# Patient Record
Sex: FEMALE | Race: WHITE | NOT HISPANIC OR LATINO | Employment: OTHER | URBAN - METROPOLITAN AREA
[De-identification: names, ages, dates, MRNs, and addresses within clinical notes are randomized per-mention and may not be internally consistent; named-entity substitution may affect disease eponyms.]

---

## 2017-01-05 ENCOUNTER — OFFICE VISIT (OUTPATIENT)
Dept: PAIN MEDICINE | Facility: CLINIC | Age: 67
End: 2017-01-05

## 2017-01-05 VITALS
SYSTOLIC BLOOD PRESSURE: 112 MMHG | OXYGEN SATURATION: 99 % | BODY MASS INDEX: 31.15 KG/M2 | HEART RATE: 76 BPM | RESPIRATION RATE: 16 BRPM | WEIGHT: 165 LBS | HEIGHT: 61 IN | TEMPERATURE: 97.2 F | DIASTOLIC BLOOD PRESSURE: 59 MMHG
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
## 2017-01-05 DIAGNOSIS — M54.2 NECK PAIN: ICD-10-CM

## 2017-01-05 DIAGNOSIS — Z79.899 ENCOUNTER FOR LONG-TERM (CURRENT) USE OF HIGH-RISK MEDICATION: ICD-10-CM

## 2017-01-05 DIAGNOSIS — G89.21 CHRONIC PAIN DUE TO TRAUMA: Primary | ICD-10-CM

## 2017-01-05 PROCEDURE — 99213 OFFICE O/P EST LOW 20 MIN: CPT | Performed by: NURSE PRACTITIONER

## 2017-01-05 RX ORDER — OXYCODONE AND ACETAMINOPHEN 10; 325 MG/1; MG/1
1 TABLET ORAL EVERY 4 HOURS PRN
Qty: 150 TABLET | Refills: 0 | Status: SHIPPED | OUTPATIENT
Start: 2017-01-05 | End: 2017-02-06 | Stop reason: SDUPTHER

## 2017-01-05 RX ORDER — METHYLPREDNISOLONE 4 MG/1
TABLET ORAL
Qty: 21 TABLET | Refills: 0 | Status: SHIPPED | OUTPATIENT
Start: 2017-01-05 | End: 2017-04-19 | Stop reason: ALTCHOICE
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
## 2017-02-06 ENCOUNTER — OFFICE VISIT (OUTPATIENT)
Dept: PAIN MEDICINE | Facility: CLINIC | Age: 67
End: 2017-02-06

## 2017-02-06 VITALS
WEIGHT: 168 LBS | HEART RATE: 78 BPM | OXYGEN SATURATION: 96 % | SYSTOLIC BLOOD PRESSURE: 115 MMHG | BODY MASS INDEX: 31.72 KG/M2 | HEIGHT: 61 IN | RESPIRATION RATE: 16 BRPM | DIASTOLIC BLOOD PRESSURE: 67 MMHG | TEMPERATURE: 97.8 F

## 2017-02-06 DIAGNOSIS — M79.602 PAIN OF LEFT UPPER EXTREMITY: ICD-10-CM

## 2017-02-06 DIAGNOSIS — M54.2 NECK PAIN: ICD-10-CM

## 2017-02-06 DIAGNOSIS — G89.21 CHRONIC PAIN DUE TO TRAUMA: Primary | ICD-10-CM

## 2017-02-06 DIAGNOSIS — Z79.899 ENCOUNTER FOR LONG-TERM (CURRENT) USE OF HIGH-RISK MEDICATION: ICD-10-CM

## 2017-02-06 LAB
POC AMPHETAMINES: NEGATIVE
POC BARBITURATES: NEGATIVE
POC BENZODIAZEPHINES: POSITIVE
POC COCAINE: NEGATIVE
POC METHADONE: NEGATIVE
POC METHAMPHETAMINE SCREEN URINE: NEGATIVE
POC OPIATES: NEGATIVE
POC OXYCODONE: POSITIVE
POC PHENCYCLIDINE: NEGATIVE
POC PROPOXYPHENE: NEGATIVE
POC THC: NEGATIVE
POC TRICYCLIC ANTIDEPRESSANTS: NEGATIVE

## 2017-02-06 PROCEDURE — 80305 DRUG TEST PRSMV DIR OPT OBS: CPT | Performed by: NURSE PRACTITIONER

## 2017-02-06 PROCEDURE — 99213 OFFICE O/P EST LOW 20 MIN: CPT | Performed by: NURSE PRACTITIONER

## 2017-02-06 RX ORDER — OXYCODONE AND ACETAMINOPHEN 10; 325 MG/1; MG/1
1 TABLET ORAL EVERY 4 HOURS PRN
Qty: 150 TABLET | Refills: 0 | Status: SHIPPED | OUTPATIENT
Start: 2017-02-06 | End: 2017-03-06 | Stop reason: SDUPTHER
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
## 2017-03-03 ENCOUNTER — TRANSCRIBE ORDERS (OUTPATIENT)
Dept: ADMINISTRATIVE | Facility: HOSPITAL | Age: 67
End: 2017-03-03

## 2017-03-03 ENCOUNTER — HOSPITAL ENCOUNTER (OUTPATIENT)
Dept: GENERAL RADIOLOGY | Facility: HOSPITAL | Age: 67
Discharge: HOME | End: 2017-03-03
Admitting: INTERNAL MEDICINE

## 2017-03-03 ENCOUNTER — LAB (OUTPATIENT)
Dept: LAB | Facility: HOSPITAL | Age: 67
End: 2017-03-03

## 2017-03-03 DIAGNOSIS — R07.9 CHEST PAIN, UNSPECIFIED: ICD-10-CM

## 2017-03-03 DIAGNOSIS — J64: Primary | ICD-10-CM

## 2017-03-03 DIAGNOSIS — J64: ICD-10-CM

## 2017-03-03 DIAGNOSIS — R05.9 COUGH: ICD-10-CM

## 2017-03-03 LAB
25(OH)D3 SERPL-MCNC: 19 NG/ML (ref 30–100)
BASOPHILS # BLD AUTO: 0.03 10*3/MM3 (ref 0–0.2)
BASOPHILS NFR BLD AUTO: 0.3 % (ref 0–1.5)
DEPRECATED RDW RBC AUTO: 52.3 FL (ref 37–54)
EOSINOPHIL # BLD AUTO: 0.28 10*3/MM3 (ref 0–0.7)
EOSINOPHIL NFR BLD AUTO: 2.8 % (ref 0.3–6.2)
ERYTHROCYTE [DISTWIDTH] IN BLOOD BY AUTOMATED COUNT: 14.8 % (ref 11.7–13)
FOLATE SERPL-MCNC: >20 NG/ML (ref 4.78–24.2)
HCT VFR BLD AUTO: 37.8 % (ref 35.6–45.5)
HGB BLD-MCNC: 12.2 G/DL (ref 11.9–15.5)
IMM GRANULOCYTES # BLD: 0.07 10*3/MM3 (ref 0–0.03)
IMM GRANULOCYTES NFR BLD: 0.7 % (ref 0–0.5)
LYMPHOCYTES # BLD AUTO: 2.64 10*3/MM3 (ref 0.9–4.8)
LYMPHOCYTES NFR BLD AUTO: 26.8 % (ref 19.6–45.3)
MCH RBC QN AUTO: 31.5 PG (ref 26.9–32)
MCHC RBC AUTO-ENTMCNC: 32.3 G/DL (ref 32.4–36.3)
MCV RBC AUTO: 97.7 FL (ref 80.5–98.2)
MONOCYTES # BLD AUTO: 0.77 10*3/MM3 (ref 0.2–1.2)
MONOCYTES NFR BLD AUTO: 7.8 % (ref 5–12)
NEUTROPHILS # BLD AUTO: 6.07 10*3/MM3 (ref 1.9–8.1)
NEUTROPHILS NFR BLD AUTO: 61.6 % (ref 42.7–76)
PLATELET # BLD AUTO: 415 10*3/MM3 (ref 140–500)
PMV BLD AUTO: 9 FL (ref 6–12)
RBC # BLD AUTO: 3.87 10*6/MM3 (ref 3.9–5.2)
VIT B12 BLD-MCNC: >2000 PG/ML (ref 211–946)
WBC NRBC COR # BLD: 9.86 10*3/MM3 (ref 4.5–10.7)

## 2017-03-03 PROCEDURE — 36415 COLL VENOUS BLD VENIPUNCTURE: CPT

## 2017-03-03 PROCEDURE — 82306 VITAMIN D 25 HYDROXY: CPT | Performed by: INTERNAL MEDICINE

## 2017-03-03 PROCEDURE — 85025 COMPLETE CBC W/AUTO DIFF WBC: CPT | Performed by: INTERNAL MEDICINE

## 2017-03-03 PROCEDURE — 82746 ASSAY OF FOLIC ACID SERUM: CPT | Performed by: INTERNAL MEDICINE

## 2017-03-03 PROCEDURE — 71020 HC CHEST PA AND LATERAL: CPT

## 2017-03-03 PROCEDURE — 82607 VITAMIN B-12: CPT | Performed by: INTERNAL MEDICINE

## 2017-03-06 ENCOUNTER — APPOINTMENT (OUTPATIENT)
Dept: WOMENS IMAGING | Facility: HOSPITAL | Age: 67
End: 2017-03-06

## 2017-03-06 ENCOUNTER — OFFICE VISIT (OUTPATIENT)
Dept: PAIN MEDICINE | Facility: CLINIC | Age: 67
End: 2017-03-06

## 2017-03-06 VITALS
RESPIRATION RATE: 16 BRPM | OXYGEN SATURATION: 99 % | DIASTOLIC BLOOD PRESSURE: 68 MMHG | HEIGHT: 61 IN | BODY MASS INDEX: 32.32 KG/M2 | WEIGHT: 171.2 LBS | HEART RATE: 83 BPM | TEMPERATURE: 97.5 F | SYSTOLIC BLOOD PRESSURE: 122 MMHG

## 2017-03-06 DIAGNOSIS — G89.21 CHRONIC PAIN DUE TO TRAUMA: Primary | ICD-10-CM

## 2017-03-06 DIAGNOSIS — M50.30 DEGENERATIVE DISC DISEASE, CERVICAL: ICD-10-CM

## 2017-03-06 DIAGNOSIS — M54.2 NECK PAIN: ICD-10-CM

## 2017-03-06 DIAGNOSIS — Z79.899 ENCOUNTER FOR LONG-TERM (CURRENT) USE OF HIGH-RISK MEDICATION: ICD-10-CM

## 2017-03-06 PROCEDURE — 77067 SCR MAMMO BI INCL CAD: CPT | Performed by: RADIOLOGY

## 2017-03-06 PROCEDURE — 77063 BREAST TOMOSYNTHESIS BI: CPT | Performed by: RADIOLOGY

## 2017-03-06 PROCEDURE — 99213 OFFICE O/P EST LOW 20 MIN: CPT | Performed by: NURSE PRACTITIONER

## 2017-03-06 RX ORDER — OXYCODONE AND ACETAMINOPHEN 10; 325 MG/1; MG/1
1 TABLET ORAL EVERY 4 HOURS PRN
Qty: 150 TABLET | Refills: 0 | Status: SHIPPED | OUTPATIENT
Start: 2017-03-06 | End: 2017-04-05 | Stop reason: SDUPTHER
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
## 2017-03-22 ENCOUNTER — OFFICE VISIT (OUTPATIENT)
Dept: CARDIOLOGY | Facility: CLINIC | Age: 67
End: 2017-03-22

## 2017-03-22 VITALS
BODY MASS INDEX: 32.28 KG/M2 | HEIGHT: 61 IN | WEIGHT: 171 LBS | DIASTOLIC BLOOD PRESSURE: 64 MMHG | HEART RATE: 69 BPM | SYSTOLIC BLOOD PRESSURE: 118 MMHG

## 2017-03-22 DIAGNOSIS — Z72.0 TOBACCO ABUSE: ICD-10-CM

## 2017-03-22 DIAGNOSIS — R07.2 PRECORDIAL PAIN: Primary | ICD-10-CM

## 2017-03-22 DIAGNOSIS — R06.09 DYSPNEA ON EXERTION: ICD-10-CM

## 2017-03-22 PROCEDURE — 93000 ELECTROCARDIOGRAM COMPLETE: CPT | Performed by: INTERNAL MEDICINE

## 2017-03-22 PROCEDURE — 99214 OFFICE O/P EST MOD 30 MIN: CPT | Performed by: INTERNAL MEDICINE

## 2017-03-22 NOTE — PROGRESS NOTES
"    Subjective:     Encounter Date:03/22/2017      Patient ID: Traci King is a 66 y.o. female.    Chief Complaint:  History of Present Illness     Ms. King is a 66-year-old female with a history of prior right middle lobe resection for carcinoma and chronic dyspnea on exertion who presents for evaluation of chest discomfort.  I last saw the patient in 08/2015 for an evaluation of worsening dyspnea on exertion and chest pain.  We decided to proceed with an echocardiogram and a stress test.  Her echocardiogram revealed normal left ventricular systolic function and wall motion, an ejection fraction of 56%, normal diastolic function and a right ventricular systolic pressure of 30 mmHg.  Her stress test revealed a large area of myocardial infarction in the septal wall.  Based on these abnormal findings we decided to proceed with a cardiac catheterization which was performed on 08/17/2015 and was negative for any coronary artery disease.      Today she again presents for chest discomfort.  The patient reports that she was in [place] shopping when she developed the sudden onset of \"hurting\" in the middle of her chest.  She had some associated mild jaw discomfort with this along with nausea.  She denied any significant change in her shortness of breath.  She denied any diaphoresis.  When she went to check out, the  asked her if she was okay because the patient was very pale.  She decided that if she continued to have these symptoms she would go to the [facility] next door.  Instead, by the time she got to the car her pain had resolved.  In the two weeks since then she has had no further episodes.  She went to see Dr. Cheek and it was unclear if her EKG had changed.  She continues to have her chronic dyspnea on exertion and it has progressed some but she believes that some of this is because she continues to gain weight.  She denies any palpitations, dizziness, lightheadedness, PND, orthopnea, " presyncope or syncope.  She is concerned she may have had a heart attack at that time.          Review of Systems   Constitution: Negative for weakness and malaise/fatigue.   HENT: Negative for headaches, hearing loss, hoarse voice, nosebleeds and sore throat.    Eyes: Negative for pain.   Cardiovascular: Positive for chest pain and dyspnea on exertion. Negative for claudication, cyanosis, irregular heartbeat, leg swelling, near-syncope, orthopnea, palpitations, paroxysmal nocturnal dyspnea and syncope.   Respiratory: Negative for shortness of breath and snoring.    Endocrine: Negative for cold intolerance, heat intolerance, polydipsia, polyphagia and polyuria.   Skin: Negative for itching and rash.   Musculoskeletal: Negative for arthritis, falls, joint pain, joint swelling, muscle cramps, muscle weakness and myalgias.   Gastrointestinal: Negative for constipation, diarrhea, dysphagia, heartburn, hematemesis, hematochezia, melena, nausea and vomiting.   Genitourinary: Negative for frequency, hematuria and hesitancy.   Neurological: Negative for excessive daytime sleepiness, dizziness, light-headedness and numbness.   Psychiatric/Behavioral: Negative for depression. The patient is not nervous/anxious.           Current Outpatient Prescriptions:   •  ARIPiprazole (ABILIFY) 5 MG tablet, Take  by mouth daily., Disp: , Rfl:   •  benzonatate (TESSALON) 100 MG capsule, , Disp: , Rfl:   •  cetirizine (ZyrTEC) 10 MG tablet, Take  by mouth 2 (two) times a day., Disp: , Rfl:   •  meloxicam (MOBIC) 15 MG tablet, Take 1 tablet by mouth Daily., Disp: 90 tablet, Rfl: 3  •  nystatin-triamcinolone (MYCOLOG II) 060054-5.1 UNIT/GM-% cream, , Disp: , Rfl:   •  oxyCODONE-acetaminophen (PERCOCET)  MG per tablet, Take 1 tablet by mouth Every 4 (Four) Hours As Needed for severe pain (7-10) (max 5 per day)., Disp: 150 tablet, Rfl: 0  •  PARoxetine (PAXIL) 20 MG tablet, , Disp: , Rfl:   •  temazepam (RESTORIL) 15 MG capsule, Take  by  "mouth., Disp: , Rfl:   •  VAGIFEM 10 MCG tablet vaginal tablet, , Disp: , Rfl:   •  MethylPREDNISolone (MEDROL, ANY,) 4 MG tablet, Take as directed on package instructions., Disp: 21 tablet, Rfl: 0    Past Medical History:   Diagnosis Date   • Depression    • Joint pain    • Low back pain    • Neck pain    • Torn rotator cuff      Past Surgical History:   Procedure Laterality Date   • BLADDER SURGERY     • BREAST SURGERY     • CARDIAC CATHETERIZATION  08/2015   • EPIDURAL BLOCK     • LUNG LOBECTOMY Right 2013    middle lobe   • ORIF WRIST FRACTURE     • RETINAL DETACHMENT REPAIR     • TONSILLECTOMY     • TUBAL ABDOMINAL LIGATION       History reviewed. No pertinent family history.  Social History   Substance Use Topics   • Smoking status: Current Every Day Smoker     Packs/day: 0.50   • Smokeless tobacco: None   • Alcohol use No           ECG 12 Lead  Date/Time: 3/22/2017 5:01 PM  Performed by: CARA CELESTIN  Authorized by: CARA CELESTIN   Comparison: compared with previous ECG   Rhythm: sinus rhythm  Other findings: PRWP               Objective:         Visit Vitals   • /64 (BP Location: Right arm, Patient Position: Sitting)   • Pulse 69   • Ht 61\" (154.9 cm)   • Wt 171 lb (77.6 kg)   • BMI 32.31 kg/m2          Physical Exam   Constitutional: She is oriented to person, place, and time. She appears well-developed and well-nourished.   HENT:   Head: Normocephalic and atraumatic.   Eyes: Conjunctivae, EOM and lids are normal. Pupils are equal, round, and reactive to light.   Neck: Normal range of motion and full passive range of motion without pain. Neck supple. No JVD present. Carotid bruit is not present.   Cardiovascular: Normal rate, regular rhythm, S1 normal and S2 normal.  Exam reveals no gallop.    No murmur heard.  Pulses:       Radial pulses are 2+ on the right side, and 2+ on the left side.   No bilateral lower extremity edema   Pulmonary/Chest: Effort normal and breath sounds normal.   Abdominal: " Soft. Normal appearance.   Lymphadenopathy:     She has no cervical adenopathy.   Neurological: She is alert and oriented to person, place, and time.   Skin: Skin is warm, dry and intact.   Psychiatric: She has a normal mood and affect.       Lab Review:       Assessment:          Diagnosis Plan   1. Precordial pain  Adult Transthoracic Echo Complete   2. Dyspnea on exertion     3. Tobacco abuse            Plan:        Chest discomfort.  Her symptoms have typical and atypical features.  It is reassuring that she has only had the one episode.  It is also reassuring that approximately one and a half years ago she had normal coronary arteries.  I would be surprised, although it is possible, that she has had progression of disease since then.  We discussed the options of monitoring her symptoms versus proceeding with a stress test and she is okay with monitoring her symptoms for now.  She is still concerned that maybe she had some sort of damage or event that occurred at the time of her episode.  We will proceed with an echocardiogram to see if there is any change in her LV function or wall motion.      We will call the patient and discuss the results of her echocardiogram.  Further workup, management and followup will be based on those results.

## 2017-03-29 ENCOUNTER — HOSPITAL ENCOUNTER (OUTPATIENT)
Dept: CARDIOLOGY | Facility: HOSPITAL | Age: 67
Discharge: HOME | End: 2017-03-29
Attending: INTERNAL MEDICINE | Admitting: INTERNAL MEDICINE

## 2017-03-29 VITALS
DIASTOLIC BLOOD PRESSURE: 42 MMHG | HEIGHT: 61 IN | HEART RATE: 82 BPM | WEIGHT: 171 LBS | SYSTOLIC BLOOD PRESSURE: 104 MMHG | BODY MASS INDEX: 32.28 KG/M2

## 2017-03-29 LAB
ASCENDING AORTA: 2.8 CM
BH CV ECHO MEAS - ACS: 1.9 CM
BH CV ECHO MEAS - AO ROOT AREA (BSA CORRECTED): 1.6
BH CV ECHO MEAS - AO ROOT AREA: 6.2 CM^2
BH CV ECHO MEAS - AO ROOT DIAM: 2.8 CM
BH CV ECHO MEAS - BSA(HAYCOCK): 1.9 M^2
BH CV ECHO MEAS - BSA: 1.8 M^2
BH CV ECHO MEAS - BZI_BMI: 32.1 KILOGRAMS/M^2
BH CV ECHO MEAS - BZI_METRIC_HEIGHT: 154.9 CM
BH CV ECHO MEAS - BZI_METRIC_WEIGHT: 77.1 KG
BH CV ECHO MEAS - CONTRAST EF (2CH): 55 ML/M^2
BH CV ECHO MEAS - CONTRAST EF 4CH: 68 ML/M^2
BH CV ECHO MEAS - EDV(MOD-SP2): 60 ML
BH CV ECHO MEAS - EDV(MOD-SP4): 75 ML
BH CV ECHO MEAS - EDV(TEICH): 126.5 ML
BH CV ECHO MEAS - EF(CUBED): 89.6 %
BH CV ECHO MEAS - EF(MOD-SP2): 55 %
BH CV ECHO MEAS - EF(MOD-SP4): 68 %
BH CV ECHO MEAS - EF(TEICH): 83.7 %
BH CV ECHO MEAS - ESV(MOD-SP2): 27 ML
BH CV ECHO MEAS - ESV(MOD-SP4): 24 ML
BH CV ECHO MEAS - ESV(TEICH): 20.6 ML
BH CV ECHO MEAS - FS: 53 %
BH CV ECHO MEAS - IVS/LVPW: 1
BH CV ECHO MEAS - IVSD: 0.92 CM
BH CV ECHO MEAS - LAT PEAK E' VEL: 10 CM/SEC
BH CV ECHO MEAS - LV DIASTOLIC VOL/BSA (35-75): 42.5 ML/M^2
BH CV ECHO MEAS - LV MASS(C)D: 171.4 GRAMS
BH CV ECHO MEAS - LV MASS(C)DI: 97.2 GRAMS/M^2
BH CV ECHO MEAS - LV SYSTOLIC VOL/BSA (12-30): 13.6 ML/M^2
BH CV ECHO MEAS - LVIDD: 5.1 CM
BH CV ECHO MEAS - LVIDS: 2.4 CM
BH CV ECHO MEAS - LVLD AP2: 6.3 CM
BH CV ECHO MEAS - LVLD AP4: 6.5 CM
BH CV ECHO MEAS - LVLS AP2: 5.1 CM
BH CV ECHO MEAS - LVLS AP4: 5.4 CM
BH CV ECHO MEAS - LVOT AREA (M): 2.5 CM^2
BH CV ECHO MEAS - LVOT AREA: 2.5 CM^2
BH CV ECHO MEAS - LVOT DIAM: 1.8 CM
BH CV ECHO MEAS - LVPWD: 0.92 CM
BH CV ECHO MEAS - MED PEAK E' VEL: 8 CM/SEC
BH CV ECHO MEAS - MR MAX PG: 41 MMHG
BH CV ECHO MEAS - MR MAX VEL: 320.1 CM/SEC
BH CV ECHO MEAS - MV A DUR: 0.1 SEC
BH CV ECHO MEAS - MV A MAX VEL: 76.2 CM/SEC
BH CV ECHO MEAS - MV DEC SLOPE: 452.6 CM/SEC^2
BH CV ECHO MEAS - MV DEC TIME: 0.17 SEC
BH CV ECHO MEAS - MV E MAX VEL: 78.6 CM/SEC
BH CV ECHO MEAS - MV E/A: 1
BH CV ECHO MEAS - MV MAX PG: 4.7 MMHG
BH CV ECHO MEAS - MV MEAN PG: 2 MMHG
BH CV ECHO MEAS - MV P1/2T MAX VEL: 79.1 CM/SEC
BH CV ECHO MEAS - MV P1/2T: 51.2 MSEC
BH CV ECHO MEAS - MV V2 MAX: 108.6 CM/SEC
BH CV ECHO MEAS - MV V2 MEAN: 67.8 CM/SEC
BH CV ECHO MEAS - MV V2 VTI: 32.2 CM
BH CV ECHO MEAS - MVA P1/2T LCG: 2.8 CM^2
BH CV ECHO MEAS - MVA(P1/2T): 4.3 CM^2
BH CV ECHO MEAS - PA ACC TIME: 0.13 SEC
BH CV ECHO MEAS - PA MAX PG (FULL): 1.3 MMHG
BH CV ECHO MEAS - PA MAX PG: 2.7 MMHG
BH CV ECHO MEAS - PA PR(ACCEL): 20.4 MMHG
BH CV ECHO MEAS - PA V2 MAX: 81.4 CM/SEC
BH CV ECHO MEAS - PULM A REVS DUR: 0.1 SEC
BH CV ECHO MEAS - PULM A REVS VEL: 25.2 CM/SEC
BH CV ECHO MEAS - PULM DIAS VEL: 52.4 CM/SEC
BH CV ECHO MEAS - PULM S/D: 1.1
BH CV ECHO MEAS - PULM SYS VEL: 56.8 CM/SEC
BH CV ECHO MEAS - PVA(V,A): 1.9 CM^2
BH CV ECHO MEAS - PVA(V,D): 1.9 CM^2
BH CV ECHO MEAS - RV MAX PG: 1.4 MMHG
BH CV ECHO MEAS - RV MEAN PG: 0.85 MMHG
BH CV ECHO MEAS - RV V1 MAX: 59.2 CM/SEC
BH CV ECHO MEAS - RV V1 MEAN: 43.7 CM/SEC
BH CV ECHO MEAS - RV V1 VTI: 14.8 CM
BH CV ECHO MEAS - RVOT AREA: 2.7 CM^2
BH CV ECHO MEAS - RVOT DIAM: 1.8 CM
BH CV ECHO MEAS - SI(CUBED): 69.3 ML/M^2
BH CV ECHO MEAS - SI(MOD-SP2): 18.7 ML/M^2
BH CV ECHO MEAS - SI(MOD-SP4): 28.9 ML/M^2
BH CV ECHO MEAS - SI(TEICH): 60.1 ML/M^2
BH CV ECHO MEAS - SV(CUBED): 122.2 ML
BH CV ECHO MEAS - SV(MOD-SP2): 33 ML
BH CV ECHO MEAS - SV(MOD-SP4): 51 ML
BH CV ECHO MEAS - SV(RVOT): 39.5 ML
BH CV ECHO MEAS - SV(TEICH): 105.9 ML
BH CV ECHO MEAS - TAPSE (>1.6): 2.9 CM2
BH CV ECHO MEAS - TR MAX VEL: 190 CM/SEC
BH CV XLRA - RV BASE: 2.4 CM
BH CV XLRA - TDI S': 13 CM/SEC
E/E' RATIO: 9
LEFT ATRIUM VOLUME INDEX: 14 ML/M2
LEFT ATRIUM VOLUME: 25 CM3
SINUS: 2.6 CM
STJ: 2.2 CM

## 2017-03-29 PROCEDURE — 25010000002 PERFLUTREN (DEFINITY) 8.476 MG IN SODIUM CHLORIDE 10 ML INJECTION: Performed by: INTERNAL MEDICINE

## 2017-03-29 PROCEDURE — 93306 TTE W/DOPPLER COMPLETE: CPT | Performed by: INTERNAL MEDICINE

## 2017-03-29 PROCEDURE — C8929 TTE W OR WO FOL WCON,DOPPLER: HCPCS

## 2017-03-29 RX ADMIN — PERFLUTREN 1.5 ML: 6.52 INJECTION, SUSPENSION INTRAVENOUS at 08:33

## 2017-03-31 ENCOUNTER — TELEPHONE (OUTPATIENT)
Dept: CARDIOLOGY | Facility: CLINIC | Age: 67
End: 2017-03-31

## 2017-03-31 DIAGNOSIS — R07.89 OTHER CHEST PAIN: Primary | ICD-10-CM

## 2017-04-05 ENCOUNTER — OFFICE VISIT (OUTPATIENT)
Dept: PAIN MEDICINE | Facility: CLINIC | Age: 67
End: 2017-04-05

## 2017-04-05 VITALS
HEART RATE: 89 BPM | TEMPERATURE: 98 F | SYSTOLIC BLOOD PRESSURE: 99 MMHG | RESPIRATION RATE: 18 BRPM | DIASTOLIC BLOOD PRESSURE: 61 MMHG | HEIGHT: 61 IN | BODY MASS INDEX: 32.02 KG/M2 | WEIGHT: 169.6 LBS | OXYGEN SATURATION: 93 %

## 2017-04-05 DIAGNOSIS — M50.30 DEGENERATIVE DISC DISEASE, CERVICAL: ICD-10-CM

## 2017-04-05 DIAGNOSIS — Z79.899 ENCOUNTER FOR LONG-TERM (CURRENT) USE OF HIGH-RISK MEDICATION: ICD-10-CM

## 2017-04-05 DIAGNOSIS — G89.21 CHRONIC PAIN DUE TO TRAUMA: Primary | ICD-10-CM

## 2017-04-05 DIAGNOSIS — M54.2 NECK PAIN: ICD-10-CM

## 2017-04-05 PROCEDURE — 99214 OFFICE O/P EST MOD 30 MIN: CPT | Performed by: NURSE PRACTITIONER

## 2017-04-05 RX ORDER — OXYCODONE AND ACETAMINOPHEN 10; 325 MG/1; MG/1
1 TABLET ORAL EVERY 4 HOURS PRN
Qty: 150 TABLET | Refills: 0 | Status: SHIPPED | OUTPATIENT
Start: 2017-04-05 | End: 2017-04-19
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
## 2017-04-12 ENCOUNTER — TELEPHONE (OUTPATIENT)
Dept: CARDIOLOGY | Facility: CLINIC | Age: 67
End: 2017-04-12

## 2017-04-12 ENCOUNTER — HOSPITAL ENCOUNTER (OUTPATIENT)
Dept: CARDIOLOGY | Facility: HOSPITAL | Age: 67
Discharge: HOME | End: 2017-04-12
Attending: INTERNAL MEDICINE | Admitting: INTERNAL MEDICINE

## 2017-04-12 ENCOUNTER — TELEPHONE (OUTPATIENT)
Dept: PAIN MEDICINE | Facility: CLINIC | Age: 67
End: 2017-04-12

## 2017-04-12 DIAGNOSIS — R07.89 OTHER CHEST PAIN: ICD-10-CM

## 2017-04-12 DIAGNOSIS — R94.39 ABNORMAL STRESS TEST: Primary | ICD-10-CM

## 2017-04-12 LAB
BH CV NUCLEAR PRIOR STUDY: 3
BH CV STRESS BP STAGE 1: NORMAL
BH CV STRESS COMMENTS STAGE 1: NORMAL
BH CV STRESS DOSE REGADENOSON STAGE 1: 0.4
BH CV STRESS DURATION MIN STAGE 1: 0
BH CV STRESS DURATION SEC STAGE 1: 15
BH CV STRESS HR STAGE 1: 111
BH CV STRESS PROTOCOL 1: NORMAL
BH CV STRESS RECOVERY BP: NORMAL MMHG
BH CV STRESS RECOVERY HR: 100 BPM
BH CV STRESS STAGE 1: 1
LV EF NUC BP: 72 %
MAXIMAL PREDICTED HEART RATE: 156 BPM
PERCENT MAX PREDICTED HR: 71.15 %
STRESS BASELINE BP: NORMAL MMHG
STRESS BASELINE HR: 74 BPM
STRESS PERCENT HR: 84 %
STRESS POST EXERCISE DUR SEC: 15 SEC
STRESS POST PEAK BP: NORMAL MMHG
STRESS POST PEAK HR: 111 BPM
STRESS TARGET HR: 133 BPM

## 2017-04-12 PROCEDURE — 0 TECHNETIUM TETROFOSMIN KIT: Performed by: INTERNAL MEDICINE

## 2017-04-12 PROCEDURE — 78452 HT MUSCLE IMAGE SPECT MULT: CPT

## 2017-04-12 PROCEDURE — 93017 CV STRESS TEST TRACING ONLY: CPT

## 2017-04-12 PROCEDURE — 93018 CV STRESS TEST I&R ONLY: CPT | Performed by: INTERNAL MEDICINE

## 2017-04-12 PROCEDURE — 93016 CV STRESS TEST SUPVJ ONLY: CPT | Performed by: INTERNAL MEDICINE

## 2017-04-12 PROCEDURE — 78452 HT MUSCLE IMAGE SPECT MULT: CPT | Performed by: INTERNAL MEDICINE

## 2017-04-12 PROCEDURE — A9502 TC99M TETROFOSMIN: HCPCS | Performed by: INTERNAL MEDICINE

## 2017-04-12 PROCEDURE — 25010000002 REGADENOSON 0.4 MG/5ML SOLUTION: Performed by: INTERNAL MEDICINE

## 2017-04-12 RX ADMIN — TETROFOSMIN 1 DOSE: 1.38 INJECTION, POWDER, LYOPHILIZED, FOR SOLUTION INTRAVENOUS at 08:50

## 2017-04-12 RX ADMIN — REGADENOSON 0.4 MG: 0.08 INJECTION, SOLUTION INTRAVENOUS at 09:55

## 2017-04-12 RX ADMIN — TETROFOSMIN 1 DOSE: 1.38 INJECTION, POWDER, LYOPHILIZED, FOR SOLUTION INTRAVENOUS at 09:55

## 2017-04-12 NOTE — TELEPHONE ENCOUNTER
Called and discussed stress test results which corresponds with abnormal wall motion on echocardiogram.    Recommend cardiac catheterization.  Explained procedure, risks, and benefits and she agrees to proceed.  She denies any further chest discomfort.  Just dyspnea on exertion.    Order placed.  Please schedule for within the next 1-2 weeks.

## 2017-04-12 NOTE — TELEPHONE ENCOUNTER
FYI - Patient called and states she had a stress test after her heart attack and was abnormal. She is awaiting a call to schedule a heart cath and is looking at blood thinners as well.

## 2017-04-17 ENCOUNTER — TELEPHONE (OUTPATIENT)
Dept: CARDIOLOGY | Facility: CLINIC | Age: 67
End: 2017-04-17

## 2017-04-17 ENCOUNTER — TRANSCRIBE ORDERS (OUTPATIENT)
Dept: LAB | Facility: HOSPITAL | Age: 67
End: 2017-04-17

## 2017-04-17 ENCOUNTER — LAB (OUTPATIENT)
Dept: LAB | Facility: HOSPITAL | Age: 67
End: 2017-04-17
Attending: INTERNAL MEDICINE

## 2017-04-17 DIAGNOSIS — Z13.6 SCREENING FOR ISCHEMIC HEART DISEASE: ICD-10-CM

## 2017-04-17 DIAGNOSIS — Z01.810 PRE-OPERATIVE CARDIOVASCULAR EXAMINATION: ICD-10-CM

## 2017-04-17 DIAGNOSIS — R94.39 OTHER NONSPECIFIC ABNORMAL CARDIOVASCULAR SYSTEM FUNCTION STUDY: ICD-10-CM

## 2017-04-17 DIAGNOSIS — Z01.810 PRE-OPERATIVE CARDIOVASCULAR EXAMINATION: Primary | ICD-10-CM

## 2017-04-17 LAB
ANION GAP SERPL CALCULATED.3IONS-SCNC: 9.8 MMOL/L
BASOPHILS # BLD AUTO: 0.04 10*3/MM3 (ref 0–0.2)
BASOPHILS NFR BLD AUTO: 0.4 % (ref 0–1.5)
BUN BLD-MCNC: 16 MG/DL (ref 8–23)
BUN/CREAT SERPL: 16.5 (ref 7–25)
CALCIUM SPEC-SCNC: 9.3 MG/DL (ref 8.6–10.5)
CHLORIDE SERPL-SCNC: 101 MMOL/L (ref 98–107)
CO2 SERPL-SCNC: 27.2 MMOL/L (ref 22–29)
CREAT BLD-MCNC: 0.97 MG/DL (ref 0.57–1)
DEPRECATED RDW RBC AUTO: 50.5 FL (ref 37–54)
EOSINOPHIL # BLD AUTO: 0.22 10*3/MM3 (ref 0–0.7)
EOSINOPHIL NFR BLD AUTO: 2.4 % (ref 0.3–6.2)
ERYTHROCYTE [DISTWIDTH] IN BLOOD BY AUTOMATED COUNT: 14.2 % (ref 11.7–13)
GFR SERPL CREATININE-BSD FRML MDRD: 58 ML/MIN/1.73
GLUCOSE BLD-MCNC: 99 MG/DL (ref 65–99)
HCT VFR BLD AUTO: 38.4 % (ref 35.6–45.5)
HGB BLD-MCNC: 12.6 G/DL (ref 11.9–15.5)
IMM GRANULOCYTES # BLD: 0.04 10*3/MM3 (ref 0–0.03)
IMM GRANULOCYTES NFR BLD: 0.4 % (ref 0–0.5)
LYMPHOCYTES # BLD AUTO: 2.39 10*3/MM3 (ref 0.9–4.8)
LYMPHOCYTES NFR BLD AUTO: 26.6 % (ref 19.6–45.3)
MCH RBC QN AUTO: 31.5 PG (ref 26.9–32)
MCHC RBC AUTO-ENTMCNC: 32.8 G/DL (ref 32.4–36.3)
MCV RBC AUTO: 96 FL (ref 80.5–98.2)
MONOCYTES # BLD AUTO: 0.54 10*3/MM3 (ref 0.2–1.2)
MONOCYTES NFR BLD AUTO: 6 % (ref 5–12)
NEUTROPHILS # BLD AUTO: 5.75 10*3/MM3 (ref 1.9–8.1)
NEUTROPHILS NFR BLD AUTO: 64.2 % (ref 42.7–76)
PLATELET # BLD AUTO: 317 10*3/MM3 (ref 140–500)
PMV BLD AUTO: 9.2 FL (ref 6–12)
POTASSIUM BLD-SCNC: 4.5 MMOL/L (ref 3.5–5.2)
RBC # BLD AUTO: 4 10*6/MM3 (ref 3.9–5.2)
SODIUM BLD-SCNC: 138 MMOL/L (ref 136–145)
WBC NRBC COR # BLD: 8.98 10*3/MM3 (ref 4.5–10.7)

## 2017-04-17 PROCEDURE — 80048 BASIC METABOLIC PNL TOTAL CA: CPT

## 2017-04-17 PROCEDURE — 85025 COMPLETE CBC W/AUTO DIFF WBC: CPT

## 2017-04-17 PROCEDURE — 36415 COLL VENOUS BLD VENIPUNCTURE: CPT

## 2017-04-17 NOTE — TELEPHONE ENCOUNTER
Patient called, informing me that she is scheduled for a cath on Thursday4/20.  Patient said her work would like to know if she will be able to return to work on Friday April 21.  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  Patient said she is not worried about when she goes back to work but her boss is insistent she find out now how long she needs to be off for.  Aidee

## 2017-04-17 NOTE — TELEPHONE ENCOUNTER
I called and told her she would not be able to work Friday.  For Saturday we would have to wait and see what is find on cath.

## 2017-04-19 RX ORDER — OXYCODONE AND ACETAMINOPHEN 10; 325 MG/1; MG/1
1 TABLET ORAL EVERY 6 HOURS PRN
COMMUNITY
End: 2017-05-03 | Stop reason: SDUPTHER

## 2017-04-20 ENCOUNTER — HOSPITAL ENCOUNTER (OUTPATIENT)
Facility: HOSPITAL | Age: 67
Setting detail: HOSPITAL OUTPATIENT SURGERY
Discharge: HOME | End: 2017-04-20
Attending: INTERNAL MEDICINE | Admitting: INTERNAL MEDICINE

## 2017-04-20 VITALS
TEMPERATURE: 98.2 F | HEIGHT: 62 IN | OXYGEN SATURATION: 96 % | BODY MASS INDEX: 31.28 KG/M2 | DIASTOLIC BLOOD PRESSURE: 60 MMHG | RESPIRATION RATE: 16 BRPM | WEIGHT: 170 LBS | SYSTOLIC BLOOD PRESSURE: 122 MMHG | HEART RATE: 74 BPM

## 2017-04-20 DIAGNOSIS — R94.39 ABNORMAL STRESS TEST: ICD-10-CM

## 2017-04-20 PROCEDURE — C1769 GUIDE WIRE: HCPCS | Performed by: INTERNAL MEDICINE

## 2017-04-20 PROCEDURE — C1894 INTRO/SHEATH, NON-LASER: HCPCS | Performed by: INTERNAL MEDICINE

## 2017-04-20 PROCEDURE — 93458 L HRT ARTERY/VENTRICLE ANGIO: CPT | Performed by: INTERNAL MEDICINE

## 2017-04-20 PROCEDURE — 25010000002 MIDAZOLAM PER 1 MG: Performed by: INTERNAL MEDICINE

## 2017-04-20 PROCEDURE — 25010000002 HEPARIN (PORCINE) PER 1000 UNITS: Performed by: INTERNAL MEDICINE

## 2017-04-20 PROCEDURE — 0 IOPAMIDOL PER 1 ML: Performed by: INTERNAL MEDICINE

## 2017-04-20 PROCEDURE — 25010000002 FENTANYL CITRATE (PF) 100 MCG/2ML SOLUTION: Performed by: INTERNAL MEDICINE

## 2017-04-20 RX ORDER — LIDOCAINE HYDROCHLORIDE 10 MG/ML
0.1 INJECTION, SOLUTION EPIDURAL; INFILTRATION; INTRACAUDAL; PERINEURAL ONCE AS NEEDED
Status: DISCONTINUED | OUTPATIENT
Start: 2017-04-20 | End: 2017-04-20 | Stop reason: HOSPADM

## 2017-04-20 RX ORDER — ERGOCALCIFEROL 1.25 MG/1
50000 CAPSULE ORAL WEEKLY
COMMUNITY
End: 2018-09-24

## 2017-04-20 RX ORDER — ASPIRIN 81 MG/1
81 TABLET ORAL DAILY
COMMUNITY
End: 2018-02-14 | Stop reason: ALTCHOICE

## 2017-04-20 RX ORDER — MIDAZOLAM HYDROCHLORIDE 1 MG/ML
INJECTION INTRAMUSCULAR; INTRAVENOUS AS NEEDED
Status: DISCONTINUED | OUTPATIENT
Start: 2017-04-20 | End: 2017-04-20 | Stop reason: HOSPADM

## 2017-04-20 RX ORDER — FENTANYL CITRATE 50 UG/ML
INJECTION, SOLUTION INTRAMUSCULAR; INTRAVENOUS AS NEEDED
Status: DISCONTINUED | OUTPATIENT
Start: 2017-04-20 | End: 2017-04-20 | Stop reason: HOSPADM

## 2017-04-20 RX ORDER — SODIUM CHLORIDE 9 MG/ML
75 INJECTION, SOLUTION INTRAVENOUS CONTINUOUS
Status: DISCONTINUED | OUTPATIENT
Start: 2017-04-20 | End: 2017-04-20 | Stop reason: HOSPADM

## 2017-04-20 RX ORDER — SODIUM CHLORIDE 0.9 % (FLUSH) 0.9 %
1-10 SYRINGE (ML) INJECTION AS NEEDED
Status: CANCELLED | OUTPATIENT
Start: 2017-04-20

## 2017-04-20 RX ORDER — LIDOCAINE HYDROCHLORIDE 20 MG/ML
INJECTION, SOLUTION INFILTRATION; PERINEURAL AS NEEDED
Status: DISCONTINUED | OUTPATIENT
Start: 2017-04-20 | End: 2017-04-20 | Stop reason: HOSPADM

## 2017-04-20 RX ORDER — SODIUM CHLORIDE 0.9 % (FLUSH) 0.9 %
1-10 SYRINGE (ML) INJECTION AS NEEDED
Status: DISCONTINUED | OUTPATIENT
Start: 2017-04-20 | End: 2017-04-20 | Stop reason: HOSPADM

## 2017-04-20 RX ADMIN — SODIUM CHLORIDE 75 ML/HR: 9 INJECTION, SOLUTION INTRAVENOUS at 07:23

## 2017-04-20 NOTE — DISCHARGE INSTRUCTIONS
[facility]  [address] [place] [address]    Coronary Angiogram with Stent (Radial Approach) After Care    Refer to this sheet in the next few weeks. These instructions provide you with information on caring for yourself after your procedure. Your health care provider may also give you more specific instructions. Your treatment has been planned according to current medical practices, but problems sometimes occur. Call your health care provider if you have any problems or questions after your procedure.       Home Care Instructions:  · You may shower the day after the procedure. Remove the bandage (dressing) and gently wash the site with plain soap and water. Gently pat the site dry. You may apply a band aid daily for 2 days if desired.    · Do not apply powder or lotion to the site.  · Do not submerge the affected site in water for 3 to 5 days or until the site is completely healed.   · Do not flex or bend the affected arm for 24 hours.  · Do not lift, push or pull anything over 10 pounds for 2 days after your procedure.  · Do not operate machinery or power tools for 24 hours.  · Inspect the site at least twice daily. You may notice some bruising at the site and it may be tender for 1 to 2 weeks.     · Increase your fluid intake for the next 2 days.    · Keep arm elevated for 24 hours.  For the remainder of the day, keep your arm in the “Pledge of Allegiance” position when up and about.    · Limit your activity for the next 48 hours and avoid strenuous activity as directed by your physician.   · Cardiac Rehab may or may not be ordered.  Please consult with your physician  · You may drive 24 hours after the procedure unless otherwise instructed by your caregiver.  · A responsible adult should be with you for the first 24 hours after you arrive home.   · Do not make any important legal decisions or sign legal papers for 24 hours.    · Take medicines only as directed by your health care provider.   Blood thinners may be prescribed after your procedure to improve blood flow through the stent.    · Eat a heart-healthy diet. This should include plenty of fresh fruits and vegetables. Meat should be lean cuts. Avoid the following types of food:    ¨ Food that is high in salt.    ¨ Canned or highly processed food.    ¨ Food that is high in saturated fat or sugar.    ¨ Fried food.    · Make any other lifestyle changes recommended by your health care provider. This may include:    ¨ Not using any tobacco products including cigarettes, chewing tobacco, or electronic cigarettes.   ¨ Managing your weight.    ¨ Getting regular exercise.    ¨ Managing your blood pressure.    ¨ Limiting your alcohol intake.    ¨ Managing other health problems, such as diabetes.    · If you need an MRI after your heart stent was placed, be sure to tell the health care provider who orders the MRI that you have a heart stent.    · Keep all follow-up visits as directed by your health care provider.    ·   Call Your Doctor If:  · You have unusual pain at the radial/ulnar (wrist) site.  · You have redness, warmth, swelling, or pain at the radial/ulnar (wrist) site.  · You have drainage (other than a small amount of blood on the dressing).  · `You have chills or a fever > 101.  · Your arm becomes pale or dark, cool, tingly, or numb.  · You develop chest pain, shortness of breath, feel faint or pass out.    · You have heavy bleeding from the site, hold pressure on the site for 20 minutes.  If the bleeding stops, apply a fresh bandage and call your cardiologist.  However, if you continue to have bleeding, call 911.        Make Sure You:   · Understand these instructions.  · Will watch your condition.  · Will get help right away if you are not doing well or get worse.

## 2017-04-20 NOTE — PLAN OF CARE
Problem: Patient Care Overview (Adult)  Goal: Discharge Needs Assessment  Outcome: Outcome(s) achieved Date Met:  04/20/17 04/20/17 1030   Discharge Needs Assessment   Concerns To Be Addressed denies needs/concerns at this time

## 2017-04-20 NOTE — PLAN OF CARE
Problem: Patient Care Overview (Adult)  Goal: Plan of Care Review  Outcome: Outcome(s) achieved Date Met:  04/20/17 04/20/17 1039   Coping/Psychosocial Response Interventions   Plan Of Care Reviewed With patient;spouse   Patient Care Overview   Progress improving   Outcome Evaluation   Outcome Summary/Follow up Plan READY FOR DISCHARGE

## 2017-04-20 NOTE — INTERVAL H&P NOTE
H&P updated. The patient was examined and the following changes are noted:  echo with septal hypokinesis and stress test with septal infarct and sean-infarct ischemia.  For cardiac catheterization today.  Patient and family given opportunity to ask questions.

## 2017-04-20 NOTE — H&P (VIEW-ONLY)
"    Subjective:     Encounter Date:03/22/2017      Patient ID: Traci King is a 66 y.o. female.    Chief Complaint:  History of Present Illness     Ms. King is a 66-year-old female with a history of prior right middle lobe resection for carcinoma and chronic dyspnea on exertion who presents for evaluation of chest discomfort.  I last saw the patient in 08/2015 for an evaluation of worsening dyspnea on exertion and chest pain.  We decided to proceed with an echocardiogram and a stress test.  Her echocardiogram revealed normal left ventricular systolic function and wall motion, an ejection fraction of 56%, normal diastolic function and a right ventricular systolic pressure of 30 mmHg.  Her stress test revealed a large area of myocardial infarction in the septal wall.  Based on these abnormal findings we decided to proceed with a cardiac catheterization which was performed on 08/17/2015 and was negative for any coronary artery disease.      Today she again presents for chest discomfort.  The patient reports that she was in [employer] shopping when she developed the sudden onset of \"hurting\" in the middle of her chest.  She had some associated mild jaw discomfort with this along with nausea.  She denied any significant change in her shortness of breath.  She denied any diaphoresis.  When she went to check out, the  asked her if she was okay because the patient was very pale.  She decided that if she continued to have these symptoms she would go to the [facility] next door.  Instead, by the time she got to the car her pain had resolved.  In the two weeks since then she has had no further episodes.  She went to see Dr. Cheek and it was unclear if her EKG had changed.  She continues to have her chronic dyspnea on exertion and it has progressed some but she believes that some of this is because she continues to gain weight.  She denies any palpitations, dizziness, lightheadedness, PND, orthopnea, " presyncope or syncope.  She is concerned she may have had a heart attack at that time.          Review of Systems   Constitution: Negative for weakness and malaise/fatigue.   HENT: Negative for headaches, hearing loss, hoarse voice, nosebleeds and sore throat.    Eyes: Negative for pain.   Cardiovascular: Positive for chest pain and dyspnea on exertion. Negative for claudication, cyanosis, irregular heartbeat, leg swelling, near-syncope, orthopnea, palpitations, paroxysmal nocturnal dyspnea and syncope.   Respiratory: Negative for shortness of breath and snoring.    Endocrine: Negative for cold intolerance, heat intolerance, polydipsia, polyphagia and polyuria.   Skin: Negative for itching and rash.   Musculoskeletal: Negative for arthritis, falls, joint pain, joint swelling, muscle cramps, muscle weakness and myalgias.   Gastrointestinal: Negative for constipation, diarrhea, dysphagia, heartburn, hematemesis, hematochezia, melena, nausea and vomiting.   Genitourinary: Negative for frequency, hematuria and hesitancy.   Neurological: Negative for excessive daytime sleepiness, dizziness, light-headedness and numbness.   Psychiatric/Behavioral: Negative for depression. The patient is not nervous/anxious.           Current Outpatient Prescriptions:   •  ARIPiprazole (ABILIFY) 5 MG tablet, Take  by mouth daily., Disp: , Rfl:   •  benzonatate (TESSALON) 100 MG capsule, , Disp: , Rfl:   •  cetirizine (ZyrTEC) 10 MG tablet, Take  by mouth 2 (two) times a day., Disp: , Rfl:   •  meloxicam (MOBIC) 15 MG tablet, Take 1 tablet by mouth Daily., Disp: 90 tablet, Rfl: 3  •  nystatin-triamcinolone (MYCOLOG II) 260927-2.1 UNIT/GM-% cream, , Disp: , Rfl:   •  oxyCODONE-acetaminophen (PERCOCET)  MG per tablet, Take 1 tablet by mouth Every 4 (Four) Hours As Needed for severe pain (7-10) (max 5 per day)., Disp: 150 tablet, Rfl: 0  •  PARoxetine (PAXIL) 20 MG tablet, , Disp: , Rfl:   •  temazepam (RESTORIL) 15 MG capsule, Take  by  "mouth., Disp: , Rfl:   •  VAGIFEM 10 MCG tablet vaginal tablet, , Disp: , Rfl:   •  MethylPREDNISolone (MEDROL, ANY,) 4 MG tablet, Take as directed on package instructions., Disp: 21 tablet, Rfl: 0    Past Medical History:   Diagnosis Date   • Depression    • Joint pain    • Low back pain    • Neck pain    • Torn rotator cuff      Past Surgical History:   Procedure Laterality Date   • BLADDER SURGERY     • BREAST SURGERY     • CARDIAC CATHETERIZATION  08/2015   • EPIDURAL BLOCK     • LUNG LOBECTOMY Right 2013    middle lobe   • ORIF WRIST FRACTURE     • RETINAL DETACHMENT REPAIR     • TONSILLECTOMY     • TUBAL ABDOMINAL LIGATION       History reviewed. No pertinent family history.  Social History   Substance Use Topics   • Smoking status: Current Every Day Smoker     Packs/day: 0.50   • Smokeless tobacco: None   • Alcohol use No           ECG 12 Lead  Date/Time: 3/22/2017 5:01 PM  Performed by: CARA CELESTIN  Authorized by: CARA CELESTIN   Comparison: compared with previous ECG   Rhythm: sinus rhythm  Other findings: PRWP               Objective:         Visit Vitals   • /64 (BP Location: Right arm, Patient Position: Sitting)   • Pulse 69   • Ht 61\" (154.9 cm)   • Wt 171 lb (77.6 kg)   • BMI 32.31 kg/m2          Physical Exam   Constitutional: She is oriented to person, place, and time. She appears well-developed and well-nourished.   HENT:   Head: Normocephalic and atraumatic.   Eyes: Conjunctivae, EOM and lids are normal. Pupils are equal, round, and reactive to light.   Neck: Normal range of motion and full passive range of motion without pain. Neck supple. No JVD present. Carotid bruit is not present.   Cardiovascular: Normal rate, regular rhythm, S1 normal and S2 normal.  Exam reveals no gallop.    No murmur heard.  Pulses:       Radial pulses are 2+ on the right side, and 2+ on the left side.   No bilateral lower extremity edema   Pulmonary/Chest: Effort normal and breath sounds normal.   Abdominal: " Soft. Normal appearance.   Lymphadenopathy:     She has no cervical adenopathy.   Neurological: She is alert and oriented to person, place, and time.   Skin: Skin is warm, dry and intact.   Psychiatric: She has a normal mood and affect.       Lab Review:       Assessment:          Diagnosis Plan   1. Precordial pain  Adult Transthoracic Echo Complete   2. Dyspnea on exertion     3. Tobacco abuse            Plan:        Chest discomfort.  Her symptoms have typical and atypical features.  It is reassuring that she has only had the one episode.  It is also reassuring that approximately one and a half years ago she had normal coronary arteries.  I would be surprised, although it is possible, that she has had progression of disease since then.  We discussed the options of monitoring her symptoms versus proceeding with a stress test and she is okay with monitoring her symptoms for now.  She is still concerned that maybe she had some sort of damage or event that occurred at the time of her episode.  We will proceed with an echocardiogram to see if there is any change in her LV function or wall motion.      We will call the patient and discuss the results of her echocardiogram.  Further workup, management and followup will be based on those results.

## 2017-04-20 NOTE — PLAN OF CARE
Problem: Cardiac Catheterization with/without PCI (Adult)  Goal: Signs and Symptoms of Listed Potential Problems Will be Absent or Manageable (Cardiac Catheterization with/without PCI)  Outcome: Outcome(s) achieved Date Met:  04/20/17 04/20/17 1037   Cardiac Catheterization with/without PCI   Problems Assessed (Cardiac Catheterization) all   Problems Present (Cardiac Catheterization) none

## 2017-04-20 NOTE — NURSING NOTE
DR. CELESTIN HERE TO CHECK ON PT RASH. INSTRUCTED PT TO USE OC BENADRYL IF NEEDED AND TO CALL HER FOR FURTHER PROBLEMS

## 2017-04-20 NOTE — PLAN OF CARE
Problem: Patient Care Overview (Adult)  Goal: Adult Individualization and Mutuality  Outcome: Outcome(s) achieved Date Met:  04/20/17

## 2017-05-03 ENCOUNTER — OFFICE VISIT (OUTPATIENT)
Dept: PAIN MEDICINE | Facility: CLINIC | Age: 67
End: 2017-05-03

## 2017-05-03 VITALS
SYSTOLIC BLOOD PRESSURE: 101 MMHG | RESPIRATION RATE: 14 BRPM | HEART RATE: 84 BPM | OXYGEN SATURATION: 95 % | DIASTOLIC BLOOD PRESSURE: 65 MMHG | TEMPERATURE: 97.8 F | HEIGHT: 62 IN | WEIGHT: 172.2 LBS | BODY MASS INDEX: 31.69 KG/M2

## 2017-05-03 DIAGNOSIS — M47.812 CERVICAL FACET JOINT SYNDROME: ICD-10-CM

## 2017-05-03 DIAGNOSIS — M50.30 DEGENERATIVE DISC DISEASE, CERVICAL: ICD-10-CM

## 2017-05-03 DIAGNOSIS — G89.21 CHRONIC PAIN DUE TO TRAUMA: Primary | ICD-10-CM

## 2017-05-03 DIAGNOSIS — M54.2 NECK PAIN: ICD-10-CM

## 2017-05-03 DIAGNOSIS — Z79.899 ENCOUNTER FOR LONG-TERM (CURRENT) USE OF HIGH-RISK MEDICATION: ICD-10-CM

## 2017-05-03 PROCEDURE — 99214 OFFICE O/P EST MOD 30 MIN: CPT | Performed by: NURSE PRACTITIONER

## 2017-05-03 RX ORDER — OXYCODONE AND ACETAMINOPHEN 10; 325 MG/1; MG/1
1 TABLET ORAL EVERY 6 HOURS PRN
Qty: 150 TABLET | Refills: 0 | Status: SHIPPED | OUTPATIENT
Start: 2017-05-03 | End: 2017-05-12 | Stop reason: SDUPTHER

## 2017-05-15 RX ORDER — OXYCODONE AND ACETAMINOPHEN 10; 325 MG/1; MG/1
1 TABLET ORAL EVERY 6 HOURS PRN
Qty: 150 TABLET | Refills: 0 | Status: SHIPPED | OUTPATIENT
Start: 2017-05-15 | End: 2017-06-06 | Stop reason: SDUPTHER

## 2017-05-31 ENCOUNTER — OFFICE VISIT (OUTPATIENT)
Dept: CARDIOLOGY | Facility: CLINIC | Age: 67
End: 2017-05-31

## 2017-05-31 VITALS
BODY MASS INDEX: 31.11 KG/M2 | DIASTOLIC BLOOD PRESSURE: 60 MMHG | SYSTOLIC BLOOD PRESSURE: 90 MMHG | HEIGHT: 61 IN | HEART RATE: 71 BPM | WEIGHT: 164.8 LBS

## 2017-05-31 DIAGNOSIS — R06.09 DYSPNEA ON EXERTION: ICD-10-CM

## 2017-05-31 DIAGNOSIS — Z72.0 TOBACCO ABUSE: ICD-10-CM

## 2017-05-31 DIAGNOSIS — I51.89 DIASTOLIC DYSFUNCTION: Primary | ICD-10-CM

## 2017-05-31 PROCEDURE — 93000 ELECTROCARDIOGRAM COMPLETE: CPT | Performed by: INTERNAL MEDICINE

## 2017-05-31 PROCEDURE — 99213 OFFICE O/P EST LOW 20 MIN: CPT | Performed by: INTERNAL MEDICINE

## 2017-06-01 ENCOUNTER — TRANSCRIBE ORDERS (OUTPATIENT)
Dept: ADMINISTRATIVE | Facility: HOSPITAL | Age: 67
End: 2017-06-01

## 2017-06-01 ENCOUNTER — LAB (OUTPATIENT)
Dept: LAB | Facility: HOSPITAL | Age: 67
End: 2017-06-01

## 2017-06-01 DIAGNOSIS — E55.9 VITAMIN D DEFICIENCY: Primary | ICD-10-CM

## 2017-06-01 DIAGNOSIS — E55.9 VITAMIN D DEFICIENCY: ICD-10-CM

## 2017-06-01 LAB — 25(OH)D3 SERPL-MCNC: 56.8 NG/ML (ref 30–100)

## 2017-06-01 PROCEDURE — 36415 COLL VENOUS BLD VENIPUNCTURE: CPT

## 2017-06-01 PROCEDURE — 82306 VITAMIN D 25 HYDROXY: CPT | Performed by: INTERNAL MEDICINE

## 2017-06-02 ENCOUNTER — TELEPHONE (OUTPATIENT)
Dept: PAIN MEDICINE | Facility: CLINIC | Age: 67
End: 2017-06-02

## 2017-06-02 NOTE — TELEPHONE ENCOUNTER
Patient came in today and picked up her script for Oxycodone-acetaminophen 10/325 q6hrs # 150. She took the script to [facility] and they would not fill the script. Regino, pharmacist, states that it is 7 days too early for the script to be filled. I spoke to regino and she states that she received #150 on 5/3/17 and the quanity of 150 is a 37 day supply.     I spoke to patient and explained that the script is q6hrs and she states she has always taken it q4hrs and she never looked at the bottle that she received on 5/3/17. She states that Patricia Chowdary never told her at her last appointment on 5/3/17 that the directions has changed. The patient was upset and crying because she states she is out of medication. I explained to the patient that we could not authorize an early fill without speaking to the provider and that Patricia is not here today. She states she wanted to come back to the office to get another script with the correct q4hrs directions and I explained to her that we did not have a doctor here to sign the script until Monday. Patient was very blunt and upset and states she will talk to Patricia on her appointment on 6/6/17

## 2017-06-02 NOTE — TELEPHONE ENCOUNTER
Spoke to Regino at [facility] and authorized an early fill for the medication. Called patient and explained that the pharmacy will fill the script and will discuss on her next appointment

## 2017-06-06 ENCOUNTER — OFFICE VISIT (OUTPATIENT)
Dept: PAIN MEDICINE | Facility: CLINIC | Age: 67
End: 2017-06-06

## 2017-06-06 VITALS
BODY MASS INDEX: 30.58 KG/M2 | RESPIRATION RATE: 16 BRPM | HEART RATE: 87 BPM | OXYGEN SATURATION: 97 % | HEIGHT: 61 IN | TEMPERATURE: 98 F | WEIGHT: 162 LBS | SYSTOLIC BLOOD PRESSURE: 118 MMHG | DIASTOLIC BLOOD PRESSURE: 75 MMHG

## 2017-06-06 DIAGNOSIS — M50.30 DEGENERATIVE DISC DISEASE, CERVICAL: ICD-10-CM

## 2017-06-06 DIAGNOSIS — G89.21 CHRONIC PAIN DUE TO TRAUMA: Primary | ICD-10-CM

## 2017-06-06 DIAGNOSIS — M47.812 CERVICAL FACET JOINT SYNDROME: ICD-10-CM

## 2017-06-06 DIAGNOSIS — Z79.899 ENCOUNTER FOR LONG-TERM (CURRENT) USE OF HIGH-RISK MEDICATION: ICD-10-CM

## 2017-06-06 PROCEDURE — 99213 OFFICE O/P EST LOW 20 MIN: CPT | Performed by: NURSE PRACTITIONER

## 2017-06-06 RX ORDER — OXYCODONE AND ACETAMINOPHEN 10; 325 MG/1; MG/1
TABLET ORAL
Qty: 150 TABLET | Refills: 0 | Status: SHIPPED | OUTPATIENT
Start: 2017-06-06 | End: 2017-07-03 | Stop reason: SDUPTHER

## 2017-06-06 NOTE — PROGRESS NOTES
"CHIEF COMPLAINT  Since 4/5/17 office visit,Pt states neck pain has gradually increased to the point of being \"constantly distracted\".    Subjective   Traci King is a 67 y.o. female  who presents to the office for follow-up.She has a history of chronic neck pain.  Pain worse today.  Cervical MBB ordered at the previous office visit and was denied by insurance, appeal process has been started.  This was per the recommendation of the neurosurgeon.      Reviewed Dr. Lira office note 4-6-17-- Treatment Plan: Recommendations would be to go forward with a left sided C3-4 and C4-5 facet steroid injection. Further she likely would be an excellent candidate for facet ablation of these levels to eliminate her pain. Over flexion extension films and her bone scan study which strongly support that his likely diagnosis. I do not feel that she needs surgical intervention at this time. She will plan to follow back with her pain management physician, Dr. Gan, in this regard. We will transfer the information to him as well.    Complains of pain in her neck and left shoulder. Today her pain is 7/10VAS. Describes the pain as continuous and worsening. Continues with Percocet 10/325 4-5/day and meloxicam. Does have some constipation but states this has been ongoing since adolescence. Constipation relieved with OTC options. Denies any other side effects from the regimen. The regimen helps decrease her pain by 50%. ADL's by self.    Neck Pain    This is a chronic problem. The current episode started more than 1 year ago. The problem occurs constantly. The problem has been gradually worsening (slightly worse since her last office visit). The quality of the pain is described as aching and burning. The pain is at a severity of 7/10. The pain is moderate. The symptoms are aggravated by position, bending, stress and twisting. The pain is worse during the day. Stiffness is present all day. Pertinent negatives include no chest pain, " fever, headaches, numbness or weakness. She has tried oral narcotics and NSAIDs for the symptoms. The treatment provided moderate relief.      EXAMINATION: Cervical spine series    DATE: 4/6/2017 8:54 AM    HISTORY: Neck pain    COMPARISON: September 9, 2014    FINDINGS: AP, lateral, and flexion-extension views of the cervical spine demonstrate no acute osseous abnormality. There is mild straightening of the normal lordotic curvature. There is mild grade 1 anterolisthesis of C3 relative to C4, due to severe   degenerative changes in the facet joints. There is a generalized pattern of reduced bone mineral density.    Between flexion and extension, there is motion at C3-C4 by approximately 3 mm.    IMPRESSION:    1. Multilevel disc and facet degeneration and reduced bone mineral density, but no acute osseous abnormality.    2. Pathologic motion at C3-C4 by approximately 3 mm.    Dictated by: Shamir Randall M.D.    Images and Report reviewed and interpreted by: Shamir Randall M.D.    PEG Assessment   What number best describes your pain on average in the past week?4  What number best describes how, during the past week, pain has interfered with your enjoyment of life?6  What number best describes how, during the past week, pain has interfered with your general activity?  6    The following portions of the patient's history were reviewed and updated as appropriate: allergies, current medications, past family history, past medical history, past social history, past surgical history and problem list.    Review of Systems   Constitutional: Negative for activity change, appetite change, fatigue and fever.   HENT: Negative for congestion.    Eyes: Negative for visual disturbance.   Respiratory: Negative for apnea, cough and wheezing.    Cardiovascular: Negative for chest pain.   Gastrointestinal: Positive for constipation. Negative for diarrhea and nausea.   Genitourinary: Negative for difficulty urinating.  "  Musculoskeletal: Positive for neck pain and neck stiffness.   Neurological: Negative for dizziness, weakness, numbness and headaches.   Psychiatric/Behavioral: Positive for sleep disturbance. Negative for agitation, confusion, hallucinations and suicidal ideas. The patient is not nervous/anxious.        Vitals:    06/06/17 0846   BP: 118/75   Pulse: 87   Resp: 16   Temp: 98 °F (36.7 °C)   SpO2: 97%   Weight: 162 lb (73.5 kg)   Height: 61\" (154.9 cm)   PainSc: 7  Comment: neck pain ranges from 2-7/10       Objective   Physical Exam   Constitutional: She is oriented to person, place, and time. Vital signs are normal. She appears well-developed and well-nourished. She is cooperative.   HENT:   Head: Normocephalic and atraumatic.   Nose: Nose normal.   Eyes: Conjunctivae and lids are normal.   Neck: Trachea normal and full passive range of motion without pain. Neck supple. Spinous process tenderness (bilateral C2-C6 facets--- moderate) and muscular tenderness present. Decreased range of motion present.   Cardiovascular: Normal rate, regular rhythm and normal heart sounds.    Pulmonary/Chest: Effort normal and breath sounds normal. No respiratory distress.   Abdominal: Normal appearance.   Neurological: She is alert and oriented to person, place, and time. Gait normal.   Reflex Scores:       Bicep reflexes are 1+ on the right side and 1+ on the left side.       Brachioradialis reflexes are 1+ on the right side and 1+ on the left side.  Skin: Skin is warm, dry and intact.   Psychiatric: She has a normal mood and affect. Her speech is normal and behavior is normal. Judgment and thought content normal. Cognition and memory are normal.   Nursing note and vitals reviewed.      Assessment/Plan   Traci was seen today for neck pain.    Diagnoses and all orders for this visit:    Chronic pain due to trauma    Cervical facet joint syndrome    Degenerative disc disease, cervical    Encounter for long-term (current) use of " high-risk medication    Other orders  -     oxyCODONE-acetaminophen (PERCOCET)  MG per tablet; 1 po q4-6 hrs PRN for pain Max-5/day      --- The urine drug screen confirmation from 2-6-17 has been reviewed and the result is appropriate based on patient history and MATTHEW report  --- Continue Percocet (refilled 6/2/17). Patient appears stable with current regimen. No adverse effects. Regarding continuation of opioids, there is no evidence of aberrant behavior or any red flags. The patient continues with appropriate response to opioid therapy. ADL's remain intact by self.   --- Continue with plan for bilateral C3-C6 facet FJN x2, when approved by insurance. Reviewed the procedure at length with the patient.  Included in the review was expectations, complications, risk and benefits.The procedure was described in detail and the risks, benefits and alternatives were discussed with the patient (including but not limited to: bleeding, infection, nerve damage, worsening of pain, inability to perform injection, paralysis, seizures, and death) who agreed to proceed.  Discussed the potential for sedation if warranted/wanted.  Questions were answered and in a way the patient could understand.  Patient verbalized understanding and wishes to proceed.  This intervention will be ordered.  --- Follow-up 1 month            MATTHEW REPORT    As part of the patient's treatment plan, I am prescribing controlled substances. The patient has been made aware of appropriate use of such medications, including potential risk of somnolence, limited ability to drive and/or work safely, and the potential for dependence or overdose. It has also bee made clear that these medications are for use by this patient only, without concomitant use of alcohol or other substances unless prescribed.     Patient has completed prescribing agreement detailing terms of continued prescribing of controlled substances, including monitoring MATTHEW reports, urine  drug screening, and pill counts if necessary. The patient is aware that inappropriate use will results in cessation of prescribing such medications.    MATTHEW report has been reviewed and scanned into the patient's chart.    Date of last MATTHEW : 6-5-2017    History and physical exam exhibit continued safe and appropriate use of controlled substances.    EMR Dragon/Transcription disclaimer:   Much of this encounter note is an electronic transcription/translation of spoken language to printed text. The electronic translation of spoken language may permit erroneous, or at times, nonsensical words or phrases to be inadvertently transcribed; Although I have reviewed the note for such errors, some may still exist.

## 2017-07-03 ENCOUNTER — OFFICE VISIT (OUTPATIENT)
Dept: PAIN MEDICINE | Facility: CLINIC | Age: 67
End: 2017-07-03

## 2017-07-03 VITALS
WEIGHT: 158.2 LBS | RESPIRATION RATE: 16 BRPM | HEART RATE: 90 BPM | HEIGHT: 61 IN | OXYGEN SATURATION: 99 % | TEMPERATURE: 98.1 F | DIASTOLIC BLOOD PRESSURE: 60 MMHG | BODY MASS INDEX: 29.87 KG/M2 | SYSTOLIC BLOOD PRESSURE: 101 MMHG

## 2017-07-03 DIAGNOSIS — Z79.899 ENCOUNTER FOR LONG-TERM (CURRENT) USE OF HIGH-RISK MEDICATION: ICD-10-CM

## 2017-07-03 DIAGNOSIS — M47.812 CERVICAL FACET JOINT SYNDROME: Primary | ICD-10-CM

## 2017-07-03 DIAGNOSIS — M54.2 NECK PAIN: ICD-10-CM

## 2017-07-03 DIAGNOSIS — G89.21 CHRONIC PAIN DUE TO TRAUMA: ICD-10-CM

## 2017-07-03 DIAGNOSIS — M47.812 CERVICAL SPONDYLOSIS WITHOUT MYELOPATHY: ICD-10-CM

## 2017-07-03 PROCEDURE — 99214 OFFICE O/P EST MOD 30 MIN: CPT | Performed by: ANESTHESIOLOGY

## 2017-07-03 RX ORDER — OXYCODONE AND ACETAMINOPHEN 10; 325 MG/1; MG/1
TABLET ORAL
Qty: 150 TABLET | Refills: 0 | Status: SHIPPED | OUTPATIENT
Start: 2017-07-03 | End: 2017-08-03 | Stop reason: SDUPTHER
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
## 2017-08-01 ENCOUNTER — OUTSIDE FACILITY SERVICE (OUTPATIENT)
Dept: PAIN MEDICINE | Facility: CLINIC | Age: 67
End: 2017-08-01

## 2017-08-01 ENCOUNTER — DOCUMENTATION (OUTPATIENT)
Dept: PAIN MEDICINE | Facility: CLINIC | Age: 67
End: 2017-08-01

## 2017-08-01 PROCEDURE — 64490 INJ PARAVERT F JNT C/T 1 LEV: CPT | Performed by: ANESTHESIOLOGY

## 2017-08-01 PROCEDURE — 64491 INJ PARAVERT F JNT C/T 2 LEV: CPT | Performed by: ANESTHESIOLOGY

## 2017-08-02 NOTE — PROGRESS NOTES
LEFT C3-5 Cervical Medial Branch Blockade  [facility]      PREOPERATIVE DIAGNOSIS:  Cervical spondylosis without myelopathy   POSTOPERATIVE DIAGNOSIS:  Same as preoperative diagnosis    PROCEDURE:    Cervical Facet Nerve (medial branch) Blocks, with Fluoroscopy:  LEVELS C3, C4, C5,  to block facet joints C3-4 & C4-5 on the LEFT  • 55745 - Cervical Facet block, 1st level  • 24503 - Cervical Facet block, 2nd level    PRE-PROCEDURE DISCUSSION WITH PATIENT:    Risks and complications were discussed with the patient prior to starting the procedure and informed consent was obtained.    SURGEON:  Jose Luis Gan MD    REASON FOR PROCEDURE:    The cause of left-sided neck pain is not determined, therefore the diagnostic injections are indicated.    SEDATION:  Patient declined administration of moderate sedation    ANESTHETIC:   Marcaine 0.5%  STEROID:  Dexamethasone 3 mg  TOTAL VOLUME OF SOLUTION:  3 mL    DESCRIPTON OF PROCEDURE:  After obtaining informed consent, the patient was placed in the prone position. IV access was obtained.  EKG, blood pressure, and pulse oximeter were monitored and all sedation was administered by an RN under my direct guidance.  The cervical area was prepped with Chloraprep and draped with sterile barrier. Under fluoroscopic guidance the waists of the C3 & C4 & C5 vertebra on the affected side were identified. Skin and subcutaneous tissue was then anesthetized with 1% lidocaine 1mL at each point. Then spinal needles were introduced under fluoroscopic guidance at the waist of these vertebra on this side. After confirming the position of the needle under fluoroscopic PA and lateral views, and confirming negative aspiration of blood and CSF, 0.25 mL of Omnipaque was injected.  Proper spread and lack of vascular uptake was demonstrated.  A solution was prepared as above, and 1 mL of that solution was injected very slowly each level.      Needles were removed intact from all  levels.  Vitals were stable throughout.       ESTIMATED BLOOD LOSS:  minimal  SPECIMENS:  None    COMPLICATIONS:    No complications were noted. and There was no indication of vascular uptake on live injection of contrast dye.    TOLERANCE & DISCHARGE CONDITION:    The patient tolerated the procedure well.  The patient was transported to the recovery area without difficulties.  The patient was discharged to home under the care of family in stable and satisfactory condition.  The patient did notice improvement in pain on extension and rotation of the cervical spine.    PLAN OF CARE:  1. The patient was given our standard instruction sheet.  2. We discussed that Cervical Medial Branch Blockade is a diagnostic procedure in consideration for radiofrequency ablation if two diagnostic procedures proved to be positive for significant benefit.  If sustained relief of six to eight weeks is obtained, then an alternative plan could be therapeutic cervical medial branch blocks on an as-needed basis.  3. The patient is asked to keep a pain log hourly for 8 hours postoperatively today.  4. The patient will Repeat injection 2 wks.  5. The patient will resume all medications as per the medication reconciliation sheet.

## 2017-08-03 ENCOUNTER — OFFICE VISIT (OUTPATIENT)
Dept: PAIN MEDICINE | Facility: CLINIC | Age: 67
End: 2017-08-03

## 2017-08-03 VITALS
WEIGHT: 158 LBS | HEART RATE: 73 BPM | SYSTOLIC BLOOD PRESSURE: 107 MMHG | TEMPERATURE: 97.9 F | RESPIRATION RATE: 16 BRPM | OXYGEN SATURATION: 97 % | BODY MASS INDEX: 29.83 KG/M2 | HEIGHT: 61 IN | DIASTOLIC BLOOD PRESSURE: 67 MMHG

## 2017-08-03 DIAGNOSIS — G89.21 CHRONIC PAIN DUE TO TRAUMA: Primary | ICD-10-CM

## 2017-08-03 DIAGNOSIS — M50.30 DEGENERATIVE DISC DISEASE, CERVICAL: ICD-10-CM

## 2017-08-03 DIAGNOSIS — M47.812 CERVICAL FACET JOINT SYNDROME: ICD-10-CM

## 2017-08-03 DIAGNOSIS — M54.2 NECK PAIN: ICD-10-CM

## 2017-08-03 DIAGNOSIS — Z79.899 ENCOUNTER FOR LONG-TERM (CURRENT) USE OF HIGH-RISK MEDICATION: ICD-10-CM

## 2017-08-03 PROCEDURE — 99214 OFFICE O/P EST MOD 30 MIN: CPT | Performed by: NURSE PRACTITIONER

## 2017-08-03 RX ORDER — OXYCODONE AND ACETAMINOPHEN 10; 325 MG/1; MG/1
TABLET ORAL
Qty: 150 TABLET | Refills: 0 | Status: SHIPPED | OUTPATIENT
Start: 2017-08-03 | End: 2017-08-31 | Stop reason: SDUPTHER

## 2017-08-03 RX ORDER — TIZANIDINE 4 MG/1
4 TABLET ORAL 2 TIMES DAILY PRN
Qty: 60 TABLET | Refills: 1 | Status: SHIPPED | OUTPATIENT
Start: 2017-08-03 | End: 2017-10-26

## 2017-08-03 NOTE — PROGRESS NOTES
CHIEF COMPLAINT    Pt had left C3-C5 on 8/01/17 and reports 10% relief from neck pain.     Subjective   Traci King is a 67 y.o. female  who presents to the office for follow-up of procedure.  She completed a left C3-C5 FJN   on  8-1-17 performed by Dr. Gan for management of neck pain. Patient reports mild relief from the procedure.  She also reports that she felt dizzy and disconnected from her body. Had increased aching and throbbing. Also notices left shoulder pain did not improve. States she is still feeling a little dizzy. Denies any other symptoms.     Complains of pain in her neck. Today her pain is 4/10VAS. Describes the pain as continuous and mildly improved.Continues with Percocet 10/325 4-5/day and meloxicam. Does have some constipation but states this has been ongoing since adolescence. Constipation relieved with OTC options. Denies any other side effects from the regimen. The regimen helps decrease her pain by 50%. ADL's by self.    Is noticing an area of her left shoulder area. Notes her  rubs area and notes improvement. Used to be on zanaflex, does not remember results with this.    Neck Pain    This is a chronic problem. The current episode started more than 1 year ago. The problem occurs constantly. The quality of the pain is described as aching and burning. The pain is at a severity of 4/10. The pain is moderate. The symptoms are aggravated by position, bending, stress and twisting. The pain is worse during the day. Stiffness is present all day. Pertinent negatives include no chest pain, fever, headaches, numbness or weakness. She has tried oral narcotics and NSAIDs for the symptoms. The treatment provided moderate relief.        PEG Assessment   What number best describes your pain on average in the past week?6  What number best describes how, during the past week, pain has interfered with your enjoyment of life?3  What number best describes how, during the past week, pain has  "interfered with your general activity?  5    The following portions of the patient's history were reviewed and updated as appropriate: allergies, current medications, past family history, past medical history, past social history, past surgical history and problem list.    Review of Systems   Constitutional: Negative for activity change, appetite change (pt on phentermine), fatigue and fever.   HENT: Negative for congestion.    Eyes: Negative for visual disturbance.   Respiratory: Negative for apnea, cough and wheezing.    Cardiovascular: Negative for chest pain.   Gastrointestinal: Positive for constipation (chronic). Negative for diarrhea and nausea.   Endocrine: Negative for polydipsia and polyuria.   Genitourinary: Negative for difficulty urinating.   Musculoskeletal: Positive for neck pain and neck stiffness.   Skin: Negative for rash.   Neurological: Negative for dizziness, weakness, numbness and headaches.   Psychiatric/Behavioral: Positive for sleep disturbance. Negative for agitation, confusion, hallucinations and suicidal ideas. The patient is not nervous/anxious.        Vitals:    08/03/17 1031   BP: 107/67   Pulse: 73   Resp: 16   Temp: 97.9 °F (36.6 °C)   SpO2: 97%   Weight: 158 lb (71.7 kg)   Height: 61\" (154.9 cm)   PainSc:   4   PainLoc: Neck     Objective   Physical Exam   Constitutional: She is oriented to person, place, and time. Vital signs are normal. She appears well-developed and well-nourished. She is cooperative.   HENT:   Head: Normocephalic and atraumatic.   Nose: Nose normal.   Eyes: Conjunctivae and lids are normal.   Neck: Trachea normal and full passive range of motion without pain. Neck supple. Spinous process tenderness (left C2-C6 facets--- mild) and muscular tenderness present. Decreased range of motion present.   Cardiovascular: Normal rate, regular rhythm and normal heart sounds.    Pulmonary/Chest: Effort normal and breath sounds normal. No respiratory distress.   Abdominal: " Normal appearance.   Musculoskeletal:        Arms:  Neurological: She is alert and oriented to person, place, and time. Gait normal.   Reflex Scores:       Bicep reflexes are 1+ on the right side and 1+ on the left side.       Brachioradialis reflexes are 1+ on the right side and 1+ on the left side.  Skin: Skin is warm, dry and intact.   Psychiatric: She has a normal mood and affect. Her speech is normal and behavior is normal. Judgment and thought content normal. Cognition and memory are normal.   Nursing note and vitals reviewed.      Assessment/Plan   Traci was seen today for neck pain.    Diagnoses and all orders for this visit:    Chronic pain due to trauma    Cervical facet joint syndrome    Neck pain    Degenerative disc disease, cervical    Encounter for long-term (current) use of high-risk medication    Other orders  -     tiZANidine (ZANAFLEX) 4 MG tablet; Take 1 tablet by mouth 2 (Two) Times a Day As Needed for Muscle Spasms.      --- The urine drug screen confirmation from 2-6-17 has been reviewed and the result is appropriate based on patient history and MATTHEW report  --- Refill Percocet. Patient appears stable with current regimen. No adverse effects. Regarding continuation of opioids, there is no evidence of aberrant behavior or any red flags.  The patient continues with appropriate response to opioid therapy. ADL's remain intact by self.   --- Trial of Tizanidine 4 mg BID PRN. Discussed medication with the patient.  Included in this discussion was the potential for side effects and adverse events.  Patient verbalized understanding and wished to proceed.  Prescription will be sent to pharmacy.  --- Hold cervical MBB at this time.   --- Follow-up 1 month or sooner if needed.         MATTHEW REPORT    As part of the patient's treatment plan, I am prescribing controlled substances. The patient has been made aware of appropriate use of such medications, including potential risk of somnolence, limited  ability to drive and/or work safely, and the potential for dependence or overdose. It has also bee made clear that these medications are for use by this patient only, without concomitant use of alcohol or other substances unless prescribed.     Patient has completed prescribing agreement detailing terms of continued prescribing of controlled substances, including monitoring MATTHEW reports, urine drug screening, and pill counts if necessary. The patient is aware that inappropriate use will results in cessation of prescribing such medications.    MATTHEW report has been reviewed and scanned into the patient's chart.    Date of last MATTHEW : 7-31-17    History and physical exam exhibit continued safe and appropriate use of controlled substances.       EMR Dragon/Transcription disclaimer:   Much of this encounter note is an electronic transcription/translation of spoken language to printed text. The electronic translation of spoken language may permit erroneous, or at times, nonsensical words or phrases to be inadvertently transcribed; Although I have reviewed the note for such errors, some may still exist.

## 2017-08-31 ENCOUNTER — OFFICE VISIT (OUTPATIENT)
Dept: PAIN MEDICINE | Facility: CLINIC | Age: 67
End: 2017-08-31

## 2017-08-31 VITALS
HEART RATE: 80 BPM | WEIGHT: 158 LBS | SYSTOLIC BLOOD PRESSURE: 108 MMHG | HEIGHT: 61 IN | DIASTOLIC BLOOD PRESSURE: 59 MMHG | RESPIRATION RATE: 18 BRPM | TEMPERATURE: 98.4 F | BODY MASS INDEX: 29.83 KG/M2 | OXYGEN SATURATION: 99 %

## 2017-08-31 DIAGNOSIS — M79.602 PAIN OF LEFT UPPER EXTREMITY: ICD-10-CM

## 2017-08-31 DIAGNOSIS — Z79.899 ENCOUNTER FOR LONG-TERM (CURRENT) USE OF HIGH-RISK MEDICATION: ICD-10-CM

## 2017-08-31 DIAGNOSIS — M47.812 CERVICAL FACET JOINT SYNDROME: ICD-10-CM

## 2017-08-31 DIAGNOSIS — M54.2 NECK PAIN: ICD-10-CM

## 2017-08-31 DIAGNOSIS — M50.30 DEGENERATIVE DISC DISEASE, CERVICAL: ICD-10-CM

## 2017-08-31 DIAGNOSIS — G89.21 CHRONIC PAIN DUE TO TRAUMA: Primary | ICD-10-CM

## 2017-08-31 PROCEDURE — 99213 OFFICE O/P EST LOW 20 MIN: CPT | Performed by: NURSE PRACTITIONER

## 2017-08-31 RX ORDER — OXYCODONE AND ACETAMINOPHEN 10; 325 MG/1; MG/1
TABLET ORAL
Qty: 150 TABLET | Refills: 0 | Status: SHIPPED | OUTPATIENT
Start: 2017-08-31 | End: 2017-09-27 | Stop reason: SDUPTHER

## 2017-09-27 ENCOUNTER — OFFICE VISIT (OUTPATIENT)
Dept: PAIN MEDICINE | Facility: CLINIC | Age: 67
End: 2017-09-27

## 2017-09-27 VITALS
HEART RATE: 74 BPM | OXYGEN SATURATION: 96 % | SYSTOLIC BLOOD PRESSURE: 108 MMHG | WEIGHT: 159 LBS | HEIGHT: 61 IN | BODY MASS INDEX: 30.02 KG/M2 | TEMPERATURE: 97.3 F | RESPIRATION RATE: 18 BRPM | DIASTOLIC BLOOD PRESSURE: 65 MMHG

## 2017-09-27 DIAGNOSIS — M47.812 CERVICAL FACET JOINT SYNDROME: ICD-10-CM

## 2017-09-27 DIAGNOSIS — G89.21 CHRONIC PAIN DUE TO TRAUMA: Primary | ICD-10-CM

## 2017-09-27 DIAGNOSIS — M54.2 NECK PAIN: ICD-10-CM

## 2017-09-27 DIAGNOSIS — Z79.899 ENCOUNTER FOR LONG-TERM (CURRENT) USE OF HIGH-RISK MEDICATION: ICD-10-CM

## 2017-09-27 DIAGNOSIS — M50.30 DEGENERATIVE DISC DISEASE, CERVICAL: ICD-10-CM

## 2017-09-27 LAB
POC AMPHETAMINES: POSITIVE
POC BARBITURATES: NEGATIVE
POC BENZODIAZEPHINES: POSITIVE
POC COCAINE: NEGATIVE
POC METHADONE: NEGATIVE
POC METHAMPHETAMINE SCREEN URINE: NEGATIVE
POC OPIATES: NEGATIVE
POC OXYCODONE: POSITIVE
POC PHENCYCLIDINE: NEGATIVE
POC PROPOXYPHENE: NEGATIVE
POC THC: NEGATIVE
POC TRICYCLIC ANTIDEPRESSANTS: NEGATIVE

## 2017-09-27 PROCEDURE — 99214 OFFICE O/P EST MOD 30 MIN: CPT | Performed by: NURSE PRACTITIONER

## 2017-09-27 PROCEDURE — 80305 DRUG TEST PRSMV DIR OPT OBS: CPT | Performed by: NURSE PRACTITIONER

## 2017-09-27 RX ORDER — OXYCODONE AND ACETAMINOPHEN 10; 325 MG/1; MG/1
TABLET ORAL
Qty: 150 TABLET | Refills: 0 | Status: SHIPPED | OUTPATIENT
Start: 2017-09-27 | End: 2017-10-26 | Stop reason: SDUPTHER

## 2017-09-27 NOTE — PROGRESS NOTES
CHIEF COMPLAINT  Patient is here today for a follow up on her neck pain. She states that her pain has increased after lifting a heavy tote and feels as if she has strained a muscle.     Subjective   Traci King is a 67 y.o. female  who presents to the office for follow-up.She has a history of neck.    Complains of pain in her neck. Today her pain is 6/10VAs. Somewhat worse since last visit, hurt herself at work lifting a heavy tote and strained muscle in right side of neck, given Robaxin by work injury physician, did not tolerate, does better with Tizanidine, improving overall.      Continues with Percocet 10/325 4-5/day, tizanidine 4 mg 1-2/day and meloxicam daily. She reports moderate improvement with this regimen, no adverse reactions, some constipation but managed with OTC's, ALD's by self.      Left Cervical MBB C3-C5 8/1/2017, reported at least 80% of left sided neck and shoulder pain, she has noticed over the past week that the pain has started to return.      Neck Pain    This is a chronic problem. The current episode started more than 1 year ago. The problem occurs constantly. The quality of the pain is described as aching and burning. The pain is at a severity of 7/10. The pain is moderate. The symptoms are aggravated by position, bending, stress and twisting. The pain is worse during the day. Stiffness is present all day. Pertinent negatives include no chest pain, fever, headaches, numbness or weakness. She has tried oral narcotics and NSAIDs for the symptoms. The treatment provided moderate relief.      PEG Assessment   What number best describes your pain on average in the past week?6  What number best describes how, during the past week, pain has interfered with your enjoyment of life?8  What number best describes how, during the past week, pain has interfered with your general activity?  7    The following portions of the patient's history were reviewed and updated as appropriate: allergies,  "current medications, past family history, past medical history, past social history, past surgical history and problem list.    Review of Systems   Constitutional: Negative for chills and fever.   Respiratory: Negative for shortness of breath.    Cardiovascular: Negative for chest pain.   Gastrointestinal: Positive for constipation. Negative for diarrhea, nausea and vomiting.   Genitourinary: Negative for difficulty urinating, dyspareunia and dysuria.   Musculoskeletal: Positive for neck pain.   Neurological: Negative for dizziness, weakness, light-headedness, numbness and headaches.   Psychiatric/Behavioral: Negative for confusion, hallucinations, self-injury, sleep disturbance and suicidal ideas. The patient is not nervous/anxious.      Vitals:    09/27/17 1339   BP: 108/65   Pulse: 74   Resp: 18   Temp: 97.3 °F (36.3 °C)   SpO2: 96%   Weight: 159 lb (72.1 kg)   Height: 61\" (154.9 cm)   PainSc:   7   PainLoc: Neck       Objective   Physical Exam   Constitutional: She is oriented to person, place, and time. Vital signs are normal. She appears well-developed and well-nourished. She is cooperative.   HENT:   Head: Normocephalic and atraumatic.   Nose: Nose normal.   Eyes: Conjunctivae and lids are normal.   Neck: Trachea normal and full passive range of motion without pain. Neck supple. Muscular tenderness present. Decreased range of motion present.   Cardiovascular: Normal rate.    Pulmonary/Chest: Effort normal. No respiratory distress.   Abdominal: Normal appearance.   Musculoskeletal:        Cervical back: She exhibits tenderness, bony tenderness and spasm.   +Cervical facet tenderness/loading    Neurological: She is alert and oriented to person, place, and time. Gait normal.   Reflex Scores:       Tricep reflexes are 1+ on the right side and 1+ on the left side.       Bicep reflexes are 1+ on the right side and 1+ on the left side.       Brachioradialis reflexes are 1+ on the right side and 1+ on the left " side.  Skin: Skin is warm, dry and intact.   Psychiatric: She has a normal mood and affect. Her speech is normal and behavior is normal. Judgment and thought content normal. Cognition and memory are normal.   Nursing note and vitals reviewed.    Assessment/Plan   Traci was seen today for neck pain.    Diagnoses and all orders for this visit:    Chronic pain due to trauma  -     Urine Drug Screen Confirmation  -     POC Urine Drug Screen, Triage    Degenerative disc disease, cervical  -     Urine Drug Screen Confirmation  -     POC Urine Drug Screen, Triage    Cervical facet joint syndrome  -     Case Request  -     Urine Drug Screen Confirmation  -     POC Urine Drug Screen, Triage    Neck pain  -     Urine Drug Screen Confirmation  -     POC Urine Drug Screen, Triage    Encounter for long-term (current) use of high-risk medication  -     Urine Drug Screen Confirmation  -     POC Urine Drug Screen, Triage    Other orders  -     oxyCODONE-acetaminophen (PERCOCET)  MG per tablet; 1 po q4-6 hrs PRN for pain Max-5/day      --- Repeat Left C3-C5 MBB   --- The urine drug screen confirmation from 2-6-17 has been reviewed and the result is appropriate based on patient history and MATTHEW report  --- Refill Percocet. Patient appears stable with current regimen. No adverse effects. Regarding continuation of opioids, there is no evidence of aberrant behavior or any red flags.  The patient continues with appropriate response to opioid therapy. ADL's remain intact by self.   --- Routine UDS in office today as part of monitoring requirements for controlled substances.  The specimen was viewed and the immunoassay result reviewed and is +BZD, oXY.  This specimen will be sent to [facility] for confirmation.     --- Follow-up 1 month / after procedure          MATTHEW REPORT  As part of the patient's treatment plan, I am prescribing controlled substances. The patient has been made aware of appropriate use of such  medications, including potential risk of somnolence, limited ability to drive and/or work safely, and the potential for dependence or overdose. It has also bee made clear that these medications are for use by this patient only, without concomitant use of alcohol or other substances unless prescribed.     Patient has completed prescribing agreement detailing terms of continued prescribing of controlled substances, including monitoring MATTHEW reports, urine drug screening, and pill counts if necessary. The patient is aware that inappropriate use will results in cessation of prescribing such medications.    MATTHEW report has been reviewed and scanned into the patient's chart.    Date of last MATTHEW : 9/26/2017    History and physical exam exhibit continued safe and appropriate use of controlled substances.    EMR Dragon/Transcription disclaimer:   Much of this encounter note is an electronic transcription/translation of spoken language to printed text. The electronic translation of spoken language may permit erroneous, or at times, nonsensical words or phrases to be inadvertently transcribed; Although I have reviewed the note for such errors, some may still exist.    Initial evaluation by DACIA Solis

## 2017-10-04 ENCOUNTER — TRANSCRIBE ORDERS (OUTPATIENT)
Dept: OCCUPATIONAL THERAPY | Facility: CLINIC | Age: 67
End: 2017-10-04

## 2017-10-04 ENCOUNTER — TREATMENT (OUTPATIENT)
Dept: PHYSICAL THERAPY | Facility: CLINIC | Age: 67
End: 2017-10-04

## 2017-10-04 DIAGNOSIS — S16.1XXD STRAIN OF NECK MUSCLE, SUBSEQUENT ENCOUNTER: Primary | ICD-10-CM

## 2017-10-04 DIAGNOSIS — S16.1XXA STRAIN OF NECK MUSCLE, INITIAL ENCOUNTER: Primary | ICD-10-CM

## 2017-10-04 PROCEDURE — 97014 ELECTRIC STIMULATION THERAPY: CPT | Performed by: PHYSICAL THERAPIST

## 2017-10-04 PROCEDURE — 97001 PR PHYS THERAPY EVALUATION: CPT | Performed by: PHYSICAL THERAPIST

## 2017-10-04 PROCEDURE — 97530 THERAPEUTIC ACTIVITIES: CPT | Performed by: PHYSICAL THERAPIST

## 2017-10-04 PROCEDURE — 97140 MANUAL THERAPY 1/> REGIONS: CPT | Performed by: PHYSICAL THERAPIST

## 2017-10-04 PROCEDURE — 97035 APP MDLTY 1+ULTRASOUND EA 15: CPT | Performed by: PHYSICAL THERAPIST

## 2017-10-04 NOTE — PROGRESS NOTES
"Physical Therapy Initial Evaluation and Plan of Care    Patient: Traci King   : 1950  Diagnosis/ICD-10 Code:  Strain of neck muscle, subsequent encounter [S16.1XXD]  Referring practitioner: ALLY Clark    Subjective Evaluation    History of Present Illness  Date of onset: 2017  Mechanism of injury: Lifting a heavy tote to shelf in refrigerator - felt pain in the right shoulder the next morning - felt sharp pain in right side neck  Issued Robaxin - already on meloxicam and oxycodone from pain mgmt for left cervical symptoms - ongoing treatment with facet injections for left cervical symptoms    No specific outside activities          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   Precautions and Work Restrictions: no lifting >5#, no push/pull >5# Pain  Current pain ratin (with meds)  At best pain ratin  At worst pain ratin  Location: right cervicalk PVM, UT, occasional posterior HA  Quality: dull ache, sharp and tight  Relieving factors: medications, change in position and rest  Aggravating factors: outstretched reach and overhead activity (right rotation, lifting, pushing, pulling)  Progression: worsening    Hand dominance: left    Diagnostic Tests  No diagnostic tests performed    Treatments  Previous treatment: medication  Current treatment: medication and physical therapy  Patient Goals  Patient goals for therapy: decreased pain and independence with ADLs/IADLs  Patient goal: Pain to be gone and RTW without pain           Objective     Special Questions  Patient is experiencing night pain, disturbed sleep and headaches.     Additional Special Questions  Pain awakens her at night - takes meds to return to sleep    Postural Observations  Seated posture: fair  Standing posture: fair    Additional Postural Observation Details  Forward head and [employer] Hump    Palpation     Right   Hypertonic in the levator scapulae, scalenes and upper trapezius. Tenderness of the " cervical paraspinals, levator scapulae, scalenes, suboccipitals and upper trapezius.     Additional Palpation Details  Generalized increased muscle tone throughout the cervical region as patient has a chronic cervical injury for which she is seeing pain mgmt for.  More abnormal tone in right UT, Levator and scalenes than left     Neurological Testing   Sensation   Cervical/Thoracic   Left   Intact: light touch    Right   Intact: light touch    Active Range of Motion   Cervical/Thoracic Spine   Cervical    Flexion: 44 (pulling) degrees with pain  Extension: Neck active extension: sharp pain. with pain  Left lateral flexion: 18 degrees with pain  Right lateral flexion: 22 degrees   Left rotation: 44 degrees   Right rotation: 36 degrees with pain    Strength/Myotome Testing     Right Shoulder     Planes of Motion   Flexion: 4-   Extension: 4     Right Elbow   Flexion: 4  Extension: 4    Right Wrist/Hand   Wrist extension: 4-  Wrist flexion: 4    Tests   Cervical     Right   Positive cervical distraction.   Negative brachial plexus traction.     Additional Tests Details  Slight decrease in cervical symptoms with cervical distraction         Assessment & Plan     Assessment  Impairments: abnormal muscle firing, abnormal muscle tone, abnormal or restricted ROM, activity intolerance, impaired physical strength, lacks appropriate home exercise program and pain with function  Assessment details: 67 y.o. Female with right cervical muscle strain after lifting heavy tote presents with: 1. Constant right cervical pain, 2. Decreased cervical AROM, 3. Increased cervical muscle tone and tenderness in right upper trapezius, levator scapular and scalenes, 4. Slight increased muscle tone throughout cervical region, 5. Pre-existing left cervical symptoms for whiich she is currently receving facet injections and medications from pain mgmt, 6. Slight weakness in right UE, 7. Decreased tolerance for many critical demands of her job in  "\"[employer]\" for [employer]  Barriers to therapy: Previous cervical injury  Prognosis: good  Functional Limitations: carrying objects, lifting, pulling, pushing, uncomfortable because of pain, reaching behind back, reaching overhead and unable to perform repetitive tasks  Goals  Plan Goals: Short Term Goals: 2 weeks  Patient will be able to tolerate initial exercises  Patient will have pain <5/10  Patient will be able to reach to shoulder level with the right hand without pain  Patient will be able to reach to upper shelf at work and lift 2# object off without pain    Long Term Goals: 4 weeks  Patient will be independent in performing home exercise program.  Patient will return to baseline level of cervical function prior to this lifting episode  Patient will be able to lift 15# to chest level without increased symptoms    Patient will return to work with min/no restrictions        Plan  Therapy options: will be seen for skilled physical therapy services  Planned modality interventions: electrical stimulation/German stimulation, ultrasound and thermotherapy (hydrocollator packs)  Planned therapy interventions: manual therapy, soft tissue mobilization, stretching, strengthening and home exercise program  Frequency: 3x week  Duration in visits: 12  Duration in weeks: 4  Treatment plan discussed with: patient        Manual Therapy:    16     mins  17926;  Therapeutic Exercise:    5     mins  38756;     Neuromuscular Pricilla:    0    mins  98439;    Therapeutic Activity:     5     mins  49211;   Rehab process, prevent aggravation to pre-existing cervical injury    Evaluation Time:     20  mins  Timed Treatment:   34   mins   Total Treatment:     90   mins    PT SIGNATURE: Angela Iglesias, PT   DATE TREATMENT INITIATED: 10/4/2017    Initial Certification  Certification Period: 1/2/2018  I certify that the therapy services are furnished while this patient is under my care.  The services outlined above are required by this " patient, and will be reviewed every 90 days.     PHYSICIAN: ALLY Clark __________________     DATE: _______________    Please sign and return via fax to [phone].. Thank you, [facility].

## 2017-10-05 ENCOUNTER — TREATMENT (OUTPATIENT)
Dept: PHYSICAL THERAPY | Facility: CLINIC | Age: 67
End: 2017-10-05

## 2017-10-05 DIAGNOSIS — S16.1XXD STRAIN OF NECK MUSCLE, SUBSEQUENT ENCOUNTER: Primary | ICD-10-CM

## 2017-10-05 PROCEDURE — 97110 THERAPEUTIC EXERCISES: CPT | Performed by: PHYSICAL THERAPIST

## 2017-10-05 PROCEDURE — 97530 THERAPEUTIC ACTIVITIES: CPT | Performed by: PHYSICAL THERAPIST

## 2017-10-05 PROCEDURE — 97014 ELECTRIC STIMULATION THERAPY: CPT | Performed by: PHYSICAL THERAPIST

## 2017-10-05 PROCEDURE — 97140 MANUAL THERAPY 1/> REGIONS: CPT | Performed by: PHYSICAL THERAPIST

## 2017-10-05 NOTE — PROGRESS NOTES
Physical Therapy Daily Progress Note    Patient Name: Traci King         :  1950  Referring Physician: ALLY Clark    Subjective   Traci King reports: Sore - C/O (R) UT / neck pain - Increased with neck flexion - No UE symptoms -     Objective   Pt presents with FH posture and rounded shoulder -   Tender (R) UT, CPS with multiple trigger points;  Tender over 1st rib (R)    See Exercise, Manual, and Modality Logs for complete treatment.     Functional / Therapeutic Activities:  10 min  · TAPING / BRACING: K-Tape to 1) Unload CPS; 2) Unload UT (R);  · Jt protection, ADL modification; Posture and      Assessment/Plan  C-Strain -     Progress strengthening /stabilization /functional activity       _________________________________________________  Manual Therapy:    15     mins  41953;  Therapeutic Exercise:    10     mins  90944;     Neuromuscular Pricilla:    NA    mins  24107;    Therapeutic Activity:     10     mins  54810;     Gait Training:      NA     mins  09378;     Ultrasound:     10     mins  90038;    Electrical Stimulation:    20     mins  42907 ( );  Dry Needling     NA     mins self-pay    Timed Treatment:   45   mins                  Total Treatment:     70   mins    Lemuel Jaffe PT  Physical Therapist

## 2017-10-10 ENCOUNTER — TREATMENT (OUTPATIENT)
Dept: PHYSICAL THERAPY | Facility: CLINIC | Age: 67
End: 2017-10-10

## 2017-10-10 DIAGNOSIS — S16.1XXD STRAIN OF NECK MUSCLE, SUBSEQUENT ENCOUNTER: Primary | ICD-10-CM

## 2017-10-10 PROCEDURE — 97140 MANUAL THERAPY 1/> REGIONS: CPT | Performed by: PHYSICAL THERAPIST

## 2017-10-10 PROCEDURE — 97530 THERAPEUTIC ACTIVITIES: CPT | Performed by: PHYSICAL THERAPIST

## 2017-10-10 PROCEDURE — 97110 THERAPEUTIC EXERCISES: CPT | Performed by: PHYSICAL THERAPIST

## 2017-10-11 ENCOUNTER — TREATMENT (OUTPATIENT)
Dept: PHYSICAL THERAPY | Facility: CLINIC | Age: 67
End: 2017-10-11

## 2017-10-11 DIAGNOSIS — S16.1XXD STRAIN OF NECK MUSCLE, SUBSEQUENT ENCOUNTER: Primary | ICD-10-CM

## 2017-10-11 PROCEDURE — 97110 THERAPEUTIC EXERCISES: CPT | Performed by: PHYSICAL THERAPIST

## 2017-10-11 PROCEDURE — 97140 MANUAL THERAPY 1/> REGIONS: CPT | Performed by: PHYSICAL THERAPIST

## 2017-10-11 PROCEDURE — 97014 ELECTRIC STIMULATION THERAPY: CPT | Performed by: PHYSICAL THERAPIST

## 2017-10-11 PROCEDURE — 97530 THERAPEUTIC ACTIVITIES: CPT | Performed by: PHYSICAL THERAPIST

## 2017-10-13 ENCOUNTER — TREATMENT (OUTPATIENT)
Dept: PHYSICAL THERAPY | Facility: CLINIC | Age: 67
End: 2017-10-13

## 2017-10-13 DIAGNOSIS — S16.1XXD STRAIN OF NECK MUSCLE, SUBSEQUENT ENCOUNTER: Primary | ICD-10-CM

## 2017-10-13 PROCEDURE — 97530 THERAPEUTIC ACTIVITIES: CPT | Performed by: PHYSICAL THERAPIST

## 2017-10-13 PROCEDURE — 97035 APP MDLTY 1+ULTRASOUND EA 15: CPT | Performed by: PHYSICAL THERAPIST

## 2017-10-13 PROCEDURE — 97140 MANUAL THERAPY 1/> REGIONS: CPT | Performed by: PHYSICAL THERAPIST

## 2017-10-13 PROCEDURE — 97110 THERAPEUTIC EXERCISES: CPT | Performed by: PHYSICAL THERAPIST

## 2017-10-15 NOTE — PROGRESS NOTES
Physical Therapy Daily Progress Note     Patient Name: Traci King         :  1950  Referring Physician: ALLY Clark     Subjective   Traci King reports: Sore - C/O (R) UT / neck pain - Increased with neck flexion - No UE symptoms - Taping very helpful      Objective   Pt presents with FH posture and rounded shoulder -   Tender (R) UT, CPS with multiple trigger points;  Tender over 1st rib (R)  (+) Elevated 1st rib (R)    See Exercise, Manual, and Modality Logs for complete treatment.      Functional / Therapeutic Activities:  10 min  · TAPING / BRACING: K-Tape to 1) Unload CPS; 2) Unload UT (R);  · Jt protection, ADL modification; Posture and       Assessment/Plan  C-Strain -   Pt reports decreased pain after MT and with Tape   Felt much better after 1st rib mobilization -      Progress strengthening /stabilization /functional activity        _________________________________________________  Manual Therapy:                      25     mins  11103;  Therapeutic Exercise:              10     mins  41265;     Neuromuscular Pricilla:             NA    mins  92091;    Therapeutic Activity:                10     mins  54607;     Gait Training:                            NA     mins  12061;     Ultrasound:                               10     mins  76086;    Electrical Stimulation:              20     mins  50304 ( );  Dry Needling                             NA     mins self-pay     Timed Treatment:   55   mins                  Total Treatment:     80   mins     Lemuel Jaffe, PT  Physical Therapist

## 2017-10-15 NOTE — PROGRESS NOTES
Physical Therapy Daily Progress Note      Patient Name: Traci King         :  1950  Referring Physician: ALLY Clark      Subjective   Traci King reports: Much less pain after last session - Decreased pain in (R) UT and improved mobility- Taping very helpful       Objective   Pt presents with FH posture and rounded shoulder -   Less tight and tender (R) UT, CPS with fewer trigger points;  Less tender over 1st rib (R)  (-) Elevated 1st rib (R)     See Exercise, Manual, and Modality Logs for complete treatment.      Functional / Therapeutic Activities:  10 min  · TAPING / BRACING: K-Tape to 1) Unload CPS; 2) Unload UT (R);  · Jt protection, ADL modification; Posture and        Assessment/Plan  C-Strain - Trap strain; Elevated 1st rib (R)  Pt reports decreased pain after MT and with Tape   Felt much better after 1st rib mobilization -       Progress strengthening /stabilization /functional activity         _________________________________________________  Manual Therapy:                      10     mins  98380;  Therapeutic Exercise:              10     mins  99594;     Neuromuscular Pricilla:             NA    mins  93676;    Therapeutic Activity:                10     mins  65279;     Gait Training:                            NA     mins  81338;     Ultrasound:                               10     mins  25320;    Electrical Stimulation:              20     mins  28896 ( );  Dry Needling                             NA     mins self-pay      Timed Treatment:   40   mins                  Total Treatment:     65   mins      Lemuel Jaffe PT  Physical Therapist

## 2017-10-16 NOTE — PROGRESS NOTES
[facility]  Physical Therapy   ------------------------------------------------------------------------------------------------------   MD PROGRESS NOTE    Patient: Traci King        : 1950  Diagnosis/ICD-10 Code:  Strain of neck muscle, subsequent encounter [S16.1XXD]  Referring practitioner: ALLY Clark / DACIA Jean  Date of Initial Visit: 10/05/2017                  Today's Date: 10/13/2017  _________________________________________________________________    Thank you for the referral of Ms. King to [facility].  Ms. King has attended 5 PT sessions and their treatment has consisted of: modalities prn, manual therapy, therapeutic exercise, kinesio taping, functional training, patient education, and HEP.     Subjective   Traci King reports: significant improvement with significantly decreased pain and good mobility and notes no functional assessment - Feels like she could do her normal job w/o problems and is anxious to return to work -   ___________________________________________________________________  Objective              OBSERVATION: Improved posturing (R) upper quarter -               PALPATION: Much less tenderness (R) UT, CPS, scalenes, and 1st rib (R) -         AROM: WFL C-spine and upper quarter -    STRENGTH: UE myotomes WNL (B)   SPECIAL TESTS: (-) C-spine and (-) Elevated 1st rib (R)   ACTIVITY TOLERANCE: Much improved tolerance to ADL's and job requirements -      See Exercise, Manual, and Modality Logs for complete treatment.      Functional / Therapeutic Activities:  X 20 min  · TAPING / BRACING: Taping to unload UT  · SEE EXERCISE FLOW SHEET -   · FUNCTIONAL ASSESSMENT   · Jt protection, ADL modification; Posture and    ___________________________________________________________________   Assessment/Plan  C-Strain - Trap strain; Elevated 1st rib (R)  Much improved with pain alleviation and improved functional ability  and mobility -   Ms. King would benefit from continuing a HEP and discontinuing formal Physical Therapy and RTW as provider deems appropriate.     P: Recommend continuing a HEP and discontinuing formal Physical Therapy and RTW as provider deems appropriate.    Please advise after your exam.    Thank you again for this referral of Ms. King to [facility].    PT Signature: ______________________________   Lemuel Jaffe, PT  ____________________________________________________________________  Manual Therapy:                      10     mins  18058;  Therapeutic Exercise:              10     mins  13488;     Neuromuscular Pricilla:             NA    mins  35806;    Therapeutic Activity:                20     mins  46152;     Gait Training:                            NA     mins  05480;     Ultrasound:                               10     mins  79279;    Electrical Stimulation:              NA     mins  40981 ( );  Dry Needling                             NA     mins self-pay      Timed Treatment:   50   mins                  Total Treatment:     60   mins        ______________________________________________________________________  [address] [place] [address]  Phone: [phone] Fax: [phone]

## 2017-10-26 ENCOUNTER — OFFICE VISIT (OUTPATIENT)
Dept: PAIN MEDICINE | Facility: CLINIC | Age: 67
End: 2017-10-26

## 2017-10-26 VITALS
OXYGEN SATURATION: 98 % | DIASTOLIC BLOOD PRESSURE: 68 MMHG | SYSTOLIC BLOOD PRESSURE: 109 MMHG | BODY MASS INDEX: 30.4 KG/M2 | TEMPERATURE: 98 F | HEIGHT: 61 IN | HEART RATE: 93 BPM | RESPIRATION RATE: 16 BRPM | WEIGHT: 161 LBS

## 2017-10-26 DIAGNOSIS — G89.21 CHRONIC PAIN DUE TO TRAUMA: Primary | ICD-10-CM

## 2017-10-26 DIAGNOSIS — Z79.899 ENCOUNTER FOR LONG-TERM (CURRENT) USE OF HIGH-RISK MEDICATION: ICD-10-CM

## 2017-10-26 DIAGNOSIS — M47.812 CERVICAL FACET JOINT SYNDROME: ICD-10-CM

## 2017-10-26 DIAGNOSIS — M50.30 DEGENERATIVE DISC DISEASE, CERVICAL: ICD-10-CM

## 2017-10-26 PROCEDURE — 99213 OFFICE O/P EST LOW 20 MIN: CPT | Performed by: NURSE PRACTITIONER

## 2017-10-26 RX ORDER — METHOCARBAMOL 500 MG/1
500 TABLET, FILM COATED ORAL 3 TIMES DAILY PRN
Qty: 90 TABLET | Refills: 1 | Status: SHIPPED | OUTPATIENT
Start: 2017-10-26 | End: 2018-02-22 | Stop reason: SDUPTHER

## 2017-10-26 RX ORDER — ESTRADIOL 10 UG/1
INSERT VAGINAL
COMMUNITY
Start: 2017-10-23 | End: 2018-02-14 | Stop reason: ALTCHOICE

## 2017-10-26 RX ORDER — OXYCODONE AND ACETAMINOPHEN 10; 325 MG/1; MG/1
TABLET ORAL
Qty: 150 TABLET | Refills: 0 | Status: SHIPPED | OUTPATIENT
Start: 2017-10-26 | End: 2017-11-22 | Stop reason: SDUPTHER

## 2017-10-26 NOTE — PROGRESS NOTES
CHIEF COMPLAINT  F/U neck pain. Left sided neck pain improved since she had facet injections, however, now the pain has increased. She wants to be scheduled for more injections. She states she hurt the right side of her neck at work 2 weeks ago (workman's comp), but had PT and it has been improving.     Subjective   Traci King is a 67 y.o. female  who presents to the office for follow-up.She has a history of chronic neck pain.    She completed a left C3-C5 MBB  on  8-1-17 which provided 80% diagnostic benefit and over a month of therapeutic benefit.      Continues with Percocet 10/325 4-5/day, tizanidine 4 mg 1-2/day and meloxicam daily. She reports moderate improvement with this regimen, no adverse reactions, some constipation but managed with OTC's, ALD's by self.      Neck Pain    This is a chronic problem. The current episode started more than 1 year ago. The problem occurs constantly. The quality of the pain is described as aching and burning. The pain is at a severity of 6/10. The pain is moderate. The symptoms are aggravated by position, bending, stress and twisting. The pain is worse during the day. Stiffness is present all day. Pertinent negatives include no chest pain, fever, headaches, numbness or weakness. She has tried oral narcotics and NSAIDs for the symptoms. The treatment provided moderate relief.      PEG Assessment   What number best describes your pain on average in the past week?6  What number best describes how, during the past week, pain has interfered with your enjoyment of life?4  What number best describes how, during the past week, pain has interfered with your general activity?  5    The following portions of the patient's history were reviewed and updated as appropriate: allergies, current medications, past family history, past medical history, past social history, past surgical history and problem list.    Review of Systems   Constitutional: Negative for chills and fever.  "  Respiratory: Negative for shortness of breath.    Cardiovascular: Negative for chest pain.   Gastrointestinal: Positive for constipation. Negative for diarrhea, nausea and vomiting.   Genitourinary: Negative for difficulty urinating, dyspareunia and dysuria.   Musculoskeletal: Positive for neck pain.   Neurological: Negative for dizziness, weakness, light-headedness, numbness and headaches.   Psychiatric/Behavioral: Negative for agitation, confusion, hallucinations, self-injury, sleep disturbance and suicidal ideas. The patient is not nervous/anxious.        Vitals:    10/26/17 0933   BP: 109/68   Pulse: 93   Resp: 16   Temp: 98 °F (36.7 °C)   SpO2: 98%   Weight: 161 lb (73 kg)   Height: 61\" (154.9 cm)   PainSc:   6   PainLoc: Neck       Objective   Physical Exam   Constitutional: She is oriented to person, place, and time. Vital signs are normal. She appears well-developed and well-nourished. She is cooperative.   HENT:   Head: Normocephalic and atraumatic.   Nose: Nose normal.   Eyes: Conjunctivae and lids are normal.   Neck: Trachea normal and full passive range of motion without pain. Neck supple. Muscular tenderness present. Decreased range of motion present.   Cardiovascular: Normal rate.    Pulmonary/Chest: Effort normal. No respiratory distress.   Abdominal: Normal appearance.   Musculoskeletal:        Cervical back: She exhibits tenderness, bony tenderness and spasm.   +Cervical facet tenderness/loading    Neurological: She is alert and oriented to person, place, and time. Gait normal.   Skin: Skin is warm, dry and intact.   Psychiatric: She has a normal mood and affect. Her speech is normal and behavior is normal. Judgment and thought content normal. Cognition and memory are normal.   Nursing note and vitals reviewed.      Assessment/Plan   Traci was seen today for neck pain.    Diagnoses and all orders for this visit:    Chronic pain due to trauma    Degenerative disc disease, cervical    Cervical facet " joint syndrome  -     Cancel: Case Request    Encounter for long-term (current) use of high-risk medication    Other orders  -     methocarbamol (ROBAXIN) 500 MG tablet; Take 1 tablet by mouth 3 (Three) Times a Day As Needed for Muscle Spasms.      --- Repeat left C3-C5 MBB - schedule today, has been authorized  --- Refill Percocet. Patient appears stable with current regimen. No adverse effects. Regarding continuation of opioids, there is no evidence of aberrant behavior or any red flags.  The patient continues with appropriate response to opioid therapy. ADL's remain intact by self.   --- The urine drug screen confirmation from 9/27/2017 has been reviewed and the result is appropriate based on patient history and MATTHEW report  --- Follow-up 1 month          MATTHEW REPORT    As part of the patient's treatment plan, I am prescribing controlled substances. The patient has been made aware of appropriate use of such medications, including potential risk of somnolence, limited ability to drive and/or work safely, and the potential for dependence or overdose. It has also bee made clear that these medications are for use by this patient only, without concomitant use of alcohol or other substances unless prescribed.     Patient has completed prescribing agreement detailing terms of continued prescribing of controlled substances, including monitoring MATTHEW reports, urine drug screening, and pill counts if necessary. The patient is aware that inappropriate use will results in cessation of prescribing such medications.    MATTHEW report has been reviewed and scanned into the patient's chart.    Date of last MATTHEW : 10/25/2017    History and physical exam exhibit continued safe and appropriate use of controlled substances.      EMR Dragon/Transcription disclaimer:   Much of this encounter note is an electronic transcription/translation of spoken language to printed text. The electronic translation of spoken language may  permit erroneous, or at times, nonsensical words or phrases to be inadvertently transcribed; Although I have reviewed the note for such errors, some may still exist.

## 2017-11-07 ENCOUNTER — TELEPHONE (OUTPATIENT)
Dept: PAIN MEDICINE | Facility: CLINIC | Age: 67
End: 2017-11-07

## 2017-11-07 NOTE — TELEPHONE ENCOUNTER
Patient called and LM stating that her arthritis in her hands is increasing and she states she is taking meloxicam. She wants to know if this can be changed to something stronger because it is not helping. She states has an appointment this month but is not able to come in sooner due to her work schedule and we cannot get her in on the days that she is off work.

## 2017-11-07 NOTE — TELEPHONE ENCOUNTER
Sent another NSAID (Diclofenac) to pharmacy to try. Do not take with Meloxicam. Always take NSAID's with food.

## 2017-11-22 RX ORDER — OXYCODONE AND ACETAMINOPHEN 10; 325 MG/1; MG/1
TABLET ORAL
Qty: 150 TABLET | Refills: 0 | Status: SHIPPED | OUTPATIENT
Start: 2017-11-22 | End: 2017-12-20 | Stop reason: SDUPTHER

## 2017-11-27 ENCOUNTER — OFFICE VISIT (OUTPATIENT)
Dept: PAIN MEDICINE | Facility: CLINIC | Age: 67
End: 2017-11-27

## 2017-11-27 ENCOUNTER — LAB (OUTPATIENT)
Dept: LAB | Facility: HOSPITAL | Age: 67
End: 2017-11-27

## 2017-11-27 VITALS
DIASTOLIC BLOOD PRESSURE: 67 MMHG | WEIGHT: 164 LBS | RESPIRATION RATE: 18 BRPM | SYSTOLIC BLOOD PRESSURE: 113 MMHG | HEART RATE: 90 BPM | BODY MASS INDEX: 30.96 KG/M2 | OXYGEN SATURATION: 97 % | TEMPERATURE: 97.3 F | HEIGHT: 61 IN

## 2017-11-27 DIAGNOSIS — M54.2 NECK PAIN: ICD-10-CM

## 2017-11-27 DIAGNOSIS — Z79.899 ENCOUNTER FOR LONG-TERM (CURRENT) USE OF HIGH-RISK MEDICATION: ICD-10-CM

## 2017-11-27 DIAGNOSIS — M50.30 DEGENERATIVE DISC DISEASE, CERVICAL: ICD-10-CM

## 2017-11-27 DIAGNOSIS — G89.21 CHRONIC PAIN DUE TO TRAUMA: Primary | ICD-10-CM

## 2017-11-27 DIAGNOSIS — M47.812 CERVICAL FACET JOINT SYNDROME: ICD-10-CM

## 2017-11-27 DIAGNOSIS — M25.50 ARTHRALGIA, UNSPECIFIED JOINT: ICD-10-CM

## 2017-11-27 LAB
CHROMATIN AB SERPL-ACNC: <10 IU/ML (ref 0–14)
CRP SERPL-MCNC: 0.48 MG/DL (ref 0–0.5)
ERYTHROCYTE [SEDIMENTATION RATE] IN BLOOD: 17 MM/HR (ref 0–30)

## 2017-11-27 PROCEDURE — 99214 OFFICE O/P EST MOD 30 MIN: CPT | Performed by: NURSE PRACTITIONER

## 2017-11-27 PROCEDURE — 86140 C-REACTIVE PROTEIN: CPT | Performed by: NURSE PRACTITIONER

## 2017-11-27 PROCEDURE — 85651 RBC SED RATE NONAUTOMATED: CPT

## 2017-11-27 PROCEDURE — 86431 RHEUMATOID FACTOR QUANT: CPT | Performed by: NURSE PRACTITIONER

## 2017-11-27 PROCEDURE — 86038 ANTINUCLEAR ANTIBODIES: CPT | Performed by: NURSE PRACTITIONER

## 2017-11-27 PROCEDURE — 36415 COLL VENOUS BLD VENIPUNCTURE: CPT

## 2017-11-27 RX ORDER — DICLOFENAC SODIUM 75 MG/1
75 TABLET, DELAYED RELEASE ORAL 2 TIMES DAILY
Qty: 60 TABLET | Refills: 1 | Status: SHIPPED | OUTPATIENT
Start: 2017-11-27 | End: 2018-02-22 | Stop reason: SDUPTHER

## 2017-11-27 NOTE — PROGRESS NOTES
"CHIEF COMPLAINT  Neck pain has decreased since last visit.    Subjective   Traci King is a 67 y.o. female  who presents to the office for follow-up.She has a history of chronic neck pain. Her pain is relatively unchanged since last office visit, though slightly improved.    At last office visit, was ordered to have cervical MBB, which had to be rescheduled. She has not yet completed this.    Complains of pain in her neck and joints. Today her pain is 5/10VSA. Describes the pain as continuous. Continues with Percocet 10/325 4-5/day, tizanidine 4 mg 1-2/day and diclofenac 50 mg BID. The regimen helps decrease her pain by 75%. Denies any side effects from the regimen.  ALD's by self.     Complains of pain in her hands. Her hands are really bothering her.  Mother has a history of arthritis.  Is wondering if she can be tested for rheumatoid arthritis. \"My whole body hurts.\" \"I get up in the morning and my entire body hurts. I literally think I have arthritis around my whole body.\" Was having increased hand pain since last office visit. Requested a different anti-inflammatory. Was tried on Diclofenac 50 mg BID in place of meloxicam.  Denies any side effects from this. Did ask about increasing to 75 mg BID.     She completed a left C3-C5 MBB  on  8-1-17 which provided 80% diagnostic benefit and over a month of therapeutic benefit.      Neck Pain    This is a chronic problem. The current episode started more than 1 year ago. The problem occurs constantly. The quality of the pain is described as aching and burning. The pain is at a severity of 5/10. The pain is moderate. The symptoms are aggravated by position, bending, stress and twisting. The pain is worse during the day. Stiffness is present all day. Pertinent negatives include no chest pain, fever, headaches, numbness or weakness. She has tried oral narcotics and NSAIDs for the symptoms. The treatment provided moderate relief.      PEG Assessment   What number best " "describes your pain on average in the past week?6  What number best describes how, during the past week, pain has interfered with your enjoyment of life?4  What number best describes how, during the past week, pain has interfered with your general activity?  5    The following portions of the patient's history were reviewed and updated as appropriate: allergies, current medications, past family history, past medical history, past social history, past surgical history and problem list.    Review of Systems   Constitutional: Negative for chills and fever.   Respiratory: Negative for shortness of breath.    Cardiovascular: Negative for chest pain.   Gastrointestinal: Negative for constipation, diarrhea, nausea and vomiting.   Genitourinary: Negative for difficulty urinating, dyspareunia and dysuria.   Musculoskeletal: Positive for neck pain.   Neurological: Negative for dizziness, weakness, light-headedness, numbness and headaches.   Psychiatric/Behavioral: Positive for sleep disturbance. Negative for confusion, hallucinations, self-injury and suicidal ideas. The patient is not nervous/anxious.        Vitals:    11/27/17 1311   BP: 113/67   Pulse: 90   Resp: 18   Temp: 97.3 °F (36.3 °C)   SpO2: 97%   Weight: 164 lb (74.4 kg)   Height: 61\" (154.9 cm)   PainSc:   5   PainLoc: Neck     Objective   Physical Exam   Constitutional: She is oriented to person, place, and time. Vital signs are normal. She appears well-developed and well-nourished. She is cooperative.   HENT:   Head: Normocephalic and atraumatic.   Nose: Nose normal.   Eyes: Conjunctivae and lids are normal.   Neck: Trachea normal and full passive range of motion without pain. Neck supple. Muscular tenderness present. Decreased range of motion present.   Cardiovascular: Normal rate, regular rhythm and normal heart sounds.    Pulmonary/Chest: Effort normal and breath sounds normal. No respiratory distress.   Abdominal: Normal appearance.   Musculoskeletal:        " Cervical back: She exhibits tenderness, bony tenderness and spasm.   +Cervical facet tenderness/loading    OA changes of bilateral hands with no acute synovitis noted    Neurological: She is alert and oriented to person, place, and time. Gait normal.   Reflex Scores:       Bicep reflexes are 1+ on the right side and 1+ on the left side.       Brachioradialis reflexes are 1+ on the right side and 1+ on the left side.  Skin: Skin is warm, dry and intact.   Psychiatric: She has a normal mood and affect. Her speech is normal and behavior is normal. Judgment and thought content normal. Cognition and memory are normal.   Nursing note and vitals reviewed.      Assessment/Plan   Traci was seen today for neck pain.    Diagnoses and all orders for this visit:    Chronic pain due to trauma    Cervical facet joint syndrome    Degenerative disc disease, cervical    Neck pain    Encounter for long-term (current) use of high-risk medication    Arthralgia, unspecified joint  -     Rheumatoid Factor, Quant; Future  -     Sedimentation Rate; Future  -     C-reactive Protein; Future  -     RAHUL With / DsDNA, RNP, Sjogrens A / B, Randall; Future    Other orders  -     diclofenac (VOLTAREN) 75 MG EC tablet; Take 1 tablet by mouth 2 (Two) Times a Day.      --- The urine drug screen confirmation from 9-27-17 has been reviewed and the result is appropriate based on patient history and MATTHEW report  --- Refill Percocet. Patient appears stable with current regimen. No adverse effects. Regarding continuation of opioids, there is no evidence of aberrant behavior or any red flags.  The patient continues with appropriate response to opioid therapy. ADL's remain intact by self. she does have a significant history of chronic neck pain and OA and demonstrates moderate improvement with multimodal therapy including opioid therapy, which allows her to engage in ADLs and family activities. The continuation of opioid therapy is therefore not  contraindicated. We will however respect the March 2016 CDC Guidelines and will plan to avoid overexposure to opioids and avoid dose escalation.  --- Increase diclofenac 75 mg BID. No history of kidney disease. Discussed medication with the patient.  Included in this discussion was the potential for side effects and adverse events.  Patient verbalized understanding and wished to proceed.  Prescription will be sent to pharmacy.  --- proceed with cervical MBB when able.  --- lab tests-- rheumatoid factor, RAHUL w reflex, ESR and CRP.  --- Follow-up 1 month or sooner if needed.       MATTHEW REPORT    As part of the patient's treatment plan, I am prescribing controlled substances. The patient has been made aware of appropriate use of such medications, including potential risk of somnolence, limited ability to drive and/or work safely, and the potential for dependence or overdose. It has also bee made clear that these medications are for use by this patient only, without concomitant use of alcohol or other substances unless prescribed.     Patient has completed prescribing agreement detailing terms of continued prescribing of controlled substances, including monitoring MATTHEW reports, urine drug screening, and pill counts if necessary. The patient is aware that inappropriate use will results in cessation of prescribing such medications.    MATTHEW report has been reviewed and scanned into the patient's chart.    Date of last MATTHEW : 11-22-17    History and physical exam exhibit continued safe and appropriate use of controlled substances.      EMR Dragon/Transcription disclaimer:   Much of this encounter note is an electronic transcription/translation of spoken language to printed text. The electronic translation of spoken language may permit erroneous, or at times, nonsensical words or phrases to be inadvertently transcribed; Although I have reviewed the note for such errors, some may still exist.

## 2017-11-29 LAB — ANA SER QL: NEGATIVE

## 2017-11-30 ENCOUNTER — TELEPHONE (OUTPATIENT)
Dept: PAIN MEDICINE | Facility: CLINIC | Age: 67
End: 2017-11-30

## 2017-11-30 NOTE — TELEPHONE ENCOUNTER
----- Message from DACIA Brower sent at 11/29/2017  3:24 PM EST -----  Please let patient know labs are normal. No obvious signs of RA. Thanks. andi    -----------    Called and relayed message to pt.

## 2017-12-14 ENCOUNTER — DOCUMENTATION (OUTPATIENT)
Dept: PAIN MEDICINE | Facility: CLINIC | Age: 67
End: 2017-12-14

## 2017-12-14 ENCOUNTER — OUTSIDE FACILITY SERVICE (OUTPATIENT)
Dept: PAIN MEDICINE | Facility: CLINIC | Age: 67
End: 2017-12-14

## 2017-12-14 PROCEDURE — 64491 INJ PARAVERT F JNT C/T 2 LEV: CPT | Performed by: ANESTHESIOLOGY

## 2017-12-14 PROCEDURE — 64490 INJ PARAVERT F JNT C/T 1 LEV: CPT | Performed by: ANESTHESIOLOGY

## 2017-12-14 NOTE — PROGRESS NOTES
LEFT C3-5 Cervical Medial Branch Blockade  [facility]      PREOPERATIVE DIAGNOSIS:  Cervical spondylosis without myelopathy   POSTOPERATIVE DIAGNOSIS:  Same as preoperative diagnosis    PROCEDURE:    Cervical Facet Nerve (medial branch) Blocks, with Fluoroscopy:  LEVELS C3, C4, C5,  to block facet joints C3-4 & C4-5 on the LEFT  • 73984 - Cervical Facet block, 1st level  • 71567 - Cervical Facet block, 2nd level    PRE-PROCEDURE DISCUSSION WITH PATIENT:    Risks and complications were discussed with the patient prior to starting the procedure and informed consent was obtained.    SURGEON:  Jose Luis Gan MD    REASON FOR PROCEDURE:    Previous diagnostic positivity of a Cervical Medial Branch Blockade at the same levels Pain logs were reviewed and these demonstrated clinically significant relief of at least 75-80% during the diagnostic window    SEDATION:  Patient declined administration of moderate sedation   and , but an IV access was obtained for safety.  ANESTHETIC:   Marcaine 0.5%  STEROID:  Methylprednisolone (DEPO MEDROL) 30mg  TOTAL VOLUME OF SOLUTION:  3 mL    DESCRIPTON OF PROCEDURE:  After obtaining informed consent, the patient was placed in the prone position. IV access was obtained.  EKG, blood pressure, and pulse oximeter were monitored and all sedation was administered by an RN under my direct guidance.  The cervical area was prepped with Chloraprep and draped with sterile barrier. Under fluoroscopic guidance the waists of the C3 & C4 & C5 vertebra on the affected side were identified. Skin and subcutaneous tissue was then anesthetized with 1% lidocaine 1mL at each point. Then spinal needles were introduced under fluoroscopic guidance at the waist of these vertebra on this side. After confirming the position of the needle under fluoroscopic PA and lateral views, and confirming negative aspiration of blood and CSF, 0.25 mL of Omnipaque was injected.  Proper spread and lack of  vascular uptake was demonstrated.  A solution was prepared as above, and 1 mL of that solution was injected very slowly each level.      Needles were removed intact from all levels.  Vitals were stable throughout.       ESTIMATED BLOOD LOSS:  minimal  SPECIMENS:  None    COMPLICATIONS:    No complications were noted. and There was no indication of vascular uptake on live injection of contrast dye.    TOLERANCE & DISCHARGE CONDITION:    The patient tolerated the procedure well.  The patient was transported to the recovery area without difficulties.  The patient was discharged to home under the care of family in stable and satisfactory condition.  The patient did notice improvement in pain on extension and rotation of the cervical spine.    PLAN OF CARE:  1. The patient was given our standard instruction sheet.  2. We discussed that Cervical Medial Branch Blockade is a diagnostic procedure in consideration for radiofrequency ablation if two diagnostic procedures proved to be positive for significant benefit.  If sustained relief of six to eight weeks is obtained, then an alternative plan could be therapeutic cervical medial branch blocks on an as-needed basis.  3. The patient is asked to keep a pain log hourly for 8 hours postoperatively today.  4. The patient will Return to clinic 1 wk.  5. The patient will resume all medications as per the medication reconciliation sheet.

## 2017-12-20 ENCOUNTER — OFFICE VISIT (OUTPATIENT)
Dept: PAIN MEDICINE | Facility: CLINIC | Age: 67
End: 2017-12-20

## 2017-12-20 VITALS
DIASTOLIC BLOOD PRESSURE: 57 MMHG | BODY MASS INDEX: 30.93 KG/M2 | OXYGEN SATURATION: 97 % | HEART RATE: 80 BPM | RESPIRATION RATE: 18 BRPM | SYSTOLIC BLOOD PRESSURE: 112 MMHG | HEIGHT: 61 IN | TEMPERATURE: 97.8 F | WEIGHT: 163.8 LBS

## 2017-12-20 DIAGNOSIS — M47.812 CERVICAL FACET JOINT SYNDROME: ICD-10-CM

## 2017-12-20 DIAGNOSIS — M54.2 NECK PAIN: ICD-10-CM

## 2017-12-20 DIAGNOSIS — G89.21 CHRONIC PAIN DUE TO TRAUMA: Primary | ICD-10-CM

## 2017-12-20 DIAGNOSIS — Z79.899 ENCOUNTER FOR LONG-TERM (CURRENT) USE OF HIGH-RISK MEDICATION: ICD-10-CM

## 2017-12-20 DIAGNOSIS — M50.30 DEGENERATIVE DISC DISEASE, CERVICAL: ICD-10-CM

## 2017-12-20 PROCEDURE — 99214 OFFICE O/P EST MOD 30 MIN: CPT | Performed by: NURSE PRACTITIONER

## 2017-12-20 RX ORDER — OXYCODONE AND ACETAMINOPHEN 10; 325 MG/1; MG/1
TABLET ORAL
Qty: 150 TABLET | Refills: 0 | Status: SHIPPED | OUTPATIENT
Start: 2017-12-20 | End: 2018-01-24 | Stop reason: SDUPTHER

## 2017-12-20 NOTE — PATIENT INSTRUCTIONS
"-------  Education about Medial Branch Blockade and RF Therapy:    This medial branch blockade (MBB) suggested is intended for diagnostic purposes, with the intent of offering the patient Radiofrequency thermal rhizotomy (RF) if the MBB is diagnostically effective.  The diagnostic blockade is necessary to determine the likelihood that RF therapy could be efficacious in providing long term relief to the patient.    Medial branches are sensory nerve branches that connect to a facet joint and transmit sensations & pain signals from that joint.  Facet is a term for the type of joints found in the spine.  Medial branches are the nerves that go to a facet, and therefore are also sometimes called \"facet joint nerves\" (FJNs).      In a medial branch blockade procedure, xray fluoroscopy is used to verify the locations of the outside of the joint lines which are being targeted.  Under xray guidance, needles are placed to these areas.  Contrast dye is injected to confirm proper placement, with dye flowing over the joint area, and to ensure that the dye does not flow into unintended areas such as a vein.  When this is confirmed, local anesthetic is injected to block the medial branch at that joint level.      If MBBs are diagnostically successful in blocking pain, then the patient is most likely a great candidate for Radiofrequency of those facet joint nerves.  In the RF procedure, needles are placed to the joint lines in the same fashion, and after testing, the needle tips are heated to thermally treat the nerves, blocking the nerves by in essence damaging the nerves with the heat treatment.       Medically, a successful RF procedure should provide a patient with 50% pain relief or more for at least 6 months.  Clinical experience suggests that successful patients receive relief more in the range of 12 months on average.  We also discussed that a fortunate minority of patients receive therapeutic success from the MBB, and may " not require RF ablation.  If a patient receives more than 8 weeks of relief from MBB, then occasional repeat MBB for therapeutic purposes is a very reasonable alternative therapy.  This course of therapy is consistent with our LCDs according to our CMS  in the area, and therefore other insurance providers should follow accordingly.  We will monitor our patients to screen for these therapeutic responders and will offer RF therapy only when necessary.        We discussed that MBB & RF are not without risks.  Guidelines regarding anticoagulant use & neuraxial procedures will be respected.  Patients that are ill or otherwise may be at risk for sepsis will not have their spines accessed by neuraxial injections of any type.  This patient will not be offered these therapies if there is an increased risk.   We discussed that there is a risk of postprocedural pain and also a risk of worsening of clinical picture with these procedures as with any neuraxial procedure.    -------

## 2017-12-20 NOTE — PROGRESS NOTES
CHIEF COMPLAINT  Follow-up for neck pain.    Subjective   Traci King is a 67 y.o. female  who presents to the office for follow-up of procedure.  She completed a LEFT C3-5 Cervical Medial Branch Blockade on 12/14/17 performed by Dr. Gan for management of neck pain. Patient reports 80% relief from the procedure for 6 days.  Her pain returned to pre-procedure level yesterday.  She completed a left C3-C5 MBB  on  8-1-17 which provided 80% diagnostic benefit and over a month of therapeutic benefit.      Complains of pain in her neck. Today her pain is 6/10VAS. Describes the pain as continuous and relatively unchanged(temporary decrease). Continues with Percocet 10/325 4-5/day, tizanidine 4 mg 1-2/day and diclofenac 75 mg BID(increased at last office visit- noting mild improvement). The regimen helps decrease her pain by 80%. Denies any side effects from the regimen.  ALD's by self.     Neck Pain    This is a chronic problem. The current episode started more than 1 year ago. The problem occurs constantly. The quality of the pain is described as aching and burning. The pain is at a severity of 6/10. The pain is moderate. The symptoms are aggravated by position, bending, stress and twisting. The pain is worse during the day. Stiffness is present all day. Pertinent negatives include no chest pain, fever, headaches, numbness or weakness. She has tried oral narcotics and NSAIDs (cervical MBB--significant temporary relief) for the symptoms. The treatment provided moderate relief.      PEG Assessment   What number best describes your pain on average in the past week?4  What number best describes how, during the past week, pain has interfered with your enjoyment of life?4  What number best describes how, during the past week, pain has interfered with your general activity?  4    The following portions of the patient's history were reviewed and updated as appropriate: allergies, current medications, past family history,  "past medical history, past social history, past surgical history and problem list.    Review of Systems   Constitutional: Negative for fatigue and fever.   HENT: Negative for congestion.    Eyes: Negative for visual disturbance.   Respiratory: Negative for cough, shortness of breath and wheezing.    Cardiovascular: Negative.  Negative for chest pain.   Gastrointestinal: Positive for constipation. Negative for diarrhea.   Genitourinary: Negative for difficulty urinating.   Musculoskeletal: Positive for neck pain.   Neurological: Negative for weakness, numbness and headaches.   Psychiatric/Behavioral: Positive for sleep disturbance. Negative for suicidal ideas. The patient is not nervous/anxious.        Vitals:    12/20/17 1316   BP: 112/57   Pulse: 80   Resp: 18   Temp: 97.8 °F (36.6 °C)   SpO2: 97%   Weight: 74.3 kg (163 lb 12.8 oz)   Height: 154.9 cm (61\")   PainSc:   6   PainLoc: Neck     Objective   Physical Exam   Constitutional: She is oriented to person, place, and time. Vital signs are normal. She appears well-developed and well-nourished. She is cooperative.   HENT:   Head: Normocephalic and atraumatic.   Nose: Nose normal.   Eyes: Conjunctivae and lids are normal.   Neck: Trachea normal and full passive range of motion without pain. Neck supple. Spinous process tenderness (moderate tenderness left C3-C5 facets) and muscular tenderness present. Decreased range of motion present.   Cardiovascular: Normal rate.    Pulmonary/Chest: Effort normal. No respiratory distress.   Abdominal: Normal appearance.   Musculoskeletal:        Cervical back: She exhibits tenderness, bony tenderness and spasm.   +Cervical facet tenderness/loading    OA changes of bilateral hands with no acute synovitis noted    Neurological: She is alert and oriented to person, place, and time. Gait normal.   Reflex Scores:       Bicep reflexes are 1+ on the right side and 1+ on the left side.       Brachioradialis reflexes are 1+ on the right " side and 1+ on the left side.  Skin: Skin is warm, dry and intact.   Psychiatric: She has a normal mood and affect. Her speech is normal and behavior is normal. Judgment and thought content normal. Cognition and memory are normal.   Nursing note and vitals reviewed.    Assessment/Plan   Traci was seen today for neck pain.    Diagnoses and all orders for this visit:    Chronic pain due to trauma    Neck pain  -     Case Request    Degenerative disc disease, cervical    Cervical facet joint syndrome  -     Case Request    Encounter for long-term (current) use of high-risk medication    Other orders  -     oxyCODONE-acetaminophen (PERCOCET)  MG per tablet; 1 po q4-6 hrs PRN for pain Max-5/day      --- The urine drug screen confirmation from 9-27-17 has been reviewed and the result is appropriate based on patient history and MATTHEW report  --- Refill Percocet. Patient appears stable with current regimen. No adverse effects. Regarding continuation of opioids, there is no evidence of aberrant behavior or any red flags.  The patient continues with appropriate response to opioid therapy. ADL's remain intact by self.   --- Proceed with left C3-C5 RFL. No blood thinners. Reviewed the procedure at length with the patient.  Included in the review was expectations, complications, risk and benefits.The procedure was described in detail and the risks, benefits and alternatives were discussed with the patient (including but not limited to: bleeding, infection, nerve damage, worsening of pain, inability to perform injection, paralysis, seizures, and death) who agreed to proceed.  Discussed the potential for sedation if warranted/wanted.  Questions were answered and in a way the patient could understand.  Patient verbalized understanding and wishes to proceed.  This intervention will be ordered.  --- Follow-up 1 month or sooner if needed.       MATTHEW REPORT    As part of the patient's treatment plan, I am prescribing  "controlled substances. The patient has been made aware of appropriate use of such medications, including potential risk of somnolence, limited ability to drive and/or work safely, and the potential for dependence or overdose. It has also bee made clear that these medications are for use by this patient only, without concomitant use of alcohol or other substances unless prescribed.     Patient has completed prescribing agreement detailing terms of continued prescribing of controlled substances, including monitoring MATTHEW reports, urine drug screening, and pill counts if necessary. The patient is aware that inappropriate use will results in cessation of prescribing such medications.    MATTHEW report has been reviewed and scanned into the patient's chart.    Date of last MATTHEW : 12-18-17    History and physical exam exhibit continued safe and appropriate use of controlled substances.       -------  Education about Medial Branch Blockade and RF Therapy:    This medial branch blockade (MBB) suggested is intended for diagnostic purposes, with the intent of offering the patient Radiofrequency thermal rhizotomy (RF) if the MBB is diagnostically effective.  The diagnostic blockade is necessary to determine the likelihood that RF therapy could be efficacious in providing long term relief to the patient.    Medial branches are sensory nerve branches that connect to a facet joint and transmit sensations & pain signals from that joint.  Facet is a term for the type of joints found in the spine.  Medial branches are the nerves that go to a facet, and therefore are also sometimes called \"facet joint nerves\" (FJNs).      In a medial branch blockade procedure, xray fluoroscopy is used to verify the locations of the outside of the joint lines which are being targeted.  Under xray guidance, needles are placed to these areas.  Contrast dye is injected to confirm proper placement, with dye flowing over the joint area, and to ensure " that the dye does not flow into unintended areas such as a vein.  When this is confirmed, local anesthetic is injected to block the medial branch at that joint level.      If MBBs are diagnostically successful in blocking pain, then the patient is most likely a great candidate for Radiofrequency of those facet joint nerves.  In the RF procedure, needles are placed to the joint lines in the same fashion, and after testing, the needle tips are heated to thermally treat the nerves, blocking the nerves by in essence damaging the nerves with the heat treatment.       Medically, a successful RF procedure should provide a patient with 50% pain relief or more for at least 6 months.  Clinical experience suggests that successful patients receive relief more in the range of 12 months on average.  We also discussed that a fortunate minority of patients receive therapeutic success from the MBB, and may not require RF ablation.  If a patient receives more than 8 weeks of relief from MBB, then occasional repeat MBB for therapeutic purposes is a very reasonable alternative therapy.  This course of therapy is consistent with our LCDs according to our CMS  in the area, and therefore other insurance providers should follow accordingly.  We will monitor our patients to screen for these therapeutic responders and will offer RF therapy only when necessary.        We discussed that MBB & RF are not without risks.  Guidelines regarding anticoagulant use & neuraxial procedures will be respected.  Patients that are ill or otherwise may be at risk for sepsis will not have their spines accessed by neuraxial injections of any type.  This patient will not be offered these therapies if there is an increased risk.   We discussed that there is a risk of postprocedural pain and also a risk of worsening of clinical picture with these procedures as with any neuraxial procedure.    -------          EMR Dragon/Transcription disclaimer:    Much of this encounter note is an electronic transcription/translation of spoken language to printed text. The electronic translation of spoken language may permit erroneous, or at times, nonsensical words or phrases to be inadvertently transcribed; Although I have reviewed the note for such errors, some may still exist.

## 2018-01-18 ENCOUNTER — LAB (OUTPATIENT)
Dept: LAB | Facility: HOSPITAL | Age: 68
End: 2018-01-18

## 2018-01-18 ENCOUNTER — TRANSCRIBE ORDERS (OUTPATIENT)
Dept: ADMINISTRATIVE | Facility: HOSPITAL | Age: 68
End: 2018-01-18

## 2018-01-18 DIAGNOSIS — Z20.2 POSSIBLE EXPOSURE TO STD: ICD-10-CM

## 2018-01-18 DIAGNOSIS — Z20.2 POSSIBLE EXPOSURE TO STD: Primary | ICD-10-CM

## 2018-01-18 LAB — HCV AB SER DONR QL: NORMAL

## 2018-01-18 PROCEDURE — 36415 COLL VENOUS BLD VENIPUNCTURE: CPT

## 2018-01-18 PROCEDURE — 86803 HEPATITIS C AB TEST: CPT | Performed by: INTERNAL MEDICINE

## 2018-01-24 ENCOUNTER — OFFICE VISIT (OUTPATIENT)
Dept: PAIN MEDICINE | Facility: CLINIC | Age: 68
End: 2018-01-24

## 2018-01-24 VITALS
SYSTOLIC BLOOD PRESSURE: 124 MMHG | WEIGHT: 167 LBS | BODY MASS INDEX: 31.53 KG/M2 | OXYGEN SATURATION: 97 % | DIASTOLIC BLOOD PRESSURE: 74 MMHG | TEMPERATURE: 96.8 F | RESPIRATION RATE: 16 BRPM | HEIGHT: 61 IN | HEART RATE: 84 BPM

## 2018-01-24 DIAGNOSIS — G89.21 CHRONIC PAIN DUE TO TRAUMA: Primary | ICD-10-CM

## 2018-01-24 DIAGNOSIS — Z79.899 ENCOUNTER FOR LONG-TERM (CURRENT) USE OF HIGH-RISK MEDICATION: ICD-10-CM

## 2018-01-24 DIAGNOSIS — M50.30 DEGENERATIVE DISC DISEASE, CERVICAL: ICD-10-CM

## 2018-01-24 DIAGNOSIS — M47.812 CERVICAL FACET JOINT SYNDROME: ICD-10-CM

## 2018-01-24 DIAGNOSIS — M54.2 NECK PAIN: ICD-10-CM

## 2018-01-24 PROCEDURE — 99213 OFFICE O/P EST LOW 20 MIN: CPT | Performed by: NURSE PRACTITIONER

## 2018-01-24 RX ORDER — OXYCODONE AND ACETAMINOPHEN 10; 325 MG/1; MG/1
TABLET ORAL
Qty: 150 TABLET | Refills: 0 | Status: SHIPPED | OUTPATIENT
Start: 2018-01-24 | End: 2018-02-22 | Stop reason: SDUPTHER

## 2018-01-24 NOTE — PROGRESS NOTES
CHIEF COMPLAINT  F/U neck pain. Pt states it has been decreased since having facet blocks. Last visit 12-20-17.    Subjective   Traci King is a 67 y.o. female  who presents to the office for follow-up.She has a history of chronic neck pain, which she reports as being decreased since cervical MBB. Is awaiting RFL for MAC Anesthesia.    Complains of pain in her neck. Today her pain is 3/1)VAS. Describes the pain as continuous and improved overall with cervical MBB(though not since last office visit).  Continues with Percocet 10/325 4-5/day, Robaxin 750 mg 1/day PRN and diclofenac 75 mg BID. The regimen helps decrease her pain by 75%. Denies any side effects from the regimen.  ADL's by self.     Will be leaving for [place] the second week of February.  Neck Pain    This is a chronic problem. The current episode started more than 1 year ago. The problem occurs constantly. The quality of the pain is described as aching and burning. The pain is at a severity of 3/10. The pain is moderate. The symptoms are aggravated by position, bending, stress and twisting. The pain is worse during the day. Stiffness is present all day. Pertinent negatives include no chest pain, fever, headaches, numbness or weakness. She has tried oral narcotics and NSAIDs (cervical MBB--significant temporary relief) for the symptoms. The treatment provided moderate relief.      PEG Assessment   What number best describes your pain on average in the past week?4  What number best describes how, during the past week, pain has interfered with your enjoyment of life?4  What number best describes how, during the past week, pain has interfered with your general activity?  4    The following portions of the patient's history were reviewed and updated as appropriate: allergies, current medications, past family history, past medical history, past social history, past surgical history and problem list.    Review of Systems   Constitutional: Negative for  "fatigue and fever.   HENT: Negative for congestion.    Eyes: Negative for visual disturbance.   Respiratory: Negative for cough, shortness of breath and wheezing.    Cardiovascular: Negative.  Negative for chest pain.   Gastrointestinal: Positive for constipation. Negative for diarrhea.   Genitourinary: Negative for difficulty urinating.   Musculoskeletal: Positive for neck pain.   Neurological: Negative for dizziness, weakness, light-headedness, numbness and headaches.   Psychiatric/Behavioral: Positive for sleep disturbance. Negative for agitation, confusion, hallucinations and suicidal ideas. The patient is not nervous/anxious.        Vitals:    01/24/18 0949   BP: 124/74   Pulse: 84   Resp: 16   Temp: 96.8 °F (36 °C)   SpO2: 97%   Weight: 75.8 kg (167 lb)   Height: 154.9 cm (60.98\")   PainSc:   3   PainLoc: Neck     Objective   Physical Exam   Constitutional: She is oriented to person, place, and time. Vital signs are normal. She appears well-developed and well-nourished. She is cooperative.   HENT:   Head: Normocephalic and atraumatic.   Nose: Nose normal.   Eyes: Conjunctivae and lids are normal.   Neck: Trachea normal. Neck supple. Spinous process tenderness (moderate tenderness left C3-C5 facets) and muscular tenderness present. Decreased range of motion present.   Cardiovascular: Normal rate.    Pulmonary/Chest: Effort normal.   Abdominal: Normal appearance.   Musculoskeletal:        Cervical back: She exhibits tenderness, bony tenderness and spasm.   +Cervical facet tenderness/loading   Neurological: She is alert and oriented to person, place, and time. Gait normal.   Reflex Scores:       Bicep reflexes are 1+ on the right side and 1+ on the left side.       Brachioradialis reflexes are 1+ on the right side and 1+ on the left side.  Skin: Skin is warm, dry and intact.   Psychiatric: She has a normal mood and affect. Her speech is normal and behavior is normal. Judgment and thought content normal. Cognition " and memory are normal.   Nursing note and vitals reviewed.      Assessment/Plan   Diagnoses and all orders for this visit:    Chronic pain due to trauma    Degenerative disc disease, cervical    Cervical facet joint syndrome    Neck pain    Encounter for long-term (current) use of high-risk medication    Other orders  -     oxyCODONE-acetaminophen (PERCOCET)  MG per tablet; 1 po q4-6 hrs PRN for pain Max-5/day      --- The urine drug screen confirmation from 9-27-17 has been reviewed and the result is appropriate based on patient history and MATTHEW report  --- Refill Percocet. Patient appears stable with current regimen. No adverse effects. Regarding continuation of opioids, there is no evidence of aberrant behavior or any red flags.  The patient continues with appropriate response to opioid therapy. ADL's remain intact by self. she does have a significant history of chronic neck pain and demonstrates moderate improvement with multimodal therapy including opioid therapy, which allows her to engage in ADLs and family activities. The continuation of opioid therapy is therefore not contraindicated. We will however respect the March 2016 CDC Guidelines and will plan to avoid overexposure to opioids and avoid dose escalation.  --- Proceed with cervical RFL when able to procure MAC anesthesia.   --- Follow-up 1 month or sooner if needed.       MATTHEW REPORT    As part of the patient's treatment plan, I am prescribing controlled substances. The patient has been made aware of appropriate use of such medications, including potential risk of somnolence, limited ability to drive and/or work safely, and the potential for dependence or overdose. It has also bee made clear that these medications are for use by this patient only, without concomitant use of alcohol or other substances unless prescribed.     Patient has completed prescribing agreement detailing terms of continued prescribing of controlled substances, including  monitoring MATTHEW reports, urine drug screening, and pill counts if necessary. The patient is aware that inappropriate use will results in cessation of prescribing such medications.    MATTHEW report has been reviewed and scanned into the patient's chart.    Date of last MATTHEW : 1-23-18    History and physical exam exhibit continued safe and appropriate use of controlled substances.      EMR Dragon/Transcription disclaimer:   Much of this encounter note is an electronic transcription/translation of spoken language to printed text. The electronic translation of spoken language may permit erroneous, or at times, nonsensical words or phrases to be inadvertently transcribed; Although I have reviewed the note for such errors, some may still exist.

## 2018-01-26 ENCOUNTER — DOCUMENTATION (OUTPATIENT)
Dept: PAIN MEDICINE | Facility: CLINIC | Age: 68
End: 2018-01-26

## 2018-01-26 ENCOUNTER — OUTSIDE FACILITY SERVICE (OUTPATIENT)
Dept: PAIN MEDICINE | Facility: CLINIC | Age: 68
End: 2018-01-26

## 2018-01-26 PROCEDURE — 64634 DESTROY C/TH FACET JNT ADDL: CPT | Performed by: ANESTHESIOLOGY

## 2018-01-26 PROCEDURE — 99152 MOD SED SAME PHYS/QHP 5/>YRS: CPT | Performed by: ANESTHESIOLOGY

## 2018-01-26 PROCEDURE — 64633 DESTROY CERV/THOR FACET JNT: CPT | Performed by: ANESTHESIOLOGY

## 2018-01-29 NOTE — PROGRESS NOTES
Cervical Facet Nerve (Medial Branch) Radiofrequency Lesioning  (Two Needles Per Level technique)    [facility]      PREOPERATIVE DIAGNOSIS:  Cervical spondylosis without myelopathy   POSTOPERATIVE DIAGNOSIS:  Same as preoperative diagnosis    PROCEDURE:    Cervical Facet Nerve (medial branch) RF, with Fluoroscopy:      LEVELS: left  C3, C4 and C5    PRE-PROCEDURE DISCUSSION WITH PATIENT:    Risks and complications were discussed with the patient prior to starting the procedure and informed consent was obtained.  We discussed various topics, including but not limited to bleeding, infection, injury, nerve injury, paralysis, coma, death, postprocedural soreness or painful flare, worsening of clinical picture, lack of pain relief.  We discussed the previous positive diagnostic nature of medial branch blockades.      SURGEON:  Jose Luis Gan MD    REASON FOR PROCEDURE:    This patient had previously diagnostic CMBB of the same levels.     SEDATION:  Versed 4mg & Fentanyl 100 mcg IV  TIME OF PROCEDURE:  After administration of the IV sedative, the intraoperative procedure time was 26 minutes.     ANESTHETIC:   Lidocaine 2%  STEROID:   NONE  TOTAL VOLUME OF SOLUTION:  6 ml      DESCRIPTON OF PROCEDURE:  After obtaining informed consent, the patient was placed in the prone position. IV access was obtained.  EKG, blood pressure, and pulse oximeter were monitored and any & all sedation was administered by an RN under my direct guidance.  The cervical area was prepped with Chloraprep and draped with sterile barrier.     Under fluoroscopic guidance the waists of the above mentioned vertebra were identified and marked.  Skin and subcutaneous tissue were then anesthetized with 1% lidocaine 1mL at each point.  Then RF cannula needles were sequentially introduced under fluoroscopic guidance to the waists of these vertebrae contacting periosteum on the lateral edge of the vertebra on the AP fluroscopic view.  After confirming the position of the needle under fluoroscopic PA and lateral views, confirming the needle position in the center of the trapezoid at each level, a second needle was placed in parallel about 2 mm from the first at each level.  Similar approach was used, and the two needles were confirmed to be in the center of each trapezoid, parallel and about 2mm apart, and after confirming negative aspiration of blood and CSF, testing was initiated.  There was a lack of radicular stimulation on each needle at 3v and 2Hz.      Then, in each needle, 1mL of the aforementioned local anesthetic was instilled.  After 2 minutes had elapsed, Radiofrequency thermal lesioning was initiated for 2 minutes at 80 degrees Celsius.      Needles were removed intact from all levels.  Vitals were stable throughout.       ESTIMATED BLOOD LOSS:  minimal  SPECIMENS:  None    COMPLICATIONS:    No complications were noted.    TOLERANCE & DISCHARGE CONDITION:    The patient tolerated the procedure well.  The patient was transported to the recovery area without difficulties.  The patient was discharged to home under the care of family in stable and satisfactory condition.  The patient did notice improvement in pain on extension and rotation of the cervical spine.      PLAN OF CARE:  1. The patient was given our standard instruction sheet.  2. We discussed that Cervical Medial Branch Blockade is a diagnostic procedure in consideration for radiofrequency ablation if two diagnostic procedures proved to be positive for significant benefit.  If sustained relief of six to eight weeks is obtained, then an alternative plan could be therapeutic cervical medial branch blocks on an as-needed basis.  3. The patient is asked to keep a pain log hourly for 8 hours postoperatively today.  4. The patient will Return to clinic 4 wks.  5. The patient will resume all medications as per the medication reconciliation sheet.

## 2018-02-06 ENCOUNTER — PRIOR AUTHORIZATION (OUTPATIENT)
Dept: PAIN MEDICINE | Facility: CLINIC | Age: 68
End: 2018-02-06

## 2018-02-06 NOTE — TELEPHONE ENCOUNTER
Sent PA Methocarbamol 500 mg to [employer] through [employer] (Key # [id number]) and was approved:          Traci King (Rosen: [id number]) - [id number]  Methocarbamol 500MG tablets  Status: PA Response - Approved  Created: February 6th, 2018  Sent: February 6th, 2018  Open  Archive

## 2018-02-13 ENCOUNTER — TELEPHONE (OUTPATIENT)
Dept: CARDIOLOGY | Facility: CLINIC | Age: 68
End: 2018-02-13

## 2018-02-13 NOTE — TELEPHONE ENCOUNTER
02/13/18  9:13 AM  Traci King  1950    Home Phone [phone]   Mobile [phone]     Ms. 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 She states she had excruciating pain in the middle of her chest at her sternum. It caused her to double over. It hurt to breathe. She describes the pain as a spasm. She has some mild nausea with it, but no lightheadedness or diaphoresis. It lasted 45 minutes and resolved on it own. She has felt fatigued since that time. She does not measure her HR/ BP.    She had a normal cath in April 2017. Does she need to be seen today or in [facility]? You have an opening tomorrow as well.    Josie CAMACHO RN

## 2018-02-14 ENCOUNTER — OFFICE VISIT (OUTPATIENT)
Dept: CARDIOLOGY | Facility: CLINIC | Age: 68
End: 2018-02-14

## 2018-02-14 VITALS
BODY MASS INDEX: 30.58 KG/M2 | SYSTOLIC BLOOD PRESSURE: 138 MMHG | WEIGHT: 162 LBS | HEART RATE: 70 BPM | HEIGHT: 61 IN | DIASTOLIC BLOOD PRESSURE: 74 MMHG

## 2018-02-14 DIAGNOSIS — R07.2 PRECORDIAL PAIN: Primary | ICD-10-CM

## 2018-02-14 DIAGNOSIS — Z72.0 TOBACCO ABUSE: ICD-10-CM

## 2018-02-14 DIAGNOSIS — I51.89 DIASTOLIC DYSFUNCTION: ICD-10-CM

## 2018-02-14 PROCEDURE — 93000 ELECTROCARDIOGRAM COMPLETE: CPT | Performed by: INTERNAL MEDICINE

## 2018-02-14 PROCEDURE — 99213 OFFICE O/P EST LOW 20 MIN: CPT | Performed by: INTERNAL MEDICINE
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
## 2018-02-22 ENCOUNTER — OFFICE VISIT (OUTPATIENT)
Dept: PAIN MEDICINE | Facility: CLINIC | Age: 68
End: 2018-02-22

## 2018-02-22 VITALS
BODY MASS INDEX: 31.15 KG/M2 | OXYGEN SATURATION: 98 % | DIASTOLIC BLOOD PRESSURE: 71 MMHG | HEART RATE: 77 BPM | SYSTOLIC BLOOD PRESSURE: 114 MMHG | RESPIRATION RATE: 18 BRPM | HEIGHT: 61 IN | WEIGHT: 165 LBS | TEMPERATURE: 97.9 F

## 2018-02-22 DIAGNOSIS — G89.21 CHRONIC PAIN DUE TO TRAUMA: Primary | ICD-10-CM

## 2018-02-22 DIAGNOSIS — Z79.899 ENCOUNTER FOR LONG-TERM (CURRENT) USE OF HIGH-RISK MEDICATION: ICD-10-CM

## 2018-02-22 DIAGNOSIS — M54.2 NECK PAIN: ICD-10-CM

## 2018-02-22 DIAGNOSIS — M47.812 CERVICAL FACET JOINT SYNDROME: ICD-10-CM

## 2018-02-22 DIAGNOSIS — M50.30 DEGENERATIVE DISC DISEASE, CERVICAL: ICD-10-CM

## 2018-02-22 PROCEDURE — 99213 OFFICE O/P EST LOW 20 MIN: CPT | Performed by: NURSE PRACTITIONER

## 2018-02-22 RX ORDER — METHOCARBAMOL 500 MG/1
500 TABLET, FILM COATED ORAL 3 TIMES DAILY PRN
Qty: 90 TABLET | Refills: 1 | Status: SHIPPED | OUTPATIENT
Start: 2018-02-22 | End: 2018-09-24

## 2018-02-22 RX ORDER — DICLOFENAC SODIUM 75 MG/1
75 TABLET, DELAYED RELEASE ORAL 2 TIMES DAILY
Qty: 60 TABLET | Refills: 2 | Status: SHIPPED | OUTPATIENT
Start: 2018-02-22 | End: 2018-06-15 | Stop reason: SDUPTHER

## 2018-02-22 RX ORDER — OXYCODONE AND ACETAMINOPHEN 10; 325 MG/1; MG/1
TABLET ORAL
Qty: 150 TABLET | Refills: 0 | Status: SHIPPED | OUTPATIENT
Start: 2018-02-22 | End: 2018-03-14 | Stop reason: SDUPTHER

## 2018-02-22 NOTE — PROGRESS NOTES
CHIEF COMPLAINT  Patient states that her neck pain has decreased some since her RF.    Subjective   Traci King is a 67 y.o. female  who presents to the office for follow-up of procedure.  She completed a Cervical Facet Nerve (Medial Branch) Radiofrequency Lesioning LEFT C3-C5  on  1/26/18 performed by Dr. Gan for management of neck pain. Patient reports 50% relief from the procedure on her left neck  Is now noticing tightness and pain in her right neck.    Complains of pain in her neck. Today her pain is 5/10VAS.  Continues with Percocet 10/325 4-5/day, Robaxin 750 mg 1/day PRN and diclofenac 75 mg BID. The regimen helps decrease her pain significantly. Denies any side effects from the regimen.  ADL's by self.    Neck Pain    This is a chronic problem. The current episode started more than 1 year ago. The problem occurs constantly. The quality of the pain is described as aching and burning. The pain is at a severity of 5/10. The pain is moderate. The symptoms are aggravated by position, bending, stress and twisting. The pain is worse during the day. Stiffness is present all day. Pertinent negatives include no chest pain, fever, headaches, numbness or weakness. She has tried oral narcotics and NSAIDs (cervical MBB--significant temporary relief) for the symptoms. The treatment provided moderate relief.      PEG Assessment   What number best describes your pain on average in the past week?6  What number best describes how, during the past week, pain has interfered with your enjoyment of life?6  What number best describes how, during the past week, pain has interfered with your general activity?  6    The following portions of the patient's history were reviewed and updated as appropriate: allergies, current medications, past family history, past medical history, past social history, past surgical history and problem list.    Review of Systems   Constitutional: Negative for chills and fever.   Respiratory:  "Negative for shortness of breath.    Cardiovascular: Negative for chest pain.   Gastrointestinal: Negative for constipation, diarrhea, nausea and vomiting.   Genitourinary: Negative for difficulty urinating, dyspareunia and dysuria.   Musculoskeletal: Positive for neck pain.   Neurological: Negative for dizziness, weakness, light-headedness, numbness and headaches.   Psychiatric/Behavioral: Negative for confusion, hallucinations, self-injury, sleep disturbance and suicidal ideas. The patient is not nervous/anxious.        Vitals:    02/22/18 0939   BP: 114/71   Pulse: 77   Resp: 18   Temp: 97.9 °F (36.6 °C)   SpO2: 98%   Weight: 74.8 kg (165 lb)   Height: 154.9 cm (60.98\")   PainSc:   5   PainLoc: Neck     Objective   Physical Exam   Constitutional: She is oriented to person, place, and time. Vital signs are normal. She appears well-developed and well-nourished. She is cooperative.   HENT:   Head: Normocephalic and atraumatic.   Nose: Nose normal.   Eyes: Conjunctivae and lids are normal.   Neck: Trachea normal. Neck supple. Spinous process tenderness (mild tenderness left C3-C5 facets) and muscular tenderness present. Decreased range of motion present.   Mild spasm of right trapezius with no distinct trigger point noted   Cardiovascular: Normal rate.    Pulmonary/Chest: Effort normal.   Abdominal: Normal appearance.   Musculoskeletal:        Cervical back: She exhibits tenderness, bony tenderness and spasm.   Neurological: She is alert and oriented to person, place, and time. Gait normal.   Reflex Scores:       Bicep reflexes are 1+ on the right side and 1+ on the left side.       Brachioradialis reflexes are 1+ on the right side and 1+ on the left side.  Skin: Skin is warm, dry and intact.   Psychiatric: She has a normal mood and affect. Her speech is normal and behavior is normal. Judgment and thought content normal. Cognition and memory are normal.   Nursing note and vitals reviewed.      Assessment/Plan   Traci " was seen today for shoulder pain and neck pain.    Diagnoses and all orders for this visit:    Chronic pain due to trauma    Degenerative disc disease, cervical    Cervical facet joint syndrome    Neck pain    Encounter for long-term (current) use of high-risk medication    Other orders  -     oxyCODONE-acetaminophen (PERCOCET)  MG per tablet; 1 po q4-6 hrs PRN for pain Max-5/day  -     diclofenac (VOLTAREN) 75 MG EC tablet; Take 1 tablet by mouth 2 (Two) Times a Day.  -     methocarbamol (ROBAXIN) 500 MG tablet; Take 1 tablet by mouth 3 (Three) Times a Day As Needed for Muscle Spasms.      --- The urine drug screen confirmation from 9-27-17 has been reviewed and the result is appropriate based on patient history and MATTHEW report  --- Refill Percocet and Diclofenac. Patient appears stable with current regimen. No adverse effects. Regarding continuation of opioids, there is no evidence of aberrant behavior or any red flags.  The patient continues with appropriate response to opioid therapy. ADL's remain intact by self.   --- Follow-up 1 month or sooner if needed.       MATTHEW REPORT    As part of the patient's treatment plan, I am prescribing controlled substances. The patient has been made aware of appropriate use of such medications, including potential risk of somnolence, limited ability to drive and/or work safely, and the potential for dependence or overdose. It has also bee made clear that these medications are for use by this patient only, without concomitant use of alcohol or other substances unless prescribed.     Patient has completed prescribing agreement detailing terms of continued prescribing of controlled substances, including monitoring MATTHEW reports, urine drug screening, and pill counts if necessary. The patient is aware that inappropriate use will results in cessation of prescribing such medications.    MATTHEW report has been reviewed and scanned into the patient's chart.    Date of last  MATTHEW : 2-15-18    History and physical exam exhibit continued safe and appropriate use of controlled substances.       EMR Dragon/Transcription disclaimer:   Much of this encounter note is an electronic transcription/translation of spoken language to printed text. The electronic translation of spoken language may permit erroneous, or at times, nonsensical words or phrases to be inadvertently transcribed; Although I have reviewed the note for such errors, some may still exist.

## 2018-03-08 ENCOUNTER — APPOINTMENT (OUTPATIENT)
Dept: WOMENS IMAGING | Facility: HOSPITAL | Age: 68
End: 2018-03-08

## 2018-03-08 PROCEDURE — 77063 BREAST TOMOSYNTHESIS BI: CPT | Performed by: RADIOLOGY

## 2018-03-08 PROCEDURE — 77067 SCR MAMMO BI INCL CAD: CPT | Performed by: RADIOLOGY

## 2018-03-14 RX ORDER — OXYCODONE AND ACETAMINOPHEN 10; 325 MG/1; MG/1
TABLET ORAL
Qty: 150 TABLET | Refills: 0 | Status: SHIPPED | OUTPATIENT
Start: 2018-03-14 | End: 2018-04-19 | Stop reason: SDUPTHER

## 2018-03-22 ENCOUNTER — OFFICE VISIT (OUTPATIENT)
Dept: PAIN MEDICINE | Facility: CLINIC | Age: 68
End: 2018-03-22

## 2018-03-22 VITALS
RESPIRATION RATE: 16 BRPM | HEIGHT: 61 IN | BODY MASS INDEX: 31.91 KG/M2 | DIASTOLIC BLOOD PRESSURE: 50 MMHG | OXYGEN SATURATION: 98 % | WEIGHT: 169 LBS | HEART RATE: 82 BPM | TEMPERATURE: 97.4 F | SYSTOLIC BLOOD PRESSURE: 102 MMHG

## 2018-03-22 DIAGNOSIS — M79.672 PAIN IN BOTH FEET: ICD-10-CM

## 2018-03-22 DIAGNOSIS — M54.50 LOW BACK PAIN WITHOUT SCIATICA, UNSPECIFIED BACK PAIN LATERALITY, UNSPECIFIED CHRONICITY: ICD-10-CM

## 2018-03-22 DIAGNOSIS — M54.2 NECK PAIN: ICD-10-CM

## 2018-03-22 DIAGNOSIS — M79.671 PAIN IN BOTH FEET: ICD-10-CM

## 2018-03-22 DIAGNOSIS — M47.812 CERVICAL FACET JOINT SYNDROME: ICD-10-CM

## 2018-03-22 DIAGNOSIS — G89.21 CHRONIC PAIN DUE TO TRAUMA: Primary | ICD-10-CM

## 2018-03-22 DIAGNOSIS — Z79.899 ENCOUNTER FOR LONG-TERM (CURRENT) USE OF HIGH-RISK MEDICATION: ICD-10-CM

## 2018-03-22 DIAGNOSIS — M50.30 DEGENERATIVE DISC DISEASE, CERVICAL: ICD-10-CM

## 2018-03-22 PROCEDURE — 99213 OFFICE O/P EST LOW 20 MIN: CPT | Performed by: NURSE PRACTITIONER

## 2018-03-22 RX ORDER — PHENTERMINE HYDROCHLORIDE 37.5 MG/1
TABLET ORAL
COMMUNITY
Start: 2018-02-20 | End: 2018-03-22 | Stop reason: SDUPTHER

## 2018-03-22 NOTE — PROGRESS NOTES
"CHIEF COMPLAINT  F/U neck pain. Pt states neck pain is a little improved since last visit, with right side of neck greater pain than the left. C/O new pain in feet that only bother her when she is at work. She has spoken with her PCP about it and is to wear in soles at work and see if this helps.     Subjective   Traci King is a 67 y.o. female  who presents to the office for follow-up.She has a history of chronic neck pain and left shoulder pain. Her left shoulder and left neck are better. \"the shoulder(left) does not hurt me at all.\" She states now the right neck is hurting more. No pain in right shoulder.   She completed a Cervical Facet Nerve (Medial Branch) Radiofrequency Lesioning LEFT C3-C5  on  1/26/18 performed by Dr. Gan for management of neck pain.    Complains of pain in her neck and feet. Today her pain is 3/10VAS. Describes the pain as continuous and variableContinues with Percocet 10/325 4-5/day, Robaxin 750 mg 1/day PRN and diclofenac 75 mg BID. The regimen helps decrease her pain significantly. Denies any side effects from the regimen.  ADL's by self.    \"I'm having a new pain.\" stands on concrete all day at work. \"Sometimes I have to leave.\" Tried to buy new shoes and inserts. Still had pain. Saw podiatrist and said \"If the inserts don't help, it's something in your back.\" Bought new inserts from podiatrist's office, '[employer]\".  \"She said I have flat feet.\" The pain is on the entirety of the bottom of her feet. Admits her pain only hurts while she works a 6 hours shift on concrete. Works 3 6 hours shifts a week at [email or web address].  Did not have any imaging performed. Has not worked since she got newest orthodics.  Describes the pain as aching and burning. Only occurs with standing and walking while at work.  Her podiatrist recommended lumbar xrays.  Does have occasional back pain but states \"the pain medication may be helping this.\" does not heavy lift at work.     Neck Pain "    This is a chronic problem. The current episode started more than 1 year ago. The problem occurs constantly. Progression since onset: unchanged since last office visit. The quality of the pain is described as aching and burning. The pain is at a severity of 3/10. The pain is moderate. The symptoms are aggravated by position, bending, stress and twisting. The pain is worse during the day. Stiffness is present all day. Pertinent negatives include no chest pain, fever, headaches, numbness or weakness. She has tried oral narcotics and NSAIDs (cervical MBB--significant temporary relief; cervical RFL--50% ongoing relief) for the symptoms. The treatment provided moderate relief.   Foot Pain   This is a new problem. The current episode started 1 to 4 weeks ago. Episode frequency: only while at work. The problem has been gradually worsening. Associated symptoms include neck pain. Pertinent negatives include no chest pain, chills, fever, headaches, nausea, numbness, vomiting or weakness. 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 She has tried lying down, position changes, rest and relaxation (being off of work completely relieves pain) for the symptoms. The treatment provided significant relief.      PEG Assessment   What number best describes your pain on average in the past week?3(neck), 6 (foot)  What number best describes how, during the past week, pain has interfered with your enjoyment of life?3 (neck), 6 (foot)  What number best describes how, during the past week, pain has interfered with your general activity?  3 (neck), 8 (foot)    The following portions of the patient's history were reviewed and updated as appropriate: allergies, current medications, past family history, past medical history, past social history, past surgical history and problem list.    Review of Systems   Constitutional: Negative for chills and fever.   Respiratory: Negative for shortness of breath.   "  Cardiovascular: Negative for chest pain.   Gastrointestinal: Negative for constipation, diarrhea, nausea and vomiting.   Genitourinary: Negative for difficulty urinating, dyspareunia and dysuria.   Musculoskeletal: Positive for neck pain.   Neurological: Negative for dizziness, weakness, light-headedness, numbness and headaches.   Psychiatric/Behavioral: Negative for confusion, hallucinations, self-injury, sleep disturbance and suicidal ideas. The patient is not nervous/anxious.        Vitals:    03/22/18 1332   BP: 102/50   Pulse: 82   Resp: 16   Temp: 97.4 °F (36.3 °C)   SpO2: 98%   Weight: 76.7 kg (169 lb)   Height: 154.9 cm (60.98\")   PainSc:   3   PainLoc: Neck     Objective   Physical Exam   Constitutional: She is oriented to person, place, and time. Vital signs are normal. She appears well-developed and well-nourished. She is cooperative.   HENT:   Head: Normocephalic and atraumatic.   Nose: Nose normal.   Eyes: Conjunctivae and lids are normal.   Neck: Trachea normal. Neck supple. Spinous process tenderness (mild tenderness left C3-C5 facets; mild on right as well) and muscular tenderness present. Decreased range of motion present.   Mild spasm of right trapezius with no distinct trigger point noted   Cardiovascular: Normal rate.    Pulmonary/Chest: Effort normal.   Abdominal: Normal appearance.   Musculoskeletal:        Cervical back: She exhibits tenderness, bony tenderness and spasm.        Lumbar back: She exhibits tenderness.        Right foot: There is no bony tenderness and no deformity.        Left foot: There is no bony tenderness and no deformity.   Neurological: She is alert and oriented to person, place, and time. Gait normal.   Reflex Scores:       Bicep reflexes are 1+ on the right side and 1+ on the left side.       Brachioradialis reflexes are 1+ on the right side and 1+ on the left side.       Patellar reflexes are 1+ on the right side and 1+ on the left side.  Skin: Skin is warm, dry and " "intact.   Psychiatric: She has a normal mood and affect. Her speech is normal and behavior is normal. Judgment and thought content normal. Cognition and memory are normal.   Nursing note and vitals reviewed.      Assessment/Plan   Traci was seen today for neck pain.    Diagnoses and all orders for this visit:    Chronic pain due to trauma    Cervical facet joint syndrome    Degenerative disc disease, cervical    Neck pain    Encounter for long-term (current) use of high-risk medication    Pain in both feet  -     XR Spine Lumbar Complete With Flex & Ext; Future    Low back pain without sciatica, unspecified back pain laterality, unspecified chronicity  -     XR Spine Lumbar Complete With Flex & Ext; Future      --- The urine drug screen confirmation from 9-27-17 has been reviewed and the result is appropriate based on patient history and MATTHEW report  --- Refill Percocet. Patient appears stable with current regimen. No adverse effects. Regarding continuation of opioids, there is no evidence of aberrant behavior or any red flags.  The patient continues with appropriate response to opioid therapy. ADL's remain intact by self.   --- xray of lumbar spine. Orders noted.  --- Patient refuses EMG-BLE. \"I've had one for my hands before and it was the most painful thing.\"   --- Consider right cervical MBB. Patient wants to wait at this time.   --- Follow-up 1 month or sooner if needed.       MATTHEW REPORT    As part of the patient's treatment plan, I am prescribing controlled substances. The patient has been made aware of appropriate use of such medications, including potential risk of somnolence, limited ability to drive and/or work safely, and the potential for dependence or overdose. It has also bee made clear that these medications are for use by this patient only, without concomitant use of alcohol or other substances unless prescribed.     Patient has completed prescribing agreement detailing terms of continued " prescribing of controlled substances, including monitoring MATTHEW reports, urine drug screening, and pill counts if necessary. The patient is aware that inappropriate use will results in cessation of prescribing such medications.    MATTHEW report has been reviewed and scanned into the patient's chart.    Date of last MATTHEW : 3-20-18    History and physical exam exhibit continued safe and appropriate use of controlled substances.      EMR Dragon/Transcription disclaimer:   Much of this encounter note is an electronic transcription/translation of spoken language to printed text. The electronic translation of spoken language may permit erroneous, or at times, nonsensical words or phrases to be inadvertently transcribed; Although I have reviewed the note for such errors, some may still exist.

## 2018-04-16 ENCOUNTER — HOSPITAL ENCOUNTER (OUTPATIENT)
Dept: GENERAL RADIOLOGY | Facility: HOSPITAL | Age: 68
Discharge: HOME | End: 2018-04-16
Admitting: NURSE PRACTITIONER

## 2018-04-16 DIAGNOSIS — M79.671 PAIN IN BOTH FEET: ICD-10-CM

## 2018-04-16 DIAGNOSIS — M79.672 PAIN IN BOTH FEET: ICD-10-CM

## 2018-04-16 DIAGNOSIS — M54.50 LOW BACK PAIN WITHOUT SCIATICA, UNSPECIFIED BACK PAIN LATERALITY, UNSPECIFIED CHRONICITY: ICD-10-CM

## 2018-04-16 PROCEDURE — 72114 X-RAY EXAM L-S SPINE BENDING: CPT

## 2018-04-19 ENCOUNTER — RESULTS ENCOUNTER (OUTPATIENT)
Dept: PAIN MEDICINE | Facility: CLINIC | Age: 68
End: 2018-04-19

## 2018-04-19 ENCOUNTER — OFFICE VISIT (OUTPATIENT)
Dept: PAIN MEDICINE | Facility: CLINIC | Age: 68
End: 2018-04-19

## 2018-04-19 VITALS
HEIGHT: 61 IN | BODY MASS INDEX: 31.72 KG/M2 | DIASTOLIC BLOOD PRESSURE: 63 MMHG | WEIGHT: 168 LBS | TEMPERATURE: 98.3 F | OXYGEN SATURATION: 100 % | SYSTOLIC BLOOD PRESSURE: 114 MMHG | RESPIRATION RATE: 18 BRPM | HEART RATE: 82 BPM

## 2018-04-19 DIAGNOSIS — M54.2 NECK PAIN: ICD-10-CM

## 2018-04-19 DIAGNOSIS — G89.21 CHRONIC PAIN DUE TO TRAUMA: Primary | ICD-10-CM

## 2018-04-19 DIAGNOSIS — M50.30 DEGENERATIVE DISC DISEASE, CERVICAL: ICD-10-CM

## 2018-04-19 DIAGNOSIS — G89.21 CHRONIC PAIN DUE TO TRAUMA: ICD-10-CM

## 2018-04-19 DIAGNOSIS — M79.672 PAIN IN BOTH FEET: ICD-10-CM

## 2018-04-19 DIAGNOSIS — M54.50 LOW BACK PAIN WITHOUT SCIATICA, UNSPECIFIED BACK PAIN LATERALITY, UNSPECIFIED CHRONICITY: ICD-10-CM

## 2018-04-19 DIAGNOSIS — M79.671 PAIN IN BOTH FEET: ICD-10-CM

## 2018-04-19 DIAGNOSIS — Z79.899 ENCOUNTER FOR LONG-TERM (CURRENT) USE OF HIGH-RISK MEDICATION: ICD-10-CM

## 2018-04-19 DIAGNOSIS — M47.812 CERVICAL FACET JOINT SYNDROME: ICD-10-CM

## 2018-04-19 LAB
POC AMPHETAMINES: POSITIVE
POC BARBITURATES: POSITIVE
POC BENZODIAZEPHINES: POSITIVE
POC COCAINE: NEGATIVE
POC METHADONE: NEGATIVE
POC METHAMPHETAMINE SCREEN URINE: NEGATIVE
POC OPIATES: NEGATIVE
POC OXYCODONE: POSITIVE
POC PHENCYCLIDINE: NEGATIVE
POC PROPOXYPHENE: NEGATIVE
POC THC: NEGATIVE
POC TRICYCLIC ANTIDEPRESSANTS: POSITIVE

## 2018-04-19 PROCEDURE — 80305 DRUG TEST PRSMV DIR OPT OBS: CPT | Performed by: NURSE PRACTITIONER

## 2018-04-19 PROCEDURE — 99214 OFFICE O/P EST MOD 30 MIN: CPT | Performed by: NURSE PRACTITIONER

## 2018-04-19 RX ORDER — GABAPENTIN 300 MG/1
300 CAPSULE ORAL 3 TIMES DAILY
Qty: 90 CAPSULE | Refills: 2 | Status: SHIPPED | OUTPATIENT
Start: 2018-04-19 | End: 2018-06-26 | Stop reason: ALTCHOICE

## 2018-04-19 RX ORDER — OXYCODONE AND ACETAMINOPHEN 10; 325 MG/1; MG/1
TABLET ORAL
Qty: 150 TABLET | Refills: 0 | Status: SHIPPED | OUTPATIENT
Start: 2018-04-19 | End: 2018-05-17 | Stop reason: SDUPTHER

## 2018-04-19 NOTE — PROGRESS NOTES
"CHIEF COMPLAINT  Neck pain is unchanged since last visit. She states she has been having bilateral foot pain.     Subjective   Traci King is a 67 y.o. female  who presents to the office for follow-up.She has a history of chronic neck pain. Reports this is unchanged since last office visit. Complains of increased bilateral foot pain.    Complains of pain in her neck and feet. Today her pain is 4/10VAS(neck) and 7-8/10VAS(foot). Describes the pain as nearly continuous and the foot pain is worsening. Continues with Percocet 10/325 4-5/day, Robaxin 750 mg 1/day PRN and diclofenac 75 mg BID. The regimen helps decrease her pain significantly. Denies any side effects from the regimen.  ADL's by self.    Is having increased pain in her feet. Has been seen by podiatry who recommended insoles. \"It's almost my shoes are too small.\" Describes the pain as the top of her feet burning. Pain increases with walking, standing and working(stands on concrete); pain decreases with medication, rest and injections. The pain is becoming more frequent. Not only occurs at work now. Tried OTC pain patch. No help with ice. Yesterday had to leave work due to pain. Denies the feet changing colors. States \"they feel cold all the time but that's not new.\"    She completed a Cervical Facet Nerve (Medial Branch) Radiofrequency Lesioning LEFT C3-C5  on  1/26/18 performed by Dr. Gan for management of neck pain. Still having moderate relief with this.    Neck Pain    This is a chronic problem. The current episode started more than 1 year ago. The problem occurs constantly. Progression since onset: unchanged since last office visit. The quality of the pain is described as burning. The pain is at a severity of 4/10. The pain is moderate. The symptoms are aggravated by position, bending, stress and twisting. The pain is worse during the day. Stiffness is present all day. Pertinent negatives include no chest pain, fever, headaches, numbness or " weakness. She has tried oral narcotics and NSAIDs (cervical MBB--significant temporary relief; cervical RFL--50% ongoing relief) for the symptoms. The treatment provided moderate relief.   Foot Pain   This is a new problem. The current episode started 1 to 4 weeks ago. Episode frequency: becoming more frequent. The problem has been gradually worsening. Associated symptoms include neck pain. Pertinent negatives include no chest pain, chills, fever, headaches, nausea, numbness, vomiting or weakness. 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 She has tried lying down, position changes, rest and relaxation (becoming more frequent) for the symptoms. The treatment provided moderate relief.        4-16-18  LUMBAR SPINE: NEUTRAL LATERAL WITH LATERAL FLEXION-EXTENSION VIEWS AS  WELL AS AP, BILATERAL OBLIQUE, SPOT LUMBOSACRAL.     HISTORY: Pain in both feet and low back.     FINDINGS: There is disc space narrowing at L5-S1. Lumbar spine vertebral  body heights appear normal and there is normal alignment without  abnormal motion with flexion/extension. There is prominent bilateral  facet arthritis at L5-S1 where there is bony overgrowth. There is no  evidence for fracture or acute abnormality. Atherosclerotic  calcifications are present involving the abdominal aorta.      Impression:     Degenerative disc disease and facet arthritis at L5-S1. No  evidence for fracture or abnormal motion on flexion/extension.     This report was finalized on 4/16/2018 3:21 PM by Dr. Rico Villalobos MD.       PEG Assessment   What number best describes your pain on average in the past week?8  What number best describes how, during the past week, pain has interfered with your enjoyment of life?8  What number best describes how, during the past week, pain has interfered with your general activity?  8      The following portions of the patient's history were reviewed and updated as appropriate: allergies,  "current medications, past family history, past medical history, past social history, past surgical history and problem list.    Review of Systems   Constitutional: Negative for chills and fever.   Respiratory: Positive for shortness of breath.    Cardiovascular: Negative for chest pain.   Gastrointestinal: Positive for constipation. Negative for diarrhea, nausea and vomiting.   Genitourinary: Negative for difficulty urinating, dyspareunia and dysuria.   Musculoskeletal: Positive for neck pain.   Neurological: Negative for dizziness, weakness, light-headedness, numbness and headaches.   Psychiatric/Behavioral: Negative for confusion, hallucinations, self-injury, sleep disturbance and suicidal ideas. The patient is not nervous/anxious.        Vitals:    04/19/18 0755   BP: 114/63   Pulse: 82   Resp: 18   Temp: 98.3 °F (36.8 °C)   SpO2: 100%   Weight: 76.2 kg (168 lb)   Height: 154.9 cm (61\")   PainSc:   8   PainLoc: Neck     Objective   Physical Exam   Constitutional: She is oriented to person, place, and time. Vital signs are normal. She appears well-developed and well-nourished. She is cooperative.   HENT:   Head: Normocephalic and atraumatic.   Nose: Nose normal.   Eyes: Conjunctivae and lids are normal.   Neck: Trachea normal. Neck supple. Spinous process tenderness (mild tenderness left C3-C5 facets; mild on right as well) and muscular tenderness present. Decreased range of motion present.   Cardiovascular: Normal rate.    Pulmonary/Chest: Effort normal.   Abdominal: Normal appearance.   Musculoskeletal:        Cervical back: She exhibits tenderness, bony tenderness and spasm.        Lumbar back: She exhibits tenderness.        Right foot: There is no bony tenderness and no deformity.        Left foot: There is no bony tenderness and no deformity.   Neurological: She is alert and oriented to person, place, and time. Gait normal.   Reflex Scores:       Bicep reflexes are 1+ on the right side and 1+ on the left " side.       Brachioradialis reflexes are 1+ on the right side and 1+ on the left side.       Patellar reflexes are 1+ on the right side and 1+ on the left side.  Skin: Skin is warm, dry and intact.   Psychiatric: She has a normal mood and affect. Her speech is normal and behavior is normal. Judgment and thought content normal. Cognition and memory are normal.   Nursing note and vitals reviewed.      Assessment/Plan   Traci was seen today for neck pain.    Diagnoses and all orders for this visit:    Chronic pain due to trauma  -     Urine Drug Screen Confirmation - Urine, Urine, Clean Catch; Future  -     POC Urine Drug Screen, Triage    Degenerative disc disease, cervical  -     Urine Drug Screen Confirmation - Urine, Urine, Clean Catch; Future  -     POC Urine Drug Screen, Triage    Neck pain  -     Urine Drug Screen Confirmation - Urine, Urine, Clean Catch; Future  -     POC Urine Drug Screen, Triage    Cervical facet joint syndrome  -     Urine Drug Screen Confirmation - Urine, Urine, Clean Catch; Future  -     POC Urine Drug Screen, Triage    Low back pain without sciatica, unspecified back pain laterality, unspecified chronicity    Pain in both feet    Encounter for long-term (current) use of high-risk medication    Other orders  -     oxyCODONE-acetaminophen (PERCOCET)  MG per tablet; 1 po q4-6 hrs PRN for pain Max-5/day  -     gabapentin (NEURONTIN) 300 MG capsule; Take 1 capsule by mouth 3 (Three) Times a Day.      --- Routine UDS in office today as part of monitoring requirements for controlled substances.  The specimen was viewed and the immunoassay result reviewed and is +OXY, +BZD(APPROPRIATE).  This specimen will be sent to [facility] laboratory for confirmation.     --- Refill Percocet. Patient appears stable with current regimen. No adverse effects. Regarding continuation of opioids, there is no evidence of aberrant behavior or any red flags.  The patient continues with appropriate response to  opioid therapy. ADL's remain intact by self.   --- Reviewed lumbar xray with patient. See above.   --- Patient refuses EMG-BLE at this time.  --- Declines xray at this time.  --- trial of gabapentin. The patient will be started on a trial of gabapentin. she will be started on gabapentin 300 mg once nightly for one week. Will then increase to twice daily for one week. Patient will then increase to three times daily as tolerated. Reviewed potential side effects, including somnolence and dizziness. Discussed medication with the patient.  Included in this discussion was the potential for side effects and adverse events.  Patient verbalized understanding and wished to proceed.  Prescription will be given to patient.  --- Follow-up 1 month or sooner if needed       MATTHEW REPORT    As part of the patient's treatment plan, I am prescribing controlled substances. The patient has been made aware of appropriate use of such medications, including potential risk of somnolence, limited ability to drive and/or work safely, and the potential for dependence or overdose. It has also bee made clear that these medications are for use by this patient only, without concomitant use of alcohol or other substances unless prescribed.     Patient has completed prescribing agreement detailing terms of continued prescribing of controlled substances, including monitoring MATTHEW reports, urine drug screening, and pill counts if necessary. The patient is aware that inappropriate use will results in cessation of prescribing such medications.    MATTHEW report has been reviewed and scanned into the patient's chart.    Date of last MATTHEW : 4-17-18    History and physical exam exhibit continued safe and appropriate use of controlled substances.      EMR Dragon/Transcription disclaimer:   Much of this encounter note is an electronic transcription/translation of spoken language to printed text. The electronic translation of spoken language may permit  erroneous, or at times, nonsensical words or phrases to be inadvertently transcribed; Although I have reviewed the note for such errors, some may still exist.

## 2018-05-01 ENCOUNTER — TELEPHONE (OUTPATIENT)
Dept: PAIN MEDICINE | Facility: CLINIC | Age: 68
End: 2018-05-01

## 2018-05-01 NOTE — TELEPHONE ENCOUNTER
Pt states Gabapentin 300 mg TID is too strong. She only has been taking it at night. She requests it be decreased to 100 mg so she can take in the day time. Is this ok?

## 2018-05-17 ENCOUNTER — OFFICE VISIT (OUTPATIENT)
Dept: PAIN MEDICINE | Facility: CLINIC | Age: 68
End: 2018-05-17

## 2018-05-17 VITALS
HEART RATE: 89 BPM | SYSTOLIC BLOOD PRESSURE: 97 MMHG | TEMPERATURE: 97.3 F | DIASTOLIC BLOOD PRESSURE: 61 MMHG | HEIGHT: 61 IN | OXYGEN SATURATION: 96 % | WEIGHT: 168 LBS | RESPIRATION RATE: 18 BRPM | BODY MASS INDEX: 31.72 KG/M2

## 2018-05-17 DIAGNOSIS — Z79.899 ENCOUNTER FOR LONG-TERM (CURRENT) USE OF HIGH-RISK MEDICATION: ICD-10-CM

## 2018-05-17 DIAGNOSIS — M79.672 PAIN IN BOTH FEET: ICD-10-CM

## 2018-05-17 DIAGNOSIS — G89.21 CHRONIC PAIN DUE TO TRAUMA: Primary | ICD-10-CM

## 2018-05-17 DIAGNOSIS — M47.812 CERVICAL FACET JOINT SYNDROME: ICD-10-CM

## 2018-05-17 DIAGNOSIS — M79.671 PAIN IN BOTH FEET: ICD-10-CM

## 2018-05-17 DIAGNOSIS — M50.30 DEGENERATIVE DISC DISEASE, CERVICAL: ICD-10-CM

## 2018-05-17 PROCEDURE — 99213 OFFICE O/P EST LOW 20 MIN: CPT | Performed by: NURSE PRACTITIONER

## 2018-05-17 RX ORDER — OXYCODONE AND ACETAMINOPHEN 10; 325 MG/1; MG/1
TABLET ORAL
Qty: 150 TABLET | Refills: 0 | Status: SHIPPED | OUTPATIENT
Start: 2018-05-17 | End: 2018-06-14 | Stop reason: SDUPTHER

## 2018-05-17 NOTE — PROGRESS NOTES
CHIEF COMPLAINT  Neck pain has slightly decreased since last office visit.    Subjective   Traci King is a 68 y.o. female  who presents to the office for follow-up.She has a history of chronic neck pain. Reports this is slightly improved since last office visit.    At last office visit, she was given a trial of gabapentin 300 mg TID for her foot and back pain. However, this was too strong and she was changed to gabapentin 100 mg TID. Notices this is helping her feet pain.  Today her pain is 2/10VAS. Describes this pain as continuous and improved.    Complains of pain in her neck and feet.  Today her pain is 4/10VAS(neck) and 2/10VAS(feet). Describes her pain as continuous and improved. Continues with Percocet 10/325 4-5/day, Robaxin 750 mg 1-2/day PRN, diclofenac 75 mg BID, and Gabapentin 100 mg 3/day. The regimen helps decrease her pain by 75%. Denies any side effects from the regimen.  ADL's by self. Works part-time at [employer].     Has a cough with bronchitis, which she states she got from her . Been sick since Monday. Called into work sick tomorrow.  Seeing her PCP later today.     She completed a Cervical Facet Nerve (Medial Branch) Radiofrequency Lesioning LEFT C3-C5  on  1/26/18 performed by Dr. Gan for management of neck pain. Still having moderate relief with this.    Neck Pain    This is a chronic problem. The current episode started more than 1 year ago. The problem occurs constantly. Progression since onset: unchanged since last office visit. The quality of the pain is described as burning. The pain is at a severity of 4/10. The pain is moderate. The symptoms are aggravated by position, bending, stress and twisting. The pain is worse during the day. Stiffness is present all day. Pertinent negatives include no chest pain, fever, headaches, numbness or weakness. She has tried oral narcotics and NSAIDs (cervical MBB--significant temporary relief; cervical RFL--50% ongoing relief) for the  "symptoms. The treatment provided moderate relief.   Foot Pain   This is a new problem. The current episode started 1 to 4 weeks ago. Episode frequency: becoming more frequent. The problem has been gradually worsening. Associated symptoms include neck pain. Pertinent negatives include no chest pain, chills, fever, headaches, nausea, numbness, vomiting or weakness. 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 She has tried lying down, position changes, rest and relaxation (becoming more frequent) for the symptoms. The treatment provided moderate relief.      PEG Assessment   What number best describes your pain on average in the past week?4  What number best describes how, during the past week, pain has interfered with your enjoyment of life?4  What number best describes how, during the past week, pain has interfered with your general activity?  4    The following portions of the patient's history were reviewed and updated as appropriate: allergies, current medications, past family history, past medical history, past social history, past surgical history and problem list.    Review of Systems   Constitutional: Negative for chills and fever.   Respiratory: Negative for shortness of breath.    Cardiovascular: Negative for chest pain.   Gastrointestinal: Negative for constipation, diarrhea, nausea and vomiting.   Genitourinary: Negative for difficulty urinating, dyspareunia and dysuria.   Musculoskeletal: Positive for neck pain.   Neurological: Negative for dizziness, weakness, light-headedness, numbness and headaches.   Psychiatric/Behavioral: Negative for confusion, hallucinations, self-injury, sleep disturbance and suicidal ideas. The patient is not nervous/anxious.        Vitals:    05/17/18 0921   BP: 97/61   Pulse: 89   Resp: 18   Temp: 97.3 °F (36.3 °C)   SpO2: 96%   Weight: 76.2 kg (168 lb)   Height: 154.9 cm (61\")   PainSc:   4   PainLoc: Neck     Objective   Physical Exam "   Constitutional: She is oriented to person, place, and time. Vital signs are normal. She appears well-developed and well-nourished. She is cooperative.   HENT:   Head: Normocephalic and atraumatic.   Nose: Nose normal.   Eyes: Conjunctivae and lids are normal.   Neck: Trachea normal. Neck supple. Spinous process tenderness (mild tenderness left C3-C5 facets; mild on right as well) and muscular tenderness present. Decreased range of motion present.   Cardiovascular: Normal rate.    Pulmonary/Chest: Effort normal.   Abdominal: Normal appearance.   Musculoskeletal:        Cervical back: She exhibits tenderness, bony tenderness and spasm.        Lumbar back: She exhibits tenderness.        Right foot: There is no deformity.        Left foot: There is no deformity.   Neurological: She is alert and oriented to person, place, and time. Gait normal.   Reflex Scores:       Bicep reflexes are 1+ on the right side and 1+ on the left side.       Brachioradialis reflexes are 1+ on the right side and 1+ on the left side.       Patellar reflexes are 1+ on the right side and 1+ on the left side.  Patient reports dysthesia of bilateral feet-- improved per patient report   Skin: Skin is warm, dry and intact.   Psychiatric: She has a normal mood and affect. Her speech is normal and behavior is normal. Judgment and thought content normal. Cognition and memory are normal.   Nursing note and vitals reviewed.      Assessment/Plan   Traci was seen today for neck pain.    Diagnoses and all orders for this visit:    Chronic pain due to trauma    Cervical facet joint syndrome    Degenerative disc disease, cervical    Pain in both feet    Encounter for long-term (current) use of high-risk medication    Other orders  -     oxyCODONE-acetaminophen (PERCOCET)  MG per tablet; 1 po q4-6 hrs PRN for pain Max-5/day      --- The urine drug screen confirmation from 4-19-18 has been reviewed and the result is APPROPRIATE based on patient history  and MATTHEW report  --- Refill Percocet. Patient appears stable with current regimen. No adverse effects. Regarding continuation of opioids, there is no evidence of aberrant behavior or any red flags.  The patient continues with appropriate response to opioid therapy. ADL's remain intact by self.   --- Discussed OTC Vitamin B6/B12 and relation to neuropathy.   --- Follow-up 1 month or sooner if needed.       MATTHEW REPORT    As part of the patient's treatment plan, I am prescribing controlled substances. The patient has been made aware of appropriate use of such medications, including potential risk of somnolence, limited ability to drive and/or work safely, and the potential for dependence or overdose. It has also bee made clear that these medications are for use by this patient only, without concomitant use of alcohol or other substances unless prescribed.     Patient has completed prescribing agreement detailing terms of continued prescribing of controlled substances, including monitoring MATTHEW reports, urine drug screening, and pill counts if necessary. The patient is aware that inappropriate use will results in cessation of prescribing such medications.    MATTHEW report has been reviewed and scanned into the patient's chart.    As the clinician, I personally reviewed the MATTHEW from 5-16-18 while the patient was in the office today.    History and physical exam exhibit continued safe and appropriate use of controlled substances.      EMR Dragon/Transcription disclaimer:   Much of this encounter note is an electronic transcription/translation of spoken language to printed text. The electronic translation of spoken language may permit erroneous, or at times, nonsensical words or phrases to be inadvertently transcribed; Although I have reviewed the note for such errors, some may still exist.

## 2018-05-22 ENCOUNTER — TRANSCRIBE ORDERS (OUTPATIENT)
Dept: ADMINISTRATIVE | Facility: HOSPITAL | Age: 68
End: 2018-05-22

## 2018-05-22 ENCOUNTER — HOSPITAL ENCOUNTER (OUTPATIENT)
Dept: GENERAL RADIOLOGY | Facility: HOSPITAL | Age: 68
Discharge: HOME | End: 2018-05-22
Admitting: INTERNAL MEDICINE

## 2018-05-22 ENCOUNTER — LAB (OUTPATIENT)
Dept: LAB | Facility: HOSPITAL | Age: 68
End: 2018-05-22

## 2018-05-22 DIAGNOSIS — J18.9 PNEUMONIA, ORGANISM UNSPECIFIED(486): ICD-10-CM

## 2018-05-22 DIAGNOSIS — R06.2 WHEEZING: Primary | ICD-10-CM

## 2018-05-22 DIAGNOSIS — J18.9 PNEUMONIA, ORGANISM UNSPECIFIED(486): Primary | ICD-10-CM

## 2018-05-22 DIAGNOSIS — R06.2 WHEEZING: ICD-10-CM

## 2018-05-22 DIAGNOSIS — R05.9 COUGH: ICD-10-CM

## 2018-05-22 LAB
BASOPHILS # BLD AUTO: 0.04 10*3/MM3 (ref 0–0.2)
BASOPHILS NFR BLD AUTO: 0.3 % (ref 0–1.5)
DEPRECATED RDW RBC AUTO: 51.4 FL (ref 37–54)
EOSINOPHIL # BLD AUTO: 0.17 10*3/MM3 (ref 0–0.7)
EOSINOPHIL NFR BLD AUTO: 1.4 % (ref 0.3–6.2)
ERYTHROCYTE [DISTWIDTH] IN BLOOD BY AUTOMATED COUNT: 14.3 % (ref 11.7–13)
HCT VFR BLD AUTO: 38.6 % (ref 35.6–45.5)
HGB BLD-MCNC: 12.2 G/DL (ref 11.9–15.5)
IMM GRANULOCYTES # BLD: 0.08 10*3/MM3 (ref 0–0.03)
IMM GRANULOCYTES NFR BLD: 0.7 % (ref 0–0.5)
LYMPHOCYTES # BLD AUTO: 3.86 10*3/MM3 (ref 0.9–4.8)
LYMPHOCYTES NFR BLD AUTO: 32.6 % (ref 19.6–45.3)
MCH RBC QN AUTO: 31 PG (ref 26.9–32)
MCHC RBC AUTO-ENTMCNC: 31.6 G/DL (ref 32.4–36.3)
MCV RBC AUTO: 98.2 FL (ref 80.5–98.2)
MONOCYTES # BLD AUTO: 0.78 10*3/MM3 (ref 0.2–1.2)
MONOCYTES NFR BLD AUTO: 6.6 % (ref 5–12)
NEUTROPHILS # BLD AUTO: 6.99 10*3/MM3 (ref 1.9–8.1)
NEUTROPHILS NFR BLD AUTO: 59.1 % (ref 42.7–76)
PLATELET # BLD AUTO: 346 10*3/MM3 (ref 140–500)
PMV BLD AUTO: 9.2 FL (ref 6–12)
RBC # BLD AUTO: 3.93 10*6/MM3 (ref 3.9–5.2)
WBC NRBC COR # BLD: 11.84 10*3/MM3 (ref 4.5–10.7)

## 2018-05-22 PROCEDURE — 71046 X-RAY EXAM CHEST 2 VIEWS: CPT

## 2018-05-22 PROCEDURE — 85025 COMPLETE CBC W/AUTO DIFF WBC: CPT | Performed by: INTERNAL MEDICINE

## 2018-05-22 PROCEDURE — 36415 COLL VENOUS BLD VENIPUNCTURE: CPT

## 2018-06-14 RX ORDER — OXYCODONE AND ACETAMINOPHEN 10; 325 MG/1; MG/1
TABLET ORAL
Qty: 150 TABLET | Refills: 0 | Status: SHIPPED | OUTPATIENT
Start: 2018-06-14 | End: 2018-07-16 | Stop reason: SDUPTHER

## 2018-06-15 ENCOUNTER — OFFICE VISIT (OUTPATIENT)
Dept: PAIN MEDICINE | Facility: CLINIC | Age: 68
End: 2018-06-15

## 2018-06-15 VITALS
SYSTOLIC BLOOD PRESSURE: 104 MMHG | TEMPERATURE: 98.4 F | BODY MASS INDEX: 32.1 KG/M2 | OXYGEN SATURATION: 95 % | HEART RATE: 80 BPM | WEIGHT: 170 LBS | RESPIRATION RATE: 18 BRPM | HEIGHT: 61 IN | DIASTOLIC BLOOD PRESSURE: 64 MMHG

## 2018-06-15 DIAGNOSIS — M47.812 CERVICAL FACET JOINT SYNDROME: ICD-10-CM

## 2018-06-15 DIAGNOSIS — M79.672 PAIN IN BOTH FEET: ICD-10-CM

## 2018-06-15 DIAGNOSIS — Z79.899 ENCOUNTER FOR LONG-TERM (CURRENT) USE OF HIGH-RISK MEDICATION: ICD-10-CM

## 2018-06-15 DIAGNOSIS — M54.5 CHRONIC LOW BACK PAIN, UNSPECIFIED BACK PAIN LATERALITY, WITH SCIATICA PRESENCE UNSPECIFIED: ICD-10-CM

## 2018-06-15 DIAGNOSIS — M47.816 OSTEOARTHRITIS OF LUMBAR SPINE, UNSPECIFIED SPINAL OSTEOARTHRITIS COMPLICATION STATUS: ICD-10-CM

## 2018-06-15 DIAGNOSIS — M79.671 PAIN IN BOTH FEET: ICD-10-CM

## 2018-06-15 DIAGNOSIS — M50.30 DEGENERATIVE DISC DISEASE, CERVICAL: ICD-10-CM

## 2018-06-15 DIAGNOSIS — G89.21 CHRONIC PAIN DUE TO TRAUMA: Primary | ICD-10-CM

## 2018-06-15 DIAGNOSIS — G89.29 CHRONIC LOW BACK PAIN, UNSPECIFIED BACK PAIN LATERALITY, WITH SCIATICA PRESENCE UNSPECIFIED: ICD-10-CM

## 2018-06-15 PROCEDURE — 99214 OFFICE O/P EST MOD 30 MIN: CPT | Performed by: NURSE PRACTITIONER

## 2018-06-15 RX ORDER — DICLOFENAC SODIUM 75 MG/1
75 TABLET, DELAYED RELEASE ORAL 2 TIMES DAILY
Qty: 60 TABLET | Refills: 2 | Status: SHIPPED | OUTPATIENT
Start: 2018-06-15 | End: 2018-09-24

## 2018-06-15 NOTE — PROGRESS NOTES
"CHIEF COMPLAINT  Neck pain is unchanged since last visit.    Subjective   Traci King is a 68 y.o. female  who presents to the office for follow-up.She has a history of chronic neck pain. Reports this is unchanged since last office visit.    Is still having bilateral foot pain. Saw new podiatrist. Was sent to PT. \"They told me it was my back.\" Is having ultrasounds on top of feet. This has helped. Is also doing exercises for her lower back. The pain is only on the tops of her feet. Notices improvement since starting gabapentin. Right foot is worse than left.     Is also having low back pain, neck pain and foot pain. Today her pain is 7/10VAS. Describes her pain as continuous and worsening(back and feet). Pain increases with walking, standing, bending/twisting/lifting; pain decreases with medication, rest and changing position. Continues with Percocet 10/325 4-5/day, Robaxin 750 mg 1-2/day PRN, diclofenac 75 mg BID, and Gabapentin 100 mg 3/day. The regimen helps decrease her pain by 75%. Denies any side effects from the regimen.  ADL's by self. Works part-time at [employer] and stands/walks a lot.    Declines EMG of feet. Has had before but it hurt her a lot.   Has failed PT.     Neck Pain    This is a chronic problem. The current episode started more than 1 year ago. The problem occurs constantly. Progression since onset: unchanged since last office visit. The quality of the pain is described as burning. The pain is at a severity of 7/10. The pain is moderate. The symptoms are aggravated by position, bending, stress and twisting. The pain is worse during the day. Stiffness is present all day. Pertinent negatives include no chest pain, fever, headaches, numbness or weakness. She has tried oral narcotics and NSAIDs (cervical MBB--significant temporary relief; cervical RFL--50% ongoing relief) for the symptoms. The treatment provided moderate relief.   Foot Pain   This is a new problem. The current episode started " more than 1 month ago. The problem has been gradually worsening. Associated symptoms include neck pain. Pertinent negatives include no chest pain, chills, fever, headaches, nausea, numbness, vomiting or weakness. 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 She has tried lying down, position changes, rest and relaxation (becoming more frequent) for the symptoms. The treatment provided moderate relief.      LUMBAR SPINE: NEUTRAL LATERAL WITH LATERAL FLEXION-EXTENSION VIEWS AS  WELL AS AP, BILATERAL OBLIQUE, SPOT LUMBOSACRAL.     HISTORY: Pain in both feet and low back.     FINDINGS: There is disc space narrowing at L5-S1. Lumbar spine vertebral  body heights appear normal and there is normal alignment without  abnormal motion with flexion/extension. There is prominent bilateral  facet arthritis at L5-S1 where there is bony overgrowth. There is no  evidence for fracture or acute abnormality. Atherosclerotic  calcifications are present involving the abdominal aorta.     IMPRESSION:  Degenerative disc disease and facet arthritis at L5-S1. No  evidence for fracture or abnormal motion on flexion/extension.     This report was finalized on 4/16/2018 3:21 PM by Dr. Rico Villalobos MD.    PEG Assessment   What number best describes your pain on average in the past week?7  What number best describes how, during the past week, pain has interfered with your enjoyment of life?7  What number best describes how, during the past week, pain has interfered with your general activity?  7    The following portions of the patient's history were reviewed and updated as appropriate: allergies, current medications, past family history, past medical history, past social history, past surgical history and problem list.    Review of Systems   Constitutional: Negative for chills and fever.   Respiratory: Positive for shortness of breath.    Cardiovascular: Negative for chest pain.   Gastrointestinal:  "Positive for constipation. Negative for diarrhea, nausea and vomiting.   Genitourinary: Negative for difficulty urinating, dyspareunia and dysuria.   Musculoskeletal: Positive for neck pain.   Neurological: Negative for dizziness, weakness, light-headedness, numbness and headaches.   Psychiatric/Behavioral: Positive for sleep disturbance. Negative for confusion, hallucinations, self-injury and suicidal ideas. The patient is not nervous/anxious.        Vitals:    06/15/18 1241   BP: 104/64   Pulse: 80   Resp: 18   Temp: 98.4 °F (36.9 °C)   SpO2: 95%   Weight: 77.1 kg (170 lb)   Height: 154.9 cm (61\")   PainSc:   7   PainLoc: Neck     Objective   Physical Exam   Constitutional: She is oriented to person, place, and time. Vital signs are normal. She appears well-developed and well-nourished. She is cooperative.   HENT:   Head: Normocephalic and atraumatic.   Nose: Nose normal.   Eyes: Conjunctivae and lids are normal.   Neck: Trachea normal. Neck supple. Spinous process tenderness (mild tenderness left C3-C5 facets; mild on right as well) and muscular tenderness present. Decreased range of motion present.   Cardiovascular: Normal rate.    Pulmonary/Chest: Effort normal.   Abdominal: Normal appearance.   Musculoskeletal:        Cervical back: She exhibits tenderness, bony tenderness and spasm.        Lumbar back: She exhibits tenderness.        Right foot: There is no deformity.        Left foot: There is no deformity.   + SLR bilaterally   Neurological: She is alert and oriented to person, place, and time. Gait abnormal.   Reflex Scores:       Bicep reflexes are 1+ on the right side and 1+ on the left side.       Brachioradialis reflexes are 1+ on the right side and 1+ on the left side.       Patellar reflexes are 1+ on the right side and 1+ on the left side.  Patient reports dysthesia of bilateral feet   Skin: Skin is warm, dry and intact.   Psychiatric: She has a normal mood and affect. Her speech is normal and " behavior is normal. Judgment and thought content normal. Cognition and memory are normal.   Nursing note and vitals reviewed.      Assessment/Plan   Traci was seen today for neck pain.    Diagnoses and all orders for this visit:    Chronic pain due to trauma    Degenerative disc disease, cervical    Cervical facet joint syndrome    Encounter for long-term (current) use of high-risk medication    Chronic low back pain, unspecified back pain laterality, with sciatica presence unspecified  -     MRI Lumbar Spine Without Contrast; Future    Pain in both feet    Osteoarthritis of lumbar spine, unspecified spinal osteoarthritis complication status  -     MRI Lumbar Spine Without Contrast; Future    Other orders  -     diclofenac (VOLTAREN) 75 MG EC tablet; Take 1 tablet by mouth 2 (Two) Times a Day.      --- The urine drug screen confirmation from 4-19-18 has been reviewed and the result is APPROPRIATE based on patient history and MATTHEW report  --- Refill Percocet and Diclofenac. Patient appears stable with current regimen. No adverse effects. Regarding continuation of opioids, there is no evidence of aberrant behavior or any red flags.  The patient continues with appropriate response to opioid therapy. ADL's remain intact by self.   --- Reviewed xray with patient.  --- MRI-lumbar spine without contrast.  --- Patient declines EMG.  --- Consider SI joint injections. Patient wants to wait until after MRI.  --- Follow-up 1 month or sooner if needed.       MATTHEW REPORT    As part of the patient's treatment plan, I am prescribing controlled substances. The patient has been made aware of appropriate use of such medications, including potential risk of somnolence, limited ability to drive and/or work safely, and the potential for dependence or overdose. It has also bee made clear that these medications are for use by this patient only, without concomitant use of alcohol or other substances unless prescribed.     Patient has  completed prescribing agreement detailing terms of continued prescribing of controlled substances, including monitoring MATTHEW reports, urine drug screening, and pill counts if necessary. The patient is aware that inappropriate use will results in cessation of prescribing such medications.    MATTHEW report has been reviewed and scanned into the patient's chart.    As the clinician, I personally reviewed the MATTHEW from 6-13-18 while the patient was in the office today.    History and physical exam exhibit continued safe and appropriate use of controlled substances.      EMR Dragon/Transcription disclaimer:   Much of this encounter note is an electronic transcription/translation of spoken language to printed text. The electronic translation of spoken language may permit erroneous, or at times, nonsensical words or phrases to be inadvertently transcribed; Although I have reviewed the note for such errors, some may still exist.

## 2018-06-26 RX ORDER — GABAPENTIN 100 MG/1
100 CAPSULE ORAL
Qty: 150 CAPSULE | Refills: 3 | Status: SHIPPED | OUTPATIENT
Start: 2018-06-26 | End: 2018-08-27 | Stop reason: DRUGHIGH

## 2018-06-27 ENCOUNTER — HOSPITAL ENCOUNTER (OUTPATIENT)
Dept: MRI IMAGING | Facility: HOSPITAL | Age: 68
Discharge: HOME | End: 2018-06-27
Admitting: NURSE PRACTITIONER

## 2018-06-27 DIAGNOSIS — M47.816 OSTEOARTHRITIS OF LUMBAR SPINE, UNSPECIFIED SPINAL OSTEOARTHRITIS COMPLICATION STATUS: ICD-10-CM

## 2018-06-27 DIAGNOSIS — G89.29 CHRONIC LOW BACK PAIN, UNSPECIFIED BACK PAIN LATERALITY, WITH SCIATICA PRESENCE UNSPECIFIED: ICD-10-CM

## 2018-06-27 DIAGNOSIS — M54.5 CHRONIC LOW BACK PAIN, UNSPECIFIED BACK PAIN LATERALITY, WITH SCIATICA PRESENCE UNSPECIFIED: ICD-10-CM

## 2018-06-27 PROCEDURE — 72148 MRI LUMBAR SPINE W/O DYE: CPT

## 2018-07-02 ENCOUNTER — TELEPHONE (OUTPATIENT)
Dept: PAIN MEDICINE | Facility: CLINIC | Age: 68
End: 2018-07-02

## 2018-07-02 NOTE — TELEPHONE ENCOUNTER
Nothing acute. Shows degeneration and facet arthritis. We can discuss further at next office visit. Thanks. andi

## 2018-07-16 ENCOUNTER — OFFICE VISIT (OUTPATIENT)
Dept: PAIN MEDICINE | Facility: CLINIC | Age: 68
End: 2018-07-16

## 2018-07-16 VITALS
WEIGHT: 170 LBS | DIASTOLIC BLOOD PRESSURE: 71 MMHG | RESPIRATION RATE: 16 BRPM | OXYGEN SATURATION: 94 % | HEART RATE: 83 BPM | SYSTOLIC BLOOD PRESSURE: 119 MMHG | HEIGHT: 62 IN | TEMPERATURE: 97.8 F | BODY MASS INDEX: 31.28 KG/M2

## 2018-07-16 DIAGNOSIS — Z79.899 ENCOUNTER FOR LONG-TERM (CURRENT) USE OF HIGH-RISK MEDICATION: ICD-10-CM

## 2018-07-16 DIAGNOSIS — M51.36 DDD (DEGENERATIVE DISC DISEASE), LUMBAR: ICD-10-CM

## 2018-07-16 DIAGNOSIS — M79.671 PAIN IN BOTH FEET: ICD-10-CM

## 2018-07-16 DIAGNOSIS — M47.812 CERVICAL FACET JOINT SYNDROME: ICD-10-CM

## 2018-07-16 DIAGNOSIS — G89.21 CHRONIC PAIN DUE TO TRAUMA: Primary | ICD-10-CM

## 2018-07-16 DIAGNOSIS — M79.672 PAIN IN BOTH FEET: ICD-10-CM

## 2018-07-16 DIAGNOSIS — M50.30 DEGENERATIVE DISC DISEASE, CERVICAL: ICD-10-CM

## 2018-07-16 PROBLEM — M51.369 DDD (DEGENERATIVE DISC DISEASE), LUMBAR: Status: ACTIVE | Noted: 2018-07-16

## 2018-07-16 PROCEDURE — 99214 OFFICE O/P EST MOD 30 MIN: CPT | Performed by: NURSE PRACTITIONER

## 2018-07-16 RX ORDER — OXYCODONE AND ACETAMINOPHEN 10; 325 MG/1; MG/1
TABLET ORAL
Qty: 150 TABLET | Refills: 0 | Status: SHIPPED | OUTPATIENT
Start: 2018-07-16 | End: 2018-08-09 | Stop reason: SDUPTHER

## 2018-07-16 NOTE — PROGRESS NOTES
CHIEF COMPLAINT  Pt states bilateral feet/ankle pain,R > L pain,is basically unchanged,being moderately controlled overall.  Neck pain continues to be moderately controlled,but moreso than feet pain.    Subjective   Traci King is a 68 y.o. female  who presents to the office for follow-up.She has a history of chronic neck pain, as well as feet and back pain. Reports her neck pain is unchanged, however her foot pain has increased substantially. She did complete lumbar MRI. Has had podiatric evaluation with no long term plan of care. She has refused EMG/NCS multiple times.    Complains of pain in her neck and feet. Today her pain is 7/10VAs. Describes the foot pain as continuous and worse.  Her pain increases significantly with standing. Has had PT but it caused her pain to worsen, as did orthodics. Does notice improvement with Gabapentin.  Continues with Percocet 10/325 4-5/day, Robaxin 750 mg 1-2/day PRN, diclofenac 75 mg BID, and Gabapentin 100 mg 5/day. The regimen helps decrease her pain by 50%. Denies any side effects from the regimen.  ADL's by self. Works part-time at [employer] and stands/walks a lot, but working 1/day/week during summer. Decreased work schedule did not improve her pain.     Failed PT, failed ultrasound therapy, failed compounded pain creme.     Neck Pain    This is a chronic problem. The current episode started more than 1 year ago. The problem occurs constantly. Progression since onset: unchanged since last office visit. The quality of the pain is described as burning. The pain is moderate. The symptoms are aggravated by position, bending, stress and twisting. The pain is worse during the day. Stiffness is present all day. Pertinent negatives include no chest pain, fever, headaches, numbness or weakness. She has tried oral narcotics and NSAIDs (cervical MBB--significant temporary relief; cervical RFL--50% ongoing relief) for the symptoms. The treatment provided moderate relief.   Foot Pain    This is a new problem. The current episode started more than 1 month ago. The problem has been gradually worsening (worse since last office visit; becoming more frequent). Associated symptoms include neck pain. Pertinent negatives include no chest pain, chills, fever, headaches, nausea, numbness, vomiting or weakness. 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 She has tried lying down, position changes, rest and relaxation (podiatric evaluation-- orthodics) for the symptoms. The treatment provided moderate relief.   Back Pain   The problem occurs 2 to 4 times per day. The problem has been gradually worsening since onset. The pain is present in the lumbar spine and sacro-iliac. The pain is at a severity of 7/10. The pain is moderate. The pain is worse during the day (with work). The symptoms are aggravated by bending, position and standing. Pertinent negatives include no bladder incontinence, bowel incontinence, chest pain, dysuria, fever, headaches, numbness or weakness.      MRI LUMBAR SPINE WITHOUT CONTRAST  06/27/2018      CLINICAL HISTORY: Chronic low back pain with sciatica, presence  unspecified, osteoarthritis lumbar spine, patient does complain of  numbness on dorsal aspect of both feet. X-rays performed on 04/16/2018  demonstrate degenerative disc changes and some facet arthropathy.     TECHNIQUE: Sagittal T1, proton density and fat-suppressed T2-weighted  images were obtained of the lumbar spine, in addition axial T2-weighted  images were obtained from T12 to S1, thin cut axial T1-weighted images  were obtained from T12 to L3 then angled through the interspaces from L3  to S1.     FINDINGS: The distal thoracic cord and the conus is normal in signal  intensity. The conus terminates at the L1-L2 interspace level which is  normal.     At T10-T11, T11-T12, T12-L1, L1-L2, L2-L3, L3-L4 disc spaces and facets  are normal with no canal or foraminal narrowing from T11 to  L4.     At L4-L5, mild bilateral facet overgrowth. The disc space is normal.  There is no canal or foraminal narrowing.     At L5-S1, mild bilateral facet overgrowth. There is moderate-to-severe  disc space narrowing, degenerative disc and endplate changes, a 3 mm  retrolisthesis of L5 on S1, diffuse posterior disc osteophyte complex.  There is no canal or lateral recess narrowing. There is some mild  spurring into the foramen and mild bilateral bony foraminal narrowing.  There is a conjoined left L5 and S1 nerve sleeve complex within the left  lateral recess and medial left foramen at L5-S1 which is a normal  anatomic variation.     IMPRESSION:     There is mild bilateral facet overgrowth at L4-L5 and L5-S1, and there  is moderate-to-severe disc space narrowing, degenerative disc and  endplate changes at L5-S1 with 3 mm retrolisthesis of L5 on S1, diffuse  posterior disc osteophyte complex. There is no canal or lateral recess  narrowing. There is spurring of the foramina with mild bilateral bony  foraminal narrowing at L5-S1. Incidental note is made of a conjoined  left L5 and S1 nerve sleeve complex within the left lateral recess,  medial left foramen, and L5-S1 which is a normal anatomic variation. The  remainder of the lumbar spine MRI is normal with no disc herniation,  canal or foraminal narrowing from T11 to L5.     This report was finalized on 6/28/2018 8:33 AM by Dr. Jacek Sotomayor M.D.    PEG Assessment   What number best describes your pain on average in the past week?7  What number best describes how, during the past week, pain has interfered with your enjoyment of life?7  What number best describes how, during the past week, pain has interfered with your general activity?  7    The following portions of the patient's history were reviewed and updated as appropriate: allergies, current medications, past family history, past medical history, past social history, past surgical history and problem  "list.    Review of Systems   Constitutional: Positive for activity change (decreased). Negative for chills and fever.   Respiratory: Positive for shortness of breath.    Cardiovascular: Negative for chest pain.   Gastrointestinal: Positive for constipation. Negative for bowel incontinence, diarrhea, nausea and vomiting.   Genitourinary: Negative for bladder incontinence, difficulty urinating, dyspareunia and dysuria.   Musculoskeletal: Positive for back pain, neck pain and neck stiffness.   Neurological: Negative for weakness, numbness and headaches.   Psychiatric/Behavioral: Positive for sleep disturbance. Negative for confusion, hallucinations, self-injury and suicidal ideas. The patient is not nervous/anxious.        Vitals:    07/16/18 0837   BP: 119/71   Pulse: 83   Resp: 16   Temp: 97.8 °F (36.6 °C)   SpO2: 94%   Weight: 77.1 kg (170 lb)   Height: 157 cm (61.81\")   PainSc: 7  Comment: bilat. feet pain ranges from 3-7/10,R>L   PainLoc: Foot     Objective   Physical Exam   Constitutional: She is oriented to person, place, and time. Vital signs are normal. She appears well-developed and well-nourished. She is cooperative.   HENT:   Head: Normocephalic and atraumatic.   Nose: Nose normal.   Eyes: Conjunctivae and lids are normal.   Neck: Trachea normal. Neck supple. Spinous process tenderness (mild tenderness left C3-C5 facets; mild on right as well) and muscular tenderness present. Decreased range of motion present.   Cardiovascular: Normal rate.    Pulmonary/Chest: Effort normal.   Abdominal: Normal appearance.   Musculoskeletal:        Cervical back: She exhibits tenderness, bony tenderness and spasm.        Lumbar back: She exhibits tenderness.        Right foot: There is no deformity.        Left foot: There is no deformity.   + SLR bilaterally   Neurological: She is alert and oriented to person, place, and time. Gait abnormal.   Reflex Scores:       Bicep reflexes are 1+ on the right side and 1+ on the left " side.       Brachioradialis reflexes are 1+ on the right side and 1+ on the left side.       Patellar reflexes are 1+ on the right side and 1+ on the left side.  Patient reports dysthesia of bilateral feet   Skin: Skin is warm, dry and intact.   Psychiatric: She has a normal mood and affect. Her speech is normal and behavior is normal. Judgment and thought content normal. Cognition and memory are normal.   Nursing note and vitals reviewed.  I HAVE PERFORMED THE PHYSICAL EXAMINATION AS DOCUMENTED ABOVE TODAY, 07/16/2018.  THE EXAM IS OTHERWISE UNCHANGED FROM PREVIOUS VISIT.        Assessment/Plan   Traci was seen today for extremity pain and neck pain.    Diagnoses and all orders for this visit:    Chronic pain due to trauma    Degenerative disc disease, cervical    Cervical facet joint syndrome    DDD (degenerative disc disease), lumbar  -     Case Request    Encounter for long-term (current) use of high-risk medication    Pain in both feet  -     Case Request    Other orders  -     oxyCODONE-acetaminophen (PERCOCET)  MG per tablet; 1 po q4-6 hrs PRN for pain Max-5/day      --- The urine drug screen confirmation from 4-19-18 has been reviewed and the result is APPROPRIATE based on patient history and MATTHEW report  --- Refill Oxycodone. Patient appears stable with current regimen. No adverse effects. Regarding continuation of opioids, there is no evidence of aberrant behavior or any red flags.  The patient continues with appropriate response to opioid therapy. ADL's remain intact by self.   --- TF JESSICA bilateral S1. No blood thinners. Reviewed the procedure at length with the patient.  Included in the review was expectations, complications, risk and benefits.The procedure was described in detail and the risks, benefits and alternatives were discussed with the patient (including but not limited to: bleeding, infection, nerve damage, worsening of pain, inability to perform injection, paralysis, seizures, and  death) who agreed to proceed.  Discussed the potential for sedation if warranted/wanted.  Questions were answered and in a way the patient could understand.  Patient verbalized understanding and wishes to proceed.  This intervention will be ordered.  --- She states that if she has no improvement with epidural, she is willing to consider proceeding with EMG/NCS.  --- Consider increasing gabapentin but patient has had somnolence with elevated doses previously.   --- Follow-up 1 month or sooner if needed.     MATTHEW REPORT    As part of the patient's treatment plan, I am prescribing controlled substances. The patient has been made aware of appropriate use of such medications, including potential risk of somnolence, limited ability to drive and/or work safely, and the potential for dependence or overdose. It has also bee made clear that these medications are for use by this patient only, without concomitant use of alcohol or other substances unless prescribed.     Patient has completed prescribing agreement detailing terms of continued prescribing of controlled substances, including monitoring MATTHEW reports, urine drug screening, and pill counts if necessary. The patient is aware that inappropriate use will results in cessation of prescribing such medications.    MATTHEW report has been reviewed and scanned into the patient's chart.    As the clinician, I personally reviewed the MATTHEW from 7-13-18 while the patient was in the office today.    History and physical exam exhibit continued safe and appropriate use of controlled substances.      EMR Dragon/Transcription disclaimer:   Much of this encounter note is an electronic transcription/translation of spoken language to printed text. The electronic translation of spoken language may permit erroneous, or at times, nonsensical words or phrases to be inadvertently transcribed; Although I have reviewed the note for such errors, some may still exist.

## 2018-07-25 ENCOUNTER — HOSPITAL ENCOUNTER (OUTPATIENT)
Dept: GENERAL RADIOLOGY | Facility: HOSPITAL | Age: 68
Discharge: HOME | End: 2018-07-25
Admitting: INTERNAL MEDICINE

## 2018-07-25 ENCOUNTER — TRANSCRIBE ORDERS (OUTPATIENT)
Dept: ADMINISTRATIVE | Facility: HOSPITAL | Age: 68
End: 2018-07-25
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
## 2018-07-25 DIAGNOSIS — M25.511 RIGHT SHOULDER PAIN, UNSPECIFIED CHRONICITY: ICD-10-CM

## 2018-07-25 PROCEDURE — 73030 X-RAY EXAM OF SHOULDER: CPT

## 2018-08-09 RX ORDER — OXYCODONE AND ACETAMINOPHEN 10; 325 MG/1; MG/1
TABLET ORAL
Qty: 150 TABLET | Refills: 0 | Status: SHIPPED | OUTPATIENT
Start: 2018-08-09 | End: 2018-09-11 | Stop reason: SDUPTHER

## 2018-08-13 ENCOUNTER — OFFICE VISIT (OUTPATIENT)
Dept: PAIN MEDICINE | Facility: CLINIC | Age: 68
End: 2018-08-13

## 2018-08-13 VITALS
HEART RATE: 79 BPM | RESPIRATION RATE: 16 BRPM | TEMPERATURE: 97.8 F | OXYGEN SATURATION: 97 % | WEIGHT: 172 LBS | DIASTOLIC BLOOD PRESSURE: 64 MMHG | SYSTOLIC BLOOD PRESSURE: 107 MMHG | HEIGHT: 62 IN | BODY MASS INDEX: 31.65 KG/M2

## 2018-08-13 DIAGNOSIS — Z79.899 ENCOUNTER FOR LONG-TERM (CURRENT) USE OF HIGH-RISK MEDICATION: ICD-10-CM

## 2018-08-13 DIAGNOSIS — G89.21 CHRONIC PAIN DUE TO TRAUMA: Primary | ICD-10-CM

## 2018-08-13 DIAGNOSIS — M50.30 DEGENERATIVE DISC DISEASE, CERVICAL: ICD-10-CM

## 2018-08-13 DIAGNOSIS — M47.812 CERVICAL FACET JOINT SYNDROME: ICD-10-CM

## 2018-08-13 DIAGNOSIS — M51.36 DDD (DEGENERATIVE DISC DISEASE), LUMBAR: ICD-10-CM

## 2018-08-13 PROCEDURE — 99214 OFFICE O/P EST MOD 30 MIN: CPT | Performed by: NURSE PRACTITIONER

## 2018-08-13 NOTE — PROGRESS NOTES
"CHIEF COMPLAINT  Pt states her bilateral feet pain ,R > L,is moderately improved. Pt is scheduled to have a Lumbar JESSICA on Thursday,8/16/18.    Subjective   Traci King is a 68 y.o. female  who presents to the office for follow-up.She has a history of chronic neck pain, as well as bilateral foot pain. Is scheduled for LESI on 8-16-18 with DR. Gan for foot pain.    Complains of pain in her neck, left shoulder, back and feet. Today her pain is 6/10VAs. Describes her pain as continuous. Her foot is improved. Has been able to titrate up to Gabapentin 300 mg TID.   Continues with Percocet 10/325 4-5/day, Robaxin 750 mg 1-2/day PRN, diclofenac 75 mg BID, and Gabapentin 300 mg 3/day. The regimen helps decrease her pain by 75%. Denies any side effects from the regimen, no longer having somnolence with Gabapentin.  ADL's by self. Works part-time at [employer] and stands/walks a lot 3/day/week.   Failed PT, failed ultrasound therapy, failed compounded pain creme.     Is having increased pain in left shoulder. Has not seen orthopedist recently. States she has a torn \"rotator cuff or something else.\" No plans for surgery.  Recently fell. HAD xray 7-25-18.    Neck Pain    This is a chronic problem. The current episode started more than 1 year ago. The problem occurs constantly. Progression since onset: unchanged since last office visit. The quality of the pain is described as burning. The pain is at a severity of 6/10. The pain is moderate. The symptoms are aggravated by position, bending, stress and twisting. The pain is worse during the day. Stiffness is present all day. Pertinent negatives include no chest pain, fever, headaches, numbness or weakness. She has tried oral narcotics and NSAIDs (cervical MBB--significant temporary relief; cervical RFL--50% ongoing relief) for the symptoms. The treatment provided moderate relief.   Foot Pain   This is a new problem. The current episode started more than 1 month ago. The " problem has been gradually worsening (unchanged since last office visit; becoming more frequent). Associated symptoms include neck pain. Pertinent negatives include no chest pain, chills, fever, headaches, nausea, numbness, vomiting or weakness. 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 She has tried lying down, position changes, rest and relaxation (podiatric evaluation-- orthodics) for the symptoms. The treatment provided moderate relief.   Back Pain   The problem occurs 2 to 4 times per day. The problem has been gradually worsening (unchanged from last office visit) since onset. The pain is present in the lumbar spine and sacro-iliac. The pain is moderate. The pain is worse during the day (with work). The symptoms are aggravated by bending, position and standing. Pertinent negatives include no bladder incontinence, bowel incontinence, chest pain, dysuria, fever, headaches, numbness or weakness.      PEG Assessment   What number best describes your pain on average in the past week?6  What number best describes how, during the past week, pain has interfered with your enjoyment of life?6  What number best describes how, during the past week, pain has interfered with your general activity?  6    The following portions of the patient's history were reviewed and updated as appropriate: allergies, current medications, past family history, past medical history, past social history, past surgical history and problem list.    Review of Systems   Constitutional: Positive for activity change (decreased). Negative for chills and fever.   Respiratory: Positive for shortness of breath.    Cardiovascular: Negative for chest pain.   Gastrointestinal: Positive for constipation. Negative for bowel incontinence, diarrhea, nausea and vomiting.   Genitourinary: Negative for bladder incontinence, difficulty urinating, dyspareunia and dysuria.   Musculoskeletal: Positive for back pain, neck  "pain and neck stiffness.   Neurological: Negative for weakness, numbness and headaches.   Psychiatric/Behavioral: Positive for sleep disturbance. Negative for confusion, hallucinations, self-injury and suicidal ideas. The patient is not nervous/anxious.        Vitals:    08/13/18 1357   BP: 107/64   Pulse: 79   Resp: 16   Temp: 97.8 °F (36.6 °C)   SpO2: 97%   Weight: 78 kg (172 lb)   Height: 157 cm (61.81\")   PainSc: 6  Comment: feet pain ranges from 4-6/10   PainLoc: Foot     Objective   Physical Exam   Constitutional: She is oriented to person, place, and time. Vital signs are normal. She appears well-developed and well-nourished. She is cooperative.   HENT:   Head: Normocephalic and atraumatic.   Nose: Nose normal.   Eyes: Conjunctivae and lids are normal.   Neck: Trachea normal. Neck supple. Spinous process tenderness (mild tenderness left C3-C5 facets; mild on right as well) and muscular tenderness present. Decreased range of motion present.   Cardiovascular: Normal rate.    Pulmonary/Chest: Effort normal.   Abdominal: Normal appearance.   Musculoskeletal:        Left shoulder: She exhibits decreased range of motion and tenderness.        Cervical back: She exhibits tenderness, bony tenderness and spasm.        Lumbar back: She exhibits tenderness.        Right foot: There is no deformity.        Left foot: There is no deformity.   + SLR bilaterally   Neurological: She is alert and oriented to person, place, and time. Gait abnormal.   Reflex Scores:       Bicep reflexes are 1+ on the right side and 1+ on the left side.       Brachioradialis reflexes are 1+ on the right side and 1+ on the left side.       Patellar reflexes are 1+ on the right side and 1+ on the left side.  Patient reports dysthesia of bilateral feet   Skin: Skin is warm, dry and intact.   Psychiatric: She has a normal mood and affect. Her speech is normal and behavior is normal. Judgment and thought content normal. Cognition and memory are " normal.   Nursing note and vitals reviewed.    Assessment/Plan   Traci was seen today for foot pain.    Diagnoses and all orders for this visit:    Chronic pain due to trauma    Cervical facet joint syndrome    Degenerative disc disease, cervical    DDD (degenerative disc disease), lumbar    Encounter for long-term (current) use of high-risk medication      --- The urine drug screen confirmation from 4-19-18 has been reviewed and the result is APPROPRIATE based on patient history and MATTHEW report  --- Refill Percocet. Patient appears stable with current regimen. No adverse effects. Regarding continuation of opioids, there is no evidence of aberrant behavior or any red flags.  The patient continues with appropriate response to opioid therapy. ADL's remain intact by self.   --- Continue with TFLESI as scheduled.  --- Follow-up 1 month or sooner if needed.     MATTHEW REPORT    As part of the patient's treatment plan, I am prescribing controlled substances. The patient has been made aware of appropriate use of such medications, including potential risk of somnolence, limited ability to drive and/or work safely, and the potential for dependence or overdose. It has also bee made clear that these medications are for use by this patient only, without concomitant use of alcohol or other substances unless prescribed.     Patient has completed prescribing agreement detailing terms of continued prescribing of controlled substances, including monitoring MATTHEW reports, urine drug screening, and pill counts if necessary. The patient is aware that inappropriate use will results in cessation of prescribing such medications.    MATTHEW report has been reviewed and scanned into the patient's chart.    As the clinician, I personally reviewed the MATTHEW from 8-9-18 while the patient was in the office today.    History and physical exam exhibit continued safe and appropriate use of controlled substances.      EMR Dragon/Transcription  disclaimer:   Much of this encounter note is an electronic transcription/translation of spoken language to printed text. The electronic translation of spoken language may permit erroneous, or at times, nonsensical words or phrases to be inadvertently transcribed; Although I have reviewed the note for such errors, some may still exist.

## 2018-08-16 ENCOUNTER — OUTSIDE FACILITY SERVICE (OUTPATIENT)
Dept: PAIN MEDICINE | Facility: CLINIC | Age: 68
End: 2018-08-16

## 2018-08-16 ENCOUNTER — DOCUMENTATION (OUTPATIENT)
Dept: PAIN MEDICINE | Facility: CLINIC | Age: 68
End: 2018-08-16

## 2018-08-16 PROCEDURE — 64483 NJX AA&/STRD TFRM EPI L/S 1: CPT | Performed by: ANESTHESIOLOGY

## 2018-08-17 NOTE — PROGRESS NOTES
Bilateral S1 Lumbar Transforaminal Epidural Steroid Injection  [facility]    PREOPERATIVE DIAGNOSIS:  Lumbar Degenerative Disc Disease    POSTOPERATIVE DIAGNOSIS:  Same as preop diagnosis    PROCEDURE:  CPT 91246(-50) --  Diagnostic Transforaminal Epidural Steroid Injection at the S1 level, bilaterally    PRE-PROCEDURE DISCUSSION WITH PATIENT:    Risks and complications were discussed with the patient prior to starting the procedure and informed consent was obtained.  We discussed various topics including but not limited to bleeding, infection, injury, nerve injury, paralysis, coma, death, postprocedural painful flare-up, postprocedural site soreness, and a lack of pain relief.  We discussed the diagnostic aspect of transforaminal epidural / selective nerve root blockade.    SURGEON:  Jose Luis Gan MD    REASON FOR PROCEDURE:    Previous clinically significant therapeutic effect is noted., Radiating pattern of pain is likely consistent with degenerative changes in the area. and Acute pain flareup is noted & problematic, and the injection is used to attempt to break the flareup.      SEDATION:  Patient declined administration of moderate sedation    ANESTHETIC:  Marcaine 0.25%  STEROID:  Methylprednisolone (DEPO MEDROL) 80mg/ml    DESCRIPTON OF PROCEDURE:  After obtaining informed consent, an I.V. was not started in the preoperative area. The patient taken to the operating room and was placed in the prone position with a pillow under the abdomen.  All pressure points were well padded.  EKG, blood pressure, and pulse oximeter were monitored.  The lumbosacral area was prepped with Chloraprep and draped in a sterile fashion. Under fluoroscopic guidance the upper vertebral-equivalent level of the sacrum was identified, and in a slightly oblique view to one side, the S1 posterior foramen was identified, and then a point just below the foramen at 6 o'clock position was identified. Skin and  subcutaneous tissue was anesthetized with 1% lidocaine. A 22-gauge spinal needle was introduced under fluoroscopic guidance at the above junction and then redirected slightly cephalad and into the foramen without parasthesias and into the epidural space. After confirming the position of the needle with PA, lateral, and oblique fluoroscopic views, aspiration was checked and was clear of blood or CSF.  Next, 1 mL of Omnipaque was injected. After seeing adequate spread on the corresponding nerve root, a total volume 2mL of injectate containing 0.5ml of the above mentioned local anesthetic, 1 ml saline,  and half of the above mentioned corticosteroid was injected into the epidural space.    The needle was removed intact.      Next, the same vertebral level and corresponding foramen on the contralateral side was visualized with fluoroscopy in like manner, and a similar approach was used to place the spinal needle in that foramen.  After confirming the position of the needle with PA, lateral, and oblique fluoroscopic views, aspiration was checked and was clear of blood or CSF.  Next, 1 mL of Omnipaque was injected. After seeing adequate spread on the corresponding nerve root, a total volume 2mL of injectate containing 0.5ml of the above mentioned local anesthetic, 1 ml saline, and half of the above mentioned corticosteroid was injected into the epidural space. The needle was removed intact.  Vital signs were stable throughout.      ESTIMATED BLOOD LOSS:  <5 mL  SPECIMENS:  none    COMPLICATIONS:   No complications were noted., There was no indication of vascular uptake on live injection of contrast dye. and The patient did not have any signs of postprocedure numbness nor weakness.    TOLERANCE & DISCHARGE CONDITION:    The patient tolerated the procedure well.  The patient was transported to the recovery area without difficulties.  The patient was discharged to home under the care of family in stable and satisfactory  condition.    PLAN OF CARE:  1. The patient was given our standard instruction sheet.  2. The patient will Repeat injection in 2-4 wks PRN.  3. The patient will resume all medications as per the medication reconciliation sheet.

## 2018-08-24 ENCOUNTER — LAB (OUTPATIENT)
Dept: LAB | Facility: HOSPITAL | Age: 68
End: 2018-08-24

## 2018-08-24 ENCOUNTER — TRANSCRIBE ORDERS (OUTPATIENT)
Dept: ADMINISTRATIVE | Facility: HOSPITAL | Age: 68
End: 2018-08-24

## 2018-08-24 DIAGNOSIS — M79.10 MYALGIA: ICD-10-CM

## 2018-08-24 DIAGNOSIS — M62.838 MUSCLE SPASMS OF BOTH LOWER EXTREMITIES: ICD-10-CM

## 2018-08-24 DIAGNOSIS — M62.838 MUSCLE SPASMS OF BOTH LOWER EXTREMITIES: Primary | ICD-10-CM

## 2018-08-24 LAB
ANION GAP SERPL CALCULATED.3IONS-SCNC: 8.1 MMOL/L
BUN BLD-MCNC: 17 MG/DL (ref 8–23)
BUN/CREAT SERPL: 15 (ref 7–25)
CALCIUM SPEC-SCNC: 9 MG/DL (ref 8.6–10.5)
CHLORIDE SERPL-SCNC: 98 MMOL/L (ref 98–107)
CK SERPL-CCNC: 126 U/L (ref 20–180)
CO2 SERPL-SCNC: 30.9 MMOL/L (ref 22–29)
CREAT BLD-MCNC: 1.13 MG/DL (ref 0.57–1)
GFR SERPL CREATININE-BSD FRML MDRD: 48 ML/MIN/1.73
GLUCOSE BLD-MCNC: 88 MG/DL (ref 65–99)
MAGNESIUM SERPL-MCNC: 2.3 MG/DL (ref 1.6–2.4)
POTASSIUM BLD-SCNC: 4.7 MMOL/L (ref 3.5–5.2)
SODIUM BLD-SCNC: 137 MMOL/L (ref 136–145)
T3 SERPL-MCNC: 68.8 NG/DL (ref 80–200)
T4 SERPL-MCNC: 6.36 MCG/DL (ref 4.5–11.7)
TSH SERPL DL<=0.05 MIU/L-ACNC: 1.38 MIU/ML (ref 0.27–4.2)

## 2018-08-24 PROCEDURE — 80048 BASIC METABOLIC PNL TOTAL CA: CPT | Performed by: INTERNAL MEDICINE

## 2018-08-24 PROCEDURE — 83735 ASSAY OF MAGNESIUM: CPT | Performed by: INTERNAL MEDICINE

## 2018-08-24 PROCEDURE — 36415 COLL VENOUS BLD VENIPUNCTURE: CPT

## 2018-08-24 PROCEDURE — 84480 ASSAY TRIIODOTHYRONINE (T3): CPT | Performed by: INTERNAL MEDICINE

## 2018-08-24 PROCEDURE — 84436 ASSAY OF TOTAL THYROXINE: CPT | Performed by: INTERNAL MEDICINE

## 2018-08-24 PROCEDURE — 84443 ASSAY THYROID STIM HORMONE: CPT | Performed by: INTERNAL MEDICINE

## 2018-08-24 PROCEDURE — 82550 ASSAY OF CK (CPK): CPT | Performed by: INTERNAL MEDICINE

## 2018-08-27 ENCOUNTER — OFFICE VISIT (OUTPATIENT)
Dept: PAIN MEDICINE | Facility: CLINIC | Age: 68
End: 2018-08-27

## 2018-08-27 VITALS — WEIGHT: 169 LBS | RESPIRATION RATE: 18 BRPM | BODY MASS INDEX: 31.1 KG/M2 | HEIGHT: 62 IN | TEMPERATURE: 97.8 F

## 2018-08-27 DIAGNOSIS — M50.30 DEGENERATIVE DISC DISEASE, CERVICAL: ICD-10-CM

## 2018-08-27 DIAGNOSIS — M79.671 PAIN IN BOTH FEET: ICD-10-CM

## 2018-08-27 DIAGNOSIS — M79.672 PAIN IN BOTH FEET: ICD-10-CM

## 2018-08-27 DIAGNOSIS — G89.21 CHRONIC PAIN DUE TO TRAUMA: Primary | ICD-10-CM

## 2018-08-27 DIAGNOSIS — M51.36 DDD (DEGENERATIVE DISC DISEASE), LUMBAR: ICD-10-CM

## 2018-08-27 PROCEDURE — 99214 OFFICE O/P EST MOD 30 MIN: CPT | Performed by: NURSE PRACTITIONER

## 2018-08-27 RX ORDER — GABAPENTIN 400 MG/1
400 CAPSULE ORAL 3 TIMES DAILY
Qty: 90 CAPSULE | Refills: 1 | Status: SHIPPED | OUTPATIENT
Start: 2018-08-27 | End: 2018-09-11 | Stop reason: DRUGHIGH

## 2018-08-27 RX ORDER — GABAPENTIN 400 MG/1
400 CAPSULE ORAL 3 TIMES DAILY
Qty: 90 CAPSULE | Refills: 1 | Status: SHIPPED | OUTPATIENT
Start: 2018-08-27 | End: 2018-08-27 | Stop reason: SDUPTHER

## 2018-08-27 NOTE — PROGRESS NOTES
CHIEF COMPLAINT  Bilateral foot pain has increased since her injection. She states she believes the injection made it worse.    Subjective   Traci King is a 68 y.o. female  who presents to the office for follow-up of procedure.  She completed a Bilateral S1 Lumbar TFESI   on  8/16/2018 performed by Dr. LEMUS for management of low back and radicular pain. Patient reports NO relief from the procedure.     Complains of pain in her feet. Today her pain is 7/10VAS(initially reported it being 9/10VAS but reviewed pain scale). Describes the pain as continuous and worsening. She states she feels as if she if she cant bend her ankles. Also complains of burning on the bottom of her feet. Notes that the bottom of her feet has turned red once. Is having trouble walking and working.   Continues with Percocet 10/325 4-5/day, Robaxin 750 mg 1-2/day PRN, diclofenac 75 mg BID, and Gabapentin 300 mg 3/day. The regimen helps decrease her pain by 75%. Denies any side effects from the regimen, no longer having somnolence with Gabapentin.  ADL's by self. Works part-time at [employer] and stands/walks a lot 3/day/week.     Failed PT, failed ultrasound therapy, failed compounded pain creme.   Neck Pain    This is a chronic problem. The current episode started more than 1 year ago. The problem occurs constantly. Progression since onset: unchanged since last office visit. The quality of the pain is described as burning. The pain is at a severity of 6/10. The pain is moderate. The symptoms are aggravated by position, bending, stress and twisting. The pain is worse during the day. Stiffness is present all day. Associated symptoms include numbness. Pertinent negatives include no chest pain, fever, headaches or weakness. She has tried oral narcotics and NSAIDs (cervical MBB--significant temporary relief; cervical RFL--50% ongoing relief) for the symptoms. The treatment provided moderate relief.   Foot Pain   This is a new problem. The  current episode started more than 1 month ago. The problem has been gradually worsening (worse since last office visit.; today is 9/10VAS). Associated symptoms include neck pain and numbness. Pertinent negatives include no chest pain, chills, fever, headaches, nausea, vomiting or weakness. 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 She has tried lying down, position changes, rest and relaxation (podiatric evaluation-- orthodics; gabapentin) for the symptoms. The treatment provided moderate relief.   Back Pain   The problem occurs 2 to 4 times per day. The problem has been gradually worsening (unchanged from last office visit) since onset. The pain is present in the lumbar spine and sacro-iliac. The pain is moderate. The pain is worse during the day (with work). The symptoms are aggravated by bending, position and standing. Associated symptoms include numbness. Pertinent negatives include no bladder incontinence, bowel incontinence, chest pain, dysuria, fever, headaches or weakness.      PEG Assessment   What number best describes your pain on average in the past week?9  What number best describes how, during the past week, pain has interfered with your enjoyment of life?9  What number best describes how, during the past week, pain has interfered with your general activity?  9    The following portions of the patient's history were reviewed and updated as appropriate: allergies, current medications, past family history, past medical history, past social history, past surgical history and problem list.    Review of Systems   Constitutional: Negative for chills and fever.   Respiratory: Positive for shortness of breath.    Cardiovascular: Negative for chest pain.   Gastrointestinal: Positive for constipation. Negative for bowel incontinence, diarrhea, nausea and vomiting.   Genitourinary: Negative for bladder incontinence, difficulty urinating, dyspareunia and dysuria.  "  Musculoskeletal: Positive for back pain and neck pain.        Feet pain   Neurological: Positive for numbness. Negative for dizziness, weakness, light-headedness and headaches.   Psychiatric/Behavioral: Positive for sleep disturbance. Negative for confusion, hallucinations, self-injury and suicidal ideas. The patient is not nervous/anxious.        Vitals:    08/27/18 1301   Resp: 18   Temp: 97.8 °F (36.6 °C)   Weight: 76.7 kg (169 lb)   Height: 157 cm (61.81\")   PainSc:   9   PainLoc: Foot     Objective   Physical Exam   Constitutional: She is oriented to person, place, and time. Vital signs are normal. She appears well-developed and well-nourished. She is cooperative.   HENT:   Head: Normocephalic and atraumatic.   Nose: Nose normal.   Eyes: Conjunctivae and lids are normal.   Neck: Trachea normal. Neck supple. Spinous process tenderness (mild tenderness left C3-C5 facets; mild on right as well) and muscular tenderness present. Decreased range of motion present.   Cardiovascular: Normal rate.    Pulmonary/Chest: Effort normal.   Abdominal: Normal appearance.   Musculoskeletal:        Right foot: There is decreased range of motion and tenderness. There is no deformity.        Left foot: There is decreased range of motion and tenderness. There is no deformity.   + SLR bilaterally   Neurological: She is alert and oriented to person, place, and time. Gait (slow) abnormal.   Reflex Scores:       Patellar reflexes are 1+ on the right side and 1+ on the left side.  Patient reports dysthesia of bilateral feet   Skin: Skin is warm, dry and intact.   Psychiatric: She has a normal mood and affect. Her speech is normal and behavior is normal. Judgment and thought content normal. Cognition and memory are normal.   Nursing note and vitals reviewed.    Assessment/Plan   Traci was seen today for foot pain and back pain.    Diagnoses and all orders for this visit:    Chronic pain due to trauma    DDD (degenerative disc disease), " lumbar    Pain in both feet  -     EMG & Nerve Conduction Test; Future  -     Ambulatory Referral to Orthopedic Surgery    Degenerative disc disease, cervical    Other orders  -     gabapentin (NEURONTIN) 400 MG capsule; Take 1 capsule by mouth 3 (Three) Times a Day.      --- The urine drug screen confirmation from 4-19-18 has been reviewed and the result is APPROPRIATE based on patient history and MATTHEW report  --- increase Gabapentin 400 mg TID. Discussed medication with the patient.  Included in this discussion was the potential for side effects and adverse events.  Patient verbalized understanding and wished to proceed.  Prescription will be sent to pharmacy.  --- EMG-BLE  --- Refer to orthopedist.   --- Did ask for work note. Reviewed I am not going to take her off work completely. I can place her on light-duty for work.  --- Follow-up as scheduled or sooner if needed.       MATTHEW REPORT    As part of the patient's treatment plan, I am prescribing controlled substances. The patient has been made aware of appropriate use of such medications, including potential risk of somnolence, limited ability to drive and/or work safely, and the potential for dependence or overdose. It has also bee made clear that these medications are for use by this patient only, without concomitant use of alcohol or other substances unless prescribed.     Patient has completed prescribing agreement detailing terms of continued prescribing of controlled substances, including monitoring MATTHEW reports, urine drug screening, and pill counts if necessary. The patient is aware that inappropriate use will results in cessation of prescribing such medications.    MATTHEW report has been reviewed and scanned into the patient's chart.    As the clinician, I personally reviewed the MATTHEW from 8-23-18 while the patient was in the office today.    History and physical exam exhibit continued safe and appropriate use of controlled substances.        EMR Dragon/Transcription disclaimer:   Much of this encounter note is an electronic transcription/translation of spoken language to printed text. The electronic translation of spoken language may permit erroneous, or at times, nonsensical words or phrases to be inadvertently transcribed; Although I have reviewed the note for such errors, some may still exist.

## 2018-09-11 ENCOUNTER — OFFICE VISIT (OUTPATIENT)
Dept: PAIN MEDICINE | Facility: CLINIC | Age: 68
End: 2018-09-11

## 2018-09-11 VITALS
BODY MASS INDEX: 32.02 KG/M2 | SYSTOLIC BLOOD PRESSURE: 119 MMHG | WEIGHT: 174 LBS | HEIGHT: 62 IN | TEMPERATURE: 97.7 F | DIASTOLIC BLOOD PRESSURE: 66 MMHG | OXYGEN SATURATION: 98 % | HEART RATE: 90 BPM | RESPIRATION RATE: 16 BRPM

## 2018-09-11 DIAGNOSIS — M51.36 DDD (DEGENERATIVE DISC DISEASE), LUMBAR: ICD-10-CM

## 2018-09-11 DIAGNOSIS — Z79.899 ENCOUNTER FOR LONG-TERM (CURRENT) USE OF HIGH-RISK MEDICATION: ICD-10-CM

## 2018-09-11 DIAGNOSIS — G89.21 CHRONIC PAIN DUE TO TRAUMA: Primary | ICD-10-CM

## 2018-09-11 DIAGNOSIS — M79.672 PAIN IN BOTH FEET: ICD-10-CM

## 2018-09-11 DIAGNOSIS — M79.671 PAIN IN BOTH FEET: ICD-10-CM

## 2018-09-11 DIAGNOSIS — M47.812 CERVICAL FACET JOINT SYNDROME: ICD-10-CM

## 2018-09-11 DIAGNOSIS — M50.30 DEGENERATIVE DISC DISEASE, CERVICAL: ICD-10-CM

## 2018-09-11 PROCEDURE — 99214 OFFICE O/P EST MOD 30 MIN: CPT | Performed by: NURSE PRACTITIONER

## 2018-09-11 RX ORDER — GABAPENTIN 600 MG/1
600 TABLET ORAL 3 TIMES DAILY
Qty: 90 TABLET | Refills: 1 | Status: SHIPPED | OUTPATIENT
Start: 2018-09-11 | End: 2018-11-07

## 2018-09-11 RX ORDER — OXYCODONE AND ACETAMINOPHEN 10; 325 MG/1; MG/1
TABLET ORAL
Qty: 150 TABLET | Refills: 0 | Status: SHIPPED | OUTPATIENT
Start: 2018-09-11 | End: 2018-10-09 | Stop reason: SDUPTHER

## 2018-09-11 RX ORDER — GABAPENTIN 600 MG/1
600 TABLET ORAL 3 TIMES DAILY
Qty: 90 TABLET | Refills: 1 | Status: SHIPPED | OUTPATIENT
Start: 2018-09-11 | End: 2018-09-11 | Stop reason: SDUPTHER

## 2018-09-11 RX ORDER — METHYLPREDNISOLONE ACETATE 40 MG/ML
40 INJECTION, SUSPENSION INTRA-ARTICULAR; INTRALESIONAL; INTRAMUSCULAR; SOFT TISSUE ONCE
Status: DISCONTINUED | OUTPATIENT
Start: 2018-09-11 | End: 2018-12-05
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
## 2018-09-13 ENCOUNTER — HOSPITAL ENCOUNTER (OUTPATIENT)
Dept: GENERAL RADIOLOGY | Facility: HOSPITAL | Age: 68
Discharge: HOME | End: 2018-09-13

## 2018-09-13 ENCOUNTER — HOSPITAL ENCOUNTER (OUTPATIENT)
Dept: GENERAL RADIOLOGY | Facility: HOSPITAL | Age: 68
Discharge: HOME | End: 2018-09-13
Admitting: INTERNAL MEDICINE

## 2018-09-13 ENCOUNTER — TRANSCRIBE ORDERS (OUTPATIENT)
Dept: ADMINISTRATIVE | Facility: HOSPITAL | Age: 68
End: 2018-09-13

## 2018-09-13 DIAGNOSIS — M25.512 LEFT SHOULDER PAIN, UNSPECIFIED CHRONICITY: ICD-10-CM

## 2018-09-13 DIAGNOSIS — M54.5 LOW BACK PAIN, UNSPECIFIED BACK PAIN LATERALITY, UNSPECIFIED CHRONICITY, WITH SCIATICA PRESENCE UNSPECIFIED: ICD-10-CM

## 2018-09-13 DIAGNOSIS — M54.5 LOW BACK PAIN, UNSPECIFIED BACK PAIN LATERALITY, UNSPECIFIED CHRONICITY, WITH SCIATICA PRESENCE UNSPECIFIED: Primary | ICD-10-CM

## 2018-09-13 PROCEDURE — 72110 X-RAY EXAM L-2 SPINE 4/>VWS: CPT

## 2018-09-13 PROCEDURE — 73030 X-RAY EXAM OF SHOULDER: CPT

## 2018-09-24 ENCOUNTER — OFFICE VISIT (OUTPATIENT)
Dept: ORTHOPEDIC SURGERY | Facility: CLINIC | Age: 68
End: 2018-09-24

## 2018-09-24 ENCOUNTER — TELEPHONE (OUTPATIENT)
Dept: PAIN MEDICINE | Facility: CLINIC | Age: 68
End: 2018-09-24

## 2018-09-24 VITALS — HEIGHT: 62 IN | WEIGHT: 173.8 LBS | BODY MASS INDEX: 31.98 KG/M2 | TEMPERATURE: 97.9 F

## 2018-09-24 DIAGNOSIS — Z72.0 TOBACCO ABUSE: ICD-10-CM

## 2018-09-24 DIAGNOSIS — M77.41 METATARSALGIA OF BOTH FEET: Primary | ICD-10-CM

## 2018-09-24 DIAGNOSIS — M77.42 METATARSALGIA OF BOTH FEET: Primary | ICD-10-CM

## 2018-09-24 DIAGNOSIS — M79.671 FOOT PAIN, BILATERAL: ICD-10-CM

## 2018-09-24 DIAGNOSIS — M79.672 FOOT PAIN, BILATERAL: ICD-10-CM

## 2018-09-24 DIAGNOSIS — M19.079 ARTHRITIS OF FOOT: ICD-10-CM

## 2018-09-24 PROCEDURE — 99204 OFFICE O/P NEW MOD 45 MIN: CPT | Performed by: ORTHOPAEDIC SURGERY

## 2018-09-24 PROCEDURE — 73630 X-RAY EXAM OF FOOT: CPT | Performed by: ORTHOPAEDIC SURGERY

## 2018-09-24 PROCEDURE — 99406 BEHAV CHNG SMOKING 3-10 MIN: CPT | Performed by: ORTHOPAEDIC SURGERY

## 2018-09-24 RX ORDER — NAPROXEN 500 MG/1
500 TABLET ORAL AS NEEDED
COMMUNITY
Start: 2018-09-19 | End: 2018-10-19

## 2018-10-09 ENCOUNTER — OFFICE VISIT (OUTPATIENT)
Dept: PAIN MEDICINE | Facility: CLINIC | Age: 68
End: 2018-10-09

## 2018-10-09 VITALS
WEIGHT: 173.4 LBS | OXYGEN SATURATION: 98 % | HEART RATE: 84 BPM | RESPIRATION RATE: 15 BRPM | BODY MASS INDEX: 31.91 KG/M2 | TEMPERATURE: 98 F | SYSTOLIC BLOOD PRESSURE: 101 MMHG | HEIGHT: 62 IN | DIASTOLIC BLOOD PRESSURE: 68 MMHG

## 2018-10-09 DIAGNOSIS — M79.672 PAIN IN BOTH FEET: ICD-10-CM

## 2018-10-09 DIAGNOSIS — M51.36 DDD (DEGENERATIVE DISC DISEASE), LUMBAR: ICD-10-CM

## 2018-10-09 DIAGNOSIS — M79.671 PAIN IN BOTH FEET: ICD-10-CM

## 2018-10-09 DIAGNOSIS — G89.21 CHRONIC PAIN DUE TO TRAUMA: Primary | ICD-10-CM

## 2018-10-09 DIAGNOSIS — Z79.899 ENCOUNTER FOR LONG-TERM (CURRENT) USE OF HIGH-RISK MEDICATION: ICD-10-CM

## 2018-10-09 DIAGNOSIS — M50.30 DEGENERATIVE DISC DISEASE, CERVICAL: ICD-10-CM

## 2018-10-09 DIAGNOSIS — M47.812 CERVICAL FACET JOINT SYNDROME: ICD-10-CM

## 2018-10-09 PROCEDURE — 99214 OFFICE O/P EST MOD 30 MIN: CPT | Performed by: NURSE PRACTITIONER

## 2018-10-09 RX ORDER — OXYCODONE AND ACETAMINOPHEN 10; 325 MG/1; MG/1
TABLET ORAL
Qty: 150 TABLET | Refills: 0 | Status: SHIPPED | OUTPATIENT
Start: 2018-10-09 | End: 2018-11-07 | Stop reason: ALTCHOICE
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
## 2018-10-12 ENCOUNTER — TELEPHONE (OUTPATIENT)
Dept: ORTHOPEDIC SURGERY | Facility: CLINIC | Age: 68
End: 2018-10-12

## 2018-10-12 NOTE — TELEPHONE ENCOUNTER
"I called and spoke with patient after receiving message that she canceled her MRI did not feel that she needed it.  She states that she has no pain in her feet at all while she is taking her gabapentin and that her pain management doctor thinks is due to her \"nerves\".  I told her that I doubted that she had a stress fracture then if the gabapentin relieves her pain and would not recommend any further treatment at this time other than use of accommodative shoes and if symptoms worsen she will let me know otherwise I will see her back as needed  "

## 2018-10-15 ENCOUNTER — APPOINTMENT (OUTPATIENT)
Dept: MRI IMAGING | Facility: HOSPITAL | Age: 68
End: 2018-10-15
Attending: ORTHOPAEDIC SURGERY

## 2018-10-18 ENCOUNTER — HOSPITAL ENCOUNTER (OUTPATIENT)
Dept: INFUSION THERAPY | Facility: HOSPITAL | Age: 68
Discharge: HOME | End: 2018-10-18
Attending: PSYCHIATRY & NEUROLOGY | Admitting: PSYCHIATRY & NEUROLOGY

## 2018-10-18 DIAGNOSIS — M79.671 PAIN IN BOTH FEET: ICD-10-CM

## 2018-10-18 DIAGNOSIS — M79.672 PAIN IN BOTH FEET: ICD-10-CM

## 2018-10-18 PROCEDURE — 95908 NRV CNDJ TST 3-4 STUDIES: CPT | Performed by: PSYCHIATRY & NEUROLOGY

## 2018-10-18 PROCEDURE — 95886 MUSC TEST DONE W/N TEST COMP: CPT | Performed by: PSYCHIATRY & NEUROLOGY

## 2018-10-18 PROCEDURE — 95886 MUSC TEST DONE W/N TEST COMP: CPT

## 2018-10-18 PROCEDURE — 95908 NRV CNDJ TST 3-4 STUDIES: CPT

## 2018-10-18 NOTE — PROCEDURES
EMG REPORT    Indication: Pain and numbness of both lower extremities    Findings: Bilateral peroneal motor study showed normal distal latencies velocities and amplitudes.  Bilateral tibial motor study showed normal distal latencies amplitudes and F wave latencies.    Concentric needle EMG of bilateral vastus lateralis, vastus medialis, anterior tibialis, peroneus, gastrocnemius muscles showed no abnormality.    Impression: Normal EMG and nerve conduction studies of both lower extremities.    Thank you for sending this patient for further evaluation.  Geremias Rendon M.D.

## 2018-11-07 ENCOUNTER — OFFICE VISIT (OUTPATIENT)
Dept: PAIN MEDICINE | Facility: CLINIC | Age: 68
End: 2018-11-07

## 2018-11-07 ENCOUNTER — RESULTS ENCOUNTER (OUTPATIENT)
Dept: PAIN MEDICINE | Facility: CLINIC | Age: 68
End: 2018-11-07

## 2018-11-07 VITALS
DIASTOLIC BLOOD PRESSURE: 71 MMHG | HEART RATE: 86 BPM | OXYGEN SATURATION: 97 % | WEIGHT: 178 LBS | HEIGHT: 62 IN | TEMPERATURE: 97.8 F | RESPIRATION RATE: 16 BRPM | SYSTOLIC BLOOD PRESSURE: 129 MMHG | BODY MASS INDEX: 32.76 KG/M2

## 2018-11-07 DIAGNOSIS — M25.512 CHRONIC LEFT SHOULDER PAIN: ICD-10-CM

## 2018-11-07 DIAGNOSIS — M51.36 DDD (DEGENERATIVE DISC DISEASE), LUMBAR: ICD-10-CM

## 2018-11-07 DIAGNOSIS — G89.21 CHRONIC PAIN DUE TO TRAUMA: ICD-10-CM

## 2018-11-07 DIAGNOSIS — M50.30 DEGENERATIVE DISC DISEASE, CERVICAL: ICD-10-CM

## 2018-11-07 DIAGNOSIS — M47.812 CERVICAL FACET JOINT SYNDROME: ICD-10-CM

## 2018-11-07 DIAGNOSIS — Z79.899 ENCOUNTER FOR LONG-TERM (CURRENT) USE OF HIGH-RISK MEDICATION: Primary | ICD-10-CM

## 2018-11-07 DIAGNOSIS — G89.29 CHRONIC LEFT SHOULDER PAIN: ICD-10-CM

## 2018-11-07 DIAGNOSIS — M79.672 PAIN IN BOTH FEET: ICD-10-CM

## 2018-11-07 DIAGNOSIS — M79.671 PAIN IN BOTH FEET: ICD-10-CM

## 2018-11-07 DIAGNOSIS — Z79.899 ENCOUNTER FOR LONG-TERM (CURRENT) USE OF HIGH-RISK MEDICATION: ICD-10-CM

## 2018-11-07 PROCEDURE — 99214 OFFICE O/P EST MOD 30 MIN: CPT | Performed by: NURSE PRACTITIONER

## 2018-11-07 PROCEDURE — 80305 DRUG TEST PRSMV DIR OPT OBS: CPT | Performed by: NURSE PRACTITIONER

## 2018-11-07 RX ORDER — OXYCODONE HYDROCHLORIDE 30 MG/1
30 TABLET, FILM COATED, EXTENDED RELEASE ORAL EVERY 12 HOURS SCHEDULED
Qty: 60 TABLET | Refills: 0 | Status: SHIPPED | OUTPATIENT
Start: 2018-11-07 | End: 2018-12-05 | Stop reason: SDUPTHER

## 2018-11-07 NOTE — PROGRESS NOTES
"CHIEF COMPLAINT  Pt states her L shoulder pain is \"steadily\" worsening since 10/9/18 office visit.    Subjective   Traci King is a 68 y.o. female  who presents to the office for follow-up.She has a history of chronic neck, back, left shoulder and foot pain. REports her pain is worse since last office visit, mainly in her left shoulder.    Complains of pain in her low back, neck, feet and left shoulder. Today her pain is 7-8/10VAS. Continues with Percocet 10/325 4-5/day, Robaxin 750 mg 1/day PRN, diclofenac 75 mg 1-2/day. SHe also admits she is taking Ibuprofen 200 mg 3 every 4 hours. She states the orthopedist told her to do this. Has decided to stop taking Gabapentin approximately 3 weeks ago. She states \"my  taught me how to walk.\" Has noticed her foot pain has improved. Discussed her getting orthotics for her foot pain.  The regimen helps decrease her pain by 75%. Denies any side effects from the regimen.  ADL's by self.      She completed a Cervical Facet Nerve (Medial Branch) Radiofrequency Lesioning LEFT C3-C5  on  1/26/18 performed by Dr. Gan for management of neck pain.  Failed PT, failed ultrasound therapy, failed compounded pain creme.    Sees by specialist October 26th with Dr. Curry. Had MRI of left shoulder. She had a cortisone injection. Reports no relief with cortisone injection. She wants to know if she can have an MRI or xray.  States she was wearing a sling at home. Was not told to do this by orthopedist.   New PCP is Dr. Sherry Fournier in [place].   Neck Pain    This is a chronic problem. The current episode started more than 1 year ago. The problem occurs constantly. Progression since onset: unchanged since last office visit. The quality of the pain is described as burning. The pain is at a severity of 7/10. The pain is moderate. The symptoms are aggravated by position, bending, stress and twisting. The pain is worse during the day. Stiffness is present all day. Pertinent " negatives include no chest pain, fever, headaches, numbness or weakness. She has tried oral narcotics and NSAIDs (cervical MBB--significant temporary relief; cervical RFL--50% ongoing relief) for the symptoms. The treatment provided moderate relief.   Foot Pain   This is a new problem. The current episode started more than 1 month ago. The problem has been gradually worsening (unchanged since last office visit.; today is 7/10VAS). Associated symptoms include neck pain. Pertinent negatives include no chest pain, chills, fever, headaches, nausea, numbness, vomiting or weakness. 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 She has tried lying down, position changes, rest and relaxation (podiatric evaluation-- orthodics; gabapentin) for the symptoms. The treatment provided moderate relief.   Back Pain   The problem occurs 2 to 4 times per day. The problem has been gradually worsening (unchanged from last office visit) since onset. The pain is present in the lumbar spine and sacro-iliac. The pain is moderate. The pain is worse during the day (with work). The symptoms are aggravated by bending, position and standing. Pertinent negatives include no bladder incontinence, bowel incontinence, chest pain, dysuria, fever, headaches, numbness or weakness.        PEG Assessment   What number best describes your pain on average in the past week?8  What number best describes how, during the past week, pain has interfered with your enjoyment of life?8  What number best describes how, during the past week, pain has interfered with your general activity?  8    The following portions of the patient's history were reviewed and updated as appropriate: allergies, current medications, past family history, past medical history, past social history, past surgical history and problem list.    Review of Systems   Constitutional: Positive for activity change (decreased). Negative for chills and fever.  "  Respiratory: Negative for shortness of breath.    Cardiovascular: Negative for chest pain.   Gastrointestinal: Positive for constipation. Negative for bowel incontinence, diarrhea, nausea and vomiting.   Genitourinary: Negative for bladder incontinence, difficulty urinating, dyspareunia and dysuria.   Musculoskeletal: Positive for back pain and neck pain.        Feet pain   Neurological: Negative for dizziness, weakness, light-headedness, numbness and headaches.   Psychiatric/Behavioral: Positive for sleep disturbance. Negative for agitation, confusion, hallucinations, self-injury and suicidal ideas. The patient is not nervous/anxious.        Vitals:    11/07/18 1239   BP: 129/71   Pulse: 86   Resp: 16   Temp: 97.8 °F (36.6 °C)   SpO2: 97%   Weight: 80.7 kg (178 lb)   Height: 157.5 cm (62.01\")   PainSc: 8  Comment: L shoulder pain ranges from 7-9/10   PainLoc: Shoulder     Objective   Physical Exam   Constitutional: She is oriented to person, place, and time. Vital signs are normal. She appears well-developed and well-nourished. She is cooperative.   HENT:   Head: Normocephalic and atraumatic.   Nose: Nose normal.   Eyes: Conjunctivae and lids are normal.   Neck: Spinous process tenderness (mild tenderness left C3-C5 facets; mild on right as well) and muscular tenderness present. Decreased range of motion present.   Cardiovascular: Normal rate.    Pulmonary/Chest: Effort normal.   Abdominal: Normal appearance.   Musculoskeletal:        Left shoulder: She exhibits decreased range of motion and tenderness. She exhibits no deformity.        Cervical back: She exhibits tenderness.        Lumbar back: She exhibits tenderness.        Right foot: There is decreased range of motion and tenderness. There is no deformity.        Left foot: There is decreased range of motion and tenderness. There is no deformity.   Guarding LUE   Neurological: She is alert and oriented to person, place, and time. Gait (slow) abnormal. "   Reflex Scores:       Bicep reflexes are 1+ on the right side and 1+ on the left side.       Brachioradialis reflexes are 1+ on the right side and 1+ on the left side.       Patellar reflexes are 1+ on the right side and 1+ on the left side.  Skin: Skin is warm, dry and intact.   Psychiatric: She has a normal mood and affect. Her speech is normal and behavior is normal. Judgment and thought content normal. Cognition and memory are normal.   Nursing note and vitals reviewed.      Assessment/Plan   Traci was seen today for shoulder pain.    Diagnoses and all orders for this visit:    Encounter for long-term (current) use of high-risk medication  -     POC Urine Drug Screen, Triage  -     Urine Drug Screen Confirmation - Urine, Clean Catch; Future    Chronic pain due to trauma  -     POC Urine Drug Screen, Triage  -     Urine Drug Screen Confirmation - Urine, Clean Catch; Future    DDD (degenerative disc disease), lumbar  -     POC Urine Drug Screen, Triage  -     Urine Drug Screen Confirmation - Urine, Clean Catch; Future    Degenerative disc disease, cervical  -     POC Urine Drug Screen, Triage  -     Urine Drug Screen Confirmation - Urine, Clean Catch; Future    Pain in both feet  -     POC Urine Drug Screen, Triage  -     Urine Drug Screen Confirmation - Urine, Clean Catch; Future    Cervical facet joint syndrome  -     POC Urine Drug Screen, Triage  -     Urine Drug Screen Confirmation - Urine, Clean Catch; Future    Chronic left shoulder pain  -     POC Urine Drug Screen, Triage  -     Urine Drug Screen Confirmation - Urine, Clean Catch; Future    Other orders  -     OxyCODONE HCl ER (oxyCONTIN) 30 MG tablet extended-release 12 hour; Take 1 tablet by mouth Every 12 (Twelve) Hours.  -     diclofenac (VOLTAREN) 50 MG EC tablet; Take 1 tablet by mouth 2 (Two) Times a Day As Needed (pain).      --- Routine UDS in office today as part of monitoring requirements for controlled substances.  The specimen was viewed and  the immunoassay result reviewed and is +OXY, +BZD(APPROPRIATE).  This specimen will be sent to [facility] laboratory for confirmation.     --- Encouraged patient to follow-up with Dr. Curry. Concern for frozen shoulder.   --- Discontinue Percocet.  --- Trial of OxyContin 30 mg BID. Discussed medication with the patient.  Included in this discussion was the potential for side effects and adverse events.  Patient verbalized understanding and wished to proceed.  Prescription will be given to patient.  --- Refill Diclofenac.   --- Consider repeat cervical RFL.  --- Follow-up 1 month or sooner if needed.       MATTHEW REPORT    As part of the patient's treatment plan, I am prescribing controlled substances. The patient has been made aware of appropriate use of such medications, including potential risk of somnolence, limited ability to drive and/or work safely, and the potential for dependence or overdose. It has also bee made clear that these medications are for use by this patient only, without concomitant use of alcohol or other substances unless prescribed.     Patient has completed prescribing agreement detailing terms of continued prescribing of controlled substances, including monitoring MATTHEW reports, urine drug screening, and pill counts if necessary. The patient is aware that inappropriate use will results in cessation of prescribing such medications.    MATTHEW report has been reviewed and scanned into the patient's chart.    As the clinician, I personally reviewed the MATTHEW from 11-5-18 while the patient was in the office today.    History and physical exam exhibit continued safe and appropriate use of controlled substances.      EMR Dragon/Transcription disclaimer:   Much of this encounter note is an electronic transcription/translation of spoken language to printed text. The electronic translation of spoken language may permit erroneous, or at times, nonsensical words or phrases to be inadvertently  transcribed; Although I have reviewed the note for such errors, some may still exist.

## 2018-11-08 ENCOUNTER — PRIOR AUTHORIZATION (OUTPATIENT)
Dept: PAIN MEDICINE | Facility: CLINIC | Age: 68
End: 2018-11-08

## 2018-11-08 NOTE — TELEPHONE ENCOUNTER
Sent PA for Oxycontin to [facility] through [facility] [employer] (Key # [id number]) and waiting on response

## 2018-11-13 ENCOUNTER — TELEPHONE (OUTPATIENT)
Dept: PAIN MEDICINE | Facility: CLINIC | Age: 68
End: 2018-11-13

## 2018-11-13 NOTE — TELEPHONE ENCOUNTER
Pt called because she wants to keep you informed on a situation you were already aware of. States as you know she has been having problems with her shoulder.She has just seen Dr. Calixto Curry and she will have to have surgery on it. States it is so bad he can not do a regular shoulder replacement and has to do a reverse one. She is going to have a CT scan done and Monday will go over the results and go over surgery. She is not asking anything, just relaying this info.

## 2018-11-13 NOTE — TELEPHONE ENCOUNTER
Relayed the message. She states it will probably not be before her next f/u with you. She will let us know.

## 2018-11-19 ENCOUNTER — TELEPHONE (OUTPATIENT)
Dept: PAIN MEDICINE | Facility: CLINIC | Age: 68
End: 2018-11-19

## 2018-11-19 NOTE — TELEPHONE ENCOUNTER
----- Message from lEias Lugo sent at 11/19/2018  1:03 PM EST -----  Contact: [phone]  JUST SRIDEVIPT CALLED TO LET YOU KNOW THAT SHE SAW THE SURGEON AND SHE HAS A LEFT SHOULDER FRACTURE THAT IS CAUSING HER SO MUCH PAIN. THE SURGEON WANTS TO GIVE IT 6 WEEKS TO SEE IF IT HEALS BEFORE THEY DO SURGERY.

## 2018-12-05 ENCOUNTER — OFFICE VISIT (OUTPATIENT)
Dept: PAIN MEDICINE | Facility: CLINIC | Age: 68
End: 2018-12-05

## 2018-12-05 VITALS
WEIGHT: 178 LBS | OXYGEN SATURATION: 98 % | HEIGHT: 62 IN | BODY MASS INDEX: 32.76 KG/M2 | TEMPERATURE: 97.6 F | SYSTOLIC BLOOD PRESSURE: 147 MMHG | DIASTOLIC BLOOD PRESSURE: 83 MMHG | HEART RATE: 83 BPM | RESPIRATION RATE: 18 BRPM

## 2018-12-05 DIAGNOSIS — G89.21 CHRONIC PAIN DUE TO TRAUMA: Primary | ICD-10-CM

## 2018-12-05 DIAGNOSIS — M25.512 CHRONIC LEFT SHOULDER PAIN: ICD-10-CM

## 2018-12-05 DIAGNOSIS — Z79.899 ENCOUNTER FOR LONG-TERM (CURRENT) USE OF HIGH-RISK MEDICATION: ICD-10-CM

## 2018-12-05 DIAGNOSIS — G89.29 CHRONIC LEFT SHOULDER PAIN: ICD-10-CM

## 2018-12-05 DIAGNOSIS — M47.812 CERVICAL FACET JOINT SYNDROME: ICD-10-CM

## 2018-12-05 PROCEDURE — 99214 OFFICE O/P EST MOD 30 MIN: CPT | Performed by: NURSE PRACTITIONER

## 2018-12-05 RX ORDER — OXYCODONE HYDROCHLORIDE 30 MG/1
30 TABLET, FILM COATED, EXTENDED RELEASE ORAL EVERY 12 HOURS SCHEDULED
Qty: 60 TABLET | Refills: 0 | Status: SHIPPED | OUTPATIENT
Start: 2018-12-05 | End: 2019-01-02 | Stop reason: SDUPTHER

## 2018-12-05 NOTE — PROGRESS NOTES
"CHIEF COMPLAINT  Left shoulder pain has increased since last visit.    Subjective   Traci King is a 68 y.o. female  who presents to the office for follow-up.She has a history of left shoulder pain, as well as neck and back pain.     Since last office visit, she was diagnosed with left shoulder fracture. Is under the care of Dr. Curry. Has follow-up to 12-31-18. Anticipates surgery in future.     Complains of pain in her neck, left shoulder and back. Today her pain is 7/10VAS. Is still having some foot pain but \"I'm dealing with it, I try to walk like my  told me to.\" Has stopped taking Gabapentin. Her feet are worse since stopping this but \"I just felt like I was taking too much medication.\" Is currently taking OxyContin 30 mg BID and Diclofenac 50 mg 2/day. Denies any side effects from the regimen.  She admits her  noticed she seemed like she was less sleepy and wasn't falling asleep as often. Wakes up at 0315 \"in pain.\"  SHe reports her pain is fairly well controlled for approximately 9 hours. ADL's by self. Denies any bowel or bladder changes.     She completed a Cervical Facet Nerve (Medial Branch) Radiofrequency Lesioning LEFT C3-C5  on  1/26/18 performed by Dr. Gan for management of neck pain.  Failed PT, failed ultrasound therapy, failed compounded pain creme.      Patient brought MRI Left shoulder 11-15-18 from Dr. Curry.  Neck Pain    This is a chronic problem. The current episode started more than 1 year ago. The problem occurs constantly. Progression since onset: unchanged since last office visit. The quality of the pain is described as burning. The pain is at a severity of 7/10. The pain is moderate. The symptoms are aggravated by position, bending, stress and twisting. The pain is worse during the day. Stiffness is present all day. Pertinent negatives include no chest pain, fever, headaches, numbness or weakness. She has tried oral narcotics and NSAIDs (cervical MBB--significant " temporary relief; cervical RFL--50% ongoing relief) for the symptoms. The treatment provided moderate relief.   Foot Pain   This is a new problem. The current episode started more than 1 month ago. The problem has been gradually worsening. Associated symptoms include neck pain. Pertinent negatives include no chest pain, chills, fever, headaches, nausea, numbness, vomiting or weakness. 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 She has tried lying down, position changes, rest and relaxation (podiatric evaluation-- orthodics; gabapentin) for the symptoms. The treatment provided moderate relief.   Back Pain   The problem occurs 2 to 4 times per day. The problem has been gradually worsening (unchanged from last office visit) since onset. The pain is present in the lumbar spine and sacro-iliac. The pain is moderate. The pain is worse during the day (with work). The symptoms are aggravated by bending, position and standing. Pertinent negatives include no bladder incontinence, bowel incontinence, chest pain, dysuria, fever, headaches, numbness or weakness.      PEG Assessment   What number best describes your pain on average in the past week?7  What number best describes how, during the past week, pain has interfered with your enjoyment of life?7  What number best describes how, during the past week, pain has interfered with your general activity?  7    The following portions of the patient's history were reviewed and updated as appropriate: allergies, current medications, past family history, past medical history, past social history, past surgical history and problem list.    Review of Systems   Constitutional: Negative for chills and fever.   Respiratory: Positive for shortness of breath.    Cardiovascular: Negative for chest pain.   Gastrointestinal: Positive for constipation. Negative for bowel incontinence, diarrhea, nausea and vomiting.   Genitourinary: Negative for bladder  "incontinence, difficulty urinating, dyspareunia and dysuria.   Musculoskeletal: Positive for back pain and neck pain.        Left shoulder pain   Neurological: Negative for dizziness, weakness, light-headedness, numbness and headaches.   Psychiatric/Behavioral: Negative for confusion, hallucinations, self-injury, sleep disturbance and suicidal ideas. The patient is not nervous/anxious.        Vitals:    12/05/18 1109   BP: 147/83   BP Location: Right arm   Patient Position: Sitting   Cuff Size: Adult   Pulse: 83   Resp: 18   Temp: 97.6 °F (36.4 °C)   TempSrc: Oral   SpO2: 98%   Weight: 80.7 kg (178 lb)   Height: 157.5 cm (62.01\")   PainSc:   7   PainLoc: Shoulder     Objective   Physical Exam   Constitutional: She is oriented to person, place, and time. Vital signs are normal. She appears well-developed and well-nourished. She is cooperative.   HENT:   Head: Normocephalic and atraumatic.   Nose: Nose normal.   Eyes: Conjunctivae and lids are normal.   Neck: Spinous process tenderness (mild tenderness left C3-C5 facets; mild on right as well) and muscular tenderness present.   Cardiovascular: Normal rate.   Pulmonary/Chest: Effort normal.   Abdominal: Normal appearance.   Musculoskeletal:        Left shoulder: She exhibits decreased range of motion and tenderness. She exhibits no deformity.        Cervical back: She exhibits tenderness.        Lumbar back: She exhibits tenderness.   Guarding LUE   Neurological: She is alert and oriented to person, place, and time. Gait (slow) abnormal.   Reflex Scores:       Bicep reflexes are 1+ on the right side and 1+ on the left side.       Brachioradialis reflexes are 1+ on the right side and 1+ on the left side.       Patellar reflexes are 1+ on the right side and 1+ on the left side.  Skin: Skin is warm, dry and intact.   Psychiatric: She has a normal mood and affect. Her speech is normal and behavior is normal. Judgment and thought content normal. Cognition and memory are " normal.   Nursing note and vitals reviewed.      Assessment/Plan   Traci was seen today for shoulder pain, back pain and neck pain.    Diagnoses and all orders for this visit:    Chronic pain due to trauma    Cervical facet joint syndrome    Chronic left shoulder pain    Encounter for long-term (current) use of high-risk medication    Other orders  -     OxyCODONE HCl ER (oxyCONTIN) 30 MG tablet extended-release 12 hour; Take 1 tablet by mouth Every 12 (Twelve) Hours.      --- The urine drug screen confirmation from 11-7-18 has been reviewed and the result is APPROPRIATE based on patient history and MATTHEW report  --- Continue with Dr. Curry for shoulder evaluation/consideration for surgery.  Obtain records.   --- Refill OxyContin. Patient appears stable with current regimen. No adverse effects. Regarding continuation of opioids, there is no evidence of aberrant behavior or any red flags.  The patient continues with appropriate response to opioid therapy. ADL's remain intact by self. Improvement in side effects noted by patient's .  --- Discussed taking Tylenol PRN as well. This can exponentiate the relief with OxyContin.  --- Consider repeat cervical RFL. Patient wants to wait at this time.   --- Follow-up 1 month or sooner if needed.     MATTHEW REPORT    As part of the patient's treatment plan, I am prescribing controlled substances. The patient has been made aware of appropriate use of such medications, including potential risk of somnolence, limited ability to drive and/or work safely, and the potential for dependence or overdose. It has also bee made clear that these medications are for use by this patient only, without concomitant use of alcohol or other substances unless prescribed.     Patient has completed prescribing agreement detailing terms of continued prescribing of controlled substances, including monitoring MATTHEW reports, urine drug screening, and pill counts if necessary. The patient is  aware that inappropriate use will results in cessation of prescribing such medications.    MATTHEW report has been reviewed and scanned into the patient's chart.    As the clinician, I personally reviewed the MATTHEW from 12-3-18 while the patient was in the office today.    History and physical exam exhibit continued safe and appropriate use of controlled substances.      EMR Dragon/Transcription disclaimer:   Much of this encounter note is an electronic transcription/translation of spoken language to printed text. The electronic translation of spoken language may permit erroneous, or at times, nonsensical words or phrases to be inadvertently transcribed; Although I have reviewed the note for such errors, some may still exist.

## 2018-12-06 ENCOUNTER — TELEPHONE (OUTPATIENT)
Dept: CARDIOLOGY | Facility: CLINIC | Age: 68
End: 2018-12-06

## 2018-12-06 NOTE — TELEPHONE ENCOUNTER
"12/06/18  8:55 AM  Traci King  1950    Home Phone [phone]   Mobile [phone]       Traci King is a patient of Dr Sargent.  She is calling in this morning with c/o increase SOA, intermittent palpitations and HTN.  She stated while at pain management yesterday her bp was 143/83 hr 83, \"which is high for her\" she is requesting a follow up with Dr Sargent.      She takes no cardiac meds  She currently has a broken left shoulder and her pain meds were increased to OxyContin 30mg every 12 hours, \"I feel like this made my bp and SOA worse\"    Apt made for Tuesday with Katt.    Is there anything else you would like to add at this time?    Farhana Sorensen RN  Triage nurse    "

## 2018-12-11 ENCOUNTER — OFFICE VISIT (OUTPATIENT)
Dept: CARDIOLOGY | Facility: CLINIC | Age: 68
End: 2018-12-11

## 2018-12-11 VITALS
HEART RATE: 75 BPM | SYSTOLIC BLOOD PRESSURE: 124 MMHG | WEIGHT: 179 LBS | BODY MASS INDEX: 33.79 KG/M2 | HEIGHT: 61 IN | OXYGEN SATURATION: 99 % | DIASTOLIC BLOOD PRESSURE: 78 MMHG

## 2018-12-11 DIAGNOSIS — R06.09 DYSPNEA ON EXERTION: ICD-10-CM

## 2018-12-11 DIAGNOSIS — I10 ESSENTIAL HYPERTENSION: Primary | ICD-10-CM

## 2018-12-11 PROBLEM — K57.30 DIVERTICULOSIS OF SIGMOID COLON: Status: ACTIVE | Noted: 2018-09-20

## 2018-12-11 PROBLEM — Z98.890 HISTORY OF CARDIAC CATHETERIZATION: Status: ACTIVE | Noted: 2018-09-20

## 2018-12-11 PROBLEM — G62.9 NEUROPATHY: Status: ACTIVE | Noted: 2018-12-11

## 2018-12-11 PROBLEM — R94.4 DECREASED CALCULATED GFR: Status: ACTIVE | Noted: 2018-09-20

## 2018-12-11 PROBLEM — Z72.0 TOBACCO ABUSE: Status: ACTIVE | Noted: 2018-09-20

## 2018-12-11 PROBLEM — J30.9 ALLERGIC RHINITIS: Status: ACTIVE | Noted: 2018-09-20

## 2018-12-11 PROBLEM — M48.02 SPINAL STENOSIS, CERVICAL REGION: Status: ACTIVE | Noted: 2018-09-20

## 2018-12-11 PROBLEM — S13.4XXA WHIPLASH INJURY TO NECK: Status: ACTIVE | Noted: 2018-12-11

## 2018-12-11 PROBLEM — M47.812 FACET ARTHRITIS OF CERVICAL REGION: Status: ACTIVE | Noted: 2017-02-21
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
## 2018-12-11 PROBLEM — J44.9 CHRONIC OBSTRUCTIVE PULMONARY DISEASE: Status: ACTIVE | Noted: 2018-12-11

## 2018-12-11 PROBLEM — H33.20 DETACHED RETINA: Status: ACTIVE | Noted: 2018-12-11

## 2018-12-11 PROBLEM — F32.9 DEPRESSION, MAJOR: Status: ACTIVE | Noted: 2018-09-20

## 2018-12-11 PROBLEM — M48.02 FORAMINAL STENOSIS OF CERVICAL REGION: Status: ACTIVE | Noted: 2018-09-20

## 2018-12-11 PROBLEM — E55.9 VITAMIN D DEFICIENCY: Status: ACTIVE | Noted: 2018-09-20

## 2018-12-11 PROBLEM — R07.89 CHEST DISCOMFORT: Status: ACTIVE | Noted: 2018-12-11

## 2018-12-11 PROBLEM — G47.00 INSOMNIA: Status: ACTIVE | Noted: 2018-09-20

## 2018-12-11 PROBLEM — R79.89 T3 LOW IN SERUM: Status: ACTIVE | Noted: 2018-09-20

## 2018-12-11 PROBLEM — I51.89 DIASTOLIC DYSFUNCTION: Status: ACTIVE | Noted: 2018-09-20

## 2018-12-11 PROCEDURE — 93000 ELECTROCARDIOGRAM COMPLETE: CPT | Performed by: PHYSICIAN ASSISTANT

## 2018-12-11 PROCEDURE — 99214 OFFICE O/P EST MOD 30 MIN: CPT | Performed by: PHYSICIAN ASSISTANT
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
## 2019-01-02 ENCOUNTER — OFFICE VISIT (OUTPATIENT)
Dept: PAIN MEDICINE | Facility: CLINIC | Age: 69
End: 2019-01-02

## 2019-01-02 VITALS
SYSTOLIC BLOOD PRESSURE: 120 MMHG | HEART RATE: 83 BPM | HEIGHT: 61 IN | WEIGHT: 179 LBS | DIASTOLIC BLOOD PRESSURE: 78 MMHG | TEMPERATURE: 97.3 F | RESPIRATION RATE: 18 BRPM | BODY MASS INDEX: 33.79 KG/M2 | OXYGEN SATURATION: 92 %

## 2019-01-02 DIAGNOSIS — Z79.899 ENCOUNTER FOR LONG-TERM (CURRENT) USE OF HIGH-RISK MEDICATION: ICD-10-CM

## 2019-01-02 DIAGNOSIS — M47.812 FACET ARTHRITIS OF CERVICAL REGION: ICD-10-CM

## 2019-01-02 DIAGNOSIS — G89.21 CHRONIC PAIN DUE TO TRAUMA: Primary | ICD-10-CM

## 2019-01-02 DIAGNOSIS — M47.812 CERVICAL FACET JOINT SYNDROME: ICD-10-CM

## 2019-01-02 DIAGNOSIS — M50.30 DEGENERATIVE DISC DISEASE, CERVICAL: ICD-10-CM

## 2019-01-02 DIAGNOSIS — M48.02 FORAMINAL STENOSIS OF CERVICAL REGION: ICD-10-CM

## 2019-01-02 PROCEDURE — 99214 OFFICE O/P EST MOD 30 MIN: CPT | Performed by: NURSE PRACTITIONER

## 2019-01-02 RX ORDER — OXYCODONE HYDROCHLORIDE 30 MG/1
30 TABLET, FILM COATED, EXTENDED RELEASE ORAL EVERY 12 HOURS SCHEDULED
Qty: 60 TABLET | Refills: 0 | Status: SHIPPED | OUTPATIENT
Start: 2019-01-02 | End: 2019-01-30 | Stop reason: SDUPTHER

## 2019-01-02 NOTE — PROGRESS NOTES
"CHIEF COMPLAINT  Left shoulder pain has decreased since last visit    Subjective   Traci King is a 68 y.o. female  who presents to the office for follow-up.She has a history of left shoulder and neck pain. REports her shoulder pain is improved since last office visit.    Saw orthopedic surgeon 12-31-18. \"He says it's doing better.\" She states her pain is better, usually around 3/10VAS. \"If I can keep it like that, then I don't have to do surgery.\"     Complains of pain in her shoulders, neck. Today her pain is 3/10VAs. Her right shoulder and neck are her primary complaints today. Wants to have injections on right side.  She completed a Cervical Facet Nerve (Medial Branch) Radiofrequency Lesioning LEFT C3-C5  on  1/26/18 performed by Dr. Gan for management of neck pain. Is trying to become more active, is joining gym later this week. \"I'm not active.\" Is currently taking OxyContin 30 mg BID and Diclofenac 50 mg 2/day. Denies any side effects from the regimen.  The regimen helps decrease her pain by 50%. ADL's by self. Denies any bowel or bladder changes. \"I feel like I'm really hitting my stride.\"  \"the feet are starting to give me problems again. But I think i'm just carrying too much weight.\"   Previously failed Gabapentin.   Sees orthopedic surgeon in 1-28-19.   Failed PT, failed ultrasound therapy, failed compounded pain creme  Neck Pain    This is a chronic problem. The current episode started more than 1 year ago. The problem occurs constantly. Progression since onset: unchanged since last office visit. The quality of the pain is described as burning. The pain is at a severity of 7/10. The pain is moderate. The symptoms are aggravated by position, bending, stress and twisting. The pain is worse during the day. Stiffness is present all day. Associated symptoms include weakness. Pertinent negatives include no chest pain, fever, headaches or numbness. She has tried oral narcotics and NSAIDs (cervical " "MBB--significant temporary relief; cervical RFL--50% ongoing relief) for the symptoms. The treatment provided moderate relief.   Shoulder Injury    The left shoulder is affected. There was no injury mechanism. The pain radiates to the left neck and right neck. The pain is at a severity of 3/10. Pertinent negatives include no chest pain or numbness. The symptoms are aggravated by movement and palpation.      PEG Assessment   What number best describes your pain on average in the past week?3  What number best describes how, during the past week, pain has interfered with your enjoyment of life?3  What number best describes how, during the past week, pain has interfered with your general activity?  3    The following portions of the patient's history were reviewed and updated as appropriate: allergies, current medications, past family history, past medical history, past social history, past surgical history and problem list.    Review of Systems   Constitutional: Negative for chills and fever.   Respiratory: Negative for shortness of breath.    Cardiovascular: Negative for chest pain.   Gastrointestinal: Negative for bowel incontinence, constipation, diarrhea, nausea and vomiting.   Genitourinary: Negative for bladder incontinence, difficulty urinating, dyspareunia and dysuria.   Musculoskeletal: Positive for back pain and neck pain.   Neurological: Positive for weakness. Negative for dizziness, light-headedness, numbness and headaches.   Psychiatric/Behavioral: Negative for confusion, hallucinations, self-injury, sleep disturbance and suicidal ideas. The patient is not nervous/anxious.        Vitals:    01/02/19 0843   BP: 120/78   Pulse: 83   Resp: 18   Temp: 97.3 °F (36.3 °C)   TempSrc: Oral   SpO2: 92%   Weight: 81.2 kg (179 lb)   Height: 154.9 cm (61\")   PainSc:   3   PainLoc: Shoulder     Objective   Physical Exam   Constitutional: She is oriented to person, place, and time. Vital signs are normal. She appears " well-developed and well-nourished. She is cooperative.   HENT:   Head: Normocephalic and atraumatic.   Nose: Nose normal.   Eyes: Conjunctivae and lids are normal.   Neck: Spinous process tenderness (MODERATE tenderness RIGHT C3-C5 facets; mild on LEFT as well) and muscular tenderness present.   Cardiovascular: Normal rate.   Pulmonary/Chest: Effort normal.   Abdominal: Normal appearance.   Musculoskeletal:        Left shoulder: She exhibits decreased range of motion and tenderness. She exhibits no deformity.        Cervical back: She exhibits tenderness.        Lumbar back: She exhibits tenderness.   Guarding LUE   Neurological: She is alert and oriented to person, place, and time. Gait (slow) abnormal.   Reflex Scores:       Bicep reflexes are 1+ on the right side and 1+ on the left side.       Brachioradialis reflexes are 1+ on the right side and 1+ on the left side.       Patellar reflexes are 1+ on the right side and 1+ on the left side.  Skin: Skin is warm, dry and intact.   Psychiatric: She has a normal mood and affect. Her speech is normal and behavior is normal. Judgment and thought content normal. Cognition and memory are normal.   Nursing note and vitals reviewed.      Assessment/Plan   Traci was seen today for shoulder pain.    Diagnoses and all orders for this visit:    Chronic pain due to trauma    Cervical facet joint syndrome  -     Case Request    Degenerative disc disease, cervical    Foraminal stenosis of cervical region    Facet arthritis of cervical region (CMS/HCC)  -     Case Request    Encounter for long-term (current) use of high-risk medication    Other orders  -     OxyCODONE HCl ER (oxyCONTIN) 30 MG tablet extended-release 12 hour; Take 1 tablet by mouth Every 12 (Twelve) Hours.      --- The urine drug screen confirmation from 11-7-18 has been reviewed and the result is APPROPRIATE based on patient history and MATTHEW report  --- Refill oxycontin. Patient appears stable with current  "regimen. No adverse effects. Regarding continuation of opioids, there is no evidence of aberrant behavior or any red flags.  The patient continues with appropriate response to opioid therapy. ADL's remain intact by self.   --- Right C3-C5 MBB. No blood thinners. Reviewed the procedure at length with the patient.  Included in the review was expectations, complications, risk and benefits.The procedure was described in detail and the risks, benefits and alternatives were discussed with the patient (including but not limited to: bleeding, infection, nerve damage, worsening of pain, inability to perform injection, paralysis, seizures, and death) who agreed to proceed.  Discussed the potential for sedation if warranted/wanted.  The procedure will plan to be performed at [facility] with fluoroscopic guidance(unless ultrasound is indicated). Questions were answered and in a way the patient could understand.  Patient verbalized understanding and wishes to proceed.  This intervention will be ordered.  --- Follow-up 1 month or sooner if needed.    -------  Education about Medial Branch Blockade and RF Therapy:    This medial branch blockade (MBB) suggested is intended for diagnostic purposes, with the intent of offering the patient Radiofrequency thermal rhizotomy (RF) if the MBB is diagnostically effective.  The diagnostic blockade is necessary to determine the likelihood that RF therapy could be efficacious in providing long term relief to the patient.    Medial branches are sensory nerve branches that connect to a facet joint and transmit sensations & pain signals from that joint.  Facet is a term for the type of joints found in the spine.  Medial branches are the nerves that go to a facet, and therefore are also sometimes called \"facet joint nerves\" (FJNs).      In a medial branch blockade procedure, xray fluoroscopy is used to verify the locations of the outside of the joint lines which are being " targeted.  Under xray guidance, needles are placed to these areas.  Contrast dye is injected to confirm proper placement, with dye flowing over the joint area, and to ensure that the dye does not flow into unintended areas such as a vein.  When this is confirmed, local anesthetic is injected to block the medial branch at that joint level.      If MBBs are diagnostically successful in blocking pain, then the patient is most likely a great candidate for Radiofrequency of those facet joint nerves.  In the RF procedure, needles are placed to the joint lines in the same fashion, and after testing, the needle tips are heated to thermally treat the nerves, blocking the nerves by in essence damaging the nerves with the heat treatment.       Medically, a successful RF procedure should provide a patient with 50% pain relief or more for at least 6 months.  Clinical experience suggests that successful patients receive relief more in the range of 12 months on average.  We also discussed that a fortunate minority of patients receive therapeutic success from the MBB, and may not require RF ablation.  If a patient receives more than 8 weeks of relief from MBB, then occasional repeat MBB for therapeutic purposes is a very reasonable alternative therapy.  This course of therapy is consistent with our LCDs according to our CMS  in the area, and therefore other insurance providers should follow accordingly.  We will monitor our patients to screen for these therapeutic responders and will offer RF therapy only when necessary.        We discussed that MBB & RF are not without risks.  Guidelines regarding anticoagulant use & neuraxial procedures will be respected.  Patients that are ill or otherwise may be at risk for sepsis will not have their spines accessed by neuraxial injections of any type.  This patient will not be offered these therapies if there is an increased risk.   We discussed that there is a risk of  postprocedural pain and also a risk of worsening of clinical picture with these procedures as with any neuraxial procedure.    -------           MATTHEW REPORT    As part of the patient's treatment plan, I am prescribing controlled substances. The patient has been made aware of appropriate use of such medications, including potential risk of somnolence, limited ability to drive and/or work safely, and the potential for dependence or overdose. It has also bee made clear that these medications are for use by this patient only, without concomitant use of alcohol or other substances unless prescribed.     Patient has completed prescribing agreement detailing terms of continued prescribing of controlled substances, including monitoring MATTHEW reports, urine drug screening, and pill counts if necessary. The patient is aware that inappropriate use will results in cessation of prescribing such medications.    MATTHEW report has been reviewed and scanned into the patient's chart.    As the clinician, I personally reviewed the MATTHEW from 12-27-18 while the patient was in the office today.    History and physical exam exhibit continued safe and appropriate use of controlled substances.      EMR Dragon/Transcription disclaimer:   Much of this encounter note is an electronic transcription/translation of spoken language to printed text. The electronic translation of spoken language may permit erroneous, or at times, nonsensical words or phrases to be inadvertently transcribed; Although I have reviewed the note for such errors, some may still exist.         .

## 2019-01-30 ENCOUNTER — OFFICE VISIT (OUTPATIENT)
Dept: PAIN MEDICINE | Facility: CLINIC | Age: 69
End: 2019-01-30

## 2019-01-30 VITALS
DIASTOLIC BLOOD PRESSURE: 70 MMHG | RESPIRATION RATE: 18 BRPM | BODY MASS INDEX: 33.53 KG/M2 | SYSTOLIC BLOOD PRESSURE: 109 MMHG | OXYGEN SATURATION: 94 % | TEMPERATURE: 97.9 F | WEIGHT: 177.6 LBS | HEART RATE: 75 BPM | HEIGHT: 61 IN

## 2019-01-30 DIAGNOSIS — M47.812 CERVICAL FACET JOINT SYNDROME: ICD-10-CM

## 2019-01-30 DIAGNOSIS — G89.29 CHRONIC LOW BACK PAIN, UNSPECIFIED BACK PAIN LATERALITY, WITH SCIATICA PRESENCE UNSPECIFIED: ICD-10-CM

## 2019-01-30 DIAGNOSIS — G89.29 CHRONIC LEFT SHOULDER PAIN: ICD-10-CM

## 2019-01-30 DIAGNOSIS — M54.5 CHRONIC LOW BACK PAIN, UNSPECIFIED BACK PAIN LATERALITY, WITH SCIATICA PRESENCE UNSPECIFIED: ICD-10-CM

## 2019-01-30 DIAGNOSIS — G89.21 CHRONIC PAIN DUE TO TRAUMA: Primary | ICD-10-CM

## 2019-01-30 DIAGNOSIS — M25.512 CHRONIC LEFT SHOULDER PAIN: ICD-10-CM

## 2019-01-30 DIAGNOSIS — Z79.899 ENCOUNTER FOR LONG-TERM (CURRENT) USE OF HIGH-RISK MEDICATION: ICD-10-CM

## 2019-01-30 PROCEDURE — 99214 OFFICE O/P EST MOD 30 MIN: CPT | Performed by: NURSE PRACTITIONER

## 2019-01-30 RX ORDER — ROSUVASTATIN CALCIUM 5 MG/1
TABLET, COATED ORAL
COMMUNITY
Start: 2019-01-15 | End: 2021-01-18 | Stop reason: SDUPTHER

## 2019-01-30 RX ORDER — OXYCODONE HYDROCHLORIDE 30 MG/1
30 TABLET, FILM COATED, EXTENDED RELEASE ORAL EVERY 12 HOURS SCHEDULED
Qty: 60 TABLET | Refills: 0 | Status: SHIPPED | OUTPATIENT
Start: 2019-01-30 | End: 2019-02-28 | Stop reason: SDUPTHER

## 2019-01-30 RX ORDER — METHOCARBAMOL 500 MG/1
500 TABLET, FILM COATED ORAL 3 TIMES DAILY
Qty: 90 TABLET | Refills: 1 | Status: SHIPPED | OUTPATIENT
Start: 2019-01-30 | End: 2019-02-28 | Stop reason: DRUGHIGH

## 2019-01-30 NOTE — PROGRESS NOTES
"CHIEF COMPLAINT  Follow-up for shoulder pain. Ms. King states that her shoulder pain has improved since her last appt.    Subjective   Traci King is a 68 y.o. female  who presents to the office for follow-up.She has a history of chronic neck, back and shoulder pain. Reports her shoulder pain is improved since last office visit. At last office visit, she was ordered to have cervical MBB but this has not yet been scheduled.    Complains of pain in her neck and left shoulder and low back. Today her pain is 3/10VAS. Describes the pain as continuous aching and throbbing. \"My left shoulder is much much better.\" Does not need surgery. Under care of orthopedist. Continues with OxyContin 30 mg BID and Diclofenac 50 mg 2/day and Robaxin 500 mg 1/day PRN . Denies any side effects from the regimen.  The regimen helps decrease her pain by 50%. ADL's by self. Denies any bowel or bladder changes. \"I'm really feeling good right now with the medicine.\"     Failed PT, failed ultrasound therapy, failed compounded pain creme. Previously failed Gabapentin.     Neck Pain    This is a chronic problem. The current episode started more than 1 year ago. The problem occurs constantly. Progression since onset: unchanged since last office visit. The quality of the pain is described as burning. The pain is at a severity of 3/10. The pain is moderate. The symptoms are aggravated by position, bending, stress and twisting. The pain is worse during the day. Stiffness is present all day. Pertinent negatives include no chest pain, fever, headaches, numbness or weakness. She has tried oral narcotics and NSAIDs (cervical MBB--significant temporary relief; cervical RFL--50% ongoing relief) for the symptoms. The treatment provided moderate relief.   Shoulder Injury    The left shoulder is affected. There was no injury mechanism. The pain radiates to the left neck and right neck. The pain is at a severity of 3/10. Pain severity now: improved " "since last office visit. Pertinent negatives include no chest pain or numbness. The symptoms are aggravated by movement and palpation.      PEG Assessment   What number best describes your pain on average in the past week?3  What number best describes how, during the past week, pain has interfered with your enjoyment of life?3  What number best describes how, during the past week, pain has interfered with your general activity?  3    The following portions of the patient's history were reviewed and updated as appropriate: allergies, current medications, past family history, past medical history, past social history, past surgical history and problem list.    Review of Systems   Constitutional: Negative for fatigue and fever.   HENT: Negative for congestion.    Eyes: Negative for visual disturbance.   Respiratory: Positive for shortness of breath. Negative for cough and wheezing.    Cardiovascular: Negative.  Negative for chest pain.   Gastrointestinal: Negative for constipation and diarrhea.   Genitourinary: Negative for difficulty urinating.   Musculoskeletal: Positive for arthralgias (left shoulder) and neck pain.   Neurological: Negative for weakness, numbness and headaches.   Psychiatric/Behavioral: Positive for sleep disturbance. Negative for suicidal ideas. The patient is not nervous/anxious.        Vitals:    01/30/19 0900   BP: 109/70   Pulse: 75   Resp: 18   Temp: 97.9 °F (36.6 °C)   SpO2: 94%   Weight: 80.6 kg (177 lb 9.6 oz)   Height: 154.9 cm (61\")   PainSc:   3   PainLoc: Shoulder     Objective   Physical Exam   Constitutional: She is oriented to person, place, and time. Vital signs are normal. She appears well-developed and well-nourished. She is cooperative.   HENT:   Head: Normocephalic and atraumatic.   Nose: Nose normal.   Eyes: Conjunctivae and lids are normal.   Neck: Spinous process tenderness (MODERATE tenderness RIGHT C3-C5 facets; mild on LEFT as well) and muscular tenderness present. "   Cardiovascular: Normal rate.   Pulmonary/Chest: Effort normal.   Abdominal: Normal appearance.   Musculoskeletal:        Left shoulder: She exhibits decreased range of motion and tenderness. She exhibits no deformity.        Cervical back: She exhibits tenderness.        Lumbar back: She exhibits tenderness.   Neurological: She is alert and oriented to person, place, and time. Gait (slow) abnormal.   Reflex Scores:       Bicep reflexes are 1+ on the right side and 1+ on the left side.       Brachioradialis reflexes are 1+ on the right side and 1+ on the left side.       Patellar reflexes are 1+ on the right side and 1+ on the left side.  Skin: Skin is warm, dry and intact.   Psychiatric: She has a normal mood and affect. Her speech is normal and behavior is normal. Judgment and thought content normal. Cognition and memory are normal.   Nursing note and vitals reviewed.      Assessment/Plan   Traci was seen today for shoulder pain.    Diagnoses and all orders for this visit:    Chronic pain due to trauma    Cervical facet joint syndrome    Chronic low back pain, unspecified back pain laterality, with sciatica presence unspecified    Chronic left shoulder pain    Encounter for long-term (current) use of high-risk medication    Other orders  -     OxyCODONE HCl ER (oxyCONTIN) 30 MG tablet extended-release 12 hour; Take 1 tablet by mouth Every 12 (Twelve) Hours.  -     diclofenac (VOLTAREN) 50 MG EC tablet; Take 1 tablet by mouth 2 (Two) Times a Day As Needed (pain).  -     methocarbamol (ROBAXIN) 500 MG tablet; Take 1 tablet by mouth 3 (Three) Times a Day.      --- The urine drug screen confirmation from 11-7-18 has been reviewed and the result is APPROPRIATE based on patient history and MATTHEW report  --- Refill OxyContin, Robaxin and Diclofenac. Patient appears stable with current regimen. No adverse effects. Regarding continuation of opioids, there is no evidence of aberrant behavior or any red flags.  The patient  continues with appropriate response to opioid therapy. ADL's remain intact by self.   --- Proceed with cervical MBB when able to afford.   --- Follow-up 1 month or sooner if needed.     MATTHEW REPORT    As part of the patient's treatment plan, I am prescribing controlled substances. The patient has been made aware of appropriate use of such medications, including potential risk of somnolence, limited ability to drive and/or work safely, and the potential for dependence or overdose. It has also bee made clear that these medications are for use by this patient only, without concomitant use of alcohol or other substances unless prescribed.     Patient has completed prescribing agreement detailing terms of continued prescribing of controlled substances, including monitoring MATTHEW reports, urine drug screening, and pill counts if necessary. The patient is aware that inappropriate use will results in cessation of prescribing such medications.    MATTHEW report has been reviewed and scanned into the patient's chart.    As the clinician, I personally reviewed the MATTHEW from 1-29-19 while the patient was in the office today.    History and physical exam exhibit continued safe and appropriate use of controlled substances.      EMR Dragon/Transcription disclaimer:   Much of this encounter note is an electronic transcription/translation of spoken language to printed text. The electronic translation of spoken language may permit erroneous, or at times, nonsensical words or phrases to be inadvertently transcribed; Although I have reviewed the note for such errors, some may still exist.

## 2019-02-06 ENCOUNTER — PRIOR AUTHORIZATION (OUTPATIENT)
Dept: PAIN MEDICINE | Facility: CLINIC | Age: 69
End: 2019-02-06

## 2019-02-07 ENCOUNTER — CLINICAL SUPPORT (OUTPATIENT)
Dept: OTHER | Facility: HOSPITAL | Age: 69
End: 2019-02-07

## 2019-02-07 ENCOUNTER — HOSPITAL ENCOUNTER (OUTPATIENT)
Dept: PET IMAGING | Facility: HOSPITAL | Age: 69
Discharge: HOME | End: 2019-02-07
Admitting: CLINICAL NURSE SPECIALIST

## 2019-02-07 DIAGNOSIS — Z12.2 SCREENING FOR MALIGNANT NEOPLASM OF RESPIRATORY ORGAN: ICD-10-CM

## 2019-02-07 DIAGNOSIS — F17.210 SMOKING GREATER THAN 30 PACK YEARS: ICD-10-CM

## 2019-02-07 DIAGNOSIS — Z12.2 SCREENING FOR MALIGNANT NEOPLASM OF RESPIRATORY ORGAN: Primary | ICD-10-CM

## 2019-02-07 PROCEDURE — G0296 VISIT TO DETERM LDCT ELIG: HCPCS | Performed by: CLINICAL NURSE SPECIALIST

## 2019-02-07 PROCEDURE — G0297 LDCT FOR LUNG CA SCREEN: HCPCS

## 2019-02-08 VITALS — HEIGHT: 62 IN | WEIGHT: 181 LBS | BODY MASS INDEX: 33.31 KG/M2

## 2019-02-09 NOTE — PROGRESS NOTES
[facility] Low-Dose Lung Cancer CT Screening Visit    Traci King is a pleasant 68 y.o. female seen today at the request of Dr. Sherry Fournier in our Multidisciplinary Clinic, being seen for Lung Cancer Screening Counseling and a Shared Decision Making Visit prior to Low-Dose Lung Cancer Screening CT exam.    SMOKING HISTORY:   Social History     Tobacco Use   Smoking Status Current Every Day Smoker   • Packs/day: 0.50   • Types: Cigarettes   Smokeless Tobacco Never Used   Tobacco Comment    Began smoking at age 16.  Smoked 1 ppd for 48 years and .5 ppd for the past 4 years for a 50 pack year history.       Traci King is currently smoking a half pack per day with a 50 pack year history.  Reports no use of alternate forms of tobacco, electronic cigarettes, marijuana or other substances.  Based on the recommendation of the [place] Preventive Services Task Force, this patient is at high risk for lung cancer and a low-dose CT screening scan is recommended.     We discussed the connection between Radon and Lung Cancer and the availability of free test kits.  She does not have a basement in her home.  The patient reports no known exposure to hazardous substances known to increase risk for lung cancer development.  No second-hand smoke exposure as a child with no family members smoking in their home.    The patient has had no hemoptysis, unintentional weight loss or increasing shortness of breath. The patient is asymptomatic and has no signs or symptoms of lung cancer.   The patient reports no personal history of cancer.  Additionally, reports no family history of lung cancer.  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  She has been told that she has COPD.    Together we discussed the potential benefits and potential harms of being screened for lung cancer including the potential for follow-up diagnostic testing, risk  for over diagnosis, false positive rate and total radiation exposure using the [facility] standardized decision aid. We reviewed the patient's smoking history and counseled on the importance and health benefits of quitting smoking.    Smoking Cessation  DISCUSSION HELD TODAY:     We discussed that there are a number of resources and tobacco cessation interventions to assist with smoking cessation including the 1-800-Quit Now line, Health Department programs, [place] [facility] resources, and use of the [place] Department of Health and Human Services five keys for quitting and quit plan worksheet.  On a scale of zero to ten, the patient rates their motivation and readiness to quit at a 0 out of 10 today.  She states that she does not want to quit.    Recommendations for continued lung cancer screening:      We discussed the NCCN guidelines for lung cancer screening and the patient verbalized understanding that annual screening is recommended until fifteen years beyond smoking as long as they have no other disease or comorbidity that would prevent them from receiving cancer treatments such as surgery should a lung cancer be detected. The [facility] Lung Cancer Screening Shared Decision-Making Tool was utilized as an aid in discussing whether or not screening was right. The patient has decided to proceed with a Low Dose Lung Cancer Screening CT today. The patient is aware that the results of his screening will be shared with the referring provider or PCP as well.       The patient verbalizes understanding that results of this low dose lung CT exam will be called and that assistance will be provided in arranging any necessary follow-up.    After review of the NCCN guidelines and recommendations for ongoing screening, the patient verbalized understanding of recommendations for follow-up. We discussed the importance of adherence to continued annual screening until 15 years beyond smoking or until  other life-limiting comorbidities are present. We discussed the impact of comorbidities and ability and willing to undergo diagnosis and treatment.    The patient has been counseled on the health benefits of quitting tobacco, smoking cessation strategies and resources, as well as the importance of adherence to annual lung cancer low-dose CT screening.     Farhana Brown, MSN, APRN, ACNS-BC, AOCN, CHPN  Clinical Nurse Specialist  [facility]

## 2019-02-28 ENCOUNTER — OFFICE VISIT (OUTPATIENT)
Dept: PAIN MEDICINE | Facility: CLINIC | Age: 69
End: 2019-02-28

## 2019-02-28 VITALS
RESPIRATION RATE: 16 BRPM | BODY MASS INDEX: 32.76 KG/M2 | TEMPERATURE: 98 F | HEIGHT: 62 IN | WEIGHT: 178 LBS | OXYGEN SATURATION: 96 % | SYSTOLIC BLOOD PRESSURE: 116 MMHG | HEART RATE: 85 BPM | DIASTOLIC BLOOD PRESSURE: 69 MMHG

## 2019-02-28 DIAGNOSIS — M47.812 CERVICAL FACET JOINT SYNDROME: ICD-10-CM

## 2019-02-28 DIAGNOSIS — G89.21 CHRONIC PAIN DUE TO TRAUMA: Primary | ICD-10-CM

## 2019-02-28 DIAGNOSIS — G89.29 CHRONIC LEFT SHOULDER PAIN: ICD-10-CM

## 2019-02-28 DIAGNOSIS — M47.812 FACET ARTHRITIS OF CERVICAL REGION: ICD-10-CM

## 2019-02-28 DIAGNOSIS — M25.512 CHRONIC LEFT SHOULDER PAIN: ICD-10-CM

## 2019-02-28 DIAGNOSIS — Z79.899 ENCOUNTER FOR LONG-TERM (CURRENT) USE OF HIGH-RISK MEDICATION: ICD-10-CM

## 2019-02-28 PROCEDURE — 99214 OFFICE O/P EST MOD 30 MIN: CPT | Performed by: NURSE PRACTITIONER

## 2019-02-28 RX ORDER — OXYCODONE HYDROCHLORIDE 30 MG/1
30 TABLET, FILM COATED, EXTENDED RELEASE ORAL EVERY 12 HOURS SCHEDULED
Qty: 60 TABLET | Refills: 0 | Status: SHIPPED | OUTPATIENT
Start: 2019-02-28 | End: 2019-03-27 | Stop reason: SDUPTHER

## 2019-02-28 RX ORDER — METHOCARBAMOL 750 MG/1
750 TABLET, FILM COATED ORAL 3 TIMES DAILY PRN
Qty: 90 TABLET | Refills: 1 | Status: SHIPPED | OUTPATIENT
Start: 2019-02-28 | End: 2019-04-23

## 2019-02-28 NOTE — PROGRESS NOTES
CHIEF COMPLAINT  Pt now states her R shoulder is worse than L,but reportedly is unable to have an injection due to expense.    Subjective   Traci King is a 68 y.o. female  who presents to the office for follow-up.She has a history of chronic neck pain, as well as shoulder pain. Reports her shoulder pain is worse since last office visit.    Complains of pain in her neck and shoulders. Today her pain is 3/10VAS. Describes her pain as continuous aching. Pain increases with activity,movement; pain decreases with medication and rest. Continues with OxyContin 30 mg BID and Diclofenac 50 mg 2/day and Robaxin 500 mg 1/day PRN (is wondering about increasing this or change to tizanidine). Denies any side effects from the regimen.  The regimen helps decrease her pain by 40-50%. ADL's by self. Denies any bowel or bladder changes.      Failed PT, failed ultrasound therapy, failed compounded pain creme. Previously failed Gabapentin.   .  She completed a Cervical Facet Nerve (Medial Branch) Radiofrequency Lesioning LEFT C3-C5  on  1/26/18 performed by Dr. Gan for management of neck pain.   Neck Pain    This is a chronic problem. The current episode started more than 1 year ago. The problem occurs constantly. Progression since onset: unchanged since last office visit. The quality of the pain is described as burning. The pain is at a severity of 3/10. The pain is moderate. The symptoms are aggravated by position, bending, stress and twisting. The pain is worse during the day. Stiffness is present all day. Pertinent negatives include no chest pain, fever, headaches, numbness or weakness. She has tried oral narcotics and NSAIDs (cervical MBB--significant temporary relief; cervical RFL--50% ongoing relief) for the symptoms. The treatment provided moderate relief.   Shoulder Injury    The left shoulder is affected. There was no injury mechanism. The pain radiates to the left neck and right neck. The pain is at a severity of  "3/10. Pain severity now: worse since last office visit. Pertinent negatives include no chest pain or numbness. The symptoms are aggravated by movement and palpation.      PEG Assessment   What number best describes your pain on average in the past week?3  What number best describes how, during the past week, pain has interfered with your enjoyment of life?3  What number best describes how, during the past week, pain has interfered with your general activity?  3    The following portions of the patient's history were reviewed and updated as appropriate: allergies, current medications, past family history, past medical history, past social history, past surgical history and problem list.    Review of Systems   Constitutional: Positive for activity change. Negative for fatigue and fever.   HENT: Negative for congestion.    Eyes: Negative for visual disturbance.   Respiratory: Positive for shortness of breath (COPD). Negative for apnea, cough and wheezing.    Cardiovascular: Negative.  Negative for chest pain.   Gastrointestinal: Negative for constipation (laxative helps), diarrhea and nausea.   Genitourinary: Negative for difficulty urinating.   Musculoskeletal: Positive for arthralgias (left shoulder and R) and neck pain.   Neurological: Negative for dizziness, weakness, light-headedness, numbness and headaches.   Psychiatric/Behavioral: Positive for sleep disturbance. Negative for suicidal ideas. The patient is not nervous/anxious.        Vitals:    02/28/19 1103   BP: 116/69   Pulse: 85   Resp: 16   Temp: 98 °F (36.7 °C)   SpO2: 96%   Weight: 80.7 kg (178 lb)   Height: 157.5 cm (62.01\")   PainSc:   3   PainLoc: Shoulder  Comment: bilat. shoulder pain ranges from 3-4/10     Objective   Physical Exam   Constitutional: She is oriented to person, place, and time. Vital signs are normal. She appears well-developed and well-nourished. She is cooperative.   HENT:   Head: Normocephalic and atraumatic.   Nose: Nose normal. "   Eyes: Conjunctivae and lids are normal.   Neck: Spinous process tenderness (MODERATE tenderness RIGHT C3-C5 facets; mild on LEFT as well) and muscular tenderness present.   Cardiovascular: Normal rate.   Pulmonary/Chest: Effort normal.   Abdominal: Normal appearance.   Musculoskeletal:        Left shoulder: She exhibits decreased range of motion and tenderness. She exhibits no deformity.        Cervical back: She exhibits tenderness.        Lumbar back: She exhibits tenderness.   Neurological: She is alert and oriented to person, place, and time. Gait (slow) abnormal.   Skin: Skin is warm, dry and intact.   Psychiatric: She has a normal mood and affect. Her speech is normal and behavior is normal. Judgment and thought content normal. Cognition and memory are normal.   Nursing note and vitals reviewed.    Assessment/Plan   Traci was seen today for shoulder pain.    Diagnoses and all orders for this visit:    Chronic pain due to trauma    Cervical facet joint syndrome    Chronic left shoulder pain    Facet arthritis of cervical region (CMS/HCC)    Encounter for long-term (current) use of high-risk medication    Other orders  -     diclofenac (VOLTAREN) 50 MG EC tablet; Take 1 tablet by mouth 2 (Two) Times a Day As Needed (pain).  -     methocarbamol (ROBAXIN) 750 MG tablet; Take 1 tablet by mouth 3 (Three) Times a Day As Needed for Muscle Spasms.      --- The urine drug screen confirmation from 11-7-18 has been reviewed and the result is APPROPRIATE based on patient history and MATTHEW report  --- Increase Robaxin 750 mg TID PRN. Discussed medication with the patient.  Included in this discussion was the potential for side effects and adverse events.  Patient verbalized understanding and wished to proceed.  Prescription will be sent to pharmacy.  --- Refill OxyContin. Patient appears stable with current regimen. No adverse effects. Regarding continuation of opioids, there is no evidence of aberrant behavior or any  red flags.  The patient continues with appropriate response to opioid therapy. ADL's remain intact by self.   --- Consider repeat cervical injections in future PRN.  --- Follow-up 1 month or sooner if needed.       MATTHEW REPORT    As part of the patient's treatment plan, I am prescribing controlled substances. The patient has been made aware of appropriate use of such medications, including potential risk of somnolence, limited ability to drive and/or work safely, and the potential for dependence or overdose. It has also bee made clear that these medications are for use by this patient only, without concomitant use of alcohol or other substances unless prescribed.     Patient has completed prescribing agreement detailing terms of continued prescribing of controlled substances, including monitoring MATTHEW reports, urine drug screening, and pill counts if necessary. The patient is aware that inappropriate use will results in cessation of prescribing such medications.    MATTHEW report has been reviewed and scanned into the patient's chart.    As the clinician, I personally reviewed the MATTHEW from 2-27-19 while the patient was in the office today.    History and physical exam exhibit continued safe and appropriate use of controlled substances.      EMR Dragon/Transcription disclaimer:   Much of this encounter note is an electronic transcription/translation of spoken language to printed text. The electronic translation of spoken language may permit erroneous, or at times, nonsensical words or phrases to be inadvertently transcribed; Although I have reviewed the note for such errors, some may still exist.

## 2019-03-27 ENCOUNTER — OFFICE VISIT (OUTPATIENT)
Dept: PAIN MEDICINE | Facility: CLINIC | Age: 69
End: 2019-03-27

## 2019-03-27 VITALS
TEMPERATURE: 98.2 F | RESPIRATION RATE: 16 BRPM | WEIGHT: 177.4 LBS | HEART RATE: 85 BPM | SYSTOLIC BLOOD PRESSURE: 121 MMHG | HEIGHT: 62 IN | BODY MASS INDEX: 32.65 KG/M2 | OXYGEN SATURATION: 95 % | DIASTOLIC BLOOD PRESSURE: 76 MMHG

## 2019-03-27 DIAGNOSIS — M47.812 CERVICAL FACET JOINT SYNDROME: ICD-10-CM

## 2019-03-27 DIAGNOSIS — M47.812 FACET ARTHRITIS OF CERVICAL REGION: ICD-10-CM

## 2019-03-27 DIAGNOSIS — M25.512 CHRONIC LEFT SHOULDER PAIN: ICD-10-CM

## 2019-03-27 DIAGNOSIS — Z79.899 ENCOUNTER FOR LONG-TERM (CURRENT) USE OF HIGH-RISK MEDICATION: ICD-10-CM

## 2019-03-27 DIAGNOSIS — G89.29 CHRONIC LEFT SHOULDER PAIN: ICD-10-CM

## 2019-03-27 DIAGNOSIS — M50.30 DEGENERATIVE DISC DISEASE, CERVICAL: ICD-10-CM

## 2019-03-27 DIAGNOSIS — G89.21 CHRONIC PAIN DUE TO TRAUMA: Primary | ICD-10-CM

## 2019-03-27 PROCEDURE — 99214 OFFICE O/P EST MOD 30 MIN: CPT | Performed by: NURSE PRACTITIONER

## 2019-03-27 RX ORDER — OXYCODONE HYDROCHLORIDE 30 MG/1
30 TABLET, FILM COATED, EXTENDED RELEASE ORAL EVERY 12 HOURS SCHEDULED
Qty: 60 TABLET | Refills: 0 | Status: SHIPPED | OUTPATIENT
Start: 2019-03-27 | End: 2019-04-23 | Stop reason: SDUPTHER

## 2019-03-27 NOTE — PROGRESS NOTES
CHIEF COMPLAINT  Pt is here to f/u on her R shoulder neck pain.Pt sts the pain is the same.    Subjective   Traci King is a 68 y.o. female  who presents to the office for follow-up.She has a history of chronic neck and shoulder pain. Reports her pain is worse since last office visit. Wants  To repeat RFL.    Complains of pain in her neck and shoulder. Today her pain is 3/10VAS.  Describes the pain as continuous aching and throbbing. Pain increases with movement, activity; pain decreases with medication, rest and injections. Wants to repeat cervical MBB/RFA.  Continues with OxyContin 30 mg BID and Diclofenac 50 mg 2/day and Robaxin 750 mg 2/day PRN . Denies any side effects from the regimen.  The regimen helps decrease her pain by 75%. ADL's by self. Denies any bowel or bladder changes.     Failed PT, failed ultrasound therapy, failed compounded pain creme. Previously failed Gabapentin.   .  She completed a Cervical Facet Nerve (Medial Branch) Radiofrequency Lesioning LEFT C3-C5  on  1/26/18 performed by Dr. Gan for management of neck pain. She had significant relief with this.   Presents with daughter.   Neck Pain    This is a chronic problem. The current episode started more than 1 year ago. The problem occurs constantly. Progression since onset: unchanged since last office visit. The quality of the pain is described as burning. The pain is at a severity of 3/10. The pain is moderate. The symptoms are aggravated by position, bending, stress and twisting. The pain is worse during the day. Stiffness is present all day. Pertinent negatives include no chest pain, fever, headaches, numbness or weakness. She has tried oral narcotics and NSAIDs (cervical MBB--significant temporary relief; cervical RFL--50% ongoing relief) for the symptoms. The treatment provided moderate relief.   Shoulder Injury    The left shoulder is affected. There was no injury mechanism. The pain radiates to the left neck and right neck.  "The pain is at a severity of 3/10. Pain severity now: unchanged since last office visit. Pertinent negatives include no chest pain or numbness. The symptoms are aggravated by movement and palpation.      PEG Assessment   What number best describes your pain on average in the past week?3  What number best describes how, during the past week, pain has interfered with your enjoyment of life?3  What number best describes how, during the past week, pain has interfered with your general activity?  3    The following portions of the patient's history were reviewed and updated as appropriate: allergies, current medications, past family history, past medical history, past social history, past surgical history and problem list.    Review of Systems   Constitutional: Negative for fatigue and fever.   HENT: Negative for congestion.    Respiratory: Negative for shortness of breath.    Cardiovascular: Negative for chest pain.   Gastrointestinal: Negative for constipation.   Genitourinary: Negative for difficulty urinating.   Musculoskeletal: Positive for neck pain. Negative for gait problem.   Neurological: Negative for weakness, light-headedness, numbness and headaches.   Psychiatric/Behavioral: Negative for suicidal ideas.       Vitals:    03/27/19 1048   BP: 121/76   Pulse: 85   Resp: 16   Temp: 98.2 °F (36.8 °C)   SpO2: 95%   Weight: 80.5 kg (177 lb 6.4 oz)   Height: 157.5 cm (62.01\")   PainSc:   3   PainLoc: Shoulder     Objective   Physical Exam   Constitutional: She is oriented to person, place, and time. Vital signs are normal. She appears well-developed and well-nourished. She is cooperative.   HENT:   Head: Normocephalic and atraumatic.   Nose: Nose normal.   Eyes: Conjunctivae and lids are normal.   Neck: Spinous process tenderness (MODERATE tenderness bilateral C3-C5 facets) and muscular tenderness present.   Cardiovascular: Normal rate.   Pulmonary/Chest: Effort normal.   Abdominal: Normal appearance. "   Musculoskeletal:        Left shoulder: She exhibits decreased range of motion and tenderness. She exhibits no deformity.        Cervical back: She exhibits tenderness.        Lumbar back: She exhibits tenderness.   Neurological: She is alert and oriented to person, place, and time. Gait (slow) abnormal.   Reflex Scores:       Bicep reflexes are 1+ on the right side and 1+ on the left side.       Brachioradialis reflexes are 1+ on the right side and 1+ on the left side.  Skin: Skin is warm, dry and intact.   Psychiatric: She has a normal mood and affect. Her speech is normal and behavior is normal. Judgment and thought content normal. Cognition and memory are normal.   Nursing note and vitals reviewed.      Assessment/Plan   Traci was seen today for shoulder pain and neck pain.    Diagnoses and all orders for this visit:    Chronic pain due to trauma    Cervical facet joint syndrome    Degenerative disc disease, cervical    Facet arthritis of cervical region (CMS/HCC)  -     Case Request    Chronic left shoulder pain    Encounter for long-term (current) use of high-risk medication    Other orders  -     oxyCODONE HCl ER (oxyCONTIN) 30 MG tablet extended-release 12 hour; Take 1 tablet by mouth Every 12 (Twelve) Hours.  -     diclofenac (VOLTAREN) 50 MG EC tablet; Take 1 tablet by mouth 2 (Two) Times a Day As Needed (pain).      --- The urine drug screen confirmation from 11-7-18 has been reviewed and the result is APPROPRIATE based on patient history and MATTHEW report  --- Refill OxyContin. Patient appears stable with current regimen. No adverse effects. Regarding continuation of opioids, there is no evidence of aberrant behavior or any red flags.  The patient continues with appropriate response to opioid therapy. ADL's remain intact by self.   --- Repeat bilateral C3-C5 MBB. No blood thinners. Reviewed the procedure at length with the patient.  Included in the review was expectations, complications, risk and  "benefits.The procedure was described in detail and the risks, benefits and alternatives were discussed with the patient (including but not limited to: bleeding, infection, nerve damage, worsening of pain, inability to perform injection, paralysis, seizures, and death) who agreed to proceed.  Discussed the potential for sedation if warranted/wanted.  The procedure will plan to be performed at [facility] with fluoroscopic guidance(unless ultrasound is indicated). Questions were answered and in a way the patient could understand.  Patient verbalized understanding and wishes to proceed.  This intervention will be ordered.  --- Follow-up 1 month or sooner if needed    -------  Education about Medial Branch Blockade and RF Therapy:    This medial branch blockade (MBB) suggested is intended for diagnostic purposes, with the intent of offering the patient Radiofrequency thermal rhizotomy (RF) if the MBB is diagnostically effective.  The diagnostic blockade is necessary to determine the likelihood that RF therapy could be efficacious in providing long term relief to the patient.    Medial branches are sensory nerve branches that connect to a facet joint and transmit sensations & pain signals from that joint.  Facet is a term for the type of joints found in the spine.  Medial branches are the nerves that go to a facet, and therefore are also sometimes called \"facet joint nerves\" (FJNs).      In a medial branch blockade procedure, xray fluoroscopy is used to verify the locations of the outside of the joint lines which are being targeted.  Under xray guidance, needles are placed to these areas.  Contrast dye is injected to confirm proper placement, with dye flowing over the joint area, and to ensure that the dye does not flow into unintended areas such as a vein.  When this is confirmed, local anesthetic is injected to block the medial branch at that joint level.      If MBBs are diagnostically successful " in blocking pain, then the patient is most likely a great candidate for Radiofrequency of those facet joint nerves.  In the RF procedure, needles are placed to the joint lines in the same fashion, and after testing, the needle tips are heated to thermally treat the nerves, blocking the nerves by in essence damaging the nerves with the heat treatment.       Medically, a successful RF procedure should provide a patient with 50% pain relief or more for at least 6 months.  Clinical experience suggests that successful patients receive relief more in the range of 12 months on average.  We also discussed that a fortunate minority of patients receive therapeutic success from the MBB, and may not require RF ablation.  If a patient receives more than 8 weeks of relief from MBB, then occasional repeat MBB for therapeutic purposes is a very reasonable alternative therapy.  This course of therapy is consistent with our LCDs according to our CMS  in the area, and therefore other insurance providers should follow accordingly.  We will monitor our patients to screen for these therapeutic responders and will offer RF therapy only when necessary.        We discussed that MBB & RF are not without risks.  Guidelines regarding anticoagulant use & neuraxial procedures will be respected.  Patients that are ill or otherwise may be at risk for sepsis will not have their spines accessed by neuraxial injections of any type.  This patient will not be offered these therapies if there is an increased risk.   We discussed that there is a risk of postprocedural pain and also a risk of worsening of clinical picture with these procedures as with any neuraxial procedure.    -------           MATTHEW REPORT    As part of the patient's treatment plan, I am prescribing controlled substances. The patient has been made aware of appropriate use of such medications, including potential risk of somnolence, limited ability to drive and/or work  safely, and the potential for dependence or overdose. It has also bee made clear that these medications are for use by this patient only, without concomitant use of alcohol or other substances unless prescribed.     Patient has completed prescribing agreement detailing terms of continued prescribing of controlled substances, including monitoring MATTHEW reports, urine drug screening, and pill counts if necessary. The patient is aware that inappropriate use will results in cessation of prescribing such medications.    MATTHEW report has been reviewed and scanned into the patient's chart.    As the clinician, I personally reviewed the MATTHEW from 3-26-19 while the patient was in the office today.    History and physical exam exhibit continued safe and appropriate use of controlled substances.      EMR Dragon/Transcription disclaimer:   Much of this encounter note is an electronic transcription/translation of spoken language to printed text. The electronic translation of spoken language may permit erroneous, or at times, nonsensical words or phrases to be inadvertently transcribed; Although I have reviewed the note for such errors, some may still exist.

## 2019-04-23 ENCOUNTER — OFFICE VISIT (OUTPATIENT)
Dept: PAIN MEDICINE | Facility: CLINIC | Age: 69
End: 2019-04-23

## 2019-04-23 ENCOUNTER — RESULTS ENCOUNTER (OUTPATIENT)
Dept: PAIN MEDICINE | Facility: CLINIC | Age: 69
End: 2019-04-23

## 2019-04-23 VITALS
HEART RATE: 99 BPM | SYSTOLIC BLOOD PRESSURE: 108 MMHG | HEIGHT: 62 IN | BODY MASS INDEX: 32.42 KG/M2 | DIASTOLIC BLOOD PRESSURE: 68 MMHG | RESPIRATION RATE: 18 BRPM | WEIGHT: 176.2 LBS | OXYGEN SATURATION: 95 % | TEMPERATURE: 98.5 F

## 2019-04-23 DIAGNOSIS — M47.812 FACET ARTHRITIS OF CERVICAL REGION: ICD-10-CM

## 2019-04-23 DIAGNOSIS — M25.512 CHRONIC LEFT SHOULDER PAIN: ICD-10-CM

## 2019-04-23 DIAGNOSIS — Z79.899 ENCOUNTER FOR LONG-TERM (CURRENT) USE OF HIGH-RISK MEDICATION: ICD-10-CM

## 2019-04-23 DIAGNOSIS — G89.21 CHRONIC PAIN DUE TO TRAUMA: ICD-10-CM

## 2019-04-23 DIAGNOSIS — M47.812 CERVICAL FACET JOINT SYNDROME: ICD-10-CM

## 2019-04-23 DIAGNOSIS — G89.29 CHRONIC LEFT SHOULDER PAIN: ICD-10-CM

## 2019-04-23 DIAGNOSIS — M50.30 DEGENERATIVE DISC DISEASE, CERVICAL: ICD-10-CM

## 2019-04-23 DIAGNOSIS — G89.21 CHRONIC PAIN DUE TO TRAUMA: Primary | ICD-10-CM

## 2019-04-23 PROCEDURE — 99214 OFFICE O/P EST MOD 30 MIN: CPT | Performed by: NURSE PRACTITIONER

## 2019-04-23 PROCEDURE — 80305 DRUG TEST PRSMV DIR OPT OBS: CPT | Performed by: NURSE PRACTITIONER

## 2019-04-23 RX ORDER — OXYCODONE HYDROCHLORIDE 30 MG/1
30 TABLET, FILM COATED, EXTENDED RELEASE ORAL EVERY 12 HOURS SCHEDULED
Qty: 60 TABLET | Refills: 0 | Status: SHIPPED | OUTPATIENT
Start: 2019-04-23 | End: 2019-05-22 | Stop reason: SDUPTHER

## 2019-04-23 RX ORDER — OMEPRAZOLE 40 MG/1
CAPSULE, DELAYED RELEASE ORAL
Refills: 0 | COMMUNITY
Start: 2019-04-08 | End: 2020-12-14

## 2019-04-23 RX ORDER — METHOCARBAMOL 500 MG/1
TABLET, FILM COATED ORAL
Refills: 0 | COMMUNITY
Start: 2019-04-04 | End: 2019-05-22 | Stop reason: SDUPTHER

## 2019-04-23 RX ORDER — ARIPIPRAZOLE 10 MG/1
TABLET ORAL
COMMUNITY
Start: 2019-03-11 | End: 2022-06-14 | Stop reason: SDUPTHER

## 2019-04-23 NOTE — PROGRESS NOTES
CHIEF COMPLAINT  Follow-up for neck and shoulder pain. Ms. King states that her pain is a little worse since her last appt.    Subjective   Traci King is a 68 y.o. female  who presents to the office for follow-up.She has a history of chronic neck and shoulder pain. Reports her pain is slightly worse since last office visit.    Complains of pain in her neck and shoulder. Today her pain is 3/10VAS. Describes the pain as continuous aching and throbbing.  Continues with OxyContin 30 mg BID and Diclofenac 50 mg 2/day and Robaxin 750 mg 2/day. Denies any side effects from the regimen.  The regimen helps decrease her pain by 50-75%. ADL's by self.  She states her  states she is doing better with OxyContin in place of Percocet.    Failed PT, failed ultrasound therapy, failed compounded pain creme. Previously failed Gabapentin.   She completed a Cervical Facet Nerve (Medial Branch) Radiofrequency Lesioning LEFT C3-C5  on  1/26/18 performed by Dr. Gan for management of neck pain. She had significant relief with this.     Neck Pain    This is a chronic problem. The current episode started more than 1 year ago. The problem occurs constantly. Progression since onset: worse since last office visit. The quality of the pain is described as burning. The pain is at a severity of 3/10. The pain is moderate. The symptoms are aggravated by position, bending, stress and twisting. The pain is worse during the day. Stiffness is present all day. Pertinent negatives include no chest pain, fever, headaches, numbness or weakness. She has tried oral narcotics and NSAIDs (cervical MBB--significant temporary relief; cervical RFL--50% ongoing relief) for the symptoms. The treatment provided moderate relief.   Shoulder Injury    The left shoulder is affected. There was no injury mechanism. The pain radiates to the left neck and right neck. The pain is at a severity of 3/10. Pain severity now: worse since last office visit.  "Pertinent negatives include no chest pain or numbness. The symptoms are aggravated by movement and palpation.      PEG Assessment   What number best describes your pain on average in the past week?3  What number best describes how, during the past week, pain has interfered with your enjoyment of life?3  What number best describes how, during the past week, pain has interfered with your general activity?  3    The following portions of the patient's history were reviewed and updated as appropriate: allergies, current medications, past family history, past medical history, past social history, past surgical history and problem list.    Review of Systems   Constitutional: Positive for fatigue. Negative for fever.   HENT: Negative for congestion.    Eyes: Negative for visual disturbance.   Respiratory: Positive for shortness of breath. Negative for cough and wheezing.    Cardiovascular: Negative.  Negative for chest pain.   Gastrointestinal: Negative for constipation and diarrhea.   Genitourinary: Negative for difficulty urinating.   Musculoskeletal: Positive for arthralgias (bilateral shoulders) and neck pain.   Neurological: Negative for weakness, numbness and headaches.   Psychiatric/Behavioral: Positive for sleep disturbance. Negative for suicidal ideas. The patient is not nervous/anxious.        Vitals:    04/23/19 1413   BP: 108/68   Pulse: 99   Resp: 18   Temp: 98.5 °F (36.9 °C)   SpO2: 95%   Weight: 79.9 kg (176 lb 3.2 oz)   Height: 157.5 cm (62.01\")   PainSc:   3   PainLoc: Neck     Objective   Physical Exam   Constitutional: She is oriented to person, place, and time. Vital signs are normal. She appears well-developed and well-nourished. She is cooperative.   HENT:   Head: Normocephalic and atraumatic.   Nose: Nose normal.   Eyes: Conjunctivae and lids are normal.   Neck: Spinous process tenderness (MODERATE tenderness bilateral C3-C5 facets) and muscular tenderness present.   Cardiovascular: Normal rate. "   Pulmonary/Chest: Effort normal.   Abdominal: Normal appearance.   Musculoskeletal:        Left shoulder: She exhibits decreased range of motion and tenderness. She exhibits no deformity.        Cervical back: She exhibits tenderness.        Lumbar back: She exhibits tenderness.   Neurological: She is alert and oriented to person, place, and time. Gait (slow) abnormal.   Reflex Scores:       Bicep reflexes are 1+ on the right side and 1+ on the left side.       Brachioradialis reflexes are 1+ on the right side and 1+ on the left side.  Skin: Skin is warm, dry and intact.   Psychiatric: She has a normal mood and affect. Her speech is normal and behavior is normal. Judgment and thought content normal. Cognition and memory are normal.   Nursing note and vitals reviewed.      Assessment/Plan   Traci was seen today for neck pain and shoulder pain.    Diagnoses and all orders for this visit:    Chronic pain due to trauma    Cervical facet joint syndrome    Degenerative disc disease, cervical    Facet arthritis of cervical region (CMS/HCC)    Chronic left shoulder pain    Encounter for long-term (current) use of high-risk medication      --- Routine UDS in office today as part of monitoring requirements for controlled substances.  The specimen was viewed and the immunoassay result reviewed and is +OXY, +BZD(APPROPRIATE).  This specimen will be sent to [facility] laboratory for confirmation.     --- Proceed with cervical MBB as scheduled 5-2-19 with Dr oden.   --- Refill OxyContin. Patient appears stable with current regimen. No adverse effects. Regarding continuation of opioids, there is no evidence of aberrant behavior or any red flags.  The patient continues with appropriate response to opioid therapy. ADL's remain intact by self.   --- Follow-up 1 month or sooner if needed.     MATTHEW REPORT    As part of the patient's treatment plan, I am prescribing controlled substances. The patient has been made aware of  appropriate use of such medications, including potential risk of somnolence, limited ability to drive and/or work safely, and the potential for dependence or overdose. It has also bee made clear that these medications are for use by this patient only, without concomitant use of alcohol or other substances unless prescribed.     Patient has completed prescribing agreement detailing terms of continued prescribing of controlled substances, including monitoring MATTHEW reports, urine drug screening, and pill counts if necessary. The patient is aware that inappropriate use will results in cessation of prescribing such medications.    MATTHEW report has been reviewed and scanned into the patient's chart.    As the clinician, I personally reviewed the MATTHEW from 4-22-19 while the patient was in the office today.    History and physical exam exhibit continued safe and appropriate use of controlled substances.      EMR Dragon/Transcription disclaimer:   Much of this encounter note is an electronic transcription/translation of spoken language to printed text. The electronic translation of spoken language may permit erroneous, or at times, nonsensical words or phrases to be inadvertently transcribed; Although I have reviewed the note for such errors, some may still exist.

## 2019-05-09 ENCOUNTER — APPOINTMENT (OUTPATIENT)
Dept: WOMENS IMAGING | Facility: HOSPITAL | Age: 69
End: 2019-05-09

## 2019-05-09 PROCEDURE — 77063 BREAST TOMOSYNTHESIS BI: CPT | Performed by: RADIOLOGY

## 2019-05-09 PROCEDURE — 77067 SCR MAMMO BI INCL CAD: CPT | Performed by: RADIOLOGY

## 2019-05-10 ENCOUNTER — TELEPHONE (OUTPATIENT)
Dept: PAIN MEDICINE | Facility: CLINIC | Age: 69
End: 2019-05-10

## 2019-05-10 NOTE — TELEPHONE ENCOUNTER
[facility] pharmacy called today to ask for an ICD 10 code for an OxyContin and temazepam prescription written by Dr. Gan for Ms. King. I informed the pharmacist that Dr. Gan doesn't prescribe the temazepam. I checked the last [facility] on file and seen that it had  as the prescriber for her 4/9/19 refill on the temazepam. I called [facility]'s pharmacy where the Rx was filled and spoke with the pharmacist. They states that they have sent a request to [facility] requesting they take his name off of the temazepam Rx.

## 2019-05-22 ENCOUNTER — OFFICE VISIT (OUTPATIENT)
Dept: PAIN MEDICINE | Facility: CLINIC | Age: 69
End: 2019-05-22

## 2019-05-22 VITALS
BODY MASS INDEX: 32.57 KG/M2 | DIASTOLIC BLOOD PRESSURE: 71 MMHG | OXYGEN SATURATION: 99 % | TEMPERATURE: 97.7 F | HEIGHT: 62 IN | SYSTOLIC BLOOD PRESSURE: 128 MMHG | WEIGHT: 177 LBS | HEART RATE: 88 BPM | RESPIRATION RATE: 16 BRPM

## 2019-05-22 DIAGNOSIS — M50.30 DEGENERATIVE DISC DISEASE, CERVICAL: ICD-10-CM

## 2019-05-22 DIAGNOSIS — Z79.899 ENCOUNTER FOR LONG-TERM (CURRENT) USE OF HIGH-RISK MEDICATION: ICD-10-CM

## 2019-05-22 DIAGNOSIS — M47.812 FACET ARTHRITIS OF CERVICAL REGION: ICD-10-CM

## 2019-05-22 DIAGNOSIS — G89.21 CHRONIC PAIN DUE TO TRAUMA: Primary | ICD-10-CM

## 2019-05-22 DIAGNOSIS — M25.512 CHRONIC LEFT SHOULDER PAIN: ICD-10-CM

## 2019-05-22 DIAGNOSIS — G89.29 CHRONIC LEFT SHOULDER PAIN: ICD-10-CM

## 2019-05-22 DIAGNOSIS — M47.812 CERVICAL FACET JOINT SYNDROME: ICD-10-CM

## 2019-05-22 PROCEDURE — 99214 OFFICE O/P EST MOD 30 MIN: CPT | Performed by: NURSE PRACTITIONER

## 2019-05-22 RX ORDER — OXYCODONE HYDROCHLORIDE 30 MG/1
30 TABLET, FILM COATED, EXTENDED RELEASE ORAL EVERY 12 HOURS SCHEDULED
Qty: 60 TABLET | Refills: 0 | Status: SHIPPED | OUTPATIENT
Start: 2019-05-22 | End: 2019-05-24 | Stop reason: SDUPTHER

## 2019-05-22 RX ORDER — METHOCARBAMOL 500 MG/1
500 TABLET, FILM COATED ORAL 3 TIMES DAILY
Qty: 90 TABLET | Refills: 1 | Status: SHIPPED | OUTPATIENT
Start: 2019-05-22 | End: 2019-07-17 | Stop reason: SDUPTHER

## 2019-05-22 RX ORDER — OXYCODONE HYDROCHLORIDE 30 MG/1
30 TABLET, FILM COATED, EXTENDED RELEASE ORAL EVERY 12 HOURS SCHEDULED
Qty: 60 TABLET | Refills: 0 | Status: SHIPPED | OUTPATIENT
Start: 2019-05-22 | End: 2019-05-22 | Stop reason: SDUPTHER

## 2019-05-22 NOTE — PROGRESS NOTES
"CHIEF COMPLAINT  F/U neck and shoulder pain- patient states that her pain has worsened. She lifted a 24 pack of coke and believes she pulled something which is causing her left shoulder to have increased pain.     Subjective   Traci King is a 69 y.o. female  who presents to the office for follow-up.She has a history of chronic neck and shoulder pain. Reports this is worse. Associates this with lifting a pack of coke.    Complains of pain in her neck and shoulders(left worse than right). Today her pain is 2/10VAS. Describes the pain as continuous aching and throbbing. Continues with OxyContin 30 mg BID and Diclofenac 50 mg 2/day and Robaxin 750 mg 2-3/day. Denies any side effects from the regimen. She did ask about increasing Diclofenac. The regimen helps decrease her pain by 40-50%. She notes that it does not always last between doses. SHe takes it at 0230 and 1430. Discussed changing to closer to 0800 and 2000.  ADL's by self.  She states her  states she is doing better with OxyContin in place of Percocet.    She is asking for a stronger a diclofenac. Is having increased pain in her hands. Was having nausea. Had GI consult. Was given Prilosec and Carafate with improvement. Was able to discontinue both. However, now that she has discontinued, is having nausea again. Was referred to GI for scope and evaluation. Has an appt 6-4-19. Reviewed Diclofenac may be causing some of this discomfort. \"What am I supposed to do then?\"    In 1 week, having teeth pulled and getting dentures on top.    Failed PT, failed ultrasound therapy, failed compounded pain creme. Previously failed Gabapentin.   She completed a Cervical Facet Nerve (Medial Branch) Radiofrequency Lesioning LEFT C3-C5  on  1/26/18 performed by Dr. Gan for management of neck pain. She had significant relief with this.   Neck Pain    This is a chronic problem. The current episode started more than 1 year ago. The problem occurs constantly. " Progression since onset: worse since last office visit. The quality of the pain is described as burning. The pain is at a severity of 2/10. The pain is moderate. The symptoms are aggravated by position, bending, stress and twisting. The pain is worse during the day. Stiffness is present all day. Associated symptoms include weakness (left arm). Pertinent negatives include no chest pain, fever, headaches or numbness. She has tried oral narcotics and NSAIDs (cervical MBB--significant temporary relief; cervical RFL--50% ongoing relief) for the symptoms. The treatment provided moderate relief.   Shoulder Injury    The left shoulder is affected. There was no injury mechanism. The pain radiates to the left neck and right neck. The pain is at a severity of 2/10. Pain severity now: worse since last office visit. Pertinent negatives include no chest pain or numbness. The symptoms are aggravated by movement and palpation.          PEG Assessment   What number best describes your pain on average in the past week?3  What number best describes how, during the past week, pain has interfered with your enjoyment of life?3  What number best describes how, during the past week, pain has interfered with your general activity?  3    The following portions of the patient's history were reviewed and updated as appropriate: allergies, current medications, past family history, past medical history, past social history, past surgical history and problem list.    Review of Systems   Constitutional: Negative for activity change, chills, fatigue and fever.   HENT: Negative for congestion.    Eyes: Negative for visual disturbance.   Respiratory: Negative for cough, chest tightness, shortness of breath and wheezing.    Cardiovascular: Negative.  Negative for chest pain.   Gastrointestinal: Positive for constipation. Negative for abdominal pain and diarrhea.   Genitourinary: Negative for difficulty urinating, dyspareunia and dysuria.  "  Musculoskeletal: Positive for arthralgias (bilateral shoulders) and neck pain.   Neurological: Positive for weakness (left arm). Negative for dizziness, light-headedness, numbness and headaches.   Psychiatric/Behavioral: Positive for sleep disturbance. Negative for agitation and suicidal ideas. The patient is not nervous/anxious.        Vitals:    05/22/19 0836   BP: 128/71   Pulse: 88   Resp: 16   Temp: 97.7 °F (36.5 °C)   SpO2: 99%   Weight: 80.3 kg (177 lb)   Height: 157.5 cm (62\")   PainSc:   2   PainLoc: Neck     Objective   Physical Exam   Constitutional: She is oriented to person, place, and time. Vital signs are normal. She appears well-developed and well-nourished. She is cooperative.   HENT:   Head: Normocephalic and atraumatic.   Nose: Nose normal.   Eyes: Conjunctivae and lids are normal.   Neck: Spinous process tenderness (MODERATE tenderness bilateral C3-C5 facets) and muscular tenderness present.   Cardiovascular: Normal rate.   Pulmonary/Chest: Effort normal.   Abdominal: Normal appearance.   Musculoskeletal:        Left shoulder: She exhibits decreased range of motion and tenderness. She exhibits no deformity.        Cervical back: She exhibits tenderness.        Lumbar back: She exhibits tenderness.   Widespread OA changes of hands with no acute synovitis   Neurological: She is alert and oriented to person, place, and time. Gait (slow) abnormal.   Reflex Scores:       Bicep reflexes are 1+ on the right side and 1+ on the left side.       Brachioradialis reflexes are 1+ on the right side and 1+ on the left side.  Skin: Skin is warm, dry and intact.   Psychiatric: She has a normal mood and affect. Her speech is normal and behavior is normal. Judgment and thought content normal. Cognition and memory are normal.   Nursing note and vitals reviewed.          Assessment/Plan   Traci was seen today for neck pain.    Diagnoses and all orders for this visit:    Chronic pain due to trauma    Cervical facet " joint syndrome    Degenerative disc disease, cervical    Facet arthritis of cervical region (CMS/HCC)    Chronic left shoulder pain    Encounter for long-term (current) use of high-risk medication    Other orders  -     Discontinue: oxyCODONE HCl ER (oxyCONTIN) 30 MG tablet extended-release 12 hour; Take 1 tablet by mouth Every 12 (Twelve) Hours. DNF until 5-25-19  -     oxyCODONE HCl ER (oxyCONTIN) 30 MG tablet extended-release 12 hour; Take 1 tablet by mouth Every 12 (Twelve) Hours. DNF until 5-25-19  -     methocarbamol (ROBAXIN) 500 MG tablet; Take 1 tablet by mouth 3 (Three) Times a Day.      --- The urine drug screen confirmation from 4-24-19 has been reviewed and the result is APPROPRIATE based on patient history and MATTHEW report  --- Refill OxyContin. Patient appears stable with current regimen. No adverse effects. Regarding continuation of opioids, there is no evidence of aberrant behavior or any red flags.  The patient continues with appropriate response to opioid therapy. ADL's remain intact by self.   --- Refill Robaxin.  --- Repeat cervical MBB when able to afford.   --- Discussed not increasing Diclofenac, and possibly discontinuing, but will await GI. Discussed discontinuing now. Patient declined.  --- Follow-up 1 month or sooner if needed     MATTHEW REPORT    As part of the patient's treatment plan, I am prescribing controlled substances. The patient has been made aware of appropriate use of such medications, including potential risk of somnolence, limited ability to drive and/or work safely, and the potential for dependence or overdose. It has also bee made clear that these medications are for use by this patient only, without concomitant use of alcohol or other substances unless prescribed.     Patient has completed prescribing agreement detailing terms of continued prescribing of controlled substances, including monitoring MATTHEW reports, urine drug screening, and pill counts if necessary. The  patient is aware that inappropriate use will results in cessation of prescribing such medications.    MATTHEW report has been reviewed and scanned into the patient's chart.    As the clinician, I personally reviewed the MATTHEW from 5-20-19 while the patient was in the office today.    History and physical exam exhibit continued safe and appropriate use of controlled substances.      EMR Dragon/Transcription disclaimer:   Much of this encounter note is an electronic transcription/translation of spoken language to printed text. The electronic translation of spoken language may permit erroneous, or at times, nonsensical words or phrases to be inadvertently transcribed; Although I have reviewed the note for such errors, some may still exist.

## 2019-05-23 ENCOUNTER — TELEPHONE (OUTPATIENT)
Dept: PAIN MEDICINE | Facility: CLINIC | Age: 69
End: 2019-05-23

## 2019-05-23 NOTE — TELEPHONE ENCOUNTER
Call pharmacy and they can refill tomorrow. BUT the medication HAS TO LAST 31 DAYS. Thanks. VERONICA

## 2019-05-23 NOTE — TELEPHONE ENCOUNTER
Just following the law. She did not tell me she was leaving town and needed it 2 days early. We generally fill the medication as a 30 day supply. If she were to fill early by 2 days every month, she would have a total of 24 extra days worth of medication per year. If she had told me she was going out of town, we could have figured something out. When is she leaving? And what was the DNF date?

## 2019-05-23 NOTE — TELEPHONE ENCOUNTER
Patient called and stated that normally she is able to  her prescription 2 days early as per her insurance but there is a DNF date on this refill and they wont allow her to do so. She states she is going out of town for the weekend and if she isnt able to get her medication tomorrow she will [place]se one day on her cabin she has already rented. Please advise.

## 2019-05-24 RX ORDER — OXYCODONE HYDROCHLORIDE 30 MG/1
30 TABLET, FILM COATED, EXTENDED RELEASE ORAL EVERY 12 HOURS SCHEDULED
Qty: 6 TABLET | Refills: 0 | Status: SHIPPED | OUTPATIENT
Start: 2019-05-24 | End: 2019-05-24 | Stop reason: SDUPTHER

## 2019-05-24 RX ORDER — OXYCODONE HYDROCHLORIDE 30 MG/1
30 TABLET, FILM COATED, EXTENDED RELEASE ORAL EVERY 12 HOURS SCHEDULED
Qty: 6 TABLET | Refills: 0 | Status: SHIPPED | OUTPATIENT
Start: 2019-05-24 | End: 2019-06-19 | Stop reason: SDUPTHER

## 2019-05-24 NOTE — TELEPHONE ENCOUNTER
Medication Refill Request     Date of phone call: 19     Medication being requested: oxycontin 30mg si tab po q 12 hours   Qty: 60     Date of last visit: 19     Date of last refill: 19     MATTHEW up to date?: yes     Next Follow up?: 19     Any new pertinent information? (i.e, new medication allergies, new use of medications, change in patient's health or condition, non-compliance or inconsistency with prescribing agreement?): please send 3 day temporary supply thank you.

## 2019-06-19 ENCOUNTER — OFFICE VISIT (OUTPATIENT)
Dept: PAIN MEDICINE | Facility: CLINIC | Age: 69
End: 2019-06-19

## 2019-06-19 VITALS
WEIGHT: 179 LBS | HEIGHT: 62 IN | RESPIRATION RATE: 16 BRPM | HEART RATE: 62 BPM | TEMPERATURE: 96 F | BODY MASS INDEX: 32.94 KG/M2 | SYSTOLIC BLOOD PRESSURE: 121 MMHG | OXYGEN SATURATION: 95 % | DIASTOLIC BLOOD PRESSURE: 65 MMHG

## 2019-06-19 DIAGNOSIS — M48.02 FORAMINAL STENOSIS OF CERVICAL REGION: ICD-10-CM

## 2019-06-19 DIAGNOSIS — M50.30 DEGENERATIVE DISC DISEASE, CERVICAL: ICD-10-CM

## 2019-06-19 DIAGNOSIS — M47.812 CERVICAL FACET JOINT SYNDROME: ICD-10-CM

## 2019-06-19 DIAGNOSIS — M51.36 DDD (DEGENERATIVE DISC DISEASE), LUMBAR: ICD-10-CM

## 2019-06-19 DIAGNOSIS — Z79.899 ENCOUNTER FOR LONG-TERM (CURRENT) USE OF HIGH-RISK MEDICATION: ICD-10-CM

## 2019-06-19 DIAGNOSIS — M79.602 PAIN OF LEFT UPPER EXTREMITY: ICD-10-CM

## 2019-06-19 DIAGNOSIS — G89.21 CHRONIC PAIN DUE TO TRAUMA: Primary | ICD-10-CM

## 2019-06-19 PROCEDURE — 99214 OFFICE O/P EST MOD 30 MIN: CPT | Performed by: NURSE PRACTITIONER

## 2019-06-19 RX ORDER — OXYCODONE HYDROCHLORIDE 30 MG/1
30 TABLET, FILM COATED, EXTENDED RELEASE ORAL EVERY 12 HOURS SCHEDULED
Qty: 60 TABLET | Refills: 0 | Status: SHIPPED | OUTPATIENT
Start: 2019-06-19 | End: 2019-07-17 | Stop reason: SDUPTHER

## 2019-06-19 NOTE — PROGRESS NOTES
"CHIEF COMPLAINT  Pt is here to f/u on back pain. Pt sts the pain is the same.    Subjective   Traci King is a 69 y.o. female  who presents to the office for follow-up.She has a history of chronic neck, back and shoulder pain. Reports her pain is unchanged since last office visit.    Complains of pain in her neck, low back and left shoulder. Today her pain is 2/10VAS. Describes the pain as nearly continuous aching and throbbing. Pain increases with walking, standing, activity and use of left arm; pain decreases with medication, rest and procedures. Continues with OxyContin 30 mg BID(0300 and 1500) and Diclofenac 50 mg 2/day and Robaxin 750 mg 2-3/day. Denies any side effects from the regimen including somnolence. She reports no constipation but reports she takes a nightly laxative and has since age 16(dulcolax).   The regimen helps decrease her pain by \"at least 50%.\"   ADL's by self.  Denies any bowel or bladder changes since last office visit.     Reports she needs left shoulder reverse replacement. Patient does not want to do this yet.   Failed PT, failed ultrasound therapy, failed compounded pain creme. Previously failed Gabapentin.   She completed a Cervical Facet Nerve (Medial Branch) Radiofrequency Lesioning LEFT C3-C5  on  1/26/18 performed by Dr. Gan for management of neck pain. She had significant relief with this. She wants to repeat MBB but cannot afford co-pay at this time.   Neck Pain    This is a chronic problem. The current episode started more than 1 year ago. The problem occurs constantly. Progression since onset: unchanged since last office visit. The quality of the pain is described as burning. The pain is at a severity of 2/10. The pain is moderate. The symptoms are aggravated by position, bending, stress and twisting. The pain is worse during the day. Stiffness is present all day. Associated symptoms include weakness (left arm). Pertinent negatives include no chest pain, fever, " "headaches or numbness. She has tried oral narcotics and NSAIDs (cervical MBB--significant temporary relief; cervical RFL--50% ongoing relief) for the symptoms. The treatment provided moderate relief.   Shoulder Injury    The left shoulder is affected. There was no injury mechanism. The pain radiates to the left neck and right neck. The pain is at a severity of 2/10. Pain severity now: unchanged since last office visit. Pertinent negatives include no chest pain or numbness. The symptoms are aggravated by movement and palpation.        PEG Assessment   What number best describes your pain on average in the past week?2  What number best describes how, during the past week, pain has interfered with your enjoyment of life?2  What number best describes how, during the past week, pain has interfered with your general activity?  3    The following portions of the patient's history were reviewed and updated as appropriate: allergies, current medications, past family history, past medical history, past social history, past surgical history and problem list.    Review of Systems   Constitutional: Negative for fatigue and fever.   HENT: Negative for congestion.    Respiratory: Negative for shortness of breath.    Cardiovascular: Negative for chest pain and palpitations.   Gastrointestinal: Negative for abdominal pain.   Genitourinary: Negative for difficulty urinating.   Musculoskeletal: Negative for neck pain.   Neurological: Positive for weakness (left arm). Negative for dizziness, numbness and headaches.   Psychiatric/Behavioral: Negative for sleep disturbance.       Vitals:    06/19/19 1054   BP: 121/65   Pulse: 62   Resp: 16   Temp: 96 °F (35.6 °C)   SpO2: 95%   Weight: 81.2 kg (179 lb)   Height: 157.5 cm (62.01\")   PainSc:   2   PainLoc: Neck     Objective   Physical Exam   Constitutional: She is oriented to person, place, and time. Vital signs are normal. She appears well-developed and well-nourished. She is cooperative. "   HENT:   Head: Normocephalic and atraumatic.   Nose: Nose normal.   Eyes: Conjunctivae and lids are normal.   Neck: Spinous process tenderness (MODERATE tenderness bilateral C3-C5 facets) and muscular tenderness present.   Cardiovascular: Normal rate.   Pulmonary/Chest: Effort normal.   Abdominal: Normal appearance.   Musculoskeletal:        Left shoulder: She exhibits decreased range of motion and tenderness. She exhibits no deformity.        Cervical back: She exhibits tenderness.        Lumbar back: She exhibits tenderness.   Widespread OA changes of hands with no acute synovitis   Neurological: She is alert and oriented to person, place, and time. Gait (slow) abnormal.   Skin: Skin is warm, dry and intact.   Psychiatric: She has a normal mood and affect. Her speech is normal and behavior is normal. Judgment and thought content normal. Cognition and memory are normal.   Nursing note and vitals reviewed.    Assessment/Plan   Traci was seen today for neck pain.    Diagnoses and all orders for this visit:    Chronic pain due to trauma    Cervical facet joint syndrome    Degenerative disc disease, cervical    DDD (degenerative disc disease), lumbar    Foraminal stenosis of cervical region    Pain of left upper extremity    Encounter for long-term (current) use of high-risk medication    Other orders  -     oxyCODONE HCl ER (oxyCONTIN) 30 MG tablet extended-release 12 hour; Take 1 tablet by mouth Every 12 (Twelve) Hours.      --- The urine drug screen confirmation from 4-24-19 has been reviewed and the result is APPROPRIATE based on patient history and MATTHEW report  --- Refill OxyContin DNF until 6-25-19. Patient appears stable with current regimen. No adverse effects. Regarding continuation of opioids, there is no evidence of aberrant behavior or any red flags.  The patient continues with appropriate response to opioid therapy. ADL's remain intact by self.   --- Proceed with cervical MBB when able to afford.  ---  Follow-up 1 month or sooner if needed.      MATTHEW REPORT    As part of the patient's treatment plan, I am prescribing controlled substances. The patient has been made aware of appropriate use of such medications, including potential risk of somnolence, limited ability to drive and/or work safely, and the potential for dependence or overdose. It has also bee made clear that these medications are for use by this patient only, without concomitant use of alcohol or other substances unless prescribed.     Patient has completed prescribing agreement detailing terms of continued prescribing of controlled substances, including monitoring MATTHEW reports, urine drug screening, and pill counts if necessary. The patient is aware that inappropriate use will results in cessation of prescribing such medications.    MATTHEW report has been reviewed and scanned into the patient's chart.    As the clinician, I personally reviewed the MATTHEW from 6-18-19 while the patient was in the office today.    History and physical exam exhibit continued safe and appropriate use of controlled substances.      EMR Dragon/Transcription disclaimer:   Much of this encounter note is an electronic transcription/translation of spoken language to printed text. The electronic translation of spoken language may permit erroneous, or at times, nonsensical words or phrases to be inadvertently transcribed; Although I have reviewed the note for such errors, some may still exist.

## 2019-07-15 ENCOUNTER — TELEPHONE (OUTPATIENT)
Dept: PAIN MEDICINE | Facility: CLINIC | Age: 69
End: 2019-07-15

## 2019-07-17 ENCOUNTER — OFFICE VISIT (OUTPATIENT)
Dept: PAIN MEDICINE | Facility: CLINIC | Age: 69
End: 2019-07-17

## 2019-07-17 VITALS
TEMPERATURE: 98.1 F | BODY MASS INDEX: 33.45 KG/M2 | SYSTOLIC BLOOD PRESSURE: 119 MMHG | WEIGHT: 181.8 LBS | HEIGHT: 62 IN | HEART RATE: 93 BPM | OXYGEN SATURATION: 97 % | RESPIRATION RATE: 16 BRPM | DIASTOLIC BLOOD PRESSURE: 65 MMHG

## 2019-07-17 DIAGNOSIS — Z79.899 ENCOUNTER FOR LONG-TERM (CURRENT) USE OF HIGH-RISK MEDICATION: ICD-10-CM

## 2019-07-17 DIAGNOSIS — G89.21 CHRONIC PAIN DUE TO TRAUMA: Primary | ICD-10-CM

## 2019-07-17 DIAGNOSIS — M47.812 CERVICAL FACET JOINT SYNDROME: ICD-10-CM

## 2019-07-17 DIAGNOSIS — G89.29 CHRONIC LEFT SHOULDER PAIN: ICD-10-CM

## 2019-07-17 DIAGNOSIS — M25.512 CHRONIC LEFT SHOULDER PAIN: ICD-10-CM

## 2019-07-17 DIAGNOSIS — M51.36 DDD (DEGENERATIVE DISC DISEASE), LUMBAR: ICD-10-CM

## 2019-07-17 DIAGNOSIS — M47.812 FACET ARTHRITIS OF CERVICAL REGION: ICD-10-CM

## 2019-07-17 PROCEDURE — 99214 OFFICE O/P EST MOD 30 MIN: CPT | Performed by: NURSE PRACTITIONER

## 2019-07-17 RX ORDER — PHENTERMINE HYDROCHLORIDE 37.5 MG/1
37.5 CAPSULE ORAL EVERY MORNING
COMMUNITY
End: 2020-02-14

## 2019-07-17 RX ORDER — MELOXICAM 15 MG/1
TABLET ORAL
Refills: 0 | COMMUNITY
Start: 2019-07-15 | End: 2019-07-17 | Stop reason: ALTCHOICE

## 2019-07-17 RX ORDER — OXYCODONE HYDROCHLORIDE 30 MG/1
30 TABLET, FILM COATED, EXTENDED RELEASE ORAL EVERY 12 HOURS SCHEDULED
Qty: 60 TABLET | Refills: 0 | Status: SHIPPED | OUTPATIENT
Start: 2019-07-17 | End: 2019-08-21 | Stop reason: SDUPTHER

## 2019-07-17 RX ORDER — METHOCARBAMOL 500 MG/1
500 TABLET, FILM COATED ORAL 3 TIMES DAILY
Qty: 90 TABLET | Refills: 2 | Status: SHIPPED | OUTPATIENT
Start: 2019-07-17 | End: 2019-10-17 | Stop reason: SDUPTHER

## 2019-07-17 NOTE — PROGRESS NOTES
"CHIEF COMPLAINT  F/U back pain- patient states that her back pain has worsened since her last visit.     Subjective   Traci King is a 69 y.o. female  who presents to the office for follow-up.She has a history of chronic neck, shoulder and back pain. Reports her back pain is worse since last office visit. However, she states she recently saw her PCP and they believe she is \"developing a kidney stone.\" This started approximately 5 days ago. IF worsens, to return to PCP.    Complains of pain in her neck, back and left shoulder. Today her pain is 3/10VAS. Describes the pain as continuous aching and throbbing. Continues with OxyContin 30 mg BID(0300 and 1500) and Diclofenac 50 mg 2/day and Robaxin 750 mg 2-3/day. Reports that the pain medication only seems to last for approximately 9 hours. Denies any side effects from the regimen including somnolence. She reports no constipation but reports she takes a nightly laxative and has since age 16(dulcolax).   The regimen helps decrease her pain by 75%.  ADL's by self.  Denies any bowel or bladder changes since last office visit.     Reports she needs left shoulder reverse replacement. Patient does not want to do this yet.   Failed PT, failed ultrasound therapy, failed compounded pain creme. Previously failed Gabapentin.   She completed a Cervical Facet Nerve (Medial Branch) Radiofrequency Lesioning LEFT C3-C5  on  1/26/18 performed by Dr. Gan for management of neck pain. She had significant relief with this. She wants to repeat MBB but cannot afford co-pay at this time.     Neck Pain    This is a chronic problem. The current episode started more than 1 year ago. The problem occurs constantly. Progression since onset: unchanged since last office visit. The quality of the pain is described as burning. The pain is at a severity of 3/10. The pain is moderate. The symptoms are aggravated by position, bending, stress and twisting. The pain is worse during the day. " Stiffness is present all day. Pertinent negatives include no chest pain, fever, headaches or numbness. Weakness: left arm. She has tried oral narcotics and NSAIDs (cervical MBB--significant temporary relief; cervical RFL--50% ongoing relief) for the symptoms. The treatment provided moderate relief.   Shoulder Injury    The left shoulder is affected. There was no injury mechanism. The pain radiates to the left neck and right neck. The pain is at a severity of 2/10. Pain severity now: unchanged since last office visit. Pertinent negatives include no chest pain or numbness. The symptoms are aggravated by movement and palpation.   Back Pain   This is a chronic (acute on chronic) problem. The current episode started more than 1 year ago. The problem occurs constantly. Pertinent negatives include no chest pain, fever, headaches or numbness. Weakness: left arm.      PEG Assessment   What number best describes your pain on average in the past week?3  What number best describes how, during the past week, pain has interfered with your enjoyment of life?3  What number best describes how, during the past week, pain has interfered with your general activity?  3    The following portions of the patient's history were reviewed and updated as appropriate: allergies, current medications, past family history, past medical history, past social history, past surgical history and problem list.    Review of Systems   Constitutional: Positive for activity change and fatigue. Negative for fever.   HENT: Negative for congestion.    Respiratory: Positive for shortness of breath.    Cardiovascular: Negative for chest pain and palpitations.   Gastrointestinal: Negative for constipation and diarrhea.   Genitourinary: Positive for flank pain. Negative for difficulty urinating.   Musculoskeletal: Positive for back pain. Negative for neck pain.   Neurological: Negative for dizziness, numbness and headaches. Weakness: left arm.  "  Psychiatric/Behavioral: Positive for sleep disturbance. Negative for agitation. The patient is not nervous/anxious.        Vitals:    07/17/19 0758   BP: 119/65   Pulse: 93   Resp: 16   Temp: 98.1 °F (36.7 °C)   SpO2: 97%   Weight: 82.5 kg (181 lb 12.8 oz)   Height: 157.5 cm (62\")   PainSc:   3   PainLoc: Back     Objective   Physical Exam   Constitutional: She is oriented to person, place, and time. Vital signs are normal. She appears well-developed and well-nourished. She is cooperative.   Looks uncomfortable   HENT:   Head: Normocephalic and atraumatic.   Nose: Nose normal.   Eyes: Conjunctivae and lids are normal.   Neck: Spinous process tenderness (MODERATE tenderness bilateral C3-C5 facets) and muscular tenderness present.   Cardiovascular: Normal rate.   Pulmonary/Chest: Effort normal.   Abdominal: Normal appearance.   Musculoskeletal:        Left shoulder: She exhibits decreased range of motion and tenderness. She exhibits no deformity.        Cervical back: She exhibits tenderness.        Lumbar back: She exhibits tenderness.   Widespread OA changes of hands with no acute synovitis  Guarding of right flank   Neurological: She is alert and oriented to person, place, and time. Gait (slow) abnormal.   Skin: Skin is warm, dry and intact.   Psychiatric: She has a normal mood and affect. Her speech is normal and behavior is normal. Judgment and thought content normal. Cognition and memory are normal.   Nursing note and vitals reviewed.      Assessment/Plan   Traci was seen today for back pain.    Diagnoses and all orders for this visit:    Chronic pain due to trauma    Cervical facet joint syndrome    Chronic left shoulder pain    Facet arthritis of cervical region (CMS/[facility])    DDD (degenerative disc disease), lumbar    Encounter for long-term (current) use of high-risk medication    Other orders  -     diclofenac (VOLTAREN) 50 MG EC tablet; Take 1 tablet by mouth 2 (Two) Times a Day As Needed (pain). for " pain  -     oxyCODONE HCl ER (oxyCONTIN) 30 MG tablet extended-release 12 hour; Take 1 tablet by mouth Every 12 (Twelve) Hours.  -     methocarbamol (ROBAXIN) 500 MG tablet; Take 1 tablet by mouth 3 (Three) Times a Day.      --- The urine drug screen confirmation from 4-24-19 has been reviewed and the result is APPROPRIATE based on patient history and MATTHEW report  --- Refill OxyContin, Diclofenac and Methocarbamol. Patient appears stable with current regimen. No adverse effects. Regarding continuation of opioids, there is no evidence of aberrant behavior or any red flags.  The patient continues with appropriate response to opioid therapy. ADL's remain intact by self.   --- Continue with other specialists as planned.  --- Continue with PCP plan of care for kidney stone.  --- Follow-up 1 month or sooner if needed.     MATTHEW REPORT    As part of the patient's treatment plan, I am prescribing controlled substances. The patient has been made aware of appropriate use of such medications, including potential risk of somnolence, limited ability to drive and/or work safely, and the potential for dependence or overdose. It has also bee made clear that these medications are for use by this patient only, without concomitant use of alcohol or other substances unless prescribed.     Patient has completed prescribing agreement detailing terms of continued prescribing of controlled substances, including monitoring MATTHEW reports, urine drug screening, and pill counts if necessary. The patient is aware that inappropriate use will results in cessation of prescribing such medications.    MATTHEW report has been reviewed and scanned into the patient's chart.    As the clinician, I personally reviewed the MATTHEW from 7-15-19 while the patient was in the office today.    History and physical exam exhibit continued safe and appropriate use of controlled substances.      EMR Dragon/Transcription disclaimer:   Much of this encounter note  is an electronic transcription/translation of spoken language to printed text. The electronic translation of spoken language may permit erroneous, or at times, nonsensical words or phrases to be inadvertently transcribed; Although I have reviewed the note for such errors, some may still exist.

## 2019-08-21 ENCOUNTER — OFFICE VISIT (OUTPATIENT)
Dept: PAIN MEDICINE | Facility: CLINIC | Age: 69
End: 2019-08-21

## 2019-08-21 VITALS
WEIGHT: 181 LBS | TEMPERATURE: 98.2 F | SYSTOLIC BLOOD PRESSURE: 111 MMHG | OXYGEN SATURATION: 96 % | HEIGHT: 62 IN | HEART RATE: 82 BPM | DIASTOLIC BLOOD PRESSURE: 65 MMHG | BODY MASS INDEX: 33.31 KG/M2 | RESPIRATION RATE: 20 BRPM

## 2019-08-21 DIAGNOSIS — Z79.899 ENCOUNTER FOR LONG-TERM (CURRENT) USE OF HIGH-RISK MEDICATION: ICD-10-CM

## 2019-08-21 DIAGNOSIS — M47.812 CERVICAL FACET JOINT SYNDROME: ICD-10-CM

## 2019-08-21 DIAGNOSIS — G89.21 CHRONIC PAIN DUE TO TRAUMA: Primary | ICD-10-CM

## 2019-08-21 DIAGNOSIS — M47.812 FACET ARTHRITIS OF CERVICAL REGION: ICD-10-CM

## 2019-08-21 DIAGNOSIS — M51.36 DDD (DEGENERATIVE DISC DISEASE), LUMBAR: ICD-10-CM

## 2019-08-21 DIAGNOSIS — M50.30 DEGENERATIVE DISC DISEASE, CERVICAL: ICD-10-CM

## 2019-08-21 DIAGNOSIS — M79.602 PAIN OF LEFT UPPER EXTREMITY: ICD-10-CM

## 2019-08-21 PROCEDURE — 99214 OFFICE O/P EST MOD 30 MIN: CPT | Performed by: NURSE PRACTITIONER

## 2019-08-21 RX ORDER — OXYCODONE HYDROCHLORIDE 30 MG/1
30 TABLET, FILM COATED, EXTENDED RELEASE ORAL EVERY 12 HOURS SCHEDULED
Qty: 60 TABLET | Refills: 0 | Status: SHIPPED | OUTPATIENT
Start: 2019-08-21 | End: 2019-09-19 | Stop reason: SDUPTHER

## 2019-08-21 RX ORDER — PHENTERMINE HYDROCHLORIDE 37.5 MG/1
TABLET ORAL DAILY
Refills: 2 | COMMUNITY
Start: 2019-08-13 | End: 2020-02-14

## 2019-08-21 NOTE — TELEPHONE ENCOUNTER
Medication Refill Request    Date of phone call: 19    Medication being requested: oxycontin 30mg  si tab po q 12 hours   Qty: 60    Date of last visit: 19    Date of last refill: 19    MATTHEW up to date?: yes    Next Follow up?: 19    Any new pertinent information? (i.e, new medication allergies, new use of medications, change in patient's health or condition, non-compliance or inconsistency with prescribing agreement?): pt keeps calling our office needs her 1600 dose, would like meds sent to pharmacy today. Please advise. Thank you.

## 2019-08-21 NOTE — PROGRESS NOTES
CHIEF COMPLAINT  F/u back pain. Pt sts back pain has improved since last ov.     Subjective   Traci King is a 69 y.o. female  who presents to the office for follow-up.She has a history of chronic back and neck pain. Reports her back pain is improved since last office visit. She was worried she was having a kidney stone at last office visit. Her neck pain is unchanged.    Complains of pain in her neck and low back. Today her pain is 2/10VAS. Describes the pain as nearly continuous aching, throbbing with intermittent sharp pain. Pain increases with waking, standing, activity; pain decreases with medication, rest and procedures. Continues with OxyContin 30 mg BID(0400 and 1600) and Diclofenac 50 mg 2/day and Robaxin 750 mg 2-3/day. Denies any side effects from the regimen including somnolence. She reports no constipation but reports she takes a nightly laxative and has since age 16(dulcolax).   The regimen helps decrease her pain by 50-75%.  ADL's by self.  Denies any bowel or bladder changes since last office visit.     Reports she needs left shoulder reverse replacement. Patient does not want to do this yet.   Failed PT, failed ultrasound therapy, failed compounded pain creme. Previously failed Gabapentin.   She completed a Cervical Facet Nerve (Medial Branch) Radiofrequency Lesioning LEFT C3-C5  on  1/26/18 performed by Dr. Gan for management of neck pain. She had significant relief with this. She wants to repeat MBB but cannot afford co-pay at this time.   Neck Pain    This is a chronic problem. The current episode started more than 1 year ago. The problem occurs constantly. The problem has been waxing and waning. The quality of the pain is described as burning. The pain is at a severity of 2/10. The pain is moderate. The symptoms are aggravated by position, bending, stress and twisting. The pain is worse during the day. Stiffness is present all day. Pertinent negatives include no chest pain, fever,  headaches, numbness or weakness (left arm). She has tried oral narcotics and NSAIDs (cervical MBB--significant temporary relief; cervical RFL--50% ongoing relief) for the symptoms. The treatment provided moderate relief.   Shoulder Injury    The left shoulder is affected. There was no injury mechanism. The pain radiates to the left neck and right neck. The pain is at a severity of 2/10. Pain severity now: unchanged since last office visit. Pertinent negatives include no chest pain or numbness. The symptoms are aggravated by movement and palpation.   Back Pain   This is a chronic problem. The current episode started more than 1 year ago. The problem occurs constantly. The problem has been waxing and waning since onset. Pertinent negatives include no chest pain, fever, headaches, numbness or weakness (left arm).      PEG Assessment   What number best describes your pain on average in the past week?2  What number best describes how, during the past week, pain has interfered with your enjoyment of life?2  What number best describes how, during the past week, pain has interfered with your general activity?  2    The following portions of the patient's history were reviewed and updated as appropriate: allergies, current medications, past family history, past medical history, past social history, past surgical history and problem list.    Review of Systems   Constitutional: Negative for activity change, fatigue and fever.   HENT: Negative for congestion.    Respiratory: Positive for shortness of breath (occ, copd). Negative for cough.    Cardiovascular: Negative for chest pain and palpitations.   Gastrointestinal: Negative for constipation and diarrhea.   Genitourinary: Negative for difficulty urinating and flank pain.   Musculoskeletal: Positive for arthralgias (left shoulder), back pain and neck pain. Negative for neck stiffness.   Neurological: Negative for dizziness, weakness (left arm), numbness and headaches.  "  Psychiatric/Behavioral: Positive for sleep disturbance. Negative for agitation and suicidal ideas. The patient is not nervous/anxious.        Vitals:    08/21/19 1033   BP: 111/65   Pulse: 82   Resp: 20   Temp: 98.2 °F (36.8 °C)   SpO2: 96%   Weight: 82.1 kg (181 lb)   Height: 157.5 cm (62\")   PainSc:   2   PainLoc: Back     Objective   Physical Exam   Constitutional: She is oriented to person, place, and time. Vital signs are normal. She appears well-developed and well-nourished. She is cooperative.   HENT:   Head: Normocephalic and atraumatic.   Nose: Nose normal.   Eyes: Conjunctivae and lids are normal.   Neck: Spinous process tenderness (MODERATE tenderness bilateral C3-C5 facets) and muscular tenderness present.   Cardiovascular: Normal rate.   Pulmonary/Chest: Effort normal.   Abdominal: Normal appearance.   Musculoskeletal:        Left shoulder: She exhibits decreased range of motion and tenderness. She exhibits no deformity.        Cervical back: She exhibits tenderness.        Lumbar back: She exhibits tenderness.   Widespread OA changes of hands with no acute synovitis  Guarding of right flank   Neurological: She is alert and oriented to person, place, and time. Gait (slow) abnormal.   Skin: Skin is warm, dry and intact.   Psychiatric: She has a normal mood and affect. Her speech is normal and behavior is normal. Judgment and thought content normal. Cognition and memory are normal.   Nursing note and vitals reviewed.      Assessment/Plan   Traci was seen today for back pain.    Diagnoses and all orders for this visit:    Chronic pain due to trauma    Cervical facet joint syndrome    Degenerative disc disease, cervical    Facet arthritis of cervical region (CMS/[facility])    DDD (degenerative disc disease), lumbar    Pain of left upper extremity    Encounter for long-term (current) use of high-risk medication      --- The urine drug screen confirmation from 4-24-19 has been reviewed and the result is " APPROPRIATE based on patient history and MATTHEW report  --- Refill OxyContin. Patient appears stable with current regimen. No adverse effects. Regarding continuation of opioids, there is no evidence of aberrant behavior or any red flags.  The patient continues with appropriate response to opioid therapy. ADL's remain intact by self.   --- Proceed with interventions when able to afford.  --- Follow-up 1 month or sooner if needed.       MATTHEW REPORT    As part of the patient's treatment plan, I am prescribing controlled substances. The patient has been made aware of appropriate use of such medications, including potential risk of somnolence, limited ability to drive and/or work safely, and the potential for dependence or overdose. It has also bee made clear that these medications are for use by this patient only, without concomitant use of alcohol or other substances unless prescribed.     Patient has completed prescribing agreement detailing terms of continued prescribing of controlled substances, including monitoring MATTHEW reports, urine drug screening, and pill counts if necessary. The patient is aware that inappropriate use will results in cessation of prescribing such medications.    MATTHEW report has been reviewed and scanned into the patient's chart.    As the clinician, I personally reviewed the MATTHEW from 8-20-19 while the patient was in the office today.    History and physical exam exhibit continued safe and appropriate use of controlled substances.      EMR Dragon/Transcription disclaimer:   Much of this encounter note is an electronic transcription/translation of spoken language to printed text. The electronic translation of spoken language may permit erroneous, or at times, nonsensical words or phrases to be inadvertently transcribed; Although I have reviewed the note for such errors, some may still exist.

## 2019-09-19 ENCOUNTER — OFFICE VISIT (OUTPATIENT)
Dept: PAIN MEDICINE | Facility: CLINIC | Age: 69
End: 2019-09-19

## 2019-09-19 VITALS
HEART RATE: 90 BPM | TEMPERATURE: 98.2 F | SYSTOLIC BLOOD PRESSURE: 106 MMHG | BODY MASS INDEX: 31.58 KG/M2 | WEIGHT: 171.6 LBS | HEIGHT: 62 IN | OXYGEN SATURATION: 99 % | DIASTOLIC BLOOD PRESSURE: 64 MMHG | RESPIRATION RATE: 16 BRPM

## 2019-09-19 DIAGNOSIS — G89.21 CHRONIC PAIN DUE TO TRAUMA: Primary | ICD-10-CM

## 2019-09-19 DIAGNOSIS — Z79.899 ENCOUNTER FOR LONG-TERM (CURRENT) USE OF HIGH-RISK MEDICATION: ICD-10-CM

## 2019-09-19 DIAGNOSIS — M51.36 DDD (DEGENERATIVE DISC DISEASE), LUMBAR: ICD-10-CM

## 2019-09-19 DIAGNOSIS — M47.812 FACET ARTHRITIS OF CERVICAL REGION: ICD-10-CM

## 2019-09-19 DIAGNOSIS — G89.29 CHRONIC LEFT SHOULDER PAIN: ICD-10-CM

## 2019-09-19 DIAGNOSIS — M25.512 CHRONIC LEFT SHOULDER PAIN: ICD-10-CM

## 2019-09-19 PROBLEM — M54.50 CHRONIC LOW BACK PAIN: Status: RESOLVED | Noted: 2018-03-22 | Resolved: 2019-09-19

## 2019-09-19 PROCEDURE — 99214 OFFICE O/P EST MOD 30 MIN: CPT | Performed by: NURSE PRACTITIONER

## 2019-09-19 RX ORDER — OXYCODONE HYDROCHLORIDE 30 MG/1
30 TABLET, FILM COATED, EXTENDED RELEASE ORAL EVERY 12 HOURS SCHEDULED
Qty: 60 TABLET | Refills: 0 | Status: SHIPPED | OUTPATIENT
Start: 2019-09-19 | End: 2019-10-17 | Stop reason: SDUPTHER

## 2019-09-19 NOTE — PROGRESS NOTES
CHIEF COMPLAINT  F/U back pain- patient states that her pain has remained the same.     Subjective   Traci King is a 69 y.o. female  who presents to the office for follow-up.She has a history of chronic back and neck pain. Reports this is unchanged since last office visit. However, she later states her right low back has been bothering her more the last 3 days.    Is hoping to have cervical MBB after next month. Expecting pay out from [facility] in august of last year.     Complains of pain in her neck, low back and shoulder. Today her pain is 3/10VAS. Describes the pain as continuous aching and throbbing. Pain increases with walking, standing, activity; pain decreases with medication and rest. Continues with OxyContin 30 mg BID(0400 and 1600) and Diclofenac 50 mg 2/day and Robaxin 750 mg 2-3/day. Denies any side effects from the regimen.  She reports no constipation but reports she takes a nightly laxative and has since age 16(dulcolax).   The regimen helps decrease her pain by 50%.  ADL's by self.  Denies any bowel or bladder changes since last office visit.     Reports she needs left shoulder reverse replacement. Patient does not want to do this yet.   Failed PT, failed ultrasound therapy, failed compounded pain creme. Previously failed Gabapentin.   She completed a Cervical Facet Nerve (Medial Branch) Radiofrequency Lesioning LEFT C3-C5  on  1/26/18 performed by Dr. Gan for management of neck pain. She had significant relief with this. She wants to repeat MBB but cannot afford co-pay at this time.   Neck Pain    This is a chronic problem. The current episode started more than 1 year ago. The problem occurs constantly. The problem has been waxing and waning (unchanged since last office visit). The quality of the pain is described as burning. The pain is at a severity of 3/10. The pain is moderate. The symptoms are aggravated by position, bending, stress and twisting. The pain is worse during the day. Stiffness  is present all day. Pertinent negatives include no chest pain, fever, headaches, numbness or weakness (left arm). She has tried oral narcotics and NSAIDs (cervical MBB--significant temporary relief; cervical RFL--50% ongoing relief) for the symptoms. The treatment provided moderate relief.   Shoulder Injury    The left shoulder is affected. There was no injury mechanism. The pain radiates to the left neck and right neck. The pain is at a severity of 2/10. Pain severity now: unchanged since last office visit. Pertinent negatives include no chest pain or numbness. The symptoms are aggravated by movement and palpation.   Back Pain   This is a chronic problem. The current episode started more than 1 year ago. The problem occurs constantly. The problem has been waxing and waning (unchanged from last office visit) since onset. The pain is at a severity of 3/10. Pertinent negatives include no chest pain, fever, headaches, numbness or weakness (left arm).      PEG Assessment   What number best describes your pain on average in the past week?2  What number best describes how, during the past week, pain has interfered with your enjoyment of life?3  What number best describes how, during the past week, pain has interfered with your general activity?  2    The following portions of the patient's history were reviewed and updated as appropriate: allergies, current medications, past family history, past medical history, past social history, past surgical history and problem list.    Review of Systems   Constitutional: Negative for activity change, fatigue and fever.   HENT: Negative for congestion.    Respiratory: Positive for shortness of breath (occ, copd). Negative for cough.    Cardiovascular: Negative for chest pain and palpitations.   Gastrointestinal: Negative for constipation and diarrhea.   Genitourinary: Negative for difficulty urinating and flank pain.   Musculoskeletal: Positive for arthralgias (left shoulder), back  "pain and neck pain. Negative for neck stiffness.   Neurological: Negative for dizziness, weakness (left arm), light-headedness, numbness and headaches.   Psychiatric/Behavioral: Negative for agitation, sleep disturbance and suicidal ideas. The patient is not nervous/anxious.        Vitals:    09/19/19 1246   BP: 106/64   Pulse: 90   Resp: 16   Temp: 98.2 °F (36.8 °C)   SpO2: 99%   Weight: 77.8 kg (171 lb 9.6 oz)   Height: 157.5 cm (62\")   PainSc:   3   PainLoc: Back     Objective   Physical Exam   Constitutional: She is oriented to person, place, and time. Vital signs are normal. She appears well-developed and well-nourished. She is cooperative.   HENT:   Head: Normocephalic and atraumatic.   Nose: Nose normal.   Eyes: Conjunctivae and lids are normal.   Neck: Spinous process tenderness (MODERATE tenderness bilateral C3-C5 facets) and muscular tenderness present.   Cardiovascular: Normal rate.   Pulmonary/Chest: Effort normal.   Abdominal: Normal appearance.   Musculoskeletal:        Left shoulder: She exhibits decreased range of motion and tenderness. She exhibits no deformity.        Cervical back: She exhibits tenderness.        Lumbar back: She exhibits tenderness.   Widespread OA changes of hands with no acute synovitis  Guarding of right flank   Neurological: She is alert and oriented to person, place, and time. Gait (slow) abnormal.   Skin: Skin is warm, dry and intact.   Psychiatric: She has a normal mood and affect. Her speech is normal and behavior is normal. Judgment and thought content normal. Cognition and memory are normal.   Nursing note and vitals reviewed.      Assessment/Plan   Traci was seen today for back pain.    Diagnoses and all orders for this visit:    Chronic pain due to trauma    DDD (degenerative disc disease), lumbar    Facet arthritis of cervical region (CMS/HCC)    Chronic left shoulder pain    Encounter for long-term (current) use of high-risk medication    Other orders  -     " oxyCODONE HCl ER (oxyCONTIN) 30 MG tablet extended-release 12 hour; Take 1 tablet by mouth Every 12 (Twelve) Hours.      --- The urine drug screen confirmation from 4-24-19 has been reviewed and the result is APPROPRIATE based on patient history and MATTHEW report  --- Refill OxyContin. Patient appears stable with current regimen. No adverse effects. Regarding continuation of opioids, there is no evidence of aberrant behavior or any red flags.  The patient continues with appropriate response to opioid therapy. ADL's remain intact by self.   --- Proceed with CMBB when able to afford.  --- Follow-up 1 month or sooner if needed.    Dr Lowery has seen patient and agrees with plan of care.     MATTHEW REPORT    As part of the patient's treatment plan, I am prescribing controlled substances. The patient has been made aware of appropriate use of such medications, including potential risk of somnolence, limited ability to drive and/or work safely, and the potential for dependence or overdose. It has also bee made clear that these medications are for use by this patient only, without concomitant use of alcohol or other substances unless prescribed.     Patient has completed prescribing agreement detailing terms of continued prescribing of controlled substances, including monitoring MATTHEW reports, urine drug screening, and pill counts if necessary. The patient is aware that inappropriate use will results in cessation of prescribing such medications.    MATTHEW report has been reviewed and scanned into the patient's chart.    As the clinician, I personally reviewed the MATTHEW from 9-17-19 while the patient was in the office today.    History and physical exam exhibit continued safe and appropriate use of controlled substances.      EMR Dragon/Transcription disclaimer:   Much of this encounter note is an electronic transcription/translation of spoken language to printed text. The electronic translation of spoken language may  permit erroneous, or at times, nonsensical words or phrases to be inadvertently transcribed; Although I have reviewed the note for such errors, some may still exist.

## 2019-10-17 ENCOUNTER — OFFICE VISIT (OUTPATIENT)
Dept: PAIN MEDICINE | Facility: CLINIC | Age: 69
End: 2019-10-17

## 2019-10-17 ENCOUNTER — RESULTS ENCOUNTER (OUTPATIENT)
Dept: PAIN MEDICINE | Facility: CLINIC | Age: 69
End: 2019-10-17

## 2019-10-17 VITALS
RESPIRATION RATE: 20 BRPM | BODY MASS INDEX: 30.84 KG/M2 | TEMPERATURE: 97.5 F | DIASTOLIC BLOOD PRESSURE: 68 MMHG | OXYGEN SATURATION: 98 % | SYSTOLIC BLOOD PRESSURE: 120 MMHG | HEIGHT: 62 IN | WEIGHT: 167.6 LBS | HEART RATE: 92 BPM

## 2019-10-17 DIAGNOSIS — G89.21 CHRONIC PAIN DUE TO TRAUMA: Primary | ICD-10-CM

## 2019-10-17 DIAGNOSIS — M25.512 CHRONIC LEFT SHOULDER PAIN: ICD-10-CM

## 2019-10-17 DIAGNOSIS — M47.812 CERVICAL FACET JOINT SYNDROME: ICD-10-CM

## 2019-10-17 DIAGNOSIS — M51.36 DDD (DEGENERATIVE DISC DISEASE), LUMBAR: ICD-10-CM

## 2019-10-17 DIAGNOSIS — Z79.899 ENCOUNTER FOR LONG-TERM (CURRENT) USE OF HIGH-RISK MEDICATION: ICD-10-CM

## 2019-10-17 DIAGNOSIS — G89.29 CHRONIC LEFT SHOULDER PAIN: ICD-10-CM

## 2019-10-17 DIAGNOSIS — G89.21 CHRONIC PAIN DUE TO TRAUMA: ICD-10-CM

## 2019-10-17 PROCEDURE — 80305 DRUG TEST PRSMV DIR OPT OBS: CPT | Performed by: NURSE PRACTITIONER

## 2019-10-17 PROCEDURE — 99214 OFFICE O/P EST MOD 30 MIN: CPT | Performed by: NURSE PRACTITIONER

## 2019-10-17 RX ORDER — OXYCODONE HYDROCHLORIDE 30 MG/1
30 TABLET, FILM COATED, EXTENDED RELEASE ORAL EVERY 12 HOURS SCHEDULED
Qty: 60 TABLET | Refills: 0 | Status: SHIPPED | OUTPATIENT
Start: 2019-10-17 | End: 2019-10-18 | Stop reason: SDUPTHER

## 2019-10-17 RX ORDER — TOPIRAMATE 25 MG/1
TABLET ORAL
Refills: 0 | COMMUNITY
Start: 2019-08-26 | End: 2020-12-14

## 2019-10-17 RX ORDER — METHOCARBAMOL 500 MG/1
500 TABLET, FILM COATED ORAL 3 TIMES DAILY
Qty: 90 TABLET | Refills: 2 | Status: SHIPPED | OUTPATIENT
Start: 2019-10-17 | End: 2020-01-14 | Stop reason: SDUPTHER

## 2019-10-17 RX ORDER — ZOSTER VACCINE RECOMBINANT, ADJUVANTED 50 MCG/0.5
KIT INTRAMUSCULAR
COMMUNITY
Start: 2019-10-09 | End: 2020-07-15

## 2019-10-17 NOTE — PROGRESS NOTES
CHIEF COMPLAINT  F/U back pain- patient states that her pain has remained the same.     Subjective   Traci King is a 69 y.o. female  who presents to the office for follow-up.She has a history of neck, back, shoulder pain.    Is hoping to have cervical MBB after next month. Expecting pay out from [facility] in august of last year.      Complains of pain in her neck, low back and shoulder. Today her pain is 2/10VAS.  Continues with OxyContin 30 mg BID(0400 and 1600) and Diclofenac 50 mg 2/day and Robaxin 750 mg 2-3/day. Denies any side effects from the regimen.  She reports no constipation but reports she takes a nightly laxative and has since age 16(dulcolax).   The regimen helps decrease her pain by 50%.  ADL's by self.       Reports she needs left shoulder reverse replacement. Patient does not want to do this yet.   Failed PT, failed ultrasound therapy, failed compounded pain creme. Previously failed Gabapentin.   She completed a Cervical Facet Nerve (Medial Branch) Radiofrequency Lesioning LEFT C3-C5 on 1/26/18 performed by Dr. Gan for management of neck pain. She had significant relief with this. She wants to repeat MBB but cannot afford co-pay at this time.     Neck Pain    This is a chronic problem. The current episode started more than 1 year ago. The problem occurs constantly. The problem has been waxing and waning (unchanged since last office visit). The quality of the pain is described as burning. The pain is at a severity of 2/10. The pain is moderate. The symptoms are aggravated by position, bending, stress and twisting. The pain is worse during the day. Stiffness is present all day. Pertinent negatives include no chest pain, fever, headaches, numbness or weakness (left arm). She has tried oral narcotics and NSAIDs (cervical MBB--significant temporary relief; cervical RFL--50% ongoing relief) for the symptoms. The treatment provided moderate relief.   Shoulder Injury    The left shoulder is affected.  There was no injury mechanism. The pain radiates to the left neck and right neck. The pain is at a severity of 2/10. Pain severity now: unchanged since last office visit. Pertinent negatives include no chest pain or numbness. The symptoms are aggravated by movement and palpation.   Back Pain   This is a chronic problem. The current episode started more than 1 year ago. The problem occurs constantly. The problem has been waxing and waning (unchanged from last office visit) since onset. The pain is at a severity of 2/10. Pertinent negatives include no chest pain, fever, headaches, numbness or weakness (left arm).     PEG Assessment   What number best describes your pain on average in the past week?2  What number best describes how, during the past week, pain has interfered with your enjoyment of life?3  What number best describes how, during the past week, pain has interfered with your general activity?  2    The following portions of the patient's history were reviewed and updated as appropriate: allergies, current medications, past family history, past medical history, past social history, past surgical history and problem list.    Review of Systems   Constitutional: Negative for activity change, fatigue and fever.   HENT: Negative for congestion.    Eyes: Negative for visual disturbance.   Respiratory: Positive for shortness of breath (occ, copd). Negative for cough.    Cardiovascular: Negative for chest pain and palpitations.   Gastrointestinal: Negative for constipation and diarrhea.   Genitourinary: Negative for difficulty urinating and flank pain.   Musculoskeletal: Positive for arthralgias (left shoulder), back pain, neck pain and neck stiffness.   Neurological: Negative for dizziness, weakness (left arm), light-headedness, numbness and headaches.   Psychiatric/Behavioral: Positive for sleep disturbance. Negative for agitation and suicidal ideas. The patient is not nervous/anxious.        Vitals:    10/17/19  "1031   BP: 120/68   Pulse: 92   Resp: 20   Temp: 97.5 °F (36.4 °C)   SpO2: 98%   Weight: 76 kg (167 lb 9.6 oz)   Height: 157.5 cm (62\")   PainSc:   2   PainLoc: Back     Objective   Physical Exam   Constitutional: She is oriented to person, place, and time. She appears well-developed and well-nourished. No distress.   HENT:   Head: Normocephalic and atraumatic.   Eyes: Conjunctivae and EOM are normal.   Neck: Neck supple.   Cardiovascular: Normal rate.   Pulmonary/Chest: Effort normal. No respiratory distress.   Musculoskeletal:        Cervical back: She exhibits decreased range of motion and tenderness.        Lumbar back: She exhibits decreased range of motion and tenderness.   Neurological: She is alert and oriented to person, place, and time. She has normal strength. No sensory deficit. Gait normal.   Skin: Skin is warm and dry. She is not diaphoretic.   Psychiatric: She has a normal mood and affect. Her behavior is normal.   Nursing note and vitals reviewed.    Assessment/Plan   Traci was seen today for back pain.    Diagnoses and all orders for this visit:    Chronic pain due to trauma    DDD (degenerative disc disease), lumbar    Cervical facet joint syndrome    Chronic left shoulder pain    Encounter for long-term (current) use of high-risk medication      --- Refill Oxycontin. Patient appears stable with current regimen. No adverse effects. Regarding continuation of opioids, there is no evidence of aberrant behavior or any red flags.  The patient continues with appropriate response to opioid therapy. ADL's remain intact by self.   --- Routine UDS in office today as part of monitoring requirements for controlled substances.  The specimen was viewed and the immunoassay result reviewed and is +OXY, BZD.  This specimen will be sent to [facility] laboratory for confirmation.     --- Follow-up 1 month          MATTHEW REPORT  As part of the patient's treatment plan, I am prescribing controlled substances. The " patient has been made aware of appropriate use of such medications, including potential risk of somnolence, limited ability to drive and/or work safely, and the potential for dependence or overdose. It has also bee made clear that these medications are for use by this patient only, without concomitant use of alcohol or other substances unless prescribed.     Patient has completed prescribing agreement detailing terms of continued prescribing of controlled substances, including monitoring MATTHEW reports, urine drug screening, and pill counts if necessary. The patient is aware that inappropriate use will results in cessation of prescribing such medications.    MATTHEW report has been reviewed and scanned into the patient's chart.    As the clinician, I personally reviewed the MATTHEW from 10/17/19 while the patient was in the office today.    History and physical exam exhibit continued safe and appropriate use of controlled substances.    EMR Dragon/Transcription disclaimer:   Much of this encounter note is an electronic transcription/translation of spoken language to printed text. The electronic translation of spoken language may permit erroneous, or at times, nonsensical words or phrases to be inadvertently transcribed; Although I have reviewed the note for such errors, some may still exist.

## 2019-10-18 RX ORDER — OXYCODONE HYDROCHLORIDE 30 MG/1
30 TABLET, FILM COATED, EXTENDED RELEASE ORAL EVERY 12 HOURS SCHEDULED
Qty: 60 TABLET | Refills: 0 | Status: SHIPPED | OUTPATIENT
Start: 2019-10-18 | End: 2019-10-18

## 2019-10-18 NOTE — TELEPHONE ENCOUNTER
oxycontin was sent to [facility] pharmacy, could you please send it to [facility] pharmacy on file? Earlier it was sent to [facility], they are out of stock. Please advise. Thank you.

## 2019-10-19 RX ORDER — OXYCODONE HYDROCHLORIDE 30 MG/1
30 TABLET, FILM COATED, EXTENDED RELEASE ORAL EVERY 12 HOURS SCHEDULED
Qty: 60 TABLET | Refills: 0 | Status: SHIPPED | OUTPATIENT
Start: 2019-10-19 | End: 2019-11-14 | Stop reason: SDUPTHER

## 2019-11-14 ENCOUNTER — OFFICE VISIT (OUTPATIENT)
Dept: PAIN MEDICINE | Facility: CLINIC | Age: 69
End: 2019-11-14

## 2019-11-14 VITALS
WEIGHT: 166 LBS | RESPIRATION RATE: 20 BRPM | DIASTOLIC BLOOD PRESSURE: 71 MMHG | HEIGHT: 62 IN | OXYGEN SATURATION: 97 % | SYSTOLIC BLOOD PRESSURE: 116 MMHG | TEMPERATURE: 98 F | HEART RATE: 88 BPM | BODY MASS INDEX: 30.55 KG/M2

## 2019-11-14 DIAGNOSIS — M50.30 DEGENERATIVE DISC DISEASE, CERVICAL: ICD-10-CM

## 2019-11-14 DIAGNOSIS — M25.512 CHRONIC LEFT SHOULDER PAIN: ICD-10-CM

## 2019-11-14 DIAGNOSIS — M47.812 FACET ARTHRITIS OF CERVICAL REGION: ICD-10-CM

## 2019-11-14 DIAGNOSIS — G62.9 NEUROPATHY: ICD-10-CM

## 2019-11-14 DIAGNOSIS — G89.21 CHRONIC PAIN DUE TO TRAUMA: Primary | ICD-10-CM

## 2019-11-14 DIAGNOSIS — Z79.899 ENCOUNTER FOR LONG-TERM (CURRENT) USE OF HIGH-RISK MEDICATION: ICD-10-CM

## 2019-11-14 DIAGNOSIS — G89.29 CHRONIC LEFT SHOULDER PAIN: ICD-10-CM

## 2019-11-14 DIAGNOSIS — M51.36 DDD (DEGENERATIVE DISC DISEASE), LUMBAR: ICD-10-CM

## 2019-11-14 PROCEDURE — 99214 OFFICE O/P EST MOD 30 MIN: CPT | Performed by: NURSE PRACTITIONER

## 2019-11-14 NOTE — PROGRESS NOTES
CHIEF COMPLAINT  F/U back pain- patient states that her pain has remained the same. pt sts needs refill on oxycontin today. Pt went to podiatrist Dr. Oscar about her right foot, had XR done, was told she has arthritis in right foot, currently wearing brace to right foot, would like to discuss a medication stronger than diclofenac for the pain. Pt is going to see Dr. Ricardo orthosurgerviolette for second opinion on 11/21/19.    Subjective   Traci King is a 69 y.o. female  who presents to the office for follow-up.She has a history of chronic neck, back, shoulders. Reports this is unchanged since last office visit. However, she later states her neck pain is worse since last office visit. Wants to repeat RFL but on both sides.  Has newer complaints of right foot pain. Saw Dr Oscar and had an xray done. States she was told she had arthritis and was given a brace. She is noticing improvement with wearing the brace. Does not remember any specific injury to start the pain. Started noticing swelling on inside of right foot.     Complains of pain in her neck, low back, shoulder, right foot. Today her pain is 1/10VAS. Describes her pain as nearly continuous aching with activity and movement. Pain increases with movement, standing, activity; pain decreases with medication, rest and and procedures.  Continues with OxyContin 30 mg BID(0400 and 1600) and Diclofenac 50 mg 2/day and Robaxin 750 mg 2-3/day. Denies any side effects from the regimen.  She reports no constipation but reports she takes a nightly laxative and has since age 16(dulcolax).   The regimen helps decrease her pain by 75%.  ADL's by self.  Denies any bowel or bladder changes since last office visit.     Reports she needs left shoulder reverse replacement. Patient does not want to do this yet.   Failed PT, failed ultrasound therapy, failed compounded pain creme. Previously failed Gabapentin.   She completed a Cervical Facet Nerve (Medial Branch) Radiofrequency  Lesioning LEFT C3-C5  on  1/26/18 performed by Dr. Gan for management of neck pain. She had significant relief with this.     Neck Pain    This is a chronic problem. The current episode started more than 1 year ago. The problem occurs constantly. The problem has been waxing and waning (unchanged since last office visit). The quality of the pain is described as burning. The pain is moderate. The symptoms are aggravated by position, bending, stress and twisting. The pain is worse during the day. Stiffness is present all day. Pertinent negatives include no chest pain, fever, headaches, numbness or weakness (left arm). She has tried oral narcotics and NSAIDs (cervical MBB--significant temporary relief; cervical RFL--50% ongoing relief) for the symptoms. The treatment provided moderate relief.   Shoulder Injury    The left shoulder is affected. There was no injury mechanism. The pain radiates to the left neck and right neck. The pain is at a severity of 2/10. Pain severity now: unchanged since last office visit. Pertinent negatives include no chest pain or numbness. The symptoms are aggravated by movement and palpation.   Back Pain   This is a chronic problem. The current episode started more than 1 year ago. The problem occurs constantly. The problem has been waxing and waning (unchanged from last office visit) since onset. The pain is at a severity of 1/10. Pertinent negatives include no abdominal pain, chest pain, fever, headaches, numbness or weakness (left arm).      PEG Assessment   What number best describes your pain on average in the past week?2  What number best describes how, during the past week, pain has interfered with your enjoyment of life?2  What number best describes how, during the past week, pain has interfered with your general activity?  3    The following portions of the patient's history were reviewed and updated as appropriate: allergies, current medications, past family history, past medical  "history, past social history, past surgical history and problem list.    Review of Systems   Constitutional: Negative for activity change, fatigue and fever.   HENT: Negative for congestion.    Eyes: Negative for visual disturbance.   Respiratory: Positive for shortness of breath (occ, copd). Negative for cough.    Cardiovascular: Negative for chest pain and palpitations.   Gastrointestinal: Negative for abdominal pain, constipation and diarrhea.   Genitourinary: Negative for difficulty urinating and flank pain.   Musculoskeletal: Positive for arthralgias (left shoulder), back pain, joint swelling (right foot), neck pain and neck stiffness. Negative for gait problem.   Neurological: Negative for dizziness, weakness (left arm), light-headedness, numbness and headaches.   Psychiatric/Behavioral: Positive for sleep disturbance (freq). Negative for agitation and suicidal ideas. The patient is not nervous/anxious.        Vitals:    11/14/19 0929   BP: 116/71   Pulse: 88   Resp: 20   Temp: 98 °F (36.7 °C)   SpO2: 97%   Weight: 75.3 kg (166 lb)   Height: 157.5 cm (62\")   PainSc:   1   PainLoc: Back     Objective   Physical Exam   Constitutional: She is oriented to person, place, and time. Vital signs are normal. She appears well-developed and well-nourished. She is cooperative.   HENT:   Head: Normocephalic and atraumatic.   Nose: Nose normal.   Eyes: Conjunctivae and lids are normal.   Neck: Spinous process tenderness (MODERATE tenderness bilateral C3-C5 facets) and muscular tenderness present.   Cardiovascular: Normal rate.   Pulmonary/Chest: Effort normal.   Abdominal: Normal appearance.   Musculoskeletal:        Left shoulder: She exhibits decreased range of motion and tenderness. She exhibits no deformity.        Cervical back: She exhibits tenderness.        Lumbar back: She exhibits tenderness.   Widespread OA changes of hands with no acute synovitis   Neurological: She is alert and oriented to person, place, and " time. Gait (slow) abnormal.   Skin: Skin is warm, dry and intact.   Psychiatric: She has a normal mood and affect. Her speech is normal and behavior is normal. Judgment and thought content normal. Cognition and memory are normal.   Nursing note and vitals reviewed.      Assessment/Plan   Traci was seen today for back pain.    Diagnoses and all orders for this visit:    Chronic pain due to trauma    DDD (degenerative disc disease), lumbar    Degenerative disc disease, cervical    Facet arthritis of cervical region  -     Case Request    Neuropathy    Chronic left shoulder pain    Encounter for long-term (current) use of high-risk medication      --- The urine drug screen confirmation from 10-17-19 has been reviewed and the result is APPROPRIATE based on patient history and MATTHEW report  --- Refill OxyContin DNF until 11-18-19. Patient appears stable with current regimen. No adverse effects. Regarding continuation of opioids, there is no evidence of aberrant behavior or any red flags.  The patient continues with appropriate response to opioid therapy. ADL's remain intact by self.   --- bilateral C3-C5MBB. no blood thinners. Reviewed the procedure at length with the patient.  Included in the review was expectations, complications, risk and benefits.The procedure was described in detail and the risks, benefits and alternatives were discussed with the patient (including but not limited to: bleeding, infection, nerve damage, worsening of pain, inability to perform injection, paralysis, seizures, and death) who agreed to proceed.  Discussed the potential for sedation if warranted/wanted.  The procedure will plan to be performed at [facility] with fluoroscopic guidance(unless ultrasound is indicated). Questions were answered and in a way the patient could understand.  Patient verbalized understanding and wishes to proceed.  This intervention will be ordered.  Discussed with patient that all procedures  "are part of a multimodal plan of care and include either formal PT or a home exercise program.    --- Follow-up 1 month or sooner if needed.    -------  Education about Medial Branch Blockade and RF Therapy:    This medial branch blockade (MBB) suggested is intended for diagnostic purposes, with the intent of offering the patient Radiofrequency thermal rhizotomy (RF) if the MBB is diagnostically effective.  The diagnostic blockade is necessary to determine the likelihood that RF therapy could be efficacious in providing long term relief to the patient.    Medial branches are sensory nerve branches that connect to a facet joint and transmit sensations & pain signals from that joint.  Facet is a term for the type of joints found in the spine.  Medial branches are the nerves that go to a facet, and therefore are also sometimes called \"facet joint nerves\" (FJNs).      In a medial branch blockade procedure, xray fluoroscopy is used to verify the locations of the outside of the joint lines which are being targeted.  Under xray guidance, needles are placed to these areas.  Contrast dye is injected to confirm proper placement, with dye flowing over the joint area, and to ensure that the dye does not flow into unintended areas such as a vein.  When this is confirmed, local anesthetic is injected to block the medial branch at that joint level.      If MBBs are diagnostically successful in blocking pain, then the patient is most likely a great candidate for Radiofrequency of those facet joint nerves.  In the RF procedure, needles are placed to the joint lines in the same fashion, and after testing, the needle tips are heated to thermally treat the nerves, blocking the nerves by in essence damaging the nerves with the heat treatment.       Medically, a successful RF procedure should provide a patient with 50% pain relief or more for at least 6 months.  Clinical experience suggests that successful patients receive relief more in " the range of 12 months on average.  We also discussed that a fortunate minority of patients receive therapeutic success from the MBB, and may not require RF ablation.  If a patient receives more than 8 weeks of relief from MBB, then occasional repeat MBB for therapeutic purposes is a very reasonable alternative therapy.  This course of therapy is consistent with our LCDs according to our CMS  in the area, and therefore other insurance providers should follow accordingly.  We will monitor our patients to screen for these therapeutic responders and will offer RF therapy only when necessary.        We discussed that MBB & RF are not without risks.  Guidelines regarding anticoagulant use & neuraxial procedures will be respected.  Patients that are ill or otherwise may be at risk for sepsis will not have their spines accessed by neuraxial injections of any type.  This patient will not be offered these therapies if there is an increased risk.   We discussed that there is a risk of postprocedural pain and also a risk of worsening of clinical picture with these procedures as with any neuraxial procedure.    -------     MATTHEW REPORT    As part of the patient's treatment plan, I am prescribing controlled substances. The patient has been made aware of appropriate use of such medications, including potential risk of somnolence, limited ability to drive and/or work safely, and the potential for dependence or overdose. It has also bee made clear that these medications are for use by this patient only, without concomitant use of alcohol or other substances unless prescribed.     Patient has completed prescribing agreement detailing terms of continued prescribing of controlled substances, including monitoring MATTHEW reports, urine drug screening, and pill counts if necessary. The patient is aware that inappropriate use will results in cessation of prescribing such medications.    MATTHEW report has been reviewed and  scanned into the patient's chart.    As the clinician, I personally reviewed the MATTHEW from 11-13-19 while the patient was in the office today.    History and physical exam exhibit continued safe and appropriate use of controlled substances.      EMR Dragon/Transcription disclaimer:   Much of this encounter note is an electronic transcription/translation of spoken language to printed text. The electronic translation of spoken language may permit erroneous, or at times, nonsensical words or phrases to be inadvertently transcribed; Although I have reviewed the note for such errors, some may still exist.

## 2019-11-15 RX ORDER — OXYCODONE HYDROCHLORIDE 30 MG/1
30 TABLET, FILM COATED, EXTENDED RELEASE ORAL EVERY 12 HOURS SCHEDULED
Qty: 60 TABLET | Refills: 0 | Status: SHIPPED | OUTPATIENT
Start: 2019-11-15 | End: 2019-12-16 | Stop reason: SDUPTHER

## 2019-12-16 ENCOUNTER — OFFICE VISIT (OUTPATIENT)
Dept: PAIN MEDICINE | Facility: CLINIC | Age: 69
End: 2019-12-16

## 2019-12-16 VITALS
DIASTOLIC BLOOD PRESSURE: 64 MMHG | RESPIRATION RATE: 20 BRPM | HEART RATE: 77 BPM | SYSTOLIC BLOOD PRESSURE: 100 MMHG | WEIGHT: 165.8 LBS | HEIGHT: 62 IN | TEMPERATURE: 97.6 F | OXYGEN SATURATION: 100 % | BODY MASS INDEX: 30.51 KG/M2

## 2019-12-16 DIAGNOSIS — M51.36 DDD (DEGENERATIVE DISC DISEASE), LUMBAR: ICD-10-CM

## 2019-12-16 DIAGNOSIS — G89.29 OTHER CHRONIC PAIN: Primary | ICD-10-CM

## 2019-12-16 DIAGNOSIS — Z79.899 ENCOUNTER FOR LONG-TERM (CURRENT) USE OF HIGH-RISK MEDICATION: ICD-10-CM

## 2019-12-16 DIAGNOSIS — M47.812 FACET ARTHRITIS OF CERVICAL REGION: ICD-10-CM

## 2019-12-16 DIAGNOSIS — M50.30 DEGENERATIVE DISC DISEASE, CERVICAL: ICD-10-CM

## 2019-12-16 PROCEDURE — 99214 OFFICE O/P EST MOD 30 MIN: CPT | Performed by: NURSE PRACTITIONER

## 2019-12-16 RX ORDER — OXYCODONE HYDROCHLORIDE 30 MG/1
30 TABLET, FILM COATED, EXTENDED RELEASE ORAL EVERY 12 HOURS SCHEDULED
Qty: 60 TABLET | Refills: 0 | Status: SHIPPED | OUTPATIENT
Start: 2019-12-16 | End: 2020-01-14 | Stop reason: SDUPTHER

## 2019-12-16 RX ORDER — OXYCODONE HYDROCHLORIDE 30 MG/1
30 TABLET, FILM COATED, EXTENDED RELEASE ORAL EVERY 12 HOURS SCHEDULED
Qty: 60 TABLET | Refills: 0 | Status: SHIPPED | OUTPATIENT
Start: 2019-12-16 | End: 2019-12-16 | Stop reason: SDUPTHER

## 2019-12-16 NOTE — PROGRESS NOTES
CHIEF COMPLAINT  F/u back pain. Pt sts pain has remained the same since last ov. Pt c/o increased pain in neck, shoulders and right foot. Pt still wearing brace to right foot. Pt sts she can't afford injections right now. Pt sts she is starting PT tomorrow at [facility] in [place].     Subjective   Traci King is a 69 y.o. female  who presents to the office for follow-up.She has a history of back pain.  She states her pain is 2/10VAS.  Her pain is worse, she was unable to scheduled most recently ordered interventions r/t cost.  Patient asked about a payment plan which she states was not an option. She decribes her pain as constant aching.  Her pain is worsened by neck ROM, bending, twisting, lifting, pain is worse at night. Her pain is improved by medication, massage.  No improvement with heat/ice/rest.  She continues with OxyContin 30 mg BID(0400 and 1600) and Diclofenac 50 mg 2-3/day and Robaxin 750 mg 2-3/day. Denies any side effects from the regimen. She does take dulcolax every day and has been doing this since age 16.  This regimen decreases her pain by 75-80%.  ADLs by self.    Patient states she will be starting PT tomorrow for her ankle which has been recommended by Podiatrist. Patient is concerned that this may be cost prohibitive to follow the regimen recommended.    Neck Pain    This is a chronic problem. The current episode started more than 1 year ago. The problem occurs constantly. The problem has been waxing and waning (unchanged since last office visit). The quality of the pain is described as burning. The pain is moderate. The symptoms are aggravated by position, bending, stress and twisting. The pain is worse during the day. Stiffness is present all day. Pertinent negatives include no chest pain, fever, headaches, numbness or weakness. She has tried oral narcotics and NSAIDs (cervical MBB--significant temporary relief; cervical RFL--50% ongoing relief) for the symptoms. The treatment provided  moderate relief.   Shoulder Injury    The left shoulder is affected. There was no injury mechanism. The pain radiates to the left neck and right neck. The pain is at a severity of 2/10. Pain severity now: unchanged since last office visit. Pertinent negatives include no chest pain or numbness. The symptoms are aggravated by movement and palpation.   Back Pain   This is a chronic problem. The current episode started more than 1 year ago. The problem occurs constantly. The problem has been waxing and waning (unchanged from last office visit) since onset. The pain is at a severity of 2/10. Pertinent negatives include no abdominal pain, chest pain, fever, headaches, numbness or weakness.      PEG Assessment   What number best describes your pain on average in the past week?4  What number best describes how, during the past week, pain has interfered with your enjoyment of life?4  What number best describes how, during the past week, pain has interfered with your general activity?  4    The following portions of the patient's history were reviewed and updated as appropriate: allergies, current medications, past family history, past medical history, past social history, past surgical history and problem list.    Review of Systems   Constitutional: Positive for activity change (dec) and fatigue (occ). Negative for chills and fever.   HENT: Negative for congestion.    Eyes: Negative for visual disturbance.   Respiratory: Positive for shortness of breath (occ with activity).    Cardiovascular: Negative for chest pain.   Gastrointestinal: Negative for abdominal pain, constipation and diarrhea.   Genitourinary: Negative for difficulty urinating.   Musculoskeletal: Positive for arthralgias (right foot, and shoulders), back pain and neck pain. Negative for gait problem and neck stiffness.   Allergic/Immunologic: Negative for immunocompromised state.   Neurological: Negative for dizziness, weakness, numbness and headaches.  "  Psychiatric/Behavioral: Positive for sleep disturbance. Negative for agitation and suicidal ideas. The patient is not nervous/anxious.        Vitals:    12/16/19 0944   BP: 100/64   Pulse: 77   Resp: 20   Temp: 97.6 °F (36.4 °C)   SpO2: 100%   Weight: 75.2 kg (165 lb 12.8 oz)   Height: 157.5 cm (62\")   PainSc:   2   PainLoc: Back       Objective   Physical Exam   Constitutional: She is oriented to person, place, and time. Vital signs are normal. She appears well-developed and well-nourished. She is cooperative.   HENT:   Head: Normocephalic and atraumatic.   Nose: Nose normal.   Eyes: Conjunctivae and lids are normal.   Neck: Spinous process tenderness (MODERATE tenderness bilateral C3-C5 facets) and muscular tenderness present.   Cardiovascular: Normal rate.   Pulmonary/Chest: Effort normal.   Abdominal: Normal appearance.   Musculoskeletal:        Left shoulder: She exhibits decreased range of motion and tenderness. She exhibits no deformity.        Cervical back: She exhibits tenderness.        Lumbar back: She exhibits tenderness.   Widespread OA changes of hands with no acute synovitis   Neurological: She is alert and oriented to person, place, and time. Gait (slow) abnormal.   Skin: Skin is warm, dry and intact.   Psychiatric: She has a normal mood and affect. Her speech is normal and behavior is normal. Judgment and thought content normal. Cognition and memory are normal.   Nursing note and vitals reviewed.      Assessment/Plan   Traci was seen today for back pain.    Diagnoses and all orders for this visit:    Other chronic pain    DDD (degenerative disc disease), lumbar    Degenerative disc disease, cervical    Facet arthritis of cervical region    Encounter for long-term (current) use of high-risk medication    --- The urine drug screen confirmation from 10-21-19 has been reviewed and the result is APPROPRIATE based on patient history and MATTHEW report  --- Refill OxyContin today.  Patient appears stable " with current regimen. No adverse effects. Regarding continuation of opioids, there is no evidence of aberrant behavior or any red flags.  The patient continues with appropriate response to opioid therapy. ADL's remain intact by self.   --- Interventions currently cost prohibitive.  Defer for now.  --- Continue with podiatrist recommendations regarding right foot/ankle  --- Follow-up 1 month               MATTHEW REPORT    As part of the patient's treatment plan, I am prescribing controlled substances. The patient has been made aware of appropriate use of such medications, including potential risk of somnolence, limited ability to drive and/or work safely, and the potential for dependence or overdose. It has also bee made clear that these medications are for use by this patient only, without concomitant use of alcohol or other substances unless prescribed.     Patient has completed prescribing agreement detailing terms of continued prescribing of controlled substances, including monitoring MATTHEW reports, urine drug screening, and pill counts if necessary. The patient is aware that inappropriate use will results in cessation of prescribing such medications.    MATTHEW report has been reviewed and scanned into the patient's chart.    As the clinician, I personally reviewed the MATTHEW from 12-13-19 while the patient was in the office today.    History and physical exam exhibit continued safe and appropriate use of controlled substances.      EMR Dragon/Transcription disclaimer:   Much of this encounter note is an electronic transcription/translation of spoken language to printed text. The electronic translation of spoken language may permit erroneous, or at times, nonsensical words or phrases to be inadvertently transcribed; Although I have reviewed the note for such errors, some may still exist.

## 2019-12-16 NOTE — TELEPHONE ENCOUNTER
Medication Refill Request    Date of phone call: 19    Medication being requested: oxycontin 30mg si tab po q 12 hours   Qty: 60    Date of last visit: 19    Date of last refill: 19    MATTHEW up to date?: yes    Next Follow up?: 20    Any new pertinent information? (i.e, new medication allergies, new use of medications, change in patient's health or condition, non-compliance or inconsistency with prescribing agreement?): please send to [facility], i've cancelled the other oxy at [facility]

## 2020-01-14 ENCOUNTER — OFFICE VISIT (OUTPATIENT)
Dept: PAIN MEDICINE | Facility: CLINIC | Age: 70
End: 2020-01-14

## 2020-01-14 VITALS
HEART RATE: 76 BPM | DIASTOLIC BLOOD PRESSURE: 68 MMHG | OXYGEN SATURATION: 98 % | SYSTOLIC BLOOD PRESSURE: 115 MMHG | RESPIRATION RATE: 16 BRPM | TEMPERATURE: 98.1 F | HEIGHT: 62 IN | WEIGHT: 171.2 LBS | BODY MASS INDEX: 31.5 KG/M2

## 2020-01-14 DIAGNOSIS — M47.812 CERVICAL FACET JOINT SYNDROME: ICD-10-CM

## 2020-01-14 DIAGNOSIS — Z79.899 ENCOUNTER FOR LONG-TERM (CURRENT) USE OF HIGH-RISK MEDICATION: ICD-10-CM

## 2020-01-14 DIAGNOSIS — M51.36 DDD (DEGENERATIVE DISC DISEASE), LUMBAR: ICD-10-CM

## 2020-01-14 DIAGNOSIS — G89.21 CHRONIC PAIN DUE TO TRAUMA: Primary | ICD-10-CM

## 2020-01-14 PROCEDURE — 99214 OFFICE O/P EST MOD 30 MIN: CPT | Performed by: NURSE PRACTITIONER

## 2020-01-14 RX ORDER — OXYCODONE HYDROCHLORIDE 30 MG/1
30 TABLET, FILM COATED, EXTENDED RELEASE ORAL EVERY 12 HOURS SCHEDULED
Qty: 60 TABLET | Refills: 0 | Status: SHIPPED | OUTPATIENT
Start: 2020-01-14 | End: 2020-02-14 | Stop reason: SDUPTHER

## 2020-01-14 RX ORDER — METHOCARBAMOL 500 MG/1
500 TABLET, FILM COATED ORAL 3 TIMES DAILY
Qty: 90 TABLET | Refills: 2 | Status: SHIPPED | OUTPATIENT
Start: 2020-01-14 | End: 2020-04-13
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
## 2020-01-21 ENCOUNTER — TRANSCRIBE ORDERS (OUTPATIENT)
Dept: ADMINISTRATIVE | Facility: HOSPITAL | Age: 70
End: 2020-01-21

## 2020-01-21 DIAGNOSIS — M79.671 RIGHT FOOT PAIN: Primary | ICD-10-CM

## 2020-01-27 ENCOUNTER — HOSPITAL ENCOUNTER (OUTPATIENT)
Dept: CT IMAGING | Facility: HOSPITAL | Age: 70
Discharge: HOME | End: 2020-01-27
Admitting: ORTHOPAEDIC SURGERY

## 2020-01-27 DIAGNOSIS — M79.671 RIGHT FOOT PAIN: ICD-10-CM

## 2020-01-27 PROCEDURE — 73700 CT LOWER EXTREMITY W/O DYE: CPT

## 2020-02-14 ENCOUNTER — OFFICE VISIT (OUTPATIENT)
Dept: PAIN MEDICINE | Facility: CLINIC | Age: 70
End: 2020-02-14

## 2020-02-14 VITALS
TEMPERATURE: 97.7 F | HEIGHT: 62 IN | WEIGHT: 171.8 LBS | BODY MASS INDEX: 31.62 KG/M2 | DIASTOLIC BLOOD PRESSURE: 68 MMHG | RESPIRATION RATE: 18 BRPM | OXYGEN SATURATION: 100 % | SYSTOLIC BLOOD PRESSURE: 111 MMHG | HEART RATE: 80 BPM

## 2020-02-14 DIAGNOSIS — M48.02 FORAMINAL STENOSIS OF CERVICAL REGION: ICD-10-CM

## 2020-02-14 DIAGNOSIS — Z79.899 ENCOUNTER FOR LONG-TERM (CURRENT) USE OF HIGH-RISK MEDICATION: Primary | ICD-10-CM

## 2020-02-14 DIAGNOSIS — G89.29 OTHER CHRONIC PAIN: ICD-10-CM

## 2020-02-14 DIAGNOSIS — M47.812 FACET ARTHRITIS OF CERVICAL REGION: ICD-10-CM

## 2020-02-14 DIAGNOSIS — M51.36 DDD (DEGENERATIVE DISC DISEASE), LUMBAR: ICD-10-CM

## 2020-02-14 PROCEDURE — 99214 OFFICE O/P EST MOD 30 MIN: CPT | Performed by: NURSE PRACTITIONER

## 2020-02-14 RX ORDER — OXYCODONE HYDROCHLORIDE 30 MG/1
30 TABLET, FILM COATED, EXTENDED RELEASE ORAL EVERY 12 HOURS SCHEDULED
Qty: 60 TABLET | Refills: 0 | Status: SHIPPED | OUTPATIENT
Start: 2020-02-14 | End: 2020-03-11 | Stop reason: SDUPTHER

## 2020-02-14 NOTE — PROGRESS NOTES
CHIEF COMPLAINT  Follow-up for back pain. Pain unchanged.    Initial evaluation by DACIA Rainey   Traci King is a 69 y.o. female  who presents to the office for follow-up.She has a history of neck and back pain.  Today her pain is 3/10VAS in severity.  She describes her pain as intermittent aching pain in her neck, back, and left shoulder.  Her pain is worsened by use of left arm, activity, bending/twisting of back/neck; it is improved by medication.  Continues with OxyContin 30 mg BID(0200 and 1400) and Diclofenac 50 mg 2/day and Robaxin 500 mg 2 tablets a day (1/2 pill 4x/day).  This medication regimen decreases her pain by 75-80%.  ADLs by self. Complains of constipation which is well treated with dulcolax and over the counter laxative.  Denies any other side effects including somnolence. Denies any bowel or bladder changes since her last office visit.    She completed Bilateral C3-C5 RFL in January 2018 with good results.  She states that it is time to repeat this, but that this procedure it currently cost-prohibitive.     Complains of generalized malaise. Swabbed for mono on 2/12/2020 and received notification of being diagnosed with Mononucleosis this morning.      Neck Pain    This is a chronic problem. The current episode started more than 1 year ago. The problem occurs constantly. The problem has been unchanged. The quality of the pain is described as burning. The pain is at a severity of 3/10. The pain is moderate. The symptoms are aggravated by position, bending, stress and twisting. The pain is worse during the day. Stiffness is present all day. Pertinent negatives include no chest pain, fever, headaches, numbness or weakness. She has tried oral narcotics and NSAIDs (cervical MBB--significant temporary relief; cervical RFL--50%-has now worn off) for the symptoms. The treatment provided moderate relief.   Shoulder Injury    The left shoulder is affected. There was no injury  mechanism. The pain radiates to the left neck and right neck. The pain is at a severity of 3/10. Pain severity now: unchanged since last office visit. Pertinent negatives include no chest pain or numbness. The symptoms are aggravated by movement and palpation.   Back Pain   This is a chronic problem. The current episode started more than 1 year ago. The problem occurs constantly. The problem is unchanged. The pain is at a severity of 4/10. The symptoms are aggravated by bending, twisting and position. Pertinent negatives include no abdominal pain, chest pain, dysuria, fever, headaches, numbness or weakness. She has tried analgesics for the symptoms.      PEG Assessment   What number best describes your pain on average in the past week?4  What number best describes how, during the past week, pain has interfered with your enjoyment of life?4  What number best describes how, during the past week, pain has interfered with your general activity?  4    The following portions of the patient's history were reviewed and updated as appropriate: allergies, current medications, past family history, past medical history, past social history, past surgical history and problem list.    Review of Systems   Constitutional: Positive for fatigue. Negative for fever.   HENT: Negative for congestion.    Eyes: Negative for visual disturbance.   Respiratory: Negative for cough, shortness of breath and wheezing.    Cardiovascular: Negative.  Negative for chest pain.   Gastrointestinal: Negative for abdominal pain, constipation and diarrhea.   Genitourinary: Negative for difficulty urinating and dysuria.   Musculoskeletal: Positive for back pain and neck pain.   Neurological: Negative for weakness, numbness and headaches.   Psychiatric/Behavioral: Positive for sleep disturbance. Negative for suicidal ideas. The patient is not nervous/anxious.      Vitals:    02/14/20 1042   BP: 111/68   Pulse: 80   Resp: 18   Temp: 97.7 °F (36.5 °C)   SpO2:  "100%   Weight: 77.9 kg (171 lb 12.8 oz)   Height: 157.5 cm (62\")   PainSc:   3   PainLoc: Back     Objective   Physical Exam   Constitutional: She is oriented to person, place, and time. She appears well-developed and well-nourished.   HENT:   Head: Normocephalic and atraumatic.   Eyes: Conjunctivae and EOM are normal.   Neck: Normal range of motion.   Cardiovascular: Normal rate.   Pulmonary/Chest: Effort normal.   Musculoskeletal:        Left shoulder: She exhibits decreased range of motion and tenderness.        Cervical back: She exhibits decreased range of motion, tenderness and pain.        Lumbar back: She exhibits decreased range of motion, tenderness and pain.   Neurological: She is alert and oriented to person, place, and time. Gait abnormal.   Skin: Skin is warm, dry and intact.   Psychiatric: She has a normal mood and affect. Her speech is normal and behavior is normal. Judgment and thought content normal.   Nursing note and vitals reviewed.    Assessment/Plan   Traci was seen today for back pain.    Diagnoses and all orders for this visit:    Encounter for long-term (current) use of high-risk medication    Foraminal stenosis of cervical region    Facet arthritis of cervical region    DDD (degenerative disc disease), lumbar    Other chronic pain      --- The urine drug screen confirmation from 10/18/19 has been reviewed and the result is appropriate based on patient history and MATTHEW report  --- Refill OxyContin 30 mg BID. DNF 2/15/20. Patient appears stable with current regimen. No adverse effects. Regarding continuation of opioids, there is no evidence of aberrant behavior or any red flags.  The patient continues with appropriate response to opioid therapy. ADL's remain intact by self.   --- Follow-up 1 month     MATTHEW REPORT    As part of the patient's treatment plan, I am prescribing controlled substances. The patient has been made aware of appropriate use of such medications, including potential " risk of somnolence, limited ability to drive and/or work safely, and the potential for dependence or overdose. It has also bee made clear that these medications are for use by this patient only, without concomitant use of alcohol or other substances unless prescribed.     Patient has completed prescribing agreement detailing terms of continued prescribing of controlled substances, including monitoring MATTHEW reports, urine drug screening, and pill counts if necessary. The patient is aware that inappropriate use will results in cessation of prescribing such medications.    MATTHEW report has been reviewed and scanned into the patient's chart.    As the clinician, I personally reviewed the MATTHEW from 2/13/2020 while the patient was in the office today.    History and physical exam exhibit continued safe and appropriate use of controlled substances.      EMR Dragon/Transcription disclaimer:   Much of this encounter note is an electronic transcription/translation of spoken language to printed text. The electronic translation of spoken language may permit erroneous, or at times, nonsensical words or phrases to be inadvertently transcribed; Although I have reviewed the note for such errors, some may still exist.

## 2020-02-20 ENCOUNTER — TRANSCRIBE ORDERS (OUTPATIENT)
Dept: ADMINISTRATIVE | Facility: HOSPITAL | Age: 70
End: 2020-02-20

## 2020-02-20 DIAGNOSIS — M79.671 RIGHT FOOT PAIN: Primary | ICD-10-CM

## 2020-02-27 ENCOUNTER — HOSPITAL ENCOUNTER (OUTPATIENT)
Dept: GENERAL RADIOLOGY | Facility: HOSPITAL | Age: 70
End: 2020-02-27

## 2020-03-03 ENCOUNTER — HOSPITAL ENCOUNTER (OUTPATIENT)
Dept: GENERAL RADIOLOGY | Facility: HOSPITAL | Age: 70
Discharge: HOME | End: 2020-03-03

## 2020-03-11 ENCOUNTER — OFFICE VISIT (OUTPATIENT)
Dept: PAIN MEDICINE | Facility: CLINIC | Age: 70
End: 2020-03-11

## 2020-03-11 VITALS
TEMPERATURE: 98 F | SYSTOLIC BLOOD PRESSURE: 110 MMHG | HEIGHT: 62 IN | DIASTOLIC BLOOD PRESSURE: 64 MMHG | WEIGHT: 173.6 LBS | OXYGEN SATURATION: 98 % | BODY MASS INDEX: 31.94 KG/M2 | RESPIRATION RATE: 20 BRPM | HEART RATE: 83 BPM

## 2020-03-11 DIAGNOSIS — M51.36 DDD (DEGENERATIVE DISC DISEASE), LUMBAR: ICD-10-CM

## 2020-03-11 DIAGNOSIS — M47.812 FACET ARTHRITIS OF CERVICAL REGION: ICD-10-CM

## 2020-03-11 DIAGNOSIS — G89.21 CHRONIC PAIN DUE TO TRAUMA: Primary | ICD-10-CM

## 2020-03-11 DIAGNOSIS — M47.812 CERVICAL FACET JOINT SYNDROME: ICD-10-CM

## 2020-03-11 DIAGNOSIS — Z79.899 ENCOUNTER FOR LONG-TERM (CURRENT) USE OF HIGH-RISK MEDICATION: ICD-10-CM

## 2020-03-11 PROCEDURE — 99214 OFFICE O/P EST MOD 30 MIN: CPT | Performed by: NURSE PRACTITIONER

## 2020-03-11 PROCEDURE — 96372 THER/PROPH/DIAG INJ SC/IM: CPT | Performed by: NURSE PRACTITIONER

## 2020-03-11 RX ORDER — OXYCODONE HYDROCHLORIDE 30 MG/1
30 TABLET, FILM COATED, EXTENDED RELEASE ORAL EVERY 12 HOURS SCHEDULED
Qty: 60 TABLET | Refills: 0 | Status: SHIPPED | OUTPATIENT
Start: 2020-03-11 | End: 2020-04-08 | Stop reason: SDUPTHER

## 2020-03-11 RX ORDER — METHYLPREDNISOLONE ACETATE 40 MG/ML
40 INJECTION, SUSPENSION INTRA-ARTICULAR; INTRALESIONAL; INTRAMUSCULAR; SOFT TISSUE ONCE
Status: COMPLETED | OUTPATIENT
Start: 2020-03-11 | End: 2020-03-11

## 2020-03-11 RX ADMIN — METHYLPREDNISOLONE ACETATE 40 MG: 40 INJECTION, SUSPENSION INTRA-ARTICULAR; INTRALESIONAL; INTRAMUSCULAR; SOFT TISSUE at 13:19

## 2020-03-11 NOTE — PROGRESS NOTES
"CHIEF COMPLAINT  F/u back, neck and left shoulder pain. Pt sts pain has worsened since last ov. Pt sts she hurts all over, been going on for a long time. Pt sts she has been taking oxycontin 30mg, she sts the medicine only provides her with 9 hours of relief as opposed to 12 hour relief, the med is not lasting anymore and would like to discuss going back on percocet 10mg, like she was previously prescribed.     Subjective   Traci King is a 69 y.o. female  who presents to the office for follow-up.She has a history of chronic back and neck pain. Reports her pain is worse since last office visit.    Complains of pain in her low back and neck and left shoulder. Today her pain is 6/10VAS. Describes her pain as continuous aching and throbbing. Pain increases with walking, standing, activity, household chores; pain decreases with medication, rest and procedures. Continues with OxyContin 30 mg BID(0400 and 1600-- states it's only lasting 9 hours) and Diclofenac 50 mg 2-3/day and Robaxin 750 mg 2-3/day. Denies any side effects from the regimen. She does take dulcolax every day and has been doing this since age 16.  This regimen decreases her pain by 50%, but states does not last between doses.  ADLs by self.    \"I need surgery\" on right foot.  Is under care of Dr Rodriguez. Going to have an orthotic in shoe. Wants to try this before surgery.     She completed a Cervical Facet Nerve (Medial Branch) Radiofrequency Lesioning LEFT C3-C5  on  1/26/18 performed by Dr. Lemus for management of neck pain.  She completed a Bilateral S1 Lumbar TFESI   on  8/16/2018 performed by Dr. LEMUS for management of low back and radicular pain. Patient reports NO relief from the procedure.     Complains of generalized malaise and widespread achiness. Swabbed for mono on 2/12/2020 and received notification of being diagnosed with Mononucleosis   Failed PT, failed ultrasound therapy, failed compounded pain creme.   had heart attack " last week. Had to have stent placed. Released 3-6-20.   Neck Pain    This is a chronic problem. The current episode started more than 1 year ago. The problem occurs constantly. The problem has been waxing and waning (worse since last office visit). The quality of the pain is described as burning. The pain is moderate. The symptoms are aggravated by position, bending, stress and twisting. The pain is worse during the day. Stiffness is present all day. Pertinent negatives include no chest pain, fever, headaches, numbness or weakness. She has tried oral narcotics and NSAIDs (cervical MBB--significant temporary relief; cervical RFL--50% ongoing relief) for the symptoms. The treatment provided moderate relief.   Shoulder Injury    The left shoulder is affected. There was no injury mechanism. The pain radiates to the left neck and right neck. Pain severity now: unchanged since last office visit. Pertinent negatives include no chest pain or numbness. The symptoms are aggravated by movement and palpation.   Back Pain   This is a chronic problem. The current episode started more than 1 year ago. The problem occurs constantly. The problem has been waxing and waning (worse from last office visit) since onset. The pain is at a severity of 6/10. Pertinent negatives include no abdominal pain, chest pain, dysuria, fever, headaches, numbness or weakness.        PEG Assessment   What number best describes your pain on average in the past week?6  What number best describes how, during the past week, pain has interfered with your enjoyment of life?6  What number best describes how, during the past week, pain has interfered with your general activity?  6    The following portions of the patient's history were reviewed and updated as appropriate: allergies, current medications, past family history, past medical history, past social history, past surgical history and problem list.    Review of Systems   Constitutional: Positive for  "activity change (dec) and fatigue. Negative for appetite change and fever.   HENT: Negative for congestion.    Eyes: Negative for visual disturbance.   Respiratory: Negative for cough, shortness of breath and wheezing.    Cardiovascular: Negative.  Negative for chest pain.   Gastrointestinal: Negative for abdominal pain, anal bleeding, constipation and diarrhea.   Genitourinary: Negative for difficulty urinating and dysuria.   Musculoskeletal: Positive for back pain and neck pain. Negative for gait problem and joint swelling.   Allergic/Immunologic: Negative for immunocompromised state.   Neurological: Negative for dizziness, weakness, numbness and headaches.   Psychiatric/Behavioral: Positive for sleep disturbance. Negative for agitation, self-injury and suicidal ideas. The patient is not nervous/anxious.        Vitals:    03/11/20 1245   BP: 110/64   Pulse: 83   Resp: 20   Temp: 98 °F (36.7 °C)   SpO2: 98%   Weight: 78.7 kg (173 lb 9.6 oz)   Height: 157.5 cm (62\")   PainSc:   6   PainLoc: Back     Objective   Physical Exam   Constitutional: She is oriented to person, place, and time. Vital signs are normal. She appears well-developed and well-nourished. She is cooperative.   HENT:   Head: Normocephalic and atraumatic.   Nose: Nose normal.   Eyes: Conjunctivae and lids are normal.   Neck: Spinous process tenderness (MODERATE tenderness bilateral C3-C5 facets) and muscular tenderness present.   Cardiovascular: Normal rate.   Pulmonary/Chest: Effort normal.   Abdominal: Normal appearance.   Musculoskeletal:        Left shoulder: She exhibits decreased range of motion and tenderness. She exhibits no deformity.        Cervical back: She exhibits tenderness.        Lumbar back: She exhibits tenderness.   Widespread OA changes of hands with no acute synovitis   Neurological: She is alert and oriented to person, place, and time. Gait (slow) abnormal.   Skin: Skin is warm, dry and intact.   Psychiatric: She has a normal " mood and affect. Her speech is normal and behavior is normal. Judgment and thought content normal. Cognition and memory are normal.   Nursing note and vitals reviewed.      Assessment/Plan   Traci was seen today for back pain.    Diagnoses and all orders for this visit:    Chronic pain due to trauma    Cervical facet joint syndrome  -     methylPREDNISolone acetate (DEPO-medrol) injection 40 mg    DDD (degenerative disc disease), lumbar  -     methylPREDNISolone acetate (DEPO-medrol) injection 40 mg    Facet arthritis of cervical region  -     methylPREDNISolone acetate (DEPO-medrol) injection 40 mg    Encounter for long-term (current) use of high-risk medication      --- The urine drug screen confirmation from 10-21-19 has been reviewed and the result is APPROPRIATE based on patient history and MATTHEW report  --- Refill OxyContin. Patient appears stable with current regimen. No adverse effects. Regarding continuation of opioids, there is no evidence of aberrant behavior or any red flags.  The patient continues with appropriate response to opioid therapy. ADL's remain intact by self.   --- Reviewed not changing back to previous regimen of Percocet 10/325 6/day.  --- DepoMedrol 40 mg IM now.   --- Follow-up 1 month or sooner if needed.     MATTHEW REPORT    As part of the patient's treatment plan, I am prescribing controlled substances. The patient has been made aware of appropriate use of such medications, including potential risk of somnolence, limited ability to drive and/or work safely, and the potential for dependence or overdose. It has also bee made clear that these medications are for use by this patient only, without concomitant use of alcohol or other substances unless prescribed.     Patient has completed prescribing agreement detailing terms of continued prescribing of controlled substances, including monitoring MATTHEW reports, urine drug screening, and pill counts if necessary. The patient is aware that  inappropriate use will results in cessation of prescribing such medications.    MATTHEW report has been reviewed and scanned into the patient's chart.    As the clinician, I personally reviewed the MATTHEW from 3-10-20 while the patient was in the office today.    History and physical exam exhibit continued safe and appropriate use of controlled substances.      EMR Dragon/Transcription disclaimer:   Much of this encounter note is an electronic transcription/translation of spoken language to printed text. The electronic translation of spoken language may permit erroneous, or at times, nonsensical words or phrases to be inadvertently transcribed; Although I have reviewed the note for such errors, some may still exist.

## 2020-04-08 ENCOUNTER — TELEMEDICINE (OUTPATIENT)
Dept: PAIN MEDICINE | Facility: CLINIC | Age: 70
End: 2020-04-08

## 2020-04-08 DIAGNOSIS — M47.812 CERVICAL FACET JOINT SYNDROME: ICD-10-CM

## 2020-04-08 DIAGNOSIS — Z79.899 ENCOUNTER FOR LONG-TERM (CURRENT) USE OF HIGH-RISK MEDICATION: ICD-10-CM

## 2020-04-08 DIAGNOSIS — M51.36 DDD (DEGENERATIVE DISC DISEASE), LUMBAR: ICD-10-CM

## 2020-04-08 DIAGNOSIS — G89.21 CHRONIC PAIN DUE TO TRAUMA: Primary | ICD-10-CM

## 2020-04-08 DIAGNOSIS — M54.2 NECK PAIN: ICD-10-CM

## 2020-04-08 PROCEDURE — 99214 OFFICE O/P EST MOD 30 MIN: CPT | Performed by: NURSE PRACTITIONER

## 2020-04-08 RX ORDER — OXYCODONE HYDROCHLORIDE 30 MG/1
30 TABLET, FILM COATED, EXTENDED RELEASE ORAL EVERY 12 HOURS SCHEDULED
Qty: 60 TABLET | Refills: 0 | Status: SHIPPED | OUTPATIENT
Start: 2020-04-08 | End: 2020-05-08 | Stop reason: SDUPTHER

## 2020-04-08 NOTE — PROGRESS NOTES
"TELEMEDICINE - VIDEO VISIT  CHIEF COMPLAINT  NECK, BACK PAIN    Subjective   Traci King is a 69 y.o. female  who presents for a video visit follow-up.She has a history of chronic neck, back pain. Reports her pain is UNCHANGED since last office visit.    Complains of pain in her neck and low back. Today her pain is 3/10VAS. Describes the pain as continuous aching and throbbing with intermittent sharp pain. Wants to repeat cervical injections. Continues with OxyContin 30 mg BID(0400 and 1600) and Diclofenac 50 mg 2-3/day and Robaxin 750 mg 2-3/day. Denies any side effects from the regimen. She does take dulcolax every day and has been doing this since age 16.\"I always have it and has always had it since before I started on the medication.\"  This regimen decreases her pain by 50%.  ADLs by self. Denies any bowel or bladder changes.     Has COPD and started inhaler from PCP.  Neck Pain    This is a chronic problem. The current episode started more than 1 year ago. The problem occurs constantly. The problem has been waxing and waning (unchanged since last office visit). The quality of the pain is described as burning. The pain is at a severity of 3/10. The pain is moderate. The symptoms are aggravated by position, bending, stress and twisting. The pain is worse during the day. Stiffness is present all day. Pertinent negatives include no chest pain, fever, headaches, numbness or weakness. She has tried oral narcotics and NSAIDs (cervical MBB--significant temporary relief; cervical RFL--50% ongoing relief) for the symptoms. The treatment provided moderate relief.   Back Pain   This is a chronic problem. The current episode started more than 1 year ago. The problem occurs constantly. The problem has been waxing and waning (unchanged from last office visit) since onset. The pain is at a severity of 3/10. Pertinent negatives include no abdominal pain, chest pain, dysuria, fever, headaches, numbness or weakness.      The " following portions of the patient's history were reviewed and updated as appropriate: allergies, current medications, past family history, past medical history, past social history, past surgical history and problem list.    Review of Systems   Constitutional: Negative for fever.   Respiratory: Negative for chest tightness and shortness of breath.    Cardiovascular: Negative for chest pain.   Gastrointestinal: Negative for abdominal pain.   Genitourinary: Negative for dysuria.   Musculoskeletal: Positive for back pain and neck pain.   Neurological: Negative for weakness, numbness and headaches.   Psychiatric/Behavioral: Positive for sleep disturbance. The patient is nervous/anxious.        There were no vitals filed for this visit.      Objective   Physical Exam        Assessment/Plan   Diagnoses and all orders for this visit:    Chronic pain due to trauma    Neck pain    Cervical facet joint syndrome    DDD (degenerative disc disease), lumbar    Encounter for long-term (current) use of high-risk medication    ----------------    Our practice is offering alternative &/or electronic methods to continue to follow our patients while at the same time further the efforts toward social distancing, in accordance with our organizational policies, professional societies' guidance, and gubernatorial mandates.  I support the Healthy at Home campaign and in this visit I have counseled the patient on our needs to limit in-person office visits and physical encounters with medical facilities whenever possible.  I have also educated the patient on the medical necessities of maintaining social distancing while we continue to function during this crisis period.        The patient was counseled on the need to consider telehealth options. The patient was offered the opportunity for a Video Visit using [employer]. The patient agreed to a Video Visit. The patient was educated about our efforts to comply with monitoring standards when  prescribing potent medications.    ----------------    --- The urine drug screen confirmation from 10-21-19 has been reviewed and the result is APPROPRIATE based on patient history and MATTHEW report  --- Refill OxyContin. Patient appears stable with current regimen. No adverse effects. Regarding continuation of opioids, there is no evidence of aberrant behavior or any red flags.  The patient continues with appropriate response to opioid therapy. ADL's remain intact by self.   --- Repeat cervical interventions when able--- current mandate for non-emergent procedures related to COVID.  --- Follow-up 1 month or sooner if needed.     MATTHEW REPORT    As part of the patient's treatment plan, I am prescribing controlled substances. The patient has been made aware of appropriate use of such medications, including potential risk of somnolence, limited ability to drive and/or work safely, and the potential for dependence or overdose. It has also bee made clear that these medications are for use by this patient only, without concomitant use of alcohol or other substances unless prescribed.     Patient has completed prescribing agreement detailing terms of continued prescribing of controlled substances, including monitoring MATTHEW reports, urine drug screening, and pill counts if necessary. The patient is aware that inappropriate use will results in cessation of prescribing such medications.    MATTHEW report has been reviewed and scanned into the patient's chart.    As the clinician, I personally reviewed the MATTHEW from 4-7-20 while the patient was on the telemedicine visit today.    History and physical exam exhibit continued safe and appropriate use of controlled substances.    -------    EMR Dragon/Transcription disclaimer:   Much of this encounter note is an electronic transcription/translation of spoken language to printed text. The electronic translation of spoken language may permit erroneous, or at times, nonsensical  words or phrases to be inadvertently transcribed; Although I have reviewed the note for such errors, some may still exist.

## 2020-04-13 RX ORDER — METHOCARBAMOL 500 MG/1
TABLET, FILM COATED ORAL
Qty: 90 TABLET | Refills: 2 | Status: SHIPPED | OUTPATIENT
Start: 2020-04-13 | End: 2020-06-09 | Stop reason: SDUPTHER

## 2020-05-08 ENCOUNTER — TELEMEDICINE (OUTPATIENT)
Dept: PAIN MEDICINE | Facility: CLINIC | Age: 70
End: 2020-05-08

## 2020-05-08 DIAGNOSIS — M48.02 FORAMINAL STENOSIS OF CERVICAL REGION: ICD-10-CM

## 2020-05-08 DIAGNOSIS — M51.36 DDD (DEGENERATIVE DISC DISEASE), LUMBAR: ICD-10-CM

## 2020-05-08 DIAGNOSIS — Z79.899 ENCOUNTER FOR LONG-TERM (CURRENT) USE OF HIGH-RISK MEDICATION: ICD-10-CM

## 2020-05-08 DIAGNOSIS — M25.512 CHRONIC LEFT SHOULDER PAIN: ICD-10-CM

## 2020-05-08 DIAGNOSIS — G89.21 CHRONIC PAIN DUE TO TRAUMA: Primary | ICD-10-CM

## 2020-05-08 DIAGNOSIS — G62.9 NEUROPATHY: ICD-10-CM

## 2020-05-08 DIAGNOSIS — G89.29 CHRONIC LEFT SHOULDER PAIN: ICD-10-CM

## 2020-05-08 DIAGNOSIS — M47.812 CERVICAL FACET JOINT SYNDROME: ICD-10-CM

## 2020-05-08 PROCEDURE — 99214 OFFICE O/P EST MOD 30 MIN: CPT | Performed by: NURSE PRACTITIONER

## 2020-05-08 RX ORDER — OXYCODONE HYDROCHLORIDE 30 MG/1
30 TABLET, FILM COATED, EXTENDED RELEASE ORAL EVERY 12 HOURS SCHEDULED
Qty: 60 TABLET | Refills: 0 | Status: SHIPPED | OUTPATIENT
Start: 2020-05-08 | End: 2020-06-09 | Stop reason: SDUPTHER

## 2020-05-08 NOTE — PROGRESS NOTES
"TELEMEDICINE - VIDEO VISIT    You have chosen to receive care through a telehealth visit.  Do you consent to use a video/audio connection for your medical care today? Yes    CHIEF COMPLAINT  Back, neck, shoulder and extremity pain.    Subjective   Traci King is a 70 y.o. female  who presents for a video visit follow-up.She has a history of chronic back, neck, joint and extremity pain. Reports her pain pattern is WORSE since last office visit. Reports her low back pain is worse. She was worried \"it as my uterus falling.\" Saw GYN yesterday. Had a CT scan with and without contrast. Her PCP wants her to have MRI of liver because there was something on CT scan.  MRI-liver is scheduled next week per PCP. The back pain is right in the middle of her back. Is not currently radiating into her legs. Denies any recent falls or trauma. Denies any bowel or bladder incontinence.  PCP is Dr Pereyra with Dr Fournier.     Complains of pain in her low back, shoulder, lower extremities, and neck. Today her pain is 7/10VAS.  Describes her pain as continuous throbbing with burning. Pain increases with walking, standing, activity; pain decreases with medication, rest. Continues with OxyContin 30 mg BID(0400 and 1600) and Diclofenac 50 mg 2-3/day and Robaxin 750 mg 2-3/day. Denies any side effects from the regimen. She does take dulcolax every day and has been doing this since age 16, reports long-standing history of constipation.  This regimen decreases her pain by 40%. Reports the pain medication only helps the acute back pain for approximately 1 hour, but helps her general chronic pain for longer.  ADLs by self. Denies any bowel or bladder changes.     She completed a Cervical Facet Nerve (Medial Branch) Radiofrequency Lesioning LEFT C3-C5  on  1/26/18 performed by Dr. Gan for management of neck pain.  She completed a Bilateral S1 Lumbar TFESI   on  8/16/2018 performed by Dr. GAN for management of low back and radicular " pain. Patient reports NO relief from the procedure.      Complains of generalized malaise and widespread achiness. Swabbed for mono on 2/12/2020 and received notification of being diagnosed with Mononucleosis   Failed PT, failed ultrasound therapy, failed compounded pain creme.   had heart attack last week. Had to have stent placed. Released 3-6-20.   Neck Pain    This is a chronic problem. The current episode started more than 1 year ago. The problem occurs constantly. The problem has been waxing and waning (unchanged since last office visit). The quality of the pain is described as burning. The pain is at a severity of 6/10. The pain is moderate. The symptoms are aggravated by position, bending, stress and twisting. The pain is worse during the day. Stiffness is present all day. Pertinent negatives include no chest pain, fever, headaches, numbness or weakness. She has tried oral narcotics and NSAIDs (cervical MBB--significant temporary relief; cervical RFL--50% ongoing relief) for the symptoms. The treatment provided moderate relief.   Back Pain   This is a chronic problem. The current episode started more than 1 year ago. The problem occurs constantly. The problem has been waxing and waning (worse since last office visit) since onset. The pain is at a severity of 7/10. Pertinent negatives include no abdominal pain, chest pain, dysuria, fever, headaches, numbness or weakness.   Shoulder Injury    The left shoulder is affected. There was no injury mechanism. The pain radiates to the left neck and right neck. Pain severity now: unchanged since last office visit. Pertinent negatives include no chest pain or numbness. The symptoms are aggravated by movement and palpation.      The following portions of the patient's history were reviewed and updated as appropriate: allergies, current medications, past family history, past medical history, past social history, past surgical history and problem list.    Review of  Systems   Constitutional: Negative for fever.   Respiratory: Negative for chest tightness and shortness of breath.    Cardiovascular: Negative for chest pain.   Gastrointestinal: Negative for abdominal pain.   Genitourinary: Negative for dysuria.   Musculoskeletal: Positive for arthralgias, back pain, gait problem and neck pain.   Neurological: Negative for weakness, numbness and headaches.   Psychiatric/Behavioral: Positive for sleep disturbance. The patient is nervous/anxious.      There were no vitals filed for this visit.    Objective   Physical Exam   Constitutional: She is oriented to person, place, and time. She appears well-developed and well-nourished.   HENT:   Head: Normocephalic and atraumatic.   Pulmonary/Chest: Effort normal.   Neurological: She is alert and oriented to person, place, and time.   Psychiatric: She has a normal mood and affect. Her speech is normal and behavior is normal. Judgment and thought content normal. Cognition and memory are normal.       Assessment/Plan   Diagnoses and all orders for this visit:    Chronic pain due to trauma    Cervical facet joint syndrome    Foraminal stenosis of cervical region    DDD (degenerative disc disease), lumbar  -     MRI Lumbar Spine Without Contrast; Future    Chronic left shoulder pain    Neuropathy    Encounter for long-term (current) use of high-risk medication      ----------------    Our practice is offering alternative &/or electronic methods to continue to follow our patients while at the same time further the efforts toward social distancing, in accordance with our organizational policies, professional societies' guidance, and gubernatorial mandates.  I support the Healthy at Home campaign and in this visit I have counseled the patient on our needs to limit in-person office visits and physical encounters with medical facilities whenever possible.  I have also educated the patient on the medical necessities of maintaining social distancing  while we continue to function during this crisis period.      The patient was counseled on the need to consider telehealth options. The patient was offered the opportunity for a Video Visit using [employer]. The patient agreed to a Video Visit. The patient was counseled on the need for a check-in visit. The patient was educated about our efforts to comply with monitoring standards when prescribing potent medications.    TIME:  Total Time:  10 minutes. Topics discussed are outlined in the Assessment/Plan section of the note.    ----------------    --- The urine drug screen confirmation from 10-21-19 has been reviewed and the result is APPROPRIATE based on patient history and MATTHEW report  --- Has previously been ordered cervical MBB, but she had not been able to afford previously.   --- Lumbar MRI at [facility].  --- Refill Oxycontin. Patient appears stable with current regimen. No adverse effects. Regarding continuation of opioids, there is no evidence of aberrant behavior or any red flags.  The patient continues with appropriate response to opioid therapy. ADL's remain intact by self.   -- Discussed CMBB, patient wants to wait at this time.  --- Continue with other specialists and imaging as planned.   --- Follow-up 1 month or sooner if needed.         MATTHEW REPORT    As part of the patient's treatment plan, I am prescribing controlled substances. The patient has been made aware of appropriate use of such medications, including potential risk of somnolence, limited ability to drive and/or work safely, and the potential for dependence or overdose. It has also been made clear that these medications are for use by this patient only, without concomitant use of alcohol or other substances unless prescribed.     Patient has completed prescribing agreement detailing terms of continued prescribing of controlled substances, including monitoring MATTHEW reports, urine drug screening, and pill counts if necessary. The  patient is aware that inappropriate use will results in cessation of prescribing such medications.    MATTHEW report has been reviewed and scanned into the patient's chart.    As the clinician, I personally reviewed the MATTHEW from 5-6-20 while the patient was on the telemedicine visit today.    History and physical exam exhibit continued safe and appropriate use of controlled substances.    -------    EMR Dragon/Transcription disclaimer:   Much of this encounter note is an electronic transcription/translation of spoken language to printed text. The electronic translation of spoken language may permit erroneous, or at times, nonsensical words or phrases to be inadvertently transcribed; Although I have reviewed the note for such errors, some may still exist.

## 2020-05-13 ENCOUNTER — TELEPHONE (OUTPATIENT)
Dept: PAIN MEDICINE | Facility: CLINIC | Age: 70
End: 2020-05-13

## 2020-05-13 NOTE — TELEPHONE ENCOUNTER
Gave results to pt today, pt verbalized understanding. Pt would like you to order facet injections and consider RFL. Can you please order injections? Thank you.

## 2020-05-13 NOTE — TELEPHONE ENCOUNTER
MRI shows nothing acute such as a fracture or herniated disc. Does show degenerative changes of discs and bones of back. There is some pretty severe disc degeneration at L5-S1. The small joints of her back(facets) have arthritis. To help with this, we generally do facet injections and consider RFL, like what the plan was with her neck previously. If she wants, I can order this. Or I can always send for neurosurgery evaluation if she prefers. Let me know. Thanks. BB  ----- Message from Elias Yancey sent at 5/13/2020  3:45 PM EDT -----  Regarding: MRI RESULTS  Traci has already called today requesting that you call her once you review her MRI results. I have scanned the results into her chart. Please advise!    Thanks

## 2020-05-14 DIAGNOSIS — M47.816 LUMBAR FACET ARTHROPATHY: Primary | ICD-10-CM

## 2020-05-18 ENCOUNTER — LAB REQUISITION (OUTPATIENT)
Dept: LAB | Facility: HOSPITAL | Age: 70
End: 2020-05-18

## 2020-05-18 DIAGNOSIS — Z00.00 ENCOUNTER FOR GENERAL ADULT MEDICAL EXAMINATION WITHOUT ABNORMAL FINDINGS: ICD-10-CM

## 2020-05-18 PROCEDURE — U0004 COV-19 TEST NON-CDC HGH THRU: HCPCS | Performed by: ANESTHESIOLOGY

## 2020-05-19 LAB
REF LAB TEST METHOD: NORMAL
SARS-COV-2 RNA RESP QL NAA+PROBE: NOT DETECTED

## 2020-05-20 ENCOUNTER — OUTSIDE FACILITY SERVICE (OUTPATIENT)
Dept: PAIN MEDICINE | Facility: CLINIC | Age: 70
End: 2020-05-20

## 2020-05-20 ENCOUNTER — DOCUMENTATION (OUTPATIENT)
Dept: PAIN MEDICINE | Facility: CLINIC | Age: 70
End: 2020-05-20

## 2020-05-20 PROCEDURE — 64493 INJ PARAVERT F JNT L/S 1 LEV: CPT | Performed by: ANESTHESIOLOGY

## 2020-05-20 PROCEDURE — 64494 INJ PARAVERT F JNT L/S 2 LEV: CPT | Performed by: ANESTHESIOLOGY

## 2020-05-20 NOTE — PROGRESS NOTES
Bilateral L3-5 Lumbar Medial Branch Blockade  [facility]      PREOPERATIVE DIAGNOSIS:  Lumbar spondylosis without myelopathy    POSTOPERATIVE DIAGNOSIS:  Lumbar spondylosis without myelopathy    PROCEDURE:   Diagnostic Bilateral Lumbar Medial Branch Nerve Blockades, with fluoroscopy:  L3, L4, and L5 nerves (at the L4 & L5 transverse processes and the sacral alar groove) to block facet joints L4-5, and L5-S1  1. 04114-17 -- Bilateral Lumbar Facet blocks, 1st Level  2. 54087-61 -- Bilateral Lumbar Facet blocks, 2nd  Level    PRE-PROCEDURE DISCUSSION WITH PATIENT:    Risks and complications were discussed with the patient prior to starting the procedure and informed consent was obtained.      SURGEON:  Jose Luis Gan MD    REASON FOR PROCEDURE:    Painful area identified on exam under fluoroscopy, Pain on extension of the lumbar spine and Positive lumbar facet loading maneuver    SEDATION:  Versed 4mg IV  ANESTHETIC:  Marcaine 0.5%  STEROID:  Methylprednisolone (DEPO MEDROL) 60mg  TOTAL VOLUME OF SOLUTION:  6ml    DESCRIPTON OF PROCEDURE:  After obtaining informed consent, IV access was obtained in the preoperative area.   The patient was taken to the operating room.  The patient was placed in the prone position with a pillow under the abdomen. All pressure points were well padded.  EKG, blood pressure, and pulse oximeter were monitored.  The patient was monitored and sedated by the RN under my direction. The lumbosacral area was prepped with Chloraprep and draped in a sterile fashion. Under fluoroscopic guidance the transverse processes of the L4 and L5 vertebrae at the junctions of the superior articular processes were identified on the right. Also identified was the groove between the ala and the superior articular process of the sacrum on the ipsilateral side.  Skin and subcutaneous tissue were anesthetized with 1% lidocaine above each of these points. A 22-gauge spinal needle was introduced  under fluoroscopic guidance at the above junctions. Aspiration was negative for blood and CSF.  After confirming the position of the needle with fluoroscope in all views, 0.25 mL of Omnipaque was injected, and after seeing the proper spread a total of 1 mL of the anesthetic solution noted above was injected at each of these points.  Needles were removed intact from each of the areas.  A similar procedure was repeated to block the L3, L4, and L5 nerves on the contralateral side.   Onset of analgesia was noted.  Vital signs remained stable throughout.      ESTIMATED BLOOD LOSS:  <5 mL  SPECIMENS:  none    COMPLICATIONS:   No complications were noted., There was no indication of vascular uptake on live injection of contrast dye. and The patient did not have any signs of postprocedure numbness nor weakness.    TOLERANCE & DISCHARGE CONDITION:    The patient tolerated the procedure well.  The patient was transported to the recovery area without difficulties.  The patient was discharged to home under the care of family in stable and satisfactory condition.    PLAN OF CARE:  1. The patient was given our standard instruction sheet.  2. We discussed that Lumbar Medial Branch Blockade is a diagnostic procedure in consideration for radiofrequency ablation if two diagnostic procedures prove to be positive for significant benefit.  If sustained relief of 6 to eight weeks is obtained, then an alternative plan could be therapeutic lumbar branch blockades.  3. The patient is asked to keep a pain log each hour for 8 hours after the procedure today.  4. The patient will  Repeat injection 2 wks.  5. The patient will resume all medications as per the medication reconciliation sheet.

## 2020-05-27 ENCOUNTER — TELEPHONE (OUTPATIENT)
Dept: PAIN MEDICINE | Facility: CLINIC | Age: 70
End: 2020-05-27

## 2020-06-09 ENCOUNTER — TELEMEDICINE (OUTPATIENT)
Dept: PAIN MEDICINE | Facility: CLINIC | Age: 70
End: 2020-06-09

## 2020-06-09 DIAGNOSIS — M47.812 CERVICAL FACET JOINT SYNDROME: ICD-10-CM

## 2020-06-09 DIAGNOSIS — G89.29 OTHER CHRONIC PAIN: Primary | ICD-10-CM

## 2020-06-09 DIAGNOSIS — Z79.899 ENCOUNTER FOR LONG-TERM (CURRENT) USE OF HIGH-RISK MEDICATION: ICD-10-CM

## 2020-06-09 DIAGNOSIS — M51.36 DDD (DEGENERATIVE DISC DISEASE), LUMBAR: ICD-10-CM

## 2020-06-09 DIAGNOSIS — M47.816 LUMBAR FACET ARTHROPATHY: ICD-10-CM

## 2020-06-09 PROCEDURE — 99214 OFFICE O/P EST MOD 30 MIN: CPT | Performed by: NURSE PRACTITIONER

## 2020-06-09 RX ORDER — METHOCARBAMOL 500 MG/1
500 TABLET, FILM COATED ORAL 3 TIMES DAILY
Qty: 90 TABLET | Refills: 2 | Status: SHIPPED | OUTPATIENT
Start: 2020-06-09 | End: 2020-12-14

## 2020-06-09 RX ORDER — OXYCODONE HYDROCHLORIDE 30 MG/1
30 TABLET, FILM COATED, EXTENDED RELEASE ORAL EVERY 12 HOURS SCHEDULED
Qty: 60 TABLET | Refills: 0 | Status: SHIPPED | OUTPATIENT
Start: 2020-06-09 | End: 2020-07-08 | Stop reason: SDUPTHER

## 2020-06-09 NOTE — PROGRESS NOTES
TELEMEDICINE - VIDEO VISIT    You have chosen to receive care through a telehealth visit.  Do you consent to use a video/audio connection for your medical care today? Yes    CHIEF COMPLAINT  Back pain, neck pain, shoulder pain    Subjective   Traci King is a 70 y.o. female  who presents for a video visit follow-up.She has a history of back, neck and shoulder pain, as well as extremity pain.    She was supposed to have an office visit today. However, her car is in the shop and she was changed to video visit.    Patient presents for follow-up of PROCEDURE. Patient had a bilateral L3-L5 MBB performed by Dr. LEMUS on 5-20-20 for management of LOW BACK PAIN. Patient reports 80% relief from the procedure for 2 days. Pain has returned to pre-procedure level.     Complains of pain in her low back, neck, shoulder and extremity. Today her pain is 2-3/10VAS. Took her pain medication at 0630. Describes her pain as nearly continuous aching and throbbing. Continues with OxyContin 30 mg BID(0400 and 1600) and Diclofenac 50 mg 2-3/day and Robaxin 750 mg 2-3/day. Denies any side effects from the regimen. She does take dulcolax every day and has been doing this since age 16, reports long-standing history of constipation.  This regimen decreases her pain by 40%.  ADLs by self. Denies any bowel or bladder changes.      Patient later states that she has been taking 4 Ibuprofen every 4 hours. After further discussion, she states she is taking 4 tablets QID approximately. She also states she is taking diclofenac 50 mg 2-3/day.     She completed a Cervical Facet Nerve (Medial Branch) Radiofrequency Lesioning LEFT C3-C5  on  1/26/18 performed by Dr. Lemus for management of neck pain.  She completed a Bilateral S1 Lumbar TFESI   on  8/16/2018 performed by Dr. LEMUS for management of low back and radicular pain. Patient reports NO relief from the procedure.   Failed PT, failed ultrasound therapy, failed compounded pain  creme.    Neck Pain    This is a chronic problem. The current episode started more than 1 year ago. The problem occurs constantly. The problem has been waxing and waning. The quality of the pain is described as burning. The pain is at a severity of 2/10. The pain is moderate. The symptoms are aggravated by position, bending, stress and twisting. The pain is worse during the day. Stiffness is present all day. Pertinent negatives include no chest pain, fever, headaches, numbness or weakness. She has tried oral narcotics and NSAIDs (cervical MBB--significant temporary relief; cervical RFL--50% ongoing relief) for the symptoms. The treatment provided moderate relief.   Back Pain   This is a chronic problem. The current episode started more than 1 year ago. The problem occurs constantly. The problem has been waxing and waning since onset. The pain is at a severity of 2/10. Pertinent negatives include no chest pain, dysuria, fever, headaches, numbness or weakness.   Shoulder Injury    The left shoulder is affected. There was no injury mechanism. The pain radiates to the left neck and right neck. Pain severity now: unchanged since last office visit. Pertinent negatives include no chest pain or numbness. The symptoms are aggravated by movement and palpation.          The following portions of the patient's history were reviewed and updated as appropriate: allergies, current medications, past family history, past medical history, past social history, past surgical history and problem list.    Review of Systems   Constitutional: Negative for chills and fever.   Respiratory: Negative for cough, chest tightness and shortness of breath.    Cardiovascular: Negative for chest pain.   Gastrointestinal: Positive for constipation. Negative for diarrhea, nausea and vomiting.   Genitourinary: Negative for difficulty urinating and dysuria.   Musculoskeletal: Positive for arthralgias, back pain, joint swelling, myalgias and neck pain.    Neurological: Negative for weakness, numbness and headaches.   Psychiatric/Behavioral: Positive for sleep disturbance. The patient is not nervous/anxious.      There were no vitals filed for this visit.    Objective   Physical Exam   Constitutional: She is oriented to person, place, and time. She appears well-developed and well-nourished.   HENT:   Head: Normocephalic and atraumatic.   Pulmonary/Chest: Effort normal.   Neurological: She is alert and oriented to person, place, and time.   Psychiatric: She has a normal mood and affect. Her speech is normal and behavior is normal. Judgment and thought content normal. Cognition and memory are normal.     Assessment/Plan   Diagnoses and all orders for this visit:    Other chronic pain    Cervical facet joint syndrome    Lumbar facet arthropathy  -     Case Request    DDD (degenerative disc disease), lumbar    Encounter for long-term (current) use of high-risk medication    Other orders  -     methocarbamol (ROBAXIN) 500 MG tablet; Take 1 tablet by mouth 3 (Three) Times a Day.      ----------------    Our practice is offering alternative &/or electronic methods to continue to follow our patients while at the same time further the efforts toward social distancing, in accordance with our organizational policies, professional societies' guidance, and gubernatorial mandates.  I support the Healthy at Home campaign and in this visit I have counseled the patient on our needs to limit in-person office visits and physical encounters with medical facilities whenever possible.  I have also educated the patient on the medical necessities of maintaining social distancing while we continue to function during this crisis period.      The patient was counseled on the need to consider telehealth options. The patient was offered the opportunity for a Video Visit using [employer]. The patient agreed to a Video Visit. The patient was counseled on the need for a check-in visit. The patient  was educated about our efforts to comply with monitoring standards when prescribing potent medications.    TIME:  Total Time:  14 minutes. Topics discussed are outlined in the Assessment/Plan section of the note.    ----------------    --- The urine drug screen confirmation from 10-21-19 has been reviewed and the result is APPROPRIATE based on patient history and MATTHEW report  --- UDS at next office visit  --- Reviewed lumbar MRI with patient. See above.  --- Refill OxyContin and Robaxin. Patient appears stable with current regimen. No adverse effects. Regarding continuation of opioids, there is no evidence of aberrant behavior or any red flags.  The patient continues with appropriate response to opioid therapy. ADL's remain intact by self.   --- repeat bilateral L3-L5 MBB. No blood thinners. Reviewed the procedure at length with the patient.  Included in the review was expectations, complications, risk and benefits.The procedure was described in detail and the risks, benefits and alternatives were discussed with the patient (including but not limited to: bleeding, infection, nerve damage, worsening of pain, inability to perform injection, paralysis, seizures, and death) who agreed to proceed.  Discussed the potential for sedation if warranted/wanted.  The procedure will plan to be performed at [facility] with fluoroscopic guidance(unless ultrasound is indicated) and could potentially have steroids and contrast dye used. Questions were answered and in a way the patient could understand.  Patient verbalized understanding and wishes to proceed.  This intervention will be ordered.  Discussed with patient that all procedures are part of a multimodal plan of care and include either formal PT or a home exercise program.  Patient has no evidence of coagulopathy or current infection.  --- Discussed at length maximum amount of tylenol(3000 mg) and ibuprofen(2400 mg) per day. Also reviewed if she is  still taking Diclofenac, she should discontinue Ibuprofen. Patient verbalized understanding.   --- Follow-up 1 month or sooner if needed.         MATTHEW REPORT    As part of the patient's treatment plan, I am prescribing controlled substances. The patient has been made aware of appropriate use of such medications, including potential risk of somnolence, limited ability to drive and/or work safely, and the potential for dependence or overdose. It has also been made clear that these medications are for use by this patient only, without concomitant use of alcohol or other substances unless prescribed.     Patient has completed prescribing agreement detailing terms of continued prescribing of controlled substances, including monitoring MATTHEW reports, urine drug screening, and pill counts if necessary. The patient is aware that inappropriate use will results in cessation of prescribing such medications.    MATTHEW report has been reviewed and scanned into the patient's chart.    As the clinician, I personally reviewed the MATTHEW from 6-8-20 while the patient was on the telemedicine visit today.    History and physical exam exhibit continued safe and appropriate use of controlled substances.    -------    EMR Dragon/Transcription disclaimer:   Much of this encounter note is an electronic transcription/translation of spoken language to printed text. The electronic translation of spoken language may permit erroneous, or at times, nonsensical words or phrases to be inadvertently transcribed; Although I have reviewed the note for such errors, some may still exist.

## 2020-07-08 ENCOUNTER — TELEMEDICINE (OUTPATIENT)
Dept: PAIN MEDICINE | Facility: CLINIC | Age: 70
End: 2020-07-08

## 2020-07-08 DIAGNOSIS — M47.812 CERVICAL FACET JOINT SYNDROME: ICD-10-CM

## 2020-07-08 DIAGNOSIS — G89.21 CHRONIC PAIN DUE TO TRAUMA: Primary | ICD-10-CM

## 2020-07-08 DIAGNOSIS — Z79.899 ENCOUNTER FOR LONG-TERM (CURRENT) USE OF HIGH-RISK MEDICATION: ICD-10-CM

## 2020-07-08 DIAGNOSIS — M47.816 LUMBAR FACET ARTHROPATHY: ICD-10-CM

## 2020-07-08 DIAGNOSIS — M51.36 DDD (DEGENERATIVE DISC DISEASE), LUMBAR: ICD-10-CM

## 2020-07-08 PROCEDURE — 99214 OFFICE O/P EST MOD 30 MIN: CPT | Performed by: NURSE PRACTITIONER

## 2020-07-08 RX ORDER — OXYCODONE HYDROCHLORIDE 30 MG/1
30 TABLET, FILM COATED, EXTENDED RELEASE ORAL EVERY 12 HOURS SCHEDULED
Qty: 60 TABLET | Refills: 0 | Status: SHIPPED | OUTPATIENT
Start: 2020-07-08 | End: 2020-08-06 | Stop reason: SDUPTHER

## 2020-07-08 NOTE — PROGRESS NOTES
"TELEMEDICINE - VIDEO VISIT    You have chosen to receive care through a telehealth visit.  Do you consent to use a video/audio connection for your medical care today? Yes    Patient called clinic this morning reporting nausea and \"stomach bug.\" Changed to video visit.  CHIEF COMPLAINT  Back, neck, shoulder    Subjective   Traci King is a 70 y.o. female  who presents for a video visit follow-up.She has a history of chronic back, neck and joint pain. Reports her pain pattern is UNCHANGED since last evaluation.    Has been feeling ill for 4+ days. No fever. Having nausea, no vomiting. Complains of headache as well. Does not have fever but feels like she does.     Complains of pain in her low back and neck. Today her pain 3/10VAS. Describes her pain as continuous aching and throbbing with intermittent sharp pain. Pain increases with walking, standing, activity, household chores, bending/lifting/twisting; pain decreases with medication, rest and procedure.  Continues with OxyContin 30 mg BID(0400 and 1600) and Diclofenac 50 mg 2-3/day and Robaxin 750 mg 2-3/day. NO longer taking Ibuprofen.  Is taking Tylenol OTC PRN. Denies any side effects from the regimen. She does take dulcolax every day and has been doing this since age 16, reports long-standing history of constipation.  This regimen decreases her pain by 30-50%.  ADLs by self. Denies any bowel or bladder changes.      Patient had a bilateral L3-L5 MBB performed by Dr. LEMUS on 5-20-20 for management of LOW BACK PAIN. Patient reports 80% relief from the procedure for 2 days. Pain has returned to pre-procedure level.     She completed a Cervical Facet Nerve (Medial Branch) Radiofrequency Lesioning LEFT C3-C5  on  1/26/18 performed by Dr. Lemus for management of neck pain.  She completed a Bilateral S1 Lumbar TFESI   on  8/16/2018 performed by Dr. LEMUS for management of low back and radicular pain. Patient reports NO relief from the procedure.   Failed PT, " failed ultrasound therapy, failed compounded pain creme.    Neck Pain    This is a chronic problem. The current episode started more than 1 year ago. The problem occurs constantly. The problem has been waxing and waning. The quality of the pain is described as burning. The pain is at a severity of 3/10. The pain is moderate. The symptoms are aggravated by position, bending, stress and twisting. The pain is worse during the day. Stiffness is present all day. Associated symptoms include headaches. Pertinent negatives include no chest pain, fever, numbness or weakness. She has tried oral narcotics and NSAIDs (cervical MBB--significant temporary relief; cervical RFL--50% ongoing relief) for the symptoms. The treatment provided moderate relief.   Shoulder Injury    The left shoulder is affected. There was no injury mechanism. The pain radiates to the left neck and right neck. Pain severity now: unchanged since last office visit. Pertinent negatives include no chest pain or numbness. The symptoms are aggravated by movement and palpation.   Back Pain   This is a chronic problem. The current episode started more than 1 year ago. The problem occurs constantly. The problem has been waxing and waning (unchanged from last evaluation.) since onset. The pain is at a severity of 3/10. Associated symptoms include headaches. Pertinent negatives include no abdominal pain, chest pain, dysuria, fever, numbness or weakness.      The following portions of the patient's history were reviewed and updated as appropriate: allergies, current medications, past family history, past medical history, past social history, past surgical history and problem list.    Review of Systems   Constitutional: Negative for chills and fever.   Respiratory: Negative for cough, chest tightness and shortness of breath.    Cardiovascular: Negative for chest pain.   Gastrointestinal: Positive for diarrhea and nausea. Negative for abdominal pain and vomiting.    Genitourinary: Negative for dysuria.   Musculoskeletal: Positive for arthralgias, back pain and neck pain.   Neurological: Positive for headaches. Negative for weakness and numbness.   Psychiatric/Behavioral: Negative for suicidal ideas. The patient is nervous/anxious.      There were no vitals filed for this visit.    Objective   Physical Exam   Constitutional: She is oriented to person, place, and time. She appears well-developed and well-nourished.   HENT:   Head: Normocephalic and atraumatic.   Pulmonary/Chest: Effort normal.   Neurological: She is alert and oriented to person, place, and time.   Psychiatric: She has a normal mood and affect. Her speech is normal and behavior is normal. Judgment and thought content normal. Cognition and memory are normal.       Assessment/Plan   Diagnoses and all orders for this visit:    Chronic pain due to trauma    Cervical facet joint syndrome    DDD (degenerative disc disease), lumbar    Lumbar facet arthropathy    Encounter for long-term (current) use of high-risk medication      ----------------  Our practice is offering alternative &/or electronic methods to continue to follow our patients while at the same time further the efforts toward social distancing, in accordance with our organizational policies, professional societies' guidance, and gubernatorial mandates.  I support the Healthy at Home campaign and in this visit I have counseled the patient on our needs to limit in-person office visits and physical encounters with medical facilities whenever possible.  I have also educated the patient on the medical necessities of maintaining social distancing while we continue to function during this crisis period.      The patient was counseled on the need to consider telehealth options. The patient was offered the opportunity for a Video Visit using [employer]. The patient agreed to a Video Visit. The patient was counseled on the need for a check-in visit. The patient was  educated about our efforts to comply with monitoring standards when prescribing potent medications.    TIME:  Total Time:  10 minutes. Topics discussed are outlined in the Assessment/Plan section of the note.    ----------------    --- The urine drug screen confirmation from 10-21-19 has been reviewed and the result is APPROPRIATE based on patient history and MATTHEW report  --- UDS AT NEXT OFFICE VISIT.  --- Proceed/schedule repeat lumbar L3-L5 MBB as it has been authorized.  Scheduled for 7-21-20 with Dr Gan.  --- Refill OxyContin. Patient appears stable with current regimen. No adverse effects. Regarding continuation of opioids, there is no evidence of aberrant behavior or any red flags.  The patient continues with appropriate response to opioid therapy. ADL's remain intact by self.   --- Follow-up 1 month or sooner if needed.           MATTHEW REPORT    As part of the patient's treatment plan, I am prescribing controlled substances. The patient has been made aware of appropriate use of such medications, including potential risk of somnolence, limited ability to drive and/or work safely, and the potential for dependence or overdose. It has also been made clear that these medications are for use by this patient only, without concomitant use of alcohol or other substances unless prescribed.     Patient has completed prescribing agreement detailing terms of continued prescribing of controlled substances, including monitoring MATTHEW reports, urine drug screening, and pill counts if necessary. The patient is aware that inappropriate use will results in cessation of prescribing such medications.    MATTHEW report has been reviewed and scanned into the patient's chart.    As the clinician, I personally reviewed the MATTHEW from 7-7-20 while the patient was on the telemedicine visit today.    History and physical exam exhibit continued safe and appropriate use of controlled substances.    -------    EMR  Dragon/Transcription disclaimer:   Much of this encounter note is an electronic transcription/translation of spoken language to printed text. The electronic translation of spoken language may permit erroneous, or at times, nonsensical words or phrases to be inadvertently transcribed; Although I have reviewed the note for such errors, some may still exist.

## 2020-07-23 ENCOUNTER — LAB REQUISITION (OUTPATIENT)
Dept: LAB | Facility: HOSPITAL | Age: 70
End: 2020-07-23

## 2020-07-23 DIAGNOSIS — Z00.00 ENCOUNTER FOR GENERAL ADULT MEDICAL EXAMINATION WITHOUT ABNORMAL FINDINGS: ICD-10-CM

## 2020-07-25 PROCEDURE — U0004 COV-19 TEST NON-CDC HGH THRU: HCPCS | Performed by: ANESTHESIOLOGY

## 2020-07-27 LAB
REF LAB TEST METHOD: NORMAL
SARS-COV-2 RNA RESP QL NAA+PROBE: NOT DETECTED

## 2020-07-28 ENCOUNTER — OUTSIDE FACILITY SERVICE (OUTPATIENT)
Dept: PAIN MEDICINE | Facility: CLINIC | Age: 70
End: 2020-07-28

## 2020-07-28 ENCOUNTER — DOCUMENTATION (OUTPATIENT)
Dept: PAIN MEDICINE | Facility: CLINIC | Age: 70
End: 2020-07-28

## 2020-07-28 PROCEDURE — 64494 INJ PARAVERT F JNT L/S 2 LEV: CPT | Performed by: ANESTHESIOLOGY

## 2020-07-28 PROCEDURE — 64493 INJ PARAVERT F JNT L/S 1 LEV: CPT | Performed by: ANESTHESIOLOGY

## 2020-07-28 NOTE — PROGRESS NOTES
Bilateral L3-5 Lumbar Medial Branch Blockade  [facility]      PREOPERATIVE DIAGNOSIS:  Lumbar spondylosis without myelopathy    POSTOPERATIVE DIAGNOSIS:  Lumbar spondylosis without myelopathy    PROCEDURE:   Diagnostic Bilateral Lumbar Medial Branch Nerve Blockades, with fluoroscopy:  L3, L4, and L5 nerves (at the L4 & L5 transverse processes and the sacral alar groove) to block facet joints L4-5, and L5-S1  1. 70892-80 -- Bilateral Lumbar Facet blocks, 1st Level  2. 56012-14 -- Bilateral Lumbar Facet blocks, 2nd  Level    PRE-PROCEDURE DISCUSSION WITH PATIENT:    Risks and complications were discussed with the patient prior to starting the procedure and informed consent was obtained.      SURGEON:  Jose Luis Gan MD    REASON FOR PROCEDURE:    Previous diagnostic positivity of a Lumbar Medial Branch Blockade at the same levels and Tenderness of the affected facet joints on exam under fluoroscopy    SEDATION:  Patient declined administration of moderate sedation    ANESTHETIC:  Marcaine 0.5%  STEROID:  NONE  TOTAL VOLUME OF SOLUTION:  6ml    DESCRIPTON OF PROCEDURE:  After obtaining informed consent, IV access was not obtained in the preoperative area.   The patient was taken to the operating room.  The patient was placed in the prone position with a pillow under the abdomen. All pressure points were well padded.  EKG, blood pressure, and pulse oximeter were monitored.  The patient was monitored and sedated by the RN under my direction. The lumbosacral area was prepped with Chloraprep and draped in a sterile fashion. Under fluoroscopic guidance the transverse processes of the L4 and L5 vertebrae at the junctions of the superior articular processes were identified on the right. Also identified was the groove between the ala and the superior articular process of the sacrum on the ipsilateral side.  Skin and subcutaneous tissue were anesthetized with 1% lidocaine above each of these points. A  22-gauge spinal needle was introduced under fluoroscopic guidance at the above junctions. Aspiration was negative for blood and CSF.  After confirming the position of the needle with fluoroscope in all views, 0.25 mL of Omnipaque was injected, and after seeing the proper spread a total of 1 mL of the anesthetic solution noted above was injected at each of these points.  Needles were removed intact from each of the areas.  A similar procedure was repeated to block the L3, L4, and L5 nerves on the contralateral side.   Onset of analgesia was noted.  Vital signs remained stable throughout.      ESTIMATED BLOOD LOSS:  <5 mL  SPECIMENS:  none    COMPLICATIONS:   No complications were noted., There was no indication of vascular uptake on live injection of contrast dye., There was no indication of intrathecal uptake on live injection of contrast dye., There was not any evidence of dural puncture.   and The patient did not have any signs of postprocedure numbness nor weakness.    TOLERANCE & DISCHARGE CONDITION:    The patient tolerated the procedure well.  The patient was transported to the recovery area without difficulties.  The patient was discharged to home under the care of family in stable and satisfactory condition.    PLAN OF CARE:  1. The patient was given our standard instruction sheet.  2. We discussed that Lumbar Medial Branch Blockade is a diagnostic procedure in consideration for radiofrequency ablation if two diagnostic procedures prove to be positive for significant benefit.  If sustained relief of 6 to eight weeks is obtained, then an alternative plan could be therapeutic lumbar branch blockades.  3. The patient is asked to keep a pain log each hour for 8 hours after the procedure today.  4. The patient will  Return to clinic 1 wk.  5. The patient will resume all medications as per the medication reconciliation sheet.

## 2020-08-06 ENCOUNTER — RESULTS ENCOUNTER (OUTPATIENT)
Dept: PAIN MEDICINE | Facility: CLINIC | Age: 70
End: 2020-08-06

## 2020-08-06 ENCOUNTER — OFFICE VISIT (OUTPATIENT)
Dept: PAIN MEDICINE | Facility: CLINIC | Age: 70
End: 2020-08-06

## 2020-08-06 VITALS
WEIGHT: 180.2 LBS | HEIGHT: 61 IN | OXYGEN SATURATION: 98 % | DIASTOLIC BLOOD PRESSURE: 66 MMHG | HEART RATE: 90 BPM | SYSTOLIC BLOOD PRESSURE: 119 MMHG | BODY MASS INDEX: 34.02 KG/M2 | TEMPERATURE: 96.3 F | RESPIRATION RATE: 20 BRPM

## 2020-08-06 DIAGNOSIS — G89.29 OTHER CHRONIC PAIN: Primary | ICD-10-CM

## 2020-08-06 DIAGNOSIS — M47.816 LUMBAR FACET ARTHROPATHY: ICD-10-CM

## 2020-08-06 DIAGNOSIS — G89.29 CHRONIC LEFT SHOULDER PAIN: ICD-10-CM

## 2020-08-06 DIAGNOSIS — M50.30 DEGENERATIVE DISC DISEASE, CERVICAL: ICD-10-CM

## 2020-08-06 DIAGNOSIS — M25.512 CHRONIC LEFT SHOULDER PAIN: ICD-10-CM

## 2020-08-06 DIAGNOSIS — Z79.899 ENCOUNTER FOR LONG-TERM (CURRENT) USE OF HIGH-RISK MEDICATION: ICD-10-CM

## 2020-08-06 DIAGNOSIS — M51.36 DDD (DEGENERATIVE DISC DISEASE), LUMBAR: ICD-10-CM

## 2020-08-06 DIAGNOSIS — G89.29 OTHER CHRONIC PAIN: ICD-10-CM

## 2020-08-06 PROCEDURE — 80305 DRUG TEST PRSMV DIR OPT OBS: CPT | Performed by: NURSE PRACTITIONER

## 2020-08-06 PROCEDURE — 99214 OFFICE O/P EST MOD 30 MIN: CPT | Performed by: NURSE PRACTITIONER

## 2020-08-06 RX ORDER — OXYCODONE HYDROCHLORIDE 30 MG/1
30 TABLET, FILM COATED, EXTENDED RELEASE ORAL EVERY 12 HOURS SCHEDULED
Qty: 60 TABLET | Refills: 0 | Status: SHIPPED | OUTPATIENT
Start: 2020-08-06 | End: 2020-09-02 | Stop reason: SDUPTHER

## 2020-08-06 NOTE — PROGRESS NOTES
CHIEF COMPLAINT  F/u back, neck and right shoulder pain. Pt had Bilateral L3-5 Lumbar Medial Branch Blockade on 7/28/20. Pt sts receiving 50% relief x a week and is still getting relief today. Pt sts back pain has improved since inj. Pt sts neck and right shoulder pain has worsened since last ov.     Subjective   Traci King is a 70 y.o. female  who presents for follow-up.  She has a history of chronic back, neck and shoulder pain. Reports her back pain is improved since procedure. Her neck and shoulder pain are worse since last office visit.    Patient presents for follow-up of PROCEDURE. Patient had a bilateral L3-L5 MBB performed by Dr. LEMUS on 7-28-20 for management of LOW BACK PAIN. Patient reports 50% ONGOING relief from the procedure.  Reports improvement in activity and sleep. Patient wants to proceed with RFL.    Patient had a bilateral L3-L5 MBB performed by Dr. LEMUS on 5-20-20 for management of LOW BACK PAIN. Patient reports 80% relief from the procedure for 2 days. Pain has returned to pre-procedure level.     Complains of pain in her low back, neck and right shoulder. Today her pain is 2/10VAS in her low back. Her neck and shoulder pain are 3-4/10VAS. Describes her pain as continuous aching and throbbing. Pain increases with walking, standing, activity, household chores, no mediation; pain decreases with medication, rest and procedures. Continues with OxyContin 30 mg BID(0400 and 1600) and Diclofenac 50 mg 2/day(Recently saw PCP. Had labwork. Was found to have a slightly elevated and told to decrease Diclofenac) and Robaxin 750 mg 2-3/day.  Is taking Tylenol OTC PRN. Denies any side effects from the regimen. She does take dulcolax every day and has been doing this since age 16, reports long-standing history of constipation.  This regimen decreases her pain by 40-50%, but does not always last between doses.  ADLs by self. Denies any bowel or bladder changes.       She completed a Cervical  Facet Nerve (Medial Branch) Radiofrequency Lesioning LEFT C3-C5  on  1/26/18 performed by Dr. Lemus for management of neck pain.  She completed a Bilateral S1 Lumbar TFESI   on  8/16/2018 performed by Dr. LEMUS for management of low back and radicular pain. Patient reports NO relief from the procedure.   Failed PT, failed ultrasound therapy, failed compounded pain creme.    Patient remained masked during entire encounter. No cough present. I donned a mask and gloves throughout entire visit. Prior to donning mask and gloves, hand hygiene was performed, as well as when it was doffed.  I was closer than 6 feet, but not for an extended period of time. No obvious exposure to any bodily fluids.    Neck Pain    This is a chronic problem. The current episode started more than 1 year ago. The problem occurs constantly. The problem has been waxing and waning (worse since last evaluation). The quality of the pain is described as burning. The pain is at a severity of 2/10. The pain is moderate. The symptoms are aggravated by position, bending, stress and twisting. The pain is worse during the day. Stiffness is present all day. Associated symptoms include headaches. Pertinent negatives include no chest pain, fever, numbness or weakness. She has tried oral narcotics and NSAIDs (cervical MBB--significant temporary relief; cervical RFL--50% ongoing relief) for the symptoms. The treatment provided moderate relief.   Shoulder Injury    The left shoulder is affected. There was no injury mechanism. The pain radiates to the left neck and right neck. Pain severity now: unchanged since last office visit. Pertinent negatives include no chest pain or numbness. The symptoms are aggravated by movement and palpation.   Back Pain   This is a chronic problem. The current episode started more than 1 year ago. The problem occurs constantly. The problem has been waxing and waning (improved from last evaluation.) since onset. The pain is at a  "severity of 3/10. Associated symptoms include headaches. Pertinent negatives include no abdominal pain, chest pain, dysuria, fever, numbness or weakness.      PEG Assessment   What number best describes your pain on average in the past week?2  What number best describes how, during the past week, pain has interfered with your enjoyment of life?2  What number best describes how, during the past week, pain has interfered with your general activity?  2    The following portions of the patient's history were reviewed and updated as appropriate: allergies, current medications, past family history, past medical history, past social history, past surgical history and problem list.    Review of Systems   Constitutional: Negative for activity change, chills, fatigue and fever.   HENT: Negative for congestion.    Eyes: Negative for visual disturbance.   Respiratory: Negative for cough, chest tightness and shortness of breath.    Cardiovascular: Negative for chest pain.   Gastrointestinal: Negative for abdominal pain, constipation, diarrhea, nausea and vomiting.   Genitourinary: Negative for difficulty urinating and dysuria.   Musculoskeletal: Positive for arthralgias (right shoulder), back pain, neck pain and neck stiffness. Negative for gait problem.   Neurological: Positive for headaches. Negative for dizziness, weakness, light-headedness and numbness.   Psychiatric/Behavioral: Negative for agitation, sleep disturbance and suicidal ideas. The patient is nervous/anxious.        Vitals:    08/06/20 1403   BP: 119/66   Pulse: 90   Resp: 20   Temp: 96.3 °F (35.7 °C)   SpO2: 98%   Weight: 81.7 kg (180 lb 3.2 oz)   Height: 154.9 cm (61\")   PainSc:   2   PainLoc: Back     Objective   Physical Exam   Constitutional: She is oriented to person, place, and time. Vital signs are normal. She appears well-developed and well-nourished. She is cooperative.   HENT:   Head: Normocephalic and atraumatic.   Nose: Nose normal.   Eyes: " Conjunctivae and lids are normal.   Neck: Spinous process tenderness (MODERATE tenderness bilateral C3-C5 facets) and muscular tenderness present.   Cardiovascular: Normal rate.   Pulmonary/Chest: Effort normal.   Abdominal: Normal appearance.   Musculoskeletal:        Right shoulder: She exhibits decreased range of motion and tenderness. She exhibits no deformity.        Left shoulder: She exhibits decreased range of motion and tenderness. She exhibits no deformity.        Cervical back: She exhibits tenderness.        Lumbar back: She exhibits decreased range of motion, tenderness and bony tenderness (moderate tenderness bilateral l3-L5 with + loading manauever).   Widespread OA changes of hands with no acute synovitis   Neurological: She is alert and oriented to person, place, and time. Gait (slow) abnormal.   Skin: Skin is warm, dry and intact.   Psychiatric: She has a normal mood and affect. Her speech is normal and behavior is normal. Judgment and thought content normal. Cognition and memory are normal.   Nursing note and vitals reviewed.      Assessment/Plan   Traci was seen today for back pain, neck pain and shoulder pain.    Diagnoses and all orders for this visit:    Other chronic pain    DDD (degenerative disc disease), lumbar    Degenerative disc disease, cervical    Lumbar facet arthropathy  -     Case Request    Chronic left shoulder pain    Encounter for long-term (current) use of high-risk medication      --- Routine UDS in office today as part of monitoring requirements for controlled substances.  The specimen was viewed and the immunoassay result reviewed and is +OXY, +BZD.  This specimen will be sent to [facility] for confirmation.     --- bilateral L3-L5 RFL. No blood thinners. Reviewed the procedure at length with the patient.  Included in the review was expectations, complications, risk and benefits.The procedure was described in detail and the risks, benefits and alternatives were  "discussed with the patient (including but not limited to: bleeding, infection, nerve damage, worsening of pain, inability to perform injection, paralysis, seizures, and death) who agreed to proceed.  Discussed the potential for sedation if warranted/wanted.  The procedure will plan to be performed at [facility] with fluoroscopic guidance(unless ultrasound is indicated) and could potentially have steroids and contrast dye used. Questions were answered and in a way the patient could understand.  Patient verbalized understanding and wishes to proceed.  This intervention will be ordered.  Discussed with patient that all procedures are part of a multimodal plan of care and include either formal PT or a home exercise program.  Patient has no evidence of coagulopathy or current infection.  --- Refill OxyContin. Patient appears stable with current regimen. No adverse effects. Regarding continuation of opioids, there is no evidence of aberrant behavior or any red flags.  The patient continues with appropriate response to opioid therapy. ADL's remain intact by self.   --- Decrease Diclofenac as per PCP.   --- Follow-up 1 month or sooner if needed.    -------  Education about Medial Branch Blockade and RF Therapy:    This medial branch blockade (MBB) suggested is intended for diagnostic purposes, with the intent of offering the patient Radiofrequency thermal rhizotomy (RF) if the MBB is diagnostically effective.  The diagnostic blockade is necessary to determine the likelihood that RF therapy could be efficacious in providing long term relief to the patient.    Medial branches are sensory nerve branches that connect to a facet joint and transmit sensations & pain signals from that joint.  Facet is a term for the type of joints found in the spine.  Medial branches are the nerves that go to a facet, and therefore are also sometimes called \"facet joint nerves\" (FJNs).      In a medial branch blockade " procedure, xray fluoroscopy is used to verify the locations of the outside of the joint lines which are being targeted.  Under xray guidance, needles are placed to these areas.  Contrast dye is injected to confirm proper placement, with dye flowing over the joint area, and to ensure that the dye does not flow into unintended areas such as a vein.  When this is confirmed, local anesthetic is injected to block the medial branch at that joint level.      If MBBs are diagnostically successful in blocking pain, then the patient is most likely a great candidate for Radiofrequency of those facet joint nerves.  In the RF procedure, needles are placed to the joint lines in the same fashion, and after testing, the needle tips are heated to thermally treat the nerves, blocking the nerves by in essence damaging the nerves with the heat treatment.       Medically, a successful RF procedure should provide a patient with 50% pain relief or more for at least 6 months.  Clinical experience suggests that successful patients receive relief more in the range of 12 months on average.  We also discussed that a fortunate minority of patients receive therapeutic success from the MBB, and may not require RF ablation.  If a patient receives more than 8 weeks of relief from MBB, then occasional repeat MBB for therapeutic purposes is a very reasonable alternative therapy.  This course of therapy is consistent with our LCDs according to our CMS  in the area, and therefore other insurance providers should follow accordingly.  We will monitor our patients to screen for these therapeutic responders and will offer RF therapy only when necessary.        We discussed that MBB & RF are not without risks.  Guidelines regarding anticoagulant use & neuraxial procedures will be respected.  Patients that are ill or otherwise may be at risk for sepsis will not have their spines accessed by neuraxial injections of any type.  This patient will  not be offered these therapies if there is an increased risk.   We discussed that there is a risk of postprocedural pain and also a risk of worsening of clinical picture with these procedures as with any neuraxial procedure.    -------       MATTHEW REPORT    As part of the patient's treatment plan, I am prescribing controlled substances. The patient has been made aware of appropriate use of such medications, including potential risk of somnolence, limited ability to drive and/or work safely, and the potential for dependence or overdose. It has also bee made clear that these medications are for use by this patient only, without concomitant use of alcohol or other substances unless prescribed.     Patient has completed prescribing agreement detailing terms of continued prescribing of controlled substances, including monitoring MATTHEW reports, urine drug screening, and pill counts if necessary. The patient is aware that inappropriate use will results in cessation of prescribing such medications.    MATTHEW report has been reviewed and scanned into the patient's chart.    As the clinician, I personally reviewed the MATTHEW from 8-4-20 .    History and physical exam exhibit continued safe and appropriate use of controlled substances.      EMR Dragon/Transcription disclaimer:   Much of this encounter note is an electronic transcription/translation of spoken language to printed text. The electronic translation of spoken language may permit erroneous, or at times, nonsensical words or phrases to be inadvertently transcribed; Although I have reviewed the note for such errors, some may still exist.

## 2020-09-02 ENCOUNTER — OFFICE VISIT (OUTPATIENT)
Dept: PAIN MEDICINE | Facility: CLINIC | Age: 70
End: 2020-09-02

## 2020-09-02 VITALS
SYSTOLIC BLOOD PRESSURE: 109 MMHG | WEIGHT: 182.2 LBS | HEART RATE: 85 BPM | OXYGEN SATURATION: 98 % | HEIGHT: 61 IN | BODY MASS INDEX: 34.4 KG/M2 | DIASTOLIC BLOOD PRESSURE: 73 MMHG | TEMPERATURE: 97.1 F | RESPIRATION RATE: 20 BRPM

## 2020-09-02 DIAGNOSIS — Z79.899 ENCOUNTER FOR LONG-TERM (CURRENT) USE OF HIGH-RISK MEDICATION: ICD-10-CM

## 2020-09-02 DIAGNOSIS — M25.519 SHOULDER PAIN, UNSPECIFIED CHRONICITY, UNSPECIFIED LATERALITY: ICD-10-CM

## 2020-09-02 DIAGNOSIS — M47.812 CERVICAL FACET JOINT SYNDROME: ICD-10-CM

## 2020-09-02 DIAGNOSIS — M51.36 DDD (DEGENERATIVE DISC DISEASE), LUMBAR: ICD-10-CM

## 2020-09-02 DIAGNOSIS — M47.816 LUMBAR FACET ARTHROPATHY: ICD-10-CM

## 2020-09-02 DIAGNOSIS — M50.30 DEGENERATIVE DISC DISEASE, CERVICAL: ICD-10-CM

## 2020-09-02 DIAGNOSIS — G89.21 CHRONIC PAIN DUE TO TRAUMA: Primary | ICD-10-CM

## 2020-09-02 PROCEDURE — 99214 OFFICE O/P EST MOD 30 MIN: CPT | Performed by: NURSE PRACTITIONER

## 2020-09-02 RX ORDER — OXYCODONE HYDROCHLORIDE 30 MG/1
30 TABLET, FILM COATED, EXTENDED RELEASE ORAL EVERY 12 HOURS SCHEDULED
Qty: 60 TABLET | Refills: 0 | Status: SHIPPED | OUTPATIENT
Start: 2020-09-02 | End: 2020-09-29 | Stop reason: SDUPTHER

## 2020-09-02 RX ORDER — PHENTERMINE HYDROCHLORIDE 37.5 MG/1
TABLET ORAL DAILY
COMMUNITY
Start: 2020-07-24 | End: 2020-12-14

## 2020-09-02 NOTE — PROGRESS NOTES
"CHIEF COMPLAINT  F/u back, neck and right shoulder pain. Pt sts back and neck pain are the same. Pt sts increased right shoulder pain. Pt sts Bilateral L3-5 Lumbar Medial Branch Blockade has worn off and would like to discuss getting bilateral L3-L5 RFL.    Subjective   Traci King is a 70 y.o. female  who presents for follow-up.  She has a history of chronic back, neck and shoulder pain. Reports her back pain has been variable, neck pain is unchanged and right shoulder pain is worse since last evaluation.    Complains of pain in her low back, neck and right shoulder. Today her pain is 2/10VAS.  Describes her pain as continuous Throbbing. Pain increases with walking, standing, activity, household chores; pain decreases with medication, rest and procedures. Continues with OxyContin 30 mg BID(0400 and 1600) and Diclofenac 50 mg 1-2/day(takes in half doses) and Robaxin 750 mg 2-3/day(takes in half doses).  Is taking Tylenol OTC PRN. Denies any side effects from the regimen. She does take dulcolax every day and has been doing this since age 16, reports long-standing history of constipation. \"I've had it my whole life.\"  This regimen decreases her pain by 40-50%. ADLs by self. Denies any bowel or bladder changes.      Reports she has a history of left torn rotator cuff and needs surgery but doesn't want it. Unsure if right shoulder is now torn. Does not remember any injury or trauma. Previously under the care of Dr kajal Curry.  Has not seen in some time.    Patient had a bilateral L3-L5 MBB performed by Dr. LEMUS on 7-28-20 for management of LOW BACK PAIN. Initially had 80-85% relief for 1 day.  Then she reports 50% ONGOING relief from the procedure for 1 month.  Reports improvement in activity and sleep. Patient wants to proceed with RFL. Pain has returned to pre-procedure level.   Patient had a bilateral L3-L5 MBB performed by Dr. LEMUS on 5-20-20 for management of LOW BACK PAIN. Patient reports 80% relief " from the procedure for 2 days. Pain has returned to pre-procedure level.     She completed a Cervical Facet Nerve (Medial Branch) Radiofrequency Lesioning LEFT C3-C5  on  1/26/18 performed by Dr. Lemus for management of neck pain.  She completed a Bilateral S1 Lumbar TFESI   on  8/16/2018 performed by Dr. LEMUS for management of low back and radicular pain. Patient reports NO relief from the procedure.   Failed PT, failed ultrasound therapy, failed compounded pain creme.     Patient remained masked during entire encounter. No cough present. I donned a mask throughout entire visit. Prior to donning mask, hand hygiene was performed, as well as when it was doffed.  I was closer than 6 feet, but not for an extended period of time. No obvious exposure to any bodily fluids.    Neck Pain    This is a chronic problem. The current episode started more than 1 year ago. The problem occurs constantly. The problem has been waxing and waning. The quality of the pain is described as burning. The pain is at a severity of 2/10. The pain is moderate. The symptoms are aggravated by position, bending, stress and twisting. The pain is worse during the day. Stiffness is present all day. Pertinent negatives include no chest pain, fever, headaches, numbness or weakness. She has tried oral narcotics and NSAIDs (cervical MBB--significant temporary relief; cervical RFL--50% ongoing relief) for the symptoms. The treatment provided moderate relief.   Shoulder Injury    The left shoulder is affected. There was no injury mechanism. The pain radiates to the left neck and right neck. Pain severity now: unchanged since last office visit. Pertinent negatives include no chest pain or numbness. The symptoms are aggravated by movement and palpation.   Back Pain   This is a chronic problem. The current episode started more than 1 year ago. The problem occurs constantly. The problem has been waxing and waning since onset. The pain is at a severity of  "2/10. Pertinent negatives include no abdominal pain, chest pain, dysuria, fever, headaches, numbness or weakness.      PEG Assessment   What number best describes your pain on average in the past week?2  What number best describes how, during the past week, pain has interfered with your enjoyment of life?2  What number best describes how, during the past week, pain has interfered with your general activity?  2      The following portions of the patient's history were reviewed and updated as appropriate: allergies, current medications, past family history, past medical history, past social history, past surgical history and problem list.    Review of Systems   Constitutional: Positive for fatigue. Negative for activity change and fever.   HENT: Negative for congestion.    Eyes: Negative for visual disturbance.   Respiratory: Positive for shortness of breath (occ w exertion). Negative for apnea.    Cardiovascular: Negative for chest pain.   Gastrointestinal: Negative for abdominal pain, constipation and diarrhea.   Genitourinary: Negative for difficulty urinating and dysuria.   Musculoskeletal: Positive for arthralgias (right shoulder), back pain, neck pain and neck stiffness.   Allergic/Immunologic: Negative for immunocompromised state.   Neurological: Negative for dizziness, weakness, light-headedness, numbness and headaches.   Psychiatric/Behavioral: Positive for sleep disturbance. Negative for agitation and suicidal ideas. The patient is not nervous/anxious.      I have reviewed and confirmed the accuracy of the ROS as documented by the MA/LPN/RN Patricia Chowdary, DACIA      Vitals:    09/02/20 1523   BP: 109/73   Pulse: 85   Resp: 20   Temp: 97.1 °F (36.2 °C)   SpO2: 98%   Weight: 82.6 kg (182 lb 3.2 oz)   Height: 154.9 cm (61\")   PainSc:   2   PainLoc: Back  Comment: neck and right shoulder     Objective   Physical Exam   Constitutional: She is oriented to person, place, and time. Vital signs are normal. She " appears well-developed and well-nourished. She is cooperative.   HENT:   Head: Normocephalic and atraumatic.   Nose: Nose normal.   Eyes: Conjunctivae and lids are normal.   Neck: Spinous process tenderness and muscular tenderness present.   Cardiovascular: Normal rate.   Pulmonary/Chest: Effort normal.   Abdominal: Normal appearance.   Musculoskeletal:        Right shoulder: She exhibits decreased range of motion and tenderness.        Left shoulder: She exhibits decreased range of motion.        Cervical back: She exhibits tenderness.        Lumbar back: She exhibits decreased range of motion, tenderness and bony tenderness (moderate tenderness bilateral l3-L5 with + loading manauever).   Neurological: She is alert and oriented to person, place, and time. Gait (slow) abnormal.   Skin: Skin is warm, dry and intact.   Psychiatric: She has a normal mood and affect. Her speech is normal and behavior is normal. Judgment and thought content normal. Cognition and memory are normal.   Nursing note and vitals reviewed.    Assessment/Plan   Traci was seen today for back pain, neck pain and shoulder pain.    Diagnoses and all orders for this visit:    Chronic pain due to trauma    Cervical facet joint syndrome    DDD (degenerative disc disease), lumbar    Degenerative disc disease, cervical    Lumbar facet arthropathy    Shoulder pain, unspecified chronicity, unspecified laterality    Encounter for long-term (current) use of high-risk medication      --- The urine drug screen confirmation from 8-6-20 has been reviewed and the result is APPROPRIATE based on patient history and MATTHEW report  --- Proceed with lumbar RFL when authorized. Had 80% diagnostic relief X2. Appropriate to proceed with RFL.  --- Refill oxyContin. With DNF. Patient appears stable with current regimen. No adverse effects. Regarding continuation of opioids, there is no evidence of aberrant behavior or any red flags.  The patient continues with appropriate  response to opioid therapy. ADL's remain intact by self.   --- Follow-up 1 month or sooner if needed.       MATTHEW REPORT  As part of the patient's treatment plan, I am prescribing controlled substances. The patient has been made aware of appropriate use of such medications, including potential risk of somnolence, limited ability to drive and/or work safely, and the potential for dependence or overdose. It has also bee made clear that these medications are for use by this patient only, without concomitant use of alcohol or other substances unless prescribed.     Patient has completed prescribing agreement detailing terms of continued prescribing of controlled substances, including monitoring MATTHEW reports, urine drug screening, and pill counts if necessary. The patient is aware that inappropriate use will results in cessation of prescribing such medications.    MATTHEW report has been reviewed and scanned into the patient's chart.    As the clinician, I personally reviewed the MATTHEW from 9-1-20 while the patient was in the office today.    History and physical exam exhibit continued safe and appropriate use of controlled substances.        EMR Dragon/Transcription disclaimer:   Much of this encounter note is an electronic transcription/translation of spoken language to printed text. The electronic translation of spoken language may permit erroneous, or at times, nonsensical words or phrases to be inadvertently transcribed; Although I have reviewed the note for such errors, some may still exist.

## 2020-09-29 ENCOUNTER — OFFICE VISIT (OUTPATIENT)
Dept: PAIN MEDICINE | Facility: CLINIC | Age: 70
End: 2020-09-29

## 2020-09-29 VITALS
RESPIRATION RATE: 20 BRPM | TEMPERATURE: 96.8 F | DIASTOLIC BLOOD PRESSURE: 64 MMHG | HEIGHT: 61 IN | HEART RATE: 72 BPM | SYSTOLIC BLOOD PRESSURE: 111 MMHG | OXYGEN SATURATION: 100 % | BODY MASS INDEX: 34.7 KG/M2 | WEIGHT: 183.8 LBS

## 2020-09-29 DIAGNOSIS — M47.812 CERVICAL FACET JOINT SYNDROME: ICD-10-CM

## 2020-09-29 DIAGNOSIS — M25.519 SHOULDER PAIN, UNSPECIFIED CHRONICITY, UNSPECIFIED LATERALITY: ICD-10-CM

## 2020-09-29 DIAGNOSIS — G89.29 OTHER CHRONIC PAIN: Primary | ICD-10-CM

## 2020-09-29 DIAGNOSIS — M47.816 LUMBAR FACET ARTHROPATHY: ICD-10-CM

## 2020-09-29 DIAGNOSIS — Z79.899 ENCOUNTER FOR LONG-TERM (CURRENT) USE OF HIGH-RISK MEDICATION: ICD-10-CM

## 2020-09-29 PROCEDURE — 99214 OFFICE O/P EST MOD 30 MIN: CPT | Performed by: NURSE PRACTITIONER

## 2020-09-29 RX ORDER — OXYCODONE HYDROCHLORIDE 30 MG/1
30 TABLET, FILM COATED, EXTENDED RELEASE ORAL EVERY 12 HOURS SCHEDULED
Qty: 60 TABLET | Refills: 0 | Status: SHIPPED | OUTPATIENT
Start: 2020-09-29 | End: 2020-10-29 | Stop reason: SDUPTHER

## 2020-09-29 RX ORDER — INFLUENZA A VIRUS A/MICHIGAN/45/2015 X-275 (H1N1) ANTIGEN (FORMALDEHYDE INACTIVATED), INFLUENZA A VIRUS A/SINGAPORE/INFIMH-16-0019/2016 IVR-186 (H3N2) ANTIGEN (FORMALDEHYDE INACTIVATED), INFLUENZA B VIRUS B/PHUKET/3073/2013 ANTIGEN (FORMALDEHYDE INACTIVATED), AND INFLUENZA B VIRUS B/MARYLAND/15/2016 BX-69A ANTIGEN (FORMALDEHYDE INACTIVATED) 60; 60; 60; 60 UG/.7ML; UG/.7ML; UG/.7ML; UG/.7ML
INJECTION, SUSPENSION INTRAMUSCULAR
COMMUNITY
Start: 2020-09-11 | End: 2021-09-05

## 2020-09-29 RX ORDER — BENZONATATE 100 MG/1
100 CAPSULE ORAL 3 TIMES DAILY PRN
COMMUNITY
Start: 2020-09-11 | End: 2021-02-03 | Stop reason: SDDI

## 2020-09-29 NOTE — TELEPHONE ENCOUNTER
Seen in office today. DNF applied. Needs to be sent in a little early due to new pharmacy and needing to order. Thanks. VERONICA

## 2020-09-29 NOTE — PROGRESS NOTES
"CHIEF COMPLAINT  F/u back, neck, and bilat shoulders. Pt sts pain has worsened a little since last ov.     Subjective   Traci King is a 70 y.o. female  who presents for follow-up.  She has a history of chronic back, neck and shoulder pain. Reports her pain is worse since last evaluation. Insurance initially denied lumbar RFL, stating she did not have 80% relief, which is inaccurate. It has been documented multiple times she had 80% relief with both LMBB on 7-28-20 and 5-20-20.     Complains of pain in her low back, neck and shoulder. Today her pain is 3/10VAS. Describes the pain as continuous aching and throbbing. Continues with OxyContin 30 mg BID(0400 and 1600) and Diclofenac 50 mg 1-2/day(takes in half doses) and Robaxin 750 mg 2-3/day(takes in half doses).  Is taking Tylenol OTC PRN. Denies any side effects from the regimen. She does take dulcolax every day and has been doing this since age 16, reports long-standing history of constipation. \"I've had it my whole life.\"  This regimen decreases her pain by 40-50%. ADLs by self. Denies any bowel or bladder changes.      Patient had a bilateral L3-L5 MBB performed by Dr. LEMUS on 7-28-20 for management of LOW BACK PAIN. Initially had 80-85% relief for 1 day.  Then she reports 50% ONGOING relief from the procedure for 1 month.  Reports improvement in activity and sleep. Patient wants to proceed with RFL. Pain has returned to pre-procedure level.   Patient had a bilateral L3-L5 MBB performed by Dr. LEMUS on 5-20-20 for management of LOW BACK PAIN. Patient reports 80% relief from the procedure for 2 days. Pain has returned to pre-procedure level.      She completed a Cervical Facet Nerve (Medial Branch) Radiofrequency Lesioning LEFT C3-C5  on  1/26/18 performed by Dr. Lemus for management of neck pain.  She completed a Bilateral S1 Lumbar TFESI   on  8/16/2018 performed by Dr. LEMUS for management of low back and radicular pain. Patient reports NO relief " from the procedure.     Failed PT, failed ultrasound therapy, failed compounded pain creme.    Patient remained masked during entire encounter. No cough present. I donned a mask and eye protection throughout entire visit. Prior to donning mask and eye protection, hand hygiene was performed, as well as when it was doffed.  I was closer than 6 feet, but not for an extended period of time. No obvious exposure to any bodily fluids.    Neck Pain   This is a chronic problem. The current episode started more than 1 year ago. The problem occurs constantly. The problem has been waxing and waning. The quality of the pain is described as burning. The pain is at a severity of 3/10. The pain is moderate. The symptoms are aggravated by position, bending, stress and twisting. The pain is worse during the day. Stiffness is present all day. Pertinent negatives include no chest pain, fever, headaches, numbness or weakness. She has tried oral narcotics and NSAIDs (cervical MBB--significant temporary relief; cervical RFL--50% ongoing relief) for the symptoms. The treatment provided moderate relief.   Shoulder Injury   The left shoulder is affected. There was no injury mechanism. The pain radiates to the left neck and right neck. Pain severity now: unchanged since last office visit. Pertinent negatives include no chest pain or numbness. The symptoms are aggravated by movement and palpation.   Back Pain  This is a chronic problem. The current episode started more than 1 year ago. The problem occurs constantly. The problem has been waxing and waning (worse since last evaluation) since onset. The pain is at a severity of 3/10. The pain is worse during the day. The symptoms are aggravated by bending, position, standing and twisting. Associated symptoms include bladder incontinence (long term issue). Pertinent negatives include no abdominal pain, bowel incontinence, chest pain, dysuria, fever, headaches, numbness or weakness. Risk factors  "include lack of exercise, sedentary lifestyle, menopause and obesity. She has tried analgesics, bed rest, chiropractic manipulation, heat, home exercises, ice, muscle relaxant, NSAIDs and walking (lumbar MBBgave 80% relief for 1-2 days twice) for the symptoms. The treatment provided moderate relief.      PEG Assessment   What number best describes your pain on average in the past week?4  What number best describes how, during the past week, pain has interfered with your enjoyment of life?4  What number best describes how, during the past week, pain has interfered with your general activity?  4    The following portions of the patient's history were reviewed and updated as appropriate: allergies, current medications, past family history, past medical history, past social history, past surgical history and problem list.    Review of Systems   Constitutional: Negative for activity change, fatigue and fever.   HENT: Negative for congestion.    Eyes: Negative for visual disturbance.   Respiratory: Negative for cough and shortness of breath.    Cardiovascular: Negative for chest pain.   Gastrointestinal: Negative for abdominal pain, bowel incontinence, constipation and diarrhea.   Genitourinary: Positive for bladder incontinence (long term issue). Negative for difficulty urinating and dysuria.   Musculoskeletal: Positive for arthralgias (bilat shoulders), back pain and neck pain. Negative for neck stiffness.   Neurological: Negative for dizziness, weakness, numbness and headaches.   Psychiatric/Behavioral: Negative for agitation, sleep disturbance and suicidal ideas. The patient is not nervous/anxious.      I have reviewed and confirmed the accuracy of the ROS as documented by the MA/JUAN/RN Patricia Chowdary, DACIA      Vitals:    09/29/20 1026   BP: 111/64   Pulse: 72   Resp: 20   Temp: 96.8 °F (36 °C)   SpO2: 100%   Weight: 83.4 kg (183 lb 12.8 oz)   Height: 154.9 cm (61\")   PainSc:   3   PainLoc: Neck  Comment: back and " bilat shoulders     Objective   Physical Exam  Vitals signs and nursing note reviewed.   Constitutional:       Appearance: Normal appearance. She is well-developed.   HENT:      Head: Normocephalic and atraumatic.      Nose: Nose normal.   Eyes:      General: Lids are normal.      Conjunctiva/sclera: Conjunctivae normal.   Neck:      Musculoskeletal: Decreased range of motion. Pain with movement, spinous process tenderness and muscular tenderness present.   Cardiovascular:      Rate and Rhythm: Normal rate.   Pulmonary:      Effort: Pulmonary effort is normal. No respiratory distress.   Musculoskeletal:      Right shoulder: She exhibits decreased range of motion and tenderness.      Left shoulder: She exhibits decreased range of motion and tenderness.      Lumbar back: She exhibits decreased range of motion, tenderness and bony tenderness (moderate tenderness bilateral L3-L5 facets; + loading manuever).      Comments: Negative SLR bilaterally   Skin:     General: Skin is warm and dry.   Neurological:      Mental Status: She is alert and oriented to person, place, and time.      Gait: Gait normal.   Psychiatric:         Speech: Speech normal.         Behavior: Behavior normal. Behavior is cooperative.         Thought Content: Thought content normal.         Judgment: Judgment normal.         Assessment/Plan   Traci was seen today for back pain, neck pain and shoulder pain.    Diagnoses and all orders for this visit:    Other chronic pain    Cervical facet joint syndrome    Lumbar facet arthropathy  -     Case Request    Encounter for long-term (current) use of high-risk medication    Shoulder pain, unspecified chronicity, unspecified laterality    Other orders  -     diclofenac (VOLTAREN) 50 MG EC tablet; Take 1 tablet by mouth 2 (Two) Times a Day As Needed (pain). for pain      --- The urine drug screen confirmation from 8-6-20 has been reviewed and the result is APPROPRIATE based on patient history and MATTHEW  report  --- Bilateral L3-L5 RFL. No blood thinners. Reviewed the procedure at length with the patient.  Included in the review was expectations, complications, risk and benefits.The procedure was described in detail and the risks, benefits and alternatives were discussed with the patient (including but not limited to: bleeding, infection, nerve damage, worsening of pain, inability to perform injection, paralysis, seizures, and death) who agreed to proceed.  Discussed the potential for sedation if warranted/wanted.  The procedure will plan to be performed at [facility] with fluoroscopic guidance(unless ultrasound is indicated) and could potentially have steroids and contrast dye used. Questions were answered and in a way the patient could understand.  Patient verbalized understanding and wishes to proceed.  This intervention will be ordered.  Discussed with patient that all procedures are part of a multimodal plan of care and include either formal PT or a home exercise program.  Patient has no evidence of coagulopathy or current infection.  --- Refill OxyContin with DNF. Patient appears stable with current regimen. No adverse effects. Regarding continuation of opioids, there is no evidence of aberrant behavior or any red flags.  The patient continues with appropriate response to opioid therapy. ADL's remain intact by self.   --- 90 day supply of Diclofenac per patient request.   --- Follow-up 1 month or sooner if needed.    Has changed pharmacy to [facility].    -------  Education about Medial Branch Blockade and RF Therapy:    This medial branch blockade (MBB) suggested is intended for diagnostic purposes, with the intent of offering the patient Radiofrequency thermal rhizotomy (RF) if the MBB is diagnostically effective.  The diagnostic blockade is necessary to determine the likelihood that RF therapy could be efficacious in providing long term relief to the patient.    Medial  "branches are sensory nerve branches that connect to a facet joint and transmit sensations & pain signals from that joint.  Facet is a term for the type of joints found in the spine.  Medial branches are the nerves that go to a facet, and therefore are also sometimes called \"facet joint nerves\" (FJNs).      In a medial branch blockade procedure, xray fluoroscopy is used to verify the locations of the outside of the joint lines which are being targeted.  Under xray guidance, needles are placed to these areas.  Contrast dye is injected to confirm proper placement, with dye flowing over the joint area, and to ensure that the dye does not flow into unintended areas such as a vein.  When this is confirmed, local anesthetic is injected to block the medial branch at that joint level.      If MBBs are diagnostically successful in blocking pain, then the patient is most likely a great candidate for Radiofrequency of those facet joint nerves.  In the RF procedure, needles are placed to the joint lines in the same fashion, and after testing, the needle tips are heated to thermally treat the nerves, blocking the nerves by in essence damaging the nerves with the heat treatment.       Medically, a successful RF procedure should provide a patient with 50% pain relief or more for at least 6 months.  Clinical experience suggests that successful patients receive relief more in the range of 12 months on average.  We also discussed that a fortunate minority of patients receive therapeutic success from the MBB, and may not require RF ablation.  If a patient receives more than 8 weeks of relief from MBB, then occasional repeat MBB for therapeutic purposes is a very reasonable alternative therapy.  This course of therapy is consistent with our LCDs according to our CMS  in the area, and therefore other insurance providers should follow accordingly.  We will monitor our patients to screen for these therapeutic responders and " will offer RF therapy only when necessary.        We discussed that MBB & RF are not without risks.  Guidelines regarding anticoagulant use & neuraxial procedures will be respected.  Patients that are ill or otherwise may be at risk for sepsis will not have their spines accessed by neuraxial injections of any type.  This patient will not be offered these therapies if there is an increased risk.   We discussed that there is a risk of postprocedural pain and also a risk of worsening of clinical picture with these procedures as with any neuraxial procedure.    -------           MATTHEW REPORT  As part of the patient's treatment plan, I am prescribing controlled substances. The patient has been made aware of appropriate use of such medications, including potential risk of somnolence, limited ability to drive and/or work safely, and the potential for dependence or overdose. It has also bee made clear that these medications are for use by this patient only, without concomitant use of alcohol or other substances unless prescribed.     Patient has completed prescribing agreement detailing terms of continued prescribing of controlled substances, including monitoring MATTHEW reports, urine drug screening, and pill counts if necessary. The patient is aware that inappropriate use will results in cessation of prescribing such medications.    MATTHEW report has been reviewed and scanned into the patient's chart.    As the clinician, I personally reviewed the MATTHEW from 9-29-20 while the patient was in the office today.    History and physical exam exhibit continued safe and appropriate use of controlled substances.        EMR Dragon/Transcription disclaimer:   Much of this encounter note is an electronic transcription/translation of spoken language to printed text. The electronic translation of spoken language may permit erroneous, or at times, nonsensical words or phrases to be inadvertently transcribed; Although I have reviewed  the note for such errors, some may still exist.

## 2020-10-29 ENCOUNTER — OFFICE VISIT (OUTPATIENT)
Dept: PAIN MEDICINE | Facility: CLINIC | Age: 70
End: 2020-10-29

## 2020-10-29 VITALS
DIASTOLIC BLOOD PRESSURE: 65 MMHG | BODY MASS INDEX: 34.55 KG/M2 | HEART RATE: 87 BPM | WEIGHT: 183 LBS | HEIGHT: 61 IN | SYSTOLIC BLOOD PRESSURE: 107 MMHG | TEMPERATURE: 96.7 F | RESPIRATION RATE: 20 BRPM | OXYGEN SATURATION: 99 %

## 2020-10-29 DIAGNOSIS — M25.519 SHOULDER PAIN, UNSPECIFIED CHRONICITY, UNSPECIFIED LATERALITY: ICD-10-CM

## 2020-10-29 DIAGNOSIS — M47.816 LUMBAR FACET ARTHROPATHY: ICD-10-CM

## 2020-10-29 DIAGNOSIS — M47.812 CERVICAL FACET JOINT SYNDROME: ICD-10-CM

## 2020-10-29 DIAGNOSIS — Z79.899 ENCOUNTER FOR LONG-TERM (CURRENT) USE OF HIGH-RISK MEDICATION: ICD-10-CM

## 2020-10-29 DIAGNOSIS — M51.36 DDD (DEGENERATIVE DISC DISEASE), LUMBAR: ICD-10-CM

## 2020-10-29 DIAGNOSIS — G89.21 CHRONIC PAIN DUE TO TRAUMA: Primary | ICD-10-CM

## 2020-10-29 PROCEDURE — 99214 OFFICE O/P EST MOD 30 MIN: CPT | Performed by: NURSE PRACTITIONER

## 2020-10-29 RX ORDER — OXYCODONE HYDROCHLORIDE 30 MG/1
30 TABLET, FILM COATED, EXTENDED RELEASE ORAL EVERY 12 HOURS SCHEDULED
Qty: 60 TABLET | Refills: 0 | Status: SHIPPED | OUTPATIENT
Start: 2020-10-29 | End: 2020-11-30 | Stop reason: SDUPTHER

## 2020-10-29 RX ORDER — TERBINAFINE HYDROCHLORIDE 250 MG/1
TABLET ORAL
COMMUNITY
Start: 2020-10-22 | End: 2020-12-14

## 2020-10-29 NOTE — TELEPHONE ENCOUNTER
Seen in office today. Needs to be send in today due to her pharmacy not ordering medication on Friday. Thanks. VERONICA

## 2020-10-29 NOTE — PROGRESS NOTES
"CHIEF COMPLAINT  F/u back, neck, and bilat shoulders. Pt sts back pain has worsened but neck and shoulder pain are the same.      Subjective   Traci King is a 70 y.o. female  who presents for follow-up.  She has a history of chronic back, neck and shoulder pain. Reports her back pain is worse, but neck and shoulder pain are unchanged. At last office visit, lumbar RFL was ordered.    Complains of pain in her low back, neck and shoulders. Today her back pain is 6/10VAS.  Describes the pain as continuous aching. Pain increases with walking, standing, activity, household chores; pain decreases with medication, rest and procedures.  Continues with OxyContin 30 mg BID(0400 and 1600) and Diclofenac 50 mg 1-2/day(takes in half doses) and Robaxin 750 mg 2-3/day(takes in half doses).  Is taking Tylenol OTC PRN. Denies any side effects from the regimen. She does take dulcolax every day and has been doing this since age 16, reports long-standing history of constipation. \"I was always constipated before I even started the pain medication.\"  This regimen decreases her pain by 50-75%. ADLs by self. Denies any bowel or bladder changes.      Patient had a bilateral L3-L5 MBB performed by Dr. LEMUS on 7-28-20 for management of LOW BACK PAIN. Initially had 80-85% relief for 1 day.  Then she reports 50% ONGOING relief from the procedure for 1 month.  Reports improvement in activity and sleep. Patient wants to proceed with RFL. Pain has returned to pre-procedure level. Insurance has denied lumbar RFL. Requires appeal to get authorization.    Patient had a bilateral L3-L5 MBB performed by Dr. LEMUS on 5-20-20 for management of LOW BACK PAIN. Patient reports 80% relief from the procedure for 2 days. Pain has returned to pre-procedure level.      She completed a Cervical Facet Nerve (Medial Branch) Radiofrequency Lesioning LEFT C3-C5 RFL  on  1/26/18 performed by Dr. Lemus for management of neck pain.    She completed a " Bilateral S1 Lumbar TFESI   on  8/16/2018 performed by Dr. LEMUS for management of low back and radicular pain. Patient reports NO relief from the procedure.      Failed PT, failed ultrasound therapy, failed compounded pain creme.    Patient remained masked during entire encounter. No cough present. I donned a mask and eye protection throughout entire visit. Prior to donning mask and eye protection, hand hygiene was performed, as well as when it was doffed.  I was closer than 6 feet, but not for an extended period of time. No obvious exposure to any bodily fluids.    Neck Pain   This is a chronic problem. The current episode started more than 1 year ago. The problem occurs constantly. The problem has been waxing and waning. The quality of the pain is described as burning. The pain is moderate. The symptoms are aggravated by position, bending, stress and twisting. The pain is worse during the day. Stiffness is present all day. Pertinent negatives include no chest pain, fever, headaches, numbness or weakness. She has tried oral narcotics and NSAIDs (cervical MBB--significant temporary relief; cervical RFL--50% ongoing relief) for the symptoms. The treatment provided moderate relief.   Shoulder Injury   The left shoulder is affected. There was no injury mechanism. The pain radiates to the left neck and right neck. Pain severity now: unchanged since last office visit. Pertinent negatives include no chest pain or numbness. The symptoms are aggravated by movement and palpation.   Back Pain  This is a chronic problem. The current episode started more than 1 year ago. The problem occurs constantly. The problem has been waxing and waning (worse since last evaluation) since onset. The pain is at a severity of 6/10. The pain is worse during the day. The symptoms are aggravated by bending, position, standing and twisting. Associated symptoms include bladder incontinence (long term issue). Pertinent negatives include no  abdominal pain, bowel incontinence, chest pain, dysuria, fever, headaches, numbness or weakness. Risk factors include lack of exercise, sedentary lifestyle, menopause and obesity. She has tried analgesics, bed rest, chiropractic manipulation, heat, home exercises, ice, muscle relaxant, NSAIDs and walking (lumbar MBBgave 80% relief for 1-2 days twice) for the symptoms. The treatment provided moderate relief.      PEG Assessment   What number best describes your pain on average in the past week?6  What number best describes how, during the past week, pain has interfered with your enjoyment of life?6  What number best describes how, during the past week, pain has interfered with your general activity?  6    The following portions of the patient's history were reviewed and updated as appropriate: allergies, current medications, past family history, past medical history, past social history, past surgical history and problem list.    Review of Systems   Constitutional: Negative for activity change, fatigue and fever.   HENT: Negative for congestion.    Eyes: Negative for visual disturbance.   Respiratory: Negative for cough, choking and shortness of breath.    Cardiovascular: Negative for chest pain.   Gastrointestinal: Negative for abdominal pain, bowel incontinence, constipation and diarrhea.   Genitourinary: Positive for bladder incontinence (long term issue). Negative for difficulty urinating and dysuria.   Musculoskeletal: Positive for arthralgias (bilat shoulders), back pain, neck pain and neck stiffness.   Allergic/Immunologic: Negative for immunocompromised state.   Neurological: Negative for dizziness, weakness, light-headedness, numbness and headaches.   Psychiatric/Behavioral: Negative for agitation, sleep disturbance and suicidal ideas. The patient is not nervous/anxious.      I have reviewed and confirmed the accuracy of the ROS as documented by the MA/LPN/RN Patricia Chowdary, DACIA    Vitals:    10/29/20  "0942   BP: 107/65   Pulse: 87   Resp: 20   Temp: 96.7 °F (35.9 °C)   SpO2: 99%   Weight: 83 kg (183 lb)   Height: 154.9 cm (61\")   PainSc:   6   PainLoc: Back     Objective   Physical Exam  Vitals signs and nursing note reviewed.   Constitutional:       Appearance: Normal appearance. She is well-developed.   HENT:      Head: Normocephalic and atraumatic.      Nose: Nose normal.   Eyes:      General: Lids are normal.      Conjunctiva/sclera: Conjunctivae normal.   Neck:      Musculoskeletal: Spinous process tenderness and muscular tenderness present.   Cardiovascular:      Rate and Rhythm: Normal rate.   Pulmonary:      Effort: Pulmonary effort is normal. No respiratory distress.   Musculoskeletal:      Lumbar back: She exhibits decreased range of motion, tenderness and bony tenderness (bilateral L3-L5 MBB moderate tenderness; + loading manuever).      Comments: Negative SLR bilaterally   Skin:     General: Skin is warm and dry.   Neurological:      Mental Status: She is alert and oriented to person, place, and time.      Gait: Gait abnormal.   Psychiatric:         Speech: Speech normal.         Behavior: Behavior normal. Behavior is cooperative.         Thought Content: Thought content normal.         Judgment: Judgment normal.             Assessment/Plan   Diagnoses and all orders for this visit:    1. 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 Cervical facet joint syndrome    3. DDD (degenerative disc disease), lumbar    4. Lumbar facet arthropathy    5. Encounter for long-term (current) use of high-risk medication    6. Shoulder pain, unspecified chronicity, unspecified laterality      --- The urine drug screen confirmation from 8-6-20 has been reviewed and the result is APPROPRIATE based on patient history and MATTHEW report  --- The patient signed an updated copy of the controlled substance agreement on 10-29-20  --- Refill OxyContin. Patient appears stable with current regimen. No adverse effects. Regarding " continuation of opioids, there is no evidence of aberrant behavior or any red flags.  The patient continues with appropriate response to opioid therapy. ADL's remain intact by self.   --- Appeal denial of lumbar RFL. Patient has previously had significant temporary relief with LMBB. Lumbar RFL is appropriate for patient to sustain longer-term relief. Reviewed the procedure at length with the patient.  Included in the review was expectations, complications, risk and benefits.The procedure was described in detail and the risks, benefits and alternatives were discussed with the patient (including but not limited to: bleeding, infection, nerve damage, worsening of pain, inability to perform injection, paralysis, seizures, and death) who agreed to proceed.  Discussed the potential for sedation if warranted/wanted.  The procedure will plan to be performed at [facility] with fluoroscopic guidance(unless ultrasound is indicated) and could potentially have steroids and contrast dye used. Questions were answered and in a way the patient could understand.  Patient verbalized understanding and wishes to proceed.  This intervention will be ordered.  Discussed with patient that all procedures are part of a multimodal plan of care and include either formal PT or a home exercise program.  Patient has no evidence of coagulopathy or current infection.  --- Follow-up 1 month or sooner if needed.    -------  Education about Medial Branch Blockade and RF Therapy:    This medial branch blockade (MBB) suggested is intended for diagnostic purposes, with the intent of offering the patient Radiofrequency thermal rhizotomy (RF) if the MBB is diagnostically effective.  The diagnostic blockade is necessary to determine the likelihood that RF therapy could be efficacious in providing long term relief to the patient.    Medial branches are sensory nerve branches that connect to a facet joint and transmit sensations & pain  "signals from that joint.  Facet is a term for the type of joints found in the spine.  Medial branches are the nerves that go to a facet, and therefore are also sometimes called \"facet joint nerves\" (FJNs).      In a medial branch blockade procedure, xray fluoroscopy is used to verify the locations of the outside of the joint lines which are being targeted.  Under xray guidance, needles are placed to these areas.  Contrast dye is injected to confirm proper placement, with dye flowing over the joint area, and to ensure that the dye does not flow into unintended areas such as a vein.  When this is confirmed, local anesthetic is injected to block the medial branch at that joint level.      If MBBs are diagnostically successful in blocking pain, then the patient is most likely a great candidate for Radiofrequency of those facet joint nerves.  In the RF procedure, needles are placed to the joint lines in the same fashion, and after testing, the needle tips are heated to thermally treat the nerves, blocking the nerves by in essence damaging the nerves with the heat treatment.       Medically, a successful RF procedure should provide a patient with 50% pain relief or more for at least 6 months.  Clinical experience suggests that successful patients receive relief more in the range of 12 months on average.  We also discussed that a fortunate minority of patients receive therapeutic success from the MBB, and may not require RF ablation.  If a patient receives more than 8 weeks of relief from MBB, then occasional repeat MBB for therapeutic purposes is a very reasonable alternative therapy.  This course of therapy is consistent with our LCDs according to our CMS  in the area, and therefore other insurance providers should follow accordingly.  We will monitor our patients to screen for these therapeutic responders and will offer RF therapy only when necessary.        We discussed that MBB & RF are not without risks. "  Guidelines regarding anticoagulant use & neuraxial procedures will be respected.  Patients that are ill or otherwise may be at risk for sepsis will not have their spines accessed by neuraxial injections of any type.  This patient will not be offered these therapies if there is an increased risk.   We discussed that there is a risk of postprocedural pain and also a risk of worsening of clinical picture with these procedures as with any neuraxial procedure.    -------           MATTHEW REPORT  As part of the patient's treatment plan, I am prescribing controlled substances. The patient has been made aware of appropriate use of such medications, including potential risk of somnolence, limited ability to drive and/or work safely, and the potential for dependence or overdose. It has also bee made clear that these medications are for use by this patient only, without concomitant use of alcohol or other substances unless prescribed.     Patient has completed prescribing agreement detailing terms of continued prescribing of controlled substances, including monitoring MATTHEW reports, urine drug screening, and pill counts if necessary. The patient is aware that inappropriate use will results in cessation of prescribing such medications.    MATTHEW report has been reviewed and scanned into the patient's chart.    As the clinician, I personally reviewed the MATTHEW from 10-29-20 while the patient was in the office today.    History and physical exam exhibit continued safe and appropriate use of controlled substances.        EMR Dragon/Transcription disclaimer:   Much of this encounter note is an electronic transcription/translation of spoken language to printed text. The electronic translation of spoken language may permit erroneous, or at times, nonsensical words or phrases to be inadvertently transcribed; Although I have reviewed the note for such errors, some may still exist.

## 2020-11-30 ENCOUNTER — TELEMEDICINE (OUTPATIENT)
Dept: PAIN MEDICINE | Facility: CLINIC | Age: 70
End: 2020-11-30

## 2020-11-30 DIAGNOSIS — M47.812 CERVICAL FACET JOINT SYNDROME: ICD-10-CM

## 2020-11-30 DIAGNOSIS — M50.30 DEGENERATIVE DISC DISEASE, CERVICAL: ICD-10-CM

## 2020-11-30 DIAGNOSIS — G89.21 CHRONIC PAIN DUE TO TRAUMA: Primary | ICD-10-CM

## 2020-11-30 DIAGNOSIS — M51.36 DDD (DEGENERATIVE DISC DISEASE), LUMBAR: ICD-10-CM

## 2020-11-30 DIAGNOSIS — Z79.899 ENCOUNTER FOR LONG-TERM (CURRENT) USE OF HIGH-RISK MEDICATION: ICD-10-CM

## 2020-11-30 DIAGNOSIS — M47.816 LUMBAR FACET ARTHROPATHY: ICD-10-CM

## 2020-11-30 PROCEDURE — 99214 OFFICE O/P EST MOD 30 MIN: CPT | Performed by: NURSE PRACTITIONER

## 2020-11-30 RX ORDER — METHYLPREDNISOLONE 4 MG/1
TABLET ORAL
Qty: 21 TABLET | Refills: 0 | Status: SHIPPED | OUTPATIENT
Start: 2020-11-30 | End: 2020-12-14

## 2020-11-30 RX ORDER — OXYCODONE HYDROCHLORIDE 30 MG/1
30 TABLET, FILM COATED, EXTENDED RELEASE ORAL EVERY 12 HOURS SCHEDULED
Qty: 60 TABLET | Refills: 0 | Status: SHIPPED | OUTPATIENT
Start: 2020-11-30 | End: 2020-12-23 | Stop reason: SDUPTHER

## 2020-11-30 NOTE — PROGRESS NOTES
"TELEMEDICINE - VIDEO VISIT    You have chosen to receive care through a telehealth visit.  Do you consent to use a video/audio connection for your medical care today? Yes  She reports she has to quarantine due to COVID exposure.  Grandson tested positive for COVID a few days ago and patient was exposed to him.    CHIEF COMPLAINT  Back and neck pain    Subjective   Traci King is a 70 y.o. female  who presents for a video visit follow-up.She has a history of chronic neck and back pain. Reports her pain is WORSE since last evaluation.    Her back pain is \"EXCRUTIATING.\" Is still awaiting to hear on authorization for lumbar RFL.     Complains of pain in her low back and neck. Today her pain is 7/10VAS.  Describes the pain as continuous aching.  Pain increases with walking, standing, activity, household chores; pain decreases with medication, rest and procedures.  Continues with OxyContin 30 mg BID(0400 and 1600) and Diclofenac 50 mg 1-2/day(takes in half doses) and Robaxin 750 mg 2-3/day(takes in half doses).  Is taking Tylenol OTC PRN. Denies any side effects from the regimen. She does take dulcolax every day and has been doing this since age 16, reports long-standing history of constipation. \"I was always constipated before I even started the pain medication.\"  This regimen decreases her pain by 30-50%. ADLs by self. Denies any bowel or bladder changes.      Patient had a bilateral L3-L5 MBB performed by Dr. LEMUS on 7-28-20 for management of LOW BACK PAIN. Initially had 80-85% relief for 1 day.  Then she reports 50% ONGOING relief from the procedure for 1 month.  Reports improvement in activity and sleep. Patient wants to proceed with RFL. Pain has returned to pre-procedure level. Insurance has denied lumbar RFL. Requires appeal to get authorization.     Patient had a bilateral L3-L5 MBB performed by Dr. LEMUS on 5-20-20 for management of LOW BACK PAIN. Patient reports 80% relief from the procedure for 2 " days. Pain has returned to pre-procedure level.      She completed a Cervical Facet Nerve (Medial Branch) Radiofrequency Lesioning LEFT C3-C5 RFL  on  1/26/18 performed by Dr. Lemus for management of neck pain.     She completed a Bilateral S1 Lumbar TFESI   on  8/16/2018 performed by Dr. LEMUS for management of low back and radicular pain. Patient reports NO relief from the procedure.      Failed PT, failed ultrasound therapy, failed compounded pain creme.    Neck Pain   This is a chronic problem. The current episode started more than 1 year ago. The problem occurs constantly. The problem has been waxing and waning. The quality of the pain is described as burning. The pain is moderate. The symptoms are aggravated by position, bending, stress and twisting. The pain is worse during the day. Stiffness is present all day. Pertinent negatives include no chest pain, fever, headaches, numbness or weakness. She has tried oral narcotics and NSAIDs (cervical MBB--significant temporary relief; cervical RFL--50% ongoing relief) for the symptoms. The treatment provided moderate relief.   Shoulder Injury   The left shoulder is affected. There was no injury mechanism. The pain radiates to the left neck and right neck. Pain severity now: unchanged since last office visit. Pertinent negatives include no chest pain or numbness. The symptoms are aggravated by movement and palpation.   Back Pain  This is a chronic problem. The current episode started more than 1 year ago. The problem occurs constantly. The problem has been waxing and waning (worse since last evaluation) since onset. The pain is at a severity of 6/10. The pain is worse during the day. The symptoms are aggravated by bending, position, standing and twisting. Associated symptoms include bladder incontinence (long term issue). Pertinent negatives include no abdominal pain, bowel incontinence, chest pain, dysuria, fever, headaches, numbness or weakness. Risk factors  include lack of exercise, sedentary lifestyle, menopause and obesity. She has tried analgesics, bed rest, chiropractic manipulation, heat, home exercises, ice, muscle relaxant, NSAIDs and walking (lumbar MBBgave 80% relief for 1-2 days twice) for the symptoms. The treatment provided moderate relief.      The following portions of the patient's history were reviewed and updated as appropriate: allergies, current medications, past family history, past medical history, past social history, past surgical history and problem list.    Review of Systems   Constitutional: Negative for fever.   Respiratory: Negative for chest tightness and shortness of breath.    Cardiovascular: Negative for chest pain.   Gastrointestinal: Negative for abdominal pain and bowel incontinence.   Genitourinary: Positive for bladder incontinence (long term issue). Negative for dysuria.   Musculoskeletal: Positive for arthralgias, back pain and neck pain.   Neurological: Negative for weakness, numbness and headaches.   Psychiatric/Behavioral: Negative for sleep disturbance. The patient is not nervous/anxious.        Vitals:    11/30/20 1453   PainSc:   7   PainLoc: Back  Comment: neck       Objective   Physical Exam  Constitutional:       Appearance: She is well-developed.   HENT:      Head: Normocephalic and atraumatic.   Pulmonary:      Effort: Pulmonary effort is normal.   Neurological:      Mental Status: She is alert and oriented to person, place, and time.   Psychiatric:         Speech: Speech normal.         Behavior: Behavior normal.         Thought Content: Thought content normal.         Judgment: Judgment normal.         Assessment/Plan   Diagnoses and all orders for this visit:    1. 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 Cervical facet joint syndrome    3. DDD (degenerative disc disease), lumbar    4. Degenerative disc disease, cervical    5. Lumbar facet arthropathy    6. Encounter for long-term (current) use of high-risk  medication    Other orders  -     methylPREDNISolone (MEDROL) 4 MG dose pack; Take as directed on package instructions.  Dispense: 21 tablet; Refill: 0    ----------------  Our practice is offering alternative &/or electronic methods to continue to follow our patients while at the same time further the efforts toward social distancing, in accordance with our organizational policies, professional societies' guidance, and [facility] mandates.  I support the Healthy at Home campaign and in this visit I have counseled the patient on our needs to limit in-person office visits and physical encounters with medical facilities whenever possible.  I have also educated the patient on the medical necessities of maintaining social distancing while we continue to function during this crisis period.    The patient was counseled on the need to consider telehealth options. The patient was offered the opportunity for a Video Visit using [employer]. The patient agreed to a Video Visit. The patient was counseled on the need for a check-in visit. The patient was educated about our efforts to comply with monitoring standards when prescribing potent medications.    TIME:  Total Time:  14 minutes. Topics discussed are outlined in the Assessment/Plan section of the note.      ----------------    --- The urine drug screen confirmation from 8-6-20 has been reviewed and the result is APPROPRIATE based on patient history and MATTHEW report  --- Refill OxyContin. Patient appears stable with current regimen. No adverse effects. Regarding continuation of opioids, there is no evidence of aberrant behavior or any red flags.  The patient continues with appropriate response to opioid therapy. ADL's remain intact by self.   --- Medrol dosepack. Discussed medication with the patient.  Included in this discussion was the potential for side effects and adverse events.  Patient verbalized understanding and wished to proceed.  Prescription will be sent to  pharmacy.  --- check on authorization of lumbar RFL  --- Follow-up 1 month or sooner if needed.         MATTHEW REPORT  As part of the patient's treatment plan, I am prescribing controlled substances. The patient has been made aware of appropriate use of such medications, including potential risk of somnolence, limited ability to drive and/or work safely, and the potential for dependence or overdose. It has also bee made clear that these medications are for use by this patient only, without concomitant use of alcohol or other substances unless prescribed.     Patient has completed prescribing agreement detailing terms of continued prescribing of controlled substances, including monitoring MATTHEW reports, urine drug screening, and pill counts if necessary. The patient is aware that inappropriate use will results in cessation of prescribing such medications.    MATTHEW report has been reviewed and scanned into the patient's chart.    As the clinician, I personally reviewed the MATTHEW from 11-30-20 while the patient was in the office today.    History and physical exam exhibit continued safe and appropriate use of controlled substances.    -------    EMR Dragon/Transcription disclaimer:   Much of this encounter note is an electronic transcription/translation of spoken language to printed text. The electronic translation of spoken language may permit erroneous, or at times, nonsensical words or phrases to be inadvertently transcribed; Although I have reviewed the note for such errors, some may still exist.

## 2020-12-10 ENCOUNTER — TELEPHONE (OUTPATIENT)
Dept: INTERNAL MEDICINE | Facility: CLINIC | Age: 70
End: 2020-12-10

## 2020-12-10 NOTE — TELEPHONE ENCOUNTER
Told pt that we apologize that we cannot get her in at this time and advised her to go to the ER or Urgent care to be evaluated

## 2020-12-10 NOTE — TELEPHONE ENCOUNTER
So unfortunately I do not think it is safe to order imaging for her with those symptoms without her being evaluated, I know it is frustrating that we are not able to see her currently and I am very sorry for these transitional headaches, but I believe the safest thing for her health with her current complaint would be to either see an urgent care or the ER for them to make sure her headaches are not an imminent danger to her health and get her the proper imaging and testing ASAP

## 2020-12-10 NOTE — TELEPHONE ENCOUNTER
Pt called stating that she is having severe headaches and is wanting a CT order to evaluate since she cannot get in to see us currently

## 2020-12-14 ENCOUNTER — OFFICE VISIT (OUTPATIENT)
Dept: FAMILY MEDICINE CLINIC | Facility: CLINIC | Age: 70
End: 2020-12-14

## 2020-12-14 VITALS
DIASTOLIC BLOOD PRESSURE: 68 MMHG | SYSTOLIC BLOOD PRESSURE: 130 MMHG | TEMPERATURE: 97.2 F | WEIGHT: 188 LBS | HEART RATE: 86 BPM | OXYGEN SATURATION: 99 % | RESPIRATION RATE: 16 BRPM | BODY MASS INDEX: 35.5 KG/M2 | HEIGHT: 61 IN

## 2020-12-14 DIAGNOSIS — R51.9 NEW ONSET HEADACHE: ICD-10-CM

## 2020-12-14 DIAGNOSIS — R51.9 SUDDEN ONSET OF SEVERE HEADACHE: Primary | ICD-10-CM

## 2020-12-14 PROCEDURE — 99214 OFFICE O/P EST MOD 30 MIN: CPT | Performed by: PHYSICIAN ASSISTANT

## 2020-12-14 RX ORDER — DICLOFENAC SODIUM 75 MG/1
TABLET, DELAYED RELEASE ORAL
COMMUNITY
Start: 2020-11-06 | End: 2020-12-14

## 2020-12-14 RX ORDER — ALBUTEROL SULFATE 90 UG/1
AEROSOL, METERED RESPIRATORY (INHALATION)
COMMUNITY
Start: 2020-12-04 | End: 2021-01-13
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
## 2020-12-17 ENCOUNTER — TELEPHONE (OUTPATIENT)
Dept: FAMILY MEDICINE CLINIC | Facility: CLINIC | Age: 70
End: 2020-12-17

## 2020-12-17 NOTE — TELEPHONE ENCOUNTER
PATIENT WAS IN TO SEE DR SANTANA ON Monday AND FORGOT TO TELL HER SHE NEEDS REFILL ON BRIO ELLIPTA 200/25  60...SHE ONLY HAS 5 APPLICATIONS LEFT USES ONCE A DAY.    PLEASE ADVISE  [phone]

## 2020-12-21 ENCOUNTER — OFFICE VISIT (OUTPATIENT)
Dept: FAMILY MEDICINE CLINIC | Facility: CLINIC | Age: 70
End: 2020-12-21

## 2020-12-21 ENCOUNTER — LAB REQUISITION (OUTPATIENT)
Dept: LAB | Facility: HOSPITAL | Age: 70
End: 2020-12-21

## 2020-12-21 DIAGNOSIS — Z00.00 ENCOUNTER FOR GENERAL ADULT MEDICAL EXAMINATION WITHOUT ABNORMAL FINDINGS: ICD-10-CM

## 2020-12-21 DIAGNOSIS — R06.02 SHORTNESS OF BREATH: Primary | ICD-10-CM

## 2020-12-21 PROCEDURE — 99442 PR PHYS/QHP TELEPHONE EVALUATION 11-20 MIN: CPT | Performed by: PHYSICIAN ASSISTANT

## 2020-12-21 NOTE — PROGRESS NOTES
Subjective   Traci King is a 70 y.o. female who is being evaluated by telephone visit for shortness of air.     History of Present Illness   You have chosen to receive care through a telephone visit. Do you consent to use a telephone visit for your medical care today? Yes    Reports the last couple days, she has had trouble breathing. Has Dx COPD but no trouble with breathing in the past. States she has SOA even with walking across the room.  She continues with headache and pain in back of neck.      No CP, palp, dizziness, weakness, cough, fever.     The following portions of the patient's history were reviewed and updated as appropriate: allergies, current medications, past family history, past medical history, past social history, past surgical history and problem list.    Review of Systems   Constitutional: Negative.    HENT: Negative for congestion, ear pain, postnasal drip, rhinorrhea and sore throat.    Respiratory: Positive for shortness of breath. Negative for cough.    Gastrointestinal: Negative.    Musculoskeletal: Positive for neck pain and neck stiffness.   Neurological: Positive for headaches.        Objective   There were no vitals filed for this visit.  There is no height or weight on file to calculate BMI.    Physical Exam  NA    Assessment/Plan   Diagnoses and all orders for this visit:    1. Shortness of breath (Primary)  -     COVID-19,[facility] ROUTINE, NP/OP SWAB IN TRANSPORT MEDIA OR ESWAB 72 HR TAT - Swab, Oropharynx        Assessment and plan  She was seen as a new patient to me about 1 week ago.  At that time, she had been having severe episodes of headache.  Patient changed PCP since they would not order brain imaging without seeing her and were not able to see her at that time.  I ordered MRI brain.  She now reports having significant shortness of air over the past week.  She states she is short of breath even walking across the room which is abnormal despite her COPD history.  I  counseled her at length today regarding my recommendation to go to the emergency department.  Since she is pretty unknown to me, has numerous medications that could affect respiratory drive, and could have increased risk of cardiovascular issues, she needs to go to ER for further work-up including labs, imaging, respiratory panel, rule out PE, and evaluation of headaches.  Patient declines ER today and reports she would like to have Covid testing.  I again counseled her on my recommendations to go to the hospital ASA.  We will do outpatient Covid testing, however, I do not feel this is her most urgent issue and feel she needs further in person evaluation.  Patient verbalized understanding of recommendations and risks of not going to ER.     About 15 minutes spent reviewing the patient's chart and telephone visit, medical decision-making, and treatment plan.

## 2020-12-22 LAB — SARS-COV-2 RNA RESP QL NAA+PROBE: NOT DETECTED

## 2020-12-23 ENCOUNTER — TELEMEDICINE (OUTPATIENT)
Dept: PAIN MEDICINE | Facility: CLINIC | Age: 70
End: 2020-12-23

## 2020-12-23 DIAGNOSIS — M79.641 PAIN OF RIGHT HAND: ICD-10-CM

## 2020-12-23 DIAGNOSIS — M51.36 DDD (DEGENERATIVE DISC DISEASE), LUMBAR: ICD-10-CM

## 2020-12-23 DIAGNOSIS — Z79.899 ENCOUNTER FOR LONG-TERM (CURRENT) USE OF HIGH-RISK MEDICATION: ICD-10-CM

## 2020-12-23 DIAGNOSIS — M47.816 LUMBAR FACET ARTHROPATHY: ICD-10-CM

## 2020-12-23 DIAGNOSIS — M47.812 CERVICAL FACET JOINT SYNDROME: ICD-10-CM

## 2020-12-23 DIAGNOSIS — G89.29 OTHER CHRONIC PAIN: Primary | ICD-10-CM

## 2020-12-23 PROCEDURE — 99214 OFFICE O/P EST MOD 30 MIN: CPT | Performed by: NURSE PRACTITIONER

## 2020-12-23 RX ORDER — OXYCODONE HYDROCHLORIDE 30 MG/1
30 TABLET, FILM COATED, EXTENDED RELEASE ORAL EVERY 12 HOURS SCHEDULED
Qty: 60 TABLET | Refills: 0 | Status: SHIPPED | OUTPATIENT
Start: 2020-12-23 | End: 2021-01-21 | Stop reason: SDUPTHER

## 2020-12-23 NOTE — PROGRESS NOTES
"TELEMEDICINE - VIDEO VISIT    You have chosen to receive care through a telehealth visit.  Do you consent to use a video/audio connection for your medical care today? Yes    CHIEF COMPLAINT  Back, neck and joint pain    Subjective   Traci King is a 70 y.o. female  who presents for a video visit follow-up.She has a history of chronic back, neck and joint pain. Reports her pain pattern is UNCHANGED since last evaluation.     Is scheduled for lumbar RFL on 12-29-20    Has been having SOA lately. Had COVID tested two days ago. Hoping for results later today.     Has increased complaints of right hand pain, especially in her 5th digit.  She wanted to increase her Diclofenac.  Her podiatrist had increased Diclofenac to 75 mg BID for a few weeks. Noticed when she returned to her previous, her right hand pain worsened.     Complains of pain in her low back, neck, head, right small finger. Today her pain is 6/10VAS.  Describes the pain as continuous aching and throbbing.  Continues with OxyContin 30 mg BID(0200 and 1400) (reports does not always last between doses) and Diclofenac 50 mg 1-2/day(takes in half doses) and Robaxin 750 mg 2-3/day(takes in half doses).  Is taking Tylenol OTC PRN. Denies any side effects from the regimen. She does take dulcolax every day and has been doing this since age 16, reports long-standing history of constipation.  This regimen decreases her pain by 30-50%. ADLs by self. Denies any bowel or bladder changes.      At last evaluation, was having increased pain and prescribed MDP. Could not tolerate due to flushing.    Will be seeing a new PCP.  She states she switched from her previous PCP because she was having worsening \"debilitating\" headache and SOA and couldn't be seen until first of the year, recommended ER. She also wanted an MRI and they would not order. Now will be seeing Joanne CANALES.  PLanning on having brain MRI to evaluate headaches. Will also be seen for updated " bloodwork.     Patient had a bilateral L3-L5 MBB performed by Dr. LEMUS on 7-28-20 for management of LOW BACK PAIN. Initially had 80-85% relief for 1 day.  Then she reports 50% ONGOING relief from the procedure for 1 month.  Reports improvement in activity and sleep. Patient wants to proceed with RFL. Pain has returned to pre-procedure level. Insurance has denied lumbar RFL. Requires appeal to get authorization.     Patient had a bilateral L3-L5 MBB performed by Dr. LEMUS on 5-20-20 for management of LOW BACK PAIN. Patient reports 80% relief from the procedure for 2 days. Pain has returned to pre-procedure level.      She completed a Cervical Facet Nerve (Medial Branch) Radiofrequency Lesioning LEFT C3-C5 RFL  on  1/26/18 performed by Dr. Lemus for management of neck pain.     She completed a Bilateral S1 Lumbar TFESI   on  8/16/2018 performed by Dr. LEMUS for management of low back and radicular pain. Patient reports NO relief from the procedure.      Failed PT, failed ultrasound therapy, failed compounded pain creme.  Neck Pain   This is a chronic problem. The current episode started more than 1 year ago. The problem occurs constantly. The problem has been waxing and waning. The quality of the pain is described as burning. The pain is moderate. The symptoms are aggravated by position, bending, stress and twisting. The pain is worse during the day. Stiffness is present all day. Pertinent negatives include no chest pain, fever, headaches, numbness or weakness. She has tried oral narcotics and NSAIDs (cervical MBB--significant temporary relief; cervical RFL--50% ongoing relief) for the symptoms. The treatment provided moderate relief.   Shoulder Injury   The left shoulder is affected. There was no injury mechanism. The pain radiates to the left neck and right neck. Pain severity now: unchanged since last office visit. Pertinent negatives include no chest pain or numbness. The symptoms are aggravated by  movement and palpation.   Back Pain  This is a chronic problem. The current episode started more than 1 year ago. The problem occurs constantly. The problem has been waxing and waning (worse since last evaluation) since onset. The pain is at a severity of 6/10. The pain is worse during the day. The symptoms are aggravated by bending, position, standing and twisting. Associated symptoms include bladder incontinence (long term issue). Pertinent negatives include no abdominal pain, bowel incontinence, chest pain, dysuria, fever, headaches, numbness or weakness. Risk factors include lack of exercise, sedentary lifestyle, menopause and obesity. She has tried analgesics, bed rest, chiropractic manipulation, heat, home exercises, ice, muscle relaxant, NSAIDs and walking (lumbar MBBgave 80% relief for 1-2 days twice) for the symptoms. The treatment provided moderate relief.          The following portions of the patient's history were reviewed and updated as appropriate: allergies, current medications, past family history, past medical history, past social history, past surgical history and problem list.      Review of Systems   Constitutional: Negative for fever.   Cardiovascular: Negative for chest pain.   Gastrointestinal: Negative for abdominal pain and bowel incontinence.   Genitourinary: Positive for bladder incontinence (long term issue). Negative for dysuria.   Musculoskeletal: Positive for back pain and neck pain.   Neurological: Negative for weakness, numbness and headaches.     Vitals:    12/23/20 1022   PainSc:   5   PainLoc: Back       Objective   Physical Exam  Constitutional:       Appearance: She is well-developed.   HENT:      Head: Normocephalic and atraumatic.   Pulmonary:      Effort: Pulmonary effort is normal.   Neurological:      Mental Status: She is alert and oriented to person, place, and time.   Psychiatric:         Speech: Speech normal.         Behavior: Behavior normal.         Thought Content:  Thought content normal.         Judgment: Judgment normal.         Assessment/Plan   Diagnoses and all orders for this visit:    1. Other chronic pain (Primary)    2. Cervical facet joint syndrome    3. DDD (degenerative disc disease), lumbar    4. Lumbar facet arthropathy    5. Encounter for long-term (current) use of high-risk medication    6. Pain of right hand  -     Ambulatory Referral to Hand Surgery      ----------------    Our practice is offering alternative &/or electronic methods to continue to follow our patients while at the same time further the efforts toward social distancing, in accordance with our organizational policies, professional societies' guidance, and [facility] mandates.  I support the Healthy at Home campaign and in this visit I have counseled the patient on our needs to limit in-person office visits and physical encounters with medical facilities whenever possible.  I have also educated the patient on the medical necessities of maintaining social distancing while we continue to function during this crisis period.      The patient was counseled on the need to consider telehealth options. The patient was offered the opportunity for a Video Visit using [employer]. The patient agreed to a Video Visit. The patient was counseled on the need for a check-in visit. The patient was educated about our efforts to comply with monitoring standards when prescribing potent medications.    TIME:  Total Time:  14 minutes. Topics discussed are outlined in the Assessment/Plan section of the note.      ----------------    --- The urine drug screen confirmation from 8-6-20 has been reviewed and the result is APPROPRIATE based on patient history and MATTHEW report  --- Refill Oxycontin with DNF. Patient appears stable with current regimen. No adverse effects. Regarding continuation of opioids, there is no evidence of aberrant behavior or any red flags.  The patient continues with appropriate response to opioid  therapy. ADL's remain intact by self.   --- Proceed with lumbar RFL as long as COVID test are negative and symptoms improve  --- Referral to hand specialists due to hand pain.  --- Follow-up 1 month or sooner if needed.         MATTHEW REPORT  As part of the patient's treatment plan, I am prescribing controlled substances. The patient has been made aware of appropriate use of such medications, including potential risk of somnolence, limited ability to drive and/or work safely, and the potential for dependence or overdose. It has also bee made clear that these medications are for use by this patient only, without concomitant use of alcohol or other substances unless prescribed.     Patient has completed prescribing agreement detailing terms of continued prescribing of controlled substances, including monitoring MATTHEW reports, urine drug screening, and pill counts if necessary. The patient is aware that inappropriate use will results in cessation of prescribing such medications.    MATTHEW report has been reviewed and scanned into the patient's chart.    As the clinician, I personally reviewed the MATTHEW from 12-23-20 while the patient was in the office today.    History and physical exam exhibit continued safe and appropriate use of controlled substances.    -------    EMR Dragon/Transcription disclaimer:   Much of this encounter note is an electronic transcription/translation of spoken language to printed text. The electronic translation of spoken language may permit erroneous, or at times, nonsensical words or phrases to be inadvertently transcribed; Although I have reviewed the note for such errors, some may still exist.

## 2020-12-26 PROCEDURE — U0004 COV-19 TEST NON-CDC HGH THRU: HCPCS | Performed by: ANESTHESIOLOGY

## 2020-12-28 LAB — SARS-COV-2 RNA RESP QL NAA+PROBE: NOT DETECTED

## 2020-12-29 ENCOUNTER — DOCUMENTATION (OUTPATIENT)
Dept: PAIN MEDICINE | Facility: CLINIC | Age: 70
End: 2020-12-29

## 2020-12-29 ENCOUNTER — OUTSIDE FACILITY SERVICE (OUTPATIENT)
Dept: PAIN MEDICINE | Facility: CLINIC | Age: 70
End: 2020-12-29

## 2020-12-29 PROCEDURE — 64636 DESTROY L/S FACET JNT ADDL: CPT | Performed by: ANESTHESIOLOGY

## 2020-12-29 PROCEDURE — 99152 MOD SED SAME PHYS/QHP 5/>YRS: CPT | Performed by: ANESTHESIOLOGY

## 2020-12-29 PROCEDURE — 64635 DESTROY LUMB/SAC FACET JNT: CPT | Performed by: ANESTHESIOLOGY

## 2020-12-29 NOTE — PROGRESS NOTES
Bilateral L3-5 Lumbar Medial Branch RADIOFREQUENCY  [facility]      PREOPERATIVE DIAGNOSIS:  Lumbar spondylosis without myelopathy    POSTOPERATIVE DIAGNOSIS:  Lumbar spondylosis without myelopathy    PROCEDURE:   Diagnostic Bilateral Lumbar Medial Branch Nerve thermal radiofrequency lesioning, with fluoroscopy:  L3, L4, and L5 nerves (at the L4 and L5 transverse processes and the sacral alar groove) to thermally treat the innervation to facet joints L4-5 and L5-S1  1. 53593-37 -- Bilateral L/S facet neuro destr., 1st Level  2. 49661-60 -- Bilateral L/S facet neuro destr., 2nd  Level    PRE-PROCEDURE DISCUSSION WITH PATIENT:    Risks and complications were discussed with the patient prior to starting the procedure and informed consent was obtained.      SURGEON:  Jose Luis Gan MD    REASON FOR PROCEDURE:    The patient complains of pain that seems to have a significant axial component and Previous diagnostic positivity of two Lumbar Medial Branch Blockades at the same levels    SEDATION:  Versed 4mg & Fentanyl 100 mcg IV  TIME OF PROCEDURE:   The intraoperative procedure time after administration of the sedative was 27 minutes.     ANESTHETIC:  Lidocaine 2%  STEROID:  NONE      DESCRIPTON OF PROCEDURE:  After obtaining informed consent, IV access  was obtained in the preoperative area.   The patient was taken to the operating room.  The patient was placed in the prone position with a pillow under the abdomen. All pressure points were well padded.  EKG, blood pressure, and pulse oximeter were monitored.  The patient was monitored and sedated by the RN under my direction. The lumbosacral area was prepped with Chloraprep and draped in a sterile fashion.     Under fluoroscopic guidance the transverse processes of the L4 and L5 vertebrae at the junctions of the superior articular processes were identified on the right.  Also identified was the groove between the ala and the superior articular  process of the sacrum on the ipsilateral side.  Skin and subcutaneous tissue were anesthetized with 1ml of 1% lidocaine above each of these points. Then, radiofrequency probe needles were advanced in this fluoro view to the above junctions.  Aspiration was negative for blood and CSF.  After confirming the position of the needle with fluoroscope in all views, testing was initiated. Motor testing was confirmed to be negative at 3V and 2Hz for any radicular stimulation.  Then 1mL of the local anesthetic was instilled in each needle.  Two minutes elapsed, and during this time a lateral fluoroscopic view was confirmed again to ensure the needles had not advanced nor retracted.  Then, Radiofrequency Lesioning was initiated for 2 minutes at 85 degrees Celsius.  Needles were removed intact from each of the areas.     A similar procedure was repeated to address the L3, L4, and L5 nerves on the contralateral side.   Onset of analgesia was noted.  Vital signs remained stable throughout.      ESTIMATED BLOOD LOSS:  <5 mL  SPECIMENS:  none    COMPLICATIONS:   No complications were noted. and The patient did not have any signs of postprocedure numbness nor weakness.    TOLERANCE & DISCHARGE CONDITION:    The patient tolerated the procedure well.  The patient was transported to the recovery area without difficulties.  The patient was discharged to home under the care of family in stable and satisfactory condition.    PLAN OF CARE:  1. The patient was given our standard instruction sheet.  2. The patient will  Return to clinic 6 wks.  3. The patient will resume all medications as per the medication reconciliation sheet.

## 2020-12-31 ENCOUNTER — OFFICE VISIT (OUTPATIENT)
Dept: FAMILY MEDICINE CLINIC | Facility: CLINIC | Age: 70
End: 2020-12-31

## 2020-12-31 VITALS
SYSTOLIC BLOOD PRESSURE: 110 MMHG | BODY MASS INDEX: 35.34 KG/M2 | HEART RATE: 73 BPM | DIASTOLIC BLOOD PRESSURE: 74 MMHG | RESPIRATION RATE: 20 BRPM | OXYGEN SATURATION: 98 % | HEIGHT: 61 IN | WEIGHT: 187.2 LBS | TEMPERATURE: 97.6 F

## 2020-12-31 DIAGNOSIS — J44.9 CHRONIC OBSTRUCTIVE PULMONARY DISEASE, UNSPECIFIED COPD TYPE (HCC): Primary | ICD-10-CM

## 2020-12-31 PROCEDURE — 99213 OFFICE O/P EST LOW 20 MIN: CPT | Performed by: NURSE PRACTITIONER

## 2020-12-31 RX ORDER — METHOCARBAMOL 500 MG/1
TABLET, FILM COATED ORAL
COMMUNITY
Start: 2020-12-17 | End: 2021-06-21 | Stop reason: SDUPTHER

## 2020-12-31 NOTE — PROGRESS NOTES
Subjective   Traci King is a 70 y.o. female.     Chief Complaint   Patient presents with   • COPD        Vitals:    12/31/20 0830   BP: 110/74   Pulse: 73   Resp: 20   Temp: 97.6 °F (36.4 °C)   SpO2: 98%        History of Present Illness   This is a 70 yr old female patient being seen today due to COPD.  He was seen for the first time on December 21 by Joanne Sena per telephone visit for complaints of shortness of breath.  She was instructed that time to report to the emergency room for further evaluation.  She did not do so.  She reports her shortness of breath has improved but knows she should see a pulmonologist and is asking for referral today.   She has a history of COPD with rt middle lobe lobectomy in 2014.  She denies any shortness of breath today or chest pain.     The following portions of the patient's history were reviewed and updated as appropriate: allergies, current medications, past family history, past medical history, past social history, past surgical history, and problem list.    Review of Systems   Constitutional: Negative for appetite change, chills, fatigue and fever.   Respiratory: Negative for cough, chest tightness and shortness of breath.    Gastrointestinal: Negative for abdominal pain.   Neurological: Positive for headache. Negative for dizziness and weakness.       Objective   Physical Exam  Constitutional:       Appearance: Normal appearance.   HENT:      Head: Normocephalic.   Cardiovascular:      Rate and Rhythm: Normal rate and regular rhythm.      Pulses: Normal pulses.      Heart sounds: Normal heart sounds.   Pulmonary:      Effort: Pulmonary effort is normal. No respiratory distress.      Breath sounds: Normal breath sounds. No wheezing or rhonchi.   Abdominal:      General: Bowel sounds are normal.      Palpations: Abdomen is soft.   Skin:     General: Skin is warm and dry.   Neurological:      Mental Status: She is alert and oriented to person, place, and time.            Assessment/Plan   Problems Addressed this Visit        Pulmonary and Pneumonias    Chronic obstructive pulmonary disease (CMS/HCC) - Primary    Relevant Orders    Ambulatory Referral to Pulmonology      Diagnoses       Codes Comments    Chronic obstructive pulmonary disease, unspecified COPD type (CMS/HCC)    -  Primary ICD-10-CM: J44.9  ICD-9-CM: 496         She is stable with her breathing today.  I will refer her to pulmonology.  She has appt in jan for labs and will f/u with randi velasquez

## 2021-01-07 ENCOUNTER — HOSPITAL ENCOUNTER (OUTPATIENT)
Dept: MRI IMAGING | Facility: HOSPITAL | Age: 71
Discharge: HOME | End: 2021-01-07
Admitting: PHYSICIAN ASSISTANT

## 2021-01-07 PROCEDURE — 70551 MRI BRAIN STEM W/O DYE: CPT

## 2021-01-11 ENCOUNTER — TRANSCRIBE ORDERS (OUTPATIENT)
Dept: ADMINISTRATIVE | Facility: HOSPITAL | Age: 71
End: 2021-01-11

## 2021-01-11 DIAGNOSIS — Z12.2 ENCOUNTER FOR SCREENING FOR LUNG CANCER: Primary | ICD-10-CM

## 2021-01-13 ENCOUNTER — OFFICE VISIT (OUTPATIENT)
Dept: FAMILY MEDICINE CLINIC | Facility: CLINIC | Age: 71
End: 2021-01-13

## 2021-01-13 VITALS
RESPIRATION RATE: 16 BRPM | SYSTOLIC BLOOD PRESSURE: 130 MMHG | DIASTOLIC BLOOD PRESSURE: 70 MMHG | WEIGHT: 182 LBS | TEMPERATURE: 98.2 F | OXYGEN SATURATION: 98 % | BODY MASS INDEX: 34.36 KG/M2 | HEIGHT: 61 IN | HEART RATE: 81 BPM

## 2021-01-13 DIAGNOSIS — M50.30 DEGENERATIVE DISC DISEASE, CERVICAL: ICD-10-CM

## 2021-01-13 DIAGNOSIS — R06.02 SHORTNESS OF BREATH: ICD-10-CM

## 2021-01-13 DIAGNOSIS — E55.9 VITAMIN D DEFICIENCY: ICD-10-CM

## 2021-01-13 DIAGNOSIS — M48.02 SPINAL STENOSIS, CERVICAL REGION: ICD-10-CM

## 2021-01-13 DIAGNOSIS — Z12.11 COLON CANCER SCREENING: ICD-10-CM

## 2021-01-13 DIAGNOSIS — M25.519 SHOULDER PAIN, UNSPECIFIED CHRONICITY, UNSPECIFIED LATERALITY: ICD-10-CM

## 2021-01-13 DIAGNOSIS — M79.18 MYOFASCIAL PAIN: ICD-10-CM

## 2021-01-13 DIAGNOSIS — M79.672 PAIN IN BOTH FEET: ICD-10-CM

## 2021-01-13 DIAGNOSIS — R79.9 ABNORMAL FINDING OF BLOOD CHEMISTRY, UNSPECIFIED: ICD-10-CM

## 2021-01-13 DIAGNOSIS — E78.49 OTHER HYPERLIPIDEMIA: ICD-10-CM

## 2021-01-13 DIAGNOSIS — F33.1 MODERATE EPISODE OF RECURRENT MAJOR DEPRESSIVE DISORDER (HCC): ICD-10-CM

## 2021-01-13 DIAGNOSIS — Z00.00 ROUTINE ADULT HEALTH MAINTENANCE: Primary | ICD-10-CM

## 2021-01-13 DIAGNOSIS — M79.602 PAIN OF LEFT UPPER EXTREMITY: ICD-10-CM

## 2021-01-13 DIAGNOSIS — M47.812 CERVICAL FACET JOINT SYNDROME: ICD-10-CM

## 2021-01-13 DIAGNOSIS — I51.89 DIASTOLIC DYSFUNCTION: ICD-10-CM

## 2021-01-13 DIAGNOSIS — R29.898 LIMB WEAKNESS: ICD-10-CM

## 2021-01-13 DIAGNOSIS — M51.36 DDD (DEGENERATIVE DISC DISEASE), LUMBAR: ICD-10-CM

## 2021-01-13 DIAGNOSIS — R06.09 DYSPNEA ON EXERTION: ICD-10-CM

## 2021-01-13 DIAGNOSIS — G62.9 NEUROPATHY: ICD-10-CM

## 2021-01-13 DIAGNOSIS — E53.8 B12 DEFICIENCY: ICD-10-CM

## 2021-01-13 DIAGNOSIS — M79.671 PAIN IN BOTH FEET: ICD-10-CM

## 2021-01-13 DIAGNOSIS — M25.60 REDUCED ACTIVE RANGE OF JOINT MOVEMENT: ICD-10-CM

## 2021-01-13 PROCEDURE — 99214 OFFICE O/P EST MOD 30 MIN: CPT | Performed by: PHYSICIAN ASSISTANT

## 2021-01-13 RX ORDER — PHENTERMINE HYDROCHLORIDE 37.5 MG/1
37.5 TABLET ORAL DAILY
COMMUNITY
Start: 2021-01-05 | End: 2021-02-03 | Stop reason: SDDI

## 2021-01-13 NOTE — PROGRESS NOTES
Subjective   Traci King is a 70 y.o. female who presents today for routine visit and follow up of hyperlipidemia, history of B12 and vitamin D deficiencies, SOA, headaches, chronic pain, and fasting labs.     History of Present Illness     Last Pap- [facility] 5/2020- normal. Goes annually  Last mammogram- [facility]- 5/2020 normal  Last DEXA-not this past year but has had with them in the past.   Last colonoscopy- Dr Bowles- 2005- hyperplastic polyp. Had Cologuard with GYN- negative per patient.   Last CT chest- scheduled 1/29/2021.   Last Tdap- 1/2019  Prevnar- 9/2020, 1/2019  Pneumovax- 2/2005  Hepatitis A-7/2016  Last flu shot-1/2020  Shingrix- had varicella as a child. 10/2019    Hyperlipidemia- stable on Crestor 5 mg once daily.   Vitamin D deficiency- does not take  B12 deficiency- does not take    COPD- patient was told she had COPD, however, per Dr French, normal PFT and referred for CT chest. Unsure if COPD diagnosis.   · History of partial lobectomy with benign biopsy. She was coughing and had xray and continued cough and they checked CT chest and found lesion- states she broke ribs coughing and reports this is why had to be removed. Still smoking- smoking history of more but now down to 5/day.  Shortness of breath- continues with SOA on exertion. No significant improvement. No worsening, new, or changing symptoms.   · 12/21/2020- telehealth with me- trouble breathing for a few days. SOA even walking across the room. Continued with headache at occipital area and neck. Advised to go to ER for evaluation - rule out PE, cardiac, and respiratory panel.   · 12/31/2020- seen by DACIA Calderon for SOA that was thought to be related to COPD. She consulted me- I recommend treatment and referral to pulmonology. Patient was referred to pulmonology.   · Seen by Dr French 1/11/2021. Normal PFT. Referred for low dose CT chest and advised referral to cardiology.  · CT scheduled 1/29/2021. Patient would  "like to see Ruth Solano-  and sister saw him and she would like to see him.     Abnormal EKG- She was told when she had EKG that she had MI in the past. Heart cath 2017 with 0% blockage in all arteries.    Headache- MRI brain with small vessel ischemic disease and cervical spondylosis. Scheduled to see neurology 1/19/2021. Headaches completely resolved. Now neck just hurts.  12/14/2021-  · Central occipital headaches x 3 eipsodes. She had tender but thinks it would feel good if someone massaged it. Increased neck pain with twisting head. No history of headaches- reports she \"never gets headaches\". She takes a Zyrtec daily- has been about 10 years since getting headaches.  · The initial headache was the top of her head and in her eyes and lasted a couple days with no relief and resolved for about 4 days then had recurrence.   · She had 2 headaches in her neck. She has increased neck, shoulder and back pain from them but has never had headaches with her pain.  · No other associated symptoms- no vision changes, nausea, vomiting, numbness tingling, dizziness, other neurological symptoms.   · She tried Tylenol every 4 hours, Aleve, Oxycontin, and ice pack on the back of her head.       Patient's Specialists:  Orthopedic surgery- Dr Winter- Reports she needs a surgery on her right foot 2/10/2021. 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 No surgical intervention. Advised power step orthotics, MRI right foot. Follow up after MRI. Did not perform MRI and patient did not follow up.   Hand surgery- Dr Frye- last appt 1/2021 for bilateral hand OA. Advised PIP joint injections, topical anti-inflammatory medication, and no surgical intervention at that time. Follow up in 6 weeks.   Neurosurgery- Dr Lira- last appt 4/2017 " for neck pain, left shoulder and arm pain- Facet arthritis C3-4, C4-5. Recommend facet injections and consideration of facet ablation. No surgical intervention. Follow up PRN.   Pain management- Dr Gan/ DACIA Gray- last appt telemedicine 12/2020 for chronic pain, cervical facet joint syndrome, DDD and facet arthropathy lumbar spine, right hand pain. Continue OxyContin, Diclofenac, and Restoril. Referred to hand surgery. Follow up in 1 month.   Psychiatry- Martine Deras- depression- seen every 3 months. She is prescribing Abilify, Paxil, and Restoril.    Thoracic surgery- Dr Harman- last appt 10/2013 following thoracotomy for right middle lobe syndrone- pathology with granulomatous inflammation with necrosis. Follow up PRN.   Pulmonology- Dr French- last appt 1/2021 for SOA/ CATHERINE, tobacco use. Normal PFT and referred for CT chest. Advised she see cardiology.   Cardiology- patient is requesting Dr James- referral today. Previously Dr Sargent/ Katt Giang PA-C- last appt 12/2018 for SOA. Thought to be due to weight gain and inactivity. Advised follow up with pulmonology. 2015- stress test with wall motion abnormality then heart cath- normal coronary arteries. 3/2017- CP, abnormal stress test with another coronary cath with normal coronary arteries. Advised stress echo if symptoms in the future.   GI- Dr Wallis- referred with appt 6/2019- patient cancelled due to feeling better.   Hand surgery- Dr Deborah Toledo- last appt 9/2014 for right distal radius fracture s/p ORIF. Follow up in 6 weeks with repeat xrays. No follow up.   Previous PCP- Dr Fournier- was seeing [facility] medicine and pediatrics in [place] with last appt 11/2020 and prior appt 8/2020 for routine follow up. She was having insomnia issues and was not sleeping well on Restoril- they advised no increase in insomnia medication and to see sleep therapy but reported she was resistant. Also she was on Phentermine and note states she was  getting Rx from elsewhere and would not prescribe from there. They advised caution with medication and risks of medication.     The following portions of the patient's history were reviewed and updated as appropriate: allergies, current medications, past family history, past medical history, past social history, past surgical history and problem list.    Review of Systems   Constitutional: Positive for fatigue. Negative for fever.   HENT: Negative.    Eyes: Negative.    Respiratory: Positive for cough and shortness of breath.    Cardiovascular: Negative.    Gastrointestinal: Negative.    Genitourinary: Negative.    Musculoskeletal: Positive for arthralgias, back pain, gait problem, myalgias, neck pain and neck stiffness.   Neurological: Negative for dizziness, tremors, seizures, syncope and headaches.   Psychiatric/Behavioral: Negative.        Objective   Vitals:    01/13/21 0802   BP: 130/70   Pulse: 81   Resp: 16   Temp: 98.2 °F (36.8 °C)   SpO2: 98%     Body mass index is 34.41 kg/m².    Physical Exam  Vitals signs and nursing note reviewed.   Constitutional:       General: She is not in acute distress.     Appearance: She is well-developed. She is not diaphoretic.   HENT:      Head: Normocephalic and atraumatic.      Right Ear: Hearing, tympanic membrane, ear canal and external ear normal.      Left Ear: Hearing, tympanic membrane, ear canal and external ear normal.      Nose: Nose normal.   Eyes:      General: Lids are normal.      Conjunctiva/sclera: Conjunctivae normal.      Pupils: Pupils are equal, round, and reactive to light.   Neck:      Musculoskeletal: Neck supple.      Thyroid: No thyroid mass or thyromegaly.      Vascular: No carotid bruit or JVD.      Trachea: No tracheal deviation.   Cardiovascular:      Rate and Rhythm: Normal rate and regular rhythm.      Pulses:           Radial pulses are 2+ on the right side and 2+ on the left side.        Posterior tibial pulses are 2+ on the right side and  2+ on the left side.      Heart sounds: Normal heart sounds. No murmur. No friction rub. No gallop.    Pulmonary:      Effort: Pulmonary effort is normal. No respiratory distress.      Breath sounds: Normal breath sounds. No wheezing, rhonchi or rales.   Abdominal:      General: Bowel sounds are normal. There is no distension or abdominal bruit.      Palpations: Abdomen is soft. Abdomen is not rigid.      Tenderness: There is no abdominal tenderness. There is no guarding or rebound.      Hernia: No hernia is present.   Musculoskeletal: Normal range of motion.         General: No tenderness or deformity.      Comments: Normal strength.   Lymphadenopathy:      Cervical: No cervical adenopathy.   Skin:     General: Skin is warm and dry.      Findings: No erythema or rash.   Neurological:      Mental Status: She is alert and oriented to person, place, and time. She is not disoriented.      Cranial Nerves: No cranial nerve deficit.      Sensory: No sensory deficit.      Motor: No abnormal muscle tone.      Coordination: Coordination normal.      Gait: Gait normal.      Deep Tendon Reflexes: Reflexes are normal and symmetric. Reflexes normal.   Psychiatric:         Attention and Perception: She is attentive.         Speech: Speech normal.         Behavior: Behavior normal.         Thought Content: Thought content normal.         Judgment: Judgment normal.       ECG 12 Lead    Date/Time: 1/19/2021 3:43 AM  Performed by: Joanne Sena PA  Authorized by: Joanne Sena PA   Comparison: compared with previous ECG from 12/11/2018  Similar to previous ECG  Rhythm: sinus rhythm  Rate: normal  ST Elevation: V1, V2 and V3  ST Depression: II, III, V5 and V6  T flattening: V6, V5 and V4  QRS axis: normal    Clinical impression: abnormal EKG  Comments: Indication:CATHERINE/ SOA              Assessment/Plan   Diagnoses and all orders for this visit:    1. Routine adult health maintenance (Primary)  -     Ambulatory Referral to  Cardiology  -     ECG 12 Lead  -     CBC & Differential  -     Comprehensive Metabolic Panel  -     CK  -     Lipid Panel With LDL / HDL Ratio  -     Iron and TIBC  -     Ferritin  -     Vitamin B12 & Folate  -     Vitamin D 25 Hydroxy  -     TSH  -     T4, free  -     T3, Free  -     proBNP  -     RAHUL With / DsDNA, RNP, Sjogrens A / B, Smith  -     C-reactive Protein  -     Cyclic Citrul Peptide Antibody, IgG / IgA  -     Rheumatoid Factor  -     Uric Acid  -     Sedimentation Rate  -     Urinalysis With Culture If Indicated -    2. Colon cancer screening  -     Ambulatory Referral For Screening Colonoscopy    3. Other hyperlipidemia    4. Vitamin D deficiency  -     Comprehensive Metabolic Panel  -     Vitamin D 25 Hydroxy    5. B12 deficiency  -     CBC & Differential  -     Vitamin B12 & Folate    6. Shortness of breath  -     Ambulatory Referral to Cardiology  -     CBC & Differential  -     Comprehensive Metabolic Panel  -     Iron and TIBC  -     Ferritin  -     Vitamin B12 & Folate  -     TSH  -     T4, free  -     T3, Free  -     proBNP    7. Dyspnea on exertion  -     Ambulatory Referral to Cardiology  -     ECG 12 Lead  -     CBC & Differential  -     Comprehensive Metabolic Panel  -     Iron and TIBC  -     Ferritin  -     Vitamin B12 & Folate  -     TSH  -     T4, free  -     T3, Free  -     proBNP    8. Diastolic dysfunction  -     Ambulatory Referral to Cardiology  -     ECG 12 Lead  -     TSH  -     T4, free  -     T3, Free  -     proBNP    9. Moderate episode of recurrent major depressive disorder (CMS/HCC)  -     Vitamin D 25 Hydroxy  -     TSH  -     T4, free  -     T3, Free    10. DDD (degenerative disc disease), lumbar  -     CK  -     RAHUL With / DsDNA, RNP, Sjogrens A / B, Smith  -     C-reactive Protein  -     Cyclic Citrul Peptide Antibody, IgG / IgA  -     Rheumatoid Factor  -     Uric Acid  -     Sedimentation Rate    11. Cervical facet joint syndrome  -     CK  -     RAHUL With / DsDNA, RNP,  Sjogrens A / B, Randall  -     C-reactive Protein  -     Cyclic Citrul Peptide Antibody, IgG / IgA  -     Rheumatoid Factor  -     Uric Acid  -     Sedimentation Rate    12. Spinal stenosis, cervical region  -     CK  -     RAHUL With / DsDNA, RNP, Sjogrens A / B, Smith  -     C-reactive Protein  -     Cyclic Citrul Peptide Antibody, IgG / IgA  -     Rheumatoid Factor  -     Uric Acid  -     Sedimentation Rate  -     Ambulatory Referral to Neurosurgery    13. Degenerative disc disease, cervical  -     Ambulatory Referral to Neurosurgery    14. Myofascial pain  -     CK  -     RAHUL With / DsDNA, RNP, Sjogrens A / B, Smith  -     C-reactive Protein  -     Cyclic Citrul Peptide Antibody, IgG / IgA  -     Rheumatoid Factor  -     Uric Acid  -     Sedimentation Rate    15. Shoulder pain, unspecified chronicity, unspecified laterality  -     CK  -     RAHUL With / DsDNA, RNP, Sjogrens A / B, Smith  -     C-reactive Protein  -     Cyclic Citrul Peptide Antibody, IgG / IgA  -     Rheumatoid Factor  -     Uric Acid  -     Sedimentation Rate    16. Pain of left upper extremity  -     CK  -     RAHUL With / DsDNA, RNP, Sjogrens A / B, Smith  -     C-reactive Protein  -     Cyclic Citrul Peptide Antibody, IgG / IgA  -     Rheumatoid Factor  -     Uric Acid  -     Sedimentation Rate    17. Pain in both feet  -     CK  -     RAHUL With / DsDNA, RNP, Sjogrens A / B, Smith  -     C-reactive Protein  -     Cyclic Citrul Peptide Antibody, IgG / IgA  -     Rheumatoid Factor  -     Uric Acid  -     Sedimentation Rate    18. Limb weakness  -     CK  -     RAHUL With / DsDNA, RNP, Sjogrens A / B, Smith  -     C-reactive Protein  -     Cyclic Citrul Peptide Antibody, IgG / IgA  -     Rheumatoid Factor  -     Uric Acid  -     Sedimentation Rate    19. Neuropathy  -     CBC & Differential  -     RAHUL With / DsDNA, RNP, Sjogrens A / B, Smith  -     C-reactive Protein  -     Cyclic Citrul Peptide Antibody, IgG / IgA  -     Rheumatoid Factor  -     Uric  Acid  -     Sedimentation Rate    20. Reduced active range of joint movement  -     CK  -     RAHUL With / DsDNA, RNP, Sjogrens A / B, Smith  -     C-reactive Protein  -     Cyclic Citrul Peptide Antibody, IgG / IgA  -     Rheumatoid Factor  -     Uric Acid  -     Sedimentation Rate    21. Abnormal finding of blood chemistry, unspecified   -     Iron and TIBC  -     Ferritin    Other orders  -     Microscopic Examination -  -     Urine culture, Comprehensive - ,        Assessment and Plan  Patient reported she had not had routine primary care in some time, had seen PCP, but was not seen regularly with fasting labs. She had last appt 11/2020 and had labs and routine follow up 8/2020. Patient will have fasting labs. Call if no results in 1 week. Stability of conditions, plan, follow up, and further recommendations pending labs. Follow up in 6 months if labs are stable.     Hyperlipidemia- Continue Crestor 5 mg once daily. Await labs for further recommendations.   Vitamin D deficiency- Further recommendations pending labs.   B12 deficiency- Dosing recommendations pending labs.   Shortness of breath- I will refer to a new cardiologist at he request.   Abnormal EKG- abnormal stress test with negative heart cath x 2. I will refer to new cardiologist at her request.   Headache- negative MRI brain with the exception of OA/ DDD cervical spine. Keep appt with neurology for evaluation and I will refer back to neurosurgery.   DDD cervical and lumbar spine, myalgias, multiple joint pain, neuropathy- I will check autoimmune testing with labs today. Continue follow up with pain management as directed by them. I will refer back to neurosurgery for evaluation of DDD cervical spine and headaches.   Mood disorder- Continue follow up with psychiatry as directed by them.    Patient continues to see specialists as noted above.  I have reviewed available records, including consult notes, labs, and imaging/testing.  Patient to continue  follow-up with specialists as directed by them.

## 2021-01-13 NOTE — PATIENT INSTRUCTIONS
Patient to call insurance to find out coverage and location of coverage for Pneumovax, Hepatitis A #2, and Shingrix #2.

## 2021-01-15 LAB
25(OH)D3+25(OH)D2 SERPL-MCNC: 39.1 NG/ML (ref 30–100)
ALBUMIN SERPL-MCNC: 4.4 G/DL (ref 3.5–5.2)
ALBUMIN/GLOB SERPL: 1.8 G/DL
ALP SERPL-CCNC: 89 U/L (ref 39–117)
ALT SERPL-CCNC: 14 U/L (ref 1–33)
ANA SER QL: NEGATIVE
APPEARANCE UR: CLEAR
AST SERPL-CCNC: 19 U/L (ref 1–32)
BACTERIA #/AREA URNS HPF: NORMAL /HPF
BACTERIA UR CULT: ABNORMAL
BASOPHILS # BLD AUTO: 0.07 10*3/MM3 (ref 0–0.2)
BASOPHILS NFR BLD AUTO: 0.8 % (ref 0–1.5)
BILIRUB SERPL-MCNC: <0.2 MG/DL (ref 0–1.2)
BILIRUB UR QL STRIP: NEGATIVE
BUN SERPL-MCNC: 12 MG/DL (ref 8–23)
BUN/CREAT SERPL: 13.5 (ref 7–25)
CALCIUM SERPL-MCNC: 9 MG/DL (ref 8.6–10.5)
CCP IGA+IGG SERPL IA-ACNC: 6 UNITS (ref 0–19)
CHLORIDE SERPL-SCNC: 102 MMOL/L (ref 98–107)
CHOLEST SERPL-MCNC: 152 MG/DL (ref 0–200)
CK SERPL-CCNC: 102 U/L (ref 20–180)
CO2 SERPL-SCNC: 24.2 MMOL/L (ref 22–29)
COLOR UR: YELLOW
CREAT SERPL-MCNC: 0.89 MG/DL (ref 0.57–1)
CRP SERPL-MCNC: 0.38 MG/DL (ref 0–0.5)
EOSINOPHIL # BLD AUTO: 0.17 10*3/MM3 (ref 0–0.4)
EOSINOPHIL NFR BLD AUTO: 2 % (ref 0.3–6.2)
EPI CELLS #/AREA URNS HPF: NORMAL /HPF (ref 0–10)
ERYTHROCYTE [DISTWIDTH] IN BLOOD BY AUTOMATED COUNT: 12 % (ref 12.3–15.4)
ERYTHROCYTE [SEDIMENTATION RATE] IN BLOOD BY WESTERGREN METHOD: 6 MM/HR (ref 0–30)
FERRITIN SERPL-MCNC: 58 NG/ML (ref 13–150)
FOLATE SERPL-MCNC: 7.42 NG/ML (ref 4.78–24.2)
GLOBULIN SER CALC-MCNC: 2.4 GM/DL
GLUCOSE SERPL-MCNC: 93 MG/DL (ref 65–99)
GLUCOSE UR QL: NEGATIVE
HCT VFR BLD AUTO: 36.3 % (ref 34–46.6)
HDLC SERPL-MCNC: 97 MG/DL (ref 40–60)
HGB BLD-MCNC: 11.9 G/DL (ref 12–15.9)
HGB UR QL STRIP: NEGATIVE
IMM GRANULOCYTES # BLD AUTO: 0.06 10*3/MM3 (ref 0–0.05)
IMM GRANULOCYTES NFR BLD AUTO: 0.7 % (ref 0–0.5)
IRON SATN MFR SERPL: 16 % (ref 20–50)
IRON SERPL-MCNC: 65 MCG/DL (ref 37–145)
KETONES UR QL STRIP: NEGATIVE
LDLC SERPL CALC-MCNC: 43 MG/DL (ref 0–100)
LDLC/HDLC SERPL: 0.45 {RATIO}
LEUKOCYTE ESTERASE UR QL STRIP: ABNORMAL
LYMPHOCYTES # BLD AUTO: 1.83 10*3/MM3 (ref 0.7–3.1)
LYMPHOCYTES NFR BLD AUTO: 21.7 % (ref 19.6–45.3)
MCH RBC QN AUTO: 32.2 PG (ref 26.6–33)
MCHC RBC AUTO-ENTMCNC: 32.8 G/DL (ref 31.5–35.7)
MCV RBC AUTO: 98.4 FL (ref 79–97)
MICRO URNS: ABNORMAL
MONOCYTES # BLD AUTO: 0.45 10*3/MM3 (ref 0.1–0.9)
MONOCYTES NFR BLD AUTO: 5.3 % (ref 5–12)
MUCOUS THREADS URNS QL MICRO: PRESENT /HPF
NEUTROPHILS # BLD AUTO: 5.87 10*3/MM3 (ref 1.7–7)
NEUTROPHILS NFR BLD AUTO: 69.5 % (ref 42.7–76)
NITRITE UR QL STRIP: NEGATIVE
NRBC BLD AUTO-RTO: 0 /100 WBC (ref 0–0.2)
NT-PROBNP SERPL-MCNC: 159 PG/ML (ref 0–301)
OTHER ANTIBIOTIC SUSC ISLT: ABNORMAL
PH UR STRIP: 5.5 [PH] (ref 5–7.5)
PLATELET # BLD AUTO: 405 10*3/MM3 (ref 140–450)
POTASSIUM SERPL-SCNC: 4.9 MMOL/L (ref 3.5–5.2)
PROT SERPL-MCNC: 6.8 G/DL (ref 6–8.5)
PROT UR QL STRIP: NEGATIVE
RBC # BLD AUTO: 3.69 10*6/MM3 (ref 3.77–5.28)
RBC #/AREA URNS HPF: NORMAL /HPF (ref 0–2)
RHEUMATOID FACT SERPL-ACNC: 11.7 IU/ML (ref 0–13.9)
SODIUM SERPL-SCNC: 136 MMOL/L (ref 136–145)
SP GR UR: 1.02 (ref 1–1.03)
T3FREE SERPL-MCNC: 2.9 PG/ML (ref 2–4.4)
T4 FREE SERPL-MCNC: 1.25 NG/DL (ref 0.93–1.7)
TIBC SERPL-MCNC: 396 MCG/DL
TRIGL SERPL-MCNC: 59 MG/DL (ref 0–150)
TSH SERPL DL<=0.005 MIU/L-ACNC: 2.17 UIU/ML (ref 0.27–4.2)
UIBC SERPL-MCNC: 331 MCG/DL (ref 112–346)
URATE SERPL-MCNC: 4.8 MG/DL (ref 2.4–5.7)
URINALYSIS REFLEX: ABNORMAL
UROBILINOGEN UR STRIP-MCNC: 0.2 MG/DL (ref 0.2–1)
VIT B12 SERPL-MCNC: 586 PG/ML (ref 211–946)
VLDLC SERPL CALC-MCNC: 12 MG/DL (ref 5–40)
WBC # BLD AUTO: 8.45 10*3/MM3 (ref 3.4–10.8)
WBC #/AREA URNS HPF: NORMAL /HPF (ref 0–5)

## 2021-01-18 NOTE — TELEPHONE ENCOUNTER
Caller: Traci King    Relationship: Self    Best call back number: [phone]    Medication needed:   Requested Prescriptions     Pending Prescriptions Disp Refills   • rosuvastatin (CRESTOR) 5 MG tablet          When do you need the refill by: ASAP    What details did the patient provide when requesting the medication: 2 DOSES LEFT     Does the patient have less than a 3 day supply:  [x] Yes  [] No    What is the patient's preferred pharmacy: [facility] [place] [address] [facility] - [phone]  - [phone] FX

## 2021-01-19 ENCOUNTER — OFFICE VISIT (OUTPATIENT)
Dept: NEUROLOGY | Facility: CLINIC | Age: 71
End: 2021-01-19

## 2021-01-19 ENCOUNTER — TELEPHONE (OUTPATIENT)
Dept: FAMILY MEDICINE CLINIC | Facility: CLINIC | Age: 71
End: 2021-01-19

## 2021-01-19 VITALS
HEART RATE: 88 BPM | WEIGHT: 185 LBS | BODY MASS INDEX: 34.93 KG/M2 | SYSTOLIC BLOOD PRESSURE: 120 MMHG | HEIGHT: 61 IN | OXYGEN SATURATION: 98 % | DIASTOLIC BLOOD PRESSURE: 58 MMHG

## 2021-01-19 DIAGNOSIS — M54.81 BILATERAL OCCIPITAL NEURALGIA: Primary | ICD-10-CM

## 2021-01-19 DIAGNOSIS — D72.828 GRANULOCYTOSIS: Primary | ICD-10-CM

## 2021-01-19 DIAGNOSIS — D64.9 ANEMIA, UNSPECIFIED TYPE: ICD-10-CM

## 2021-01-19 DIAGNOSIS — D75.89 MACROCYTOSIS: ICD-10-CM

## 2021-01-19 PROCEDURE — 99214 OFFICE O/P EST MOD 30 MIN: CPT | Performed by: PSYCHIATRY & NEUROLOGY

## 2021-01-19 PROCEDURE — 93000 ELECTROCARDIOGRAM COMPLETE: CPT | Performed by: PHYSICIAN ASSISTANT

## 2021-01-19 RX ORDER — ROSUVASTATIN CALCIUM 5 MG/1
5 TABLET, COATED ORAL NIGHTLY
Qty: 90 TABLET | Refills: 1 | Status: SHIPPED | OUTPATIENT
Start: 2021-01-19 | End: 2021-07-13
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
## 2021-01-19 NOTE — PATIENT INSTRUCTIONS
Occipital Neuralgia    Occipital neuralgia is a type of headache that causes brief episodes of very bad pain in the back of your head. Pain from occipital neuralgia may spread (radiate) to other parts of your head.  These headaches may be caused by irritation of the nerves that leave your spinal cord high up in your neck, just below the base of your skull (occipital nerves). Your occipital nerves transmit sensations from the back of your head, the top of your head, and the areas behind your ears.  What are the causes?  This condition can occur without any known cause (primary headache syndrome). In other cases, this condition is caused by pressure on or irritation of one of the two occipital nerves. Pressure and irritation may be due to:  · Muscle spasm in the neck.  · Neck injury.  · Wear and tear of the vertebrae in the neck (osteoarthritis).  · Disease of the disks that separate the vertebrae.  · Swollen blood vessels that put pressure on the occipital nerves.  · Infections.  · Tumors.  · Diabetes.  What are the signs or symptoms?  This condition causes brief burning, stabbing, electric, shocking, or shooting pain which can radiate to the top of the head. It can happen on one side or both sides of the head. It can also cause:  · Pain behind the eye.  · Pain triggered by neck movement or hair brushing.  · Scalp tenderness.  · Aching in the back of the head between episodes of very bad pain.  · Pain gets worse with exposure to bright lights.  How is this diagnosed?  There is no test that diagnoses this condition. Your health care provider may diagnose this condition based on a physical exam and your symptoms. Other tests may be done, such as:  · Imaging studies of the brain and neck (cervical spine), such as an MRI or CT scan. These look for causes of pinched nerves.  · Applying pressure to the nerves in the neck to try to re-create the pain.  · Injection of numbing medicine into the occipital nerve areas to see if  pain goes away (diagnostic nerve block).  How is this treated?  Treatment for this condition may begin with simple measures, such as:  · Rest.  · Massage.  · Applying heat or cold on the area.  · Over-the-counter pain relievers.  If these measures do not work, you may need other treatments, including:  · Medicines, such as:  ? Prescription-strength anti-inflammatory medicines.  ? Muscle relaxants.  ? Anti-seizure medicines, which can relieve pain.  ? Antidepressants, which can relieve pain.  ? Injected medicines, such as medicines that numb the area (local anesthetic) and steroids.  · Pulsed radiofrequency ablation. This is when wires are implanted to deliver electrical impulses that block pain signals from the occipital nerve.  · Surgery to relieve nerve pressure.  · Physical therapy.  Follow these instructions at home:  Pain management         · Avoid any activities that cause pain.  · Rest when you have an attack of pain.  · Try gentle massage to relieve pain.  · Try a different pillow or sleeping position.  · If directed, apply heat to the affected area as told by your health care provider. Use the heat source that your health care provider recommends, such as a moist heat pack or a heating pad.  ? Place a towel between your skin and the heat source.  ? Leave the heat on for 20-30 minutes.  ? Remove the heat if your skin turns bright red. This is especially important if you are unable to feel pain, heat, or cold. You may have a greater risk of getting burned.  · If directed, apply ice to the back of the head and neck area as told by your health care provider.  ? Put ice in a plastic bag.  ? Place a towel between your skin and the bag.  ? Leave the ice on for 20 minutes, 2-3 times per day.  General instructions  · Take over-the-counter and prescription medicines only as told by your health care provider.  · Avoid things that make your symptoms worse, such as bright lights.  · Try to stay active. Get regular  exercise that does not cause pain. Ask your health care provider to suggest safe exercises for you.  · Work with a physical therapist to learn stretching exercises you can do at home.  · Practice good posture.  · Keep all follow-up visits as told by your health care provider. This is important.  Contact a health care provider if:  · Your medicine is not working.  · You have new or worsening symptoms.  Get help right away if:  · You have very bad head pain that does not go away.  · You have a sudden change in vision, balance, or speech.  Summary  · Occipital neuralgia is a type of headache that causes brief episodes of very bad pain in the back of your head.  · Pain from occipital neuralgia may spread (radiate) to other parts of your head.  · Treatment for this condition includes rest, massage, and medicines.  This information is not intended to replace advice given to you by your health care provider. Make sure you discuss any questions you have with your health care provider.  Document Revised: 12/04/2018 Document Reviewed: 02/22/2018  Elsevier Patient Education © 2020 [employer]

## 2021-01-19 NOTE — ASSESSMENT & PLAN NOTE
70 year old woman with bilateral occipital neuralgia due to increased muscle tension in the cervico-occipital region.  Negative brain MRI scan for any acute findings or etiology of her headaches which I reviewed the images with her in clinic today.  She has tenderness on physical examination with palpation over the occipital nerves bilaterally.  Discussed potential treatment options.  I will set her up for bilateral occipital nerve blocks to see if these are helpful for her headaches which do not seem migrainous but have some tension type qualities.  Answered all of her questions today.  Will see her back tomorrow for ONB and in 3 months to reevaluate.

## 2021-01-19 NOTE — TELEPHONE ENCOUNTER
"PT IS CALLING IN STATING THAT A HEMOTOLOGIST/ONCOLOGIST HAS CALLED HER TO REMIND HER OF HER APPOINTMENT.  PT KNOWS NOTHING ABOUT THIS AND IS \"FREAKING OUT\" AND STATES THAT SHE NEEDS TO SPEAK TO DR ADRIAN SANTANA ABOUT THIS.    PT CALL BACK  [phone]  "

## 2021-01-20 ENCOUNTER — PROCEDURE VISIT (OUTPATIENT)
Dept: NEUROLOGY | Facility: CLINIC | Age: 71
End: 2021-01-20

## 2021-01-20 DIAGNOSIS — M54.81 BILATERAL OCCIPITAL NEURALGIA: Primary | ICD-10-CM

## 2021-01-20 RX ORDER — METHYLPREDNISOLONE ACETATE 40 MG/ML
40 INJECTION, SUSPENSION INTRA-ARTICULAR; INTRALESIONAL; INTRAMUSCULAR; SOFT TISSUE ONCE
Status: DISCONTINUED | OUTPATIENT
Start: 2021-01-20 | End: 2022-04-27

## 2021-01-20 RX ORDER — LIDOCAINE HYDROCHLORIDE 20 MG/ML
5 INJECTION, SOLUTION EPIDURAL; INFILTRATION; INTRACAUDAL; PERINEURAL ONCE
Status: DISCONTINUED | OUTPATIENT
Start: 2021-01-20 | End: 2022-04-27

## 2021-01-20 NOTE — TELEPHONE ENCOUNTER
Didn't you talk with this person about this yesterday to go through her lab results and let her know why we were referring her?

## 2021-01-20 NOTE — PROGRESS NOTES
Procedure   Procedures      Occipital Nerve Block Procedure Note:    PROCEDURE IN DETAIL:  The patient was injected in the bilateral occipital nerves with 2% lidocaine, a total of 2.5 mL times two, and Depomedrol total of 0.5 mL or 20 mg times two after obtaining written consent. Sterile technique was used including sterile gloves and needles. Injection sites identified using known anatomical landmarks.  The area to be injected was prepped with sterilizing agent. The patient tolerated the procedure without any complications. She will be returning for injection as indicated and clinic for follow up as previously scheduled.

## 2021-01-20 NOTE — TELEPHONE ENCOUNTER
Yes. I did speak with the patient and explained what is going on and why she was being referred out to those places. She did verbalize understanding

## 2021-01-21 ENCOUNTER — TELEPHONE (OUTPATIENT)
Dept: NEUROLOGY | Facility: CLINIC | Age: 71
End: 2021-01-21

## 2021-01-21 ENCOUNTER — OFFICE VISIT (OUTPATIENT)
Dept: PAIN MEDICINE | Facility: CLINIC | Age: 71
End: 2021-01-21

## 2021-01-21 VITALS
TEMPERATURE: 97.1 F | HEIGHT: 61 IN | OXYGEN SATURATION: 97 % | SYSTOLIC BLOOD PRESSURE: 123 MMHG | BODY MASS INDEX: 34.93 KG/M2 | DIASTOLIC BLOOD PRESSURE: 77 MMHG | WEIGHT: 185 LBS | HEART RATE: 77 BPM | RESPIRATION RATE: 20 BRPM

## 2021-01-21 DIAGNOSIS — M25.519 SHOULDER PAIN, UNSPECIFIED CHRONICITY, UNSPECIFIED LATERALITY: ICD-10-CM

## 2021-01-21 DIAGNOSIS — M47.812 CERVICAL FACET JOINT SYNDROME: ICD-10-CM

## 2021-01-21 DIAGNOSIS — M47.816 LUMBAR FACET ARTHROPATHY: ICD-10-CM

## 2021-01-21 DIAGNOSIS — Z79.899 ENCOUNTER FOR LONG-TERM (CURRENT) USE OF HIGH-RISK MEDICATION: ICD-10-CM

## 2021-01-21 DIAGNOSIS — G89.29 OTHER CHRONIC PAIN: Primary | ICD-10-CM

## 2021-01-21 PROBLEM — M19.071 PRIMARY OSTEOARTHRITIS, RIGHT ANKLE AND FOOT: Status: ACTIVE | Noted: 2021-01-11

## 2021-01-21 PROBLEM — J44.9 CHRONIC OBSTRUCTIVE PULMONARY DISEASE: Status: RESOLVED | Noted: 2018-12-11 | Resolved: 2021-01-21

## 2021-01-21 PROCEDURE — 99214 OFFICE O/P EST MOD 30 MIN: CPT | Performed by: NURSE PRACTITIONER

## 2021-01-21 PROCEDURE — 80305 DRUG TEST PRSMV DIR OPT OBS: CPT | Performed by: NURSE PRACTITIONER

## 2021-01-21 RX ORDER — OXYCODONE HYDROCHLORIDE 30 MG/1
30 TABLET, FILM COATED, EXTENDED RELEASE ORAL EVERY 12 HOURS SCHEDULED
Qty: 60 TABLET | Refills: 0 | Status: SHIPPED | OUTPATIENT
Start: 2021-01-21 | End: 2021-02-22 | Stop reason: SDUPTHER

## 2021-01-21 NOTE — PROGRESS NOTES
"CHIEF COMPLAINT  F/u back, neck and left shoulder pain. Pt had Bilateral L3-5 Lumbar Medial Branch RADIOFREQUENCY and sts receiving 50% relief from procedure.     Subjective   Traci King is a 70 y.o. female  who presents for follow-up.  She has a history of chronic back, neck and shoulder pain. Reports her pain is variable since last evaluation.    Patient presents for follow-up of PROCEDURE. Patient had a bilateral L3-L5 RFL performed by Dr. LEMUS on 12-29-20 for management of LOW BACK PAIN. Patient reports 50% ONGOING relief from the procedure.    Complains of pain in her low back, neck and shoulder. Today her pain is 2/10VAS. Describes the pain as nearly continuous aching and throbbing. Pain increases with walking, standing, activity; pain decreases with medication, rest and procedures.   Continues with OxyContin 30 mg BID(0200 and 1400)  and Diclofenac 50 mg 1-2/day(takes in half doses) and Robaxin 750 mg 2-3/day(takes in half doses)(reports has not been having to take as frequently).  Is taking Tylenol OTC PRN. Denies any side effects from the regimen. She does take dulcolax every day and has been doing this since age 16, reports long-standing history of constipation. \"I've had it all my life.\"  This regimen decreases her pain by 75%. ADLs by self. Denies any bowel or bladder changes.      She reports she is taking PHenteramine for weight loss efforts.     Saw PCP recently. Referred to pulmonology for SOA.  Pulmonary tests were normal. Referred to cardiologist. Also having headaches in back of head. Had CT scan. Referred to neurologist. Reports she was told she has \"small vessels.\" Had occipital nerve block with neurology 1-20-21.  Having significant relief.     Patient had a bilateral L3-L5 MBB performed by Dr. LEMUS on 7-28-20 for management of LOW BACK PAIN. Initially had 80-85% relief for 1 day.  Then she reports 50% ONGOING relief from the procedure for 1 month.  Reports improvement in activity " and sleep. Patient wants to proceed with RFL. Pain has returned to pre-procedure level. Insurance has denied lumbar RFL. Requires appeal to get authorization.     Patient had a bilateral L3-L5 MBB performed by Dr. LEMUS on 5-20-20 for management of LOW BACK PAIN. Patient reports 80% relief from the procedure for 2 days. Pain has returned to pre-procedure level.      She completed a Cervical Facet Nerve (Medial Branch) Radiofrequency Lesioning LEFT C3-C5 RFL  on  1/26/18 performed by Dr. Lemus for management of neck pain.     She completed a Bilateral S1 Lumbar TFESI   on  8/16/2018 performed by Dr. LEMUS for management of low back and radicular pain. Patient reports NO relief from the procedure.      Failed PT, failed ultrasound therapy, failed compounded pain creme.    Patient remained masked during entire encounter. No cough present. I donned a mask and eye protection throughout entire visit. Prior to donning mask and eye protection, hand hygiene was performed, as well as when it was doffed.  I was closer than 6 feet, but not for an extended period of time. No obvious exposure to any bodily fluids.    Neck Pain   This is a chronic problem. The current episode started more than 1 year ago. The problem occurs constantly. The problem has been waxing and waning. The quality of the pain is described as burning. The pain is at a severity of 2/10. The pain is moderate. The symptoms are aggravated by position, bending, stress and twisting. The pain is worse during the day. Stiffness is present all day. Pertinent negatives include no chest pain, fever, headaches, numbness or weakness. She has tried oral narcotics and NSAIDs (cervical MBB--significant temporary relief; cervical RFL--50% ongoing relief) for the symptoms. The treatment provided moderate relief.   Shoulder Injury   The left shoulder is affected. There was no injury mechanism. The pain radiates to the left neck and right neck. The pain is at a severity of  2/10. Pain severity now: unchanged since last office visit. Pertinent negatives include no chest pain or numbness. The symptoms are aggravated by movement and palpation.   Back Pain  This is a chronic problem. The current episode started more than 1 year ago. The problem occurs constantly. The problem has been waxing and waning since onset. The pain is at a severity of 2/10. The pain is worse during the day. The symptoms are aggravated by bending, position, standing and twisting. Associated symptoms include bladder incontinence (long term issue). Pertinent negatives include no abdominal pain, bowel incontinence, chest pain, dysuria, fever, headaches, numbness or weakness. Risk factors include lack of exercise, sedentary lifestyle, menopause and obesity. She has tried analgesics, bed rest, chiropractic manipulation, heat, home exercises, ice, muscle relaxant, NSAIDs and walking (lumbar MBBgave 80% relief for 1-2 days twice) for the symptoms. The treatment provided moderate relief.      PEG Assessment   What number best describes your pain on average in the past week?2  What number best describes how, during the past week, pain has interfered with your enjoyment of life?2  What number best describes how, during the past week, pain has interfered with your general activity?  2    The following portions of the patient's history were reviewed and updated as appropriate: allergies, current medications, past family history, past medical history, past social history, past surgical history and problem list.    Review of Systems   Constitutional: Negative for activity change, fatigue and fever.   HENT: Negative for congestion.    Eyes: Negative for visual disturbance.   Respiratory: Negative for cough and shortness of breath.    Cardiovascular: Negative for chest pain.   Gastrointestinal: Negative for abdominal pain, bowel incontinence, constipation and diarrhea.   Genitourinary: Positive for bladder incontinence (long term  "issue). Negative for difficulty urinating and dysuria.   Musculoskeletal: Positive for arthralgias (left shoulder), back pain and neck pain.   Allergic/Immunologic: Negative for immunocompromised state.   Neurological: Negative for dizziness, weakness, light-headedness, numbness and headaches.   Psychiatric/Behavioral: Negative for agitation, sleep disturbance and suicidal ideas. The patient is not nervous/anxious.      I have reviewed and confirmed the accuracy of the ROS as documented by the MA/LPN/RN DACIA Baez      Vitals:    01/21/21 0935   BP: 123/77   Pulse: 77   Resp: 20   Temp: 97.1 °F (36.2 °C)   SpO2: 97%   Weight: 83.9 kg (185 lb)   Height: 154.9 cm (61\")   PainSc:   2   PainLoc: Back  Comment: neck and left shoulder     Objective   Physical Exam  Vitals signs and nursing note reviewed.   Constitutional:       Appearance: Normal appearance. She is well-developed.   HENT:      Head: Normocephalic and atraumatic.   Neck:      Musculoskeletal: Spinous process tenderness and muscular tenderness present.   Cardiovascular:      Rate and Rhythm: Normal rate.   Pulmonary:      Effort: Pulmonary effort is normal. No respiratory distress.   Musculoskeletal:      Lumbar back: She exhibits decreased range of motion, tenderness and bony tenderness.   Skin:     General: Skin is warm and dry.   Neurological:      Mental Status: She is alert.      Gait: Gait abnormal.   Psychiatric:         Speech: Speech normal.         Behavior: Behavior normal. Behavior is cooperative.         Thought Content: Thought content normal.         Judgment: Judgment normal.         Assessment/Plan   Diagnoses and all orders for this visit:    1. Other chronic pain (Primary)  -     Urine Drug Screen Confirmation - Urine, Clean Catch; Future  -     POC Urine Drug Screen, Triage    2. Lumbar facet arthropathy  -     Urine Drug Screen Confirmation - Urine, Clean Catch; Future  -     POC Urine Drug Screen, Triage    3. Cervical " facet joint syndrome  -     Urine Drug Screen Confirmation - Urine, Clean Catch; Future  -     POC Urine Drug Screen, Triage    4. Shoulder pain, unspecified chronicity, unspecified laterality  -     Urine Drug Screen Confirmation - Urine, Clean Catch; Future  -     POC Urine Drug Screen, Triage    5. Encounter for long-term (current) use of high-risk medication  -     Urine Drug Screen Confirmation - Urine, Clean Catch; Future  -     POC Urine Drug Screen, Triage    Other orders  -     diclofenac (VOLTAREN) 50 MG EC tablet; Take 1 tablet by mouth 2 (Two) Times a Day As Needed (pain). for pain  Dispense: 180 tablet; Refill: 0      --- Routine UDS in office today as part of monitoring requirements for controlled substances.  The specimen was viewed and the immunoassay result reviewed and is +OXY, +BZD, +AMPH.  This specimen will be sent to [facility] for confirmation.     --- The patient signed an updated copy of the controlled substance agreement on 1-21-21  --- Continue with other specialists as planned.  --- refill diclofenac  --- Refill OxyContin DNF. Patient appears stable with current regimen. No adverse effects. Regarding continuation of opioids, there is no evidence of aberrant behavior or any red flags.  The patient continues with appropriate response to opioid therapy. ADL's remain intact by self.   --- Hold on interventions at this time.   --- Follow-up 1 month or sooner if needed.       MATTHEW REPORT  As part of the patient's treatment plan, I am prescribing controlled substances. The patient has been made aware of appropriate use of such medications, including potential risk of somnolence, limited ability to drive and/or work safely, and the potential for dependence or overdose. It has also bee made clear that these medications are for use by this patient only, without concomitant use of alcohol or other substances unless prescribed.     Patient has completed prescribing agreement detailing  terms of continued prescribing of controlled substances, including monitoring MATTHEW reports, urine drug screening, and pill counts if necessary. The patient is aware that inappropriate use will results in cessation of prescribing such medications.    MATTHEW report has been reviewed and scanned into the patient's chart.    As the clinician, I personally reviewed the MATTHEW from 1-21-21 while the patient was in the office today.    History and physical exam exhibit continued safe and appropriate use of controlled substances.        EMR Dragon/Transcription disclaimer:   Much of this encounter note is an electronic transcription/translation of spoken language to printed text. The electronic translation of spoken language may permit erroneous, or at times, nonsensical words or phrases to be inadvertently transcribed; Although I have reviewed the note for such errors, some may still exist.

## 2021-01-29 ENCOUNTER — HOSPITAL ENCOUNTER (OUTPATIENT)
Dept: CT IMAGING | Facility: HOSPITAL | Age: 71
Discharge: HOME | End: 2021-01-29
Admitting: INTERNAL MEDICINE

## 2021-01-29 ENCOUNTER — APPOINTMENT (OUTPATIENT)
Dept: CT IMAGING | Facility: HOSPITAL | Age: 71
End: 2021-01-29

## 2021-01-29 DIAGNOSIS — Z12.2 ENCOUNTER FOR SCREENING FOR LUNG CANCER: ICD-10-CM

## 2021-01-29 PROCEDURE — 71271 CT THORAX LUNG CANCER SCR C-: CPT

## 2021-02-03 ENCOUNTER — LAB (OUTPATIENT)
Dept: OTHER | Facility: HOSPITAL | Age: 71
End: 2021-02-03

## 2021-02-03 ENCOUNTER — CONSULT (OUTPATIENT)
Dept: ONCOLOGY | Facility: CLINIC | Age: 71
End: 2021-02-03

## 2021-02-03 VITALS
TEMPERATURE: 96.6 F | OXYGEN SATURATION: 97 % | SYSTOLIC BLOOD PRESSURE: 140 MMHG | HEIGHT: 60 IN | BODY MASS INDEX: 36.26 KG/M2 | WEIGHT: 184.7 LBS | HEART RATE: 79 BPM | RESPIRATION RATE: 18 BRPM | DIASTOLIC BLOOD PRESSURE: 78 MMHG

## 2021-02-03 DIAGNOSIS — D72.9 NEUTROPHILIA: ICD-10-CM

## 2021-02-03 DIAGNOSIS — D52.0 DIETARY FOLATE DEFICIENCY ANEMIA: ICD-10-CM

## 2021-02-03 DIAGNOSIS — D64.9 ANEMIA, UNSPECIFIED TYPE: Primary | ICD-10-CM

## 2021-02-03 DIAGNOSIS — D50.9 IRON DEFICIENCY ANEMIA, UNSPECIFIED IRON DEFICIENCY ANEMIA TYPE: Primary | ICD-10-CM

## 2021-02-03 LAB
BASOPHILS # BLD AUTO: 0.06 10*3/MM3 (ref 0–0.2)
BASOPHILS NFR BLD AUTO: 0.5 % (ref 0–1.5)
DEPRECATED RDW RBC AUTO: 47.8 FL (ref 37–54)
EOSINOPHIL # BLD AUTO: 0.18 10*3/MM3 (ref 0–0.4)
EOSINOPHIL NFR BLD AUTO: 1.5 % (ref 0.3–6.2)
ERYTHROCYTE [DISTWIDTH] IN BLOOD BY AUTOMATED COUNT: 13 % (ref 12.3–15.4)
HCT VFR BLD AUTO: 35.9 % (ref 34–46.6)
HGB BLD-MCNC: 11.8 G/DL (ref 12–15.9)
IMM GRANULOCYTES # BLD AUTO: 0.08 10*3/MM3 (ref 0–0.05)
IMM GRANULOCYTES NFR BLD AUTO: 0.7 % (ref 0–0.5)
LYMPHOCYTES # BLD AUTO: 2.95 10*3/MM3 (ref 0.7–3.1)
LYMPHOCYTES NFR BLD AUTO: 24.2 % (ref 19.6–45.3)
MCH RBC QN AUTO: 32.6 PG (ref 26.6–33)
MCHC RBC AUTO-ENTMCNC: 32.9 G/DL (ref 31.5–35.7)
MCV RBC AUTO: 99.2 FL (ref 79–97)
MONOCYTES # BLD AUTO: 0.83 10*3/MM3 (ref 0.1–0.9)
MONOCYTES NFR BLD AUTO: 6.8 % (ref 5–12)
NEUTROPHILS NFR BLD AUTO: 66.3 % (ref 42.7–76)
NEUTROPHILS NFR BLD AUTO: 8.1 10*3/MM3 (ref 1.7–7)
NRBC BLD AUTO-RTO: 0 /100 WBC (ref 0–0.2)
PLATELET # BLD AUTO: 344 10*3/MM3 (ref 140–450)
PMV BLD AUTO: 8.4 FL (ref 6–12)
RBC # BLD AUTO: 3.62 10*6/MM3 (ref 3.77–5.28)
WBC # BLD AUTO: 12.2 10*3/MM3 (ref 3.4–10.8)

## 2021-02-03 PROCEDURE — 85025 COMPLETE CBC W/AUTO DIFF WBC: CPT | Performed by: INTERNAL MEDICINE

## 2021-02-03 PROCEDURE — 36415 COLL VENOUS BLD VENIPUNCTURE: CPT

## 2021-02-03 PROCEDURE — 99204 OFFICE O/P NEW MOD 45 MIN: CPT | Performed by: INTERNAL MEDICINE

## 2021-02-03 RX ORDER — FOLIC ACID 1 MG/1
1 TABLET ORAL DAILY
Qty: 30 TABLET | Refills: 5 | Status: ON HOLD | OUTPATIENT
Start: 2021-02-03 | End: 2021-08-04

## 2021-02-03 RX ORDER — FERROUS SULFATE 325(65) MG
325 TABLET ORAL DAILY
Qty: 30 TABLET | Refills: 11 | Status: ON HOLD | OUTPATIENT
Start: 2021-02-03 | End: 2021-08-04

## 2021-02-08 ENCOUNTER — TELEPHONE (OUTPATIENT)
Dept: CARDIOLOGY | Facility: CLINIC | Age: 71
End: 2021-02-08

## 2021-02-22 ENCOUNTER — OFFICE VISIT (OUTPATIENT)
Dept: PAIN MEDICINE | Facility: CLINIC | Age: 71
End: 2021-02-22

## 2021-02-22 VITALS
WEIGHT: 189 LBS | BODY MASS INDEX: 37.11 KG/M2 | OXYGEN SATURATION: 96 % | TEMPERATURE: 96.8 F | HEART RATE: 78 BPM | RESPIRATION RATE: 20 BRPM | SYSTOLIC BLOOD PRESSURE: 110 MMHG | DIASTOLIC BLOOD PRESSURE: 56 MMHG | HEIGHT: 60 IN

## 2021-02-22 DIAGNOSIS — G89.21 CHRONIC PAIN DUE TO TRAUMA: Primary | ICD-10-CM

## 2021-02-22 DIAGNOSIS — Z79.899 ENCOUNTER FOR LONG-TERM (CURRENT) USE OF HIGH-RISK MEDICATION: ICD-10-CM

## 2021-02-22 DIAGNOSIS — M47.816 LUMBAR FACET ARTHROPATHY: ICD-10-CM

## 2021-02-22 DIAGNOSIS — M47.812 CERVICAL FACET JOINT SYNDROME: ICD-10-CM

## 2021-02-22 PROCEDURE — 99214 OFFICE O/P EST MOD 30 MIN: CPT | Performed by: NURSE PRACTITIONER

## 2021-02-22 RX ORDER — OXYCODONE HYDROCHLORIDE 30 MG/1
30 TABLET, FILM COATED, EXTENDED RELEASE ORAL EVERY 12 HOURS SCHEDULED
Qty: 60 TABLET | Refills: 0 | Status: SHIPPED | OUTPATIENT
Start: 2021-02-22 | End: 2021-03-22 | Stop reason: SDUPTHER

## 2021-02-22 NOTE — PROGRESS NOTES
"CHIEF COMPLAINT  F/u back, neck and left shoulder pain. Pt sts back pain has increased radiating down left leg. Pt sts neck and left shoulder pain is the same since last ov.     Subjective   Traci King is a 70 y.o. female  who presents for follow-up.  She has a history of chronic back, neck and left shoulder pain. Reports her pain is worse since last evaluation. She initially states her left low back is hurting. However, she points to left hip. Exquisite tenderness of left greater trochanteric bursa.    Complains of pain in her neck, back and left shoulder. Today her pain is 4/10VAS. Describes the pain as continuous aching and throbbing.  Pain increases with activity, walking, laying on left hip; pain decreases with medication, rest, ice and procedures.  Continues with OxyContin 30 mg BID (0100 and 1300) (\"Whenever I wake up, I have very weird hours\")  and Diclofenac 50 mg 1-2/day and Robaxin 750 mg 2-3/day(has been taking a little more regularly).  Is taking Tylenol OTC PRN. Denies any side effects from the regimen. She does take dulcolax every day and has been doing this since age 16, reports long-standing history of constipation. Also reports being on iron and folic acid.  This regimen decreases her pain by 50%. ADLs by self. Denies any bowel or bladder changes.        Patient had a bilateral L3-L5 RFL performed by Dr. LEMUS on 12-29-20 for management of LOW BACK PAIN. Patient reports 50% ONGOING relief from the procedure.     Saw PCP recently. Referred to pulmonology for SOA.  Pulmonary tests were normal. Referred to cardiologist.     Also having headaches in back of head. Had CT scan. Referred to neurologist. Reports she was told she has \"small vessels.\" Had occipital nerve block with neurology 1-20-21.  Having significant relief.      Patient had a bilateral L3-L5 MBB performed by Dr. LEMUS on 7-28-20 for management of LOW BACK PAIN. Initially had 80-85% relief for 1 day.  Then she reports 50% " ONGOING relief from the procedure for 1 month.  Reports improvement in activity and sleep.      Patient had a bilateral L3-L5 MBB performed by Dr. LEMUS on 5-20-20 for management of LOW BACK PAIN. Patient reports 80% relief from the procedure for 2 days. Pain has returned to pre-procedure level.      She completed a Cervical Facet Nerve (Medial Branch) Radiofrequency Lesioning LEFT C3-C5 RFL  on  1/26/18 performed by Dr. Lemus for management of neck pain.     She completed a Bilateral S1 Lumbar TFESI   on  8/16/2018 performed by Dr. LEMUS for management of low back and radicular pain. Patient reports NO relief from the procedure.      Failed PT, failed ultrasound therapy, failed compounded pain creme.    Patient remained masked during entire encounter. No cough present. I donned a mask and eye protection throughout entire visit. Prior to donning mask and eye protection, hand hygiene was performed, as well as when it was doffed.  I was closer than 6 feet, but not for an extended period of time. No obvious exposure to any bodily fluids.    Neck Pain   This is a chronic problem. The current episode started more than 1 year ago. The problem occurs constantly. The problem has been waxing and waning. The quality of the pain is described as burning. The pain is at a severity of 4/10. The pain is moderate. The symptoms are aggravated by position, bending, stress and twisting. The pain is worse during the day. Stiffness is present all day. Pertinent negatives include no chest pain, fever, headaches, numbness or weakness. She has tried oral narcotics and NSAIDs (cervical MBB--significant temporary relief; cervical RFL--50% ongoing relief) for the symptoms. The treatment provided moderate relief.   Shoulder Injury   The left shoulder is affected. There was no injury mechanism. The pain radiates to the left neck and right neck. Pain severity now: unchanged since last office visit. Pertinent negatives include no chest pain  or numbness. The symptoms are aggravated by movement and palpation.   Back Pain  This is a chronic problem. The current episode started more than 1 year ago. The problem occurs constantly. The problem has been waxing and waning since onset. The pain is at a severity of 4/10. The pain is worse during the day. The symptoms are aggravated by bending, position, standing and twisting. Associated symptoms include bladder incontinence (long term issue). Pertinent negatives include no abdominal pain, bowel incontinence, chest pain, dysuria, fever, headaches, numbness or weakness. Risk factors include lack of exercise, sedentary lifestyle, menopause and obesity. She has tried analgesics, bed rest, chiropractic manipulation, heat, home exercises, ice, muscle relaxant, NSAIDs and walking (lumbar MBBgave 80% relief for 1-2 days twice) for the symptoms. The treatment provided moderate relief.      PEG Assessment   What number best describes your pain on average in the past week?4  What number best describes how, during the past week, pain has interfered with your enjoyment of life?4  What number best describes how, during the past week, pain has interfered with your general activity?  4    The following portions of the patient's history were reviewed and updated as appropriate: allergies, current medications, past family history, past medical history, past social history, past surgical history and problem list.    Review of Systems   Constitutional: Negative for activity change, fatigue and fever.   HENT: Negative for congestion.    Eyes: Negative for visual disturbance.   Respiratory: Negative for cough and shortness of breath.    Cardiovascular: Negative for chest pain.   Gastrointestinal: Negative for abdominal pain, bowel incontinence, constipation and diarrhea.   Endocrine: Negative for polyuria.   Genitourinary: Positive for bladder incontinence (long term issue). Negative for difficulty urinating and dysuria.  "  Musculoskeletal: Positive for arthralgias (left shoulder), back pain and neck pain.   Allergic/Immunologic: Negative for immunocompromised state.   Neurological: Negative for dizziness, weakness, light-headedness, numbness and headaches.   Psychiatric/Behavioral: Negative for agitation, sleep disturbance and suicidal ideas. The patient is not nervous/anxious.      I have reviewed and confirmed the accuracy of the ROS as documented by the MA/LPN/RN Patricia Chowdary, DACIA      Vitals:    02/22/21 1038   BP: 110/56   Pulse: 78   Resp: 20   Temp: 96.8 °F (36 °C)   SpO2: 96%   Weight: 85.7 kg (189 lb)   Height: 153 cm (60.24\")   PainSc:   4   PainLoc: Back     Objective   Physical Exam  Vitals signs and nursing note reviewed.   Constitutional:       Appearance: Normal appearance. She is well-developed.   HENT:      Head: Normocephalic and atraumatic.   Neck:      Musculoskeletal: Spinous process tenderness and muscular tenderness present.   Musculoskeletal:      Lumbar back: She exhibits decreased range of motion, tenderness and bony tenderness.   Skin:     General: Skin is warm and dry.   Neurological:      Mental Status: She is alert.      Gait: Gait abnormal.   Psychiatric:         Speech: Speech normal.         Behavior: Behavior normal. Behavior is cooperative.         Thought Content: Thought content normal.         Judgment: Judgment normal.         Assessment/Plan   Diagnoses and all orders for this visit:    1. 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 Cervical facet joint syndrome    3. Lumbar facet arthropathy    4. Encounter for long-term (current) use of high-risk medication      --- The urine drug screen confirmation from 1-21-21 has been reviewed and the result is APPROPRIATE based on patient history and MATTHEW report  --- The patient signed an updated copy of the controlled substance agreement on 1-21-21  --- Refill oxycontin with DNF. Patient appears stable with current regimen. No adverse effects. " Regarding continuation of opioids, there is no evidence of aberrant behavior or any red flags.  The patient continues with appropriate response to opioid therapy. ADL's remain intact by self.   --- Consider left greater trochanteric bursa injection in future PRN.  --- Follow-up 1 month or sooner if needed.       MATTHEW REPORT  As part of the patient's treatment plan, I am prescribing controlled substances. The patient has been made aware of appropriate use of such medications, including potential risk of somnolence, limited ability to drive and/or work safely, and the potential for dependence or overdose. It has also bee made clear that these medications are for use by this patient only, without concomitant use of alcohol or other substances unless prescribed.     Patient has completed prescribing agreement detailing terms of continued prescribing of controlled substances, including monitoring MATTHEW reports, urine drug screening, and pill counts if necessary. The patient is aware that inappropriate use will results in cessation of prescribing such medications.    MATTHEW report has been reviewed and scanned into the patient's chart.    As the clinician, I personally reviewed the MATTHEW from 2-22-21 while the patient was in the office today.    History and physical exam exhibit continued safe and appropriate use of controlled substances.        EMR Dragon/Transcription disclaimer:   Much of this encounter note is an electronic transcription/translation of spoken language to printed text. The electronic translation of spoken language may permit erroneous, or at times, nonsensical words or phrases to be inadvertently transcribed; Although I have reviewed the note for such errors, some may still exist.

## 2021-03-02 ENCOUNTER — TELEPHONE (OUTPATIENT)
Dept: NEUROLOGY | Facility: CLINIC | Age: 71
End: 2021-03-02

## 2021-03-02 NOTE — TELEPHONE ENCOUNTER
PT CALLED TO ASK ABOUT GETTING THE ONB INJECTIONS, PT STATES THAT SHE IS HAVING HEADACHES AGAIN, PT STATES THAT SHE HAS HAD A CONSTANT HEADACHE UNLESS SHE TAKES SOMETHING AND THEN IT WILL GO AWAY BUT DOES REOCCUR. SHE TAKES TYLENOL AND ADVIL TO GET RID OF THEM ALTERNATING, PT STATES SHE WILL TAKE TYLENOL IMMEDIATELY AT FIRST SIGN OF HEADACHE, THAT THIS HELPS FOR ABOUT 2 1/2 HOURS BEFORE THEY REOCCUR AND SHE TAKES ADVIL 2 HOURS AFTER REOCCURRENCE.     PT STATES THAT WITH INJECTIONS SHE WAS NOT NEEDING ANY MEDICATIONS.     PLEASE REVIEW AND ADVISE.     PT CALL [phone]

## 2021-03-02 NOTE — TELEPHONE ENCOUNTER
Pt had ONB on 1/20/21 reporting that her HA are back, but alternates tylenol advil to get some relief, reports not having any ha after the injections, wondering what the next steps you would like to take.

## 2021-03-22 ENCOUNTER — OFFICE VISIT (OUTPATIENT)
Dept: PAIN MEDICINE | Facility: CLINIC | Age: 71
End: 2021-03-22

## 2021-03-22 VITALS
OXYGEN SATURATION: 99 % | WEIGHT: 188 LBS | BODY MASS INDEX: 36.91 KG/M2 | TEMPERATURE: 97.3 F | SYSTOLIC BLOOD PRESSURE: 142 MMHG | HEART RATE: 83 BPM | DIASTOLIC BLOOD PRESSURE: 82 MMHG | HEIGHT: 60 IN | RESPIRATION RATE: 18 BRPM

## 2021-03-22 DIAGNOSIS — M47.812 CERVICAL FACET JOINT SYNDROME: ICD-10-CM

## 2021-03-22 DIAGNOSIS — Z79.899 ENCOUNTER FOR LONG-TERM (CURRENT) USE OF HIGH-RISK MEDICATION: ICD-10-CM

## 2021-03-22 DIAGNOSIS — M51.36 DDD (DEGENERATIVE DISC DISEASE), LUMBAR: ICD-10-CM

## 2021-03-22 DIAGNOSIS — M47.816 LUMBAR FACET ARTHROPATHY: ICD-10-CM

## 2021-03-22 DIAGNOSIS — G89.29 OTHER CHRONIC PAIN: Primary | ICD-10-CM

## 2021-03-22 PROCEDURE — 99214 OFFICE O/P EST MOD 30 MIN: CPT | Performed by: NURSE PRACTITIONER

## 2021-03-22 RX ORDER — OXYCODONE HYDROCHLORIDE 30 MG/1
30 TABLET, FILM COATED, EXTENDED RELEASE ORAL EVERY 12 HOURS SCHEDULED
Qty: 60 TABLET | Refills: 0 | Status: SHIPPED | OUTPATIENT
Start: 2021-03-22 | End: 2021-04-21 | Stop reason: SDUPTHER

## 2021-03-22 NOTE — PROGRESS NOTES
"CHIEF COMPLAINT  Back, neck and left shoulder pain has increased since last visit    Subjective   Traci King is a 70 y.o. female  who presents for follow-up.  She has a history of back, neck and left shoulder. Reports her pain is worse since last evaluation. She believes the back pain is worse because she a bony growth in her right foot. This is causing her to walk differently. They recommend surgery but she declines to have this because she does not want to stop smoking.    Complains of pain in her low back, neck, left shoulder and right foot. Today her pain is 4/10VAS.  Pain increases with walking, standing, activity and household chore; pain decreases with medication, rest and procedures.   Continues with OxyContin 30 mg BID (0100 and 1300)  and Diclofenac 50 mg 1-2/day and Robaxin 750 mg 1/day PRN(takes sparingly, 1/ week).  Is taking Tylenol OTC PRN. Denies any side effects from the regimen. She does take dulcolax every day and has been doing this since age 16, reports long-standing history of constipation. Also reports being on iron and folic acid.  This regimen decreases her pain by 75%. ADLs by self. Denies any bowel or bladder changes.      Reviewed weight gain. She continues to gain weight. She has gained 17lbs in this past year.     Also having headaches in back of head. Had CT scan. Referred to neurologist. Reports she was told she has \"small vessels.\" Had occipital nerve block with neurology 1-20-21.  Having significant relief. Under care of Dr Malave.       Patient had a bilateral L3-L5 RFL performed by Dr. LEMUS on 12-29-20 for management of LOW BACK PAIN. Patient reports 50% ONGOING relief from the procedure    Patient had a bilateral L3-L5 MBB performed by Dr. LEMUS on 7-28-20 for management of LOW BACK PAIN. Initially had 80-85% relief for 1 day.  Then she reports 50% ONGOING relief from the procedure for 1 month.  Reports improvement in activity and sleep.      Patient had a bilateral " L3-L5 MBB performed by Dr. LEMUS on 5-20-20 for management of LOW BACK PAIN. Patient reports 80% relief from the procedure for 2 days. Pain has returned to pre-procedure level.      She completed a Cervical Facet Nerve (Medial Branch) Radiofrequency Lesioning LEFT C3-C5 RFL  on  1/26/18 performed by Dr. Lemus for management of neck pain.     She completed a Bilateral S1 Lumbar TFESI   on  8/16/2018 performed by Dr. LEMUS for management of low back and radicular pain. Patient reports NO relief from the procedure.      Failed PT, failed ultrasound therapy, failed compounded pain creme.      Patient remained masked during entire encounter. No cough present. I donned a mask and eye protection throughout entire visit. Prior to donning mask and eye protection, hand hygiene was performed, as well as when it was doffed.  I was closer than 6 feet, but not for an extended period of time. No obvious exposure to any bodily fluids.    Neck Pain   This is a chronic problem. The current episode started more than 1 year ago. The problem occurs constantly. The problem has been waxing and waning. The quality of the pain is described as burning. The pain is at a severity of 4/10. The pain is moderate. The symptoms are aggravated by position, bending, stress and twisting. The pain is worse during the day. Stiffness is present all day. Pertinent negatives include no chest pain, fever, headaches, numbness or weakness. She has tried oral narcotics and NSAIDs (cervical MBB--significant temporary relief; cervical RFL--50% ongoing relief) for the symptoms. The treatment provided moderate relief.   Shoulder Injury   The left shoulder is affected. There was no injury mechanism. The pain radiates to the left neck and right neck. Pain severity now: unchanged since last office visit. Pertinent negatives include no chest pain or numbness. The symptoms are aggravated by movement and palpation.   Back Pain  This is a chronic problem. The  current episode started more than 1 year ago. The problem occurs constantly. The problem has been waxing and waning since onset. The pain is at a severity of 4/10. The pain is worse during the day. The symptoms are aggravated by bending, position, standing and twisting. Associated symptoms include bladder incontinence (long term issue). Pertinent negatives include no abdominal pain, bowel incontinence, chest pain, dysuria, fever, headaches, numbness or weakness. Risk factors include lack of exercise, sedentary lifestyle, menopause and obesity. She has tried analgesics, bed rest, chiropractic manipulation, heat, home exercises, ice, muscle relaxant, NSAIDs and walking (lumbar MBBgave 80% relief for 1-2 days twice) for the symptoms. The treatment provided moderate relief.      PEG Assessment   What number best describes your pain on average in the past week?4  What number best describes how, during the past week, pain has interfered with your enjoyment of life?4  What number best describes how, during the past week, pain has interfered with your general activity?  5    The following portions of the patient's history were reviewed and updated as appropriate: allergies, current medications, past family history, past medical history, past social history, past surgical history and problem list.    Review of Systems   Constitutional: Negative for chills and fever.   Respiratory: Negative for shortness of breath.    Cardiovascular: Negative for chest pain.   Gastrointestinal: Negative for abdominal pain, bowel incontinence, constipation, diarrhea, nausea and vomiting.   Genitourinary: Positive for bladder incontinence (long term issue). Negative for difficulty urinating, dyspareunia and dysuria.   Musculoskeletal: Positive for back pain and neck pain.   Neurological: Negative for dizziness, weakness, light-headedness, numbness and headaches.   Psychiatric/Behavioral: Negative for confusion, hallucinations, self-injury,  "sleep disturbance and suicidal ideas. The patient is not nervous/anxious.    All other systems reviewed and are negative.    I have reviewed and confirmed the accuracy of the ROS as documented by the MA/LPN/RN DACIA Baez      Vitals:    03/22/21 1121   BP: 142/82   Pulse: 83   Resp: 18   Temp: 97.3 °F (36.3 °C)   SpO2: 99%   Weight: 85.3 kg (188 lb)   Height: 153 cm (60.24\")   PainSc:   4   PainLoc: Back     Objective   Physical Exam  Vitals and nursing note reviewed.   Constitutional:       Appearance: Normal appearance. She is well-developed.   HENT:      Head: Normocephalic and atraumatic.   Musculoskeletal:      Cervical back: Spinous process tenderness and muscular tenderness present.      Lumbar back: Tenderness and bony tenderness (facet tenderness) present. Decreased range of motion.   Skin:     General: Skin is warm and dry.   Neurological:      Mental Status: She is alert.      Gait: Gait abnormal.   Psychiatric:         Speech: Speech normal.         Behavior: Behavior normal. Behavior is cooperative.         Thought Content: Thought content normal.         Judgment: Judgment normal.         Assessment/Plan   Diagnoses and all orders for this visit:    1. Other chronic pain (Primary)    2. DDD (degenerative disc disease), lumbar  -     Ambulatory Referral to Physical Therapy Evaluate and treat    3. Cervical facet joint syndrome    4. Lumbar facet arthropathy    5. Encounter for long-term (current) use of high-risk medication      --- The urine drug screen confirmation from 1-21-21 has been reviewed and the result is APPROPRIATE based on patient history and MATTHEW report  --- The patient signed an updated copy of the controlled substance agreement on 1-21-21  --- Physical therapy for low back pain and deconditioning. Patient requests [facility] in [place]  --- Reviewed deconditioning.  --- HOld on interventions at this time.   --- Refill oxycontin. Patient appears stable with current regimen. " No adverse effects. Regarding continuation of opioids, there is no evidence of aberrant behavior or any red flags.  The patient continues with appropriate response to opioid therapy. ADL's remain intact by self.   --- Continue with other specialists as planned.  --- Follow-up 1 month or sooner if needed.       MATTHEW REPORT  As part of the patient's treatment plan, I am prescribing controlled substances. The patient has been made aware of appropriate use of such medications, including potential risk of somnolence, limited ability to drive and/or work safely, and the potential for dependence or overdose. It has also bee made clear that these medications are for use by this patient only, without concomitant use of alcohol or other substances unless prescribed.     Patient has completed prescribing agreement detailing terms of continued prescribing of controlled substances, including monitoring MATTHEW reports, urine drug screening, and pill counts if necessary. The patient is aware that inappropriate use will results in cessation of prescribing such medications.    MATTHEW report has been reviewed and scanned into the patient's chart.    As the clinician, I personally reviewed the MATTHEW from 3-22-21 while the patient was in the office today.    History and physical exam exhibit continued safe and appropriate use of controlled substances.        EMR Dragon/Transcription disclaimer:   Much of this encounter note is an electronic transcription/translation of spoken language to printed text. The electronic translation of spoken language may permit erroneous, or at times, nonsensical words or phrases to be inadvertently transcribed; Although I have reviewed the note for such errors, some may still exist.

## 2021-04-14 ENCOUNTER — TELEPHONE (OUTPATIENT)
Dept: ONCOLOGY | Facility: CLINIC | Age: 71
End: 2021-04-14

## 2021-04-14 NOTE — TELEPHONE ENCOUNTER
Caller: Traci King    Relationship to patient: Self    Best call back number:[phone]    Chief complaint:PATIENT CANC. HER 4/28/2021 APPT. AS SHE STATES SHE FEELS SHE DOES NOT NEED IT,  AS SHE IS TAKING HER IRON PILLS & THAT IS WORKING.     Type of visit: FOLLOW UP 1                  *

## 2021-04-19 ENCOUNTER — TELEPHONE (OUTPATIENT)
Dept: PAIN MEDICINE | Facility: CLINIC | Age: 71
End: 2021-04-19

## 2021-04-19 NOTE — TELEPHONE ENCOUNTER
Caller: MRS BISHOP     Relationship to patient: PATIENT     Best call back number: [phone]    Chief complaint: N/A     Type of visit: FOLLOW UP     Requested date: N/A      If rescheduling, when is the original appointment: N/A     Additional notes:PATIENT CALLED IN REG HER OFFICE VISIT THAT IS SCHEDULED FOR 4/21/21. PATIENT HAS BEEN DEALING WITH [facility] AND WASN'T SURE IF PROVIDER WANTED PATIENT TO COME IN OR IF WOULD RATHER DO FOLLOW UP VISIT BY PHONE/VIDEO. PLEASE CALL PATIENT AND ADVISE HOW PROVIDER WOULD LIKE TO DO VISIT.

## 2021-04-20 ENCOUNTER — APPOINTMENT (OUTPATIENT)
Dept: WOMENS IMAGING | Facility: HOSPITAL | Age: 71
End: 2021-04-20

## 2021-04-20 PROCEDURE — 77063 BREAST TOMOSYNTHESIS BI: CPT | Performed by: RADIOLOGY

## 2021-04-20 PROCEDURE — 77067 SCR MAMMO BI INCL CAD: CPT | Performed by: RADIOLOGY

## 2021-04-21 ENCOUNTER — TELEPHONE (OUTPATIENT)
Dept: PAIN MEDICINE | Facility: CLINIC | Age: 71
End: 2021-04-21

## 2021-04-21 ENCOUNTER — TELEMEDICINE (OUTPATIENT)
Dept: PAIN MEDICINE | Facility: CLINIC | Age: 71
End: 2021-04-21

## 2021-04-21 DIAGNOSIS — M47.812 CERVICAL FACET JOINT SYNDROME: ICD-10-CM

## 2021-04-21 DIAGNOSIS — M50.30 DEGENERATIVE DISC DISEASE, CERVICAL: ICD-10-CM

## 2021-04-21 DIAGNOSIS — Z79.899 ENCOUNTER FOR LONG-TERM (CURRENT) USE OF HIGH-RISK MEDICATION: ICD-10-CM

## 2021-04-21 DIAGNOSIS — M47.816 LUMBAR FACET ARTHROPATHY: ICD-10-CM

## 2021-04-21 DIAGNOSIS — G89.21 CHRONIC PAIN DUE TO TRAUMA: Primary | ICD-10-CM

## 2021-04-21 DIAGNOSIS — G62.9 NEUROPATHY: ICD-10-CM

## 2021-04-21 DIAGNOSIS — M51.36 DDD (DEGENERATIVE DISC DISEASE), LUMBAR: ICD-10-CM

## 2021-04-21 DIAGNOSIS — M25.519 SHOULDER PAIN, UNSPECIFIED CHRONICITY, UNSPECIFIED LATERALITY: ICD-10-CM

## 2021-04-21 PROCEDURE — 99214 OFFICE O/P EST MOD 30 MIN: CPT | Performed by: NURSE PRACTITIONER

## 2021-04-21 RX ORDER — OXYCODONE HYDROCHLORIDE 30 MG/1
30 TABLET, FILM COATED, EXTENDED RELEASE ORAL EVERY 12 HOURS SCHEDULED
Qty: 60 TABLET | Refills: 0 | Status: SHIPPED | OUTPATIENT
Start: 2021-04-21 | End: 2021-05-20 | Stop reason: SDUPTHER

## 2021-04-21 NOTE — PROGRESS NOTES
"TELEMEDICINE - VIDEO VISIT    You have chosen to receive care through a telehealth visit.  Do you consent to use a video/audio connection for your medical care today? Yes    CHIEF COMPLAINT  Back, neck, extremity and joint pain    Subjective   Traci King is a 70 y.o. female  who presents for a video visit follow-up.She has a history of chronic back, neck, extremity and joint pain. Reports her pain pattern is slightly improved since last evaluation. At last evaluation, was referred to PT. \"it showed me what kind of shape i'm in.\" Complains of being sore after treatment. Is going twice weekly.    Complains of pain in her low back, neck, arm, shoulder and feet. Today her pain is 2/10VAS. Describes the pain as nearly continuous aching with intermittent sharp pain.  Continues with OxyContin 30 mg BID (0200 and 1400)  and Diclofenac 50 mg 1-2/day and Robaxin 750 mg 1/day PRN(takes sparingly, 1/ week).  Is taking Tylenol OTC PRN. Denies any side effects from the regimen. She does take dulcolax every day and has been doing this since age 16, reports long-standing history of constipation. Also reports being on iron and folic acid.  This regimen decreases her pain by 75%. ADL's by self. Denies any bowel or bladder changes.      Diagnosed with Flathead by PCP last week. Best friend had Flathead.  Patient requested video visit.    Has second COVID vaccine yesterday 21-- having left arm soreness today.    Reports she is sad today. Today is what would have been her son's birthday who  6 years ago.     Also having headaches in back of head. Had CT scan. Referred to neurologist. Reports she was told she has \"small vessels.\" Had occipital nerve block with neurology 21.  Having significant relief. Under care of Dr Malave. Due for follow-up.     Patient had a bilateral L3-L5 RFL performed by Dr. LEMUS on 20 for management of LOW BACK PAIN. Patient reports 50% ONGOING relief from the procedure     Patient had " a bilateral L3-L5 MBB performed by Dr. LEMUS on 7-28-20 for management of LOW BACK PAIN. Initially had 80-85% relief for 1 day.  Then she reports 50% ONGOING relief from the procedure for 1 month.  Reports improvement in activity and sleep.      Patient had a bilateral L3-L5 MBB performed by Dr. LEMUS on 5-20-20 for management of LOW BACK PAIN. Patient reports 80% relief from the procedure for 2 days. Pain has returned to pre-procedure level.      She completed a Cervical Facet Nerve (Medial Branch) Radiofrequency Lesioning LEFT C3-C5 RFL  on  1/26/18 performed by Dr. Lemus for management of neck pain.     She completed a Bilateral S1 Lumbar TFESI   on  8/16/2018 performed by Dr. LEMUS for management of low back and radicular pain. Patient reports NO relief from the procedure.      Failed PT, failed ultrasound therapy, failed compounded pain creme.    Neck Pain   This is a chronic problem. The current episode started more than 1 year ago. The problem occurs constantly. The problem has been waxing and waning. The quality of the pain is described as burning. The pain is moderate. The symptoms are aggravated by position, bending, stress and twisting. The pain is worse during the day. Stiffness is present all day. Pertinent negatives include no chest pain, fever, headaches, numbness or weakness. She has tried oral narcotics and NSAIDs (cervical MBB--significant temporary relief; cervical RFL--50% ongoing relief) for the symptoms. The treatment provided moderate relief.   Shoulder Injury   The left shoulder is affected. There was no injury mechanism. The pain radiates to the left neck and right neck. Pain severity now: unchanged since last office visit. Pertinent negatives include no chest pain or numbness. The symptoms are aggravated by movement and palpation.   Back Pain  This is a chronic problem. The current episode started more than 1 year ago. The problem occurs constantly. The problem has been waxing and  waning since onset. The pain is worse during the day. The symptoms are aggravated by bending, position, standing and twisting. Associated symptoms include bladder incontinence (long term issue). Pertinent negatives include no abdominal pain, bowel incontinence, chest pain, dysuria, fever, headaches, numbness or weakness. Risk factors include lack of exercise, sedentary lifestyle, menopause and obesity. She has tried analgesics, bed rest, chiropractic manipulation, heat, home exercises, ice, muscle relaxant, NSAIDs and walking (lumbar MBBgave 80% relief for 1-2 days twice) for the symptoms. The treatment provided moderate relief.      The following portions of the patient's history were reviewed and updated as appropriate: allergies, current medications, past family history, past medical history, past social history, past surgical history and problem list.    Review of Systems   Constitutional: Negative for fever.   Cardiovascular: Negative for chest pain.   Gastrointestinal: Negative for abdominal pain and bowel incontinence.   Genitourinary: Positive for bladder incontinence (long term issue). Negative for dysuria.   Musculoskeletal: Positive for back pain and neck pain.   Neurological: Negative for weakness, numbness and headaches.     There were no vitals filed for this visit.    Objective   Physical Exam  Constitutional:       Appearance: She is well-developed.   HENT:      Head: Normocephalic and atraumatic.   Pulmonary:      Effort: Pulmonary effort is normal.   Neurological:      Mental Status: She is alert and oriented to person, place, and time.   Psychiatric:         Speech: Speech normal.         Behavior: Behavior normal.         Thought Content: Thought content normal.         Judgment: Judgment normal.             Assessment/Plan   Diagnoses and all orders for this visit:    1. 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 DDD (degenerative disc disease), lumbar    3. Degenerative disc disease,  cervical    4. Cervical facet joint syndrome    5. Lumbar facet arthropathy    6. Neuropathy    7. Shoulder pain, unspecified chronicity, unspecified laterality    8. Encounter for long-term (current) use of high-risk medication      ----------------  Our practice is offering alternative &/or electronic methods to continue to follow our patients while at the same time further the efforts toward social distancing, in accordance with our organizational policies, professional societies' guidance, and [facility] mandates.  I support the Healthy at Home campaign and in this visit I have counseled the patient on our needs to limit in-person office visits and physical encounters with medical facilities whenever possible.  I have also educated the patient on the medical necessities of maintaining social distancing while we continue to function during this crisis period.      The patient was counseled on the need to consider telehealth options. The patient was offered the opportunity for a Video Visit using [employer]. The patient agreed to a Video Visit. The patient was counseled on the need for a telehealth visit for necessary monitoring. The patient was educated about our efforts to comply with monitoring standards when prescribing potent medications. During these pandemic conditions, telephone encounters are federally mandated as acceptable alternative to videoconference or in-person encounters to ensure that an individual can receive appropriate care and be monitored regardless of access to technology.      TIME:  Topics discussed are outlined in the Assessment/Plan section of the note.    ----------------    --- The urine drug screen confirmation from 1-21-21 has been reviewed and the result is APPROPRIATE based on patient history and MATTHEW report  --- The patient signed an updated copy of the controlled substance agreement on 1-21-21  --- Refill Diclofenac.  --- refill Oxycontin DNF. Patient appears stable with  current regimen. No adverse effects. Regarding continuation of opioids, there is no evidence of aberrant behavior or any red flags.  The patient continues with appropriate response to opioid therapy. ADL's remain intact by self.   --- Continue with PT when able to resume.   --- Follow-up 1 month or sooner if needed       MATTHEW REPORT  As part of the patient's treatment plan, I am prescribing controlled substances. The patient has been made aware of appropriate use of such medications, including potential risk of somnolence, limited ability to drive and/or work safely, and the potential for dependence or overdose. It has also bee made clear that these medications are for use by this patient only, without concomitant use of alcohol or other substances unless prescribed.     Patient has completed prescribing agreement detailing terms of continued prescribing of controlled substances, including monitoring MATTHEW reports, urine drug screening, and pill counts if necessary. The patient is aware that inappropriate use will results in cessation of prescribing such medications.    MATTHEW report has been reviewed and scanned into the patient's chart.    As the clinician, I personally reviewed the MATTHEW from 4-21-21 while the patient was in the office today.    History and physical exam exhibit continued safe and appropriate use of controlled substances.    -------    EMR Dragon/Transcription disclaimer:   Much of this encounter note is an electronic transcription/translation of spoken language to printed text. The electronic translation of spoken language may permit erroneous, or at times, nonsensical words or phrases to be inadvertently transcribed; Although I have reviewed the note for such errors, some may still exist.

## 2021-04-21 NOTE — TELEPHONE ENCOUNTER
Caller: DIOGO BISHOP    Relationship: SELF    Best call back number: [phone]    What orders are you requesting (i.e. lab or imaging): PHYSICAL THERAPY      Where will you receive your lab/imaging services:  [facility]    Additional notes: PATIENT ORIGINALLY TOLD YOU [facility] AND SHE CHANGED HER MIND AND NOW WANTS ORDER TO RECEIVED PT AT  [facility]

## 2021-04-28 ENCOUNTER — APPOINTMENT (OUTPATIENT)
Dept: OTHER | Facility: HOSPITAL | Age: 71
End: 2021-04-28

## 2021-04-30 ENCOUNTER — APPOINTMENT (OUTPATIENT)
Dept: GENERAL RADIOLOGY | Facility: HOSPITAL | Age: 71
End: 2021-04-30

## 2021-04-30 PROCEDURE — 73630 X-RAY EXAM OF FOOT: CPT | Performed by: EMERGENCY MEDICINE

## 2021-05-03 ENCOUNTER — TRANSCRIBE ORDERS (OUTPATIENT)
Dept: ADMINISTRATIVE | Facility: HOSPITAL | Age: 71
End: 2021-05-03

## 2021-05-03 DIAGNOSIS — M79.671 FOOT PAIN, RIGHT: Primary | ICD-10-CM

## 2021-05-13 ENCOUNTER — TELEPHONE (OUTPATIENT)
Dept: PAIN MEDICINE | Facility: CLINIC | Age: 71
End: 2021-05-13

## 2021-05-13 ENCOUNTER — TREATMENT (OUTPATIENT)
Dept: PHYSICAL THERAPY | Facility: CLINIC | Age: 71
End: 2021-05-13

## 2021-05-13 ENCOUNTER — HOSPITAL ENCOUNTER (OUTPATIENT)
Dept: GENERAL RADIOLOGY | Facility: HOSPITAL | Age: 71
Discharge: HOME | End: 2021-05-13
Admitting: ORTHOPAEDIC SURGERY

## 2021-05-13 DIAGNOSIS — M47.9 OSTEOARTHRITIS OF LOWER BACK: ICD-10-CM

## 2021-05-13 DIAGNOSIS — M79.671 FOOT PAIN, RIGHT: ICD-10-CM

## 2021-05-13 DIAGNOSIS — R26.2 DIFFICULTY WALKING: ICD-10-CM

## 2021-05-13 DIAGNOSIS — M51.36 DDD (DEGENERATIVE DISC DISEASE), LUMBAR: Primary | ICD-10-CM

## 2021-05-13 DIAGNOSIS — M54.50 CHRONIC BILATERAL LOW BACK PAIN WITHOUT SCIATICA: Primary | ICD-10-CM

## 2021-05-13 DIAGNOSIS — M47.816 LUMBAR FACET ARTHROPATHY: ICD-10-CM

## 2021-05-13 DIAGNOSIS — G89.29 CHRONIC BILATERAL LOW BACK PAIN WITHOUT SCIATICA: Primary | ICD-10-CM

## 2021-05-13 PROCEDURE — G0283 ELEC STIM OTHER THAN WOUND: HCPCS | Performed by: PHYSICAL THERAPIST

## 2021-05-13 PROCEDURE — 25010000003 LIDOCAINE 1 % SOLUTION: Performed by: ORTHOPAEDIC SURGERY

## 2021-05-13 PROCEDURE — 77002 NEEDLE LOCALIZATION BY XRAY: CPT

## 2021-05-13 PROCEDURE — 97530 THERAPEUTIC ACTIVITIES: CPT | Performed by: PHYSICAL THERAPIST

## 2021-05-13 PROCEDURE — 97162 PT EVAL MOD COMPLEX 30 MIN: CPT | Performed by: PHYSICAL THERAPIST

## 2021-05-13 PROCEDURE — 25010000002 METHYLPREDNISOLONE PER 125 MG: Performed by: ORTHOPAEDIC SURGERY

## 2021-05-13 PROCEDURE — 25010000002 IOPAMIDOL 61 % SOLUTION: Performed by: ORTHOPAEDIC SURGERY

## 2021-05-13 RX ORDER — METHYLPREDNISOLONE SODIUM SUCCINATE 125 MG/2ML
80 INJECTION, POWDER, LYOPHILIZED, FOR SOLUTION INTRAMUSCULAR; INTRAVENOUS
Status: COMPLETED | OUTPATIENT
Start: 2021-05-13 | End: 2021-05-13

## 2021-05-13 RX ORDER — BUPIVACAINE HYDROCHLORIDE 2.5 MG/ML
10 INJECTION, SOLUTION EPIDURAL; INFILTRATION; INTRACAUDAL ONCE
Status: COMPLETED | OUTPATIENT
Start: 2021-05-13 | End: 2021-05-13

## 2021-05-13 RX ORDER — LIDOCAINE HYDROCHLORIDE 10 MG/ML
10 INJECTION, SOLUTION INFILTRATION; PERINEURAL ONCE
Status: COMPLETED | OUTPATIENT
Start: 2021-05-13 | End: 2021-05-13

## 2021-05-13 RX ADMIN — BUPIVACAINE HYDROCHLORIDE 5 ML: 2.5 INJECTION, SOLUTION EPIDURAL; INFILTRATION; INTRACAUDAL; PERINEURAL at 14:59

## 2021-05-13 RX ADMIN — LIDOCAINE HYDROCHLORIDE 2 ML: 10 INJECTION, SOLUTION INFILTRATION; PERINEURAL at 14:59

## 2021-05-13 RX ADMIN — IOPAMIDOL 1 ML: 612 INJECTION, SOLUTION INTRAVENOUS at 14:59

## 2021-05-13 RX ADMIN — METHYLPREDNISOLONE SODIUM SUCCINATE 80 MG: 125 INJECTION, POWDER, LYOPHILIZED, FOR SOLUTION INTRAMUSCULAR; INTRAVENOUS at 14:59

## 2021-05-13 NOTE — TELEPHONE ENCOUNTER
Per Leandra: Need an update order for PT. Last POC is dated March 2021. Insurance will not pay if POC is over 30 days.   Sent in stf msg earlier today. Please advise. Thank you.
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
## 2021-05-19 ENCOUNTER — TREATMENT (OUTPATIENT)
Dept: PHYSICAL THERAPY | Facility: CLINIC | Age: 71
End: 2021-05-19

## 2021-05-19 DIAGNOSIS — G89.29 CHRONIC BILATERAL LOW BACK PAIN WITHOUT SCIATICA: Primary | ICD-10-CM

## 2021-05-19 DIAGNOSIS — M54.50 CHRONIC BILATERAL LOW BACK PAIN WITHOUT SCIATICA: Primary | ICD-10-CM

## 2021-05-19 DIAGNOSIS — R26.2 DIFFICULTY WALKING: ICD-10-CM

## 2021-05-19 DIAGNOSIS — M47.9 OSTEOARTHRITIS OF LOWER BACK: ICD-10-CM

## 2021-05-19 PROCEDURE — 97110 THERAPEUTIC EXERCISES: CPT | Performed by: PHYSICAL THERAPIST

## 2021-05-19 NOTE — PROGRESS NOTES
Physical Therapy Daily Progress Note  Visit # 2           Patient: Traci King   : 1950  Diagnosis/ICD-10 Code:  Chronic bilateral low back pain without sciatica [M54.5, G89.29]  Referring practitioner: DACIA Brower  Date of Initial Evaluation:  Type: THERAPY  Noted: 2021      Subjective  Traci King reports:     No significant changes from status at last PT visit through today.    Doing well overall, no specific areas of pain noted at onset of today's visit.        Objective   See Exercise, Manual, and Modality Logs for complete treatment.     Reviewed current HEP, progressed therex program with exercises as noted.  Added HS stretch 90/90, hip ABD vs t-band to HEP, written instructions issued ([place] code [id number]).      Assessment/Plan    Tolerated continued progression of therapeutic exercise well today, able to progress HEP with exercises noted with no increased pain reported at conclusion of session.       Progress per Plan of Care and Progress strengthening /stabilization /functional activity           Timed:         Manual Therapy:         mins  24505     Therapeutic Exercise:     30    mins  01948     Neuromuscular Pricilla:        mins  30994    Therapeutic Activity:          mins  44619     Gait Training:           mins  79325     Ultrasound:          mins  78882    Ionto                                   mins  06625  Self Care                            mins  31904    Un-Timed:  Electrical Stimulation:         mins 68135 ( )  Traction          mins 95451    Timed Treatment:   30   mins   Total Treatment:     35   mins    CHAO Finnegan License #[id number]  Physical Therapist Assistant

## 2021-05-20 ENCOUNTER — OFFICE VISIT (OUTPATIENT)
Dept: PAIN MEDICINE | Facility: CLINIC | Age: 71
End: 2021-05-20

## 2021-05-20 VITALS
WEIGHT: 187 LBS | BODY MASS INDEX: 36.71 KG/M2 | OXYGEN SATURATION: 97 % | RESPIRATION RATE: 20 BRPM | HEART RATE: 80 BPM | TEMPERATURE: 96.8 F | SYSTOLIC BLOOD PRESSURE: 125 MMHG | DIASTOLIC BLOOD PRESSURE: 68 MMHG | HEIGHT: 60 IN

## 2021-05-20 DIAGNOSIS — Z79.899 ENCOUNTER FOR LONG-TERM (CURRENT) USE OF HIGH-RISK MEDICATION: ICD-10-CM

## 2021-05-20 DIAGNOSIS — M47.816 LUMBAR FACET ARTHROPATHY: ICD-10-CM

## 2021-05-20 DIAGNOSIS — M47.812 CERVICAL FACET JOINT SYNDROME: ICD-10-CM

## 2021-05-20 DIAGNOSIS — G89.29 OTHER CHRONIC PAIN: Primary | ICD-10-CM

## 2021-05-20 DIAGNOSIS — M50.30 DEGENERATIVE DISC DISEASE, CERVICAL: ICD-10-CM

## 2021-05-20 DIAGNOSIS — M51.36 DDD (DEGENERATIVE DISC DISEASE), LUMBAR: ICD-10-CM

## 2021-05-20 PROCEDURE — 99214 OFFICE O/P EST MOD 30 MIN: CPT | Performed by: NURSE PRACTITIONER

## 2021-05-20 RX ORDER — PHENTERMINE HYDROCHLORIDE 37.5 MG/1
TABLET ORAL
COMMUNITY
Start: 2021-05-18 | End: 2021-09-05

## 2021-05-20 RX ORDER — NALOXONE HYDROCHLORIDE 4 MG/.1ML
SPRAY NASAL
COMMUNITY
Start: 2021-05-15 | End: 2021-11-01

## 2021-05-20 RX ORDER — OXYCODONE HYDROCHLORIDE 30 MG/1
30 TABLET, FILM COATED, EXTENDED RELEASE ORAL EVERY 12 HOURS SCHEDULED
Qty: 60 TABLET | Refills: 0 | Status: SHIPPED | OUTPATIENT
Start: 2021-05-20 | End: 2021-06-21 | Stop reason: SDUPTHER

## 2021-05-20 NOTE — PROGRESS NOTES
"CHIEF COMPLAINT  F/u back, neck and left shoulder pain. Pt sts pain is the same.     Subjective   Traci King is a 71 y.o. female  who presents for follow-up.  She has a history of chronic back, neck and shoulder pain. Reports her pain pattern is unchanged since last evaluation.    Had right foot injection 5-13-21. Has noticed a significant improvement.  Reports that \"I had had a painful month with my foot but now it's better.\"  \"Physical therapy is making me sore but that's a good thing.\" Also was ordered a TENS unit.    Complains of pain in her neck, low back, left shoulder and right foot. Today her pain is 2/10VAS. Describes the pain as continuous throbbing. Pain increases with walking, standing, activity, household chores; pain decreases with medication, rest and procedures.  Continues with OxyContin 30 mg BID (0200 and 1400)  and Diclofenac 50 mg 1-2/day and Robaxin 750 mg 1/day PRN(takes sparingly, 1/ week).  Is taking Tylenol OTC PRN. Denies any side effects from the regimen. She does take dulcolax every day and has been doing this since age 16, reports long-standing history of constipation. Also reports history being on iron and folic acid.  This regimen decreases her pain by 50-75%. ADL's by self. Denies any bowel or bladder changes.      Also having headaches in back of head. Had CT scan. Referred to neurologist. Reports she was told she has \"small vessels.\" Had occipital nerve block with neurology 1-20-21.  Having significant relief. Under care of Dr Malave. Due for follow-up.     Patient had a bilateral L3-L5 RFL performed by Dr. LEMUS on 12-29-20 for management of LOW BACK PAIN. Patient reports 50% ONGOING relief from the procedure     Patient had a bilateral L3-L5 MBB performed by Dr. LEMUS on 7-28-20 for management of LOW BACK PAIN. Initially had 80-85% relief for 1 day.  Then she reports 50% ONGOING relief from the procedure for 1 month.  Reports improvement in activity and sleep. "      Patient had a bilateral L3-L5 MBB performed by Dr. LEMUS on 5-20-20 for management of LOW BACK PAIN. Patient reports 80% relief from the procedure for 2 days. Pain has returned to pre-procedure level.      She completed a Cervical Facet Nerve (Medial Branch) Radiofrequency Lesioning LEFT C3-C5 RFL  on  1/26/18 performed by Dr. Lemus for management of neck pain.     She completed a Bilateral S1 Lumbar TFESI   on  8/16/2018 performed by Dr. LEMUS for management of low back and radicular pain. Patient reports NO relief from the procedure.      Failed PT, failed ultrasound therapy, failed compounded pain creme.    Patient remained masked during entire encounter. No cough present. I donned a mask and eye protection throughout entire visit. Prior to donning mask and eye protection, hand hygiene was performed, as well as when it was doffed.  I was closer than 6 feet, but not for an extended period of time. No obvious exposure to any bodily fluids.    Neck Pain   This is a chronic problem. The current episode started more than 1 year ago. The problem occurs constantly. The problem has been waxing and waning (unchanged since last evaluation). The quality of the pain is described as burning. The pain is at a severity of 2/10. The pain is moderate. The symptoms are aggravated by position, bending, stress and twisting. The pain is worse during the day. Stiffness is present all day. Pertinent negatives include no chest pain, fever, headaches, numbness or weakness. She has tried oral narcotics and NSAIDs (cervical MBB--significant temporary relief; cervical RFL--50% ongoing relief) for the symptoms. The treatment provided moderate relief.   Shoulder Injury   The left shoulder is affected. There was no injury mechanism. The pain radiates to the left neck and right neck. Pain severity now: unchanged since last office visit. Pertinent negatives include no chest pain or numbness. The symptoms are aggravated by movement and  palpation.   Back Pain  This is a chronic problem. The current episode started more than 1 year ago. The problem occurs constantly. The problem has been waxing and waning (unchanged since last evaluation) since onset. The pain is worse during the day. The symptoms are aggravated by bending, position, standing and twisting. Associated symptoms include bladder incontinence (long term issue). Pertinent negatives include no abdominal pain, bowel incontinence, chest pain, dysuria, fever, headaches, numbness or weakness. Risk factors include lack of exercise, sedentary lifestyle, menopause and obesity. She has tried analgesics, bed rest, chiropractic manipulation, heat, home exercises, ice, muscle relaxant, NSAIDs and walking (lumbar MBBgave 80% relief for 1-2 days twice) for the symptoms. The treatment provided moderate relief.        PEG Assessment   What number best describes your pain on average in the past week?2  What number best describes how, during the past week, pain has interfered with your enjoyment of life?2  What number best describes how, during the past week, pain has interfered with your general activity?  2    The following portions of the patient's history were reviewed and updated as appropriate: allergies, current medications, past family history, past medical history, past social history, past surgical history and problem list.    Review of Systems   Constitutional: Negative for activity change, fatigue and fever.   HENT: Negative for congestion.    Eyes: Negative for visual disturbance.   Respiratory: Negative for cough and shortness of breath.    Cardiovascular: Negative for chest pain.   Gastrointestinal: Negative for abdominal pain, bowel incontinence, constipation and diarrhea.   Endocrine: Negative for polyuria.   Genitourinary: Positive for bladder incontinence (long term issue). Negative for difficulty urinating and dysuria.   Musculoskeletal: Positive for arthralgias (left shoulder), back  "pain and neck pain.   Neurological: Negative for dizziness, weakness, light-headedness, numbness and headaches.   Psychiatric/Behavioral: Negative for agitation, sleep disturbance and suicidal ideas. The patient is not nervous/anxious.      I have reviewed and confirmed the accuracy of the ROS as documented by the MA/LPN/RN DACIA Baez      Vitals:    05/20/21 0906   BP: 125/68   Pulse: 80   Resp: 20   Temp: 96.8 °F (36 °C)   SpO2: 97%   Weight: 84.8 kg (187 lb)   Height: 153 cm (60.24\")   PainSc:   2   PainLoc: Back     Objective   Physical Exam  Vitals and nursing note reviewed.   Constitutional:       Appearance: Normal appearance. She is well-developed.   HENT:      Head: Normocephalic and atraumatic.   Musculoskeletal:      Cervical back: Spinous process tenderness and muscular tenderness present.      Lumbar back: Tenderness and bony tenderness (facet tenderness) present. Decreased range of motion.   Skin:     General: Skin is warm and dry.   Neurological:      Mental Status: She is alert.      Gait: Gait abnormal.      Deep Tendon Reflexes:      Reflex Scores:       Patellar reflexes are 1+ on the right side and 1+ on the left side.  Psychiatric:         Speech: Speech normal.         Behavior: Behavior normal. Behavior is cooperative.         Thought Content: Thought content normal.         Judgment: Judgment normal.         Assessment/Plan   Diagnoses and all orders for this visit:    1. Other chronic pain (Primary)    2. Cervical facet joint syndrome    3. DDD (degenerative disc disease), lumbar    4. Degenerative disc disease, cervical    5. Lumbar facet arthropathy    6. Encounter for long-term (current) use of high-risk medication      --- The urine drug screen confirmation from 1-21-21 has been reviewed and the result is APPROPRIATE based on patient history and MATTHEW report  --- The patient signed an updated copy of the controlled substance agreement on 1-21-21  --- repeat interventions in " future as needed.  --- Refill Oxycontin DNF. Patient appears stable with current regimen. No adverse effects. Regarding continuation of opioids, there is no evidence of aberrant behavior or any red flags.  The patient continues with appropriate response to opioid therapy. ADL's remain intact by self.   --- Follow-up 1 month or sooner if needed.       MATTHEW REPORT  As part of the patient's treatment plan, I am prescribing controlled substances. The patient has been made aware of appropriate use of such medications, including potential risk of somnolence, limited ability to drive and/or work safely, and the potential for dependence or overdose. It has also bee made clear that these medications are for use by this patient only, without concomitant use of alcohol or other substances unless prescribed.     Patient has completed prescribing agreement detailing terms of continued prescribing of controlled substances, including monitoring MATTHEW reports, urine drug screening, and pill counts if necessary. The patient is aware that inappropriate use will results in cessation of prescribing such medications.    MATTHEW report has been reviewed and scanned into the patient's chart.    As the clinician, I personally reviewed the MATTHEW from 5-20-21 while the patient was in the office today.    History and physical exam exhibit continued safe and appropriate use of controlled substances.        EMR Dragon/Transcription disclaimer:   Much of this encounter note is an electronic transcription/translation of spoken language to printed text. The electronic translation of spoken language may permit erroneous, or at times, nonsensical words or phrases to be inadvertently transcribed; Although I have reviewed the note for such errors, some may still exist.

## 2021-05-21 ENCOUNTER — TREATMENT (OUTPATIENT)
Dept: PHYSICAL THERAPY | Facility: CLINIC | Age: 71
End: 2021-05-21

## 2021-05-21 DIAGNOSIS — G89.29 CHRONIC BILATERAL LOW BACK PAIN WITHOUT SCIATICA: Primary | ICD-10-CM

## 2021-05-21 DIAGNOSIS — M54.50 CHRONIC BILATERAL LOW BACK PAIN WITHOUT SCIATICA: Primary | ICD-10-CM

## 2021-05-21 DIAGNOSIS — R26.2 DIFFICULTY WALKING: ICD-10-CM

## 2021-05-21 DIAGNOSIS — M47.9 OSTEOARTHRITIS OF LOWER BACK: ICD-10-CM

## 2021-05-21 PROCEDURE — 97110 THERAPEUTIC EXERCISES: CPT | Performed by: PHYSICAL THERAPIST

## 2021-05-21 NOTE — PROGRESS NOTES
Physical Therapy Daily Progress Note  Visit # 3           Patient: Traci King   : 1950  Diagnosis/ICD-10 Code:  Chronic bilateral low back pain without sciatica [M54.5, G89.29]  Referring practitioner: DCAIA Brower  Date of Initial Evaluation:  Type: THERAPY  Noted: 2021      Subjective  Traci King reports:   I was sore after our last session, I still feel sore today.  I took my pain meds right before I came, it usually takes about an hour for them to kick in.        Objective   See Exercise, Manual, and Modality Logs for complete treatment.       Assessment & Plan     Assessment  Assessment details:   Tolerated continued progression of therapeutic exercise well today, does note abdominal discomfort at conclusion of last set of abd bracing exercise.  No HEP progression today as pt noting soreness for the past two days after last session.            Progress per Plan of Care and Progress strengthening /stabilization /functional activity           Timed:         Manual Therapy:         mins  58942     Therapeutic Exercise:     30    mins  44922     Neuromuscular Pricilla:        mins  21884    Therapeutic Activity:          mins  92133     Gait Training:           mins  49101     Ultrasound:          mins  42452    Ionto                                   mins  66546  Self Care                            mins  95189    Un-Timed:  Electrical Stimulation:         mins 10811 ( )  Traction          mins 94035    Timed Treatment:   30   mins   Total Treatment:     35   mins    CHAO Finnegan License #[id number]  Physical Therapist Assistant

## 2021-05-24 ENCOUNTER — TREATMENT (OUTPATIENT)
Dept: PHYSICAL THERAPY | Facility: CLINIC | Age: 71
End: 2021-05-24

## 2021-05-24 DIAGNOSIS — M47.9 OSTEOARTHRITIS OF LOWER BACK: ICD-10-CM

## 2021-05-24 DIAGNOSIS — R26.2 DIFFICULTY WALKING: ICD-10-CM

## 2021-05-24 DIAGNOSIS — G89.29 CHRONIC BILATERAL LOW BACK PAIN WITHOUT SCIATICA: Primary | ICD-10-CM

## 2021-05-24 DIAGNOSIS — M54.50 CHRONIC BILATERAL LOW BACK PAIN WITHOUT SCIATICA: Primary | ICD-10-CM

## 2021-05-24 PROCEDURE — 97140 MANUAL THERAPY 1/> REGIONS: CPT | Performed by: PHYSICAL THERAPIST

## 2021-05-24 PROCEDURE — 97110 THERAPEUTIC EXERCISES: CPT | Performed by: PHYSICAL THERAPIST

## 2021-05-24 NOTE — PROGRESS NOTES
Physical Therapy Daily Progress Note  Visit: 4    Traci King reports: I am sore today. My stomach muscles are so from the exercises. Today is just not a good day    Subjective     Objective   See Exercise, Manual, and Modality Logs for complete treatment.       Assessment & Plan     Assessment  Assessment details: Pt tolerated treatment ok. Continued education on abdominal bracing    Plan  Plan details: Progress per POC        Manual Therapy:    8     mins  94311;  Therapeutic Exercise:    31     mins  15994;     Neuromuscular Pricilla:    -    mins  56456;    Therapeutic Activity:     -     mins  86868;     Gait Training:      -     mins  61182;     Ultrasound:     -     mins  49744;    Electrical Stimulation:    -     mins  94956 ( );  Dry Needling     -     mins self-pay    Timed Treatment:   39   mins   Total Treatment:     45   mins    Maureen Herrera PT  [place] License #: [id number]    Physical Therapist

## 2021-05-26 ENCOUNTER — TREATMENT (OUTPATIENT)
Dept: PHYSICAL THERAPY | Facility: CLINIC | Age: 71
End: 2021-05-26

## 2021-05-26 DIAGNOSIS — M54.50 CHRONIC BILATERAL LOW BACK PAIN WITHOUT SCIATICA: Primary | ICD-10-CM

## 2021-05-26 DIAGNOSIS — M47.9 OSTEOARTHRITIS OF LOWER BACK: ICD-10-CM

## 2021-05-26 DIAGNOSIS — G89.29 CHRONIC BILATERAL LOW BACK PAIN WITHOUT SCIATICA: Primary | ICD-10-CM

## 2021-05-26 DIAGNOSIS — R26.2 DIFFICULTY WALKING: ICD-10-CM

## 2021-05-26 PROCEDURE — 97110 THERAPEUTIC EXERCISES: CPT | Performed by: PHYSICAL THERAPIST

## 2021-05-26 PROCEDURE — 97530 THERAPEUTIC ACTIVITIES: CPT | Performed by: PHYSICAL THERAPIST

## 2021-05-26 NOTE — PROGRESS NOTES
Physical Therapy Daily Progress Note  Visit: 5    Traci King reports: I am feeling much better than I was on Monday    Subjective     Objective   See Exercise, Manual, and Modality Logs for complete treatment.       Assessment & Plan     Assessment  Assessment details: Pt tolerated treatment well. Was able to progress with core and lumbar stabilization. Added bridge and clamshell to HEP    Plan  Plan details: Progress with standing hip exercises next session        Manual Therapy:    -     mins  94644;  Therapeutic Exercise:    31     mins  18561;     Neuromuscular Pricilla:    -    mins  20254;    Therapeutic Activity:     10     mins  37283;     Gait Training:      -     mins  99065;     Ultrasound:     -     mins  33199;    Electrical Stimulation:    -     mins  41786 ( );  Dry Needling     -     mins self-pay    Timed Treatment:   41   mins   Total Treatment:     45   mins    Maureen Herrera PT  [place] License #: [id number]    Physical Therapist

## 2021-06-01 ENCOUNTER — TREATMENT (OUTPATIENT)
Dept: PHYSICAL THERAPY | Facility: CLINIC | Age: 71
End: 2021-06-01

## 2021-06-01 DIAGNOSIS — M47.9 OSTEOARTHRITIS OF LOWER BACK: ICD-10-CM

## 2021-06-01 DIAGNOSIS — R26.2 DIFFICULTY WALKING: ICD-10-CM

## 2021-06-01 DIAGNOSIS — G89.29 CHRONIC BILATERAL LOW BACK PAIN WITHOUT SCIATICA: Primary | ICD-10-CM

## 2021-06-01 DIAGNOSIS — M54.50 CHRONIC BILATERAL LOW BACK PAIN WITHOUT SCIATICA: Primary | ICD-10-CM

## 2021-06-01 PROCEDURE — 97110 THERAPEUTIC EXERCISES: CPT | Performed by: PHYSICAL THERAPIST

## 2021-06-01 PROCEDURE — 97140 MANUAL THERAPY 1/> REGIONS: CPT | Performed by: PHYSICAL THERAPIST

## 2021-06-01 NOTE — PROGRESS NOTES
Physical Therapy Daily Progress Note  Visit: 6    Traci King reports: My back is killing me today. I went to the [place] and lifted a bunch of things I should not have.     Subjective     Objective   See Exercise, Manual, and Modality Logs for complete treatment.       Assessment & Plan     Assessment  Assessment details: Education for pt to use TENS unit at home, ice and stretch at home. Noted tight adductors. Education for stretching of adductors and trying TPR of adductors to help with deep low back pain    Plan  Plan details: Progress per POC        Manual Therapy:    13   mins  45613;  Therapeutic Exercise:    31     mins  85745;     Neuromuscular Pricilla:    -    mins  89957;    Therapeutic Activity:     -     mins  57353;     Gait Training:      -     mins  49903;     Ultrasound:     -     mins  52340;    Electrical Stimulation:    -     mins  49627 ( );  Dry Needling     -     mins self-pay    Timed Treatment:   44   mins   Total Treatment:     48   mins    Maureen Herrera PT  [place] License #: [id number]    Physical Therapist

## 2021-06-07 ENCOUNTER — TELEPHONE (OUTPATIENT)
Dept: PHYSICAL THERAPY | Facility: CLINIC | Age: 71
End: 2021-06-07

## 2021-06-10 ENCOUNTER — TELEPHONE (OUTPATIENT)
Dept: PHYSICAL THERAPY | Facility: CLINIC | Age: 71
End: 2021-06-10

## 2021-06-14 ENCOUNTER — TREATMENT (OUTPATIENT)
Dept: PHYSICAL THERAPY | Facility: CLINIC | Age: 71
End: 2021-06-14

## 2021-06-14 DIAGNOSIS — G89.29 CHRONIC BILATERAL LOW BACK PAIN WITHOUT SCIATICA: Primary | ICD-10-CM

## 2021-06-14 DIAGNOSIS — R26.2 DIFFICULTY WALKING: ICD-10-CM

## 2021-06-14 DIAGNOSIS — M54.50 CHRONIC BILATERAL LOW BACK PAIN WITHOUT SCIATICA: Primary | ICD-10-CM

## 2021-06-14 DIAGNOSIS — M47.9 OSTEOARTHRITIS OF LOWER BACK: ICD-10-CM

## 2021-06-14 PROCEDURE — 97112 NEUROMUSCULAR REEDUCATION: CPT | Performed by: PHYSICAL THERAPIST

## 2021-06-14 PROCEDURE — 97110 THERAPEUTIC EXERCISES: CPT | Performed by: PHYSICAL THERAPIST

## 2021-06-14 NOTE — PROGRESS NOTES
Physical Therapy Daily Progress Note  Visit: 7    Traci King reports: My back is a little sore today. The adductor stretching and STM did not seem to help    Subjective     Objective   See Exercise, Manual, and Modality Logs for complete treatment.       Assessment & Plan     Assessment  Assessment details: Pt tolerated treatment pretty well. Able to advance with standing exercises.     Plan  Plan details: Reassess next session        Manual Therapy:    -     mins  40463;  Therapeutic Exercise:    31     mins  51760;     Neuromuscular Pricilla:    10    mins  37778;    Therapeutic Activity:     -     mins  11571;     Gait Training:      -     mins  21761;     Ultrasound:     -     mins  61989;    Electrical Stimulation:    -     mins  16603 ( );  Dry Needling     -     mins self-pay    Timed Treatment:   41   mins   Total Treatment:     42   mins    Maureen Herrera PT  [place] License #: [id number]    Physical Therapist

## 2021-06-16 ENCOUNTER — TREATMENT (OUTPATIENT)
Dept: PHYSICAL THERAPY | Facility: CLINIC | Age: 71
End: 2021-06-16

## 2021-06-16 DIAGNOSIS — M54.50 CHRONIC BILATERAL LOW BACK PAIN WITHOUT SCIATICA: Primary | ICD-10-CM

## 2021-06-16 DIAGNOSIS — R26.2 DIFFICULTY WALKING: ICD-10-CM

## 2021-06-16 DIAGNOSIS — M47.9 OSTEOARTHRITIS OF LOWER BACK: ICD-10-CM

## 2021-06-16 DIAGNOSIS — G89.29 CHRONIC BILATERAL LOW BACK PAIN WITHOUT SCIATICA: Primary | ICD-10-CM

## 2021-06-16 PROCEDURE — 97530 THERAPEUTIC ACTIVITIES: CPT | Performed by: PHYSICAL THERAPIST

## 2021-06-16 PROCEDURE — 97112 NEUROMUSCULAR REEDUCATION: CPT | Performed by: PHYSICAL THERAPIST

## 2021-06-16 PROCEDURE — 97110 THERAPEUTIC EXERCISES: CPT | Performed by: PHYSICAL THERAPIST

## 2021-06-21 ENCOUNTER — OFFICE VISIT (OUTPATIENT)
Dept: PAIN MEDICINE | Facility: CLINIC | Age: 71
End: 2021-06-21

## 2021-06-21 VITALS
DIASTOLIC BLOOD PRESSURE: 64 MMHG | BODY MASS INDEX: 37.5 KG/M2 | OXYGEN SATURATION: 100 % | SYSTOLIC BLOOD PRESSURE: 117 MMHG | TEMPERATURE: 95.9 F | HEIGHT: 60 IN | RESPIRATION RATE: 20 BRPM | WEIGHT: 191 LBS | HEART RATE: 80 BPM

## 2021-06-21 DIAGNOSIS — M47.812 CERVICAL FACET JOINT SYNDROME: ICD-10-CM

## 2021-06-21 DIAGNOSIS — M47.816 LUMBAR FACET ARTHROPATHY: ICD-10-CM

## 2021-06-21 DIAGNOSIS — G89.21 CHRONIC PAIN DUE TO TRAUMA: Primary | ICD-10-CM

## 2021-06-21 DIAGNOSIS — Z79.899 ENCOUNTER FOR LONG-TERM (CURRENT) USE OF HIGH-RISK MEDICATION: ICD-10-CM

## 2021-06-21 DIAGNOSIS — M25.519 SHOULDER PAIN, UNSPECIFIED CHRONICITY, UNSPECIFIED LATERALITY: ICD-10-CM

## 2021-06-21 DIAGNOSIS — M51.36 DDD (DEGENERATIVE DISC DISEASE), LUMBAR: ICD-10-CM

## 2021-06-21 DIAGNOSIS — M50.30 DEGENERATIVE DISC DISEASE, CERVICAL: ICD-10-CM

## 2021-06-21 PROCEDURE — 99214 OFFICE O/P EST MOD 30 MIN: CPT | Performed by: NURSE PRACTITIONER

## 2021-06-21 RX ORDER — METHOCARBAMOL 500 MG/1
500 TABLET, FILM COATED ORAL 3 TIMES DAILY PRN
Qty: 60 TABLET | Refills: 1 | Status: SHIPPED | OUTPATIENT
Start: 2021-06-21 | End: 2021-11-01

## 2021-06-21 RX ORDER — OXYCODONE HYDROCHLORIDE 30 MG/1
30 TABLET, FILM COATED, EXTENDED RELEASE ORAL EVERY 12 HOURS SCHEDULED
Qty: 60 TABLET | Refills: 0 | Status: SHIPPED | OUTPATIENT
Start: 2021-06-21 | End: 2021-07-21 | Stop reason: SDUPTHER

## 2021-06-21 NOTE — PROGRESS NOTES
"CHIEF COMPLAINT  F/u back, neck and left shoulder pain. Pt sts pain is the same. Pt c/o new right hip pain which started 4 days ago.        Subjective   Traci King is a 71 y.o. female  who presents for follow-up.  She has a history of chronic back, neck and left shoulder pain. Reports this Is unchanged since last evaluation.  However, has complaints of \"right hip\" pain. She noticed this approximately 4 days ago with no trauma or injury.     Complains of pain in her low back, neck, left shoulder and right hip. Today her pain is 2/10VAS. Describes the pain as nearly continuous aching. Pain increases with walking, standing, activity, bending/lifting/twisting; pain decreases with medication, rest and procedures.  Continues with OxyContin 30 mg BID (0200 and 1400)  and Diclofenac 50 mg 1-2/day and Robaxin 500 mg 1/day PRN(has been taking more regularly due to hip pain).  Is taking Tylenol OTC PRN. Denies any side effects from the regimen. She does take dulcolax every day and has been doing this since age 16, reports long-standing history of constipation. Also reports history being on iron and folic acid.  This regimen decreases her pain by 70-80%. ADL's by self. Denies any bowel or bladder changes.      Reviewed weight gain. SHe is up 4 lbs from last office visit. SHe admits her activity level has been greatly decreased. She had an issue with her foot which required an injection. It is feeling better. Her activity has not increased though. REviewed step log on her phone.  She averaged 3000 steps/day last week.    Also having headaches in back of head. Had CT scan. Referred to neurologist. Reports she was told she has \"small vessels.\" Had occipital nerve block with neurology 1-20-21.  Having significant relief. Under care of Dr Malave.      Patient had a bilateral L3-L5 RFL performed by Dr. LEMUS on 12-29-20 for management of LOW BACK PAIN. Patient reports 50% ONGOING relief from the procedure     Patient had " a bilateral L3-L5 MBB performed by Dr. LEMUS on 7-28-20 for management of LOW BACK PAIN. Initially had 80-85% relief for 1 day.  Then she reports 50% ONGOING relief from the procedure for 1 month.  Reports improvement in activity and sleep.      Patient had a bilateral L3-L5 MBB performed by Dr. LEMUS on 5-20-20 for management of LOW BACK PAIN. Patient reports 80% relief from the procedure for 2 days. Pain has returned to pre-procedure level.      She completed a Cervical Facet Nerve (Medial Branch) Radiofrequency Lesioning LEFT C3-C5 RFL  on  1/26/18 performed by Dr. Lemus for management of neck pain.     She completed a Bilateral S1 Lumbar TFESI   on  8/16/2018 performed by Dr. LEMUS for management of low back and radicular pain. Patient reports NO relief from the procedure.      Failed PT, failed ultrasound therapy, failed compounded pain creme.    Patient remained masked during entire encounter. No cough present. I donned a mask and eye protection throughout entire visit. Prior to donning mask and eye protection, hand hygiene was performed, as well as when it was doffed.  I was closer than 6 feet, but not for an extended period of time. No obvious exposure to any bodily fluids.    Neck Pain   This is a chronic problem. The current episode started more than 1 year ago. The problem occurs constantly. The problem has been waxing and waning (unchanged since last evaluation). The quality of the pain is described as burning. The pain is at a severity of 2/10. The pain is moderate. The symptoms are aggravated by position, bending, stress and twisting. The pain is worse during the day. Stiffness is present all day. Pertinent negatives include no chest pain, fever, headaches, numbness or weakness. She has tried oral narcotics and NSAIDs (cervical MBB--significant temporary relief; cervical RFL--50% ongoing relief) for the symptoms. The treatment provided moderate relief.   Shoulder Injury   The left shoulder is  affected. There was no injury mechanism. The pain radiates to the left neck and right neck. Pain severity now: unchanged since last office visit. Pertinent negatives include no chest pain or numbness. The symptoms are aggravated by movement and palpation.   Back Pain  This is a chronic problem. The current episode started more than 1 year ago. The problem occurs constantly. The problem has been waxing and waning (unchanged since last evaluation) since onset. The pain is at a severity of 2/10. The pain is worse during the day. The symptoms are aggravated by bending, position, standing and twisting. Associated symptoms include bladder incontinence (long term issue). Pertinent negatives include no abdominal pain, bowel incontinence, chest pain, dysuria, fever, headaches, numbness or weakness. Risk factors include lack of exercise, sedentary lifestyle, menopause and obesity. She has tried analgesics, bed rest, chiropractic manipulation, heat, home exercises, ice, muscle relaxant, NSAIDs and walking (lumbar MBBgave 80% relief for 1-2 days twice) for the symptoms. The treatment provided moderate relief.        PEG Assessment   What number best describes your pain on average in the past week?4  What number best describes how, during the past week, pain has interfered with your enjoyment of life?4  What number best describes how, during the past week, pain has interfered with your general activity?  4    The following portions of the patient's history were reviewed and updated as appropriate: allergies, current medications, past family history, past medical history, past social history, past surgical history and problem list.    Review of Systems   Constitutional: Negative for activity change, fatigue and fever.   HENT: Negative for congestion.    Eyes: Negative for visual disturbance.   Respiratory: Negative for cough and shortness of breath.    Cardiovascular: Negative for chest pain.   Gastrointestinal: Negative for  "abdominal pain, bowel incontinence, constipation and diarrhea.   Endocrine: Negative for polyuria.   Genitourinary: Positive for bladder incontinence (long term issue). Negative for difficulty urinating and dysuria.   Musculoskeletal: Positive for arthralgias (left shoulder), back pain and neck pain.   Allergic/Immunologic: Negative for immunocompromised state.   Neurological: Negative for dizziness, weakness, light-headedness, numbness and headaches.   Psychiatric/Behavioral: Negative for agitation, sleep disturbance and suicidal ideas. The patient is not nervous/anxious.      I have reviewed and confirmed the accuracy of the ROS as documented by the MA/LPN/RN DACIA Baez      Vitals:    06/21/21 0934   BP: 117/64   Pulse: 80   Resp: 20   Temp: 95.9 °F (35.5 °C)   SpO2: 100%   Weight: 86.6 kg (191 lb)   Height: 153 cm (60.24\")   PainSc:   2   PainLoc: Back  Comment: neck and left shoulder     Objective   Physical Exam  Vitals and nursing note reviewed.   Constitutional:       Appearance: Normal appearance. She is well-developed.   HENT:      Head: Normocephalic and atraumatic.   Musculoskeletal:      Cervical back: Spinous process tenderness and muscular tenderness present.      Lumbar back: Tenderness and bony tenderness (facet tenderness) present. Decreased range of motion.        Back:    Skin:     General: Skin is warm and dry.   Neurological:      Mental Status: She is alert.      Gait: Gait abnormal.      Deep Tendon Reflexes:      Reflex Scores:       Patellar reflexes are 1+ on the right side and 1+ on the left side.  Psychiatric:         Speech: Speech normal.         Behavior: Behavior normal. Behavior is cooperative.         Thought Content: Thought content normal.         Judgment: Judgment normal.         Assessment/Plan   Diagnoses and all orders for this visit:    1. 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 Cervical facet joint syndrome    3. DDD (degenerative disc disease), " lumbar    4. Degenerative disc disease, cervical    5. Lumbar facet arthropathy    6. Shoulder pain, unspecified chronicity, unspecified laterality    7. Encounter for long-term (current) use of high-risk medication    Other orders  -     methocarbamol (ROBAXIN) 500 MG tablet; Take 1 tablet by mouth 3 (Three) Times a Day As Needed for Muscle Spasms.  Dispense: 60 tablet; Refill: 1      --- The urine drug screen confirmation from 1-21-21 has been reviewed and the result is APPROPRIATE based on patient history and MATTHEW report  --- The patient signed an updated copy of the controlled substance agreement on 1-21-21  --- Discussed activity goals--- 4000 steps/day and weight loss of 2 lbs.   --- Refill Robaxin.  --- Refill OxyContin. Patient appears stable with current regimen. No adverse effects. Regarding continuation of opioids, there is no evidence of aberrant behavior or any red flags.  The patient continues with appropriate response to opioid therapy. ADL's remain intact by self.   --- Follow-up 1 month or sooner if needed.       MATTHEW REPORT  As part of the patient's treatment plan, I am prescribing controlled substances. The patient has been made aware of appropriate use of such medications, including potential risk of somnolence, limited ability to drive and/or work safely, and the potential for dependence or overdose. It has also bee made clear that these medications are for use by this patient only, without concomitant use of alcohol or other substances unless prescribed.     Patient has completed prescribing agreement detailing terms of continued prescribing of controlled substances, including monitoring MATTHEW reports, urine drug screening, and pill counts if necessary. The patient is aware that inappropriate use will results in cessation of prescribing such medications.    As the clinician, I personally reviewed the MATTHEW from 6-21-21 while the patient was in the office today.    History and physical  exam exhibit continued safe and appropriate use of controlled substances.        EMR Dragon/Transcription disclaimer:   Much of this encounter note is an electronic transcription/translation of spoken language to printed text. The electronic translation of spoken language may permit erroneous, or at times, nonsensical words or phrases to be inadvertently transcribed; Although I have reviewed the note for such errors, some may still exist.

## 2021-07-13 RX ORDER — ROSUVASTATIN CALCIUM 5 MG/1
5 TABLET, COATED ORAL NIGHTLY
Qty: 30 TABLET | Refills: 0 | Status: SHIPPED | OUTPATIENT
Start: 2021-07-13 | End: 2021-08-17

## 2021-07-21 ENCOUNTER — OFFICE VISIT (OUTPATIENT)
Dept: PAIN MEDICINE | Facility: CLINIC | Age: 71
End: 2021-07-21

## 2021-07-21 ENCOUNTER — TRANSCRIBE ORDERS (OUTPATIENT)
Dept: SURGERY | Facility: SURGERY CENTER | Age: 71
End: 2021-07-21

## 2021-07-21 ENCOUNTER — PREP FOR SURGERY (OUTPATIENT)
Dept: SURGERY | Facility: SURGERY CENTER | Age: 71
End: 2021-07-21

## 2021-07-21 VITALS
HEIGHT: 60 IN | RESPIRATION RATE: 16 BRPM | WEIGHT: 189 LBS | HEART RATE: 91 BPM | BODY MASS INDEX: 37.11 KG/M2 | TEMPERATURE: 96.2 F | OXYGEN SATURATION: 96 % | SYSTOLIC BLOOD PRESSURE: 160 MMHG | DIASTOLIC BLOOD PRESSURE: 80 MMHG

## 2021-07-21 DIAGNOSIS — M47.816 LUMBAR FACET ARTHROPATHY: Primary | ICD-10-CM

## 2021-07-21 DIAGNOSIS — M51.36 DDD (DEGENERATIVE DISC DISEASE), LUMBAR: ICD-10-CM

## 2021-07-21 DIAGNOSIS — M50.30 DEGENERATIVE DISC DISEASE, CERVICAL: ICD-10-CM

## 2021-07-21 DIAGNOSIS — Z79.899 ENCOUNTER FOR LONG-TERM (CURRENT) USE OF HIGH-RISK MEDICATION: ICD-10-CM

## 2021-07-21 DIAGNOSIS — M47.816 LUMBAR FACET ARTHROPATHY: ICD-10-CM

## 2021-07-21 DIAGNOSIS — G89.21 CHRONIC PAIN DUE TO TRAUMA: Primary | ICD-10-CM

## 2021-07-21 PROCEDURE — 80305 DRUG TEST PRSMV DIR OPT OBS: CPT | Performed by: NURSE PRACTITIONER

## 2021-07-21 PROCEDURE — 99214 OFFICE O/P EST MOD 30 MIN: CPT | Performed by: NURSE PRACTITIONER

## 2021-07-21 RX ORDER — SODIUM CHLORIDE 0.9 % (FLUSH) 0.9 %
10 SYRINGE (ML) INJECTION EVERY 12 HOURS SCHEDULED
Status: CANCELLED | OUTPATIENT
Start: 2021-07-21

## 2021-07-21 RX ORDER — SODIUM CHLORIDE 0.9 % (FLUSH) 0.9 %
10 SYRINGE (ML) INJECTION AS NEEDED
Status: CANCELLED | OUTPATIENT
Start: 2021-07-21

## 2021-07-21 RX ORDER — OXYCODONE HYDROCHLORIDE 30 MG/1
30 TABLET, FILM COATED, EXTENDED RELEASE ORAL EVERY 12 HOURS SCHEDULED
Qty: 60 TABLET | Refills: 0 | Status: SHIPPED | OUTPATIENT
Start: 2021-07-21 | End: 2021-08-19 | Stop reason: SDUPTHER

## 2021-07-21 NOTE — PROGRESS NOTES
"CHIEF COMPLAINT  F/U back, neck and shoulder pain- patient states that her pain has worsened in her lower back.     Subjective   Traci King is a 71 y.o. female  who presents for follow-up.  She has a history of chronic back, neck and shoulder pain. Reports her low back pain is worse since last evaluation.    Been having right foot pain. HAs a follow-up appointment with orthopedist and hoping for cortisone injections again.    Started a new job at [employer]. Has been working at [employer]. Is working 6-7 hours at a time. Has noticed her back pain has worsened with this. \"I need to build my stamina up.\" Plan is to work 12-15 hours/week.     Complains of pain in her low back, neck and shoulder. Today her pain is 5/10VAS.  Describes her pain as continuous throbbing. Pain increases with walking, standing, activity; pain decreases with medication, rest and procedures.  Continues with OxyContin 30 mg BID (0200 and 1400)  and Diclofenac 50 mg 1-2/day and Robaxin 500 mg 1/day PRN(has been taking more regularly due to hip pain). Has also started using Lidocaine patch OTC with some improvement.  Is taking Tylenol OTC PRN. Denies any side effects from the regimen. She does take dulcolax every day and has been doing this since age 16, reports long-standing history of constipation. Also reports history being on iron and folic acid.  This regimen decreases her pain by 50%. ADL's by self. Denies any bowel or bladder changes.      Also having headaches in back of head. Had CT scan. Referred to neurologist. Reports she was told she has \"small vessels.\" Had occipital nerve block with neurology 1-20-21.  Having significant relief. Under care of Dr Malave.      Patient had a bilateral L3-L5 RFL performed by Dr. LEMUS on 12-29-20 for management of LOW BACK PAIN. Patient reports 50% ONGOING relief from the procedure     Patient had a bilateral L3-L5 MBB performed by Dr. LEMUS on 7-28-20 for management of LOW BACK " PAIN. Initially had 80-85% relief for 1 day.  Then she reports 50% ONGOING relief from the procedure for 1 month.  Reports improvement in activity and sleep.      Patient had a bilateral L3-L5 MBB performed by Dr. LEMUS on 5-20-20 for management of LOW BACK PAIN. Patient reports 80% relief from the procedure for 2 days. Pain has returned to pre-procedure level.      She completed a Cervical Facet Nerve (Medial Branch) Radiofrequency Lesioning LEFT C3-C5 RFL  on  1/26/18 performed by Dr. Lemus for management of neck pain.     She completed a Bilateral S1 Lumbar TFESI   on  8/16/2018 performed by Dr. LEMUS for management of low back and radicular pain. Patient reports NO relief from the procedure.      Failed PT, failed ultrasound therapy, failed compounded pain creme.        Patient remained masked during entire encounter. No cough present. I donned a mask and eye protection throughout entire visit. Prior to donning mask and eye protection, hand hygiene was performed, as well as when it was doffed.  I was closer than 6 feet, but not for an extended period of time. No obvious exposure to any bodily fluids.    Neck Pain   This is a chronic problem. The current episode started more than 1 year ago. The problem occurs constantly. The problem has been waxing and waning (worse since last evaluation). The quality of the pain is described as burning. The pain is at a severity of 5/10. The pain is moderate. The symptoms are aggravated by position, bending, stress and twisting. The pain is worse during the day. Stiffness is present all day. Pertinent negatives include no chest pain, fever, headaches, numbness or weakness. She has tried oral narcotics and NSAIDs (cervical MBB--significant temporary relief; cervical RFL--50% ongoing relief) for the symptoms. The treatment provided moderate relief.   Shoulder Injury   The left shoulder is affected. There was no injury mechanism. The pain radiates to the left neck and right  neck. Pain severity now: unchanged since last office visit. Pertinent negatives include no chest pain or numbness. The symptoms are aggravated by movement and palpation.   Back Pain  This is a chronic problem. The current episode started more than 1 year ago. The problem occurs constantly. The problem has been waxing and waning (unchanged since last evaluation) since onset. The pain is at a severity of 2/10. The pain is worse during the day. The symptoms are aggravated by bending, position, standing and twisting. Associated symptoms include bladder incontinence (long term issue). Pertinent negatives include no abdominal pain, bowel incontinence, chest pain, dysuria, fever, headaches, numbness or weakness. Risk factors include lack of exercise, sedentary lifestyle, menopause and obesity. She has tried analgesics, bed rest, chiropractic manipulation, heat, home exercises, ice, muscle relaxant, NSAIDs and walking (lumbar MBBgave 80% relief for 1-2 days twice) for the symptoms. The treatment provided moderate relief.      PEG Assessment   What number best describes your pain on average in the past week?4  What number best describes how, during the past week, pain has interfered with your enjoyment of life?5  What number best describes how, during the past week, pain has interfered with your general activity?  6    The following portions of the patient's history were reviewed and updated as appropriate: allergies, current medications, past family history, past medical history, past social history, past surgical history and problem list.    Review of Systems   Constitutional: Negative for activity change (increased), chills, fatigue and fever.   HENT: Negative for congestion.    Eyes: Negative for visual disturbance.   Respiratory: Negative for chest tightness and shortness of breath.    Cardiovascular: Negative for chest pain.   Gastrointestinal: Positive for diarrhea. Negative for abdominal pain, bowel incontinence and  "constipation.   Genitourinary: Positive for bladder incontinence (long term issue). Negative for difficulty urinating, dyspareunia and dysuria.   Musculoskeletal: Positive for back pain and neck pain.   Neurological: Negative for dizziness, weakness, light-headedness, numbness and headaches.   Psychiatric/Behavioral: Positive for sleep disturbance. Negative for agitation. The patient is not nervous/anxious.      I have reviewed and confirmed the accuracy of the ROS as documented by the MA/LPN/RN DACIA Baez    Vitals:    07/21/21 0937   BP: 160/80   Pulse: 91   Resp: 16   Temp: 96.2 °F (35.7 °C)   SpO2: 96%   Weight: 85.7 kg (189 lb)   Height: 152.4 cm (60\")   PainSc:   5   PainLoc: Back     Objective   Physical Exam  Vitals and nursing note reviewed.   Constitutional:       Appearance: Normal appearance. She is well-developed.   HENT:      Head: Normocephalic and atraumatic.   Musculoskeletal:      Cervical back: Spinous process tenderness and muscular tenderness present.      Lumbar back: Tenderness and bony tenderness (bilateral L3-L5 moderate facet tenderness; + loading maneuver) present. Decreased range of motion.   Skin:     General: Skin is warm and dry.   Neurological:      Mental Status: She is alert.      Gait: Gait abnormal.      Deep Tendon Reflexes:      Reflex Scores:       Patellar reflexes are 1+ on the right side and 1+ on the left side.  Psychiatric:         Speech: Speech normal.         Behavior: Behavior normal. Behavior is cooperative.         Thought Content: Thought content normal.         Judgment: Judgment normal.         Assessment/Plan   Diagnoses and all orders for this visit:    1. 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 Degenerative disc disease, cervical    3. DDD (degenerative disc disease), lumbar    4. Lumbar facet arthropathy    5. Encounter for long-term (current) use of high-risk medication    Other orders  -     diclofenac (VOLTAREN) 50 MG EC tablet; Take 1 " tablet by mouth 2 (Two) Times a Day As Needed (pain). for pain  Dispense: 180 tablet; Refill: 0      ---Routine UDS in office today as part of monitoring requirements for controlled substances.  The specimen was viewed and the immunoassay result reviewed and is +OXY, +BZD.  This specimen will be sent to [facility] laboratory for confirmation.     --- Trial of Compounded pain creme. Discussed medication with the patient.  Included in this discussion was the potential for side effects and adverse events.  Patient verbalized understanding and wished to proceed.  Prescription will be sent to pharmacy.  --- Refill OxyContin.  Patient appears stable with current regimen. No adverse effects. Regarding continuation of opioids, there is no evidence of aberrant behavior or any red flags.  The patient continues with appropriate response to opioid therapy. ADL's remain intact by self.   --- Refill Diclofenac.  --- Repeat bilateral L3-L5 MBB. No blood thinners. Reviewed the procedure at length with the patient.  Included in the review was expectations, complications, risk and benefits.The procedure was described in detail and the risks, benefits and alternatives were discussed with the patient (including but not limited to: bleeding, infection, nerve damage, worsening of pain, inability to perform injection, paralysis, seizures, and death) who agreed to proceed.  Discussed the potential for sedation if warranted/wanted.  The procedure will plan to be performed at [facility] with fluoroscopic guidance(unless ultrasound is indicated) and could potentially have steroids and contrast dye used. Questions were answered and in a way the patient could understand.  Patient verbalized understanding and wishes to proceed.  This intervention will be ordered.  Discussed with patient that all procedures are part of a multimodal plan of care and include either formal PT or a home exercise program.  Patient has no evidence  of coagulopathy or current infection.  --- Follow-up 1 month or sooner if needed.       MATTHEW REPORT  As part of the patient's treatment plan, I am prescribing controlled substances. The patient has been made aware of appropriate use of such medications, including potential risk of somnolence, limited ability to drive and/or work safely, and the potential for dependence or overdose. It has also bee made clear that these medications are for use by this patient only, without concomitant use of alcohol or other substances unless prescribed.     Patient has completed prescribing agreement detailing terms of continued prescribing of controlled substances, including monitoring MATTHEW reports, urine drug screening, and pill counts if necessary. The patient is aware that inappropriate use will results in cessation of prescribing such medications.    As the clinician, I personally reviewed the MATTHEW from 7-21-21 while the patient was in the office today.    History and physical exam exhibit continued safe and appropriate use of controlled substances.       Dictated utilizing Dragon dictation.

## 2021-07-27 ENCOUNTER — TELEPHONE (OUTPATIENT)
Dept: PAIN MEDICINE | Facility: CLINIC | Age: 71
End: 2021-07-27

## 2021-07-27 NOTE — TELEPHONE ENCOUNTER
Provider: KEMI VICTORIA    Caller: PATIENT    Relationship to Patient: SELF    Phone Number: [phone]- [place] [facility] [place]     Reason [place] Call:  PT. STATES THAT SHE SAW KEMI VICTORIA ON 07/21/21, AND THEY DISCUSSED A COMPOUND PAIN CREAM BEING SENT TO A COMPOUND PHARMACY.   PT. STATES THAT SHE HAS NOT HEARD ANYTHING AND IS CHECKING IN TO SEE IF PRESCRIPTION HAS BEEN SENT IN.   PLEASE CALL TO ADVISE.

## 2021-07-29 ENCOUNTER — TRANSCRIBE ORDERS (OUTPATIENT)
Dept: ADMINISTRATIVE | Facility: HOSPITAL | Age: 71
End: 2021-07-29

## 2021-07-29 DIAGNOSIS — M79.671 RIGHT FOOT PAIN: Primary | ICD-10-CM

## 2021-07-30 ENCOUNTER — TELEPHONE (OUTPATIENT)
Dept: PAIN MEDICINE | Facility: CLINIC | Age: 71
End: 2021-07-30

## 2021-07-30 NOTE — TELEPHONE ENCOUNTER
Provider: DR. LEMUS  Caller: DIOGO BISHOP  Relationship to Patient: SELF  Phone Number: [phone]  Reason for Call: PT WANTS TO KNOW IF SHE'S ABLE TO NOT GO UNDER ANESTHESIA FOR HER PROCEDURE ON 08/04/21. WOULD LIKE A CALL BACK TO DISCUSS.

## 2021-08-03 ENCOUNTER — OFFICE VISIT (OUTPATIENT)
Dept: FAMILY MEDICINE CLINIC | Facility: CLINIC | Age: 71
End: 2021-08-03

## 2021-08-03 VITALS
HEIGHT: 60 IN | WEIGHT: 189 LBS | RESPIRATION RATE: 16 BRPM | OXYGEN SATURATION: 95 % | HEART RATE: 86 BPM | DIASTOLIC BLOOD PRESSURE: 76 MMHG | TEMPERATURE: 96.8 F | SYSTOLIC BLOOD PRESSURE: 132 MMHG | BODY MASS INDEX: 37.11 KG/M2

## 2021-08-03 DIAGNOSIS — J44.9 CHRONIC OBSTRUCTIVE PULMONARY DISEASE, UNSPECIFIED COPD TYPE (HCC): ICD-10-CM

## 2021-08-03 DIAGNOSIS — M79.18 MYOFASCIAL PAIN: ICD-10-CM

## 2021-08-03 DIAGNOSIS — F33.1 MODERATE EPISODE OF RECURRENT MAJOR DEPRESSIVE DISORDER (HCC): ICD-10-CM

## 2021-08-03 DIAGNOSIS — E53.8 B12 DEFICIENCY: ICD-10-CM

## 2021-08-03 DIAGNOSIS — M48.02 SPINAL STENOSIS, CERVICAL REGION: ICD-10-CM

## 2021-08-03 DIAGNOSIS — D72.828 GRANULOCYTOSIS: ICD-10-CM

## 2021-08-03 DIAGNOSIS — M47.812 CERVICAL FACET JOINT SYNDROME: ICD-10-CM

## 2021-08-03 DIAGNOSIS — R06.02 SHORTNESS OF BREATH: ICD-10-CM

## 2021-08-03 DIAGNOSIS — I51.89 DIASTOLIC DYSFUNCTION: ICD-10-CM

## 2021-08-03 DIAGNOSIS — D72.829 LEUKOCYTOSIS, UNSPECIFIED TYPE: ICD-10-CM

## 2021-08-03 DIAGNOSIS — E78.49 OTHER HYPERLIPIDEMIA: Primary | ICD-10-CM

## 2021-08-03 DIAGNOSIS — K21.9 GASTROESOPHAGEAL REFLUX DISEASE, UNSPECIFIED WHETHER ESOPHAGITIS PRESENT: ICD-10-CM

## 2021-08-03 DIAGNOSIS — D64.9 ANEMIA, UNSPECIFIED TYPE: ICD-10-CM

## 2021-08-03 DIAGNOSIS — M50.30 DEGENERATIVE DISC DISEASE, CERVICAL: ICD-10-CM

## 2021-08-03 DIAGNOSIS — G62.9 NEUROPATHY: ICD-10-CM

## 2021-08-03 DIAGNOSIS — E61.1 IRON DEFICIENCY: ICD-10-CM

## 2021-08-03 DIAGNOSIS — M51.36 DDD (DEGENERATIVE DISC DISEASE), LUMBAR: ICD-10-CM

## 2021-08-03 DIAGNOSIS — R06.09 DYSPNEA ON EXERTION: ICD-10-CM

## 2021-08-03 DIAGNOSIS — E53.8 FOLATE DEFICIENCY: ICD-10-CM

## 2021-08-03 DIAGNOSIS — D75.89 MACROCYTOSIS: ICD-10-CM

## 2021-08-03 DIAGNOSIS — E55.9 VITAMIN D DEFICIENCY: ICD-10-CM

## 2021-08-03 PROCEDURE — 99214 OFFICE O/P EST MOD 30 MIN: CPT | Performed by: PHYSICIAN ASSISTANT

## 2021-08-03 RX ORDER — KETOCONAZOLE 20 MG/G
CREAM TOPICAL
COMMUNITY
Start: 2021-07-25 | End: 2021-09-05

## 2021-08-04 ENCOUNTER — HOSPITAL ENCOUNTER (OUTPATIENT)
Dept: GENERAL RADIOLOGY | Facility: SURGERY CENTER | Age: 71
Setting detail: HOSPITAL OUTPATIENT SURGERY
End: 2021-08-04

## 2021-08-04 ENCOUNTER — HOSPITAL ENCOUNTER (OUTPATIENT)
Facility: SURGERY CENTER | Age: 71
Setting detail: HOSPITAL OUTPATIENT SURGERY
Discharge: HOME | End: 2021-08-04
Attending: ANESTHESIOLOGY | Admitting: ANESTHESIOLOGY

## 2021-08-04 VITALS
BODY MASS INDEX: 35.5 KG/M2 | TEMPERATURE: 97 F | HEART RATE: 74 BPM | WEIGHT: 188 LBS | OXYGEN SATURATION: 97 % | DIASTOLIC BLOOD PRESSURE: 60 MMHG | HEIGHT: 61 IN | RESPIRATION RATE: 18 BRPM | SYSTOLIC BLOOD PRESSURE: 109 MMHG

## 2021-08-04 DIAGNOSIS — M47.816 LUMBAR FACET ARTHROPATHY: ICD-10-CM

## 2021-08-04 PROCEDURE — 3E0T3BZ INTRODUCTION OF ANESTHETIC AGENT INTO PERIPHERAL NERVES AND PLEXI, PERCUTANEOUS APPROACH: ICD-10-PCS | Performed by: ANESTHESIOLOGY

## 2021-08-04 PROCEDURE — 0 IOHEXOL 300 MG/ML SOLUTION 10 ML VIAL: Performed by: ANESTHESIOLOGY

## 2021-08-04 PROCEDURE — 64493 INJ PARAVERT F JNT L/S 1 LEV: CPT | Performed by: ANESTHESIOLOGY

## 2021-08-04 PROCEDURE — BR16ZZZ FLUOROSCOPY OF LUMBAR FACET JOINT(S): ICD-10-PCS | Performed by: ANESTHESIOLOGY

## 2021-08-04 PROCEDURE — 25010000002 MIDAZOLAM PER 1 MG: Performed by: ANESTHESIOLOGY

## 2021-08-04 PROCEDURE — 64494 INJ PARAVERT F JNT L/S 2 LEV: CPT | Performed by: ANESTHESIOLOGY

## 2021-08-04 PROCEDURE — 3E0T33Z INTRODUCTION OF ANTI-INFLAMMATORY INTO PERIPHERAL NERVES AND PLEXI, PERCUTANEOUS APPROACH: ICD-10-PCS | Performed by: ANESTHESIOLOGY

## 2021-08-04 RX ORDER — SODIUM CHLORIDE 0.9 % (FLUSH) 0.9 %
10 SYRINGE (ML) INJECTION EVERY 12 HOURS SCHEDULED
Status: DISCONTINUED | OUTPATIENT
Start: 2021-08-04 | End: 2021-08-04 | Stop reason: HOSPADM

## 2021-08-04 RX ORDER — MIDAZOLAM HYDROCHLORIDE 1 MG/ML
INJECTION INTRAMUSCULAR; INTRAVENOUS AS NEEDED
Status: DISCONTINUED | OUTPATIENT
Start: 2021-08-04 | End: 2021-08-04 | Stop reason: HOSPADM

## 2021-08-04 RX ORDER — SODIUM CHLORIDE 0.9 % (FLUSH) 0.9 %
10 SYRINGE (ML) INJECTION AS NEEDED
Status: DISCONTINUED | OUTPATIENT
Start: 2021-08-04 | End: 2021-08-04 | Stop reason: HOSPADM

## 2021-08-04 NOTE — DISCHARGE INSTRUCTIONS
PAIN DIARY               Traci King  1950    Procedure:   Bilateral L3/L4/L5 lmbb  Date of Procedure:  08/04/21          PAIN SCORE (0-10)   Activity Level         Pre-procedure              1 hour after procedure:            2 hours after procedure:            3 hours after procedure:            4 hours after procedure:            5 hours after procedure:            6 hours after procedure:            7 hours after procedure:            8 hours after procedure:              PLEASE BRING THIS WITH YOU TO YOUR NEXT APPOINTMENT & TURN IT IN AT THE CHECK-IN WINDOW TO SCAN INTO YOUR CHART.          ---           [facility] - Post-procedure Instructions          --  While there are no absolute restrictions, it is recommended that you do not perform strenuous activity today. In the morning, you may resume your level of activity as before your block.    --  If you have a band-aid at your injection site, please remove it later today. Observe the area for any redness, swelling, pus-like drainage, or a temperature over 101°. If any of these symptoms occur, please call your doctor at [phone]. If after office hours, leave a message and the on-call provider will return your call.    --  Ice may be applied to your injection site. It is recommended you avoid direct heat (heating pad; hot tub) for 1-2 days.    --  Call [facility] at [phone] if you experience persistent headache, persistent bleeding from the injection site, or severe pain not relieved by heat or oral medication.    --  Do not make important decisions today.    --  Due to the effects of the block and/or the I.V. Sedation, DO NOT drive or operate hazardous machinery for 12 hours.  Local anesthetics may cause numbness after procedure and precautions must be taken with regards to operating equipment as well as with walking, even if ambulating with assistance of another person or with an assistive device.    --  Do not drink  "alcohol for 12 hours.    -- You may return to work tomorrow, or as directed by your referring doctor.    --  Occasionally you may notice a slight increase in your pain after the procedure. This should start to improve within the next 24-48 hours. Radiofrequency ablation procedure pain may last 3-4 weeks.    --  It may take as long as 3-4 days before you notice a gradual improvement in your pain and/or other symptoms.    -- You may continue to take your prescribed pain medication as needed.    --  Some normal possible side effects of steroid use could include fluid retention, increased blood sugar, dull headache, increased sweating, increased appetite, mood swings and flushing.    --  Diabetics are recommended to watch their blood glucose level closely for 24-48 hours after the injection.    --  Must stay in PACU for 20 min upon arrival and prove no leg weakness before being discharged.    --  IN THE EVENT OF A LIFE THREATENING EMERGENCY, (CHEST PAIN, BREATHING DIFFICULTIES, PARALYSIS…) YOU SHOULD GO TO YOUR NEAREST EMERGENCY ROOM.    --  You should be contacted by our office within 2-3 days to schedule follow up or next appointment date.  If not contacted within 7 days, please call the office at [phone]    ---    -------  Education about Medial Branch Blockade and RF Therapy:    This medial branch blockade (MBB) suggested is intended for diagnostic purposes, with the intent of offering the patient Radiofrequency thermal rhizotomy (RF) if the MBB is diagnostically effective.  The diagnostic blockade is necessary to determine the likelihood that RF therapy could be efficacious in providing long term relief to the patient.    Medial branches are sensory nerve branches that connect to a facet joint and transmit sensations & pain signals from that joint.  Facet is a term for the type of joints found in the spine.  Medial branches are the nerves that go to a facet, and therefore are also sometimes called \"facet " "joint nerves\" (FJNs).      In a medial branch blockade procedure, xray fluoroscopy is used to verify the locations of the outside of the joint lines which are being targeted.  Under xray guidance, needles are placed to these areas.  Contrast dye is injected to confirm proper placement, with dye flowing over the joint area, and to ensure that the dye does not flow into unintended areas such as a vein.  When this is confirmed, local anesthetic is injected to block the medial branch at that joint level.      If MBBs are diagnostically successful in blocking pain, then the patient is most likely a great candidate for Radiofrequency of those facet joint nerves.  In the RF procedure, needles are placed to the joint lines in the same fashion, and after testing, the needle tips are heated to thermally treat the nerves, blocking the nerves by in essence damaging the nerves with the heat treatment.       Medically, a successful RF procedure should provide a patient with 50% pain relief or more for at least 6 months.  Clinical experience suggests that successful patients receive relief more in the range of 12 months on average.  We also discussed that a fortunate minority of patients receive therapeutic success from the MBB, and may not require RF ablation.  If a patient receives more than 8 weeks of relief from MBB, then occasional repeat MBB for therapeutic purposes is a very reasonable alternative therapy.  This course of therapy is consistent with our LCDs according to our CMS  in the area, and therefore other insurance providers should follow accordingly.  We will monitor our patients to screen for these therapeutic responders and will offer RF therapy only when necessary.        We discussed that MBB & RF are not without risks.  Guidelines regarding anticoagulant use & neuraxial procedures will be respected.  Patients that are ill or otherwise may be at risk for sepsis will not have their spines accessed by " neuraxial injections of any type.  This patient will not be offered these therapies if there is an increased risk.   We discussed that there is a risk of postprocedural pain and also a risk of worsening of clinical picture with these procedures as with any neuraxial procedure.    -------      --------  Education about Radiofrequency Lesioning of Medial Branches:    The medial branch blockade was intended for diagnostic purposes, with the intent of offering the patient Radiofrequency thermal rhizotomy if the MBB is diagnostically effective.  The diagnostic blockade is necessary to determine the likelihood that RF therapy could be efficacious in providing long term relief to the patient.  As indicated above, diagnostic efficacy was established.      In the RF procedure, needles are placed to the joint lines in the same fashion, and after testing, the needle tips are heated to thermally treat the nerves, blocking the nerves by in essence damaging the nerves with the heat treatment.      Medically, a successful RF procedure should provide a patient with 50% pain relief or more for at least 6 months.  We estimate a likelihood of about an 80% chance that medical success will be realized.  We discussed & educated once again that not all patients have a medically successful result, and the patient voices understanding.  However, our clinical experience suggests that successful patients receive relief more in the range of 12 months on average.  (We also discussed that a fortunate minority of patients receive therapeutic success from the MBB, and may not require RF ablation.  If a patient receives more than 8 weeks of relief from MBB, then occasional repeat MBB for therapeutic purposes is a very reasonable alternative therapy.  This course of therapy is consistent with our LCDs according to our CMS  in the area, and therefore other insurance providers should follow accordingly.  We will monitor our patients to  screen for these therapeutic responders and will offer RF therapy only when necessary.  However, in this clinical scenario, this therapeutic result was not realized, and therefore Radiofrequency Lesioning is medically necessary.)      We discussed that MBB & RF are not without risks.  Guidelines regarding anticoagulant use & neuraxial procedures will be respected.  Patients that are ill or otherwise may be at risk for sepsis will not have their spines accessed by neuraxial injections of any type.  This patient will not be offered these therapies if there is an increased risk.   We discussed that there is a risk of postprocedural pain and also a risk of worsening of clinical picture with these procedures as with any neuraxial procedure.  All invasive procedures have risks including but not limited to bleeding, infection, injury, nerve injury, paralysis, coma, death, lack of pain relief, and worsening of clinical picture.      In this education session, all of these topics were covered and the patient voiced understanding.    ---------

## 2021-08-04 NOTE — OP NOTE
Bilateral L3-5 Lumbar Medial Branch Blockade  [facility]      PREOPERATIVE DIAGNOSIS:  Lumbar spondylosis without myelopathy    POSTOPERATIVE DIAGNOSIS:  Lumbar spondylosis without myelopathy    PROCEDURE:   Diagnostic Bilateral Lumbar Medial Branch Nerve Blockades, with fluoroscopy:  L3, L4, and L5 nerves (at the L4 & L5 transverse processes and the sacral alar groove) to block facet joints L4-5, and L5-S1  1. 52674-47 -- Bilateral Lumbar Facet blocks, 1st Level  2. 77215-65 -- Bilateral Lumbar Facet blocks, 2nd  Level    PRE-PROCEDURE DISCUSSION WITH PATIENT:    Risks and complications were discussed with the patient prior to starting the procedure and informed consent was obtained.      SURGEON:  Jose Luis Gan MD    REASON FOR PROCEDURE:    The patient complains of pain that seems to have a significant axial component and Previous clinically significant therapeutic effect after prior Radiofrequency procedure    SEDATION:  Versed 4mg IV  ANESTHETIC:  Marcaine 0.5%  STEROID:  NONE  TOTAL VOLUME OF SOLUTION:  3ml    DESCRIPTON OF PROCEDURE:  After obtaining informed consent, IV access was obtained in the preoperative area.   The patient was taken to the operating room.  The patient was placed in the prone position with a pillow under the abdomen. All pressure points were well padded.  EKG, blood pressure, and pulse oximeter were monitored.  The patient was monitored and sedated by the RN under my direction. The lumbosacral area was prepped with Chloraprep and draped in a sterile fashion. Under fluoroscopic guidance the transverse processes of the L4 and L5 vertebrae at the junctions of the superior articular processes were identified on the right. Also identified was the groove between the ala and the superior articular process of the sacrum on the ipsilateral side.  Skin and subcutaneous tissue were anesthetized with 1% lidocaine above each of these points. A 22-gauge spinal needle was  introduced under fluoroscopic guidance at the above junctions. Aspiration was negative for blood and CSF.  After confirming the position of the needle with fluoroscope in all views, 0.25 mL of Omnipaque was injected, and after seeing the proper spread a total of 0.5 mL of the anesthetic solution noted above was injected at each of these points.  Needles were removed intact from each of the areas.  A similar procedure was repeated to block the L3, L4, and L5 nerves on the contralateral side.   Onset of analgesia was noted.  Vital signs remained stable throughout.      ESTIMATED BLOOD LOSS:  <5 mL  SPECIMENS:  none    COMPLICATIONS:   No complications were noted., There was no indication of vascular uptake on live injection of contrast dye., There was no indication of intrathecal uptake on live injection of contrast dye., There was not any evidence of dural puncture.   and The patient did not have any signs of postprocedure numbness nor weakness.    TOLERANCE & DISCHARGE CONDITION:    The patient tolerated the procedure well.  The patient was transported to the recovery area without difficulties.  The patient was discharged to home under the care of family in stable and satisfactory condition.    PLAN OF CARE:  1. The patient was given our standard instruction sheet.  2. We discussed that Lumbar Medial Branch Blockade is a diagnostic procedure in consideration for radiofrequency ablation if two diagnostic procedures prove to be positive for significant benefit.  If sustained relief of 6 to eight weeks is obtained, then an alternative plan could be therapeutic lumbar branch blockades.  3. The patient is asked to keep a pain log each hour for 8 hours after the procedure today.  4. The patient will  Return to clinic 1-2 wks.  5. The patient will resume all medications as per the medication reconciliation sheet.

## 2021-08-06 ENCOUNTER — TELEPHONE (OUTPATIENT)
Dept: FAMILY MEDICINE CLINIC | Facility: CLINIC | Age: 71
End: 2021-08-06

## 2021-08-06 NOTE — TELEPHONE ENCOUNTER
Caller: Traci King    Relationship: Self    Best call back number: [phone]     Medication needed: ACID REFLUX MEDICATION      When do you need the refill by: 08/06/21    What additional details did the patient provide when requesting the medication: PATIENT IS CALLING TO STATE SHE SAW MS SANTANA THIS WEEK AND HAD REQUESTED A MEDICATION FOR ACID REFLUX.  SHE IS CHECKING THE STATUS OF THAT REQUEST.    PATIENT WOULD ALSO L NAKIA TO KNOW THE RESULTS OF LAB TESTS DONE ON 08/03/21.    Does the patient have less than a 3 day supply:  [x] Yes  [] No    What is the patient's preferred pharmacy:    [facility] - [place], [place] - [address] [place] [address]. - [phone] PH - [phone] FX  [phone]      PLEASE ADVISE.

## 2021-08-07 LAB
25(OH)D3+25(OH)D2 SERPL-MCNC: 42.5 NG/ML (ref 30–100)
ALBUMIN SERPL-MCNC: 4.3 G/DL (ref 3.5–5.2)
ALBUMIN/GLOB SERPL: 1.9 G/DL
ALP SERPL-CCNC: 89 U/L (ref 39–117)
ALT SERPL-CCNC: 19 U/L (ref 1–33)
AST SERPL-CCNC: 17 U/L (ref 1–32)
BASOPHILS # BLD AUTO: 0.05 10*3/MM3 (ref 0–0.2)
BASOPHILS NFR BLD AUTO: 0.7 % (ref 0–1.5)
BILIRUB SERPL-MCNC: <0.2 MG/DL (ref 0–1.2)
BUN SERPL-MCNC: 12 MG/DL (ref 8–23)
BUN/CREAT SERPL: 12.5 (ref 7–25)
CALCIUM SERPL-MCNC: 9.6 MG/DL (ref 8.6–10.5)
CHLORIDE SERPL-SCNC: 104 MMOL/L (ref 98–107)
CHOLEST SERPL-MCNC: 142 MG/DL (ref 0–200)
CK SERPL-CCNC: 93 U/L (ref 20–180)
CO2 SERPL-SCNC: 21.9 MMOL/L (ref 22–29)
CREAT SERPL-MCNC: 0.96 MG/DL (ref 0.57–1)
EOSINOPHIL # BLD AUTO: 0.11 10*3/MM3 (ref 0–0.4)
EOSINOPHIL NFR BLD AUTO: 1.5 % (ref 0.3–6.2)
ERYTHROCYTE [DISTWIDTH] IN BLOOD BY AUTOMATED COUNT: 12.8 % (ref 12.3–15.4)
FERRITIN SERPL-MCNC: 87.9 NG/ML (ref 13–150)
FOLATE SERPL-MCNC: 14.5 NG/ML (ref 4.78–24.2)
GLOBULIN SER CALC-MCNC: 2.3 GM/DL
GLUCOSE SERPL-MCNC: 96 MG/DL (ref 65–99)
HCT VFR BLD AUTO: 34.8 % (ref 34–46.6)
HDLC SERPL-MCNC: 71 MG/DL (ref 40–60)
HGB BLD-MCNC: 11.8 G/DL (ref 12–15.9)
IMM GRANULOCYTES # BLD AUTO: 0.04 10*3/MM3 (ref 0–0.05)
IMM GRANULOCYTES NFR BLD AUTO: 0.5 % (ref 0–0.5)
IRON SATN MFR SERPL: 21 % (ref 20–50)
IRON SERPL-MCNC: 74 MCG/DL (ref 37–145)
LDLC SERPL CALC-MCNC: 53 MG/DL (ref 0–100)
LDLC/HDLC SERPL: 0.71 {RATIO}
LYMPHOCYTES # BLD AUTO: 1.67 10*3/MM3 (ref 0.7–3.1)
LYMPHOCYTES NFR BLD AUTO: 22.9 % (ref 19.6–45.3)
Lab: NORMAL
MCH RBC QN AUTO: 32.9 PG (ref 26.6–33)
MCHC RBC AUTO-ENTMCNC: 33.9 G/DL (ref 31.5–35.7)
MCV RBC AUTO: 96.9 FL (ref 79–97)
METHYLMALONATE SERPL-SCNC: 172 NMOL/L (ref 0–378)
MONOCYTES # BLD AUTO: 0.46 10*3/MM3 (ref 0.1–0.9)
MONOCYTES NFR BLD AUTO: 6.3 % (ref 5–12)
NEUTROPHILS # BLD AUTO: 4.97 10*3/MM3 (ref 1.7–7)
NEUTROPHILS NFR BLD AUTO: 68.1 % (ref 42.7–76)
NRBC BLD AUTO-RTO: 0 /100 WBC (ref 0–0.2)
PLATELET # BLD AUTO: 381 10*3/MM3 (ref 140–450)
POTASSIUM SERPL-SCNC: 5.1 MMOL/L (ref 3.5–5.2)
PROT SERPL-MCNC: 6.6 G/DL (ref 6–8.5)
RBC # BLD AUTO: 3.59 10*6/MM3 (ref 3.77–5.28)
SODIUM SERPL-SCNC: 138 MMOL/L (ref 136–145)
TIBC SERPL-MCNC: 359 MCG/DL
TRIGL SERPL-MCNC: 102 MG/DL (ref 0–150)
UIBC SERPL-MCNC: 285 MCG/DL (ref 112–346)
VIT B12 SERPL-MCNC: 724 PG/ML (ref 211–946)
VLDLC SERPL CALC-MCNC: 18 MG/DL (ref 5–40)
WBC # BLD AUTO: 7.3 10*3/MM3 (ref 3.4–10.8)

## 2021-08-09 RX ORDER — OMEPRAZOLE 40 MG/1
40 CAPSULE, DELAYED RELEASE ORAL DAILY
Qty: 90 CAPSULE | Refills: 0 | Status: SHIPPED | OUTPATIENT
Start: 2021-08-09 | End: 2022-05-20 | Stop reason: SDUPTHER

## 2021-08-12 ENCOUNTER — HOSPITAL ENCOUNTER (OUTPATIENT)
Dept: GENERAL RADIOLOGY | Facility: HOSPITAL | Age: 71
Discharge: HOME | End: 2021-08-12
Admitting: ORTHOPAEDIC SURGERY

## 2021-08-12 DIAGNOSIS — M79.671 RIGHT FOOT PAIN: ICD-10-CM

## 2021-08-12 PROCEDURE — 25010000002 IOPAMIDOL 61 % SOLUTION: Performed by: ORTHOPAEDIC SURGERY

## 2021-08-12 PROCEDURE — 77002 NEEDLE LOCALIZATION BY XRAY: CPT

## 2021-08-12 PROCEDURE — 25010000002 METHYLPREDNISOLONE PER 125 MG: Performed by: ORTHOPAEDIC SURGERY

## 2021-08-12 PROCEDURE — 25010000003 LIDOCAINE 1 % SOLUTION: Performed by: ORTHOPAEDIC SURGERY

## 2021-08-12 RX ORDER — BUPIVACAINE HYDROCHLORIDE 2.5 MG/ML
10 INJECTION, SOLUTION EPIDURAL; INFILTRATION; INTRACAUDAL ONCE
Status: COMPLETED | OUTPATIENT
Start: 2021-08-12 | End: 2021-08-12

## 2021-08-12 RX ORDER — LIDOCAINE HYDROCHLORIDE 10 MG/ML
10 INJECTION, SOLUTION INFILTRATION; PERINEURAL ONCE
Status: COMPLETED | OUTPATIENT
Start: 2021-08-12 | End: 2021-08-12

## 2021-08-12 RX ORDER — METHYLPREDNISOLONE SODIUM SUCCINATE 125 MG/2ML
80 INJECTION, POWDER, LYOPHILIZED, FOR SOLUTION INTRAMUSCULAR; INTRAVENOUS
Status: COMPLETED | OUTPATIENT
Start: 2021-08-12 | End: 2021-08-12

## 2021-08-12 RX ADMIN — LIDOCAINE HYDROCHLORIDE 2 ML: 10 INJECTION, SOLUTION INFILTRATION; PERINEURAL at 11:55

## 2021-08-12 RX ADMIN — METHYLPREDNISOLONE SODIUM SUCCINATE 80 MG: 125 INJECTION, POWDER, LYOPHILIZED, FOR SOLUTION INTRAMUSCULAR; INTRAVENOUS at 11:55

## 2021-08-12 RX ADMIN — BUPIVACAINE HYDROCHLORIDE 5 ML: 2.5 INJECTION, SOLUTION EPIDURAL; INFILTRATION; INTRACAUDAL; PERINEURAL at 11:55

## 2021-08-12 RX ADMIN — IOPAMIDOL 1 ML: 612 INJECTION, SOLUTION INTRAVENOUS at 11:55

## 2021-08-17 RX ORDER — ROSUVASTATIN CALCIUM 5 MG/1
5 TABLET, COATED ORAL NIGHTLY
Qty: 30 TABLET | Refills: 3 | Status: SHIPPED | OUTPATIENT
Start: 2021-08-17 | End: 2022-01-05 | Stop reason: SDUPTHER

## 2021-08-17 NOTE — TELEPHONE ENCOUNTER
PATIENT IS CALLING IN TO CHECK ON THE STATUS OF GETTING HER MEDICATION REFILLED.  STATES THAT SHE ONLY HAS 2 TABLETS LEFT.  PLEASE ADVISE ONCE THIS HAS BEEN SENT TO THE PHARMACY    [phone]

## 2021-08-19 ENCOUNTER — PREP FOR SURGERY (OUTPATIENT)
Dept: SURGERY | Facility: SURGERY CENTER | Age: 71
End: 2021-08-19

## 2021-08-19 ENCOUNTER — OFFICE VISIT (OUTPATIENT)
Dept: PAIN MEDICINE | Facility: CLINIC | Age: 71
End: 2021-08-19

## 2021-08-19 ENCOUNTER — TELEPHONE (OUTPATIENT)
Dept: PAIN MEDICINE | Facility: CLINIC | Age: 71
End: 2021-08-19

## 2021-08-19 VITALS
HEART RATE: 96 BPM | WEIGHT: 186.6 LBS | DIASTOLIC BLOOD PRESSURE: 78 MMHG | SYSTOLIC BLOOD PRESSURE: 142 MMHG | BODY MASS INDEX: 36.63 KG/M2 | OXYGEN SATURATION: 99 % | TEMPERATURE: 97 F | RESPIRATION RATE: 16 BRPM | HEIGHT: 60 IN

## 2021-08-19 DIAGNOSIS — G89.29 OTHER CHRONIC PAIN: Primary | ICD-10-CM

## 2021-08-19 DIAGNOSIS — M51.36 DDD (DEGENERATIVE DISC DISEASE), LUMBAR: ICD-10-CM

## 2021-08-19 DIAGNOSIS — M47.812 CERVICAL FACET JOINT SYNDROME: ICD-10-CM

## 2021-08-19 DIAGNOSIS — Z79.899 ENCOUNTER FOR LONG-TERM (CURRENT) USE OF HIGH-RISK MEDICATION: ICD-10-CM

## 2021-08-19 DIAGNOSIS — M47.816 LUMBAR FACET ARTHROPATHY: ICD-10-CM

## 2021-08-19 DIAGNOSIS — M47.816 LUMBAR FACET ARTHROPATHY: Primary | ICD-10-CM

## 2021-08-19 PROCEDURE — 99214 OFFICE O/P EST MOD 30 MIN: CPT | Performed by: NURSE PRACTITIONER

## 2021-08-19 RX ORDER — OXYCODONE AND ACETAMINOPHEN 10; 325 MG/1; MG/1
1 TABLET ORAL EVERY 6 HOURS PRN
Qty: 40 TABLET | Refills: 0 | Status: SHIPPED | OUTPATIENT
Start: 2021-08-19 | End: 2021-09-15

## 2021-08-19 RX ORDER — OXYCODONE HYDROCHLORIDE 30 MG/1
30 TABLET, FILM COATED, EXTENDED RELEASE ORAL EVERY 12 HOURS SCHEDULED
Qty: 60 TABLET | Refills: 0 | Status: SHIPPED | OUTPATIENT
Start: 2021-08-19 | End: 2021-09-05

## 2021-08-19 RX ORDER — SODIUM CHLORIDE 0.9 % (FLUSH) 0.9 %
10 SYRINGE (ML) INJECTION AS NEEDED
Status: CANCELLED | OUTPATIENT
Start: 2021-08-19

## 2021-08-19 RX ORDER — SODIUM CHLORIDE 0.9 % (FLUSH) 0.9 %
10 SYRINGE (ML) INJECTION EVERY 12 HOURS SCHEDULED
Status: CANCELLED | OUTPATIENT
Start: 2021-08-19

## 2021-08-19 NOTE — TELEPHONE ENCOUNTER
Patient states that she has called several pharmacies and her medication is on back order at every pharmacy she called. She asked if you will change her medication to the medication she used to be on until she can get her medication.

## 2021-08-19 NOTE — PROGRESS NOTES
"CHIEF COMPLAINT  F/U Back pain-MEDIAL BRANCH BLOCK--- bilateral lumbar3-lumbar5- patient states that she had 60% improvement for 2 days.      Subjective   Traci King is a 71 y.o. female  who presents to the office for follow-up of procedure.  She completed a bilateral L3-L5   on  8-4-21 performed by Dr. LEMUS for management of LOW BACK PAIN. Patient reports 60% TEMPORARY relief from the procedure for a few days. Then pain returned to baseline.    Had right foot injection with Dr Rodriguez on 8-12-21. Not really noticing a difference. \"they said if it stops working, I need surgery.\" She refuses to quit smoking/nicotine use and therefore won't have surgery.  Smokes 1/2 PPD since age 16.    Complains of pain in her low back, neck, right foot. Today her pain is 7/10VAS. Describes the pain as continuous aching and throbbing. Pain increases with walking, standing, activity, working; pain decreases with medication, rest and procedures. Continues with OxyContin 30 mg BID (0200 and 1400)  and Diclofenac 50 mg 1-2/day and Robaxin 500 mg 1/day PRN(has been taking more regularly due to hip pain). Continues with Tylenol and Lidocaine patch OTC. Denies any side effects from the regimen. She does take dulcolax every day and has been doing this since age 16, reports long-standing history of constipation. This regimen decreases her pain by 80%, but she reports it does not last between doses. ADL's by self. Denies any bowel or bladder changes.  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      Also having headaches in back of head. Had CT scan. Referred to neurologist. Reports she was told she has \"small vessels.\" Had occipital nerve block with neurology 1-20-21.  Having significant relief. Under care of Dr Malave.      Patient had a bilateral L3-L5 RFL performed by Dr. LEMUS on 12-29-20 for management of LOW BACK PAIN. Patient reports 50% ONGOING relief from the " procedure     Patient had a bilateral L3-L5 MBB performed by Dr. LEMUS on 7-28-20 for management of LOW BACK PAIN. Initially had 80-85% relief for 1 day.  Then she reports 50% ONGOING relief from the procedure for 1 month.  Reports improvement in activity and sleep.      Patient had a bilateral L3-L5 MBB performed by Dr. LEMUS on 5-20-20 for management of LOW BACK PAIN. Patient reports 80% relief from the procedure for 2 days. Pain has returned to pre-procedure level.      She completed a Cervical Facet Nerve (Medial Branch) Radiofrequency Lesioning LEFT C3-C5 RFL  on  1/26/18 performed by Dr. Lemus for management of neck pain.     She completed a Bilateral S1 Lumbar TFESI   on  8/16/2018 performed by Dr. LEMUS for management of low back and radicular pain. Patient reports NO relief from the procedure.      Failed PT, failed ultrasound therapy, failed compounded pain creme.    Patient remained masked during entire encounter. No cough present. I donned a mask and eye protection throughout entire visit. Prior to donning mask and eye protection, hand hygiene was performed, as well as when it was doffed.  I was closer than 6 feet, but not for an extended period of time. No obvious exposure to any bodily fluids.      Neck Pain   This is a chronic problem. The current episode started more than 1 year ago. The problem occurs constantly. The problem has been waxing and waning. The quality of the pain is described as burning. The pain is at a severity of 7/10. The pain is moderate. The symptoms are aggravated by position, bending, stress and twisting. The pain is worse during the day. Stiffness is present all day. Pertinent negatives include no chest pain, fever, headaches, numbness or weakness. She has tried oral narcotics and NSAIDs (cervical MBB--significant temporary relief; cervical RFL--50% ongoing relief) for the symptoms. The treatment provided moderate relief.   Shoulder Injury   The left shoulder is  affected. There was no injury mechanism. The pain radiates to the left neck and right neck. Pain severity now: unchanged since last office visit. Pertinent negatives include no chest pain or numbness. The symptoms are aggravated by movement and palpation.   Back Pain  This is a chronic problem. The current episode started more than 1 year ago. The problem occurs constantly. The problem has been waxing and waning since onset. The pain is worse during the day. The symptoms are aggravated by bending, position, standing and twisting. Associated symptoms include bladder incontinence (long term issue). Pertinent negatives include no abdominal pain, bowel incontinence, chest pain, dysuria, fever, headaches, numbness or weakness. Risk factors include lack of exercise, sedentary lifestyle, menopause and obesity. She has tried analgesics, bed rest, chiropractic manipulation, heat, home exercises, ice, muscle relaxant, NSAIDs and walking (lumbar MBBgave 80% relief for 1-2 days twice) for the symptoms. The treatment provided moderate relief.      PEG Assessment   What number best describes your pain on average in the past week?7  What number best describes how, during the past week, pain has interfered with your enjoyment of life?7  What number best describes how, during the past week, pain has interfered with your general activity?  6    The following portions of the patient's history were reviewed and updated as appropriate: allergies, current medications, past family history, past medical history, past social history, past surgical history and problem list.    Review of Systems   Constitutional: Negative for activity change, chills, fatigue and fever.   HENT: Negative for congestion.    Eyes: Negative for visual disturbance.   Respiratory: Negative for chest tightness and shortness of breath.    Cardiovascular: Negative for chest pain.   Gastrointestinal: Positive for constipation. Negative for abdominal pain, bowel  "incontinence and diarrhea.   Genitourinary: Positive for bladder incontinence (long term issue). Negative for difficulty urinating, dyspareunia and dysuria.   Musculoskeletal: Positive for back pain and neck pain.   Neurological: Negative for dizziness, weakness, light-headedness, numbness and headaches.   Psychiatric/Behavioral: Positive for sleep disturbance. Negative for agitation. The patient is not nervous/anxious.      I have reviewed and confirmed the accuracy of the ROS as documented by the MA/LPN/RN DACIA Baez       Vitals:    08/19/21 1243   BP: 142/78   Pulse: 96   Resp: 16   Temp: 97 °F (36.1 °C)   SpO2: 99%   Weight: 84.6 kg (186 lb 9.6 oz)   Height: 152.4 cm (60\")   PainSc:   7   PainLoc: Back         Objective   Physical Exam  Vitals and nursing note reviewed.   Constitutional:       Appearance: Normal appearance. She is well-developed.   HENT:      Head: Normocephalic and atraumatic.   Musculoskeletal:      Cervical back: Spinous process tenderness and muscular tenderness present.      Lumbar back: Tenderness and bony tenderness (bilateral L3-L5 moderate facet tenderness; + loading maneuver) present. Decreased range of motion.   Skin:     General: Skin is warm and dry.   Neurological:      Mental Status: She is alert.      Gait: Gait abnormal.   Psychiatric:         Speech: Speech normal.         Behavior: Behavior normal. Behavior is cooperative.         Thought Content: Thought content normal.         Judgment: Judgment normal.       Assessment/Plan   Diagnoses and all orders for this visit:    1. Other chronic pain (Primary)    2. Cervical facet joint syndrome    3. Lumbar facet arthropathy    4. DDD (degenerative disc disease), lumbar    5. Encounter for long-term (current) use of high-risk medication      --- The urine drug screen confirmation from 7-21-21 has been reviewed and the result is APPROPRIATE based on patient history and MATTHEW report  --- Refill OxyContin. Patient " appears stable with current regimen. No adverse effects. Regarding continuation of opioids, there is no evidence of aberrant behavior or any red flags.  The patient continues with appropriate response to opioid therapy. ADL's remain intact by self.   --- Repeat bilateral L3-L5 RFL. No blood thinners. Reviewed the procedure at length with the patient.  Included in the review was expectations, complications, risk and benefits.The procedure was described in detail and the risks, benefits and alternatives were discussed with the patient (including but not limited to: bleeding, infection, nerve damage, worsening of pain, inability to perform injection, paralysis, seizures, and death) who agreed to proceed.  Discussed the potential for sedation if warranted/wanted.  The procedure will plan to be performed at [facility] with fluoroscopic guidance(unless ultrasound is indicated) and could potentially have steroids and contrast dye used. Questions were answered and in a way the patient could understand.  Patient verbalized understanding and wishes to proceed.  This intervention will be ordered.  Discussed with patient that all procedures are part of a multimodal plan of care and include either formal PT or a home exercise program.  Patient has no evidence of coagulopathy or current infection.  --- Follow-up 1 month or sooner if needed.    -------  Education about Medial Branch Blockade and RF Therapy:    This medial branch blockade (MBB) suggested is intended for diagnostic purposes, with the intent of offering the patient Radiofrequency thermal rhizotomy (RF) if the MBB is diagnostically effective.  The diagnostic blockade is necessary to determine the likelihood that RF therapy could be efficacious in providing long term relief to the patient.    Medial branches are sensory nerve branches that connect to a facet joint and transmit sensations & pain signals from that joint.  Facet is a term for the  "type of joints found in the spine.  Medial branches are the nerves that go to a facet, and therefore are also sometimes called \"facet joint nerves\" (FJNs).      In a medial branch blockade procedure, xray fluoroscopy is used to verify the locations of the outside of the joint lines which are being targeted.  Under xray guidance, needles are placed to these areas.  Contrast dye is injected to confirm proper placement, with dye flowing over the joint area, and to ensure that the dye does not flow into unintended areas such as a vein.  When this is confirmed, local anesthetic is injected to block the medial branch at that joint level.      If MBBs are diagnostically successful in blocking pain, then the patient is most likely a great candidate for Radiofrequency of those facet joint nerves.  In the RF procedure, needles are placed to the joint lines in the same fashion, and after testing, the needle tips are heated to thermally treat the nerves, blocking the nerves by in essence damaging the nerves with the heat treatment.       Medically, a successful RF procedure should provide a patient with 50% pain relief or more for at least 6 months.  Clinical experience suggests that successful patients receive relief more in the range of 12 months on average.  We also discussed that a fortunate minority of patients receive therapeutic success from the MBB, and may not require RF ablation.  If a patient receives more than 8 weeks of relief from MBB, then occasional repeat MBB for therapeutic purposes is a very reasonable alternative therapy.  This course of therapy is consistent with our LCDs according to our CMS  in the area, and therefore other insurance providers should follow accordingly.  We will monitor our patients to screen for these therapeutic responders and will offer RF therapy only when necessary.        We discussed that MBB & RF are not without risks.  Guidelines regarding anticoagulant use & " neuraxial procedures will be respected.  Patients that are ill or otherwise may be at risk for sepsis will not have their spines accessed by neuraxial injections of any type.  This patient will not be offered these therapies if there is an increased risk.   We discussed that there is a risk of postprocedural pain and also a risk of worsening of clinical picture with these procedures as with any neuraxial procedure.    -------             MATTHEW REPORT  As part of the patient's treatment plan, I am prescribing controlled substances. The patient has been made aware of appropriate use of such medications, including potential risk of somnolence, limited ability to drive and/or work safely, and the potential for dependence or overdose. It has also bee made clear that these medications are for use by this patient only, without concomitant use of alcohol or other substances unless prescribed.     Patient has completed prescribing agreement detailing terms of continued prescribing of controlled substances, including monitoring MATTHEW reports, urine drug screening, and pill counts if necessary. The patient is aware that inappropriate use will results in cessation of prescribing such medications.    As the clinician, I personally reviewed the MATTHEW from 8-19-21 while the patient was in the office today.    History and physical exam exhibit continued safe and appropriate use of controlled substances.         Dictated utilizing Dragon dictation.

## 2021-08-19 NOTE — TELEPHONE ENCOUNTER
Yes, she was on percocet prior to that and wanted if she can substitute until she can refill her regular medication

## 2021-08-19 NOTE — TELEPHONE ENCOUNTER
Caller: DIOGO BISHOP    Relationship to patient: SELF    Best call back number: [phone]    Patient is needing: PATIENT TELLING US THAT OXYCODONE IS ON BACK ORDER AT ALL THE [facility] IN House HAS OXYCODONE ON BACK ORDER.  PATIENT IS WANTING TO SEE IF THE [facility] IN [facility] IN [place].

## 2021-08-23 ENCOUNTER — TELEPHONE (OUTPATIENT)
Dept: PAIN MEDICINE | Facility: CLINIC | Age: 71
End: 2021-08-23

## 2021-08-23 NOTE — TELEPHONE ENCOUNTER
Caller: DIOGO BISHOP    Relationship to patient: SELF    Best call back number:     Patient is needing:UNABLE TO WARM TRANSFER - PATIENT REQUEST A CALL BACK AH [facility] ABOUT HER PRESCRIPTION.  UNABLE TO GET IT FILLED - EVERY PLACE IS ON BACK ORDER OR DOESN'T HVE AND ONE PLACE TOLD HER TO HAVE HER PRESCRIBING DOCTORS OFFICE CALL, WOULDN'T GIVE HER ANY INFORMATION.

## 2021-08-24 ENCOUNTER — TELEPHONE (OUTPATIENT)
Dept: PAIN MEDICINE | Facility: CLINIC | Age: 71
End: 2021-08-24

## 2021-08-24 NOTE — TELEPHONE ENCOUNTER
Provider: JULIEN  Caller: DIOGO BISHOP  Relationship to Patient: PATIENT    Phone Number: [phone]    Reason for Call: PATIENT CALLING TO LET KEMI JULIEN KNOW THAT SHE WAS ABLE TO OBTAIN A 25 DAY SUPPLY OF OXYCODONE 30MG EXTENDED RELEASE FROM HER PHARMACY.  When was the patient last seen: 8.19.21      Pharmacy: [facility]

## 2021-08-29 NOTE — TELEPHONE ENCOUNTER
Patient called and wants to get her second MBB scheduled. Please give her a call when you availability.    Restart at 30 mg today,discussed with pharmacist.

## 2021-09-15 ENCOUNTER — OFFICE VISIT (OUTPATIENT)
Dept: FAMILY MEDICINE CLINIC | Facility: CLINIC | Age: 71
End: 2021-09-15

## 2021-09-15 VITALS
HEART RATE: 60 BPM | HEIGHT: 61 IN | OXYGEN SATURATION: 97 % | WEIGHT: 188 LBS | TEMPERATURE: 97.3 F | BODY MASS INDEX: 35.5 KG/M2 | RESPIRATION RATE: 18 BRPM

## 2021-09-15 DIAGNOSIS — R19.7 DIARRHEA, UNSPECIFIED TYPE: ICD-10-CM

## 2021-09-15 DIAGNOSIS — R23.2 FACIAL FLUSHING: ICD-10-CM

## 2021-09-15 DIAGNOSIS — R11.0 NAUSEA: ICD-10-CM

## 2021-09-15 DIAGNOSIS — R50.9 FEVER AND CHILLS: Primary | ICD-10-CM

## 2021-09-15 LAB
EXPIRATION DATE: NORMAL
FLUAV AG NPH QL: NEGATIVE
FLUBV AG NPH QL: NEGATIVE
INTERNAL CONTROL: NORMAL
Lab: 6636

## 2021-09-15 PROCEDURE — 99213 OFFICE O/P EST LOW 20 MIN: CPT | Performed by: PHYSICIAN ASSISTANT

## 2021-09-15 PROCEDURE — 87804 INFLUENZA ASSAY W/OPTIC: CPT | Performed by: PHYSICIAN ASSISTANT

## 2021-09-15 RX ORDER — PHENTERMINE HYDROCHLORIDE 37.5 MG/1
37.5 TABLET ORAL DAILY
COMMUNITY
Start: 2021-09-07 | End: 2021-11-01

## 2021-09-15 RX ORDER — OXYCODONE HYDROCHLORIDE 30 MG/1
30 TABLET, FILM COATED, EXTENDED RELEASE ORAL EVERY 12 HOURS SCHEDULED
COMMUNITY
End: 2021-09-20 | Stop reason: SDUPTHER

## 2021-09-16 ENCOUNTER — TRANSCRIBE ORDERS (OUTPATIENT)
Dept: SURGERY | Facility: SURGERY CENTER | Age: 71
End: 2021-09-16

## 2021-09-16 DIAGNOSIS — Z41.9 SURGERY, ELECTIVE: Primary | ICD-10-CM

## 2021-09-16 LAB
LABCORP SARS-COV-2, NAA 2 DAY TAT: NORMAL
SARS-COV-2 RNA RESP QL NAA+PROBE: NOT DETECTED

## 2021-09-17 ENCOUNTER — TELEPHONE (OUTPATIENT)
Dept: FAMILY MEDICINE CLINIC | Facility: CLINIC | Age: 71
End: 2021-09-17

## 2021-09-17 ENCOUNTER — TELEPHONE (OUTPATIENT)
Dept: PAIN MEDICINE | Facility: CLINIC | Age: 71
End: 2021-09-17

## 2021-09-17 NOTE — TELEPHONE ENCOUNTER
Caller: Traci King A    Relationship to patient: Self    Best call back number: [phone] (H)    Patient is needing: PATIENT CALLED IN STATING SHE IS STILL FEELING FATIGUED, HAS DIARRHEA AND A HEADACHE.  STATES SHE ALSO FEELS REALLY FEVERISH. STATES SHE FEELS LIKE SHE FEELS LIKE SHE MAY HAVE MONO. NEEDS TO BE ADVISED ON WHAT SHE SHOULD DO. PLEASE CALL. THANK YOU

## 2021-09-17 NOTE — TELEPHONE ENCOUNTER
Provider: KEMI VICTORIA  Caller: DIOGO BISHOP  Relationship to Patient: SELF     Phone Number: [phone]  Reason for Call: PATIENT IS SICK - HAS BEEN SINCE WED OF THIS WEEKL - HAS TESTED (NEG) FOR FLU AND COVID BUT STILL REALLY SICK.  PATIENT REQUESTING VIRTUAL VISIT FOR THE APPTMT.  ADVISED PATIENT SHE COULD R/S IF NEEDED IF UNABLE TO DO VIRTUAL.  ATTEMPTED TO WT TO OFFICE - SPOKE WITH BOTH YESSYHOLDEN  WAS ADVISED TO SPEAK WITH  (DIDN'T REALIZE HAD DIALED CLINICAL LINE) - SPOKE DEVEN DEGROOT AND WAS ADVISED TO PUT IN URGENT TE FOR KEMI TO REVIEW ON Monday AS SHE WAS OUT TODAY.  ADVISED PATIENT KEMI WAS OUT TODAY AND WOULD SEND MESSAGE OVER TO PRACTICE TOR HER REQ. DID ADVISE PATIENT IF NOT ANY BETTER AND STILL NOT FEELING WELL - TO RESCHEDULE AND CHECK BACK WITH OFFICE TO R/S.

## 2021-09-17 NOTE — TELEPHONE ENCOUNTER
As we discussed at her visit, she was tested very early in symptoms and this is likely not a reliable test.  We discussed the possibility that she would need to test again after symptoms have been present for a little longer.  I would recommend she go to the urgent care today where she can get rapid testing and treatment.

## 2021-09-20 ENCOUNTER — TELEPHONE (OUTPATIENT)
Dept: FAMILY MEDICINE CLINIC | Facility: CLINIC | Age: 71
End: 2021-09-20

## 2021-09-20 ENCOUNTER — TELEMEDICINE (OUTPATIENT)
Dept: PAIN MEDICINE | Facility: CLINIC | Age: 71
End: 2021-09-20

## 2021-09-20 DIAGNOSIS — Z79.899 ENCOUNTER FOR LONG-TERM (CURRENT) USE OF HIGH-RISK MEDICATION: ICD-10-CM

## 2021-09-20 DIAGNOSIS — M51.36 DDD (DEGENERATIVE DISC DISEASE), LUMBAR: ICD-10-CM

## 2021-09-20 DIAGNOSIS — G89.29 OTHER CHRONIC PAIN: Primary | ICD-10-CM

## 2021-09-20 DIAGNOSIS — M47.816 LUMBAR FACET ARTHROPATHY: ICD-10-CM

## 2021-09-20 DIAGNOSIS — M48.02 FORAMINAL STENOSIS OF CERVICAL REGION: ICD-10-CM

## 2021-09-20 PROCEDURE — 99214 OFFICE O/P EST MOD 30 MIN: CPT | Performed by: NURSE PRACTITIONER

## 2021-09-20 RX ORDER — OXYCODONE HYDROCHLORIDE 30 MG/1
30 TABLET, FILM COATED, EXTENDED RELEASE ORAL EVERY 12 HOURS SCHEDULED
Qty: 60 TABLET | Refills: 0 | Status: SHIPPED | OUTPATIENT
Start: 2021-09-20 | End: 2021-10-05 | Stop reason: SDUPTHER

## 2021-09-20 NOTE — TELEPHONE ENCOUNTER
Please see previous notes.  She called and was still feeling poorly after Covid exposure.  She was advised to go to the urgent care.  Please find out if she went to the urgent care for evaluation and further treatment.  Otherwise, she will need to schedule follow-up with me.  Since she is still under Covid quarantine timeline, she will need to remain in her vehicle for evaluation.

## 2021-09-20 NOTE — PROGRESS NOTES
TELEMEDICINE - VIDEO VISIT    You have chosen to receive care through a telehealth visit.  Do you consent to use a video/audio connection for your medical care today? Yes  Requested MyChart visit due to being very sick. Has had multiple COVID tests negative. [facility] recommended patient go to hospital for rehydration. Starting to feel better. Has been off work. Had multiple COVID  exposures last week.    CHIEF COMPLAINT  Back, shoulder, foot pain    Subjective   Traci King is a 71 y.o. female  who presents for a video visit follow-up.She has a history of chronic pain. Reports her pain is UNCHANGED since last evaluation.    There was an issue last month with her pharmacy having OxyContin. Was only able to get a 25 days supply of medication. Was then given a small quantity of Percocet 10/325 per Dr Lemus due to shortage.     Complains of pain in her low back, neck and joints. Today her pain is 6/10VAS.  Describes her pain as continuous throbbing. Pain increases with activity, working, prolonged position; pain decreases with medication, rest and procedures.  Continues with OxyContin 30 mg BID (0200 and 1400)  and Diclofenac 50 mg 1-2/day and Robaxin 500 mg 1/day PRN(has been taking more regularly due to hip pain). Continues with Tylenol and Lidocaine patch OTC. Denies any side effects from the regimen. She does take dulcolax every day and has been doing this since age 16, reports long-standing history of constipation. This regimen decreases her pain by 50%. ADL's by self. Denies any bowel or bladder changes.  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 Has been out since becoming ill    Patient had a bilateral L3-L5 RFL performed by Dr. LEMUS on 12-29-20 for management of LOW BACK PAIN. Patient reports 50% ONGOING relief from the procedure     Patient had a bilateral L3-L5 MBB performed by Dr. LEMUS on 7-28-20 for management of LOW BACK PAIN. Initially had  "80-85% relief for 1 day.  Then she reports 50% ONGOING relief from the procedure for 1 month.  Reports improvement in activity and sleep.      Patient had a bilateral L3-L5 MBB performed by Dr. LEMUS on 5-20-20 for management of LOW BACK PAIN. Patient reports 80% relief from the procedure for 2 days. Pain has returned to pre-procedure level.      She completed a Cervical Facet Nerve (Medial Branch) Radiofrequency Lesioning LEFT C3-C5 RFL  on  1/26/18 performed by Dr. Lemus for management of neck pain.     She completed a Bilateral S1 Lumbar TFESI   on  8/16/2018 performed by Dr. LEMUS for management of low back and radicular pain. Patient reports NO relief from the procedure.      Failed PT, failed ultrasound therapy, failed compounded pain creme.    Had right foot injection with Dr Rodriguez on 8-12-21. Not really noticing a difference. \"they said if it stops working, I need surgery.\" She refuses to quit smoking/nicotine use and therefore won't have surgery.  Smokes 1/2 PPD since age 16.    Neck Pain   This is a chronic problem. The current episode started more than 1 year ago. The problem occurs constantly. The problem has been waxing and waning. The quality of the pain is described as burning. The pain is at a severity of 7/10. The pain is moderate. The symptoms are aggravated by position, bending, stress and twisting. The pain is worse during the day. Stiffness is present all day. Pertinent negatives include no chest pain, fever, headaches, numbness or weakness. She has tried oral narcotics and NSAIDs (cervical MBB--significant temporary relief; cervical RFL--50% ongoing relief) for the symptoms. The treatment provided moderate relief.   Shoulder Injury   The left shoulder is affected. There was no injury mechanism. The pain radiates to the left neck and right neck. Pain severity now: unchanged since last office visit. Pertinent negatives include no chest pain or numbness. The symptoms are aggravated by " movement and palpation.   Back Pain  This is a chronic problem. The current episode started more than 1 year ago. The problem occurs constantly. The problem has been waxing and waning since onset. The pain is worse during the day. The symptoms are aggravated by bending, position, standing and twisting. Associated symptoms include bladder incontinence (long term issue). Pertinent negatives include no abdominal pain, bowel incontinence, chest pain, dysuria, fever, headaches, numbness or weakness. Risk factors include lack of exercise, sedentary lifestyle, menopause and obesity. She has tried analgesics, bed rest, chiropractic manipulation, heat, home exercises, ice, muscle relaxant, NSAIDs and walking (lumbar MBBgave 80% relief for 1-2 days twice) for the symptoms. The treatment provided moderate relief.        The following portions of the patient's history were reviewed and updated as appropriate: allergies, current medications, past family history, past medical history, past social history, past surgical history and problem list.    Review of Systems   Constitutional: Negative for fever.   Cardiovascular: Negative for chest pain.   Gastrointestinal: Negative for abdominal pain and bowel incontinence.   Genitourinary: Positive for bladder incontinence (long term issue). Negative for dysuria.   Musculoskeletal: Positive for back pain and neck pain.   Neurological: Negative for weakness, numbness and headaches.     Vitals:    09/20/21 0937   PainSc:   6   PainLoc: Back  Comment: neck, joints       Objective   Physical Exam  Constitutional:       Appearance: She is well-developed.   HENT:      Head: Normocephalic and atraumatic.   Neurological:      Mental Status: She is alert and oriented to person, place, and time.   Psychiatric:         Speech: Speech normal.         Behavior: Behavior normal.         Thought Content: Thought content normal.         Judgment: Judgment normal.         Assessment/Plan   Diagnoses and  all orders for this visit:    1. Other chronic pain (Primary)    2. Lumbar facet arthropathy    3. DDD (degenerative disc disease), lumbar    4. Foraminal stenosis of cervical region    5. Encounter for long-term (current) use of high-risk medication      ----------------    Our practice is offering alternative &/or electronic methods to continue to follow our patients while at the same time further the efforts toward social distancing, in accordance with our organizational policies, professional societies' guidance, and [facility] mandates.  I support the Healthy at Home campaign and in this visit I have counseled the patient on our needs to limit in-person office visits and physical encounters with medical facilities whenever possible.  I have also educated the patient on the medical necessities of maintaining social distancing while we continue to function during this crisis period.      The patient was counseled on the need to consider telehealth options. The patient was offered the opportunity for a Video Visit using [employer]. The patient agreed to a Video Visit. The patient was counseled on the need for a telehealth visit for necessary monitoring. The patient was educated about our efforts to comply with monitoring standards when prescribing potent medications. During these pandemic conditions, telephone encounters are federally mandated as acceptable alternative to videoconference or in-person encounters to ensure that an individual can receive appropriate care and be monitored regardless of access to technology.      TIME:  Topics discussed are outlined in the Assessment/Plan section of the note.      ----------------    --- The urine drug screen confirmation from 7-21-21 has been reviewed and the result is APPROPRIATE based on patient history and MATTHEW report  --- Refill OxyContin. Patient appears stable with current regimen. No adverse effects. Regarding continuation of opioids, there is no evidence of  aberrant behavior or any red flags.  The patient continues with appropriate response to opioid therapy. ADL's remain intact by self.   --- Proceed with LMBB when able to schedule.  --- Follow-up 1 month or sooner if needed       MATTHEW REPORT  As part of the patient's treatment plan, I am prescribing controlled substances. The patient has been made aware of appropriate use of such medications, including potential risk of somnolence, limited ability to drive and/or work safely, and the potential for dependence or overdose. It has also bee made clear that these medications are for use by this patient only, without concomitant use of alcohol or other substances unless prescribed.     Patient has completed prescribing agreement detailing terms of continued prescribing of controlled substances, including monitoring MATTHEW reports, urine drug screening, and pill counts if necessary. The patient is aware that inappropriate use will results in cessation of prescribing such medications.    As the clinician, I personally reviewed the MATTHEW from 9-20-21 while the patient was in the office today.    History and physical exam exhibit continued safe and appropriate use of controlled substances.    -------    EMR Dragon/Transcription disclaimer:   Much of this encounter note is an electronic transcription/translation of spoken language to printed text. The electronic translation of spoken language may permit erroneous, or at times, nonsensical words or phrases to be inadvertently transcribed; Although I have reviewed the note for such errors, some may still exist.

## 2021-09-20 NOTE — TELEPHONE ENCOUNTER
As recommended, she will need evaluation in person for any further recommendations.  If they were recommending she go to the ER for fluids, she does need to consider doing this.

## 2021-09-20 NOTE — TELEPHONE ENCOUNTER
Caller: Traci King    Relationship to patient: Self    Best call back number: [phone]     Chief complaint: TIREDNESS, MALAISE    Type of visit: LABS    Requested date: ASAP      Additional notes: THE PATIENT STATES THAT SHE ISNT FEELING WELL. THE PATIENT STATES SHE IS TIRED AND HAS MALAISE. THE PATIENT DOESN'T HAVE ANY OTHER SYMPTOMS. THE PATIENT STATES THAT SHE WOULD LIKE BLOOD WORK TO TRY TO FIGURE OUT WHATS WRONG WITH HER. PLEASE ADVISE.

## 2021-09-20 NOTE — TELEPHONE ENCOUNTER
Patient informed she could do a video visit or needs to f/u with Joanne she is not wanting to come back in. She was told to go to Hospital for IV fluids on Friday by [facility] for dehydration she did not go. Says she will follow up with them again.

## 2021-09-28 ENCOUNTER — TELEPHONE (OUTPATIENT)
Dept: PAIN MEDICINE | Facility: CLINIC | Age: 71
End: 2021-09-28

## 2021-09-28 NOTE — TELEPHONE ENCOUNTER
Caller: Traci King    Relationship to patient: Self    Best call back number: [phone]    Chief complaint: CHRONIC PAIN    Type of visit: FOLLOW UP     Requested date: 10/19/21    If rescheduling, when is the original appointment:     Additional notes: PATIENT WAS TRYING TO SCHEDULE FOLLOW UP APPOINTMENT FOR CHRONIC PAINS. PATIENT WANTED TO SCHEDULE ON 10/19/21 BUT THE  FIRST AVAILABLE APPOINTMENT WAS 10/26/21. PATIENT ADVISED SHE WOULD BE OUT OF HER DICLOFENAC MEDICINE BY 10/20/21. PLEASE CONTACT PATIENT

## 2021-09-28 NOTE — TELEPHONE ENCOUNTER
Can you please see if there are any available appts prior to 10/20/21 for pt then call her to schedule? Thank you.

## 2021-10-05 ENCOUNTER — TELEPHONE (OUTPATIENT)
Dept: PAIN MEDICINE | Facility: CLINIC | Age: 71
End: 2021-10-05

## 2021-10-05 ENCOUNTER — OFFICE VISIT (OUTPATIENT)
Dept: PAIN MEDICINE | Facility: CLINIC | Age: 71
End: 2021-10-05

## 2021-10-05 VITALS
TEMPERATURE: 97.4 F | SYSTOLIC BLOOD PRESSURE: 104 MMHG | HEART RATE: 86 BPM | HEIGHT: 61 IN | WEIGHT: 187 LBS | BODY MASS INDEX: 35.3 KG/M2 | RESPIRATION RATE: 20 BRPM | DIASTOLIC BLOOD PRESSURE: 60 MMHG | OXYGEN SATURATION: 100 %

## 2021-10-05 DIAGNOSIS — G89.29 OTHER CHRONIC PAIN: Primary | ICD-10-CM

## 2021-10-05 DIAGNOSIS — Z79.899 ENCOUNTER FOR LONG-TERM (CURRENT) USE OF HIGH-RISK MEDICATION: ICD-10-CM

## 2021-10-05 DIAGNOSIS — M47.812 CERVICAL FACET JOINT SYNDROME: ICD-10-CM

## 2021-10-05 DIAGNOSIS — M48.02 FORAMINAL STENOSIS OF CERVICAL REGION: ICD-10-CM

## 2021-10-05 DIAGNOSIS — M51.36 DDD (DEGENERATIVE DISC DISEASE), LUMBAR: ICD-10-CM

## 2021-10-05 DIAGNOSIS — M47.816 LUMBAR FACET ARTHROPATHY: ICD-10-CM

## 2021-10-05 PROCEDURE — 99214 OFFICE O/P EST MOD 30 MIN: CPT | Performed by: NURSE PRACTITIONER

## 2021-10-05 RX ORDER — OXYCODONE HYDROCHLORIDE 30 MG/1
30 TABLET, FILM COATED, EXTENDED RELEASE ORAL EVERY 12 HOURS SCHEDULED
Qty: 60 TABLET | Refills: 0 | Status: SHIPPED | OUTPATIENT
Start: 2021-10-05 | End: 2021-11-16 | Stop reason: SDUPTHER

## 2021-10-05 NOTE — TELEPHONE ENCOUNTER
Medication Refill Request    Date of phone call: 10/5/21    Medication being requested: diclofenac 50mg si tab po bid prn   Qty: 180    Date of last visit: 10/5/21    Date of last refill:     MATTHEW up to date?:     Next Follow up?: 21    Any new pertinent information? (i.e, new medication allergies, new use of medications, change in patient's health or condition, non-compliance or inconsistency with prescribing agreement?):

## 2021-10-05 NOTE — TELEPHONE ENCOUNTER
Looks like it:  E-Prescribing Status: Receipt confirmed by pharmacy (10/5/2021  2:43 PM EDT)    Would you like me to call?

## 2021-10-05 NOTE — TELEPHONE ENCOUNTER
Caller: DIOGO BISHOP    Relationship: SELF     Medication requested (name and dosage): diclofenac (VOLTAREN) 50 MG EC tablet    Pharmacy where request should be sent: [facility] ( IN CHART)     Additional details provided by patient: PATIENT STATED SHE HAS A 3 DAY SUPPLY     Best call back number: [phone]    Does the patient have less than a 3 day supply:  [] Yes  [x] No    Linda Hernandez   10/05/21 14:11 EDT

## 2021-10-05 NOTE — PROGRESS NOTES
CHIEF COMPLAINT  F/u back, right shoulder and neck pain. Pt sts back and right shoulder pain has worsened since last ov. Pt would like to discuss rescheduling RADIOFREQUENCY ABLATION LUMBAR--bilateral lumbar3-lumbar5. Pt sts neck pain is the same.     Subjective   Traci King is a 71 y.o. female  who presents for follow-up.  She has a history of chronic back, neck and shoulder pain. Reports her pain is worse since last evaluation. Also having new right shoulder pain. Fell last week. Did not go to ER. Had large bruise on right thigh as well. Does not want ortho evaluation at this time.      Complains of pain in her neck, low back and right shoulder.  Today her pain is 7/10VAS. Describes the pain as continuous aching and throbbing with intermittent sharp pain. Pain increases with activity, working, standing/walking; pain decreases with medication, rest and procedures. Continues with OxyContin 30 mg BID (0200 and 1400)  and Diclofenac 50 mg 1-2/day and Robaxin 500 mg 1/day PRN(has been taking more regularly due to hip pain). Continues with Tylenol and Lidocaine patch OTC. Denies any side effects from the regimen. She does take dulcolax every day and has been doing this since age 16, reports long-standing history of constipation. This regimen decreases her pain by 40%. ADL's by self. Denies any bowel or bladder changes.  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     She completed a bilateral L3-L5   on  8-4-21 performed by Dr. LEMUS for management of LOW BACK PAIN. Patient reports 60-80% TEMPORARY relief from the procedure for a few days. Then pain returned to baseline.       Patient had a bilateral L3-L5 RFL performed by Dr. LEMUS on 12-29-20 for management of LOW BACK PAIN. Patient reports 50% ONGOING relief from the procedure     Patient had a bilateral L3-L5 MBB performed by Dr. LEMUS on 7-28-20 for management of LOW BACK PAIN. Initially had  "80-85% relief for 1 day.  Then she reports 50% ONGOING relief from the procedure for 1 month.  Reports improvement in activity and sleep.      Patient had a bilateral L3-L5 MBB performed by Dr. LEMUS on 5-20-20 for management of LOW BACK PAIN. Patient reports 80% relief from the procedure for 2 days. Pain has returned to pre-procedure level.      She completed a Cervical Facet Nerve (Medial Branch) Radiofrequency Lesioning LEFT C3-C5 RFL  on  1/26/18 performed by Dr. Lemus for management of neck pain.     She completed a Bilateral S1 Lumbar TFESI   on  8/16/2018 performed by Dr. LEMUS for management of low back and radicular pain. Patient reports NO relief from the procedure.      Failed PT, failed ultrasound therapy, failed compounded pain creme.    Had right foot injection with Dr Rodriguez on 8-12-21. Not really noticing a difference. \"they said if it stops working, I need surgery.\" She refuses to quit smoking/nicotine use and therefore won't have surgery.  Smokes 1/2 PPD since age 16.    Patient remained masked during entire encounter. No cough present. I donned a mask and eye protection throughout entire visit. Prior to donning mask and eye protection, hand hygiene was performed, as well as when it was doffed.  I was closer than 6 feet, but not for an extended period of time. No obvious exposure to any bodily fluids.    Neck Pain   This is a chronic problem. The current episode started more than 1 year ago. The problem occurs constantly. The problem has been waxing and waning. The quality of the pain is described as burning. The pain is at a severity of 7/10. The pain is moderate. The symptoms are aggravated by position, bending, stress and twisting. The pain is worse during the day. Stiffness is present all day. Pertinent negatives include no chest pain, fever, headaches, numbness or weakness. She has tried oral narcotics and NSAIDs (cervical MBB--significant temporary relief; cervical RFL--50% ongoing " relief) for the symptoms. The treatment provided moderate relief.   Shoulder Injury   The left shoulder is affected. There was no injury mechanism. The pain radiates to the left neck and right neck. Pain severity now: unchanged since last office visit. Pertinent negatives include no chest pain or numbness. The symptoms are aggravated by movement and palpation.   Back Pain  This is a chronic problem. The current episode started more than 1 year ago. The problem occurs constantly. The problem has been waxing and waning (worse since last evaluation) since onset. The pain is at a severity of 7/10. The pain is worse during the day. The symptoms are aggravated by bending, position, standing and twisting. Associated symptoms include bladder incontinence (long term issue). Pertinent negatives include no abdominal pain, bowel incontinence, chest pain, dysuria, fever, headaches, numbness or weakness. Risk factors include lack of exercise, sedentary lifestyle, menopause and obesity. She has tried analgesics, bed rest, chiropractic manipulation, heat, home exercises, ice, muscle relaxant, NSAIDs and walking (lumbar MBBgave 80% relief for 1-2 days twice) for the symptoms. The treatment provided moderate relief.        PEG Assessment   What number best describes your pain on average in the past week?7  What number best describes how, during the past week, pain has interfered with your enjoyment of life?7  What number best describes how, during the past week, pain has interfered with your general activity?  7      The following portions of the patient's history were reviewed and updated as appropriate: allergies, current medications, past family history, past medical history, past social history, past surgical history and problem list.    Review of Systems   Constitutional: Negative for fever.   Cardiovascular: Negative for chest pain.   Gastrointestinal: Negative for abdominal pain and bowel incontinence.   Genitourinary:  "Positive for bladder incontinence (long term issue). Negative for dysuria.   Musculoskeletal: Positive for back pain and neck pain.   Neurological: Negative for weakness, numbness and headaches.     I have reviewed and confirmed the accuracy of the ROS as documented by the MA/LPN/RN DACIA Baez      Vitals:    10/05/21 1110   BP: 104/60   Pulse: 86   Resp: 20   Temp: 97.4 °F (36.3 °C)   SpO2: 100%   Weight: 84.8 kg (187 lb)   Height: 154.9 cm (61\")   PainSc:   7   PainLoc: Back     Objective   Physical Exam  Vitals and nursing note reviewed.   Constitutional:       Appearance: Normal appearance. She is well-developed.   HENT:      Head: Normocephalic and atraumatic.   Musculoskeletal:      Cervical back: Spinous process tenderness and muscular tenderness present.      Lumbar back: Tenderness and bony tenderness (bilateral L3-L5 moderate facet tenderness; + loading maneuver) present. Decreased range of motion.   Skin:     General: Skin is warm and dry.   Neurological:      Mental Status: She is alert.      Gait: Gait abnormal.   Psychiatric:         Speech: Speech normal.         Behavior: Behavior normal. Behavior is cooperative.         Thought Content: Thought content normal.         Judgment: Judgment normal.         Assessment/Plan   Diagnoses and all orders for this visit:    1. Other chronic pain (Primary)    2. Lumbar facet arthropathy    3. DDD (degenerative disc disease), lumbar    4. Foraminal stenosis of cervical region    5. Cervical facet joint syndrome    6. Encounter for long-term (current) use of high-risk medication      --- The urine drug screen confirmation from 7-21-21 has been reviewed and the result is APPROPRIATE based on patient history and MATTHEW report  --- proceed with lumbar RFL. No blood thinners. Reviewed the procedure at length with the patient.  Included in the review was expectations, complications, risk and benefits.The procedure was described in detail and the risks, " "benefits and alternatives were discussed with the patient (including but not limited to: bleeding, infection, nerve damage, worsening of pain, inability to perform injection, paralysis, seizures, and death) who agreed to proceed.  Discussed the potential for sedation if warranted/wanted.  The procedure will plan to be performed at [facility] with fluoroscopic guidance(unless ultrasound is indicated) and could potentially have steroids and contrast dye used. Questions were answered and in a way the patient could understand.  Patient verbalized understanding and wishes to proceed.  This intervention will be ordered.  Discussed with patient that all procedures are part of a multimodal plan of care and include either formal PT or a home exercise program.  Patient has no evidence of coagulopathy or current infection.  --- Refill OxyContin DNF. Patient appears stable with current regimen. No adverse effects. Regarding continuation of opioids, there is no evidence of aberrant behavior or any red flags.  The patient continues with appropriate response to opioid therapy. ADL's remain intact by self.   --- Declines ortho referral.  --- Follow-up 6 weeks or sooner if needed.         -------  Education about Medial Branch Blockade and RF Therapy:    This medial branch blockade (MBB) suggested is intended for diagnostic purposes, with the intent of offering the patient Radiofrequency thermal rhizotomy (RF) if the MBB is diagnostically effective.  The diagnostic blockade is necessary to determine the likelihood that RF therapy could be efficacious in providing long term relief to the patient.    Medial branches are sensory nerve branches that connect to a facet joint and transmit sensations & pain signals from that joint.  Facet is a term for the type of joints found in the spine.  Medial branches are the nerves that go to a facet, and therefore are also sometimes called \"facet joint nerves\" (FJNs).      In " a medial branch blockade procedure, xray fluoroscopy is used to verify the locations of the outside of the joint lines which are being targeted.  Under xray guidance, needles are placed to these areas.  Contrast dye is injected to confirm proper placement, with dye flowing over the joint area, and to ensure that the dye does not flow into unintended areas such as a vein.  When this is confirmed, local anesthetic is injected to block the medial branch at that joint level.      If MBBs are diagnostically successful in blocking pain, then the patient is most likely a great candidate for Radiofrequency of those facet joint nerves.  In the RF procedure, needles are placed to the joint lines in the same fashion, and after testing, the needle tips are heated to thermally treat the nerves, blocking the nerves by in essence damaging the nerves with the heat treatment.       Medically, a successful RF procedure should provide a patient with 50% pain relief or more for at least 6 months.  Clinical experience suggests that successful patients receive relief more in the range of 12 months on average.  We also discussed that a fortunate minority of patients receive therapeutic success from the MBB, and may not require RF ablation.  If a patient receives more than 8 weeks of relief from MBB, then occasional repeat MBB for therapeutic purposes is a very reasonable alternative therapy.  This course of therapy is consistent with our LCDs according to our CMS  in the area, and therefore other insurance providers should follow accordingly.  We will monitor our patients to screen for these therapeutic responders and will offer RF therapy only when necessary.        We discussed that MBB & RF are not without risks.  Guidelines regarding anticoagulant use & neuraxial procedures will be respected.  Patients that are ill or otherwise may be at risk for sepsis will not have their spines accessed by neuraxial injections of any  type.  This patient will not be offered these therapies if there is an increased risk.   We discussed that there is a risk of postprocedural pain and also a risk of worsening of clinical picture with these procedures as with any neuraxial procedure.    -------      MATTHEW REPORT  As part of the patient's treatment plan, I am prescribing controlled substances. The patient has been made aware of appropriate use of such medications, including potential risk of somnolence, limited ability to drive and/or work safely, and the potential for dependence or overdose. It has also bee made clear that these medications are for use by this patient only, without concomitant use of alcohol or other substances unless prescribed.     Patient has completed prescribing agreement detailing terms of continued prescribing of controlled substances, including monitoring MATTHEW reports, urine drug screening, and pill counts if necessary. The patient is aware that inappropriate use will results in cessation of prescribing such medications.    As the clinician, I personally reviewed the MATTHEW from 10-5-21 while the patient was in the office today.    History and physical exam exhibit continued safe and appropriate use of controlled substances.       Dictated utilizing Dragon dictation.

## 2021-10-11 ENCOUNTER — TELEPHONE (OUTPATIENT)
Dept: FAMILY MEDICINE CLINIC | Facility: CLINIC | Age: 71
End: 2021-10-11

## 2021-10-11 NOTE — TELEPHONE ENCOUNTER
Caller: Traci King    Relationship to patient: Self    Best call back number: [phone]    Chief complaint: PATIENT CALLED WANTING TO SEE IF ADRIAN COULD SEND IN ORDERS FOR HER TO GET AN XRAY FOR HER SHOULDER. PATIENT FELL ON HER RIGHT SHOULDER Friday AND IS IN A LOT OF PAIN. PATIENT STATES SHE IS ALREADY ON PAIN MEDICATION AND THE PAIN FROM HER SHOULDER IS STILL HURTING. PATIENT WAS ADVISED TO GO TO ER, SHE AGREED SHE WOULD GO TO [facility] BUT NOT UNTIL TOMORROW REALLY EARLY MORNING AT 5 AM.     Patient directed to call 911 or go to their nearest emergency room.     Patient verbalized understanding: [x] Yes  [] No  If no, why?

## 2021-10-18 ENCOUNTER — TELEPHONE (OUTPATIENT)
Dept: PAIN MEDICINE | Facility: CLINIC | Age: 71
End: 2021-10-18

## 2021-10-18 ENCOUNTER — TELEPHONE (OUTPATIENT)
Dept: FAMILY MEDICINE CLINIC | Facility: CLINIC | Age: 71
End: 2021-10-18

## 2021-10-18 NOTE — TELEPHONE ENCOUNTER
Provider: KEMI VICTORIA    Caller: PATIENT    Relationship to Patient:  SELF      Phone Number:  [phone]    Reason for Call: PT. CALLING FOR MADDI VICTORIA. PT. STATES THAT SHE WENT TO [facility] ON Saturday AND WAS DIAGNOSED WITH COVID.   PT. STATES THAT SHE HAS A NON STOP DEEP COUGH. SHE IS ASKING IF KEMI VICTORIA WILL SEND IN PRESCRIPTION FOR PROMETHAZINE DM COUGH SYRUP IF IT IS OK FOR HER TO TAKE SINCE SHE IS A PAIN MANAGEMENT PT.   SHE CHECKED WITH HER PCP AND WAS TOLD TO CALL PAIN MANAGEMENT.   PLEASE ADVISE.     [facility]- [phone]

## 2021-10-18 NOTE — TELEPHONE ENCOUNTER
PATIENT'S DAUGHTER CALLED AND STATED :       PATIENT TESTED COVID POSITIVE   AT [facility]  PART OF HER LUNG TAKEN OUT/MISSING       SHE CANNOT GET THE INFUSION  UNTIL Wednesday    CAN YOU GET HER IN SOONER FOR THIS ?     PLEASE CALL / ADVISE     Traci King (Self) [phone] ()     [facility] [phone]-- ON [address]         SHE ALSO WANTED TO DISCUSS THE COUGH SYRUP REQUEST.

## 2021-10-18 NOTE — TELEPHONE ENCOUNTER
Caller: Traci King    Relationship: Self    Best call back number: [phone]    What was the call regarding: PATIENT WAS CALLING BACK REGARDING COUGH MEDICATION. SHE HAS COVID AND CAN NOT STOP COUGHING. PLEASE CALL PATIENT TO ADVISE.     Do you require a callback: YES      [facility] - [place], [place] - [address] [place] [address] Rd. - [phone]  - [phone] FX

## 2021-10-18 NOTE — TELEPHONE ENCOUNTER
I would not be able to get her in for infusion sooner than Wednesday.  She may need to go to the emergency department if she has severe symptoms.  I would need to evaluate her in person otherwise.

## 2021-10-18 NOTE — TELEPHONE ENCOUNTER
PATIENT STATES: THAT'S SHE HAS COVID GOT TESTED POSITIVE ON 10/17/2021. SHE WOULD LIKE TO HAVE PROMETHAZINE FOR HER COUGH PLEASE ADVISE      PATIENT CAN BE REACHED ON: [phone]    PHARMACY [facility] - [address] - [phone]  - [phone] FX  [phone]

## 2021-10-18 NOTE — TELEPHONE ENCOUNTER
That's her PCP's decision. We do not prescribe cough medication. Maybe ask PCP for something other than that. Sorry. Than ks. BB

## 2021-10-19 ENCOUNTER — TELEPHONE (OUTPATIENT)
Dept: PAIN MEDICINE | Facility: CLINIC | Age: 71
End: 2021-10-19

## 2021-10-19 NOTE — TELEPHONE ENCOUNTER
"Informed her to go to ER if it is emergent. She said it was not that serious and wanted to see if you felt like she would need one. She stated \"nevermind\". "

## 2021-10-19 NOTE — TELEPHONE ENCOUNTER
Provider:  DACIA VICTORIA    Caller: [phone]    Relationship to Patient: SELF    Phone Number: [phone]    Reason for Call:  PATIENT STATED SHE GETS RELEASED FROM [facility] ON 10/25/21 BUT SHE HAS A NEW INJURY TO HER BACK. SHE FELL AND HURT HER BACK AND IT WILL NOT STOP HURTING NO MATTER WHAT SHE DOES. SHE WANTS TO KNOW IF SHE SHOULD GET XRAY AND WHERE SHOULD SHE GO GET THE XRAY?

## 2021-10-19 NOTE — TELEPHONE ENCOUNTER
"Patient states that she has no red flag symptoms. She states that she fell last Friday and is still in pain. I informed that if she fills like its an emergency than she should go to the ER. She got upset and said \"all I want to know is if she thinks I need an xray',. I told her that I would ask again and she said never mind. She then stated that she needs to schedule her ablation. I transferred her to [facility] and told her to leave a message if she doesn't the answer. "

## 2021-10-19 NOTE — TELEPHONE ENCOUNTER
I took it as she fell today. Did not realize it was last week. I'd say she needs an xray if she feels like there is an emergent issue from fall. Can always go to ER. Thanks. BB

## 2021-10-19 NOTE — TELEPHONE ENCOUNTER
If she feels like it's an emergency, she needs to go to hospital. Otherwise, let's wait and see for a few days. Please review red flags. Thanks. VERONICA

## 2021-10-20 ENCOUNTER — APPOINTMENT (OUTPATIENT)
Dept: GENERAL RADIOLOGY | Facility: SURGERY CENTER | Age: 71
End: 2021-10-20

## 2021-10-22 ENCOUNTER — TELEPHONE (OUTPATIENT)
Dept: PAIN MEDICINE | Facility: CLINIC | Age: 71
End: 2021-10-22

## 2021-10-22 NOTE — TELEPHONE ENCOUNTER
Provider: KEMI VICTORIA    Caller: RUSS BISHOP    Relationship to Patient: SELF    Phone Number: [phone]    Reason for Call: THE PATIENT WANTED TO LET KEMI JULIEN KNOW THAT SHE FELL ON WED AND HAS A COMPRESSION FX OF L2 AND A COMPRESSION FX OF HER SCAPULA.

## 2021-10-25 NOTE — TELEPHONE ENCOUNTER
Ok. Thanks for update. Tell her to take care of herself. Glad she sought medical attention. We can figure out follow up later. Thanks. BB

## 2021-10-25 NOTE — TELEPHONE ENCOUNTER
Just looked her chart she doesn't have appt until 11/16 so we should be fine. But I dont see the date In care every where. I ill call her back tomorrow to see what date she went and request the records.

## 2021-10-25 NOTE — TELEPHONE ENCOUNTER
Did she go to hospital finally? Can we get notes for all of this? Thanks. VERONICA [Unremarkable] : Unremarkable [Normal?] : normal pregnancy [] :  [___ lbs.] : [unfilled] lbs [___ oz.] : [unfilled] oz. [Was child in NICU?] : Child was not in NICU

## 2021-10-25 NOTE — TELEPHONE ENCOUNTER
She is in the hospital right now and wont be able to make her appt because she is in the hospital with pneumonia ([facility] [place]). She went to [facility] for the fall. I will try and get the notes.

## 2021-10-29 ENCOUNTER — TELEPHONE (OUTPATIENT)
Dept: FAMILY MEDICINE CLINIC | Facility: CLINIC | Age: 71
End: 2021-10-29

## 2021-10-29 NOTE — TELEPHONE ENCOUNTER
PATIENT JUST RELEASED FROM HOSPITAL FOR COVID  AND IS NOW ON OXYGEN     SHE IS NEEDING HOME HEALTH ORDER SENT BECAUSE SHE IS NOT SURE WHAT TO DO FROM HERE.     YIN -- NURSE WITH [facility] CALLED TO SEE IF YOU COULD SET SOMETHING UP FOR HER AND CALL PATIENT TO DISCUSS           PLEASE ADVISE AND SET UP FOR HER     Traci King (Self) [phone] (H)         [facility] / [facility]  [phone]

## 2021-11-01 ENCOUNTER — OFFICE VISIT (OUTPATIENT)
Dept: FAMILY MEDICINE CLINIC | Facility: CLINIC | Age: 71
End: 2021-11-01

## 2021-11-01 VITALS
RESPIRATION RATE: 18 BRPM | WEIGHT: 187 LBS | SYSTOLIC BLOOD PRESSURE: 144 MMHG | HEIGHT: 61 IN | OXYGEN SATURATION: 88 % | BODY MASS INDEX: 35.3 KG/M2 | HEART RATE: 107 BPM | TEMPERATURE: 97.4 F | DIASTOLIC BLOOD PRESSURE: 62 MMHG

## 2021-11-01 DIAGNOSIS — R79.82 ELEVATED C-REACTIVE PROTEIN (CRP): ICD-10-CM

## 2021-11-01 DIAGNOSIS — R74.02 ELEVATED LDH: ICD-10-CM

## 2021-11-01 DIAGNOSIS — E83.42 HYPOMAGNESEMIA: ICD-10-CM

## 2021-11-01 DIAGNOSIS — J96.01 ACUTE HYPOXEMIC RESPIRATORY FAILURE DUE TO COVID-19 (HCC): ICD-10-CM

## 2021-11-01 DIAGNOSIS — U07.1 COVID-19 VIRUS INFECTION: Primary | ICD-10-CM
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
## 2021-11-01 DIAGNOSIS — S32.020D COMPRESSION FRACTURE OF L2 VERTEBRA WITH ROUTINE HEALING, SUBSEQUENT ENCOUNTER: ICD-10-CM

## 2021-11-01 DIAGNOSIS — R79.89 ELEVATED FERRITIN: ICD-10-CM

## 2021-11-01 DIAGNOSIS — S42.101D CLOSED FRACTURE OF RIGHT SCAPULA WITH ROUTINE HEALING, UNSPECIFIED PART OF SCAPULA, SUBSEQUENT ENCOUNTER: ICD-10-CM

## 2021-11-01 DIAGNOSIS — U07.1 ACUTE HYPOXEMIC RESPIRATORY FAILURE DUE TO COVID-19 (HCC): ICD-10-CM

## 2021-11-01 DIAGNOSIS — E87.1 HYPONATREMIA: ICD-10-CM

## 2021-11-01 DIAGNOSIS — J18.9 COMMUNITY ACQUIRED PNEUMONIA, BILATERAL: ICD-10-CM

## 2021-11-01 PROCEDURE — 99214 OFFICE O/P EST MOD 30 MIN: CPT | Performed by: PHYSICIAN ASSISTANT

## 2021-11-01 NOTE — PROGRESS NOTES
Subjective   Traci King is a 71 y.o. female who presents today in follow up of hospitalization for COvid 19, pneumonia, hypoxic respiratory failure, and scapula and lumbar fracture.     History of Present Illness     Several weeks prior to being diagnosed with Covid 19, she fell. She reports she was asleep on the phone and her friend's ring tone was a doorbell. She reports when the phone went off, she jumped up to go to the door and fell over the coffee table and hit coffee table and floor and hurt her back and right shoulder.   Seen by orthopedist after the fall and had xray of the shoulder. They advised rotator cuff damage and that she needed a shoulder replacement for bone on bone. No fracture noted at that time.     10/16/2021- went to [facility] urgent care 10/16/2021- they reported headache, cough, congestion from the day prior but they report her symptoms started 10/14/2021.     She went to have the monoclonal antibody infusion and was complaining of back and shoulder pain at the infusion. She was seen in the ER.   Patient went to [facility] 10/20/2021.  She was complaining of back pain in the lumbar area x7 days.  She had a fall with increased pain but denied altered sensation or focal weakness.  She fell about 1 week prior to going to ER and reported her OxyContin was no longer working.  She also reported ongoing shoulder pain, saw her orthopedist and had a rotator cuff injury.  She was found to have superior endplate compression fracture L2 vertebral body with retropulsion causing moderate canal stenosis with 20% loss of height.  DDD and OA also noted.  Advised consideration of MRI.  She also had suspected acute fracture involving the body of the scapula near the acromium.  Advised consideration of follow-up CT for further evaluation.  Per note, she had compression fracture L2 and possible right scapular fracture.  They reported it sounded as if the mechanism may have been higher energy  initially reported.  They advised additional CT imaging and patient refused.  She was feeling unwell from Covid and wanted to go home.  She signed out AMA.    Patient was hospitalized 10/22/2021 through 10/25/2021 for acute hypoxic respiratory failure, Covid pneumonia, community-acquired bacterial pneumonia, hyponatremia.    I have reviewed hospital notes, including H&P, labs, imaging, consult notes, and discharge summary. Per discharge summary, patient had symptoms of COVID-19, underwent monoclonal antibody 10/20/2021, and had worsening shortness of air, she went to the ER with oxygen saturation 80%.  CT PE protocol without PE but had persistent groundglass opacities throughout.  She was given IV dexamethasone and remdesivirwith improvement.  She had mild elevation of procalcitonin and was started on IV antibiotics.  She was discharged on prednisone taper, Augmentin, and Mucinex.  Per discharge instructions, she required supplemental oxygen at discharge.  She did have some hyponatremia during the hospital stay and needs BMP.  She had increased diarrhea with antibiotic that resolved. Stopped her stool regimen and now may need to restart with constipation.     10/28/2021- she had virtual hospital visit for Covid pneumonia.     She was using oxygen during the day and at night. She stopped daytime oxygen 10/30/2021.     She stopped oxygen at night last night. Reports oxygen saturation at home has been 91-94% at rest. She has not checked oxygen levels up walking or while sleeping. They have not picked up the oxygen from her home- still has oxygen.     Some SOA with moving around but no SOA at rest. No weakness or dizziness with moving around. Cough resolved. No headache, ear pain. She reports overall she is improving significantly.    Her back feels like it is healing- not bothering that much but her right shoulder is more painful- increased pain when she uses her right arm- movement of her right arm causes increased  pain. She had appt with orthopedist but was in the hospital then had appt today and had to cancel it. She has not been seen or had further workup for scapula fracture or compression fracture.     She does feel she needs a shower chair.     The following portions of the patient's history were reviewed and updated as appropriate: allergies, current medications, past family history, past medical history, past social history, past surgical history and problem list.    Review of Systems   Constitutional: Negative.    HENT: Negative.    Respiratory: Positive for shortness of breath.    Cardiovascular: Negative.    Gastrointestinal: Positive for constipation and diarrhea.   Musculoskeletal: Positive for arthralgias and back pain.   Neurological: Negative.        Objective   Vitals:    11/01/21 1453   BP:    Pulse: 107   Resp:    Temp:    SpO2: (!) 88%     Body mass index is 35.33 kg/m².    Physical Exam  Vitals and nursing note reviewed.   Constitutional:       Appearance: She is well-developed.   HENT:      Head: Normocephalic and atraumatic.      Right Ear: External ear normal.      Left Ear: External ear normal.      Nose: Nose normal.   Eyes:      General: Lids are normal.      Conjunctiva/sclera: Conjunctivae normal.   Neck:      Vascular: No carotid bruit.   Cardiovascular:      Rate and Rhythm: Normal rate and regular rhythm.      Heart sounds: Normal heart sounds. No murmur heard.  No friction rub. No gallop.    Pulmonary:      Effort: Pulmonary effort is normal. No respiratory distress.      Breath sounds: Normal breath sounds. No wheezing, rhonchi or rales.   Musculoskeletal:         General: No deformity.      Cervical back: Neck supple.   Skin:     General: Skin is warm and dry.   Neurological:      Mental Status: She is alert and oriented to person, place, and time.      Gait: Gait normal.   Psychiatric:         Speech: Speech normal.         Behavior: Behavior normal.         Thought Content: Thought content  normal.         Assessment/Plan   Diagnoses and all orders for this visit:    1. COVID-19 virus infection (Primary)  -     CBC & Differential  -     Comprehensive Metabolic Panel  -     Iron and TIBC  -     Ferritin  -     Lactate Dehydrogenase  -     C-reactive Protein  -     Sedimentation Rate  -     Magnesium  -     Fibrinogen  -     Miscellaneous DME    2. Community acquired pneumonia, bilateral  -     CBC & Differential  -     Comprehensive Metabolic Panel  -     Iron and TIBC  -     Ferritin  -     Lactate Dehydrogenase  -     C-reactive Protein  -     Sedimentation Rate  -     Magnesium  -     Fibrinogen  -     Miscellaneous DME    3. Acute hypoxemic respiratory failure due to COVID-19 (HCC)  -     CBC & Differential  -     Comprehensive Metabolic Panel  -     Iron and TIBC  -     Ferritin  -     Lactate Dehydrogenase  -     C-reactive Protein  -     Sedimentation Rate  -     Magnesium  -     Fibrinogen  -     Miscellaneous DME    4. Hyponatremia  -     CBC & Differential  -     Comprehensive Metabolic Panel  -     Iron and TIBC  -     Ferritin  -     Lactate Dehydrogenase  -     C-reactive Protein  -     Sedimentation Rate  -     Magnesium  -     Fibrinogen  -     Miscellaneous DME    5. Hypomagnesemia  -     CBC & Differential  -     Comprehensive Metabolic Panel  -     Iron and TIBC  -     Ferritin  -     Lactate Dehydrogenase  -     C-reactive Protein  -     Sedimentation Rate  -     Magnesium  -     Fibrinogen  -     Miscellaneous DME    6. Elevated C-reactive protein (CRP)  -     CBC & Differential  -     Comprehensive Metabolic Panel  -     Iron and TIBC  -     Ferritin  -     Lactate Dehydrogenase  -     C-reactive Protein  -     Sedimentation Rate  -     Magnesium  -     Fibrinogen  -     Miscellaneous DME    7. Elevated ferritin  -     CBC & Differential  -     Comprehensive Metabolic Panel  -     Iron and TIBC  -     Ferritin  -     Lactate Dehydrogenase  -     C-reactive Protein  -      Sedimentation Rate  -     Magnesium  -     Fibrinogen  -     Miscellaneous DME    8. Elevated LDH  -     CBC & Differential  -     Comprehensive Metabolic Panel  -     Iron and TIBC  -     Ferritin  -     Lactate Dehydrogenase  -     C-reactive Protein  -     Sedimentation Rate  -     Magnesium  -     Fibrinogen  -     Miscellaneous DME    9. Closed fracture of right scapula with routine healing, unspecified part of scapula, subsequent encounter  -     Miscellaneous DME    10. Compression fracture of L2 vertebra with routine healing, subsequent encounter  -     Miscellaneous DME    11. Fall, subsequent encounter  -     Miscellaneous DME        Assessment and Plan  Patient has a complicated history of care. She reports several weeks ago, she fell and hit her coffee table. She had back and right shoulder pain. She was seen by her orthopedist who noted degenerative changes. She then started with headache, cough, and congestion 10/14/2021-10/15/2021. She was seen at the urgent care 10/16/2021 and had positive Covid 19 testing. She then received monoclonal antibody infusion 10/20/2021. At her infusion, she complained of shoulder and back pain and was advised to go to the ER. She was noted to have lumbar compression fracture and right scapula fracture. They did not treat for Covid apparently but were addressing the mechanical fall. Patient declined further imaging and left AMA, reporting she was feeling too bad from Covid 19. She then had worsening SOA and went to ER 10/22/2021. She was admit 10/22/2021- 10/25/2021 for Covid 19 infection, Covid pneumonia, community acquired pneumonia, hypoxic respiratory failure. She was given Remdesivir and Dexamethasone as well as antibiotic and discharged with prednisone taper, Augmentin, Mucinex, and oxygen. They have weaned and stopped oxygen at home without direction- they stopped night-time oxygen last night. They have not checked oximetry with ambulation or while sleeping but  resting oxygen is low at 91-94% at home. She is notably hypoxic in office today with oxygen saturation 88% with pulse 107. I counseled them at length about the need for oxygen, monitoring oxygen saturation at rest, with ambulation, and asleep to maintain oxygen saturation >95%. To ER ASAP if she has persistent hypoxia with oxygen use. I will recheck labs today- she had hyponatremia, hypomagnesemia, elevated CRP, ferritin, and LDH. I will recheck today. I have counseled patient and family about the severity of her illness and the need for proper care and recovery.     For her scapula and lumbar compression fractures, she should return to orthopedic surgery for follow up and further workup/ treatment. She does not report increase pain at this time and reports she is improving, however, she should have specialist evaluation and treatment recommendations. I will order shower chair today.     I spent 35 minutes caring for Traci King on this date of service. This time includes time spent by me in the following activities as necessary: preparing for the visit, reviewing tests, specialists records and previous visits, obtaining and/or reviewing a separately obtained history, performing a medically appropriate exam and/or evaluation, counseling and educating the patient, family, caregiver, referring and/or communicating with other healthcare professionals, documenting information in the medical record, independently interpreting results and communicating that information with the patient, family, caregiver, and developing a medically appropriate treatment plan with consideration of other conditions, medications, and treatments.

## 2021-11-02 LAB
ALBUMIN SERPL-MCNC: 3.4 G/DL (ref 3.8–4.8)
ALBUMIN/GLOB SERPL: 1.4 {RATIO} (ref 1.2–2.2)
ALP SERPL-CCNC: 104 IU/L (ref 44–121)
ALT SERPL-CCNC: 18 IU/L (ref 0–32)
AST SERPL-CCNC: 14 IU/L (ref 0–40)
BASOPHILS # BLD AUTO: 0 X10E3/UL (ref 0–0.2)
BASOPHILS NFR BLD AUTO: 0 %
BILIRUB SERPL-MCNC: <0.2 MG/DL (ref 0–1.2)
BUN SERPL-MCNC: 15 MG/DL (ref 8–27)
BUN/CREAT SERPL: 19 (ref 12–28)
CALCIUM SERPL-MCNC: 8.9 MG/DL (ref 8.7–10.3)
CHLORIDE SERPL-SCNC: 98 MMOL/L (ref 96–106)
CO2 SERPL-SCNC: 19 MMOL/L (ref 20–29)
CREAT SERPL-MCNC: 0.78 MG/DL (ref 0.57–1)
CRP SERPL-MCNC: 34 MG/L (ref 0–10)
EOSINOPHIL # BLD AUTO: 0 X10E3/UL (ref 0–0.4)
EOSINOPHIL NFR BLD AUTO: 0 %
ERYTHROCYTE [DISTWIDTH] IN BLOOD BY AUTOMATED COUNT: 13.3 % (ref 11.7–15.4)
ERYTHROCYTE [SEDIMENTATION RATE] IN BLOOD BY WESTERGREN METHOD: 12 MM/HR (ref 0–40)
FERRITIN SERPL-MCNC: 263 NG/ML (ref 15–150)
FIBRINOGEN PPP-MCNC: 368 MG/DL (ref 193–507)
GLOBULIN SER CALC-MCNC: 2.5 G/DL (ref 1.5–4.5)
GLUCOSE SERPL-MCNC: 117 MG/DL (ref 65–99)
HCT VFR BLD AUTO: 28.7 % (ref 34–46.6)
HGB BLD-MCNC: 9.5 G/DL (ref 11.1–15.9)
IMM GRANULOCYTES # BLD AUTO: 0.5 X10E3/UL (ref 0–0.1)
IMM GRANULOCYTES NFR BLD AUTO: 3 %
IRON SATN MFR SERPL: 14 % (ref 15–55)
IRON SERPL-MCNC: 36 UG/DL (ref 27–139)
LDH SERPL-CCNC: 313 IU/L (ref 119–226)
LYMPHOCYTES # BLD AUTO: 0.9 X10E3/UL (ref 0.7–3.1)
LYMPHOCYTES NFR BLD AUTO: 5 %
MAGNESIUM SERPL-MCNC: 1.7 MG/DL (ref 1.6–2.3)
MCH RBC QN AUTO: 31.8 PG (ref 26.6–33)
MCHC RBC AUTO-ENTMCNC: 33.1 G/DL (ref 31.5–35.7)
MCV RBC AUTO: 96 FL (ref 79–97)
MONOCYTES # BLD AUTO: 0.5 X10E3/UL (ref 0.1–0.9)
MONOCYTES NFR BLD AUTO: 3 %
NEUTROPHILS # BLD AUTO: 16.1 X10E3/UL (ref 1.4–7)
NEUTROPHILS NFR BLD AUTO: 89 %
PLATELET # BLD AUTO: 458 X10E3/UL (ref 150–450)
POTASSIUM SERPL-SCNC: 4.2 MMOL/L (ref 3.5–5.2)
PROT SERPL-MCNC: 5.9 G/DL (ref 6–8.5)
RBC # BLD AUTO: 2.99 X10E6/UL (ref 3.77–5.28)
SODIUM SERPL-SCNC: 134 MMOL/L (ref 134–144)
TIBC SERPL-MCNC: 249 UG/DL (ref 250–450)
UIBC SERPL-MCNC: 213 UG/DL (ref 118–369)
WBC # BLD AUTO: 17.9 X10E3/UL (ref 3.4–10.8)

## 2021-11-05 ENCOUNTER — TELEPHONE (OUTPATIENT)
Dept: FAMILY MEDICINE CLINIC | Facility: CLINIC | Age: 71
End: 2021-11-05

## 2021-11-05 NOTE — TELEPHONE ENCOUNTER
Spoke with patient to clarify the cbc would go out to [facility] and we would not get it back quick enough. She was leaving ER she stated no sepsis and WBC 13K. Told her to let us know if she needed anything.

## 2021-11-05 NOTE — TELEPHONE ENCOUNTER
PT is refusing to go to the ER so instead her daughter was wondering if she can get her white blood count measured and get the results back quick and if its high she would take her mother to the ER. PT'S daughter also stated that [facility] would do the white blood count if an order is sent to there lab. Please Advise?

## 2021-11-05 NOTE — TELEPHONE ENCOUNTER
I spoke with both of them yesterday.  I advised he must go to the ER, as she needs further work-up and evaluation that cannot be done here.  Patient must go to ER for evaluation.  She can follow-up with me next week.

## 2021-11-08 ENCOUNTER — TELEPHONE (OUTPATIENT)
Dept: INTERNAL MEDICINE | Facility: CLINIC | Age: 71
End: 2021-11-08

## 2021-11-08 NOTE — TELEPHONE ENCOUNTER
Caller: Traci King    Relationship: Self    Best call back number: [phone]    What medication are you requesting: ZOFRAN    What are your current symptoms: NAUSEA    How long have you been experiencing symptoms: SINCE YESTERDAY    If a prescription is needed, what is your preferred pharmacy and phone number:      Your [facility] - [address] - [phone]  - [phone] FX  [phone]    Additional notes: PATIENT REQUEST MEDICATION FOR NAUSEA. THE PATIENT STATES SHE IS RECOVERING FROM BILATERAL PNEUMONIA AND HAS BEEN CONSTIPATED AS WELL, BUT STARTED WITH NAUSEA YESTERDAY.    THE PATIENT WOULD LIKE A CALL BACK.

## 2021-11-09 ENCOUNTER — TELEPHONE (OUTPATIENT)
Dept: FAMILY MEDICINE CLINIC | Facility: CLINIC | Age: 71
End: 2021-11-09

## 2021-11-09 NOTE — TELEPHONE ENCOUNTER
I saw the patient in the office for hospital follow up and she told me she was feeling well and had no complaints. They then called me on call. When I spoke with patient and her daughter on call, they said she was feeling poorly, not herself, and that she and the family were concerned.     I discussed her results with patient, her , and daughter when I talked to them about her symptoms. She was advised to go to the ER for evaluation of her symptoms in the setting of elevated WBC. We discussed that elevation of WBC could be steroid induced or sepsis but that with her feeling worse, she needed a clinical evaluation at the ER. When she went to the ER, the note reports she told them she was well with no complaints- no symptoms. This is not what was relayed to me and not why I sent her to the ER.     Please schedule ER follow up with me- she needs in person evaluation, especially with new symptoms.

## 2021-11-09 NOTE — TELEPHONE ENCOUNTER
Message has been left via voicemail for this patient to call and schedule a follow up from ER visit last week per Joanne's note

## 2021-11-09 NOTE — TELEPHONE ENCOUNTER
Left patient voicemail to call and make a follow up as requested by Joanne last week. She will discuss any medications at that time

## 2021-11-09 NOTE — TELEPHONE ENCOUNTER
It looks like she may have been seen by Dr Fournier 9/2021. Please see previous note. She needs in person evaluation.

## 2021-11-09 NOTE — TELEPHONE ENCOUNTER
----- Message from Joanne Mon sent at 11/8/2021  2:01 PM EST -----  Regarding: MED REFILL & LAB  PT ALSO WANTS A CALL BACK ON THE RESULTS THAT POSTED ON HER MY CHART BUT HAS NOT HEARD FROM OUR OFFICE.       Note  Caller: Traci King     Relationship: Self     Best call back number: [phone]     What medication are you requesting: ZOFRAN     What are your current symptoms: NAUSEA     How long have you been experiencing symptoms: SINCE YESTERDAY     If a prescription is needed, what is your preferred pharmacy and phone number:       Your [facility] - [place], [place] - [address] [place] [address] Rd. - [phone] PH - [phone] FX  [phone]     Additional notes: PATIENT REQUEST MEDICATION FOR NAUSEA. THE PATIENT STATES SHE IS RECOVERING FROM BILATERAL PNEUMONIA AND HAS BEEN CONSTIPATED AS WELL, BUT STARTED WITH NAUSEA YESTERDAY.     THE PATIENT WOULD LIKE A CALL BACK.                      TT    11/8/21 12:11 PM  Charmaine Servin routed this conversation to [facility]    11/8/21 12:46 PM  Hilaria Gurrola MA routed this conversation to Sherry Fournier MD Karem, Anthony George, MD (Tony)  to Hilaria Gurrola MA         11/8/21 12:56 PM  Note  Looks like this lady sees someone else.  I think this came to the wrong office      Hilaria Gurrola MA  to [facility]    11/8/21 1:05 PM  Note  See below; we believe she is seeing your office now          Macarena Patel MA  to Joanne Sena PA     NP    11/8/21 1:52 PM  Note  Patient is in between changing PCP. She is wanting zofran filled. Please and thank you        This encounter is not signed. The conversation may still be ongoing.      Additional Documentation    Vitals: LMP  (LMP Unknown)    Encounter Info: Billing Info,    History,    Allergies,    Detailed Report          Orders Placed    None      Medication Renewals and Changes        None      Medication  List    Visit Diagnoses        None      Problem List

## 2021-11-15 ENCOUNTER — TELEPHONE (OUTPATIENT)
Dept: PAIN MEDICINE | Facility: CLINIC | Age: 71
End: 2021-11-15

## 2021-11-15 NOTE — TELEPHONE ENCOUNTER
Caller: DIOGO BISHOP    Relationship to patient: SELF    Best call back number: [phone]    Type of visit: FOLLOW UP    Requested date: 11/16/21    If rescheduling, when is the original appointment: 11/16/21    Additional notes:PATIENT WAS IN ICU AT THE END OF October FOR COVID- SHE DOES NOT FEEL COMFORTABLE DRIVING YET AND IS WANTING TO MAKE THIS APPOINTMENT A TELEPHONE OR VIDEO VISIT.

## 2021-11-16 ENCOUNTER — OFFICE VISIT (OUTPATIENT)
Dept: PAIN MEDICINE | Facility: CLINIC | Age: 71
End: 2021-11-16

## 2021-11-16 VITALS
OXYGEN SATURATION: 95 % | HEIGHT: 61 IN | SYSTOLIC BLOOD PRESSURE: 131 MMHG | DIASTOLIC BLOOD PRESSURE: 76 MMHG | WEIGHT: 182 LBS | HEART RATE: 93 BPM | RESPIRATION RATE: 20 BRPM | BODY MASS INDEX: 34.36 KG/M2 | TEMPERATURE: 96.8 F

## 2021-11-16 DIAGNOSIS — M51.36 DDD (DEGENERATIVE DISC DISEASE), LUMBAR: ICD-10-CM

## 2021-11-16 DIAGNOSIS — M47.816 LUMBAR FACET ARTHROPATHY: ICD-10-CM

## 2021-11-16 DIAGNOSIS — M47.812 CERVICAL FACET JOINT SYNDROME: ICD-10-CM

## 2021-11-16 DIAGNOSIS — G89.29 OTHER CHRONIC PAIN: Primary | ICD-10-CM

## 2021-11-16 DIAGNOSIS — M48.02 FORAMINAL STENOSIS OF CERVICAL REGION: ICD-10-CM

## 2021-11-16 DIAGNOSIS — Z79.899 ENCOUNTER FOR LONG-TERM (CURRENT) USE OF HIGH-RISK MEDICATION: ICD-10-CM

## 2021-11-16 PROCEDURE — 99214 OFFICE O/P EST MOD 30 MIN: CPT | Performed by: NURSE PRACTITIONER

## 2021-11-16 RX ORDER — PREDNISONE 20 MG/1
TABLET ORAL
COMMUNITY
Start: 2021-10-25 | End: 2021-11-16

## 2021-11-16 RX ORDER — KETOCONAZOLE 20 MG/G
CREAM TOPICAL
COMMUNITY
Start: 2021-07-25 | End: 2021-11-16

## 2021-11-16 RX ORDER — TEMAZEPAM 15 MG/1
15 CAPSULE ORAL
COMMUNITY
Start: 2021-10-20 | End: 2021-11-16

## 2021-11-16 RX ORDER — OXYCODONE HYDROCHLORIDE 30 MG/1
30 TABLET, FILM COATED, EXTENDED RELEASE ORAL EVERY 12 HOURS SCHEDULED
Qty: 60 TABLET | Refills: 0 | Status: SHIPPED | OUTPATIENT
Start: 2021-11-16 | End: 2021-12-08 | Stop reason: SDUPTHER

## 2021-11-16 RX ORDER — ARIPIPRAZOLE 5 MG/1
10 TABLET ORAL DAILY
COMMUNITY
End: 2021-11-16

## 2021-11-16 RX ORDER — OXYCODONE AND ACETAMINOPHEN 10; 325 MG/1; MG/1
TABLET ORAL
COMMUNITY
Start: 2021-08-19 | End: 2021-11-16

## 2021-11-16 NOTE — PROGRESS NOTES
CHIEF COMPLAINT  F/u back, right shoulder and neck pain. Pt sts back and right shoulder pain has worsened since fall early October. Pt sts neck pain is the same.     Subjective   Traci King is a 71 y.o. female  who presents for follow-up.  She has a history of chronic back, neck and right shoulder pain. Reports her pain is worse. Had a fall in October.    In august 2021, was ordered lumbar RFA. This was not completed. Patient wants to wait until beginning of next year.      Complains of pain in her back, right shoulder, neck. Today her pain is 3/10VAS. Describes her pain as continuous throbbing and aching with intermittent sharp pain.  Pain increases with activity, household chores, prolonged position; pain decreases with medication, rest and procedures.  Continues with OxyContin 30 mg BID (0200 and 1400)  and Diclofenac 50 mg 1-2/day and Robaxin 500 mg 1/day PRN(does not take daily, only sparingly). Continues with Tylenol and Lidocaine patch OTC. Denies any side effects from the regimen. She does take dulcolax every day and has been doing this since age 16, reports long-standing history of constipation. This was worsened after recent hospitalization. Is now improving. This regimen decreases her pain by 30-50%. ADL's by self. Denies any bowel or bladder changes. Quit working at [employer] due to illness.     Due to see Dr Fournier 12-9-21.     Is no longer requiring oxygen.     Has not yet seen orthopedist.     Patient was admitted to [facility] on 10/22/2021 and discharged on 10/25/2021.  Patient presented to the hospital with shortness of breath.  She had been vaccinated but started developed symptoms of COVID-19.  She underwent monoclonal antibody infusion on 10/20/2021.  After that she developed worsening shortness of breath.  She was admitted to the hospital due to acute hypoxic respiratory failure from COVID-19.  CT showed no evidence of PE but showed persistent groundglass opacities  throughout.  She was started on IV dexamethasone/remdesvir and symptoms improved.  She had a mildly elevated pro calcitonin so IV antibiotics were changed to oral antibiotics.  She completed a steroid taper and oral antibiotics.  She will require supplemental oxygen at discharge.    Reviewed ALL[facility] Cooney office visit 11/1/2021--patient presents for follow-up of hospitalization.  Prior to hospitalization for shortness of breath and COVID-19 she had a fall.  Had x-ray of shoulder.  They advised rotator cuff damage and she needed total shoulder replacement for bone-on-bone.   Ordered shower chair.    Patient went to [facility] on 10/20/2021.  Was complaining of increased lumbar pain x7 days.  She fell approximately 1 week prior to going to ER and admission reported her OxyContin was no longer working her back pain.  Was found to have superior endplate compression fracture at L2 with retropulsion causing moderate canal stenosis.  Advised consideration of MRI.  Also has suspected acute fracture involving the body of the scapula near the acromion.  Recommend orthopedic follow-up.  There was concern that the mechanism of energy may been higher energy than initially reported.  Recommended additional CT patient refused.  Patient signed out AMA.    She completed a bilateral L3-L5   on  8-4-21 performed by Dr. LEMUS for management of LOW BACK PAIN. Patient reports 60-80% TEMPORARY relief from the procedure for a few days. Then pain returned to baseline.     Patient had a bilateral L3-L5 RFL performed by Dr. LEMUS on 12-29-20 for management of LOW BACK PAIN. Patient reports 50% ONGOING relief from the procedure     Patient had a bilateral L3-L5 MBB performed by Dr. LEMUS on 7-28-20 for management of LOW BACK PAIN. Initially had 80-85% relief for 1 day.  Then she reports 50% ONGOING relief from the procedure for 1 month.  Reports improvement in activity and sleep.      Patient had a bilateral L3-L5  "MBB performed by Dr. LEMUS on 5-20-20 for management of LOW BACK PAIN. Patient reports 80% relief from the procedure for 2 days. Pain has returned to pre-procedure level.      She completed a Cervical Facet Nerve (Medial Branch) Radiofrequency Lesioning LEFT C3-C5 RFL  on  1/26/18 performed by Dr. Lemus for management of neck pain.     She completed a Bilateral S1 Lumbar TFESI   on  8/16/2018 performed by Dr. LEMUS for management of low back and radicular pain. Patient reports NO relief from the procedure.      Failed PT, failed ultrasound therapy, failed compounded pain creme.     Had right foot injection with Dr Rodriguez on 8-12-21. Not really noticing a difference. \"they said if it stops working, I need surgery.\" She refuses to quit smoking/nicotine use and therefore won't have surgery.  Smokes 1/2 PPD since age 16.    Patient remained masked during entire encounter. No cough present. I donned a mask and eye protection throughout entire visit. Prior to donning mask and eye protection, hand hygiene was performed, as well as when it was doffed.  I was closer than 6 feet, but not for an extended period of time. No obvious exposure to any bodily fluids.    Neck Pain   This is a chronic problem. The current episode started more than 1 year ago. The problem occurs constantly. The problem has been waxing and waning. The quality of the pain is described as burning. The pain is at a severity of 3/10. The pain is moderate. The symptoms are aggravated by position, bending, stress and twisting. The pain is worse during the day. Stiffness is present all day. Pertinent negatives include no chest pain, fever, headaches, numbness or weakness. She has tried oral narcotics and NSAIDs (cervical MBB--significant temporary relief; cervical RFL--50% ongoing relief) for the symptoms. The treatment provided moderate relief.   Shoulder Injury   The left shoulder is affected. There was no injury mechanism. The pain radiates to the left " neck and right neck. Pain severity now: unchanged since last office visit. Pertinent negatives include no chest pain or numbness. The symptoms are aggravated by movement and palpation.   Back Pain  This is a chronic problem. The current episode started more than 1 year ago. The problem occurs constantly. The problem has been waxing and waning (unchanged since last evaluation) since onset. The pain is at a severity of 3/10. The pain is worse during the day. The symptoms are aggravated by bending, position, standing and twisting. Associated symptoms include bladder incontinence (long term issue). Pertinent negatives include no abdominal pain, bowel incontinence, chest pain, dysuria, fever, headaches, numbness or weakness. Risk factors include lack of exercise, sedentary lifestyle, menopause and obesity. She has tried analgesics, bed rest, chiropractic manipulation, heat, home exercises, ice, muscle relaxant, NSAIDs and walking (lumbar MBBgave 80% relief for 1-2 days twice) for the symptoms. The treatment provided moderate relief.        PEG Assessment   What number best describes your pain on average in the past week?3  What number best describes how, during the past week, pain has interfered with your enjoyment of life?3  What number best describes how, during the past week, pain has interfered with your general activity?  3    The following portions of the patient's history were reviewed and updated as appropriate: allergies, current medications, past family history, past medical history, past social history, past surgical history and problem list.    Review of Systems   Constitutional: Negative for activity change, fatigue and fever.   HENT: Negative for congestion.    Eyes: Negative for visual disturbance.   Respiratory: Negative for cough and shortness of breath.    Cardiovascular: Negative for chest pain.   Gastrointestinal: Negative for abdominal pain, bowel incontinence, constipation and diarrhea.  "  Genitourinary: Positive for bladder incontinence (long term issue). Negative for difficulty urinating and dysuria.   Musculoskeletal: Positive for arthralgias (right shoulder), back pain and neck pain. Negative for neck stiffness.   Allergic/Immunologic: Negative for immunocompromised state.   Neurological: Negative for dizziness, weakness, light-headedness, numbness and headaches.   Psychiatric/Behavioral: Negative for agitation, sleep disturbance and suicidal ideas. The patient is not nervous/anxious.      I have reviewed and confirmed the accuracy of the ROS as documented by the MA/LPN/RN DACIA Baez    Vitals:    11/16/21 1318   BP: 131/76   Pulse: 93   Resp: 20   Temp: 96.8 °F (36 °C)   SpO2: 95%   Weight: 82.6 kg (182 lb)   Height: 154.9 cm (61\")   PainSc:   3   PainLoc: Back  Comment: right shoulder and neck pain.     Objective   Physical Exam  Vitals and nursing note reviewed.   Constitutional:       Appearance: Normal appearance. She is well-developed.   HENT:      Head: Normocephalic and atraumatic.   Musculoskeletal:      Right shoulder: Tenderness and bony tenderness present. Decreased range of motion.      Cervical back: Spinous process tenderness and muscular tenderness present.      Lumbar back: Tenderness and bony tenderness (bilateral L3-L5 moderate facet tenderness; + loading maneuver) present. Decreased range of motion.   Skin:     General: Skin is warm and dry.   Neurological:      Mental Status: She is alert.      Gait: Gait abnormal.   Psychiatric:         Speech: Speech normal.         Behavior: Behavior normal. Behavior is cooperative.         Thought Content: Thought content normal.         Judgment: Judgment normal.             Assessment/Plan   Diagnoses and all orders for this visit:    1. Other chronic pain (Primary)    2. Lumbar facet arthropathy    3. DDD (degenerative disc disease), lumbar    4. Foraminal stenosis of cervical region    5. Cervical facet joint " syndrome    6. Encounter for long-term (current) use of high-risk medication      --- The urine drug screen confirmation from 7-21-21 has been reviewed and the result is APPROPRIATE based on patient history and MATTHEW report  --- Refill OxyContin. Patient appears stable with current regimen. No adverse effects. Regarding continuation of opioids, there is no evidence of aberrant behavior or any red flags.  The patient continues with appropriate response to opioid therapy. ADL's remain intact by self.   --- COntinue with other specialists as planned.  --- Lumbar RFA when able to schedule.  --- Follow-up 1 month or sooner if needed. (MyChart visit OK).     MATTHEW REPORT  As part of the patient's treatment plan, I am prescribing controlled substances. The patient has been made aware of appropriate use of such medications, including potential risk of somnolence, limited ability to drive and/or work safely, and the potential for dependence or overdose. It has also bee made clear that these medications are for use by this patient only, without concomitant use of alcohol or other substances unless prescribed.     Patient has completed prescribing agreement detailing terms of continued prescribing of controlled substances, including monitoring MATTHEW reports, urine drug screening, and pill counts if necessary. The patient is aware that inappropriate use will results in cessation of prescribing such medications.    As the clinician, I personally reviewed the MATTHEW from 11-16-21 while the patient was in the office today.    History and physical exam exhibit continued safe and appropriate use of controlled substances.       Dictated utilizing Dragon dictation.

## 2021-11-16 NOTE — TELEPHONE ENCOUNTER
Seen in office today DNF applied. Thanks. VERONICA CASTRO states refill was 10-19-21, but she actually got #2 on 10-18-21 and other #58 on 10-19-21. Which she can prove with her bottle in office today.     Thanks. VERONICA

## 2021-11-24 RX ORDER — ROSUVASTATIN CALCIUM 5 MG/1
5 TABLET, COATED ORAL NIGHTLY
Qty: 30 TABLET | Refills: 3 | OUTPATIENT
Start: 2021-11-24

## 2021-12-08 ENCOUNTER — TRANSCRIBE ORDERS (OUTPATIENT)
Dept: SURGERY | Facility: SURGERY CENTER | Age: 71
End: 2021-12-08

## 2021-12-08 ENCOUNTER — OFFICE VISIT (OUTPATIENT)
Dept: PAIN MEDICINE | Facility: CLINIC | Age: 71
End: 2021-12-08

## 2021-12-08 ENCOUNTER — PREP FOR SURGERY (OUTPATIENT)
Dept: SURGERY | Facility: SURGERY CENTER | Age: 71
End: 2021-12-08

## 2021-12-08 VITALS
SYSTOLIC BLOOD PRESSURE: 151 MMHG | DIASTOLIC BLOOD PRESSURE: 83 MMHG | OXYGEN SATURATION: 93 % | WEIGHT: 181 LBS | HEART RATE: 103 BPM | BODY MASS INDEX: 34.17 KG/M2 | TEMPERATURE: 96.9 F | HEIGHT: 61 IN | RESPIRATION RATE: 20 BRPM

## 2021-12-08 DIAGNOSIS — M19.90 ARTHRITIS: ICD-10-CM

## 2021-12-08 DIAGNOSIS — S32.020G COMPRESSION FRACTURE OF L2 VERTEBRA WITH DELAYED HEALING, SUBSEQUENT ENCOUNTER: ICD-10-CM

## 2021-12-08 DIAGNOSIS — Z41.9 SURGERY, ELECTIVE: Primary | ICD-10-CM

## 2021-12-08 DIAGNOSIS — M79.18 MYOFASCIAL PAIN SYNDROME OF LUMBAR SPINE: ICD-10-CM

## 2021-12-08 DIAGNOSIS — G89.4 CHRONIC PAIN SYNDROME: Primary | ICD-10-CM

## 2021-12-08 DIAGNOSIS — Z79.899 ENCOUNTER FOR LONG-TERM (CURRENT) USE OF HIGH-RISK MEDICATION: ICD-10-CM

## 2021-12-08 DIAGNOSIS — S32.020G COMPRESSION FRACTURE OF L2 VERTEBRA WITH DELAYED HEALING, SUBSEQUENT ENCOUNTER: Primary | ICD-10-CM

## 2021-12-08 DIAGNOSIS — M47.816 LUMBAR FACET ARTHROPATHY: ICD-10-CM

## 2021-12-08 PROBLEM — S32.020A COMPRESSION FRACTURE OF L2 LUMBAR VERTEBRA (HCC): Status: ACTIVE | Noted: 2021-12-08

## 2021-12-08 PROCEDURE — 99214 OFFICE O/P EST MOD 30 MIN: CPT | Performed by: ANESTHESIOLOGY

## 2021-12-08 RX ORDER — METHOCARBAMOL 500 MG/1
TABLET, FILM COATED ORAL
COMMUNITY
Start: 2021-11-23 | End: 2021-12-08

## 2021-12-08 RX ORDER — METHOCARBAMOL 750 MG/1
750 TABLET, FILM COATED ORAL 3 TIMES DAILY
Qty: 90 TABLET | Refills: 0 | Status: SHIPPED | OUTPATIENT
Start: 2021-12-08 | End: 2022-01-17

## 2021-12-08 RX ORDER — OXYCODONE HYDROCHLORIDE 30 MG/1
30 TABLET, FILM COATED, EXTENDED RELEASE ORAL EVERY 12 HOURS SCHEDULED
Qty: 60 TABLET | Refills: 0 | Status: SHIPPED | OUTPATIENT
Start: 2021-12-08 | End: 2022-01-13 | Stop reason: SDUPTHER
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
## 2021-12-09 ENCOUNTER — OFFICE VISIT (OUTPATIENT)
Dept: INTERNAL MEDICINE | Facility: CLINIC | Age: 71
End: 2021-12-09

## 2021-12-09 VITALS
HEART RATE: 97 BPM | WEIGHT: 182 LBS | TEMPERATURE: 95.5 F | SYSTOLIC BLOOD PRESSURE: 170 MMHG | OXYGEN SATURATION: 97 % | HEIGHT: 61 IN | BODY MASS INDEX: 34.36 KG/M2 | RESPIRATION RATE: 16 BRPM | DIASTOLIC BLOOD PRESSURE: 92 MMHG

## 2021-12-09 DIAGNOSIS — J12.82 PNEUMONIA DUE TO COVID-19 VIRUS: Primary | ICD-10-CM

## 2021-12-09 DIAGNOSIS — Z23 ENCOUNTER FOR IMMUNIZATION: ICD-10-CM

## 2021-12-09 DIAGNOSIS — U07.1 PNEUMONIA DUE TO COVID-19 VIRUS: Primary | ICD-10-CM

## 2021-12-09 DIAGNOSIS — R21 RASH: ICD-10-CM

## 2021-12-09 DIAGNOSIS — M15.9 PRIMARY OSTEOARTHRITIS INVOLVING MULTIPLE JOINTS: Chronic | ICD-10-CM

## 2021-12-09 DIAGNOSIS — I10 PRIMARY HYPERTENSION: Chronic | ICD-10-CM

## 2021-12-09 DIAGNOSIS — R73.09 OTHER ABNORMAL GLUCOSE: ICD-10-CM

## 2021-12-09 DIAGNOSIS — Z72.0 TOBACCO ABUSE: ICD-10-CM

## 2021-12-09 DIAGNOSIS — E78.2 MIXED HYPERLIPIDEMIA: Chronic | ICD-10-CM

## 2021-12-09 PROBLEM — M79.672 PAIN IN BOTH FEET: Status: RESOLVED | Noted: 2018-03-22 | Resolved: 2021-12-09

## 2021-12-09 PROBLEM — R07.89 CHEST DISCOMFORT: Status: RESOLVED | Noted: 2018-12-11 | Resolved: 2021-12-09

## 2021-12-09 PROBLEM — M15.0 PRIMARY OSTEOARTHRITIS INVOLVING MULTIPLE JOINTS: Chronic | Status: ACTIVE | Noted: 2021-01-11

## 2021-12-09 PROBLEM — R79.89 T3 LOW IN SERUM: Status: RESOLVED | Noted: 2018-09-20 | Resolved: 2021-12-09

## 2021-12-09 PROBLEM — J96.01 ACUTE RESPIRATORY FAILURE WITH HYPOXIA: Status: ACTIVE | Noted: 2021-10-22

## 2021-12-09 PROBLEM — R07.2 PRECORDIAL PAIN: Status: RESOLVED | Noted: 2018-02-14 | Resolved: 2021-12-09

## 2021-12-09 PROBLEM — E87.1 HYPONATREMIA: Status: ACTIVE | Noted: 2021-10-25

## 2021-12-09 PROBLEM — S13.4XXA WHIPLASH INJURY TO NECK: Status: RESOLVED | Noted: 2018-12-11 | Resolved: 2021-12-09

## 2021-12-09 PROBLEM — J15.9 COMMUNITY ACQUIRED BACTERIAL PNEUMONIA: Status: RESOLVED | Noted: 2021-10-24 | Resolved: 2021-12-09

## 2021-12-09 PROBLEM — R06.09 DYSPNEA ON EXERTION: Status: RESOLVED | Noted: 2017-03-22 | Resolved: 2021-12-09

## 2021-12-09 PROBLEM — E87.1 HYPONATREMIA: Status: RESOLVED | Noted: 2021-10-25 | Resolved: 2021-12-09

## 2021-12-09 PROBLEM — R94.4 DECREASED CALCULATED GFR: Status: RESOLVED | Noted: 2018-09-20 | Resolved: 2021-12-09

## 2021-12-09 PROBLEM — G62.9 NEUROPATHY: Status: RESOLVED | Noted: 2018-12-11 | Resolved: 2021-12-09

## 2021-12-09 PROBLEM — R06.09 DYSPNEA ON EXERTION: Status: RESOLVED | Noted: 2018-12-11 | Resolved: 2021-12-09

## 2021-12-09 PROBLEM — I51.89 DIASTOLIC DYSFUNCTION: Status: RESOLVED | Noted: 2018-09-20 | Resolved: 2021-12-09

## 2021-12-09 PROBLEM — Z98.890 HISTORY OF CARDIAC CATHETERIZATION: Status: RESOLVED | Noted: 2018-09-20 | Resolved: 2021-12-09

## 2021-12-09 PROBLEM — M79.671 PAIN IN BOTH FEET: Status: RESOLVED | Noted: 2018-03-22 | Resolved: 2021-12-09

## 2021-12-09 PROBLEM — J96.01 ACUTE RESPIRATORY FAILURE WITH HYPOXIA: Status: RESOLVED | Noted: 2021-10-22 | Resolved: 2021-12-09
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
## 2021-12-09 PROBLEM — J15.9 COMMUNITY ACQUIRED BACTERIAL PNEUMONIA: Status: ACTIVE | Noted: 2021-10-24

## 2021-12-09 PROBLEM — G89.29 OTHER CHRONIC PAIN: Status: RESOLVED | Noted: 2020-02-14 | Resolved: 2021-12-09

## 2021-12-09 PROCEDURE — G0008 ADMIN INFLUENZA VIRUS VAC: HCPCS | Performed by: INTERNAL MEDICINE

## 2021-12-09 PROCEDURE — 99406 BEHAV CHNG SMOKING 3-10 MIN: CPT | Performed by: INTERNAL MEDICINE

## 2021-12-09 PROCEDURE — 90662 IIV NO PRSV INCREASED AG IM: CPT | Performed by: INTERNAL MEDICINE

## 2021-12-09 PROCEDURE — 99214 OFFICE O/P EST MOD 30 MIN: CPT | Performed by: INTERNAL MEDICINE

## 2021-12-09 RX ORDER — TRIAMCINOLONE ACETONIDE 0.25 MG/G
1 OINTMENT TOPICAL 2 TIMES DAILY
Qty: 80 G | Refills: 0 | Status: SHIPPED | OUTPATIENT
Start: 2021-12-09 | End: 2022-01-13 | Stop reason: ALTCHOICE

## 2021-12-09 RX ORDER — ALBUTEROL SULFATE 90 UG/1
2 AEROSOL, METERED RESPIRATORY (INHALATION) EVERY 4 HOURS PRN
Qty: 8 G | Refills: 5 | Status: SHIPPED | OUTPATIENT
Start: 2021-12-09 | End: 2022-01-13 | Stop reason: ALTCHOICE

## 2021-12-09 RX ORDER — LISINOPRIL 20 MG/1
20 TABLET ORAL DAILY
Qty: 30 TABLET | Refills: 1 | Status: SHIPPED | OUTPATIENT
Start: 2021-12-09 | End: 2022-01-04 | Stop reason: SDUPTHER

## 2021-12-09 NOTE — PROGRESS NOTES
"Chief Complaint  Follow-up, Med Refill, Rash, and Hyperlipidemia    Subjective          Traci King presents to [facility] for follow up and med refills.   Pt recently with COVID, fully vaccinated in Oct. Had bilateral PNA and ended up in ICU, never intubated. Did require O2 therapy while hospitalized, was discharged with O2 for home but has since weaned off. Pt is still smoking but is down to 3 cigs/day. Had been rx Breo in the past with prn alb, but hadn't been on any of these for almost a year prior to her COVID dx, now back to needing this daily.     Objective   Vital Signs:     /92   Pulse 97   Temp 95.5 °F (35.3 °C)   Resp 16   Ht 154.9 cm (61\")   Wt 82.6 kg (182 lb)   SpO2 97%   BMI 34.39 kg/m²     Physical Exam  Vitals and nursing note reviewed.   Constitutional:       General: She is not in acute distress.     Appearance: Normal appearance.   Cardiovascular:      Rate and Rhythm: Normal rate and regular rhythm.      Pulses: Normal pulses.      Heart sounds: Normal heart sounds. No murmur heard.      Pulmonary:      Effort: Pulmonary effort is normal. No respiratory distress.      Breath sounds: Normal breath sounds.   Abdominal:      General: Abdomen is flat. Bowel sounds are normal.      Palpations: Abdomen is soft.      Tenderness: There is no abdominal tenderness.   Musculoskeletal:      Right lower leg: No edema.      Left lower leg: No edema.   Skin:     Comments: Mild area of erythema across R lateral base of neck, no overlying excoriation or edema, no papules present   Neurological:      Mental Status: She is alert and oriented to person, place, and time. Mental status is at baseline.   Psychiatric:         Mood and Affect: Mood normal.         Behavior: Behavior normal.          Result Review :   The following data was reviewed by: Sherry Fournier MD on 12/09/2021:  CMP    CMP 1/13/21 8/3/21 11/1/21   Glucose 93 96 117 (A)   BUN " 12 12 15   Creatinine 0.89 0.96 0.78   eGFR Non  Am 63 58 (A) 78   eGFR African Am 76 70 90   Sodium 136 138 134   Potassium 4.9 5.1 4.2   Chloride 102 104 98   Calcium 9.0 9.6 8.9   Total Protein 6.8 6.6 5.9 (A)   Albumin 4.40 4.30 3.4 (A)   Globulin 2.4 2.3 2.5   Total Bilirubin <0.2 <0.2 <0.2   Alkaline Phosphatase 89 89 104   AST (SGOT) 19 17 14   ALT (SGPT) 14 19 18   (A) Abnormal value       Comments are available for some flowsheets but are not being displayed.           CBC    CBC 10/25/21 11/1/21 11/5/21   WBC 13.36 (A) 17.9 (A) 13.38 (A)   RBC 3.23 (A) 2.99 (A) 2.92 (A)   Hemoglobin 10.4 (A) 9.5 (A) 9.6 (A)   Hematocrit 31.3 (A) 28.7 (A) 29.3 (A)   MCV 96.9 96 100.3 (A)   MCH 32.2 31.8 32.9   MCHC 33.2 33.1 32.8   RDW 13.6 13.3 14.6   Platelets 335 458 (A) 414   (A) Abnormal value            CBC w/diff    CBC w/Diff 10/25/21 11/1/21 11/5/21   WBC 13.36 (A) 17.9 (A) 13.38 (A)   RBC 3.23 (A) 2.99 (A) 2.92 (A)   Hemoglobin 10.4 (A) 9.5 (A) 9.6 (A)   Hematocrit 31.3 (A) 28.7 (A) 29.3 (A)   MCV 96.9 96 100.3 (A)   MCH 32.2 31.8 32.9   MCHC 33.2 33.1 32.8   RDW 13.6 13.3 14.6   Platelets 335 458 (A) 414   Neutrophil Rel % 89.6 (A) 89 80.9 (A)   Immature Granulocyte Rel % 1.0  2.3 (A)   Lymphocyte Rel % 6.1 (A) 5 10.1 (A)   Monocyte Rel % 3.2 3 6.0   Eosinophil Rel % 0.0 0 0.5   Basophil Rel % 0.1 0 0.2   (A) Abnormal value            Lipid Panel    Lipid Panel 1/13/21 8/3/21   Total Cholesterol 152 142   Triglycerides 59 102   HDL Cholesterol 97 (A) 71 (A)   VLDL Cholesterol 12 18   LDL Cholesterol  43 53   LDL/HDL Ratio 0.45 0.71   (A) Abnormal value       Comments are available for some flowsheets but are not being displayed.           TSH    TSH 1/13/21   TSH 2.170           Electrolytes    Electrolytes 1/13/21 8/3/21 11/1/21   Sodium 136 138 134   Potassium 4.9 5.1 4.2   Chloride 102 104 98   Calcium 9.0 9.6 8.9           Renal Profile    Renal Profile 1/13/21 8/3/21 11/1/21   BUN 12 12 15    Creatinine 0.89 0.96 0.78   eGFR Non  Am 63 58 (A) 78   eGFR  Am 76 70 90   (A) Abnormal value       Comments are available for some flowsheets but are not being displayed.           BMP    BMP 1/13/21 8/3/21 11/1/21   BUN 12 12 15   Creatinine 0.89 0.96 0.78   Sodium 136 138 134   Potassium 4.9 5.1 4.2   Chloride 102 104 98   CO2 24.2 21.9 (A) 19 (A)   Calcium 9.0 9.6 8.9   (A) Abnormal value                Covid Tests    Common Labsle 9/15/21   COVID19 Not Detected      Comments are available for some flowsheets but are not being displayed.           Data reviewed: Radiologic studies CT PE protocol and Recent hospitalization notes COVID ICU stay Oct 2021            Assessment and Plan      Diagnoses and all orders for this visit:    1. Pneumonia due to COVID-19 virus (Primary)  Assessment & Plan:  RESOLVING  - s/p ICU stay with oxygen requirement, never intubated  - weaned off oxygen at this time  - has prolonged smoking history though never formally diagnosed with COPD, has been on inhalers in the past but was not taking actively when she was admitted with COVID  - will restart daily Breo with prn albuterol      Orders:  -     Breo Ellipta 100-25 MCG/INH inhaler; Inhale 1 puff Daily.  Dispense: 28 each; Refill: 5  -     albuterol sulfate  (90 Base) MCG/ACT inhaler; Inhale 2 puffs Every 4 (Four) Hours As Needed for Wheezing or Shortness of Air.  Dispense: 8 g; Refill: 5    2. Tobacco abuse  Assessment & Plan:  IMPROVING  - pt is weaning down, currently at 3 cigs/day and motivated to quit  Traci LACHO King  reports that she has been smoking cigarettes. She has been smoking about 0.50 packs per day. She has never used smokeless tobacco.. I have educated her on the risk of diseases from using tobacco products such as cancer, COPD and heart disease.     I advised her to quit and she is willing to quit. We have discussed the following method/s for tobacco cessation:  Counseling [place] OTC  Cessation Products Prescription Medicaiton. She will cont her current plan of slowly weaning down at home which has been successful so far for her.     I spent 5 minutes counseling the patient.             3. Primary hypertension  Assessment & Plan:  UNCONTROLLED- NEW  - BP elevation only an issue s/p COVID, now pt states everytime she checks it it is high  - does cont to have low exercise tolerance and breathing issues with smoking history, so this is all likely contributing  - will start pt on low dose ACEi and schedule close follow up to monitor trend and titrate further  - Cont checking BP at home daily, call if values trend outside of goal range      Orders:  -     lisinopril (PRINIVIL,ZESTRIL) 20 MG tablet; Take 1 tablet by mouth Daily.  Dispense: 30 tablet; Refill: 1  -     Hemoglobin A1c  -     Comprehensive Metabolic Panel    4. Mixed hyperlipidemia  Assessment & Plan:  CONTROLLED  - FLP today for ongoing monitoring  - cont on current moderate intensity statin, will adjust dose as indicated based on labs  - cont with good diet and lifestyle changes including increased exercise, low chol and low fat diet, and increased fiber      Orders:  -     Lipid Panel  -     Comprehensive Metabolic Panel    5. Primary osteoarthritis involving multiple joints  Assessment & Plan:  STABLE  - pt with chronic pain syndrome related to back and neck pain, but states she has struggled with joint pains in various locations for many years  - requesting testing to rule out inflammatory arthritis like RA given her level of pain, will order today  - originally ordered by her pain mgmt MD, will fax results to his office once available    Orders:  -     Comprehensive Metabolic Panel  -     CBC & Differential  -     Sedimentation Rate  -     C-reactive Protein  -     RAHUL Direct Reflex to 11 Biomarker  -     Rheumatoid Factor  -     Cyclic Citrul Peptide Antibody, IgG / IgA    6. Other abnormal glucose   -     Hemoglobin A1c    7.  Rash  -     triamcinolone (KENALOG) 0.025 % ointment; Apply 1 application topically to the appropriate area as directed 2 (Two) Times a Day.  Dispense: 80 g; Refill: 0    8. Encounter for immunization  -     Fluzone High-Dose 65+yrs ([id number])        Follow Up   Return in about 2 weeks (around 12/23/2021) for follow up HTN.    Patient was given instructions and counseling regarding her condition or for health maintenance advice. Please see specific information pulled into the AVS if appropriate.     Lyndsay Fournier MD  [facility]  Phone: [phone]  Fax: [phone]

## 2021-12-09 NOTE — ASSESSMENT & PLAN NOTE
IMPROVING  - pt is weaning down, currently at 3 cigs/day and motivated to quit  Traci King  reports that she has been smoking cigarettes. She has been smoking about 0.50 packs per day. She has never used smokeless tobacco.. I have educated her on the risk of diseases from using tobacco products such as cancer, COPD and heart disease.     I advised her to quit and she is willing to quit. We have discussed the following method/s for tobacco cessation:  Counseling [place] OTC Cessation Products Prescription Medicaiton. She will cont her current plan of slowly weaning down at home which has been successful so far for her.     I spent 5 minutes counseling the patient.

## 2021-12-09 NOTE — ASSESSMENT & PLAN NOTE
STABLE  - pt with chronic pain syndrome related to back and neck pain, but states she has struggled with joint pains in various locations for many years  - requesting testing to rule out inflammatory arthritis like RA given her level of pain, will order today  - originally ordered by her pain mgmt MD, will fax results to his office once available

## 2021-12-09 NOTE — ASSESSMENT & PLAN NOTE
RESOLVING  - s/p ICU stay with oxygen requirement, never intubated  - weaned off oxygen at this time  - has prolonged smoking history though never formally diagnosed with COPD, has been on inhalers in the past but was not taking actively when she was admitted with COVID  - will restart daily Breo with prn albuterol

## 2021-12-09 NOTE — ASSESSMENT & PLAN NOTE
UNCONTROLLED- NEW  - BP elevation only an issue s/p COVID, now pt states everytime she checks it it is high  - does cont to have low exercise tolerance and breathing issues with smoking history, so this is all likely contributing  - will start pt on low dose ACEi and schedule close follow up to monitor trend and titrate further  - Cont checking BP at home daily, call if values trend outside of goal range

## 2021-12-11 LAB
ALBUMIN SERPL-MCNC: 4.1 G/DL (ref 3.8–4.8)
ALBUMIN/GLOB SERPL: 1.5 {RATIO} (ref 1.2–2.2)
ALP SERPL-CCNC: 91 IU/L (ref 44–121)
ALT SERPL-CCNC: 12 IU/L (ref 0–32)
ANA SER QL: NEGATIVE
AST SERPL-CCNC: 14 IU/L (ref 0–40)
BASOPHILS # BLD AUTO: 0.1 X10E3/UL (ref 0–0.2)
BASOPHILS NFR BLD AUTO: 1 %
BILIRUB SERPL-MCNC: <0.2 MG/DL (ref 0–1.2)
BUN SERPL-MCNC: 15 MG/DL (ref 8–27)
BUN/CREAT SERPL: 17 (ref 12–28)
CALCIUM SERPL-MCNC: 9.3 MG/DL (ref 8.7–10.3)
CCP IGA+IGG SERPL IA-ACNC: 6 UNITS (ref 0–19)
CHLORIDE SERPL-SCNC: 104 MMOL/L (ref 96–106)
CHOLEST SERPL-MCNC: 161 MG/DL (ref 100–199)
CO2 SERPL-SCNC: 20 MMOL/L (ref 20–29)
CREAT SERPL-MCNC: 0.88 MG/DL (ref 0.57–1)
CRP SERPL-MCNC: 2 MG/L (ref 0–10)
EOSINOPHIL # BLD AUTO: 0.1 X10E3/UL (ref 0–0.4)
EOSINOPHIL NFR BLD AUTO: 1 %
ERYTHROCYTE [DISTWIDTH] IN BLOOD BY AUTOMATED COUNT: 13.3 % (ref 11.7–15.4)
ERYTHROCYTE [SEDIMENTATION RATE] IN BLOOD BY WESTERGREN METHOD: 11 MM/HR (ref 0–40)
GLOBULIN SER CALC-MCNC: 2.8 G/DL (ref 1.5–4.5)
GLUCOSE SERPL-MCNC: 87 MG/DL (ref 65–99)
HBA1C MFR BLD: 5.6 % (ref 4.8–5.6)
HCT VFR BLD AUTO: 34.8 % (ref 34–46.6)
HDLC SERPL-MCNC: 87 MG/DL
HGB BLD-MCNC: 11.4 G/DL (ref 11.1–15.9)
IMM GRANULOCYTES # BLD AUTO: 0.1 X10E3/UL (ref 0–0.1)
IMM GRANULOCYTES NFR BLD AUTO: 1 %
LDLC SERPL CALC-MCNC: 55 MG/DL (ref 0–99)
LYMPHOCYTES # BLD AUTO: 1.9 X10E3/UL (ref 0.7–3.1)
LYMPHOCYTES NFR BLD AUTO: 16 %
MCH RBC QN AUTO: 32.6 PG (ref 26.6–33)
MCHC RBC AUTO-ENTMCNC: 32.8 G/DL (ref 31.5–35.7)
MCV RBC AUTO: 99 FL (ref 79–97)
MONOCYTES # BLD AUTO: 0.7 X10E3/UL (ref 0.1–0.9)
MONOCYTES NFR BLD AUTO: 6 %
NEUTROPHILS # BLD AUTO: 9.1 X10E3/UL (ref 1.4–7)
NEUTROPHILS NFR BLD AUTO: 75 %
PLATELET # BLD AUTO: 488 X10E3/UL (ref 150–450)
POTASSIUM SERPL-SCNC: 4.4 MMOL/L (ref 3.5–5.2)
PROT SERPL-MCNC: 6.9 G/DL (ref 6–8.5)
RBC # BLD AUTO: 3.5 X10E6/UL (ref 3.77–5.28)
RHEUMATOID FACT SERPL-ACNC: 16.8 IU/ML
SODIUM SERPL-SCNC: 137 MMOL/L (ref 134–144)
TRIGL SERPL-MCNC: 112 MG/DL (ref 0–149)
VLDLC SERPL CALC-MCNC: 19 MG/DL (ref 5–40)
WBC # BLD AUTO: 12 X10E3/UL (ref 3.4–10.8)

## 2021-12-13 ENCOUNTER — TELEPHONE (OUTPATIENT)
Dept: PAIN MEDICINE | Facility: CLINIC | Age: 71
End: 2021-12-13

## 2021-12-13 NOTE — TELEPHONE ENCOUNTER
Caller: DIOGO BISHOP    Relationship: PATIENT    Best call back number: [phone] PLEASE LEAVE A MESSAGE IF NO ANSWER    What is the best time to reach you: ANY    What was the call regarding: PATIENT IS TO HAVE A PROCEDURE ON 12/16/2021. SHE STATES THAT SHE HAS INFORMED DR LEMUS OF A FRACTURE OF HER L2. SHE WENT TO HER ORTHO ON Friday (12/10/2021), AND SHE NOW HAS A PICTURE OF THE FRACTURE. HER ORTHO INSTRUCTED HER TO CONSULT W/ DR LEMUS TO MAKE SURE THAT IT WAS STILL OK FOR HIM TO DO THE PROCEDURE. SHE HAS AN IMAGE THAT SHE WOULD LIKE TO TEXT.     Do you require a callback: YES

## 2021-12-16 ENCOUNTER — HOSPITAL ENCOUNTER (OUTPATIENT)
Facility: SURGERY CENTER | Age: 71
Setting detail: HOSPITAL OUTPATIENT SURGERY
Discharge: HOME | End: 2021-12-16
Attending: ANESTHESIOLOGY | Admitting: ANESTHESIOLOGY

## 2021-12-16 ENCOUNTER — HOSPITAL ENCOUNTER (OUTPATIENT)
Dept: GENERAL RADIOLOGY | Facility: SURGERY CENTER | Age: 71
Setting detail: HOSPITAL OUTPATIENT SURGERY
End: 2021-12-16

## 2021-12-16 VITALS
WEIGHT: 182 LBS | OXYGEN SATURATION: 95 % | HEIGHT: 61 IN | DIASTOLIC BLOOD PRESSURE: 76 MMHG | BODY MASS INDEX: 34.36 KG/M2 | HEART RATE: 82 BPM | SYSTOLIC BLOOD PRESSURE: 141 MMHG | RESPIRATION RATE: 20 BRPM | TEMPERATURE: 97.3 F

## 2021-12-16 DIAGNOSIS — Z41.9 SURGERY, ELECTIVE: ICD-10-CM

## 2021-12-16 PROCEDURE — 0 IOHEXOL 300 MG/ML SOLUTION 10 ML VIAL: Performed by: ANESTHESIOLOGY

## 2021-12-16 PROCEDURE — 25010000002 MIDAZOLAM PER 1 MG: Performed by: ANESTHESIOLOGY

## 2021-12-16 PROCEDURE — 76000 FLUOROSCOPY <1 HR PHYS/QHP: CPT

## 2021-12-16 PROCEDURE — 62323 NJX INTERLAMINAR LMBR/SAC: CPT | Performed by: ANESTHESIOLOGY

## 2021-12-16 PROCEDURE — 77002 NEEDLE LOCALIZATION BY XRAY: CPT

## 2021-12-16 PROCEDURE — 25010000002 METHYLPREDNISOLONE PER 80 MG: Performed by: ANESTHESIOLOGY

## 2021-12-16 PROCEDURE — 3E0S3BZ INTRODUCTION OF ANESTHETIC AGENT INTO EPIDURAL SPACE, PERCUTANEOUS APPROACH: ICD-10-PCS | Performed by: ANESTHESIOLOGY

## 2021-12-16 RX ORDER — MIDAZOLAM HYDROCHLORIDE 1 MG/ML
INJECTION INTRAMUSCULAR; INTRAVENOUS AS NEEDED
Status: DISCONTINUED | OUTPATIENT
Start: 2021-12-16 | End: 2021-12-16 | Stop reason: HOSPADM

## 2021-12-16 NOTE — DISCHARGE INSTRUCTIONS
[facility] - Post-procedure Instructions          --  While there are no absolute restrictions, it is recommended that you do not perform strenuous activity today. In the morning, you may resume your level of activity as before your block.    --  If you have a band-aid at your injection site, please remove it later today. Observe the area for any redness, swelling, pus-like drainage, or a temperature over 101°. If any of these symptoms occur, please call your doctor at [phone]. If after office hours, leave a message and the on-call provider will return your call.    --  Ice may be applied to your injection site. It is recommended you avoid direct heat (heating pad; hot tub) for 1-2 days.    --  Call [facility] at [phone] if you experience persistent headache, persistent bleeding from the injection site, or severe pain not relieved by heat or oral medication.    --  Do not make important decisions today.    --  Due to the effects of the block and/or the I.V. Sedation, DO NOT drive or operate hazardous machinery for 12 hours.  Local anesthetics may cause numbness after procedure and precautions must be taken with regards to operating equipment as well as with walking, even if ambulating with assistance of another person or with an assistive device.    --  Do not drink alcohol for 12 hours.    -- You may return to work tomorrow, or as directed by your referring doctor.    --  Occasionally you may notice a slight increase in your pain after the procedure. This should start to improve within the next 24-48 hours. Radiofrequency ablation procedure pain may last 3-4 weeks.    --  It may take as long as 3-4 days before you notice a gradual improvement in your pain and/or other symptoms.    -- You may continue to take your prescribed pain medication as needed.    --  Some normal possible side effects of steroid use could include fluid retention, increased blood sugar, dull headache,  increased sweating, increased appetite, mood swings and flushing.    --  Diabetics are recommended to watch their blood glucose level closely for 24-48 hours after the injection.    --  Must stay in PACU for 20 min upon arrival and prove no leg weakness before being discharged.    --  IN THE EVENT OF A LIFE THREATENING EMERGENCY, (CHEST PAIN, BREATHING DIFFICULTIES, PARALYSIS…) YOU SHOULD GO TO YOUR NEAREST EMERGENCY ROOM.    --  You should be contacted by our office within 2-3 days to schedule follow up or next appointment date.  If not contacted within 7 days, please call the office at [phone]

## 2021-12-16 NOTE — OP NOTE
Lumbar Epidural Steroid Injection  [facility]    PREOPERATIVE DIAGNOSIS:   Vertebral compression fracture and Lumbar Radiculopathy  POSTOPERATIVE DIAGNOSIS:  Same as preop diagnosis    PROCEDURE:   Lumbar Epidural Steroid Injection, Therapeutic Translaminar Injection, with epidurogram, at  L2/L3 level    PRE-PROCEDURE DISCUSSION WITH PATIENT:    Risks and complications were discussed with the patient prior to starting the procedure and informed consent was obtained.  We discussed various topics including but not limited to bleeding, infection, injury, paralysis, nerve injury, dural puncture, coma, death, worsening of clinical picture, lack of pain relief, and postprocedural soreness.    SURGEON:  Jose Luis Gan MD    REASON FOR PROCEDURE:    Degenerative changes are noted in the area., Tenderness to palpation in the lumbar area and Compression fracture in this area    SEDATION:  Versed 4mg IV  ANESTHETIC:  Marcaine 0.25%  STEROID:   Methylprednisolone (DEPO MEDROL) 80mg/ml    DESCRIPTON OF PROCEDURE:    After obtaining informed consent, I.V. was started in the preop area.   The patient was taken to the operating room and placed in the prone position.  EKG, blood pressure, and pulse oximeter were monitored throughout, and sedation was provided as needed by the RN under my guidance. All pressure points were well padded.  The lumbar spine area was prepped with Chloraprep and draped in a sterile fashion.  Under fluoroscopic guidance, the above mentioned interlaminar space was identified. Skin and subcutaneous tissues were anesthetized with 1% lidocaine in the middle of the space. A Tuohy needle was introduced through the skin and advanced to this interlaminar space and into the epidural space under fluoroscopic guidance and verified with loss-of-resistance technique to air.  After confirming the position of the needle with the fluoroscope with all the views, and after aspiration was confirmed  negative for blood and CSF, 1.5 mL of Omnipaque was injected.  After seeing appropriate epidurogram with lateral and PA views, a total of 3 cc solution was injected, consisting of 1cc of local anesthetic as above, with normal saline and injectable steroid as above.     ESTIMATED BLOOD LOSS:  <5 mL  SPECIMENS:  None    COMPLICATIONS:     No complications were noted., There was no indication of vascular uptake on live injection of contrast dye., There was no indication of intrathecal uptake on live injection of contrast dye., There was not any evidence of dural puncture.   and The patient did not have any signs of postprocedure numbness nor weakness.    TOLERANCE & DISCHARGE CONDITION:    The patient tolerated the procedure well.  The patient was transported to the recovery area without difficulties.  The patient was discharged to home under the care of family in stable and satisfactory condition.    PLAN OF CARE:  1. The patient was given our standard instruction sheet.  2. The patient will Repeat injection 2-4 wks  3. The patient will resume all medications as per the medication reconciliation sheet.

## 2022-01-04 ENCOUNTER — OFFICE VISIT (OUTPATIENT)
Dept: INTERNAL MEDICINE | Facility: CLINIC | Age: 72
End: 2022-01-04

## 2022-01-04 VITALS
HEART RATE: 101 BPM | SYSTOLIC BLOOD PRESSURE: 136 MMHG | HEIGHT: 61 IN | WEIGHT: 179 LBS | RESPIRATION RATE: 16 BRPM | OXYGEN SATURATION: 94 % | TEMPERATURE: 94.5 F | DIASTOLIC BLOOD PRESSURE: 84 MMHG | BODY MASS INDEX: 33.79 KG/M2

## 2022-01-04 DIAGNOSIS — R89.9 ABNORMAL LABORATORY TEST: ICD-10-CM

## 2022-01-04 DIAGNOSIS — I10 PRIMARY HYPERTENSION: Primary | ICD-10-CM

## 2022-01-04 DIAGNOSIS — R52 GENERALIZED BODY ACHES: ICD-10-CM

## 2022-01-04 LAB
EXPIRATION DATE: NORMAL
FLUAV AG NPH QL: NEGATIVE
FLUBV AG NPH QL: NEGATIVE
INTERNAL CONTROL: NORMAL
Lab: NORMAL

## 2022-01-04 PROCEDURE — 99214 OFFICE O/P EST MOD 30 MIN: CPT | Performed by: INTERNAL MEDICINE

## 2022-01-04 PROCEDURE — 87804 INFLUENZA ASSAY W/OPTIC: CPT | Performed by: INTERNAL MEDICINE

## 2022-01-04 RX ORDER — LISINOPRIL 20 MG/1
20 TABLET ORAL DAILY
Qty: 90 TABLET | Refills: 1 | Status: SHIPPED | OUTPATIENT
Start: 2022-01-04 | End: 2022-06-21 | Stop reason: SINTOL

## 2022-01-04 NOTE — ASSESSMENT & PLAN NOTE
IMPROVED  - BP improved today with BP now in goal range  - cont on ACEi at current dose--> refills sent  - BMP today to follow up renal function and K+  - Cont checking BP at home intermittently, call if values trend outside of goal range

## 2022-01-05 ENCOUNTER — TELEPHONE (OUTPATIENT)
Dept: INTERNAL MEDICINE | Facility: CLINIC | Age: 72
End: 2022-01-05

## 2022-01-05 LAB
BUN SERPL-MCNC: 16 MG/DL (ref 8–27)
BUN/CREAT SERPL: 18 (ref 12–28)
CALCIUM SERPL-MCNC: 9.7 MG/DL (ref 8.7–10.3)
CHLORIDE SERPL-SCNC: 104 MMOL/L (ref 96–106)
CO2 SERPL-SCNC: 16 MMOL/L (ref 20–29)
CREAT SERPL-MCNC: 0.91 MG/DL (ref 0.57–1)
GLUCOSE SERPL-MCNC: 103 MG/DL (ref 65–99)
POTASSIUM SERPL-SCNC: 4.6 MMOL/L (ref 3.5–5.2)
RHEUMATOID FACT SERPL-ACNC: 13.8 IU/ML
SODIUM SERPL-SCNC: 136 MMOL/L (ref 134–144)

## 2022-01-05 RX ORDER — ROSUVASTATIN CALCIUM 5 MG/1
5 TABLET, COATED ORAL NIGHTLY
Qty: 90 TABLET | Refills: 1 | Status: SHIPPED | OUTPATIENT
Start: 2022-01-05 | End: 2022-07-25

## 2022-01-10 ENCOUNTER — PREP FOR SURGERY (OUTPATIENT)
Dept: SURGERY | Facility: SURGERY CENTER | Age: 72
End: 2022-01-10

## 2022-01-10 DIAGNOSIS — M47.816 LUMBAR FACET ARTHROPATHY: Primary | ICD-10-CM

## 2022-01-10 PROCEDURE — S0260 H&P FOR SURGERY: HCPCS | Performed by: ANESTHESIOLOGY

## 2022-01-10 RX ORDER — SODIUM CHLORIDE 0.9 % (FLUSH) 0.9 %
10 SYRINGE (ML) INJECTION EVERY 12 HOURS SCHEDULED
Status: CANCELLED | OUTPATIENT
Start: 2022-01-10

## 2022-01-10 RX ORDER — SODIUM CHLORIDE, SODIUM LACTATE, POTASSIUM CHLORIDE, CALCIUM CHLORIDE 600; 310; 30; 20 MG/100ML; MG/100ML; MG/100ML; MG/100ML
9 INJECTION, SOLUTION INTRAVENOUS CONTINUOUS PRN
Status: CANCELLED | OUTPATIENT
Start: 2022-01-10

## 2022-01-10 RX ORDER — SODIUM CHLORIDE 0.9 % (FLUSH) 0.9 %
10 SYRINGE (ML) INJECTION AS NEEDED
Status: CANCELLED | OUTPATIENT
Start: 2022-01-10

## 2022-01-11 ENCOUNTER — HOSPITAL ENCOUNTER (OUTPATIENT)
Facility: SURGERY CENTER | Age: 72
Setting detail: HOSPITAL OUTPATIENT SURGERY
Discharge: HOME | End: 2022-01-11
Attending: ANESTHESIOLOGY | Admitting: ANESTHESIOLOGY

## 2022-01-11 ENCOUNTER — HOSPITAL ENCOUNTER (OUTPATIENT)
Dept: GENERAL RADIOLOGY | Facility: SURGERY CENTER | Age: 72
Setting detail: HOSPITAL OUTPATIENT SURGERY
End: 2022-01-11

## 2022-01-11 VITALS
RESPIRATION RATE: 18 BRPM | HEART RATE: 71 BPM | OXYGEN SATURATION: 98 % | HEIGHT: 61 IN | SYSTOLIC BLOOD PRESSURE: 117 MMHG | TEMPERATURE: 97 F | WEIGHT: 179 LBS | BODY MASS INDEX: 33.79 KG/M2 | DIASTOLIC BLOOD PRESSURE: 84 MMHG

## 2022-01-11 DIAGNOSIS — Z41.9 SURGERY, ELECTIVE: ICD-10-CM

## 2022-01-11 DIAGNOSIS — M47.816 LUMBAR FACET ARTHROPATHY: ICD-10-CM

## 2022-01-11 PROCEDURE — 25010000002 MIDAZOLAM PER 1 MG: Performed by: ANESTHESIOLOGY

## 2022-01-11 PROCEDURE — 64636 DESTROY L/S FACET JNT ADDL: CPT | Performed by: ANESTHESIOLOGY

## 2022-01-11 PROCEDURE — 64635 DESTROY LUMB/SAC FACET JNT: CPT | Performed by: ANESTHESIOLOGY

## 2022-01-11 PROCEDURE — 76000 FLUOROSCOPY <1 HR PHYS/QHP: CPT

## 2022-01-11 PROCEDURE — 3E0T3TZ INTRODUCTION OF DESTRUCTIVE AGENT INTO PERIPHERAL NERVES AND PLEXI, PERCUTANEOUS APPROACH: ICD-10-PCS | Performed by: ANESTHESIOLOGY

## 2022-01-11 PROCEDURE — 99152 MOD SED SAME PHYS/QHP 5/>YRS: CPT | Performed by: ANESTHESIOLOGY

## 2022-01-11 PROCEDURE — 77002 NEEDLE LOCALIZATION BY XRAY: CPT

## 2022-01-11 PROCEDURE — 25010000002 FENTANYL CITRATE (PF) 50 MCG/ML SOLUTION: Performed by: ANESTHESIOLOGY

## 2022-01-11 RX ORDER — MIDAZOLAM HYDROCHLORIDE 1 MG/ML
INJECTION INTRAMUSCULAR; INTRAVENOUS AS NEEDED
Status: DISCONTINUED | OUTPATIENT
Start: 2022-01-11 | End: 2022-01-11 | Stop reason: HOSPADM

## 2022-01-11 RX ORDER — SODIUM CHLORIDE 0.9 % (FLUSH) 0.9 %
10 SYRINGE (ML) INJECTION AS NEEDED
Status: DISCONTINUED | OUTPATIENT
Start: 2022-01-11 | End: 2022-01-11 | Stop reason: HOSPADM

## 2022-01-11 RX ORDER — FENTANYL CITRATE 50 UG/ML
INJECTION, SOLUTION INTRAMUSCULAR; INTRAVENOUS AS NEEDED
Status: DISCONTINUED | OUTPATIENT
Start: 2022-01-11 | End: 2022-01-11 | Stop reason: HOSPADM

## 2022-01-11 RX ORDER — SODIUM CHLORIDE 0.9 % (FLUSH) 0.9 %
10 SYRINGE (ML) INJECTION EVERY 12 HOURS SCHEDULED
Status: DISCONTINUED | OUTPATIENT
Start: 2022-01-11 | End: 2022-01-11 | Stop reason: HOSPADM

## 2022-01-11 NOTE — OP NOTE
Bilateral L3-5 Lumbar Medial Branch RADIOFREQUENCY  [facility]      PREOPERATIVE DIAGNOSIS:  Lumbar spondylosis without myelopathy    POSTOPERATIVE DIAGNOSIS:  Lumbar spondylosis without myelopathy    PROCEDURE:   Diagnostic Bilateral Lumbar Medial Branch Nerve thermal radiofrequency lesioning, with fluoroscopy:  L3, L4, and L5 nerves (at the L4 and L5 transverse processes and the sacral alar groove) to thermally treat the innervation to facet joints L4-5 and L5-S1  1. 00039-28 -- Bilateral L/S facet neuro destr., 1st Level  2. [id number] -- Bilateral L/S facet neuro destr., 2nd  Level    PRE-PROCEDURE DISCUSSION WITH PATIENT:    Risks and complications were discussed with the patient prior to starting the procedure and informed consent was obtained.      SURGEON:  Jose Luis Gan MD    REASON FOR PROCEDURE:    The patient complains of pain that seems to have a significant axial component    SEDATION:  Versed 4mg & Fentanyl 50 mcg IV  TIME OF PROCEDURE:   The intraoperative procedure time after administration of the sedative was 19 minutes.     ANESTHETIC:  Lidocaine 1%  STEROID:  NONE      DESCRIPTON OF PROCEDURE:  After obtaining informed consent, IV access  was obtained in the preoperative area.   The patient was taken to the operating room.  The patient was placed in the prone position with a pillow under the abdomen. All pressure points were well padded.  EKG, blood pressure, and pulse oximeter were monitored.  The patient was monitored and sedated by the RN under my direction. The lumbosacral area was prepped with Chloraprep and draped in a sterile fashion.     Under fluoroscopic guidance the transverse processes of the L4 and L5 vertebrae at the junctions of the superior articular processes were identified on the right.  Also identified was the groove between the ala and the superior articular process of the sacrum on the ipsilateral side.  Skin and subcutaneous tissue were anesthetized  with 1ml of 1% lidocaine above each of these points. Then, radiofrequency probe needles were advanced in this fluoro view to the above junctions.  Aspiration was negative for blood and CSF.  After confirming the position of the needle with fluoroscope in all views, testing was initiated.  First, sensory testing was started on each needle a 1V and 50Hz and slowly decreased until painful pressure stimulation diminished at 0.5V.  Next, motor testing was confirmed to be negative at 3V and 2Hz for any radicular stimulation.  Then 1mL of the local anesthetic was instilled in each needle.  Two minutes elapsed, and during this time a lateral fluoroscopic view was confirmed again to ensure the needles had not advanced nor retracted.  Then, Radiofrequency Lesioning was initiated for 1.5 minutes at 85 degrees Celsius.  Needles were removed intact from each of the areas.     A similar procedure was repeated to address the L3, L4, and L5 nerves on the contralateral side.   Onset of analgesia was noted.  Vital signs remained stable throughout.      ESTIMATED BLOOD LOSS:  <5 mL  SPECIMENS:  none    COMPLICATIONS:   No complications were noted., There was not any evidence of dural puncture.   and The patient did not have any signs of postprocedure numbness nor weakness.    TOLERANCE & DISCHARGE CONDITION:    The patient tolerated the procedure well.  The patient was transported to the recovery area without difficulties.  The patient was discharged to home under the care of family in stable and satisfactory condition.    PLAN OF CARE:  1. The patient was given our standard instruction sheet.  2. The patient will  Return to clinic 1 wk.  3. The patient will resume all medications as per the medication reconciliation sheet.
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
## 2022-01-11 NOTE — DISCHARGE INSTRUCTIONS
[facility] - Post-procedure Instructions          --  While there are no absolute restrictions, it is recommended that you do not perform strenuous activity today. In the morning, you may resume your level of activity as before your block.    --  If you have a band-aid at your injection site, please remove it later today. Observe the area for any redness, swelling, pus-like drainage, or a temperature over 101°. If any of these symptoms occur, please call your doctor at [phone]. If after office hours, leave a message and the on-call provider will return your call.    --  Ice may be applied to your injection site. It is recommended you avoid direct heat (heating pad; hot tub) for 1-2 days.    --  Call [facility] at [phone] if you experience persistent headache, persistent bleeding from the injection site, or severe pain not relieved by heat or oral medication.    --  Do not make important decisions today.    --  Due to the effects of the block and/or the I.V. Sedation, DO NOT drive or operate hazardous machinery for 12 hours.  Local anesthetics may cause numbness after procedure and precautions must be taken with regards to operating equipment as well as with walking, even if ambulating with assistance of another person or with an assistive device.    --  Do not drink alcohol for 12 hours.    -- You may return to work tomorrow, or as directed by your referring doctor.    --  Occasionally you may notice a slight increase in your pain after the procedure. This should start to improve within the next 24-48 hours. Radiofrequency ablation procedure pain may last 3-4 weeks.    --  It may take as long as 3-4 days before you notice a gradual improvement in your pain and/or other symptoms.    -- You may continue to take your prescribed pain medication as needed.    --  Some normal possible side effects of steroid use could include fluid retention, increased blood sugar, dull headache,  increased sweating, increased appetite, mood swings and flushing.    --  Diabetics are recommended to watch their blood glucose level closely for 24-48 hours after the injection.    --  Must stay in PACU for 20 min upon arrival and prove no leg weakness before being discharged.    --  IN THE EVENT OF A LIFE THREATENING EMERGENCY, (CHEST PAIN, BREATHING DIFFICULTIES, PARALYSIS…) YOU SHOULD GO TO YOUR NEAREST EMERGENCY ROOM.    --  You should be contacted by our office within 2-3 days to schedule follow up or next appointment date.  If not contacted within 7 days, please call the office at [phone]

## 2022-01-13 ENCOUNTER — TELEMEDICINE (OUTPATIENT)
Dept: PAIN MEDICINE | Facility: CLINIC | Age: 72
End: 2022-01-13

## 2022-01-13 DIAGNOSIS — M47.816 LUMBAR FACET ARTHROPATHY: ICD-10-CM

## 2022-01-13 DIAGNOSIS — M79.18 MYOFASCIAL PAIN SYNDROME OF LUMBAR SPINE: ICD-10-CM

## 2022-01-13 DIAGNOSIS — G89.4 CHRONIC PAIN SYNDROME: ICD-10-CM

## 2022-01-13 DIAGNOSIS — Z79.899 LONG-TERM USE OF HIGH-RISK MEDICATION: ICD-10-CM

## 2022-01-13 DIAGNOSIS — M50.30 DEGENERATIVE DISC DISEASE, CERVICAL: ICD-10-CM

## 2022-01-13 DIAGNOSIS — G89.29 OTHER CHRONIC PAIN: Primary | ICD-10-CM

## 2022-01-13 DIAGNOSIS — M25.519 SHOULDER PAIN, UNSPECIFIED CHRONICITY, UNSPECIFIED LATERALITY: ICD-10-CM

## 2022-01-13 DIAGNOSIS — M51.36 DDD (DEGENERATIVE DISC DISEASE), LUMBAR: ICD-10-CM

## 2022-01-13 PROCEDURE — 99214 OFFICE O/P EST MOD 30 MIN: CPT | Performed by: NURSE PRACTITIONER

## 2022-01-13 RX ORDER — OXYCODONE HYDROCHLORIDE 30 MG/1
30 TABLET, FILM COATED, EXTENDED RELEASE ORAL EVERY 12 HOURS SCHEDULED
Qty: 60 TABLET | Refills: 0 | Status: SHIPPED | OUTPATIENT
Start: 2022-01-13 | End: 2022-02-14 | Stop reason: SDUPTHER

## 2022-01-13 NOTE — PROGRESS NOTES
TELEMEDICINE - VIDEO VISIT    You have chosen to receive care through a telehealth visit.  Do you consent to use a video/audio connection for your medical care today? Yes    Location of patient:home  Location of Provider-clinic  Anyone else present-yes, if yes- who- -- gives consent for permission to hear conversation.  Type of Technology used- ZOOM through use of Epic/Straphart visit    Requested MyChart visit due to  testing positive for COVID-19. Also patient now has symptoms.    CHIEF COMPLAINT  Back, neck and joint pain    Subjective   Traci King is a 71 y.o. female  who presents for a video visit follow-up.She has a history of chronic back, neck and joint pain. Reports her pain pattern is IMPROVED since last evaluation.    Patient presents for follow-up of PROCEDURE. Patient had a bilateral L3-L5 RFL performed by Dr. LEMUS on 1-11-22 for management of LOW BACK PAIN. Patient reports 60% ONGOING relief from the procedure.    Patient presents for follow-up of PROCEDURE. Patient had a LESI L2-3 performed by Dr. LEMUS on 12-16-21 for management of BACK PAIN/VCF. Patient reports no relief from the procedure.    Patient was evaluated in the office by Dr. Lemus on 12/8/2021.  At that time she was complaining of increasing back pain.  Wants to repeat lumbar RF ablation.  Also has been diagnosed with vertebral compression fracture.  Plan to follow-up for procedure of LESI at L2-3 ASAP for VCF pain.  Reschedule lumbar RF.  Increased Robaxin for low back pain secondary spasm.  Ordered rheumatoid panel.  Continued prescription of OxyContin.    Complains of pain in her low back, neck, joints. Today her pain is 2/10VAS.  Describes her pain as continuous aching and throbbing. Pain increases with activity, movement, household chores; pain decreases with medication, rest and procedures.  Continues with OxyContin 30 mg BID,Robaxin 750 mg 2-3/day (takes half tablets during daytime), Diclofenac 50 mg BID.  Denies any side effects from the regimen. She reports a long-standing history of constipation, and does not believe it is related to the medication.  Takes OTC DulcoLax nightly with positive results. The regimen helps decrease her pain by 70-80%.  ADL's by self. Denies any bowel or bladder changes.     Is no longer requiring oxygen.      Patient was admitted to [facility] on 10/22/2021 and discharged on 10/25/2021.  Patient presented to the hospital with shortness of breath.  She had been vaccinated but started developed symptoms of COVID-19.  She underwent monoclonal antibody infusion on 10/20/2021.  After that she developed worsening shortness of breath.  She was admitted to the hospital due to acute hypoxic respiratory failure from COVID-19.  CT showed no evidence of PE but showed persistent groundglass opacities throughout.  She was started on IV dexamethasone/remdesvir and symptoms improved.  She had a mildly elevated pro calcitonin so IV antibiotics were changed to oral antibiotics.  She completed a steroid taper and oral antibiotics.  She will require supplemental oxygen at discharge.     Reviewed [facility] office visit 11/1/2021--patient presents for follow-up of hospitalization.  Prior to hospitalization for shortness of breath and COVID-19 she had a fall.  Had x-ray of shoulder.  They advised rotator cuff damage and she needed total shoulder replacement for bone-on-bone.   Ordered shower chair.     Patient went to [facility] on 10/20/[facility]21.  Was complaining of increased lumbar pain x7 days.  She fell approximately 1 week prior to going to ER and admission reported her OxyContin was no longer working her back pain.  Was found to have superior endplate compression fracture at L2 with retropulsion causing moderate canal stenosis.  Advised consideration of MRI.  Also has suspected acute fracture involving the body of the scapula near the acromion.  Recommend orthopedic  "follow-up.  There was concern that the mechanism of energy may been higher energy than initially reported.  Recommended additional CT patient refused.  Patient signed out AMA.     She completed a bilateral L3-L5   on  8-4-21 performed by Dr. LEMUS for management of LOW BACK PAIN. Patient reports 60-80% TEMPORARY relief from the procedure for a few days. Then pain returned to baseline.     Patient had a bilateral L3-L5 RFL performed by Dr. LEMUS on 12-29-20 for management of LOW BACK PAIN. Patient reports 50% ONGOING relief from the procedure     Patient had a bilateral L3-L5 MBB performed by Dr. LEMUS on 7-28-20 for management of LOW BACK PAIN. Initially had 80-85% relief for 1 day.  Then she reports 50% ONGOING relief from the procedure for 1 month.  Reports improvement in activity and sleep.      Patient had a bilateral L3-L5 MBB performed by Dr. LEMUS on 5-20-20 for management of LOW BACK PAIN. Patient reports 80% relief from the procedure for 2 days. Pain has returned to pre-procedure level.      She completed a Cervical Facet Nerve (Medial Branch) Radiofrequency Lesioning LEFT C3-C5 RFL  on  1/26/18 performed by Dr. Lemus for management of neck pain.     She completed a Bilateral S1 Lumbar TFESI   on  8/16/2018 performed by Dr. LEMUS for management of low back and radicular pain. Patient reports NO relief from the procedure.      Failed PT, failed ultrasound therapy, failed compounded pain creme.     Had right foot injection with Dr Rodriguez on 8-12-21. Not really noticing a difference. \"they said if it stops working, I need surgery.\" She refuses to quit smoking/nicotine use and therefore won't have surgery.  Smokes 1/2 PPD since age 16.    Neck Pain   This is a chronic problem. The current episode started more than 1 year ago. The problem occurs constantly. The problem has been waxing and waning. The quality of the pain is described as burning. The pain is at a severity of 3/10. The pain is moderate. " The symptoms are aggravated by position, bending, stress and twisting. The pain is worse during the day. Stiffness is present all day. Pertinent negatives include no chest pain, fever, headaches, numbness or weakness. She has tried oral narcotics and NSAIDs (cervical MBB--significant temporary relief; cervical RFL--50% ongoing relief) for the symptoms. The treatment provided moderate relief.   Shoulder Injury   The left shoulder is affected. There was no injury mechanism. The pain radiates to the left neck and right neck. Pain severity now: unchanged since last office visit. Pertinent negatives include no chest pain or numbness. The symptoms are aggravated by movement and palpation.   Back Pain  This is a chronic problem. The current episode started more than 1 year ago. The problem occurs constantly. The problem has been waxing and waning (unchanged since last evaluation) since onset. The pain is at a severity of 3/10. The pain is worse during the day. The symptoms are aggravated by bending, position, standing and twisting. Associated symptoms include bladder incontinence (long term issue). Pertinent negatives include no abdominal pain, bowel incontinence, chest pain, dysuria, fever, headaches, numbness or weakness. Risk factors include lack of exercise, sedentary lifestyle, menopause and obesity. She has tried analgesics, bed rest, chiropractic manipulation, heat, home exercises, ice, muscle relaxant, NSAIDs and walking (lumbar MBBgave 80% relief for 1-2 days twice) for the symptoms. The treatment provided moderate relief.        The following portions of the patient's history were reviewed and updated as appropriate: allergies, current medications, past family history, past medical history, past social history, past surgical history and problem list.    Review of Systems   Constitutional: Negative for fever.   Cardiovascular: Negative for chest pain.   Gastrointestinal: Negative for abdominal pain and bowel  incontinence.   Genitourinary: Positive for bladder incontinence (long term issue). Negative for dysuria.   Musculoskeletal: Positive for back pain and neck pain.   Neurological: Negative for weakness, numbness and headaches.       Vitals:    01/13/22 0845   PainSc:   2   PainLoc: Back       Objective   Physical Exam  Constitutional:       Appearance: She is well-developed.   HENT:      Head: Normocephalic and atraumatic.   Pulmonary:      Effort: Pulmonary effort is normal.   Neurological:      Mental Status: She is alert and oriented to person, place, and time.   Psychiatric:         Speech: Speech normal.         Behavior: Behavior normal.         Thought Content: Thought content normal.         Judgment: Judgment normal.         Assessment/Plan   Diagnoses and all orders for this visit:    1. Other chronic pain (Primary)    2. DDD (degenerative disc disease), lumbar    3. Degenerative disc disease, cervical    4. Shoulder pain, unspecified chronicity, unspecified laterality    5. Lumbar facet arthropathy    6. Long-term use of high-risk medication      ----------------  Our practice is offering alternative &/or electronic methods to continue to follow our patients while at the same time further the efforts toward social distancing, in accordance with our organizational policies, professional societies' guidance, and gubernatorial mandates.  I support the Healthy at Home campaign and in this visit I have counseled the patient on our needs to limit in-person office visits and physical encounters with medical facilities whenever possible.  I have also educated the patient on the medical necessities of maintaining social distancing while we continue to function during this crisis period.      The patient was counseled on the need to consider telehealth options. The patient was offered the opportunity for a Video Visit using Zong. The patient agreed to a Video Visit. The patient was counseled on the need for a  telehealth visit for necessary monitoring. The patient was educated about our efforts to comply with monitoring standards when prescribing potent medications.    TIME:  Topics discussed are outlined in the Assessment/Plan section of the note.      ----------------    --- The urine drug screen confirmation from 7-21-21 has been reviewed and the result is APPROPRIATE based on patient history and MATTHEW report  --- REfill OxyContin DNF. Patient appears stable with current regimen. No adverse effects. Regarding continuation of opioids, there is no evidence of aberrant behavior or any red flags.  The patient continues with appropriate response to opioid therapy. ADL's remain intact by self.   --- Hold on interventions at this time. She is having some improvement in low back pain since RFA a few days ago.  --- Follow-up 1 month or sooner if needed.         MATTHEW REPORT  As part of the patient's treatment plan, I am prescribing controlled substances. The patient has been made aware of appropriate use of such medications, including potential risk of somnolence, limited ability to drive and/or work safely, and the potential for dependence or overdose. It has also bee made clear that these medications are for use by this patient only, without concomitant use of alcohol or other substances unless prescribed.     Patient has completed prescribing agreement detailing terms of continued prescribing of controlled substances, including monitoring MATTHEW reports, urine drug screening, and pill counts if necessary. The patient is aware that inappropriate use will results in cessation of prescribing such medications.    As the clinician, I personally reviewed the MATTHEW from 1-13-22 while the patient was in the office today.    History and physical exam exhibit continued safe and appropriate use of controlled substances.    -------    EMR Dragon/Transcription disclaimer:   Much of this encounter note is an electronic  transcription/translation of spoken language to printed text. The electronic translation of spoken language may permit erroneous, or at times, nonsensical words or phrases to be inadvertently transcribed; Although I have reviewed the note for such errors, some may still exist.         This document is intended for medical expert use only. Reading of this document by patients and/or patient's family without participating medical staff guidance may result in misinterpretation and unintended morbidity.   Any interpretation of such data is the responsibility of the patient and/or family member responsible for the patient in concert with their primary or specialist providers, not to be left for sources of online searches such as Resermap, [employer]aero or similar queries. Relying on these approaches to knowledge may result in misinterpretation, misguided goals of care and even death should patients or family members try recommendations outside of the realm of professional medical care in a supervised way.

## 2022-01-17 RX ORDER — METHOCARBAMOL 750 MG/1
TABLET, FILM COATED ORAL
Qty: 90 TABLET | Refills: 2 | Status: SHIPPED | OUTPATIENT
Start: 2022-01-17 | End: 2022-05-03

## 2022-01-24 ENCOUNTER — TELEPHONE (OUTPATIENT)
Dept: PAIN MEDICINE | Facility: CLINIC | Age: 72
End: 2022-01-24

## 2022-01-24 NOTE — TELEPHONE ENCOUNTER
Provider:DR LEMUS    Caller: DIOGO BISHOP     Phone Number: [phone]    Reason for Call: PT WANTED TO INFORM DR LEMUS SHE IS HAVING INCREASED PAIN FROM PROCEDURE DONE 1.11.22 , SHE WOULD LIKE TO KNOW IF SHE CAN BE SET UP FOR A PAIN BLOCK OR STRONGER MEDICATION? PLEASE ADVISE

## 2022-01-24 NOTE — TELEPHONE ENCOUNTER
She states that she became agitated, nervous and she gained weight when she was on steroids when she had covid.

## 2022-01-24 NOTE — TELEPHONE ENCOUNTER
She said she doesn't do well on steroids. I told her I will inform you but that is what you were offering. She said she guesses she will try it.

## 2022-01-24 NOTE — TELEPHONE ENCOUNTER
That's why we did telehealth visit on 1-13-22. She should be out of quarantine. Recommend evaluation. Thanks. BB

## 2022-01-27 ENCOUNTER — OFFICE VISIT (OUTPATIENT)
Dept: PAIN MEDICINE | Facility: CLINIC | Age: 72
End: 2022-01-27

## 2022-01-27 VITALS
WEIGHT: 176.8 LBS | HEIGHT: 61 IN | BODY MASS INDEX: 33.38 KG/M2 | HEART RATE: 98 BPM | SYSTOLIC BLOOD PRESSURE: 105 MMHG | TEMPERATURE: 96.9 F | OXYGEN SATURATION: 99 % | DIASTOLIC BLOOD PRESSURE: 62 MMHG | RESPIRATION RATE: 18 BRPM

## 2022-01-27 DIAGNOSIS — S32.020G COMPRESSION FRACTURE OF L2 VERTEBRA WITH DELAYED HEALING, SUBSEQUENT ENCOUNTER: ICD-10-CM

## 2022-01-27 DIAGNOSIS — G89.29 OTHER CHRONIC PAIN: Primary | ICD-10-CM

## 2022-01-27 DIAGNOSIS — M50.30 DEGENERATIVE DISC DISEASE, CERVICAL: ICD-10-CM

## 2022-01-27 DIAGNOSIS — M51.36 DDD (DEGENERATIVE DISC DISEASE), LUMBAR: ICD-10-CM

## 2022-01-27 DIAGNOSIS — M47.816 LUMBAR FACET ARTHROPATHY: ICD-10-CM

## 2022-01-27 DIAGNOSIS — Z79.899 LONG-TERM USE OF HIGH-RISK MEDICATION: ICD-10-CM

## 2022-01-27 DIAGNOSIS — M25.519 SHOULDER PAIN, UNSPECIFIED CHRONICITY, UNSPECIFIED LATERALITY: ICD-10-CM

## 2022-01-27 LAB
POC AMPHETAMINES: POSITIVE
POC BARBITURATES: NEGATIVE
POC BENZODIAZEPHINES: NEGATIVE
POC COCAINE: NEGATIVE
POC METHADONE: NEGATIVE
POC METHAMPHETAMINE SCREEN URINE: NEGATIVE
POC OPIATES: NEGATIVE
POC OXYCODONE: POSITIVE
POC PHENCYCLIDINE: NEGATIVE
POC PROPOXYPHENE: NEGATIVE
POC THC: NEGATIVE
POC TRICYCLIC ANTIDEPRESSANTS: NEGATIVE

## 2022-01-27 PROCEDURE — 99214 OFFICE O/P EST MOD 30 MIN: CPT | Performed by: NURSE PRACTITIONER

## 2022-01-27 PROCEDURE — 80305 DRUG TEST PRSMV DIR OPT OBS: CPT | Performed by: NURSE PRACTITIONER

## 2022-01-27 RX ORDER — HYDROCODONE BITARTRATE AND ACETAMINOPHEN 5; 325 MG/1; MG/1
1 TABLET ORAL EVERY 4 HOURS PRN
Qty: 18 TABLET | Refills: 0 | Status: SHIPPED | OUTPATIENT
Start: 2022-01-27 | End: 2022-03-08

## 2022-01-27 RX ORDER — NALOXONE HYDROCHLORIDE 4 MG/.1ML
1 SPRAY NASAL AS NEEDED
Qty: 1 EACH | Refills: 0 | Status: SHIPPED | OUTPATIENT
Start: 2022-01-27

## 2022-01-27 NOTE — PROGRESS NOTES
CHIEF COMPLAINT  Follow-up for back pain.    Subjective   Traci King is a 71 y.o. female  who presents for follow-up.  She has a history of chronic back, shoulder and neck pain. Reports her pain is worse since last evaluation.    Complains of pain in her low back, neck, left knee and right shoulder. Today her pain is 7/10VAS. Describes her pain as continuous throbbing and intermittent sharp pain. Points to left SI joint area. Also complains of other joints pain. Continues with OxyContin 30 mg BID,Robaxin 750 mg 2-3/day (takes half tablets during daytime), Diclofenac 50 mg BID. Denies any side effects from the regimen. She reports a long-standing history of constipation, and does not believe it is related to the medication.  Takes OTC DulcoLax nightly with positive results. The regimen helps decrease her pain by 40-50%, but not lasting between doses. Noets that right now, with increased pain, she can barely complete her daily tasks.   ADL's by self. Denies any bowel or bladder changes.     Left knee pain-- going to be seen at [facility] on 2-1-22  Right shoulder pain-- going to be seen at [facility] with Dr Randall on 2-3-22.    Patient had a bilateral L3-L5 RFL performed by Dr. LEMUS on 1-11-22 for management of LOW BACK PAIN. Patient reports 60% ONGOING relief from the procedure.     Patient presents for follow-up of PROCEDURE. Patient had a LESI L2-3 performed by Dr. LEMUS on 12-16-21 for management of BACK PAIN/VCF. Patient reports no relief from the procedure.     Patient was evaluated in the office by Dr. Lemus on 12/8/2021.  At that time she was complaining of increasing back pain.  Wants to repeat lumbar RF ablation.  Also has been diagnosed with vertebral compression fracture.  Plan to follow-up for procedure of LESI at L2-3 ASAP for VCF pain.  Reschedule lumbar RF.  Increased Robaxin for low back pain secondary spasm.  Ordered rheumatoid panel.  Continued prescription of  OxyContin.    Patient was admitted to [facility] on 10/22/2021 and discharged on 10/25/2021.  Patient presented to the hospital with shortness of breath.  She had been vaccinated but started developed symptoms of COVID-19.  She underwent monoclonal antibody infusion on 10/20/2021.  After that she developed worsening shortness of breath.  She was admitted to the hospital due to acute hypoxic respiratory failure from COVID-19.  CT showed no evidence of PE but showed persistent groundglass opacities throughout.  She was started on IV dexamethasone/remdesvir and symptoms improved.  She had a mildly elevated pro calcitonin so IV antibiotics were changed to oral antibiotics.  She completed a steroid taper and oral antibiotics.  She will require supplemental oxygen at discharge.     Reviewed [facility] office visit 11/1/2021--patient presents for follow-up of hospitalization.  Prior to hospitalization for shortness of breath and COVID-19 she had a fall.  Had x-ray of shoulder.  They advised rotator cuff damage and she needed total shoulder replacement for bone-on-bone.   Ordered shower chair.     Patient went to [facility] on 10/20/2021.  Was complaining of increased lumbar pain x7 days.  She fell approximately 1 week prior to going to ER and admission reported her OxyContin was no longer working her back pain.  Was found to have superior endplate compression fracture at L2 with retropulsion causing moderate canal stenosis.  Advised consideration of MRI.  Also has suspected acute fracture involving the body of the scapula near the acromion.  Recommend orthopedic follow-up.  There was concern that the mechanism of energy may been higher energy than initially reported.  Recommended additional CT patient refused.  Patient signed out AMA.     She completed a bilateral L3-L5   on  8-4-21 performed by Dr. LEMUS for management of LOW BACK PAIN. Patient reports 60-80% TEMPORARY relief  "from the procedure for a few days. Then pain returned to baseline.     Patient had a bilateral L3-L5 RFL performed by Dr. LEMUS on 12-29-20 for management of LOW BACK PAIN. Patient reports 50% ONGOING relief from the procedure     Patient had a bilateral L3-L5 MBB performed by Dr. LEMUS on 7-28-20 for management of LOW BACK PAIN. Initially had 80-85% relief for 1 day.  Then she reports 50% ONGOING relief from the procedure for 1 month.  Reports improvement in activity and sleep.      Patient had a bilateral L3-L5 MBB performed by Dr. LEMUS on 5-20-20 for management of LOW BACK PAIN. Patient reports 80% relief from the procedure for 2 days. Pain has returned to pre-procedure level.      She completed a Cervical Facet Nerve (Medial Branch) Radiofrequency Lesioning LEFT C3-C5 RFL  on  1/26/18 performed by Dr. Lemus for management of neck pain.     She completed a Bilateral S1 Lumbar TFESI   on  8/16/2018 performed by Dr. LEMUS for management of low back and radicular pain. Patient reports NO relief from the procedure.      Failed PT, failed ultrasound therapy, failed compounded pain creme.     Had right foot injection with Dr Rodriguez on 8-12-21. Not really noticing a difference. \"they said if it stops working, I need surgery.\" She refuses to quit smoking/nicotine use and therefore won't have surgery.  Smokes 1/2 PPD since age 16.    Patient remained masked during entire encounter. No cough present. I donned a mask and eye protection throughout entire visit. Prior to donning mask and eye protection, hand hygiene was performed, as well as when it was doffed.  I was closer than 6 feet, but not for an extended period of time. No obvious exposure to any bodily fluids.    Neck Pain   This is a chronic problem. The current episode started more than 1 year ago. The problem occurs constantly. The problem has been waxing and waning. The quality of the pain is described as burning. The pain is at a severity of 7/10. The " pain is moderate. The symptoms are aggravated by position, bending, stress and twisting. The pain is worse during the day. Stiffness is present all day. Pertinent negatives include no chest pain, fever, headaches, numbness or weakness. She has tried oral narcotics and NSAIDs (cervical MBB--significant temporary relief; cervical RFL--50% ongoing relief) for the symptoms. The treatment provided moderate relief.   Shoulder Injury   The left shoulder is affected. There was no injury mechanism. The pain radiates to the left neck and right neck. Pain severity now: unchanged since last office visit. Pertinent negatives include no chest pain or numbness. The symptoms are aggravated by movement and palpation.   Back Pain  This is a chronic problem. The current episode started more than 1 year ago. The problem occurs constantly. The problem has been waxing and waning (unchanged since last evaluation) since onset. The pain is at a severity of 7/10. The pain is worse during the day. The symptoms are aggravated by bending, position, standing and twisting. Associated symptoms include bladder incontinence (long term issue). Pertinent negatives include no abdominal pain, bowel incontinence, chest pain, dysuria, fever, headaches, numbness or weakness. Risk factors include lack of exercise, sedentary lifestyle, menopause and obesity. She has tried analgesics, bed rest, chiropractic manipulation, heat, home exercises, ice, muscle relaxant, NSAIDs and walking (lumbar MBBgave 80% relief for 1-2 days twice) for the symptoms. The treatment provided moderate relief.        PEG Assessment   What number best describes your pain on average in the past week?7  What number best describes how, during the past week, pain has interfered with your enjoyment of life?7  What number best describes how, during the past week, pain has interfered with your general activity?  7    The following portions of the patient's history were reviewed and updated  "as appropriate: allergies, current medications, past family history, past medical history, past social history, past surgical history and problem list.    Review of Systems   Constitutional: Negative for fatigue and fever.   HENT: Negative for congestion.    Eyes: Negative for visual disturbance.   Respiratory: Positive for shortness of breath. Negative for cough and wheezing.    Cardiovascular: Negative for chest pain.   Gastrointestinal: Positive for constipation. Negative for abdominal pain and bowel incontinence.   Genitourinary: Positive for bladder incontinence (long term issue). Negative for difficulty urinating and dysuria.   Musculoskeletal: Positive for back pain and neck pain.   Neurological: Negative for weakness, numbness and headaches.   Psychiatric/Behavioral: Positive for sleep disturbance. Negative for suicidal ideas. The patient is not nervous/anxious.      I have reviewed and confirmed the accuracy of the ROS as documented by the MA/LPN/RN DACIA Baez    Vitals:    01/27/22 1350   BP: 105/62   Pulse: 98   Resp: 18   Temp: 96.9 °F (36.1 °C)   SpO2: 99%   Weight: 80.2 kg (176 lb 12.8 oz)   Height: 154.9 cm (61\")   PainSc:   7   PainLoc: Back       Objective   Physical Exam  Vitals and nursing note reviewed.   Constitutional:       Appearance: Normal appearance. She is well-developed.   HENT:      Head: Normocephalic and atraumatic.   Musculoskeletal:      Right shoulder: Tenderness and bony tenderness present. Decreased range of motion.      Cervical back: Spinous process tenderness and muscular tenderness present.      Lumbar back: Tenderness and bony tenderness present. Decreased range of motion.      Comments: Mild lumbar axial pain  Moderate tenderness over left SI joint   Skin:     General: Skin is warm and dry.   Neurological:      Mental Status: She is alert.      Gait: Gait abnormal.   Psychiatric:         Speech: Speech normal.         Behavior: Behavior normal. Behavior is " cooperative.         Thought Content: Thought content normal.         Judgment: Judgment normal.           Assessment/Plan   Diagnoses and all orders for this visit:    1. Other chronic pain (Primary)    2. Degenerative disc disease, cervical    3. Lumbar facet arthropathy  -     MRI Lumbar Spine Without Contrast; Future    4. DDD (degenerative disc disease), lumbar  -     MRI Lumbar Spine Without Contrast; Future    5. Shoulder pain, unspecified chronicity, unspecified laterality    6. Long-term use of high-risk medication    7. Compression fracture of L2 vertebra with delayed healing, subsequent encounter  -     MRI Lumbar Spine Without Contrast; Future    Other orders  -     HYDROcodone-acetaminophen (NORCO) 5-325 MG per tablet; Take 1 tablet by mouth Every 4 (Four) Hours As Needed for Severe Pain .  Dispense: 18 tablet; Refill: 0  -     naloxone (Narcan) 4 MG/0.1ML nasal spray; 1 spray into the nostril(s) as directed by provider As Needed (overdose symptoms).  Dispense: 1 each; Refill: 0      --- Routine UDS in office today as part of monitoring requirements for controlled substances.  The specimen was viewed and the immunoassay result reviewed and is +OXY, +AMPH.  This specimen will be sent to [email or web address] for confirmation.     ---The patient signed an updated copy of the controlled substance agreement on 1-27-22  --- A medication management agreement is signed by the patient and the provider today.  Reviewed with the patient that this contract is specific to controlled substances, specifically pain medication.  Reviewed core of pain medicine is to decrease pain and increase functional level.  Reviewed the medication must be picked up as a written prescription from this office and will not be called into any pharmacy.  Reviewed the use of Bakari within the office setting.  Reviewed causes for potential of discontinuation of opiates,  including but not limited to consideration for diversion, obtaining  other controlled substances from other license practitioners, or  inappropriate office behavior.  Patient understands to use a controlled substance as prescribed by physician and to avoid improper use of controlled substances.  Patient will also verbalized understanding that prescriptions to be filled at the same pharmacy  unless office staff is made aware of this.  Patient understands they can be submitted to random urine drug screens and/or random pill counts on any request.  Patient understands they are not to receive early refills.  The patient must produce an official police report for any effort to replace controlled substances that are lost or stolen.  No use of illegal street drugs while receiving controlled substances from this prescribing provider.  The patient is to take medication as prescribed  and not deviate from the normal schedule without consultation from the provider.  Patient is not to share the medication with others or to take medication with alcohol or other sedatives.  Use caution when driving or operating machinery.  Alert office if the patient becomes pregnant or begins nursing a child.  Reviewed the use of controlled substances recreates a risk for respiratory depression, which may result in serious harm or even death.  Must always keep the prescription in the original container.  Patient is to store controlled substances in a locked cabinet or other secure storage unit that is cool, dry and out of sunlight.  Patient must immediately notify office if any controlled substances is stolen or improperly taken by another individual.  Reviewed risk for physical dependence, tolerance and addiction.  --- lumbar MRI.   --- Acute supply of Hydrocodone 5/325 q4hrs PRN #18 to be taken very sparingly. Discussed medication with the patient.  Included in this discussion was the potential for side effects and adverse events.  Patient verbalized understanding and wished to proceed.  Prescription will be  sent to pharmacy.  --- Continue with other medication as previously prescribed.   --- Continue with other specialists as planned.  --- Narcan to pharmacy for emergency use.  --- Follow-up as scheduled.     -------  NALOXONE:  What it is and why we prescribe it    Naloxone is a medication that is used to reverse the effects of opioid/opiate pain medications.  In the event of an emergency, it can be used by you or a family member to block the harmful effect of a pain medications.    Always follow our prescribing instructions and do not violate our medication management agreement.    Opioid pain medications carry many severe risks including risk of respiratory depression (i.e. Stop breathing, or breathing too shallow) and this leads to death.  Naloxone can save a life.      Accidents can happen, and if there was an event in which a person took too much pain medication, or accidentally took a dose too soon, there can be major consequences.   This is a drug overdose.   With too much pain medication, a person can stop breathing and can die.      Also, your health might change.  If you were to be sick, that could decrease your metabolism.  If your bowels or liver or kidney function was to change, your body might not process the opioid pain medication as well or as quickly.  If that was to happen, then you could be at risk for overdose even with the same dose of medication.      The pharmacist can show you how to use Naloxone in an emergency.  Different forms come in injections to give in a muscle as a shot, or as a nasal spray.      If a person who takes opioids was to be found to be oversedated, or not breathing, or worse, give the Naloxone (the entire dose) immediately and call 911.  Naloxone will hopefully give the EMS paramedics time to respond and provide further & better immediate treatment.   -------               MATTHEW REPORT  As part of the patient's treatment plan, I am prescribing controlled substances. The  patient has been made aware of appropriate use of such medications, including potential risk of somnolence, limited ability to drive and/or work safely, and the potential for dependence or overdose. It has also bee made clear that these medications are for use by this patient only, without concomitant use of alcohol or other substances unless prescribed.     Patient has completed prescribing agreement detailing terms of continued prescribing of controlled substances, including monitoring MATTHEW reports, urine drug screening, and pill counts if necessary. The patient is aware that inappropriate use will results in cessation of prescribing such medications.    As the clinician, I personally reviewed the MATTHEW from 1-27-22 while the patient was in the office today.    History and physical exam exhibit continued safe and appropriate use of controlled substances.       Dictated utilizing Dragon dictation.     This document is intended for medical expert use only. Reading of this document by patients and/or patient's family without participating medical staff guidance may result in misinterpretation and unintended morbidity.   Any interpretation of such data is the responsibility of the patient and/or family member responsible for the patient in concert with their primary or specialist providers, not to be left for sources of online searches such as [employer], Since1910[employer]com or similar queries. Relying on these approaches to knowledge may result in misinterpretation, misguided goals of care and even death should patients or family members try recommendations outside of the realm of professional medical care in a supervised way.

## 2022-02-14 ENCOUNTER — OFFICE VISIT (OUTPATIENT)
Dept: PAIN MEDICINE | Facility: CLINIC | Age: 72
End: 2022-02-14

## 2022-02-14 VITALS
SYSTOLIC BLOOD PRESSURE: 117 MMHG | TEMPERATURE: 97.1 F | HEART RATE: 87 BPM | OXYGEN SATURATION: 98 % | DIASTOLIC BLOOD PRESSURE: 68 MMHG | HEIGHT: 61 IN | WEIGHT: 178.6 LBS | BODY MASS INDEX: 33.72 KG/M2

## 2022-02-14 DIAGNOSIS — M47.816 LUMBAR FACET ARTHROPATHY: ICD-10-CM

## 2022-02-14 DIAGNOSIS — M51.36 DDD (DEGENERATIVE DISC DISEASE), LUMBAR: ICD-10-CM

## 2022-02-14 DIAGNOSIS — G89.4 CHRONIC PAIN SYNDROME: ICD-10-CM

## 2022-02-14 DIAGNOSIS — S32.020G COMPRESSION FRACTURE OF L2 VERTEBRA WITH DELAYED HEALING, SUBSEQUENT ENCOUNTER: ICD-10-CM

## 2022-02-14 DIAGNOSIS — G89.29 OTHER CHRONIC PAIN: Primary | ICD-10-CM

## 2022-02-14 DIAGNOSIS — Z79.899 LONG-TERM USE OF HIGH-RISK MEDICATION: ICD-10-CM

## 2022-02-14 DIAGNOSIS — M50.30 DEGENERATIVE DISC DISEASE, CERVICAL: ICD-10-CM

## 2022-02-14 DIAGNOSIS — M79.18 MYOFASCIAL PAIN SYNDROME OF LUMBAR SPINE: ICD-10-CM

## 2022-02-14 DIAGNOSIS — M25.519 SHOULDER PAIN, UNSPECIFIED CHRONICITY, UNSPECIFIED LATERALITY: ICD-10-CM

## 2022-02-14 PROCEDURE — 99214 OFFICE O/P EST MOD 30 MIN: CPT | Performed by: NURSE PRACTITIONER

## 2022-02-14 RX ORDER — OXYCODONE HYDROCHLORIDE 30 MG/1
30 TABLET, FILM COATED, EXTENDED RELEASE ORAL EVERY 12 HOURS SCHEDULED
Qty: 60 TABLET | Refills: 0 | Status: SHIPPED | OUTPATIENT
Start: 2022-02-14 | End: 2022-03-08 | Stop reason: SDUPTHER

## 2022-02-14 NOTE — PROGRESS NOTES
CHIEF COMPLAINT  Back, neck and joint pain    Subjective   Traci King is a 71 y.o. female  who presents for follow-up.  She has a history of chronic back, neck and joint pain. Reports her pain is WORSE since last evaluation. She reports she had a fall at [employer] on 2-9-22. She did not go to ER. Did make a report at [facility] [employer]. Was able to finish her shopping. She did fall and hit her low back and back of head.  Denies any LOC. She did eventually file a report but had to hurry through this because she wanted to get her refrigerated items home.  Is now having headaches.  Denies a need to go to ER currently. Refuses to go to ER currently. Recommend she follow up Dr Fournier IMMEDIATELY. Prior to fall, she reports her pain had improved since last evaluatino.    Patient was last evaluated in the office on 1/27/2022.  At that time she presented with complaints of increasing pain.  Plan was to obtain a lumbar MRI.  She was also given acute supply of hydrocodone 5-3 25 every 4 hours as needed quantity 18 to be taken very sparingly.  Also Narcan was sent to the pharmacy for emergency use.  Plan to follow-up as scheduled.    Complains of pain in her low back, neck and joints. Today her pain is 8/10VAs. Describes her pain as continuous aching and throbbing. Pain increases with walking, standing, activity, household chores; pain decreases with medication, rest and procedures. AT office visit on 1-27-22, she was given a temporary supply of hydrocodone 5/325 #18. Reports she has #4 of these left.   Continues with OxyContin 30 mg BID,Robaxin 750 mg 2-3/day (takes half tablets during daytime), Diclofenac 50 mg BID. Denies any side effects from the regimen. She reports a long-standing history of constipation, and does not believe it is related to the medication.  Takes OTC DulcoLax nightly with positive results. The regimen helps decrease her pain by 40-50%, but not lasting between doses.   ADL's by self. Denies any bowel or  bladder changes.     She is scheduled for lumbar MRI 2-25-22. Wants to know if there is a place where she can get this done sooner.     Left knee pain-- going to be seen at [facility] on 2-1-22  Right shoulder pain-- going to be seen at [facility] with Dr Randall on 2-3-22.     Patient had a bilateral L3-L5 RFL performed by Dr. LEMUS on 1-11-22 for management of LOW BACK PAIN. Patient reports 60% ONGOING relief from the procedure.     Patient presents for follow-up of PROCEDURE. Patient had a LESI L2-3 performed by Dr. LEMUS on 12-16-21 for management of BACK PAIN/VCF. Patient reports no relief from the procedure.     Patient was evaluated in the office by Dr. Lemus on 12/8/2021.  At that time she was complaining of increasing back pain.  Wants to repeat lumbar RF ablation.  Also has been diagnosed with vertebral compression fracture.  Plan to follow-up for procedure of LESI at L2-3 ASAP for VCF pain.  Reschedule lumbar RF.  Increased Robaxin for low back pain secondary spasm.  Ordered rheumatoid panel.  Continued prescription of OxyContin.     Patient was admitted to [facility] on 10/22/2021 and discharged on 10/25/2021.  Patient presented to the hospital with shortness of breath.  She had been vaccinated but started developed symptoms of COVID-19.  She underwent monoclonal antibody infusion on 10/20/2021.  After that she developed worsening shortness of breath.  She was admitted to the hospital due to acute hypoxic respiratory failure from COVID-19.  CT showed no evidence of PE but showed persistent groundglass opacities throughout.  She was started on IV dexamethasone/remdesvir and symptoms improved.  She had a mildly elevated pro calcitonin so IV antibiotics were changed to oral antibiotics.  She completed a steroid taper and oral antibiotics.  She will require supplemental oxygen at discharge.     Reviewed [facility] office visit 11/1/2021--patient presents  for follow-up of hospitalization.  Prior to hospitalization for shortness of breath and COVID-19 she had a fall.  Had x-ray of shoulder.  They advised rotator cuff damage and she needed total shoulder replacement for bone-on-bone.   Ordered shower chair.     Patient went to [facility] on 10/20/2021.  Was complaining of increased lumbar pain x7 days.  She fell approximately 1 week prior to going to ER and admission reported her OxyContin was no longer working her back pain.  Was found to have superior endplate compression fracture at L2 with retropulsion causing moderate canal stenosis.  Advised consideration of MRI.  Also has suspected acute fracture involving the body of the scapula near the acromion.  Recommend orthopedic follow-up.  There was concern that the mechanism of energy may been higher energy than initially reported.  Recommended additional CT patient refused.  Patient signed out AMA.     She completed a bilateral L3-L5   on  8-4-21 performed by Dr. LEMUS for management of LOW BACK PAIN. Patient reports 60-80% TEMPORARY relief from the procedure for a few days. Then pain returned to baseline.     Patient had a bilateral L3-L5 RFL performed by Dr. LEMUS on 12-29-20 for management of LOW BACK PAIN. Patient reports 50% ONGOING relief from the procedure     Patient had a bilateral L3-L5 MBB performed by Dr. LEMUS on 7-28-20 for management of LOW BACK PAIN. Initially had 80-85% relief for 1 day.  Then she reports 50% ONGOING relief from the procedure for 1 month.  Reports improvement in activity and sleep.      Patient had a bilateral L3-L5 MBB performed by Dr. LEMUS on 5-20-20 for management of LOW BACK PAIN. Patient reports 80% relief from the procedure for 2 days. Pain has returned to pre-procedure level.      She completed a Cervical Facet Nerve (Medial Branch) Radiofrequency Lesioning LEFT C3-C5 RFL  on  1/26/18 performed by Dr. Lemus for management of neck  "pain.     She completed a Bilateral S1 Lumbar TFESI   on  8/16/2018 performed by Dr. LEMUS for management of low back and radicular pain. Patient reports NO relief from the procedure.      Failed PT, failed ultrasound therapy, failed compounded pain creme.     Had right foot injection with Dr Rodriguez on 8-12-21. Not really noticing a difference. \"they said if it stops working, I need surgery.\" She refuses to quit smoking/nicotine use and therefore won't have surgery.  Smokes 1/2 PPD since age 16.       Patient remained masked during entire encounter. No cough present. I donned a mask and eye protection throughout entire visit. Prior to donning mask and eye protection, hand hygiene was performed, as well as when it was doffed.  I was closer than 6 feet, but not for an extended period of time. No obvious exposure to any bodily fluids.    History of Present Illness       The following portions of the patient's history were reviewed and updated as appropriate: allergies, current medications, past family history, past medical history, past social history, past surgical history and problem list.    Review of Systems   Constitutional: Positive for fatigue. Negative for fever.   HENT: Negative for congestion and sinus pain.    Eyes: Negative for pain and discharge.   Respiratory: Positive for shortness of breath. Negative for cough.    Cardiovascular: Negative for chest pain and palpitations.   Gastrointestinal: Negative for abdominal pain, constipation and diarrhea.   Endocrine: Negative for cold intolerance and heat intolerance.   Genitourinary: Negative for difficulty urinating and frequency.   Musculoskeletal: Positive for arthralgias, back pain and gait problem.   Skin: Negative for rash and wound.   Allergic/Immunologic: Negative for food allergies.   Neurological: Negative for dizziness and light-headedness.   Hematological: Bruises/bleeds easily.   Psychiatric/Behavioral: Negative for agitation and confusion.     I " "have reviewed and confirmed the accuracy of the ROS as documented by the MA/LPN/RN Patricia Chowdary, APRN      Vitals:    02/14/22 1106   BP: 117/68   Pulse: 87   Temp: 97.1 °F (36.2 °C)   SpO2: 98%   Weight: 81 kg (178 lb 9.6 oz)   Height: 154.9 cm (61\")   PainSc:   8   PainLoc: Back         Objective   Physical Exam  Vitals and nursing note reviewed.   Constitutional:       Appearance: Normal appearance. She is well-developed.   HENT:      Head: Normocephalic and atraumatic.   Musculoskeletal:      Right shoulder: Tenderness and bony tenderness present. Decreased range of motion.      Cervical back: Spinous process tenderness and muscular tenderness present.      Lumbar back: Tenderness and bony tenderness present. Decreased range of motion.        Back:       Comments: Mild lumbar axial pain     Skin:     General: Skin is warm and dry.   Neurological:      Mental Status: She is alert.      Gait: Gait abnormal.   Psychiatric:         Speech: Speech normal.         Behavior: Behavior normal. Behavior is cooperative.         Thought Content: Thought content normal.         Judgment: Judgment normal.             Assessment/Plan   Diagnoses and all orders for this visit:    1. Other chronic pain (Primary)    2. Degenerative disc disease, cervical    3. Lumbar facet arthropathy  -     MRI Lumbar Spine Without Contrast; Future    4. DDD (degenerative disc disease), lumbar  -     MRI Lumbar Spine Without Contrast; Future    5. Shoulder pain, unspecified chronicity, unspecified laterality    6. Long-term use of high-risk medication    7. Compression fracture of L2 vertebra with delayed healing, subsequent encounter  -     MRI Lumbar Spine Without Contrast; Future      --- The urine drug screen confirmation from 1-27-22 has been reviewed and the result is APPROPRIATE based on patient history and MATTHEW report  --- The patient signed an updated copy of the controlled substance agreement on 1-27-22  --- Re-order lumbar MRI. " Patient requests [facility].  --- Refill OxyContin. Patient appears stable with current regimen. No adverse effects. Regarding continuation of opioids, there is no evidence of aberrant behavior or any red flags.  The patient continues with appropriate response to opioid therapy. ADL's remain intact by self.  REviewed Hydrocodone was for short-term treatment.  --- RECOMMEND PATIENT CALL FOLLOW UP WITH ER OR PCP IN [place] TO HEADACHE AND FALLS  --- RECOMMEND CALLING [facility] IN [place] TO RECENT FALL AND ISSUES SINCE FALL  --- Follow-up 1 month or sooner if needed.       MATTHEW REPORT  As part of the patient's treatment plan, I am prescribing controlled substances. The patient has been made aware of appropriate use of such medications, including potential risk of somnolence, limited ability to drive and/or work safely, and the potential for dependence or overdose. It has also bee made clear that these medications are for use by this patient only, without concomitant use of alcohol or other substances unless prescribed.     Patient has completed prescribing agreement detailing terms of continued prescribing of controlled substances, including monitoring MATTHEW reports, urine drug screening, and pill counts if necessary. The patient is aware that inappropriate use will results in cessation of prescribing such medications.    As the clinician, I personally reviewed the MATTHEW from 2-14-22 while the patient was in the office today.    History and physical exam exhibit continued safe and appropriate use of controlled substances.       Dictated utilizing Dragon dictation.     This document is intended for medical expert use only. Reading of this document by patients and/or patient's family without participating medical staff guidance may result in misinterpretation and unintended morbidity.   Any interpretation of such data is the responsibility of the patient and/or family member responsible for the patient in  concert with their primary or specialist providers, not to be left for sources of online searches such as [employer], [employer] or similar queries. Relying on these approaches to knowledge may result in misinterpretation, misguided goals of care and even death should patients or family members try recommendations outside of the realm of professional medical care in a supervised way.

## 2022-02-16 ENCOUNTER — TELEPHONE (OUTPATIENT)
Dept: PAIN MEDICINE | Facility: CLINIC | Age: 72
End: 2022-02-16

## 2022-02-16 ENCOUNTER — TELEPHONE (OUTPATIENT)
Dept: INTERNAL MEDICINE | Facility: CLINIC | Age: 72
End: 2022-02-16

## 2022-02-16 NOTE — TELEPHONE ENCOUNTER
SEE MESSAGE FROM PATIENT, PLEASE.    I S/W THE PATIENT AND TOLD HER THAT SHE NEEDED TO GO TO THE EMERGENCY ROOM.  I TOLD HER THAT SHE NEEDS TO GET CHECKED OUT, BECAUSE IT'S A HEAD INJURY.  I LET HER KNOW THAT YOU WERE NOT IN TODAY, BUT SHE STILL WANTED ME TO ASK YOU TO SEND IN AN ORDER FOR A CT SCAN.  I TOLD HER THAT I WOULD SEND YOU A MESSAGE, BUT THAT SHE REALLY NEEDED TO GET LOOKED AT TODAY. SHE SAID THAT SHE WOULD WAIT TO SEE WHAT YOU SAID, EVEN IF IT WASN'T UNTIL TOMORROW BEFORE SHE RECEIVED AN ANSWER.    THANK YOU

## 2022-02-16 NOTE — TELEPHONE ENCOUNTER
She needs to be evaluated before a CT Scan can be ordered, I am sure the ER actually meant she could try to see her PCP to get a CT scan ordered. She may want to call other ERs to see if they are all functioning on the same wait, it is usually slower earlier in the day and gets longer from there. I can't just order a head CT without seeing her and I know I dont have much available before the weekend. The other benefit of the ER is that if the CT is negative but there is still concern she could potentially also get the MRI right then which I would not be able to coordinate without a significant time delay

## 2022-02-16 NOTE — TELEPHONE ENCOUNTER
PATIENT FELL A WEEK AGO AT [place]- HIT HER HEAD AND NOW HAVING HEADACHES    SHE CALLED THE ER AND THERE WAS AN ALL DAY WAIT TO BE SEEN     THE ER ADVISED HER TO GET ORDERS FROM YOU FOR A SCAN    CAN YOU SEND THAT IN FOR HER?    THERE WERE NO SOON APPT FOR AN OFFICE VISIT     PLEASE CALL AND ADVISE  Traci King (Self) [phone] (H)

## 2022-02-16 NOTE — TELEPHONE ENCOUNTER
I S/W THE PATIENT AND RELAYED YOUR MESSAGE.  SHE GOT MAD AND SAID THAT SHE DOESN'T KNOW WHY YOU CAN'T JUST ORDER IT FOR HER.  I TOLD HER THAT SHE NEEDED TO BE EVALUATED FIRST AND THAT THE HOSPITAL MAY DO OTHER TESTS, ALSO.  SHE GOT MAD AND HUNG UP ON ME.    THANK YOU

## 2022-02-16 NOTE — TELEPHONE ENCOUNTER
Caller: DIOGO BISHOP     Relationship: SELF     Best call back number: [phone]     What form or medical record are you requesting: MRI ORDERS     Who is requesting this form or medical record from you: [facility]     How would you like to receive the form or medical records (pick-up, mail, fax): FAX  If fax, what is the fax number:

## 2022-02-21 NOTE — TELEPHONE ENCOUNTER
PATIENT CALLED BACK.  STATES SHE HAS NOT HEARD FROM [facility] [address] REGARDING REQUESTED MRI- PATIENT STATES SHE IS IN SEVERE PAIN- FEELS HER BACK MAY BE BROKEN    HUB ATTEMPTED WARM TRANSFER TO CLINICAL LINE-   PLEASE CONTACT PATIENT REGARDING STATUS OF MRI REQUEST-

## 2022-02-23 NOTE — TELEPHONE ENCOUNTER
Ms. King called today and states that she had a CT of her lumbar done today at [facility] while in the ER. She wanted to know if she still needed to have the MRI done on Friday? The results aren't available yet.

## 2022-02-25 ENCOUNTER — APPOINTMENT (OUTPATIENT)
Dept: MRI IMAGING | Facility: HOSPITAL | Age: 72
End: 2022-02-25

## 2022-03-07 ENCOUNTER — TELEPHONE (OUTPATIENT)
Dept: PAIN MEDICINE | Facility: CLINIC | Age: 72
End: 2022-03-07

## 2022-03-07 NOTE — TELEPHONE ENCOUNTER
Provider: KEMI VICTORIA    Caller: DIOGO BISHOP    Relationship to Patient: SELF    Pharmacy: [facility] IN [place]    Phone Number: [phone]    Reason for Call: PATIENT CALLING KEMI ABOUT THE CTs THAT SHE HAD DONE, BUT SHE IS IN A LOT OF PAIN AND WOULD LIKE TO TALK TO KEMI ABOUT GETTING SOMETHING STRONGER FOR PAIN SINCE HER BACK IS BROKEN AND SHE'S HAVING A LOT OF PAIN. PATIENT SAID SHE CANNOT DO ANYTHING AND DOES NOT HAVE ANY QUALITY OF LIFE RIGHT NOW. SHE SAID THAT HER L2 WAS CRUSHED 70% ACCORDING TO THE CTs THAT WERE TAKEN. PLEASE HAVE KEMI CALL THE PATIENT BACK ASAP.

## 2022-03-07 NOTE — TELEPHONE ENCOUNTER
Patricia is very packed out tomorrow.  Please make that a 10:30 AM visit with me tomorrow.  I want her to keep her next follow-up with Patricia for the 14th also

## 2022-03-07 NOTE — TELEPHONE ENCOUNTER
She states that they wont give her anything pain. Can you give her something until she sees neurosurgery?

## 2022-03-08 ENCOUNTER — OFFICE VISIT (OUTPATIENT)
Dept: PAIN MEDICINE | Facility: CLINIC | Age: 72
End: 2022-03-08

## 2022-03-08 VITALS
RESPIRATION RATE: 12 BRPM | TEMPERATURE: 96.3 F | WEIGHT: 180 LBS | HEART RATE: 103 BPM | BODY MASS INDEX: 33.99 KG/M2 | DIASTOLIC BLOOD PRESSURE: 64 MMHG | HEIGHT: 61 IN | OXYGEN SATURATION: 96 % | SYSTOLIC BLOOD PRESSURE: 114 MMHG

## 2022-03-08 DIAGNOSIS — S32.020G COMPRESSION FRACTURE OF L2 VERTEBRA WITH DELAYED HEALING, SUBSEQUENT ENCOUNTER: ICD-10-CM

## 2022-03-08 DIAGNOSIS — M79.18 MYOFASCIAL PAIN SYNDROME OF LUMBAR SPINE: ICD-10-CM

## 2022-03-08 DIAGNOSIS — Z79.899 ENCOUNTER FOR LONG-TERM (CURRENT) USE OF HIGH-RISK MEDICATION: ICD-10-CM

## 2022-03-08 DIAGNOSIS — M50.30 DEGENERATIVE DISC DISEASE, CERVICAL: ICD-10-CM

## 2022-03-08 DIAGNOSIS — M48.02 SPINAL STENOSIS, CERVICAL REGION: ICD-10-CM

## 2022-03-08 DIAGNOSIS — M15.9 PRIMARY OSTEOARTHRITIS INVOLVING MULTIPLE JOINTS: Chronic | ICD-10-CM

## 2022-03-08 DIAGNOSIS — G89.4 CHRONIC PAIN SYNDROME: Primary | ICD-10-CM

## 2022-03-08 PROCEDURE — 99214 OFFICE O/P EST MOD 30 MIN: CPT | Performed by: ANESTHESIOLOGY

## 2022-03-08 RX ORDER — OXYCODONE HYDROCHLORIDE 30 MG/1
30 TABLET, FILM COATED, EXTENDED RELEASE ORAL EVERY 12 HOURS SCHEDULED
Qty: 60 TABLET | Refills: 0 | Status: SHIPPED | OUTPATIENT
Start: 2022-03-14 | End: 2022-04-12 | Stop reason: SDUPTHER

## 2022-03-08 RX ORDER — HYDROCODONE BITARTRATE AND ACETAMINOPHEN 10; 325 MG/1; MG/1
1 TABLET ORAL EVERY 6 HOURS PRN
Qty: 25 TABLET | Refills: 0 | Status: SHIPPED | OUTPATIENT
Start: 2022-03-08 | End: 2022-04-08 | Stop reason: SDUPTHER
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
## 2022-03-14 ENCOUNTER — OFFICE VISIT (OUTPATIENT)
Dept: PAIN MEDICINE | Facility: CLINIC | Age: 72
End: 2022-03-14

## 2022-03-14 VITALS
WEIGHT: 179.4 LBS | BODY MASS INDEX: 33.87 KG/M2 | RESPIRATION RATE: 14 BRPM | DIASTOLIC BLOOD PRESSURE: 63 MMHG | TEMPERATURE: 96.9 F | OXYGEN SATURATION: 96 % | HEART RATE: 91 BPM | SYSTOLIC BLOOD PRESSURE: 113 MMHG | HEIGHT: 61 IN

## 2022-03-14 DIAGNOSIS — G89.4 CHRONIC PAIN SYNDROME: Primary | ICD-10-CM

## 2022-03-14 DIAGNOSIS — S32.020G COMPRESSION FRACTURE OF L2 VERTEBRA WITH DELAYED HEALING, SUBSEQUENT ENCOUNTER: ICD-10-CM

## 2022-03-14 DIAGNOSIS — Z79.899 ENCOUNTER FOR LONG-TERM (CURRENT) USE OF HIGH-RISK MEDICATION: ICD-10-CM

## 2022-03-14 DIAGNOSIS — M15.9 PRIMARY OSTEOARTHRITIS INVOLVING MULTIPLE JOINTS: ICD-10-CM

## 2022-03-14 DIAGNOSIS — M47.816 LUMBAR FACET ARTHROPATHY: ICD-10-CM

## 2022-03-14 DIAGNOSIS — M50.30 DEGENERATIVE DISC DISEASE, CERVICAL: ICD-10-CM

## 2022-03-14 DIAGNOSIS — M51.36 DDD (DEGENERATIVE DISC DISEASE), LUMBAR: ICD-10-CM

## 2022-03-14 PROCEDURE — 99214 OFFICE O/P EST MOD 30 MIN: CPT | Performed by: NURSE PRACTITIONER

## 2022-03-14 RX ORDER — PHENTERMINE HYDROCHLORIDE 37.5 MG/1
37.5 TABLET ORAL DAILY
Status: ON HOLD | COMMUNITY
Start: 2022-03-01 | End: 2022-04-27

## 2022-03-14 RX ORDER — AMOXICILLIN 500 MG/1
TABLET, FILM COATED ORAL
Status: ON HOLD | COMMUNITY
Start: 2022-02-24 | End: 2022-04-27

## 2022-03-14 RX ORDER — LIDOCAINE 50 MG/G
PATCH TOPICAL
COMMUNITY
Start: 2022-02-24 | End: 2022-05-12 | Stop reason: SDUPTHER

## 2022-03-14 NOTE — PROGRESS NOTES
"CHIEF COMPLAINT  Back pain    Subjective   Traci King is a 71 y.o. female  who presents for follow-up.  She has a history of chronic back, joint and neck pain. \"I'm doing much better.\"    Reports her pain pattern is IMPROVED since last evaluation.  Reports she has been taking acute pain medication intermittently (once every 3 days). Reports she has #18 at home.   Is leaving for a couple days vacation to [place] tomorrow with grandchildren.  Reports she has a walker and renting a scooter.     Patient was last evaluated in the office by Dr. Herminio Gan on 3/8/2022.  At that time was a follow-up visit for right status post a fall on 2/9/2022 at [place].  Went to ER on 2/23/2022.  Found to have a refractured L2 vertebra.  Upon review of scans, she had increased compression L2 and also some retropulsion.  Her neck in arthritis pain are stable.  Low back pain is a chronic problem but now there is an overlay of acute problem with refracture of L2.  Course of treatment is still indeterminate.  She is pending MRI and neurosurgical consult.  Acute pain reliever with hydrocodone 10-3 25.  Also recommend patient keep appointment for MRI and follow-up with [facility].  Continue OxyContin for chronic pain medication.  Follow-up next week.  Given a prescription for hydrocodone 10-3 25 1 p.o. every 6 hours as needed quantity of 25.    Is scheduled for lumbar MRI 3-22-22 and due to see Amparo Woodard PA-C immediately after MRI.     Complains of pain in her back, neck and joints. Today her pain is 3/10VAS. Describes her pain as continuous aching and throbbing with intermittent sharp pain. Pain increases with walking, standing, activity, household chores; pain decreases with medication, rest and procedures.  Continues with OxyContin 30 mg BID,Robaxin 750 mg 2-3/day (takes half tablets during daytime), Diclofenac 50 mg BID. Also recent prescription of Hydrocodone 10/325 q6hrs PRN(Dr Gan 3-8-22). Denies any " "side effects from the regimen. She reports a long-standing history of constipation, and does not believe it is related to the medication.  Takes OTC DulcoLax nightly with positive results. The regimen helps decrease her pain by 50%, but not lasting between doses.   ADL's by self. Denies any bowel or bladder changes.     Is also having right shoulder pain. Previously seen by DR sepulveda ([facility]).  \"They told me i'd need a shoulder replacement and I need my back    Patient was seen by myself on 2/14/2022.  She was reporting worsening back  pain.  Reported a fall on 2/9/2022 while shopping.  Denied any LOC.  Refused to go to ER.  Ordered lumbar MRI.  Also recommended patient follow-up with her ER or PCP in regards to headache and falls.      Patient had a bilateral L3-L5 RFL performed by Dr. LEMUS on 1-11-22 for management of LOW BACK PAIN. Patient reports 60% ONGOING relief from the procedure.     Patient presents for follow-up of PROCEDURE. Patient had a LESI L2-3 performed by Dr. LEMUS on 12-16-21 for management of BACK PAIN/VCF. Patient reports no relief from the procedure.     Patient was evaluated in the office by Dr. Lemus on 12/8/2021.  At that time she was complaining of increasing back pain.  Wants to repeat lumbar RF ablation.  Also has been diagnosed with vertebral compression fracture.  Plan to follow-up for procedure of LESI at L2-3 ASAP for VCF pain.  Reschedule lumbar RF.  Increased Robaxin for low back pain secondary spasm.  Ordered rheumatoid panel.  Continued prescription of OxyContin.     Patient was [facility] to [facility] on 10/22/2021 and discharged on 10/25/2021.  Patient presented to the hospital with shortness of breath.  She had been vaccinated but started developed symptoms of COVID-19.  She underwent monoclonal antibody infusion on 10/20/2021.  After that she developed worsening shortness of breath.  She was admitted to the hospital due to acute hypoxic respiratory " failure from COVID-19.  CT showed no evidence of PE but showed persistent groundglass opacities throughout.  She was started on IV dexamethasone/remdesvir and symptoms improved.  She had a mildly elevated pro calcitonin so IV antibiotics were changed to oral antibiotics.  She completed a steroid taper and oral antibiotics.  She will require supplemental oxygen at discharge.     Reviewed [facility] office visit 11/1/2021--patient presents for follow-up of hospitalization.  Prior to hospitalization for shortness of breath and COVID-19 she had a fall.  Had x-ray of shoulder.  They advised rotator cuff damage and she needed total shoulder replacement for bone-on-bone.   Ordered shower chair.     Patient went to [facility] on 10/20/2021.  Was complaining of increased lumbar pain x7 days.  She fell approximately 1 week prior to going to ER and admission reported her OxyContin was no longer working her back pain.  Was found to have superior endplate compression fracture at L2 with retropulsion causing moderate canal stenosis.  Advised consideration of MRI.  Also has suspected acute fracture involving the body of the scapula near the acromion.  Recommend orthopedic follow-up.  There was concern that the mechanism of energy may been higher energy than initially reported.  Recommended additional CT patient refused.  Patient signed out AMA.     She completed a bilateral L3-L5   on  8-4-21 performed by Dr. LEMUS for management of LOW BACK PAIN. Patient reports 60-80% TEMPORARY relief from the procedure for a few days. Then pain returned to baseline.     Patient had a bilateral L3-L5 RFL performed by Dr. LEMUS on 12-29-20 for management of LOW BACK PAIN. Patient reports 50% ONGOING relief from the procedure     Patient had a bilateral L3-L5 MBB performed by Dr. LEMUS on 7-28-20 for management of LOW BACK PAIN. Initially had 80-85% relief for 1 day.  Then she reports 50% ONGOING relief from the  "procedure for 1 month.  Reports improvement in activity and sleep.      Patient had a bilateral L3-L5 MBB performed by Dr. LEMUS on 5-20-20 for management of LOW BACK PAIN. Patient reports 80% relief from the procedure for 2 days. Pain has returned to pre-procedure level.      She completed a Cervical Facet Nerve (Medial Branch) Radiofrequency Lesioning LEFT C3-C5 RFL  on  1/26/18 performed by Dr. Lemus for management of neck pain.     She completed a Bilateral S1 Lumbar TFESI   on  8/16/2018 performed by Dr. LEMUS for management of low back and radicular pain. Patient reports NO relief from the procedure.      Failed PT, failed ultrasound therapy, failed compounded pain creme.     Had right foot injection with Dr Rodriguez on 8-12-21. Not really noticing a difference. \"they said if it stops working, I need surgery.\" She refuses to quit smoking/nicotine use and therefore won't have surgery.  Smokes 1/2 PPD since age 16.    Patient remained masked during entire encounter. No cough present. I donned a mask and eye protection throughout entire visit. Prior to donning mask and eye protection, hand hygiene was performed, as well as when it was doffed.  I was closer than 6 feet, but not for an extended period of time. No obvious exposure to any bodily fluids.    History of Present Illness     PEG Assessment   What number best describes your pain on average in the past week? 5  What number best describes how, during the past week, pain has interfered with your enjoyment of life? 5  What number best describes how, during the past week, pain has interfered with your general activity?  5    The following portions of the patient's history were reviewed and updated as appropriate: allergies, current medications, past family history, past medical history, past social history, past surgical history and problem list.    Review of Systems   Constitutional: Negative for chills and fatigue.   HENT: Negative for congestion and sinus " "pain.    Eyes: Negative for discharge and itching.   Respiratory: Positive for shortness of breath. Negative for cough.    Cardiovascular: Negative for chest pain and leg swelling.   Gastrointestinal: Negative for abdominal distention, diarrhea and vomiting.   Genitourinary: Negative for difficulty urinating and pelvic pain.   Musculoskeletal: Positive for back pain and gait problem.   Neurological: Negative for dizziness and headaches.   Psychiatric/Behavioral: Negative for agitation and confusion.     I have reviewed and confirmed the accuracy of the ROS as documented by the MA/LPN/RN DACIA Baez      Vitals:    03/14/22 1449   BP: 113/63   BP Location: Right arm   Patient Position: Sitting   Cuff Size: Large Adult   Pulse: 91   Resp: 14   Temp: 96.9 °F (36.1 °C)   SpO2: 96%   Weight: 81.4 kg (179 lb 6.4 oz)   Height: 154.9 cm (61\")   PainSc:   3   PainLoc: Back     Objective   Physical Exam  Vitals and nursing note reviewed.   Constitutional:       Appearance: Normal appearance. She is well-developed.   HENT:      Head: Normocephalic and atraumatic.   Musculoskeletal:      Right shoulder: Tenderness and bony tenderness present. Decreased range of motion.      Cervical back: Spinous process tenderness and muscular tenderness present.      Lumbar back: Tenderness and bony tenderness present. Decreased range of motion.        Back:       Comments: Mild lumbar axial pain     Skin:     General: Skin is warm and dry.   Neurological:      Mental Status: She is alert.      Gait: Gait abnormal.   Psychiatric:         Speech: Speech normal.         Behavior: Behavior normal. Behavior is cooperative.         Thought Content: Thought content normal.         Judgment: Judgment normal.         Assessment/Plan   Diagnoses and all orders for this visit:    1. Chronic pain syndrome (Primary)    2. Degenerative disc disease, cervical    3. Lumbar facet arthropathy    4. Compression fracture of L2 vertebra with delayed " healing, subsequent encounter    5. Primary osteoarthritis involving multiple joints    6. DDD (degenerative disc disease), lumbar    7. Encounter for long-term (current) use of high-risk medication      --- The urine drug screen confirmation from 1-27-22 has been reviewed and the result is APPROPRIATE based on patient history and MATTHEW report  --- The patient signed an updated copy of the controlled substance agreement on 1-  --- continue with MRI and Neurosurgery evaluation as needed  --- Refill OxyContin. Patient appears stable with current regimen. No adverse effects. Regarding continuation of opioids, there is no evidence of aberrant behavior or any red flags.  The patient continues with appropriate response to opioid therapy. ADL's remain intact by self.   --- Can continue with Hydrocodone as planned.   --- Follow-up 1 month or sooner if needed     MATTHEW REPORT  As part of the patient's treatment plan, I am prescribing controlled substances. The patient has been made aware of appropriate use of such medications, including potential risk of somnolence, limited ability to drive and/or work safely, and the potential for dependence or overdose. It has also bee made clear that these medications are for use by this patient only, without concomitant use of alcohol or other substances unless prescribed.     Patient has completed prescribing agreement detailing terms of continued prescribing of controlled substances, including monitoring MATTHEW reports, urine drug screening, and pill counts if necessary. The patient is aware that inappropriate use will results in cessation of prescribing such medications.    As the clinician, I personally reviewed the MATTHEW from 3-14-22 while the patient was in the office today.    History and physical exam exhibit continued safe and appropriate use of controlled substances.       Dictated utilizing Dragon dictation.     This document is intended for medical expert use only.  Reading of this document by patients and/or patient's family without participating medical staff guidance may result in misinterpretation and unintended morbidity.   Any interpretation of such data is the responsibility of the patient and/or family member responsible for the patient in concert with their primary or specialist providers, not to be left for sources of online searches such as [employer], [employer] or similar queries. Relying on these approaches to knowledge may result in misinterpretation, misguided goals of care and even death should patients or family members try recommendations outside of the realm of professional medical care in a supervised way.

## 2022-03-22 ENCOUNTER — TELEPHONE (OUTPATIENT)
Dept: PAIN MEDICINE | Facility: CLINIC | Age: 72
End: 2022-03-22

## 2022-03-22 NOTE — TELEPHONE ENCOUNTER
Caller: DIOGO BISHOP    Relationship to patient: SELF    Best call back number:  [phone]    Chief complaint:  PATIENT SAW DENNYS C[place]AND PA-C AT [facility] AND WAS ADVISED TO CALL DR. LEMUS AND REQUEST TO HAVE LUMBAR PAIN INJECTIONS SCHEDULED.    Type of visit: INJECTIONS

## 2022-04-08 ENCOUNTER — TELEPHONE (OUTPATIENT)
Dept: PAIN MEDICINE | Facility: CLINIC | Age: 72
End: 2022-04-08

## 2022-04-08 DIAGNOSIS — S32.020G COMPRESSION FRACTURE OF L2 VERTEBRA WITH DELAYED HEALING, SUBSEQUENT ENCOUNTER: ICD-10-CM

## 2022-04-08 RX ORDER — HYDROCODONE BITARTRATE AND ACETAMINOPHEN 10; 325 MG/1; MG/1
1 TABLET ORAL EVERY 6 HOURS PRN
Qty: 12 TABLET | Refills: 0 | Status: SHIPPED | OUTPATIENT
Start: 2022-04-08 | End: 2022-04-12 | Stop reason: SDUPTHER

## 2022-04-08 NOTE — TELEPHONE ENCOUNTER
Ms. King called today and wanted to know if you would refill the hydro/apap Rx that you gave her last month while she continues to heal from her fracture.

## 2022-04-12 ENCOUNTER — OFFICE VISIT (OUTPATIENT)
Dept: PAIN MEDICINE | Facility: CLINIC | Age: 72
End: 2022-04-12

## 2022-04-12 ENCOUNTER — PREP FOR SURGERY (OUTPATIENT)
Dept: SURGERY | Facility: SURGERY CENTER | Age: 72
End: 2022-04-12

## 2022-04-12 ENCOUNTER — TRANSCRIBE ORDERS (OUTPATIENT)
Dept: SURGERY | Facility: SURGERY CENTER | Age: 72
End: 2022-04-12

## 2022-04-12 VITALS
SYSTOLIC BLOOD PRESSURE: 118 MMHG | HEIGHT: 61 IN | HEART RATE: 93 BPM | WEIGHT: 179 LBS | DIASTOLIC BLOOD PRESSURE: 65 MMHG | OXYGEN SATURATION: 95 % | TEMPERATURE: 96.4 F | BODY MASS INDEX: 33.79 KG/M2 | RESPIRATION RATE: 20 BRPM

## 2022-04-12 DIAGNOSIS — M79.18 MYOFASCIAL PAIN SYNDROME OF LUMBAR SPINE: ICD-10-CM

## 2022-04-12 DIAGNOSIS — Z79.899 ENCOUNTER FOR LONG-TERM (CURRENT) USE OF HIGH-RISK MEDICATION: ICD-10-CM

## 2022-04-12 DIAGNOSIS — G89.4 CHRONIC PAIN SYNDROME: Primary | ICD-10-CM

## 2022-04-12 DIAGNOSIS — S32.020G COMPRESSION FRACTURE OF L2 VERTEBRA WITH DELAYED HEALING, SUBSEQUENT ENCOUNTER: ICD-10-CM

## 2022-04-12 DIAGNOSIS — M47.816 LUMBAR FACET ARTHROPATHY: ICD-10-CM

## 2022-04-12 DIAGNOSIS — Z41.9 SURGERY, ELECTIVE: Primary | ICD-10-CM

## 2022-04-12 DIAGNOSIS — S32.020G COMPRESSION FRACTURE OF L2 VERTEBRA WITH DELAYED HEALING, SUBSEQUENT ENCOUNTER: Primary | ICD-10-CM

## 2022-04-12 DIAGNOSIS — G89.4 CHRONIC PAIN SYNDROME: ICD-10-CM

## 2022-04-12 DIAGNOSIS — M50.30 DEGENERATIVE DISC DISEASE, CERVICAL: ICD-10-CM

## 2022-04-12 DIAGNOSIS — M51.36 DDD (DEGENERATIVE DISC DISEASE), LUMBAR: ICD-10-CM

## 2022-04-12 PROCEDURE — 99214 OFFICE O/P EST MOD 30 MIN: CPT | Performed by: NURSE PRACTITIONER

## 2022-04-12 RX ORDER — HYDROCODONE BITARTRATE AND ACETAMINOPHEN 10; 325 MG/1; MG/1
1 TABLET ORAL DAILY PRN
Qty: 22 TABLET | Refills: 0 | Status: SHIPPED | OUTPATIENT
Start: 2022-04-12 | End: 2022-05-12 | Stop reason: SDUPTHER

## 2022-04-12 RX ORDER — OXYCODONE HYDROCHLORIDE 30 MG/1
30 TABLET, FILM COATED, EXTENDED RELEASE ORAL EVERY 12 HOURS SCHEDULED
Qty: 60 TABLET | Refills: 0 | Status: SHIPPED | OUTPATIENT
Start: 2022-04-12 | End: 2022-05-12 | Stop reason: SDUPTHER

## 2022-04-12 RX ORDER — PHENTERMINE HYDROCHLORIDE 37.5 MG/1
1 TABLET ORAL DAILY
COMMUNITY
Start: 2022-03-01 | End: 2022-06-14

## 2022-04-12 RX ORDER — SODIUM CHLORIDE 0.9 % (FLUSH) 0.9 %
10 SYRINGE (ML) INJECTION EVERY 12 HOURS SCHEDULED
Status: CANCELLED | OUTPATIENT
Start: 2022-04-12

## 2022-04-12 RX ORDER — SODIUM CHLORIDE 0.9 % (FLUSH) 0.9 %
10 SYRINGE (ML) INJECTION AS NEEDED
Status: CANCELLED | OUTPATIENT
Start: 2022-04-12

## 2022-04-12 NOTE — PROGRESS NOTES
CHIEF COMPLAINT  F/u back and neck pain. Pt sts pain is the same.     Subjective   Traci King is a 71 y.o. female  who presents for follow-up.  She has a history of chronic back, neck and joint pain. Reports her pain is variable since last evaluation.    Seen by Amparo Woodard PA-C(neurosurgery) 3-22-22.  Recommend epidural. Patient was last evaluated in the office by Dr. Herminio Gan on 3/8/2022.  At that time was a follow-up visit for right status post a fall on 2/9/2022 at [facility].  Went to ER on 2/23/2022.  Found to have a refractured L2 vertebra.  Upon review of scans, she had increased compression L2 and also some retropulsion.  Her neck in arthritis pain are stable.  Low back pain is a chronic problem but now there is an overlay of acute problem with refracture of L2.  Course of treatment is still indeterminate.  She is pending MRI and neurosurgical consult.  Acute pain reliever with hydrocodone 10-3 25.  Also recommend patient keep appointment for MRI and follow-up with [facility].  Continue OxyContin for chronic pain medication.  Follow-up next week.  Given a prescription for hydrocodone 10-3 25 1 p.o. every 6 hours as needed quantity of 25.     Given a repeat prescription for Hydrocodone 10/325 q6hrs PRN #12 on 4-8-22. Reports she is usually taking 1 per day.      Complains of pain in her neck, low back and joints. Today her pain is 4/10VAS. Describes the pain as continuous aching and throbbing with intermittent sharp pain. Her back pain is her primary complaint today.  Pain increases with movement, activity, chores, bending/lifting/twisting; pain decreases with medication, rest and procedures.  Continues with OxyContin 30 mg BID, Robaxin 750 mg 1-2/day (takes half tablets during daytime), Diclofenac 50 mg BID. Also recent prescription of Hydrocodone 10/325 q6hrs PRN(Dr Gan 4-8-22).  patient reports she has been taking one/day to help with severe pain. Denies any side effects from the  "regimen. She reports a long-standing history of constipation, and does not believe it is related to the medication.  Takes OTC DulcoLax nightly with positive results. The regimen helps decrease her pain by 75%.   ADL's by self. Denies any bowel or bladder changes.     Is also having right shoulder pain. Previously seen by DR sepulveda ([facility]).  \"They told me i'd need a shoulder replacement and I need my back     Patient was seen by myself on 2/14/2022.  She was reporting worsening back  pain.  Reported a fall on 2/9/2022 while shopping.  Denied any LOC.  Refused to go to ER.  Ordered lumbar MRI.  Also recommended patient follow-up with her ER or PCP in regards to headache and falls.       Patient had a bilateral L3-L5 RFL performed by Dr. LEMUS on 1-11-22 for management of LOW BACK PAIN. Patient reports 60% ONGOING relief from the procedure.      Patient was evaluated in the office by Dr. Lemus on 12/8/2021.  At that time she was complaining of increasing back pain.  Wants to repeat lumbar RF ablation.  Also has been diagnosed with vertebral compression fracture.  Plan to follow-up for procedure of LESI at L2-3 ASAP for VCF pain.  Reschedule lumbar RF.  Increased Robaxin for low back pain secondary spasm.  Ordered rheumatoid panel.  Continued prescription of OxyContin.     Patient was admitted to [facility] on 10/22/2021 and discharged on 10/25/2021.  Patient presented to the hospital with shortness of breath.  She had been vaccinated but started developed symptoms of COVID-19.  She underwent monoclonal antibody infusion on 10/20/2021.  After that she developed worsening shortness of breath.  She was admitted to the hospital due to acute hypoxic respiratory failure from COVID-19.  CT showed no evidence of PE but showed persistent groundglass opacities throughout.  She was started on IV dexamethasone/remdesvir and symptoms improved.  She had a mildly elevated pro calcitonin so IV antibiotics were " changed to oral antibiotics.  She completed a steroid taper and oral antibiotics.  She will require supplemental oxygen at discharge.     Reviewed [facility] office visit 11/1/2021--patient presents for follow-up of hospitalization.  Prior to hospitalization for shortness of breath and COVID-19 she had a fall.  Had x-ray of shoulder.  They advised rotator cuff damage and she needed total shoulder replacement for bone-on-bone.   Ordered shower chair.     Patient went to [facility] on 10/20/2021.  Was complaining of increased lumbar pain x7 days.  She fell approximately 1 week prior to going to ER and admission reported her OxyContin was no longer working her back pain.  Was found to have superior endplate compression fracture at L2 with retropulsion causing moderate canal stenosis.  Advised consideration of MRI.  Also has suspected acute fracture involving the body of the scapula near the acromion.  Recommend orthopedic follow-up.  There was concern that the mechanism of energy may been higher energy than initially reported.  Recommended additional CT patient refused.  Patient signed out AMA.     She completed a bilateral L3-L5   on  8-4-21 performed by Dr. LEMUS for management of LOW BACK PAIN. Patient reports 60-80% TEMPORARY relief from the procedure for a few days. Then pain returned to baseline.     Patient had a bilateral L3-L5 RFL performed by Dr. LEMUS on 12-29-20 for management of LOW BACK PAIN. Patient reports 50% ONGOING relief from the procedure     Patient had a bilateral L3-L5 MBB performed by Dr. LEMUS on 7-28-20 for management of LOW BACK PAIN. Initially had 80-85% relief for 1 day.  Then she reports 50% ONGOING relief from the procedure for 1 month.  Reports improvement in activity and sleep.      Patient had a bilateral L3-L5 MBB performed by Dr. LEMUS on 5-20-20 for management of LOW BACK PAIN. Patient reports 80% relief from the procedure for 2 days. Pain has  "returned to pre-procedure level.      She completed a Cervical Facet Nerve (Medial Branch) Radiofrequency Lesioning LEFT C3-C5 RFL  on  1/26/18 performed by Dr. Lemus for management of neck pain.     She completed a Bilateral S1 Lumbar TFESI   on  8/16/2018 performed by Dr. LEMUS for management of low back and radicular pain. Patient reports NO relief from the procedure.      Failed PT, failed ultrasound therapy, failed compounded pain creme.     Had right foot injection with Dr Rodriguez on 8-12-21. Not really noticing a difference. \"they said if it stops working, I need surgery.\" She refuses to quit smoking/nicotine use and therefore won't have surgery.  Smokes 1/2 PPD since age 16.    HAS NARCAN AT HOME.    Patient remained masked during entire encounter. No cough present. I donned a mask and eye protection throughout entire visit. Prior to donning mask and eye protection, hand hygiene was performed, as well as when it was doffed.  I was closer than 6 feet, but not for an extended period of time. No obvious exposure to any bodily fluids.    History of Present Illness     PEG Assessment   What number best describes your pain on average in the past week?4  What number best describes how, during the past week, pain has interfered with your enjoyment of life?4  What number best describes how, during the past week, pain has interfered with your general activity?  4    The following portions of the patient's history were reviewed and updated as appropriate: allergies, current medications, past family history, past medical history, past social history, past surgical history and problem list.    Review of Systems   Constitutional: Positive for activity change (dec) and fatigue (occ). Negative for fever.   HENT: Negative for congestion.    Eyes: Negative for visual disturbance.   Respiratory: Positive for shortness of breath (occ). Negative for cough.    Cardiovascular: Negative for chest pain.   Gastrointestinal: " "Negative for constipation and diarrhea.   Genitourinary: Negative for difficulty urinating.   Musculoskeletal: Positive for back pain, neck pain and neck stiffness (occ).   Neurological: Negative for dizziness, weakness, light-headedness, numbness and headaches.   Psychiatric/Behavioral: Positive for sleep disturbance (occ). Negative for agitation and suicidal ideas. The patient is not nervous/anxious.      I have reviewed and confirmed the accuracy of the ROS as documented by the MA/LPN/RN DACIA Baez      Vitals:    04/12/22 0935   BP: 118/65   Pulse: 93   Resp: 20   Temp: 96.4 °F (35.8 °C)   SpO2: 95%   Weight: 81.2 kg (179 lb)   Height: 154.9 cm (61\")   PainSc:   4   PainLoc: Back       Objective   Physical Exam  Vitals and nursing note reviewed.   Constitutional:       Appearance: Normal appearance. She is well-developed.   HENT:      Head: Normocephalic and atraumatic.   Musculoskeletal:      Right shoulder: Tenderness and bony tenderness present. Decreased range of motion.      Cervical back: Spinous process tenderness and muscular tenderness present.      Lumbar back: Tenderness and bony tenderness present. Decreased range of motion.        Back:       Comments:      Skin:     General: Skin is warm and dry.   Neurological:      Mental Status: She is alert.      Gait: Gait abnormal.   Psychiatric:         Speech: Speech normal.         Behavior: Behavior normal. Behavior is cooperative.         Thought Content: Thought content normal.         Judgment: Judgment normal.         Assessment/Plan   Diagnoses and all orders for this visit:    1. Chronic pain syndrome (Primary)    2. Degenerative disc disease, cervical    3. DDD (degenerative disc disease), lumbar    4. Lumbar facet arthropathy    5. Compression fracture of L2 vertebra with delayed healing, subsequent encounter    6. Encounter for long-term (current) use of high-risk medication      --- The urine drug screen confirmation from [id number] " has been reviewed and the result is AOORIORUATE based on patient history and MATTHEW report  --- The patient signed an updated copy of the controlled substance agreement on 1-27-22  --- LESI L2-3. No blood thinners. Reviewed the procedure at length with the patient.  Included in the review was expectations, complications, risk and benefits.The procedure was described in detail and the risks, benefits and alternatives were discussed with the patient (including but not limited to: bleeding, infection, nerve damage, worsening of pain, inability to perform injection, paralysis, seizures, coma, no pain relief and death) who agreed to proceed.  Discussed the potential for sedation if warranted/wanted.  The procedure will plan to be performed at [facility] with fluoroscopic guidance(unless ultrasound is indicated) and could potentially have steroids and contrast dye used. Questions were answered and in a way the patient could understand.  Patient verbalized understanding and wishes to proceed.  This intervention will be ordered.  Discussed with patient that all procedures are part of a multimodal plan of care and include either formal PT or a home exercise program.  Patient has no evidence of coagulopathy or current infection.  --- refill oxycontin 30 mg BID. Patient appears stable with current regimen. No adverse effects. Regarding continuation of opioids, there is no evidence of aberrant behavior or any red flags.  The patient continues with appropriate response to opioid therapy. ADL's remain intact by self.   --- Change Hydrocodone 10/325 once daily PRN. Discussed medication with the patient.  Included in this discussion was the potential for side effects and adverse events.  Patient verbalized understanding and wished to proceed.  Prescription will be sent to pharmacy.  --- Follow-up 1 month or sooner if needed.      ---  Indications for epidural injection:  Plan is to proceed with epidural at the  appropriate level.  If the patient receives significant pain reduction and improvement in function and the plan will be to repeat the epidural when the pain worsens.  If a second epidural provides at least 6 weeks of sustained improvement that includes both pain reduction and improvement in function then an epidural injection could be repeated once again at the same level.  This is a mutual decision between the clinician and the patient that includes discussions including risks and benefits in detail as well as alternative therapies.  Patient's questions were answered to their satisfaction and to their understanding.  ---         MATTHEW REPORT  As part of the patient's treatment plan, I am prescribing controlled substances. The patient has been made aware of appropriate use of such medications, including potential risk of somnolence, limited ability to drive and/or work safely, and the potential for dependence or overdose. It has also bee made clear that these medications are for use by this patient only, without concomitant use of alcohol or other substances unless prescribed.     Patient has completed prescribing agreement detailing terms of continued prescribing of controlled substances, including monitoring MATTHEW reports, urine drug screening, and pill counts if necessary. The patient is aware that inappropriate use will results in cessation of prescribing such medications.    As the clinician, I personally reviewed the MATTHEW from 4-12-22 while the patient was in the office today.    History and physical exam exhibit continued safe and appropriate use of controlled substances.       Dictated utilizing Dragon dictation.     This document is intended for medical expert use only. Reading of this document by patients and/or patient's family without participating medical staff guidance may result in misinterpretation and unintended morbidity.   Any interpretation of such data is the responsibility of the patient  and/or family member responsible for the patient in concert with their primary or specialist providers, not to be left for sources of online searches such as [employer], [employer] or similar queries. Relying on these approaches to knowledge may result in misinterpretation, misguided goals of care and even death should patients or family members try recommendations outside of the realm of professional medical care in a supervised way.

## 2022-04-27 ENCOUNTER — HOSPITAL ENCOUNTER (OUTPATIENT)
Dept: GENERAL RADIOLOGY | Facility: SURGERY CENTER | Age: 72
Setting detail: HOSPITAL OUTPATIENT SURGERY
End: 2022-04-27

## 2022-04-27 ENCOUNTER — HOSPITAL ENCOUNTER (OUTPATIENT)
Facility: SURGERY CENTER | Age: 72
Setting detail: HOSPITAL OUTPATIENT SURGERY
Discharge: HOME | End: 2022-04-27
Attending: ANESTHESIOLOGY | Admitting: ANESTHESIOLOGY

## 2022-04-27 VITALS
RESPIRATION RATE: 20 BRPM | WEIGHT: 179 LBS | SYSTOLIC BLOOD PRESSURE: 130 MMHG | DIASTOLIC BLOOD PRESSURE: 50 MMHG | OXYGEN SATURATION: 99 % | BODY MASS INDEX: 33.79 KG/M2 | TEMPERATURE: 98.4 F | HEIGHT: 61 IN | HEART RATE: 75 BPM

## 2022-04-27 DIAGNOSIS — Z41.9 SURGERY, ELECTIVE: ICD-10-CM

## 2022-04-27 DIAGNOSIS — S32.020G COMPRESSION FRACTURE OF L2 VERTEBRA WITH DELAYED HEALING, SUBSEQUENT ENCOUNTER: ICD-10-CM

## 2022-04-27 PROCEDURE — 25010000002 METHYLPREDNISOLONE PER 80 MG: Performed by: ANESTHESIOLOGY

## 2022-04-27 PROCEDURE — 62323 NJX INTERLAMINAR LMBR/SAC: CPT | Performed by: ANESTHESIOLOGY

## 2022-04-27 PROCEDURE — 77002 NEEDLE LOCALIZATION BY XRAY: CPT

## 2022-04-27 PROCEDURE — 76000 FLUOROSCOPY <1 HR PHYS/QHP: CPT

## 2022-04-27 PROCEDURE — 0 IOHEXOL 300 MG/ML SOLUTION 10 ML VIAL: Performed by: ANESTHESIOLOGY

## 2022-04-27 RX ORDER — SODIUM CHLORIDE 0.9 % (FLUSH) 0.9 %
10 SYRINGE (ML) INJECTION AS NEEDED
Status: DISCONTINUED | OUTPATIENT
Start: 2022-04-27 | End: 2022-04-27 | Stop reason: HOSPADM

## 2022-04-27 RX ORDER — SODIUM CHLORIDE 0.9 % (FLUSH) 0.9 %
10 SYRINGE (ML) INJECTION EVERY 12 HOURS SCHEDULED
Status: DISCONTINUED | OUTPATIENT
Start: 2022-04-27 | End: 2022-04-27 | Stop reason: HOSPADM

## 2022-05-03 RX ORDER — METHOCARBAMOL 750 MG/1
TABLET, FILM COATED ORAL
Qty: 90 TABLET | Refills: 2 | Status: SHIPPED | OUTPATIENT
Start: 2022-05-03 | End: 2022-09-06

## 2022-05-05 NOTE — TELEPHONE ENCOUNTER
I do not prescribe promethazine cough syrup.  Also, this is not necessarily recommended for Covid.  Patient needs evaluation and consideration of further work-up or testing due to increased cough with COVID-19, she should be seen ASAP here, urgent care, or ER.  If she has any shortness of air, weakness, fever, or oxygen saturation less than 90%, she needs to go to the emergency department ASAP.   SUBJECTIVE:   Jena presents today for cough and fever.    Patient was evaluated by PCP on 5/3/22. Per chart review, she has been sick since the end of march, was in UC on 4/4 and told it was viral. The couple of days prior to visits earlier this week symptoms ahd worsened with more runny nose, cougha dn congestion.  Thought to be viral again and was told to call if symptoms still present with no improvemetn 10-14 days in.     She returns today because of sinus pressure, ear pain and fever. Has been taking ibuprofen. She didn't have sinus pressure when she was seen earlier in the week, seemed to have hit her out of the blue on Wed morning.  Fever tmax 101.  Hasn't taken anything else besides ibuprofen. No sore throat.  Chest hurts from coughing so much. She isn't coughing anything up, says it is more annoying and scratching. No shortness of breath.     She did have covid in December, has not repeated testing.      I have reviewed the patient's medications and allergies, past medical, surgical, social and family history, updating these as appropriate.  See History section of the EMR for a display of this information.    ROS:   As per above.        OBJECTIVE: Examination of the patient reveals:   VITALS:    Visit Vitals  /62 (BP Location: LUE - Left upper extremity, Patient Position: Sitting, Cuff Size: Large Adult)   Pulse 89   Temp 97.6 °F (36.4 °C) (Temporal)   Wt 101.2 kg (223 lb 1.7 oz)   LMP  (LMP Unknown)   SpO2 99%   BMI 36.01 kg/m²       GENERAL: appears stated age, is in no apparent distress and is well developed and well nourished  SINUS: bilateral maxillary and frontal sinus tenderness.  EYES: sclera and conjunctiva are normal and lids and lashes are normal  EARS: pinna and external ear is normal bilaterally, external auditory canals are normal and tympanic membranes with small amount of clear fluid behind them bilaterally  NOSE: external nose is normal to inspection, minimal bilateral turbinate  erythema and edema   MOUTH/THROAT: oropharynx appears normal, soft palate and uvula are normal and oral mucosa normal   NECK: neck is supple, no thyromegaly, mild bilateral anterior cervical adenopathy and no posterior cervical adenopathy  LUNGS: lungs are clear to auscultation with normal inspiratory/expiratory sounds, no rales, no rhonchi and no wheezes  HEART: normal rate and rhythm, S1 and S2 normal and no murmurs      ASSESSMENT / PLAN:   1. Sinusitis   Influenza A/B and covid testing negative   Did agree to treat sinusitis with azithromycin   Start nasal steroid spray   Humidifier in room at night   Sinus rinse/neti pot   Call if no improvement, fever longer than 5 days, increased work of breathing or any other concerns

## 2022-05-11 ENCOUNTER — APPOINTMENT (OUTPATIENT)
Dept: WOMENS IMAGING | Facility: HOSPITAL | Age: 72
End: 2022-05-11

## 2022-05-11 PROCEDURE — 77067 SCR MAMMO BI INCL CAD: CPT | Performed by: RADIOLOGY

## 2022-05-11 PROCEDURE — 77063 BREAST TOMOSYNTHESIS BI: CPT | Performed by: RADIOLOGY

## 2022-05-12 ENCOUNTER — PREP FOR SURGERY (OUTPATIENT)
Dept: SURGERY | Facility: SURGERY CENTER | Age: 72
End: 2022-05-12

## 2022-05-12 ENCOUNTER — OFFICE VISIT (OUTPATIENT)
Dept: PAIN MEDICINE | Facility: CLINIC | Age: 72
End: 2022-05-12

## 2022-05-12 VITALS
OXYGEN SATURATION: 97 % | HEART RATE: 99 BPM | HEIGHT: 61 IN | TEMPERATURE: 96.9 F | BODY MASS INDEX: 33.79 KG/M2 | WEIGHT: 179 LBS | SYSTOLIC BLOOD PRESSURE: 116 MMHG | DIASTOLIC BLOOD PRESSURE: 62 MMHG | RESPIRATION RATE: 20 BRPM

## 2022-05-12 DIAGNOSIS — S32.020G COMPRESSION FRACTURE OF L2 VERTEBRA WITH DELAYED HEALING, SUBSEQUENT ENCOUNTER: ICD-10-CM

## 2022-05-12 DIAGNOSIS — M79.18 MYOFASCIAL PAIN SYNDROME OF LUMBAR SPINE: ICD-10-CM

## 2022-05-12 DIAGNOSIS — G89.4 CHRONIC PAIN SYNDROME: ICD-10-CM

## 2022-05-12 DIAGNOSIS — G89.4 CHRONIC PAIN SYNDROME: Primary | ICD-10-CM

## 2022-05-12 DIAGNOSIS — M15.9 PRIMARY OSTEOARTHRITIS INVOLVING MULTIPLE JOINTS: ICD-10-CM

## 2022-05-12 DIAGNOSIS — M47.816 LUMBAR FACET ARTHROPATHY: Primary | ICD-10-CM

## 2022-05-12 DIAGNOSIS — Z79.899 ENCOUNTER FOR LONG-TERM (CURRENT) USE OF HIGH-RISK MEDICATION: ICD-10-CM

## 2022-05-12 DIAGNOSIS — M51.36 DDD (DEGENERATIVE DISC DISEASE), LUMBAR: ICD-10-CM

## 2022-05-12 DIAGNOSIS — M50.30 DEGENERATIVE DISC DISEASE, CERVICAL: ICD-10-CM

## 2022-05-12 DIAGNOSIS — M47.816 LUMBAR FACET ARTHROPATHY: ICD-10-CM

## 2022-05-12 PROCEDURE — 99214 OFFICE O/P EST MOD 30 MIN: CPT | Performed by: NURSE PRACTITIONER

## 2022-05-12 RX ORDER — LIDOCAINE 50 MG/G
1 PATCH TOPICAL EVERY 24 HOURS
Qty: 30 PATCH | Refills: 2 | Status: SHIPPED | OUTPATIENT
Start: 2022-05-12 | End: 2022-06-14

## 2022-05-12 RX ORDER — SODIUM CHLORIDE 0.9 % (FLUSH) 0.9 %
10 SYRINGE (ML) INJECTION EVERY 12 HOURS SCHEDULED
Status: CANCELLED | OUTPATIENT
Start: 2022-05-12

## 2022-05-12 RX ORDER — SODIUM CHLORIDE 0.9 % (FLUSH) 0.9 %
10 SYRINGE (ML) INJECTION AS NEEDED
Status: CANCELLED | OUTPATIENT
Start: 2022-05-12

## 2022-05-12 RX ORDER — OXYCODONE HYDROCHLORIDE 30 MG/1
30 TABLET, FILM COATED, EXTENDED RELEASE ORAL EVERY 12 HOURS SCHEDULED
Qty: 60 TABLET | Refills: 0 | Status: SHIPPED | OUTPATIENT
Start: 2022-05-12 | End: 2022-06-10 | Stop reason: SDUPTHER

## 2022-05-12 RX ORDER — HYDROCODONE BITARTRATE AND ACETAMINOPHEN 10; 325 MG/1; MG/1
1 TABLET ORAL DAILY PRN
Qty: 15 TABLET | Refills: 0 | Status: SHIPPED | OUTPATIENT
Start: 2022-05-12 | End: 2022-06-10 | Stop reason: SDUPTHER

## 2022-05-12 NOTE — PROGRESS NOTES
CHIEF COMPLAINT  F/u back pain. Pt had lumbar epidural steroid injection lumbar 2-3 and sts receiving 100% relief x 1 day then pain returned to baseline.     Subjective   Traci King is a 72 y.o. female  who presents for follow-up.  She has a history of chronic back, neck and joint pain. Reports her pain has been variable since last evaluation.    Patient presents for follow-up of PROCEDURE. Patient had a LESI L2-3 performed by Dr. LEMUS on 4-27-22 for management of LOW BACK PAIN. Patient reports 100% relief from the procedure for 1 day.    Complains of pain in her low back, neck and joints. Today her pain is 3/10VAS. Describes her pain as continuous throbbing and aching with intermittent sharp pain. Pain increases with activity, walking, standing, household chores; pain decreases with medication, rest and procedures. Continues with OxyContin 30 mg BID, Robaxin 750 mg 1-2/day (takes half tablets during daytime), Diclofenac 50 mg BID, as well as more recently 10/325 1/day PRN (per Dr Lemus)(has been out for a few days-- refill of #22 on 4-12-22)..  patient reports she has been taking one/day to help with severe pain. Denies any side effects from the regimen. She reports a long-standing history of constipation, and does not believe it is related to the medication.  Takes OTC DulcoLax nightly with positive results. The regimen helps decrease her pain by 75%.   ADL's by self. Denies any bowel or bladder changes.    Seen by Amparo Woodard PA-C(neurosurgery) 3-22-22.  Recommend epidural. Patient was last evaluated in the office by Dr. Herminio Lemus on 3/8/2022.  At that time was a follow-up visit for right status post a fall on 2/9/2022 at [facility].  Went to ER on 2/23/2022.  Found to have a refractured L2 vertebra.  Upon review of scans, she had increased compression L2 and also some retropulsion.  Her neck in arthritis pain are stable.  Low back pain is a chronic problem but now there is an overlay of acute  problem with refracture of L2.  Course of treatment is still indeterminate.  She is pending MRI and neurosurgical consult.  Acute pain reliever with hydrocodone 10-3 25.  Also recommend patient keep appointment for MRI and follow-up with [facility].  Continue OxyContin for chronic pain medication.  Follow-up next week.  Given a prescription for hydrocodone 10-3 25 1 p.o. every 6 hours as needed quantity of 25.     Patient had a bilateral L3-L5 RFL performed by Dr. LEMUS on 1-11-22 for management of LOW BACK PAIN. Patient reports 60% ONGOING relief from the procedure.    Patient had a LESI L2-3 performed by Dr. LEMUS on 12-16-21 for management of low back pain. Patient reports minimal relief from the procedure.      Patient was evaluated in the office by Dr. Lemus on 12/8/2021.  At that time she was complaining of increasing back pain.  Wants to repeat lumbar RF ablation.  Also has been diagnosed with vertebral compression fracture.  Plan to follow-up for procedure of LESI at L2-3 ASAP for VCF pain.  Reschedule lumbar RF.  Increased Robaxin for low back pain secondary spasm.  Ordered rheumatoid panel.  Continued prescription of OxyContin.     Patient was admitted to [facility] on 10/22/2021 and discharged on 10/25/2021.  Patient presented to the hospital with shortness of breath.  She had been vaccinated but started developed symptoms of COVID-19.  She underwent monoclonal antibody infusion on 10/20/2021.  After that she developed worsening shortness of breath.  She was admitted to the hospital due to acute hypoxic respiratory failure from COVID-19.  CT showed no evidence of PE but showed persistent groundglass opacities throughout.  She was started on IV dexamethasone/remdesvir and symptoms improved.  She had a mildly elevated pro calcitonin so IV antibiotics were changed to oral antibiotics.  She completed a steroid taper and oral antibiotics.  She will require supplemental oxygen at  discharge.    Patient went to [facility] on 10/20/2021.  Was complaining of increased lumbar pain x7 days.  She fell approximately 1 week prior to going to ER and admission reported her OxyContin was no longer working her back pain.  Was found to have superior endplate compression fracture at L2 with retropulsion causing moderate canal stenosis.  Advised consideration of MRI.  Also has suspected acute fracture involving the body of the scapula near the acromion.  Recommend orthopedic follow-up.  There was concern that the mechanism of energy may been higher energy than initially reported.  Recommended additional CT patient refused.  Patient signed out AMA.     She completed a bilateral L3-L5   on  8-4-21 performed by Dr. LEMUS for management of LOW BACK PAIN. Patient reports 60-80% TEMPORARY relief from the procedure for a few days. Then pain returned to baseline.     Patient had a bilateral L3-L5 RFL performed by Dr. LEMUS on 12-29-20 for management of LOW BACK PAIN. Patient reports 50% ONGOING relief from the procedure     Patient had a bilateral L3-L5 MBB performed by Dr. LEMUS on 7-28-20 for management of LOW BACK PAIN. Initially had 80-85% relief for 1 day.  Then she reports 50% ONGOING relief from the procedure for 1 month.  Reports improvement in activity and sleep.      Patient had a bilateral L3-L5 MBB performed by Dr. LEMUS on 5-20-20 for management of LOW BACK PAIN. Patient reports 80% relief from the procedure for 2 days. Pain has returned to pre-procedure level.      She completed a Cervical Facet Nerve (Medial Branch) Radiofrequency Lesioning LEFT C3-C5 RFL  on  1/26/18 performed by Dr. Lemus for management of neck pain.     She completed a Bilateral S1 Lumbar TFESI   on  8/16/2018 performed by Dr. LEMUS for management of low back and radicular pain. Patient reports NO relief from the procedure.      Failed PT, failed ultrasound therapy, failed compounded pain  creme.     HAS NARCAN AT HOME.    Patient remained masked during entire encounter. No cough present. I donned a mask and eye protection throughout entire visit. Prior to donning mask and eye protection, hand hygiene was performed, as well as when it was doffed.  I was closer than 6 feet, but not for an extended period of time. No obvious exposure to any bodily fluids.    Neck Pain   This is a chronic problem. The current episode started more than 1 year ago. The problem occurs constantly. The problem has been waxing and waning. The quality of the pain is described as burning. The pain is at a severity of 3/10. The pain is moderate. The symptoms are aggravated by position, bending, stress and twisting. The pain is worse during the day. Stiffness is present all day. Pertinent negatives include no chest pain, fever, headaches, numbness or weakness. She has tried oral narcotics and NSAIDs (cervical MBB--significant temporary relief; cervical RFL--50% ongoing relief) for the symptoms. The treatment provided moderate relief.   Shoulder Injury   The left shoulder is affected. There was no injury mechanism. The pain radiates to the left neck and right neck. Pain severity now: unchanged since last office visit. Pertinent negatives include no chest pain or numbness. The symptoms are aggravated by movement and palpation.   Back Pain  This is a chronic problem. The current episode started more than 1 year ago. The problem occurs constantly. The problem has been waxing and waning since onset. The pain is at a severity of 3/10. The pain is worse during the day. The symptoms are aggravated by bending, position, standing and twisting. Associated symptoms include bladder incontinence (long term issue). Pertinent negatives include no abdominal pain, bowel incontinence, chest pain, dysuria, fever, headaches, numbness or weakness. Risk factors include lack of exercise, sedentary lifestyle, menopause and obesity. She has tried  "analgesics, bed rest, chiropractic manipulation, heat, home exercises, ice, muscle relaxant, NSAIDs and walking (lumbar MBBgave 80% relief for 1-2 days twice) for the symptoms. The treatment provided moderate relief.      PEG Assessment   What number best describes your pain on average in the past week?3  What number best describes how, during the past week, pain has interfered with your enjoyment of life?3  What number best describes how, during the past week, pain has interfered with your general activity?  3    The following portions of the patient's history were reviewed and updated as appropriate: allergies, current medications, past family history, past medical history, past social history, past surgical history and problem list.    Review of Systems   Constitutional: Negative for activity change, fatigue and fever.   HENT: Negative for congestion.    Eyes: Negative for visual disturbance.   Respiratory: Positive for shortness of breath (occ ). Negative for cough.    Cardiovascular: Negative for chest pain.   Gastrointestinal: Negative for abdominal pain, bowel incontinence, constipation and diarrhea.   Genitourinary: Positive for bladder incontinence (long term issue). Negative for difficulty urinating and dysuria.   Musculoskeletal: Positive for back pain and neck pain.   Neurological: Negative for dizziness, weakness, light-headedness, numbness and headaches.   Psychiatric/Behavioral: Negative for agitation, sleep disturbance and suicidal ideas. The patient is not nervous/anxious.      I have reviewed and confirmed the accuracy of the ROS as documented by the MA/LPN/RN Patricia Chowdary, DACIA    Vitals:    05/12/22 0954   BP: 116/62   Pulse: 99   Resp: 20   Temp: 96.9 °F (36.1 °C)   SpO2: 97%   Weight: 81.2 kg (179 lb)   Height: 154.9 cm (61\")   PainSc:   3   PainLoc: Back       Objective   Physical Exam  Vitals and nursing note reviewed.   Constitutional:       Appearance: Normal appearance. She is " well-developed.   HENT:      Head: Normocephalic and atraumatic.   Musculoskeletal:      Right shoulder: Tenderness and bony tenderness present. Decreased range of motion.      Cervical back: Spinous process tenderness and muscular tenderness present.      Lumbar back: Tenderness and bony tenderness (bilateral lumbar1-3 moderate facet tenderness; + loading manuever) present. Decreased range of motion.        Back:       Comments:      Skin:     General: Skin is warm and dry.   Neurological:      Mental Status: She is alert.      Gait: Gait abnormal.      Deep Tendon Reflexes:      Reflex Scores:       Patellar reflexes are 1+ on the right side and 1+ on the left side.  Psychiatric:         Speech: Speech normal.         Behavior: Behavior normal. Behavior is cooperative.         Thought Content: Thought content normal.         Judgment: Judgment normal.         Assessment & Plan   Diagnoses and all orders for this visit:    1. Chronic pain syndrome (Primary)    2. Degenerative disc disease, cervical    3. DDD (degenerative disc disease), lumbar    4. Lumbar facet arthropathy    5. Compression fracture of L2 vertebra with delayed healing, subsequent encounter    6. Primary osteoarthritis involving multiple joints    7. Encounter for long-term (current) use of high-risk medication    Other orders  -     lidocaine (LIDODERM) 5 %; Place 1 patch on the skin as directed by provider Daily.  Dispense: 30 patch; Refill: 2      --- The urine drug screen confirmation from 1-27-22 has been reviewed and the result is APPROPRIATE based on patient history and MATTHEW report  --- The patient signed an updated copy of the controlled substance agreement on 11-11-21  --- Refill oxycontin and refill hydrocodone 10/325 but decrease to every other day. Patient appears stable with current regimen. No adverse effects. Regarding continuation of opioids, there is no evidence of aberrant behavior or any red flags.  The patient continues with  "appropriate response to opioid therapy. ADL's remain intact by self.   --- Refill lidocaine patches from neurosurgery.   --- bilateral L1-L3 MBB x1.  Reviewed the procedure at length with the patient.  Included in the review was expectations, complications, risk and benefits.The procedure was described in detail and the risks, benefits and alternatives were discussed with the patient (including but not limited to: bleeding, infection, nerve damage, worsening of pain, inability to perform injection, paralysis, seizures, coma, no pain relief and death) who agreed to proceed.  Discussed the potential for sedation if warranted/wanted.  The procedure will plan to be performed at [facility] with fluoroscopic guidance(unless ultrasound is indicated) and could potentially have steroids and contrast dye used. Questions were answered and in a way the patient could understand.  Patient verbalized understanding and wishes to proceed.  This intervention will be ordered.  Discussed with patient that all procedures are part of a multimodal plan of care and include either formal PT or a home exercise program.  Patient has no evidence of coagulopathy or current infection.  --- Follow-up 1 month or sooner if needed.    -------  Education about Medial Branch Blockade and RF Therapy:    This medial branch blockade (MBB) suggested is intended for diagnostic purposes, with the intent of offering the patient Radiofrequency thermal rhizotomy (RF) if the MBB is diagnostically effective.  The diagnostic blockade is necessary to determine the likelihood that RF therapy could be efficacious in providing long term relief to the patient.    Medial branches are sensory nerve branches that connect to a facet joint and transmit sensations & pain signals from that joint.  Facet is a term for the type of joints found in the spine.  Medial branches are the nerves that go to a facet, and therefore are also sometimes called \"facet " "joint nerves\" (FJNs).      In a medial branch blockade procedure, xray fluoroscopy is used to verify the locations of the outside of the joint lines which are being targeted.  Under xray guidance, needles are placed to these areas.  Contrast dye is injected to confirm proper placement, with dye flowing over the joint area, and to ensure that the dye does not flow into unintended areas such as a vein.  When this is confirmed, local anesthetic is injected to block the medial branch at that joint level.      If MBBs are diagnostically successful in blocking pain, then the patient is most likely a great candidate for Radiofrequency of those facet joint nerves.  In the RF procedure, needles are placed to the joint lines in the same fashion, and after testing, the needle tips are heated to thermally treat the nerves, blocking the nerves by in essence damaging the nerves with the heat treatment(non-pulsed).       Medically, a successful RF procedure should provide a patient with 50% pain relief or more for at least 6 months.  Clinical experience suggests that successful patients receive relief more in the range of 12 months on average.  We also discussed that a fortunate minority of patients receive therapeutic success from the MBB, and may not require RF ablation.  If a patient receives more than 8 weeks of relief from MBB, then occasional repeat MBB for therapeutic purposes is a very reasonable alternative therapy.  This course of therapy is consistent with our LCDs according to our CMS  in the area, and therefore other insurance providers should follow accordingly.  We will monitor our patients to screen for these therapeutic responders and will offer RF therapy only when necessary.        We discussed that MBB & RF are not without risks.  Guidelines regarding anticoagulant use & neuraxial procedures will be respected.  Patients that are ill or otherwise may be at risk for sepsis will not have their spines " accessed by neuraxial injections of any type.  This patient will not be offered these therapies if there is an increased risk.   We discussed that there is a risk of postprocedural pain and also a risk of worsening of clinical picture with these procedures as with any neuraxial procedure.    -------     MATTHEW REPORT  As part of the patient's treatment plan, I am prescribing controlled substances. The patient has been made aware of appropriate use of such medications, including potential risk of somnolence, limited ability to drive and/or work safely, and the potential for dependence or overdose. It has also bee made clear that these medications are for use by this patient only, without concomitant use of alcohol or other substances unless prescribed.     Patient has completed prescribing agreement detailing terms of continued prescribing of controlled substances, including monitoring MATTHEW reports, urine drug screening, and pill counts if necessary. The patient is aware that inappropriate use will results in cessation of prescribing such medications.    As the clinician, I personally reviewed the MATTHEW from 5-12-22 while the patient was in the office today.    History and physical exam exhibit continued safe and appropriate use of controlled substances.       Dictated utilizing Dragon dictation.     This document is intended for medical expert use only. Reading of this document by patients and/or patient's family without participating medical staff guidance may result in misinterpretation and unintended morbidity.   Any interpretation of such data is the responsibility of the patient and/or family member responsible for the patient in concert with their primary or specialist providers, not to be left for sources of online searches such as [employer], [employer] or similar queries. Relying on these approaches to knowledge may result in misinterpretation, misguided goals of care and even death should patients or family  members try recommendations outside of the realm of professional medical care in a supervised way.

## 2022-05-20 ENCOUNTER — OFFICE VISIT (OUTPATIENT)
Dept: INTERNAL MEDICINE | Facility: CLINIC | Age: 72
End: 2022-05-20

## 2022-05-20 VITALS
SYSTOLIC BLOOD PRESSURE: 126 MMHG | HEART RATE: 100 BPM | TEMPERATURE: 95.1 F | OXYGEN SATURATION: 94 % | BODY MASS INDEX: 34.74 KG/M2 | RESPIRATION RATE: 16 BRPM | DIASTOLIC BLOOD PRESSURE: 84 MMHG | HEIGHT: 61 IN | WEIGHT: 184 LBS

## 2022-05-20 DIAGNOSIS — J01.40 SUBACUTE PANSINUSITIS: ICD-10-CM

## 2022-05-20 DIAGNOSIS — J44.1 COPD WITH ACUTE EXACERBATION: Primary | ICD-10-CM

## 2022-05-20 DIAGNOSIS — K21.00 GASTROESOPHAGEAL REFLUX DISEASE WITH ESOPHAGITIS WITHOUT HEMORRHAGE: Chronic | ICD-10-CM

## 2022-05-20 DIAGNOSIS — Z86.16 PERSONAL HISTORY OF COVID-19: ICD-10-CM

## 2022-05-20 PROCEDURE — 99214 OFFICE O/P EST MOD 30 MIN: CPT | Performed by: INTERNAL MEDICINE

## 2022-05-20 RX ORDER — DOXYCYCLINE 100 MG/1
100 TABLET ORAL 2 TIMES DAILY
Qty: 14 TABLET | Refills: 0 | Status: SHIPPED | OUTPATIENT
Start: 2022-05-20 | End: 2022-05-27

## 2022-05-20 RX ORDER — BENZONATATE 100 MG/1
100 CAPSULE ORAL 3 TIMES DAILY PRN
Qty: 30 CAPSULE | Refills: 1 | Status: SHIPPED | OUTPATIENT
Start: 2022-05-20 | End: 2022-06-14 | Stop reason: SDUPTHER

## 2022-05-20 RX ORDER — ACETAMINOPHEN 500 MG
500 TABLET ORAL
COMMUNITY
Start: 2022-05-14 | End: 2022-06-14

## 2022-05-20 RX ORDER — BENZONATATE 100 MG/1
100 CAPSULE ORAL
COMMUNITY
Start: 2022-05-14 | End: 2022-05-20 | Stop reason: SDUPTHER

## 2022-05-20 RX ORDER — AMOXICILLIN 875 MG/1
875 TABLET, COATED ORAL
COMMUNITY
Start: 2022-05-14 | End: 2022-05-24

## 2022-05-20 RX ORDER — GUAIFENESIN AND DEXTROMETHORPHAN HYDROBROMIDE 1200; 60 MG/1; MG/1
1 TABLET, EXTENDED RELEASE ORAL EVERY 12 HOURS
COMMUNITY
Start: 2022-05-14 | End: 2022-06-14

## 2022-05-20 RX ORDER — ALBUTEROL SULFATE 90 UG/1
2 AEROSOL, METERED RESPIRATORY (INHALATION) EVERY 4 HOURS PRN
Qty: 8 G | Refills: 3 | Status: SHIPPED | OUTPATIENT
Start: 2022-05-20 | End: 2022-06-14

## 2022-05-20 RX ORDER — OMEPRAZOLE 40 MG/1
40 CAPSULE, DELAYED RELEASE ORAL DAILY
Qty: 90 CAPSULE | Refills: 0 | Status: SHIPPED | OUTPATIENT
Start: 2022-05-20 | End: 2022-06-14 | Stop reason: SDUPTHER

## 2022-05-20 NOTE — PROGRESS NOTES
"Chief Complaint  Cough, Sinus Drainage, Fatigue, and Diarrhea    Subjective          Traci King presents to [facility] for cough, sinus drainage, fatigue, diarrhea x 2 weeks. Went to [facility] about 1 week ago and was given amoxicillin and tessalon pearls and that does make it better but overall symptoms have persisted. Her breathing seems to have gotten worse during this time as well. Needs a refill on her breo. Does not have albuterol at home but if she did she would have wanted to use it many times. Does think she feels better overall.   Needs refill for omeprazole.   Also wants to get COVID Ab tested related to recent infection.     Objective   Vital Signs:     /84   Pulse 100   Temp 95.1 °F (35.1 °C)   Resp 16   Ht 154.9 cm (61\")   Wt 83.5 kg (184 lb)   SpO2 94%   BMI 34.77 kg/m²     Physical Exam  Vitals and nursing note reviewed.   Constitutional:       General: She is not in acute distress.     Appearance: Normal appearance.   HENT:      Nose:      Right Sinus: Maxillary sinus tenderness and frontal sinus tenderness present.      Left Sinus: Maxillary sinus tenderness and frontal sinus tenderness present.   Cardiovascular:      Rate and Rhythm: Normal rate and regular rhythm.      Pulses: Normal pulses.      Heart sounds: Normal heart sounds. No murmur heard.  Pulmonary:      Effort: Pulmonary effort is normal. No respiratory distress.      Breath sounds: Normal breath sounds.   Abdominal:      General: Abdomen is flat. Bowel sounds are normal.      Palpations: Abdomen is soft.      Tenderness: There is no abdominal tenderness.   Musculoskeletal:      Right lower leg: No edema.      Left lower leg: No edema.   Neurological:      Mental Status: She is alert and oriented to person, place, and time. Mental status is at baseline.   Psychiatric:         Mood and Affect: Mood normal.         Behavior: Behavior normal.          Result Review :   The " following data was reviewed by: Sherry Fournier MD on 05/20/2022:    Data reviewed: Consultant notes [facility] 5/14     Assessment and Plan      Diagnoses and all orders for this visit:    1. COPD with acute exacerbation (HCC) (Primary)/Sinusitis   - suspect persistent cough is related to underlying COPD that is currently flared related to her incompletely treated sinusitis   - will start doxycycline   - no oral steroids at this time as pt is not wheezing and does not seem to be in distress   - restart ICS-LABA-- refill sent   - new Rx sent for alb for prn use with symptoms   - she is worried about dehydration due to prolonged illness with diarrhea initially--> will check labs today     Orders:  -     albuterol sulfate  (90 Base) MCG/ACT inhaler; Inhale 2 puffs Every 4 (Four) Hours As Needed for Wheezing or Shortness of Air.  Dispense: 8 g; Refill: 3  -     Fluticasone Furoate-Vilanterol (BREO ELLIPTA) 100-25 MCG/INH inhaler; Inhale 1 puff Daily.  Dispense: 28 each; Refill: 5  -     benzonatate (TESSALON) 100 MG capsule; Take 1 capsule by mouth 3 (Three) Times a Day As Needed for Cough.  Dispense: 30 capsule; Refill: 1  -     CBC & Differential  -     Comprehensive Metabolic Panel    2. Gastroesophageal reflux disease with esophagitis without hemorrhage  Assessment & Plan:  CONTROLLED  - uses prn when gastritis symptoms flare, does not take daily--> refills sent  - reflux precautions reviewed and cont to encourage dietary trigger avoidance    Orders:  -     omeprazole (priLOSEC) 40 MG capsule; Take 1 capsule by mouth Daily.  Dispense: 90 capsule; Refill: 0    3.  Personal history of COVID-19  -     SARS-CoV-2 Antibodies (Roche)      Follow Up   Return for Next scheduled follow up.    Patient was given instructions and counseling regarding her condition or for health maintenance advice. Please see specific information pulled into the AVS if appropriate.     Lyndsay Fournier MD  [facility]  Medicine and Pediatrics  Phone: [phone]  Fax: [phone]

## 2022-05-20 NOTE — ASSESSMENT & PLAN NOTE
CONTROLLED  - uses prn when gastritis symptoms flare, does not take daily--> refills sent  - reflux precautions reviewed and cont to encourage dietary trigger avoidance

## 2022-05-22 LAB
ALBUMIN SERPL-MCNC: 4.1 G/DL (ref 3.8–4.8)
ALBUMIN/GLOB SERPL: 2.3 {RATIO} (ref 1.2–2.2)
ALP SERPL-CCNC: 91 IU/L (ref 44–121)
ALT SERPL-CCNC: 18 IU/L (ref 0–32)
AST SERPL-CCNC: 20 IU/L (ref 0–40)
BASOPHILS # BLD AUTO: 0.1 X10E3/UL (ref 0–0.2)
BASOPHILS NFR BLD AUTO: 1 %
BILIRUB SERPL-MCNC: <0.2 MG/DL (ref 0–1.2)
BUN SERPL-MCNC: 17 MG/DL (ref 8–27)
BUN/CREAT SERPL: 17 (ref 12–28)
CALCIUM SERPL-MCNC: 9.2 MG/DL (ref 8.7–10.3)
CHLORIDE SERPL-SCNC: 93 MMOL/L (ref 96–106)
CO2 SERPL-SCNC: 16 MMOL/L (ref 20–29)
CREAT SERPL-MCNC: 0.99 MG/DL (ref 0.57–1)
EGFRCR SERPLBLD CKD-EPI 2021: 61 ML/MIN/1.73
EOSINOPHIL # BLD AUTO: 0.1 X10E3/UL (ref 0–0.4)
EOSINOPHIL NFR BLD AUTO: 2 %
ERYTHROCYTE [DISTWIDTH] IN BLOOD BY AUTOMATED COUNT: 12.5 % (ref 11.7–15.4)
GLOBULIN SER CALC-MCNC: 1.8 G/DL (ref 1.5–4.5)
GLUCOSE SERPL-MCNC: 85 MG/DL (ref 65–99)
HCT VFR BLD AUTO: 32.1 % (ref 34–46.6)
HGB BLD-MCNC: 10.6 G/DL (ref 11.1–15.9)
IMM GRANULOCYTES # BLD AUTO: 0 X10E3/UL (ref 0–0.1)
IMM GRANULOCYTES NFR BLD AUTO: 1 %
LYMPHOCYTES # BLD AUTO: 1.9 X10E3/UL (ref 0.7–3.1)
LYMPHOCYTES NFR BLD AUTO: 27 %
MCH RBC QN AUTO: 33.4 PG (ref 26.6–33)
MCHC RBC AUTO-ENTMCNC: 33 G/DL (ref 31.5–35.7)
MCV RBC AUTO: 101 FL (ref 79–97)
MONOCYTES # BLD AUTO: 0.6 X10E3/UL (ref 0.1–0.9)
MONOCYTES NFR BLD AUTO: 8 %
NEUTROPHILS # BLD AUTO: 4.4 X10E3/UL (ref 1.4–7)
NEUTROPHILS NFR BLD AUTO: 61 %
PLATELET # BLD AUTO: 362 X10E3/UL (ref 150–450)
POTASSIUM SERPL-SCNC: 5.3 MMOL/L (ref 3.5–5.2)
PROT SERPL-MCNC: 5.9 G/DL (ref 6–8.5)
RBC # BLD AUTO: 3.17 X10E6/UL (ref 3.77–5.28)
SARS-COV-2 AB SERPL QL IA: POSITIVE
SODIUM SERPL-SCNC: 124 MMOL/L (ref 134–144)
WBC # BLD AUTO: 7.1 X10E3/UL (ref 3.4–10.8)

## 2022-05-23 ENCOUNTER — LAB (OUTPATIENT)
Dept: INTERNAL MEDICINE | Facility: CLINIC | Age: 72
End: 2022-05-23

## 2022-05-23 DIAGNOSIS — E87.1 HYPONATREMIA: ICD-10-CM

## 2022-05-23 DIAGNOSIS — E87.1 HYPONATREMIA: Primary | ICD-10-CM

## 2022-05-23 LAB
ALBUMIN SERPL-MCNC: 4 G/DL (ref 3.8–4.8)
ALBUMIN/GLOB SERPL: 1.9 {RATIO} (ref 1.2–2.2)
ALP SERPL-CCNC: 96 IU/L (ref 44–121)
ALT SERPL-CCNC: 19 IU/L (ref 0–32)
AST SERPL-CCNC: 18 IU/L (ref 0–40)
BILIRUB SERPL-MCNC: <0.2 MG/DL (ref 0–1.2)
BUN SERPL-MCNC: 16 MG/DL (ref 8–27)
BUN/CREAT SERPL: 18 (ref 12–28)
CALCIUM SERPL-MCNC: 8.9 MG/DL (ref 8.7–10.3)
CHLORIDE SERPL-SCNC: 97 MMOL/L (ref 96–106)
CO2 SERPL-SCNC: 21 MMOL/L (ref 20–29)
CREAT SERPL-MCNC: 0.89 MG/DL (ref 0.57–1)
EGFRCR SERPLBLD CKD-EPI 2021: 70 ML/MIN/1.73
GLOBULIN SER CALC-MCNC: 2.1 G/DL (ref 1.5–4.5)
GLUCOSE SERPL-MCNC: 87 MG/DL (ref 65–99)
POTASSIUM SERPL-SCNC: 5.3 MMOL/L (ref 3.5–5.2)
PROT SERPL-MCNC: 6.1 G/DL (ref 6–8.5)
SODIUM SERPL-SCNC: 127 MMOL/L (ref 134–144)

## 2022-05-26 ENCOUNTER — TRANSCRIBE ORDERS (OUTPATIENT)
Dept: SURGERY | Facility: SURGERY CENTER | Age: 72
End: 2022-05-26

## 2022-05-26 DIAGNOSIS — M47.816 LUMBAR FACET ARTHROPATHY: Primary | ICD-10-CM

## 2022-05-27 ENCOUNTER — TELEPHONE (OUTPATIENT)
Dept: INTERNAL MEDICINE | Facility: CLINIC | Age: 72
End: 2022-05-27

## 2022-05-27 DIAGNOSIS — E87.1 HYPONATREMIA: Primary | ICD-10-CM

## 2022-05-27 NOTE — TELEPHONE ENCOUNTER
----- Message from Malorie Palacios MA sent at 5/27/2022  7:55 AM EDT -----  Regarding: LAB ORDERS  PATIENT IS COMING IN FOR LAB WORK ON Tuesday, 5/31/22.  COULD YOU PLEASE PUT AN ORDER IN THE COMPUTER?    THANK YOU

## 2022-06-01 ENCOUNTER — TELEPHONE (OUTPATIENT)
Dept: INTERNAL MEDICINE | Facility: CLINIC | Age: 72
End: 2022-06-01

## 2022-06-01 NOTE — TELEPHONE ENCOUNTER
I S/W THE PATIENT AND RELAYED DR. RPASAD'S MESSAGE REGARDING LAB RESULTS AND FAXED IT TO DR. MARITZA DOMINGUEZ [phone] PER HER REQUEST.

## 2022-06-01 NOTE — TELEPHONE ENCOUNTER
Caller: Traci King    Relationship: Self    Best call back number: [phone]    What test was performed: BLOOD WORK    When was the test performed: YESTERDAY, 05/31/2022     Where was the test performed: IN OFFICE    Additional notes: PATIENT IS CALLING TO SEE HOW LONG IT WILL BE BEFORE SHE GETS HER TEST RESULTS BACK. PLEASE CALL PATIENT WITH RESULTS WHEN THEY ARE AVAILABLE.

## 2022-06-01 NOTE — TELEPHONE ENCOUNTER
I S/W THE PATIENT AND LET HER KNOW THAT SOMEONE WOULD CALL HER Thursday WITH HER LAB RESULTS.  SHE SAID THAT HER BEHAVIORAL HEALTH PROVIDER, DR. MARITZA DOMINGUEZ, SAID THAT THE LOW SODIUM WAS PROBABLY DUE TO THE PAXCIL.  SHE'S WEANING HER OFF OF THE PAXIL.  IT'S CAUSING DIOGO SOME WITHDRAW SYMPTOMS.  SHE WOULD LIKE FOR US TO FAX A COPY OF THE LAB RESULTS TO DR. MARITZA DOMINGUEZ AT [facility] ([phone] = FAX #).    THANK YOU

## 2022-06-01 NOTE — TELEPHONE ENCOUNTER
I believe I just put a message in about her results, please fax her over to her psychiatrist as requested

## 2022-06-06 ENCOUNTER — TELEPHONE (OUTPATIENT)
Dept: INTERNAL MEDICINE | Facility: CLINIC | Age: 72
End: 2022-06-06

## 2022-06-06 DIAGNOSIS — E87.1 HYPONATREMIA: ICD-10-CM

## 2022-06-06 DIAGNOSIS — R53.83 FATIGUE, UNSPECIFIED TYPE: Primary | ICD-10-CM

## 2022-06-06 NOTE — TELEPHONE ENCOUNTER
Caller: Itzel Cordero     Relationship: DAUGHTER    Best call back number: [phone]    What is your medical concern? PATIENTS DAUGHTER CALLED WITH CONCERNS:  * PATIENT REFUSES TO TAKE SHOWER  * MISSING FAMILY EVENTS  * FEELS LIKE SHE IS DIEING  * ONLY WANTS TO SLEEP  * DRINKING LESS WATER DUE TO FEAR OF LOW SALT    PATIENTS PAXIL WAS LOWERED TO 40MG FROM EMERGENCY ROOM DOCTOR.      *PATIENT FEELS SIMILAR TO WHEN SHE WENT TO THE ER DUE TO LOW SALT.      PLEASE ADVISE- WHAT CAN BE DONE TO GIVE HER ENERGY?  WHAT OPTIONS ARE THERE TO GET HER SALT TO NORMAL LEVELS?      How long has this issue been going on? WEEK AND 1/2    Is your provider already aware of this issue? YES BUT MAYBE NOT THE NEW CONCERNS.     Have you been treated for this issue? YES IN THE PAST

## 2022-06-06 NOTE — TELEPHONE ENCOUNTER
Caller: Traci King    Relationship: Self    Best call back number: [phone]     What is the best time to reach you: ANY    Who are you requesting to speak with (clinical staff, provider,  specific staff member): CLINICAL      What was the call regarding: PATIENT STATES THAT SHE IS HAVING SEVERE FATIGUE AND WOULD LIKE TO COME IN TO GET HER BLOOD DRAWN. SHE STATES THAT SHE IS HAVING THE SAME SYMPTOMS THAT SHE HAS HAD WHEN HER SODIUM HAS BEEN LOW AND WOULD LIKE TO GET THIS CHECKED OUT.     Do you require a callback: YES    PLEASE ADVISE PATIENT

## 2022-06-06 NOTE — TELEPHONE ENCOUNTER
So I had a message earlier from Janie herself about this as well and put in orders for her to come in and get some labs drawn, that is the only way we will know for sure if how she is feeling is really related to the sodium or not  Lowering hte paxil dose is not unreasonable as that can contribute to some sodium abnormalities, however, that may also be contributing some to some fo her symptoms as there could be a crossover there with symptoms of uncontrolled anxiety and depression that may have flared as her paxil dose was decreased  I think the first step is knowing what the sodium level is because if that is normal and she is feeling that way, then we need to be looking elsewhere for ways to improve her symptoms. If her sodium level is not normal, then we need to get together and discuss her fluid intake and other potential options for raising her sodium

## 2022-06-07 ENCOUNTER — TELEPHONE (OUTPATIENT)
Dept: INTERNAL MEDICINE | Facility: CLINIC | Age: 72
End: 2022-06-07

## 2022-06-07 DIAGNOSIS — R53.83 FATIGUE, UNSPECIFIED TYPE: ICD-10-CM

## 2022-06-07 DIAGNOSIS — E87.1 HYPONATREMIA: ICD-10-CM

## 2022-06-07 NOTE — TELEPHONE ENCOUNTER
Caller: Traci King    Relationship: Self    Best call back number: [phone] (H)    What orders are you requesting (i.e. lab or imaging): GENETIC TESTING TO SEE WHICH ANTIDEPRESSANT WOULD WORK BEST FOR THE PATIENT. A CHEEK SWAB     In what timeframe would the patient need to come in: ASAP    Where will you receive your lab/imaging services: IN OFFICE, [facility]     Additional notes:     PATIENT HAS LABS SET UP FOR 10:45 TODAY AND IS REQUESTING US TO ADD THIS GENETIC TESTING PANEL PLEASE.    PLEASE ADVISE

## 2022-06-07 NOTE — TELEPHONE ENCOUNTER
We can do this type of testing in our office but would want this to be something there psychiatrist is aware of and ordering since they would be the one to apply this information to her treatment plan. If psych wants her to have it then we can facilitate getting it done if their office does not do it

## 2022-06-08 ENCOUNTER — TELEPHONE (OUTPATIENT)
Dept: PAIN MEDICINE | Facility: CLINIC | Age: 72
End: 2022-06-08

## 2022-06-08 ENCOUNTER — TELEPHONE (OUTPATIENT)
Dept: INTERNAL MEDICINE | Facility: CLINIC | Age: 72
End: 2022-06-08

## 2022-06-08 LAB
ALBUMIN SERPL-MCNC: 4.3 G/DL (ref 3.8–4.8)
ALBUMIN/GLOB SERPL: 1.8 {RATIO} (ref 1.2–2.2)
ALP SERPL-CCNC: 89 IU/L (ref 44–121)
ALT SERPL-CCNC: 20 IU/L (ref 0–32)
AST SERPL-CCNC: 24 IU/L (ref 0–40)
BASOPHILS # BLD AUTO: 0.1 X10E3/UL (ref 0–0.2)
BASOPHILS NFR BLD AUTO: 1 %
BILIRUB SERPL-MCNC: <0.2 MG/DL (ref 0–1.2)
BUN SERPL-MCNC: 18 MG/DL (ref 8–27)
BUN/CREAT SERPL: 19 (ref 12–28)
CALCIUM SERPL-MCNC: 9.3 MG/DL (ref 8.7–10.3)
CHLORIDE SERPL-SCNC: 98 MMOL/L (ref 96–106)
CO2 SERPL-SCNC: 16 MMOL/L (ref 20–29)
CREAT SERPL-MCNC: 0.96 MG/DL (ref 0.57–1)
EBV NA IGG SER IA-ACNC: >600 U/ML (ref 0–17.9)
EBV VCA IGG SER IA-ACNC: 196 U/ML (ref 0–17.9)
EBV VCA IGM SER IA-ACNC: 71.6 U/ML (ref 0–35.9)
EGFRCR SERPLBLD CKD-EPI 2021: 64 ML/MIN/1.73
EOSINOPHIL # BLD AUTO: 0.1 X10E3/UL (ref 0–0.4)
EOSINOPHIL NFR BLD AUTO: 1 %
ERYTHROCYTE [DISTWIDTH] IN BLOOD BY AUTOMATED COUNT: 12.2 % (ref 11.7–15.4)
GLOBULIN SER CALC-MCNC: 2.4 G/DL (ref 1.5–4.5)
GLUCOSE SERPL-MCNC: 94 MG/DL (ref 65–99)
HCT VFR BLD AUTO: 32.9 % (ref 34–46.6)
HGB BLD-MCNC: 11.1 G/DL (ref 11.1–15.9)
IMM GRANULOCYTES # BLD AUTO: 0.1 X10E3/UL (ref 0–0.1)
IMM GRANULOCYTES NFR BLD AUTO: 1 %
LYMPHOCYTES # BLD AUTO: 1.9 X10E3/UL (ref 0.7–3.1)
LYMPHOCYTES NFR BLD AUTO: 20 %
MCH RBC QN AUTO: 33.5 PG (ref 26.6–33)
MCHC RBC AUTO-ENTMCNC: 33.7 G/DL (ref 31.5–35.7)
MCV RBC AUTO: 99 FL (ref 79–97)
MONOCYTES # BLD AUTO: 0.6 X10E3/UL (ref 0.1–0.9)
MONOCYTES NFR BLD AUTO: 6 %
NEUTROPHILS # BLD AUTO: 6.8 X10E3/UL (ref 1.4–7)
NEUTROPHILS NFR BLD AUTO: 71 %
PLATELET # BLD AUTO: 409 X10E3/UL (ref 150–450)
POTASSIUM SERPL-SCNC: 5.3 MMOL/L (ref 3.5–5.2)
PROT SERPL-MCNC: 6.7 G/DL (ref 6–8.5)
RBC # BLD AUTO: 3.31 X10E6/UL (ref 3.77–5.28)
SERVICE CMNT-IMP: ABNORMAL
SODIUM SERPL-SCNC: 130 MMOL/L (ref 134–144)
WBC # BLD AUTO: 9.4 X10E3/UL (ref 3.4–10.8)

## 2022-06-08 NOTE — TELEPHONE ENCOUNTER
Hub staff attempted to follow warm transfer process and was unsuccessful     Caller: DIOGO      Relationship to patient: SELF    Best call back number: [phone]    Patient is needing: PT WOULD LIKE TO CHANGE 6/10/22 10:40 APPT TO VIDEO VISIT IF POSSIBLE , PT IS FEELING UNWELL. PLEASE ADVISE

## 2022-06-08 NOTE — TELEPHONE ENCOUNTER
Caller: Traci King    Relationship: Self    Best call back number: [phone]    What test was performed: LABS/BLOOD WORK    When was the test performed: 6/7/22    Where was the test performed: IN OFFICE    Additional notes: PATIENT REQUESTS A CALL BACK WITH RESULTS OF LABS WHEN AVAILABLE

## 2022-06-09 ENCOUNTER — TELEPHONE (OUTPATIENT)
Dept: INTERNAL MEDICINE | Facility: CLINIC | Age: 72
End: 2022-06-09

## 2022-06-09 NOTE — TELEPHONE ENCOUNTER
"Daughter called stating that she is very concerned about pt; states that she is very fatigued, not wanting to do anything. Pt is afraid that she has cancer and daughter feels like she is \"dying in front of her\". States that they have been to the hospital and they will not do anything for her since her sodium is low; daughter also states that pt is afraid to drink anything because she is scared it will drop her sodium. (is eating only ice chips)  asked if there is anything that they can do in the meantime to help her feel better.     Ami: [phone]  "

## 2022-06-09 NOTE — TELEPHONE ENCOUNTER
So we need to let Ami know that we did check labs here and her sodium is actually not a major issue currently and should nto be low enough to be causing her to feel bad. I did recommend the ER as a result of this because if she is still feeling this poorly she should be evaluated because I do NOT think we should assume it is just her sodium based on her labs. Unfortunately I do think some of this is probably related to the med change with the paxil and she may want to reach out to her psychiatrist as well to let her know how bad she is doing and they may recommend some med changes to try to help. Does she have any other specific symptoms other than fatigue and the generalized impending doom type of feeling?

## 2022-06-10 ENCOUNTER — TELEMEDICINE (OUTPATIENT)
Dept: PAIN MEDICINE | Facility: CLINIC | Age: 72
End: 2022-06-10

## 2022-06-10 DIAGNOSIS — M51.36 DDD (DEGENERATIVE DISC DISEASE), LUMBAR: ICD-10-CM

## 2022-06-10 DIAGNOSIS — M79.18 MYOFASCIAL PAIN SYNDROME OF LUMBAR SPINE: ICD-10-CM

## 2022-06-10 DIAGNOSIS — S32.020G COMPRESSION FRACTURE OF L2 VERTEBRA WITH DELAYED HEALING, SUBSEQUENT ENCOUNTER: ICD-10-CM

## 2022-06-10 DIAGNOSIS — G89.4 CHRONIC PAIN SYNDROME: ICD-10-CM

## 2022-06-10 DIAGNOSIS — M48.02 SPINAL STENOSIS, CERVICAL REGION: ICD-10-CM

## 2022-06-10 DIAGNOSIS — M15.9 PRIMARY OSTEOARTHRITIS INVOLVING MULTIPLE JOINTS: ICD-10-CM

## 2022-06-10 DIAGNOSIS — M50.30 DEGENERATIVE DISC DISEASE, CERVICAL: ICD-10-CM

## 2022-06-10 DIAGNOSIS — Z79.899 ENCOUNTER FOR LONG-TERM (CURRENT) USE OF HIGH-RISK MEDICATION: ICD-10-CM

## 2022-06-10 DIAGNOSIS — M47.816 LUMBAR FACET ARTHROPATHY: ICD-10-CM

## 2022-06-10 DIAGNOSIS — S32.020G COMPRESSION FRACTURE OF L2 VERTEBRA WITH DELAYED HEALING, SUBSEQUENT ENCOUNTER: Primary | ICD-10-CM

## 2022-06-10 PROCEDURE — 99214 OFFICE O/P EST MOD 30 MIN: CPT | Performed by: NURSE PRACTITIONER

## 2022-06-10 RX ORDER — HYDROCODONE BITARTRATE AND ACETAMINOPHEN 10; 325 MG/1; MG/1
1 TABLET ORAL DAILY PRN
Qty: 15 TABLET | Refills: 0 | Status: SHIPPED | OUTPATIENT
Start: 2022-06-10 | End: 2022-07-08 | Stop reason: SDUPTHER

## 2022-06-10 RX ORDER — OXYCODONE HYDROCHLORIDE 30 MG/1
30 TABLET, FILM COATED, EXTENDED RELEASE ORAL EVERY 12 HOURS SCHEDULED
Qty: 60 TABLET | Refills: 0 | Status: SHIPPED | OUTPATIENT
Start: 2022-06-10 | End: 2022-07-08 | Stop reason: SDUPTHER

## 2022-06-10 NOTE — PROGRESS NOTES
TELEMEDICINE - VIDEO VISIT    You have chosen to receive care through a telehealth visit.  Do you consent to use a video/audio connection for your medical care today? Yes    Location of patient: Private Residence  [place] [facility] [place] [facility]  Anyone else present: Yes, if yes- who- DACIA Miller  Type of Technology used- ZOOM through use of Epic/MyChart visit    CHIEF COMPLAINT: back, neck, and joint pain    Subjective   Traci King is a 72 y.o. female  who presents for a video visit follow-up.She has a history of back, neck, and joint pain.    Today her pain is 4/10VAS in severity. Continues with OxyContin 30 mg 2/day, Robaxin 750 mg PRN (does not take daily), Diclofenac 50 mg 2/day, as well as more recently Norco 10/325 every other day PRN (per Dr Gan). This medication regimen decreases her pain by a significant amount. ADLs by self.     She is scheduled for [facility] L1-L3 MBB on 6/28/2022 with Dr. Gan to treat the pain r/t her L2 fracture.     Procedure list:  4/27/2022-LESI at L2 3-100% relief x1 day  1/11/2022-bilateral L3-L5 RFA-60% relief  12/16/2021-LESI at L2-3-no relief  8/4/2021-bilateral L3-L5 MBB-60% diagnostic relief  12/29/2020-bilateral L3-L5 RFA-50% relief  7/8/2020-bilateral L3-L5 MBB-50% relief  5/20/2020-bilateral L3-L5 MBB-80% relief  8/16/2018-bilateral S1 TFESI-no relief  1/26/2018-left C3-C5 RFA-50% relief    Neck Pain   This is a chronic problem. The current episode started more than 1 year ago. The problem occurs constantly. The problem has been waxing and waning. The quality of the pain is described as burning. The pain is at a severity of 4/10. The pain is moderate. The symptoms are aggravated by position, bending, stress and twisting. The pain is worse during the day. Stiffness is present all day. Pertinent negatives include no fever, headaches or weakness. She has tried oral narcotics and NSAIDs (cervical MBB--significant temporary relief; cervical RFL--50%  ongoing relief) for the symptoms. The treatment provided moderate relief.   Back Pain  This is a chronic problem. The current episode started more than 1 year ago. The problem occurs constantly. The problem has been waxing and waning since onset. The pain is at a severity of 4/10. The pain is worse during the day. The symptoms are aggravated by bending, position, standing and twisting. Associated symptoms include bladder incontinence (long term issue). Pertinent negatives include no abdominal pain, bowel incontinence, dysuria, fever, headaches or weakness. Risk factors include lack of exercise, sedentary lifestyle, menopause and obesity. She has tried analgesics, bed rest, chiropractic manipulation, heat, home exercises, ice, muscle relaxant, NSAIDs and walking (lumbar MBBgave 80% relief for 1-2 days twice) for the symptoms. The treatment provided moderate relief.      Review of Pertinent Medical Data ---  Office visit from 5/12/2020 with DACIA Gray reviewed.  Patient has a history of chronic back, neck, and joint pain.  She is maintained on OxyContin 30 mg twice daily as well as Norco 10/325 daily as needed.  She also utilizes Robaxin and diclofenac.  Patient was diagnosed with a refractured L2 vertebrae on 2/23/2022 following a fall at [facility] on 2/9/2022 (this was previously found to be fractured in October 2021).  She has previously failed PT, ultrasound therapy, and compounded pain cream.  Continue with medication management, Norco 10/325 decreased to every other day.  Refill lidocaine patch as originally prescribed by neurosurgery.  Plan for bilateral L1-L3 MBB x1    The following portions of the patient's history were reviewed and updated as appropriate: allergies, current medications, past family history, past medical history, past social history, past surgical history and problem list.    Review of Systems   Constitutional: Negative for fever.   Gastrointestinal: Negative for abdominal pain and  bowel incontinence.   Genitourinary: Positive for bladder incontinence (long term issue). Negative for dysuria.   Musculoskeletal: Positive for back pain and neck pain.   Neurological: Negative for weakness and headaches.     --  The aforementioned information the Chief Complaint section and above subjective data including any HPI data, and also the Review of Systems data, has been personally reviewed and affirmed.  --    Vitals:    06/10/22 1222   PainSc:   4   PainLoc: Back     Objective   Physical Exam  Constitutional:       Appearance: Normal appearance.   Pulmonary:      Effort: Pulmonary effort is normal.   Neurological:      Mental Status: She is alert.   Psychiatric:         Mood and Affect: Mood normal.         Behavior: Behavior normal.         Thought Content: Thought content normal.         Judgment: Judgment normal.       Assessment & Plan   Diagnoses and all orders for this visit:    1. Compression fracture of L2 vertebra with delayed healing, subsequent encounter (Primary)    2. Chronic pain syndrome    3. Degenerative disc disease, cervical    4. Lumbar facet arthropathy    5. DDD (degenerative disc disease), lumbar    6. Spinal stenosis, cervical region    7. Primary osteoarthritis involving multiple joints    8. Encounter for long-term (current) use of high-risk medication    Other orders  -     diclofenac (VOLTAREN) 50 MG EC tablet; Take 1 tablet by mouth 2 (Two) Times a Day As Needed (pain).  Dispense: 180 tablet; Refill: 0      ----------------  A video visit was chosen today as the patient was feeling acutely ill which she attributes to medication changes that she is undergoing with her PCP.   ----------------    --- The urine drug screen confirmation from 1/27/2022 has been reviewed and the result is appropriate based on patient history and MATTHEW report  --- CSA updated 11/11/2021  --- Refill OxyContin. Patient appears stable with current regimen. No adverse effects. Regarding continuation of  opioids, there is no evidence of aberrant behavior or any red flags.  The patient continues with appropriate response to opioid therapy. ADL's remain intact by self.   --- Refill Norco 10/325. Patient appears stable with current regimen. No adverse effects. Regarding continuation of opioids, there is no evidence of aberrant behavior or any red flags.  The patient continues with appropriate response to opioid therapy. ADL's remain intact by self.   --- Proceed with previously scheduled Lumbar MBB  --- Follow-up 1 month or sooner if needed.      MATTHEW REPORT  As part of the patient's treatment plan, I am prescribing controlled substances. The patient has been made aware of appropriate use of such medications, including potential risk of somnolence, limited ability to drive and/or work safely, and the potential for dependence or overdose. It has also bee made clear that these medications are for use by this patient only, without concomitant use of alcohol or other substances unless prescribed.     Patient has completed prescribing agreement detailing terms of continued prescribing of controlled substances, including monitoring MATTHEW reports, urine drug screening, and pill counts if necessary. The patient is aware that inappropriate use will results in cessation of prescribing such medications.    As the clinician, I personally reviewed the MATTHEW from 6/10/2022 while the patient was in the office today.    History and physical exam exhibit continued safe and appropriate use of controlled substances.    -------    EMR Dragon/Transcription disclaimer:   Much of this encounter note is an electronic transcription/translation of spoken language to printed text. The electronic translation of spoken language may permit erroneous, or at times, nonsensical words or phrases to be inadvertently transcribed; Although I have reviewed the note for such errors, some may still exist.         This document is intended for medical  expert use only. Reading of this document by patients and/or patient's family without participating medical staff guidance may result in misinterpretation and unintended morbidity.   Any interpretation of such data is the responsibility of the patient and/or family member responsible for the patient in concert with their primary or specialist providers, not to be left for sources of online searches such as Zing, [place] or similar queries. Relying on these approaches to knowledge may result in misinterpretation, misguided goals of care and even death should patients or family members try recommendations outside of the realm of professional medical care in a supervised way.

## 2022-06-14 ENCOUNTER — APPOINTMENT (OUTPATIENT)
Dept: GENERAL RADIOLOGY | Facility: SURGERY CENTER | Age: 72
End: 2022-06-14

## 2022-06-14 ENCOUNTER — OFFICE VISIT (OUTPATIENT)
Dept: INTERNAL MEDICINE | Facility: CLINIC | Age: 72
End: 2022-06-14

## 2022-06-14 VITALS
TEMPERATURE: 94.8 F | SYSTOLIC BLOOD PRESSURE: 130 MMHG | OXYGEN SATURATION: 95 % | RESPIRATION RATE: 16 BRPM | BODY MASS INDEX: 34.74 KG/M2 | HEIGHT: 61 IN | DIASTOLIC BLOOD PRESSURE: 90 MMHG | HEART RATE: 83 BPM | WEIGHT: 184 LBS

## 2022-06-14 DIAGNOSIS — K21.00 GASTROESOPHAGEAL REFLUX DISEASE WITH ESOPHAGITIS WITHOUT HEMORRHAGE: Chronic | ICD-10-CM

## 2022-06-14 DIAGNOSIS — J44.1 COPD WITH ACUTE EXACERBATION: ICD-10-CM

## 2022-06-14 DIAGNOSIS — F33.1 MODERATE EPISODE OF RECURRENT MAJOR DEPRESSIVE DISORDER: Primary | ICD-10-CM

## 2022-06-14 DIAGNOSIS — E22.2 SIADH (SYNDROME OF INAPPROPRIATE ADH PRODUCTION): Chronic | ICD-10-CM

## 2022-06-14 DIAGNOSIS — L30.4 PRURITIC INTERTRIGO: ICD-10-CM

## 2022-06-14 PROCEDURE — 99215 OFFICE O/P EST HI 40 MIN: CPT | Performed by: INTERNAL MEDICINE

## 2022-06-14 RX ORDER — OMEPRAZOLE 40 MG/1
40 CAPSULE, DELAYED RELEASE ORAL DAILY
Qty: 90 CAPSULE | Refills: 3 | Status: SHIPPED | OUTPATIENT
Start: 2022-06-14

## 2022-06-14 RX ORDER — FLUCONAZOLE 150 MG/1
150 TABLET ORAL WEEKLY
Qty: 6 TABLET | Refills: 0 | Status: SHIPPED | OUTPATIENT
Start: 2022-06-14 | End: 2022-07-07 | Stop reason: SDUPTHER

## 2022-06-14 RX ORDER — PAROXETINE HYDROCHLORIDE 20 MG/1
20 TABLET, FILM COATED ORAL EVERY MORNING
Qty: 30 TABLET | Refills: 1 | Status: ON HOLD
Start: 2022-06-14 | End: 2022-07-21

## 2022-06-14 RX ORDER — ONDANSETRON 4 MG/1
4 TABLET, ORALLY DISINTEGRATING ORAL EVERY 8 HOURS PRN
Qty: 20 TABLET | Refills: 0 | Status: SHIPPED | OUTPATIENT
Start: 2022-06-14

## 2022-06-14 RX ORDER — ARIPIPRAZOLE 10 MG/1
10 TABLET ORAL DAILY
Qty: 30 TABLET | Refills: 1 | Status: SHIPPED | OUTPATIENT
Start: 2022-06-14 | End: 2022-08-02 | Stop reason: SDUPTHER

## 2022-06-14 RX ORDER — NYSTATIN 100000 [USP'U]/G
POWDER TOPICAL 3 TIMES DAILY
Qty: 60 G | Refills: 2 | Status: SHIPPED | OUTPATIENT
Start: 2022-06-14 | End: 2022-10-13 | Stop reason: SDUPTHER

## 2022-06-14 RX ORDER — MIRTAZAPINE 7.5 MG/1
7.5 TABLET, FILM COATED ORAL NIGHTLY
Qty: 30 TABLET | Refills: 0 | Status: SHIPPED | OUTPATIENT
Start: 2022-06-14 | End: 2022-07-11

## 2022-06-14 RX ORDER — BENZONATATE 100 MG/1
100 CAPSULE ORAL 3 TIMES DAILY PRN
Qty: 30 CAPSULE | Refills: 1 | Status: ON HOLD | OUTPATIENT
Start: 2022-06-14 | End: 2022-09-15

## 2022-06-14 NOTE — PROGRESS NOTES
Transitional Care Follow Up Visit    .  Chief Complaint   Patient presents with   • Follow-up   • Depression   • Hyperlipidemia   • Hypertension   • Fatigue       Subjective     Traci King is a 72 y.o. female who presents for a transitional care management visit.    [facility] Date: 5/26  DSC Diagnosis: Hyponatremia    Within 48 business hours after discharge our office contacted her via telephone to coordinate her care and needs.      I reviewed and discussed the details of that call along with the discharge summary, hospital problems, inpatient lab results, inpatient diagnostic studies, and consultation reports with Traci.     Current outpatient and discharge medications have been reconciled for the patient.  Reviewed by: Sherry Fournier MD    History of Present Illness   Course During Hospital Stay:  Pt was instructed to go to the hospital after labs in office showed hyponatremia and pt was symptomatic with fatigue and confusion. She was admitted with sodiums in low 120s. Work up in the hospital showed SIADH and was placed on fluid restriction with rebound of her sodium level to low 130s. Her paxil was also felt to be contributing and her dose was decreased abruptly.   Since that time she has really been struggling, she is nauseated constantly, has poor appetite, low energy, feels bad, cries often.   She is currently maintaining on the 40 mg of paxil and her abilify at 5 mg but is really struggling with her depression. She had been working with a psychiatrist but has had trouble communicating with her office and would like to transfer care here for her mental health.   Also with macerated rash in her inguinal folds. Very itchy. Has tried nystatin cream, triamcinalone cream, and ketoconazole cream but not effective and seems to be getting worse.     Review of Systems   Constitutional: Positive for fatigue. Negative for appetite change, chills and fever.   HENT: Negative for hearing loss and sore throat.    Eyes:  Negative for visual disturbance.   Respiratory: Negative for cough and shortness of breath.    Cardiovascular: Negative for chest pain, palpitations and leg swelling.   Gastrointestinal: Negative for constipation, diarrhea and vomiting.   Genitourinary: Negative for difficulty urinating and frequency.   Musculoskeletal: Negative for arthralgias and myalgias.   Skin: Negative for rash.   Neurological: Negative for weakness and headaches.   Hematological: Negative for adenopathy.   Psychiatric/Behavioral: Negative for confusion and sleep disturbance.       Objective   Physical Exam  Vitals and nursing note reviewed.   Constitutional:       General: She is not in acute distress.     Appearance: Normal appearance.   Cardiovascular:      Rate and Rhythm: Normal rate and regular rhythm.      Pulses: Normal pulses.      Heart sounds: Normal heart sounds. No murmur heard.  Pulmonary:      Effort: Pulmonary effort is normal. No respiratory distress.      Breath sounds: Normal breath sounds.   Abdominal:      General: Abdomen is flat. Bowel sounds are normal.      Palpations: Abdomen is soft.      Tenderness: There is no abdominal tenderness.   Musculoskeletal:      Right lower leg: No edema.      Left lower leg: No edema.   Neurological:      Mental Status: She is alert and oriented to person, place, and time. Mental status is at baseline.   Psychiatric:         Mood and Affect: Mood is depressed. Affect is labile and tearful.         Speech: Speech is tangential.         Behavior: Behavior normal.         Thought Content: Thought content normal.         Judgment: Judgment normal.         Assessment & Plan   Diagnoses and all orders for this visit:    1. Moderate episode of recurrent major depressive disorder (HCC) (Primary)  Assessment & Plan:  UNCONTROLLED  - attempting to wean down on paxil related to hyponatremia  - hosp weaned from 60 mg to 40 mg, currently taking his but with major flare of her depressive symptoms  -  will increase abilify to 10 mg  - start mirtazapine at night   - wean paxil to 20 mg starting tomorrow  - zofran sent for nausea prn that I believe is related to med changes  - [employer] testing sent per patient request, will follow up with results once available and further detail clinical utility of this testing    Orders:  -     ARIPiprazole (ABILIFY) 10 MG tablet; Take 1 tablet by mouth Daily.  Dispense: 30 tablet; Refill: 1  -     mirtazapine (REMERON) 7.5 MG tablet; Take 1 tablet by mouth Every Night.  Dispense: 30 tablet; Refill: 0  -     PARoxetine (PAXIL) 20 MG tablet; Take 1 tablet by mouth Every Morning.  Dispense: 30 tablet; Refill: 1  -     ondansetron ODT (ZOFRAN-ODT) 4 MG disintegrating tablet; Place 1 tablet on the tongue Every 8 (Eight) Hours As Needed for Nausea or Vomiting.  Dispense: 20 tablet; Refill: 0    2. SIADH (syndrome of inappropriate ADH production) ([facility])  Assessment & Plan:  STABLE  - [facility] today for ongoing monitoring  - likely chronic but with acute flare leading to admission 5/22 with appropriate urine studies confirming diagnosis  - has had hyponatremia episodes when admitted in the past for respiratory infections, likely 2/2 to chronic lung disease with COPD from smoking  - fluid restriction to 1500 cc/day  - low salt diet  - will attempt to change SSRI as above    Orders:  -     Basic Metabolic Panel    3. Gastroesophageal reflux disease with esophagitis without hemorrhage  -     omeprazole (priLOSEC) 40 MG capsule; Take 1 capsule by mouth Daily.  Dispense: 90 capsule; Refill: 3    4. COPD with acute exacerbation ([facility])  -     benzonatate (TESSALON) 100 MG capsule; Take 1 capsule by mouth 3 (Three) Times a Day As Needed for Cough.  Dispense: 30 capsule; Refill: 1    5. Pruritic intertrigo  -     nystatin (MYCOSTATIN) 480916 UNIT/GM powder; Apply  topically to the appropriate area as directed 3 (Three) Times a Day.  Dispense: 60 g; Refill: 2  -     fluconazole (DIFLUCAN) 150 MG  tablet; Take 1 tablet by mouth 1 (One) Time Per Week for 6 doses.  Dispense: 6 tablet; Refill: 0  -     hydrocortisone 2.5 % ointment; Apply 1 application topically to the appropriate area as directed 2 (Two) Times a Day.  Dispense: 28.35 g; Refill: 2      45 mins spent in direct care of this patient preparing for visit, reviewing old records and tests, obtaining history, completing exam, ordering tests, documenting in chart.        .Return in about 3 weeks (around 7/5/2022) for follow up.    Lyndsay Fournier MD  [facility] [place] [facility]  Phone: [phone]  Fax: [phone]

## 2022-06-14 NOTE — ASSESSMENT & PLAN NOTE
UNCONTROLLED  - attempting to wean down on paxil related to hyponatremia  - hosp weaned from 60 mg to 40 mg, currently taking his but with major flare of her depressive symptoms  - will increase abilify to 10 mg  - start mirtazapine at night   - wean paxil to 20 mg starting tomorrow  - zofran sent for nausea prn that I believe is related to med changes  - [employer] testing sent per patient request, will follow up with results once available and further detail clinical utility of this testing

## 2022-06-14 NOTE — ASSESSMENT & PLAN NOTE
STABLE  - BMP today for ongoing monitoring  - likely chronic but with acute flare leading to admission 5/22 with appropriate urine studies confirming diagnosis  - has had hyponatremia episodes when admitted in the past for respiratory infections, likely 2/2 to chronic lung disease with COPD from smoking  - fluid restriction to 1500 cc/day  - low salt diet  - will attempt to change SSRI as above

## 2022-06-15 LAB
BUN SERPL-MCNC: 15 MG/DL (ref 8–27)
BUN/CREAT SERPL: 18 (ref 12–28)
CALCIUM SERPL-MCNC: 9.4 MG/DL (ref 8.7–10.3)
CHLORIDE SERPL-SCNC: 97 MMOL/L (ref 96–106)
CO2 SERPL-SCNC: 18 MMOL/L (ref 20–29)
CREAT SERPL-MCNC: 0.84 MG/DL (ref 0.57–1)
EGFRCR SERPLBLD CKD-EPI 2021: 75 ML/MIN/1.73
GLUCOSE SERPL-MCNC: 101 MG/DL (ref 65–99)
POTASSIUM SERPL-SCNC: 4.9 MMOL/L (ref 3.5–5.2)
SODIUM SERPL-SCNC: 127 MMOL/L (ref 134–144)

## 2022-06-17 ENCOUNTER — LAB (OUTPATIENT)
Dept: INTERNAL MEDICINE | Facility: CLINIC | Age: 72
End: 2022-06-17

## 2022-06-17 DIAGNOSIS — I10 PRIMARY HYPERTENSION: ICD-10-CM

## 2022-06-17 DIAGNOSIS — R53.83 FATIGUE, UNSPECIFIED TYPE: ICD-10-CM

## 2022-06-17 DIAGNOSIS — E22.2 SIADH (SYNDROME OF INAPPROPRIATE ADH PRODUCTION): ICD-10-CM

## 2022-06-17 DIAGNOSIS — R89.9 ABNORMAL LABORATORY TEST: Primary | ICD-10-CM

## 2022-06-17 DIAGNOSIS — E87.1 HYPONATREMIA: ICD-10-CM

## 2022-06-17 LAB
BUN SERPL-MCNC: 23 MG/DL (ref 8–27)
BUN/CREAT SERPL: 22 (ref 12–28)
CALCIUM SERPL-MCNC: 9.7 MG/DL (ref 8.7–10.3)
CHLORIDE SERPL-SCNC: 106 MMOL/L (ref 96–106)
CO2 SERPL-SCNC: 20 MMOL/L (ref 20–29)
CREAT SERPL-MCNC: 1.05 MG/DL (ref 0.57–1)
EGFRCR SERPLBLD CKD-EPI 2021: 57 ML/MIN/1.73
GLUCOSE SERPL-MCNC: 109 MG/DL (ref 65–99)
POTASSIUM SERPL-SCNC: 5.5 MMOL/L (ref 3.5–5.2)
SODIUM SERPL-SCNC: 134 MMOL/L (ref 134–144)

## 2022-06-21 DIAGNOSIS — I10 PRIMARY HYPERTENSION: Primary | ICD-10-CM

## 2022-06-21 RX ORDER — AMLODIPINE BESYLATE 10 MG/1
10 TABLET ORAL DAILY
Qty: 30 TABLET | Refills: 1 | Status: ON HOLD | OUTPATIENT
Start: 2022-06-21 | End: 2022-07-21

## 2022-06-24 ENCOUNTER — LAB (OUTPATIENT)
Dept: INTERNAL MEDICINE | Facility: CLINIC | Age: 72
End: 2022-06-24

## 2022-06-24 DIAGNOSIS — I10 PRIMARY HYPERTENSION: Primary | ICD-10-CM

## 2022-06-24 DIAGNOSIS — R89.9 ABNORMAL LABORATORY TEST: ICD-10-CM

## 2022-06-24 RX ORDER — LIDOCAINE 50 MG/G
1 PATCH TOPICAL EVERY 24 HOURS
Qty: 30 PATCH | Refills: 5 | Status: SHIPPED | OUTPATIENT
Start: 2022-06-24

## 2022-06-25 LAB
BUN SERPL-MCNC: 24 MG/DL (ref 8–27)
BUN/CREAT SERPL: 23 (ref 12–28)
CALCIUM SERPL-MCNC: 9.6 MG/DL (ref 8.7–10.3)
CHLORIDE SERPL-SCNC: 105 MMOL/L (ref 96–106)
CO2 SERPL-SCNC: 16 MMOL/L (ref 20–29)
CREAT SERPL-MCNC: 1.06 MG/DL (ref 0.57–1)
EGFRCR SERPLBLD CKD-EPI 2021: 57 ML/MIN/1.73
GLUCOSE SERPL-MCNC: 97 MG/DL (ref 65–99)
POTASSIUM SERPL-SCNC: 5 MMOL/L (ref 3.5–5.2)
SODIUM SERPL-SCNC: 136 MMOL/L (ref 134–144)

## 2022-06-27 ENCOUNTER — TELEPHONE (OUTPATIENT)
Dept: INTERNAL MEDICINE | Facility: CLINIC | Age: 72
End: 2022-06-27

## 2022-06-27 DIAGNOSIS — I10 PRIMARY HYPERTENSION: Primary | ICD-10-CM

## 2022-06-27 RX ORDER — HYDRALAZINE HYDROCHLORIDE 10 MG/1
10 TABLET, FILM COATED ORAL 2 TIMES DAILY
Qty: 60 TABLET | Refills: 0 | Status: ON HOLD | OUTPATIENT
Start: 2022-06-27 | End: 2022-07-21

## 2022-06-27 NOTE — TELEPHONE ENCOUNTER
Caller: Traci King     Relationship: SELF    Best call back number:     What is your medical concern?    PATIENT IS HAVING SIDE EFFECTS FROM BLOOD PRESSURE MEDICATION  amLODIPine (NORVASC) 10 MG tablet.  SHE STATED SHE IS NOT TAKING THIS MEDICATION ANYMORE.  SHE ALSO HAS A SWOLLEN RIGHT GLAND IN HER THROAT.  SIDE EFFECTS:  IT IS CAUSING HER TO HAVE NAUSEA, PASSING OUT FEELINGS, FATIGUE, ELECTRICAL SHOCKS THROUGH OUT BODY, SWELLING IN LEGS AND ANKLES, CALF CRAMPS WHEN DRIVING      SHE IS ALSO AN EMOTIONAL MESS.  CAN'T SPEAK WITHOUT CRYING     mirtazapine (REMERON) 7.5 MG tablet      COULD NOT GET PATIENT IN TODAY AS SAME DAY. TRIED TO WARM TRANSFER AND COULD NOT GET THROUGH      PLEASE CALL AND ADVISE.

## 2022-06-27 NOTE — TELEPHONE ENCOUNTER
Hub staff attempted to follow warm transfer process and was unsuccessful     Caller: Tarci King    Relationship to patient: Self    Best call back number: [phone]    Patient is needing: TO SPEAK WITH SOMEONE REGARDING HER BLOOD PRESSURE MEDICATION. SHE IS CRYING AND A COMPLETE WRECK.

## 2022-06-27 NOTE — TELEPHONE ENCOUNTER
Amlodipine can definitely cause swelling in the ankles so that does likely account for that and that can cause the cramping  Have sent in a new Rx for hydralazine to take twice a day for blood pressure instead, should be tolerated better than the amlodipine  As for other mentioned symptoms it seems less likely to be related, if she has a sore throat and swollen glands she may just actually be sick, so we will see her later this week to go over that, looks like she is scheduled for Thursday  Lets see how she feels after a couple days off hte amlodipine and then we can go from there

## 2022-06-28 ENCOUNTER — APPOINTMENT (OUTPATIENT)
Dept: GENERAL RADIOLOGY | Facility: SURGERY CENTER | Age: 72
End: 2022-06-28

## 2022-06-30 ENCOUNTER — OFFICE VISIT (OUTPATIENT)
Dept: INTERNAL MEDICINE | Facility: CLINIC | Age: 72
End: 2022-06-30

## 2022-06-30 VITALS
OXYGEN SATURATION: 95 % | TEMPERATURE: 95.1 F | RESPIRATION RATE: 16 BRPM | WEIGHT: 184 LBS | HEART RATE: 92 BPM | DIASTOLIC BLOOD PRESSURE: 86 MMHG | BODY MASS INDEX: 34.74 KG/M2 | HEIGHT: 61 IN | SYSTOLIC BLOOD PRESSURE: 136 MMHG

## 2022-06-30 DIAGNOSIS — I10 PRIMARY HYPERTENSION: Primary | Chronic | ICD-10-CM

## 2022-06-30 DIAGNOSIS — F33.1 MODERATE EPISODE OF RECURRENT MAJOR DEPRESSIVE DISORDER: ICD-10-CM

## 2022-06-30 DIAGNOSIS — E22.2 SIADH (SYNDROME OF INAPPROPRIATE ADH PRODUCTION): ICD-10-CM

## 2022-06-30 PROCEDURE — 99214 OFFICE O/P EST MOD 30 MIN: CPT | Performed by: INTERNAL MEDICINE

## 2022-07-01 LAB
BUN SERPL-MCNC: 16 MG/DL (ref 8–27)
BUN/CREAT SERPL: 18 (ref 12–28)
CALCIUM SERPL-MCNC: 9.7 MG/DL (ref 8.7–10.3)
CHLORIDE SERPL-SCNC: 98 MMOL/L (ref 96–106)
CO2 SERPL-SCNC: 22 MMOL/L (ref 20–29)
CREAT SERPL-MCNC: 0.91 MG/DL (ref 0.57–1)
EGFRCR SERPLBLD CKD-EPI 2021: 68 ML/MIN/1.73
GLUCOSE SERPL-MCNC: 97 MG/DL (ref 65–99)
POTASSIUM SERPL-SCNC: 4.8 MMOL/L (ref 3.5–5.2)
SODIUM SERPL-SCNC: 135 MMOL/L (ref 134–144)

## 2022-07-01 NOTE — ASSESSMENT & PLAN NOTE
IMPROVING  -now fully weaned off paxil  - cont on abilify to 10 mg  - cont on mirtazapine at night   - [employer] testing sent per patient request, results still pending  - is doing better now that she is off the paxil but still room for improvement, no further med changes today as she has only fully been off of paxil x 1 week and only on new medication combination x 2 weeks so likely more benefit to be seen from current regimen

## 2022-07-01 NOTE — ASSESSMENT & PLAN NOTE
STABLE  - BMP today for ongoing monitoring  - likely chronic but with acute flare leading to admission 5/22 with appropriate urine studies confirming diagnosis  - has had hyponatremia episodes when admitted in the past for respiratory infections, likely 2/2 to chronic lung disease with COPD from smoking  - fluid restriction to 1500 cc/day recommended but pt notes she is no longer following this  - low salt diet

## 2022-07-07 ENCOUNTER — TELEPHONE (OUTPATIENT)
Dept: INTERNAL MEDICINE | Facility: CLINIC | Age: 72
End: 2022-07-07

## 2022-07-07 DIAGNOSIS — L30.4 PRURITIC INTERTRIGO: ICD-10-CM

## 2022-07-07 RX ORDER — FLUCONAZOLE 150 MG/1
150 TABLET ORAL WEEKLY
Qty: 6 TABLET | Refills: 0 | Status: ON HOLD | OUTPATIENT
Start: 2022-07-07 | End: 2022-07-21

## 2022-07-07 NOTE — TELEPHONE ENCOUNTER
Have sent in oral pills to her pharmacy, she should take it once a week x 6 weeks, may not work for several doses so she may have to give it some time

## 2022-07-07 NOTE — TELEPHONE ENCOUNTER
Caller: Traci King    Relationship: Self    Best call back number: [phone]    What medication are you requesting: SOMETHING FOR YEAST INFECTION      Have you had these symptoms before:    [x] Yes  [] No    Have you been treated for these symptoms before:   [x] Yes  [] No    If a prescription is needed, what is your preferred pharmacy and phone number: [facility] - [address] - [phone]  - [phone] FX     Additional notes:PATIENT ADVISED THAT HER YEAST INFECTION IS GETTING WORSE AND THE CREAMS ARE NOT HELPING.  IS THERE SOMETHING ELSE YOU CAN PRESCRIBE?

## 2022-07-08 ENCOUNTER — OFFICE VISIT (OUTPATIENT)
Dept: PAIN MEDICINE | Facility: CLINIC | Age: 72
End: 2022-07-08

## 2022-07-08 ENCOUNTER — TELEPHONE (OUTPATIENT)
Dept: INTERNAL MEDICINE | Facility: CLINIC | Age: 72
End: 2022-07-08

## 2022-07-08 VITALS
HEART RATE: 91 BPM | OXYGEN SATURATION: 99 % | BODY MASS INDEX: 34.85 KG/M2 | HEIGHT: 61 IN | DIASTOLIC BLOOD PRESSURE: 76 MMHG | SYSTOLIC BLOOD PRESSURE: 136 MMHG | WEIGHT: 184.6 LBS | RESPIRATION RATE: 12 BRPM | TEMPERATURE: 97.7 F

## 2022-07-08 DIAGNOSIS — S32.020G COMPRESSION FRACTURE OF L2 VERTEBRA WITH DELAYED HEALING, SUBSEQUENT ENCOUNTER: ICD-10-CM

## 2022-07-08 DIAGNOSIS — M79.18 MYOFASCIAL PAIN SYNDROME OF LUMBAR SPINE: ICD-10-CM

## 2022-07-08 DIAGNOSIS — G89.4 CHRONIC PAIN SYNDROME: Primary | ICD-10-CM

## 2022-07-08 DIAGNOSIS — Z79.899 ENCOUNTER FOR LONG-TERM (CURRENT) USE OF HIGH-RISK MEDICATION: ICD-10-CM

## 2022-07-08 DIAGNOSIS — M51.36 DDD (DEGENERATIVE DISC DISEASE), LUMBAR: ICD-10-CM

## 2022-07-08 DIAGNOSIS — M48.02 SPINAL STENOSIS, CERVICAL REGION: ICD-10-CM

## 2022-07-08 DIAGNOSIS — G89.4 CHRONIC PAIN SYNDROME: ICD-10-CM

## 2022-07-08 DIAGNOSIS — M79.671 RIGHT FOOT PAIN: ICD-10-CM

## 2022-07-08 DIAGNOSIS — R26.81 GAIT INSTABILITY: ICD-10-CM

## 2022-07-08 DIAGNOSIS — M15.9 PRIMARY OSTEOARTHRITIS INVOLVING MULTIPLE JOINTS: ICD-10-CM

## 2022-07-08 LAB
POC AMPHETAMINES: NEGATIVE
POC BARBITURATES: NEGATIVE
POC BENZODIAZEPHINES: NEGATIVE
POC COCAINE: NEGATIVE
POC METHADONE: NEGATIVE
POC METHAMPHETAMINE SCREEN URINE: NEGATIVE
POC OPIATES: NEGATIVE
POC OXYCODONE: POSITIVE
POC PHENCYCLIDINE: NEGATIVE
POC PROPOXYPHENE: NEGATIVE
POC THC: NEGATIVE
POC TRICYCLIC ANTIDEPRESSANTS: NEGATIVE

## 2022-07-08 PROCEDURE — 99214 OFFICE O/P EST MOD 30 MIN: CPT | Performed by: NURSE PRACTITIONER

## 2022-07-08 PROCEDURE — 80305 DRUG TEST PRSMV DIR OPT OBS: CPT | Performed by: NURSE PRACTITIONER

## 2022-07-08 NOTE — H&P (VIEW-ONLY)
CHIEF COMPLAINT  2 Month lumbar pain evaluation   Patient in office reports her pain has continued to remain consistent over the last 2 months .     Subjective   Traci King is a 72 y.o. female  who presents for follow-up.  She has a history of back pain.    Today her pain is 4/10VAS in severity. Continues with OxyContin 30 mg 2/day, Robaxin 750 mg PRN (does not take daily), Diclofenac 50 mg 2/day, as well as more recently Norco 10/325 every other day PRN. This medication regimen decreases her pain by a significant amount. ADLs by self. Constipation reported. Taking laxative daily without relief.      Cancelled Zacarias L1-L3 MBB on 6/28/2022 due to having side effects related to medication changes (related to other medical conditions). This was scheduled to treat the pain r/t her L2 fracture.  She would like to re-schedule.       Procedure list:  4/27/2022-LESI at L2 3-100% relief x1 day  1/11/2022-bilateral L3-L5 RFA-60% relief  12/16/2021-LESI at L2-3-no relief  8/4/2021-bilateral L3-L5 MBB-60% diagnostic relief  12/29/2020-bilateral L3-L5 RFA-50% relief  7/8/2020-bilateral L3-L5 MBB-50% relief  5/20/2020-bilateral L3-L5 MBB-80% relief  8/16/2018-bilateral S1 TFESI-no relief  1/26/2018-left C3-C5 RFA-50% relief    Patient remained masked during entire encounter. No cough present. I donned a mask and eye protection throughout entire visit. Prior to donning mask and eye protection, hand hygiene was performed, as well as when it was doffed.  I was closer than 6 feet, but not for an extended period of time. No obvious exposure to any bodily fluids.    Neck Pain   This is a chronic problem. The current episode started more than 1 year ago. The problem occurs constantly. The problem has been waxing and waning. The quality of the pain is described as aching and burning. The pain is at a severity of 6/10. The pain is moderate. The symptoms are aggravated by position, bending, stress and twisting. The pain is worse during  the day. Stiffness is present all day. Pertinent negatives include no chest pain, fever, headaches, numbness or weakness. She has tried oral narcotics and NSAIDs for the symptoms. The treatment provided moderate relief.   Back Pain  This is a chronic problem. The current episode started more than 1 year ago. The problem occurs daily. The problem has been waxing and waning since onset. The pain is present in the lumbar spine. The quality of the pain is described as aching. The pain is at a severity of 6/10. The symptoms are aggravated by position, standing, bending and twisting. Pertinent negatives include no abdominal pain, chest pain, dysuria, fever, headaches, numbness or weakness. She has tried NSAIDs, analgesics and muscle relaxant for the symptoms. The treatment provided moderate relief.      PEG Assessment   What number best describes your pain on average in the past week?7  What number best describes how, during the past week, pain has interfered with your enjoyment of life?7  What number best describes how, during the past week, pain has interfered with your general activity?  7    The following portions of the patient's history were reviewed and updated as appropriate: allergies, current medications, past family history, past medical history, past social history, past surgical history and problem list.    Review of Systems   Constitutional: Negative for activity change, fatigue and fever.   HENT: Negative for congestion.    Eyes: Negative for visual disturbance.   Respiratory: Positive for cough (smoker).    Cardiovascular: Negative for chest pain.   Gastrointestinal: Positive for constipation. Negative for abdominal pain and diarrhea.   Genitourinary: Negative for difficulty urinating and dysuria.   Musculoskeletal: Positive for back pain and neck pain.   Neurological: Negative for dizziness, weakness, light-headedness, numbness and headaches.   Psychiatric/Behavioral: Positive for sleep disturbance.  "Negative for agitation and suicidal ideas. The patient is not nervous/anxious.      I have reviewed and confirmed the accuracy of the ROS as documented by the MA/LPN/RN DACIA Solis    Vitals:    07/08/22 0905   BP: 136/76   BP Location: Left arm   Patient Position: Sitting   Pulse: 91   Resp: 12   Temp: 97.7 °F (36.5 °C)   SpO2: 99%   Weight: 83.7 kg (184 lb 9.6 oz)   Height: 154.9 cm (61\")   PainSc:   6   PainLoc: Back     Objective   Physical Exam  Vitals and nursing note reviewed.   Constitutional:       General: She is not in acute distress.     Appearance: Normal appearance. She is not ill-appearing.   Pulmonary:      Effort: Pulmonary effort is normal. No respiratory distress.   Musculoskeletal:      Cervical back: Tenderness and bony tenderness present.      Lumbar back: Tenderness and bony tenderness present.      Right foot: Tenderness present.   Skin:     General: Skin is warm and dry.   Neurological:      Mental Status: She is alert and oriented to person, place, and time.      Motor: Motor function is intact.      Gait: Gait abnormal (slowed, antalgia).   Psychiatric:         Mood and Affect: Mood normal.         Behavior: Behavior normal.       Assessment & Plan   Diagnoses and all orders for this visit:    1. Chronic pain syndrome (Primary)    2. Compression fracture of L2 vertebra with delayed healing, subsequent encounter    3. DDD (degenerative disc disease), lumbar  -     Ambulatory Referral to Physical Therapy Evaluate and treat    4. Spinal stenosis, cervical region    5. Primary osteoarthritis involving multiple joints    6. Encounter for long-term (current) use of high-risk medication    7. Right foot pain  -     Ambulatory Referral to Physical Therapy Evaluate and treat    8. Gait instability  -     Ambulatory Referral to Physical Therapy Evaluate and treat    Other orders  -     Methylnaltrexone Bromide (RELISTOR) 150 MG tablet; Take 3 tablets by mouth Daily.  Dispense: 90 tablet; " Refill: 5      --- Refill OxyContin and Hydrocodone. Patient appears stable with current regimen. No adverse effects. Regarding continuation of opioids, there is no evidence of aberrant behavior or any red flags.  The patient continues with appropriate response to opioid therapy. ADL's remain intact by self.   --- Routine UDS in office today as part of monitoring requirements for controlled substances.  The specimen was viewed and the immunoassay result reviewed and is +OXY.  This specimen will be sent to [facility] laboratory for confirmation.     --- The patient signed an updated copy of the controlled substance agreement on 11/11/2021  --- PT to eval/treat - right foot pain causing gait instability, also causing back pain to worsen. Trying to avoid right foot surgery   --- Trial Relistor - take 3 po every AM. Samples dispensed (#18). Discussed medication with the patient.  Included in this discussion was the potential for side effects and adverse events.  Patient verbalized understanding and wished to proceed.  Prescription will be sent to pharmacy.  --- Follow-up 1 month          MATTHEW REPORT  As part of the patient's treatment plan, I am prescribing controlled substances. The patient has been made aware of appropriate use of such medications, including potential risk of somnolence, limited ability to drive and/or work safely, and the potential for dependence or overdose. It has also been made clear that these medications are for use by this patient only, without concomitant use of alcohol or other substances unless prescribed.     Patient has completed prescribing agreement detailing terms of continued prescribing of controlled substances, including monitoring MATTHEW reports, urine drug screening, and pill counts if necessary. The patient is aware that inappropriate use will results in cessation of prescribing such medications.    As the clinician, I personally reviewed the MATTHEW from 7/8/2022 while the  patient was in the office today.    History and physical exam exhibit continued safe and appropriate use of controlled substances.     Dictated utilizing Dragon dictation.     This document is intended for medical expert use only. Reading of this document by patients and/or patient's family without participating medical staff guidance may result in misinterpretation and unintended morbidity.   Any interpretation of such data is the responsibility of the patient and/or family member responsible for the patient in concert with their primary or specialist providers, not to be left for sources of online searches such as [employer], [employer] or similar queries. Relying on these approaches to knowledge may result in misinterpretation, misguided goals of care and even death should patients or family members try recommendations outside of the realm of professional medical care in a supervised way.

## 2022-07-08 NOTE — PROGRESS NOTES
CHIEF COMPLAINT  2 Month lumbar pain evaluation   Patient in office reports her pain has continued to remain consistent over the last 2 months .     Subjective   Traci King is a 72 y.o. female  who presents for follow-up.  She has a history of back pain.    Today her pain is 4/10VAS in severity. Continues with OxyContin 30 mg 2/day, Robaxin 750 mg PRN (does not take daily), Diclofenac 50 mg 2/day, as well as more recently Norco 10/325 every other day PRN. This medication regimen decreases her pain by a significant amount. ADLs by self. Constipation reported. Taking laxative daily without relief.      Cancelled Zacarias L1-L3 MBB on 6/28/2022 due to having side effects related to medication changes (related to other medical conditions). This was scheduled to treat the pain r/t her L2 fracture.  She would like to re-schedule.       Procedure list:  4/27/2022-LESI at L2 3-100% relief x1 day  1/11/2022-bilateral L3-L5 RFA-60% relief  12/16/2021-LESI at L2-3-no relief  8/4/2021-bilateral L3-L5 MBB-60% diagnostic relief  12/29/2020-bilateral L3-L5 RFA-50% relief  7/8/2020-bilateral L3-L5 MBB-50% relief  5/20/2020-bilateral L3-L5 MBB-80% relief  8/16/2018-bilateral S1 TFESI-no relief  1/26/2018-left C3-C5 RFA-50% relief    Patient remained masked during entire encounter. No cough present. I donned a mask and eye protection throughout entire visit. Prior to donning mask and eye protection, hand hygiene was performed, as well as when it was doffed.  I was closer than 6 feet, but not for an extended period of time. No obvious exposure to any bodily fluids.    Neck Pain   This is a chronic problem. The current episode started more than 1 year ago. The problem occurs constantly. The problem has been waxing and waning. The quality of the pain is described as aching and burning. The pain is at a severity of 6/10. The pain is moderate. The symptoms are aggravated by position, bending, stress and twisting. The pain is worse during  the day. Stiffness is present all day. Pertinent negatives include no chest pain, fever, headaches, numbness or weakness. She has tried oral narcotics and NSAIDs for the symptoms. The treatment provided moderate relief.   Back Pain  This is a chronic problem. The current episode started more than 1 year ago. The problem occurs daily. The problem has been waxing and waning since onset. The pain is present in the lumbar spine. The quality of the pain is described as aching. The pain is at a severity of 6/10. The symptoms are aggravated by position, standing, bending and twisting. Pertinent negatives include no abdominal pain, chest pain, dysuria, fever, headaches, numbness or weakness. She has tried NSAIDs, analgesics and muscle relaxant for the symptoms. The treatment provided moderate relief.      PEG Assessment   What number best describes your pain on average in the past week?7  What number best describes how, during the past week, pain has interfered with your enjoyment of life?7  What number best describes how, during the past week, pain has interfered with your general activity?  7    The following portions of the patient's history were reviewed and updated as appropriate: allergies, current medications, past family history, past medical history, past social history, past surgical history and problem list.    Review of Systems   Constitutional: Negative for activity change, fatigue and fever.   HENT: Negative for congestion.    Eyes: Negative for visual disturbance.   Respiratory: Positive for cough (smoker).    Cardiovascular: Negative for chest pain.   Gastrointestinal: Positive for constipation. Negative for abdominal pain and diarrhea.   Genitourinary: Negative for difficulty urinating and dysuria.   Musculoskeletal: Positive for back pain and neck pain.   Neurological: Negative for dizziness, weakness, light-headedness, numbness and headaches.   Psychiatric/Behavioral: Positive for sleep disturbance.  "Negative for agitation and suicidal ideas. The patient is not nervous/anxious.      I have reviewed and confirmed the accuracy of the ROS as documented by the MA/LPN/RN DACIA Solis    Vitals:    07/08/22 0905   BP: 136/76   BP Location: Left arm   Patient Position: Sitting   Pulse: 91   Resp: 12   Temp: 97.7 °F (36.5 °C)   SpO2: 99%   Weight: 83.7 kg (184 lb 9.6 oz)   Height: 154.9 cm (61\")   PainSc:   6   PainLoc: Back     Objective   Physical Exam  Vitals and nursing note reviewed.   Constitutional:       General: She is not in acute distress.     Appearance: Normal appearance. She is not ill-appearing.   Pulmonary:      Effort: Pulmonary effort is normal. No respiratory distress.   Musculoskeletal:      Cervical back: Tenderness and bony tenderness present.      Lumbar back: Tenderness and bony tenderness present.      Right foot: Tenderness present.   Skin:     General: Skin is warm and dry.   Neurological:      Mental Status: She is alert and oriented to person, place, and time.      Motor: Motor function is intact.      Gait: Gait abnormal (slowed, antalgia).   Psychiatric:         Mood and Affect: Mood normal.         Behavior: Behavior normal.       Assessment & Plan   Diagnoses and all orders for this visit:    1. Chronic pain syndrome (Primary)    2. Compression fracture of L2 vertebra with delayed healing, subsequent encounter    3. DDD (degenerative disc disease), lumbar  -     Ambulatory Referral to Physical Therapy Evaluate and treat    4. Spinal stenosis, cervical region    5. Primary osteoarthritis involving multiple joints    6. Encounter for long-term (current) use of high-risk medication    7. Right foot pain  -     Ambulatory Referral to Physical Therapy Evaluate and treat    8. Gait instability  -     Ambulatory Referral to Physical Therapy Evaluate and treat    Other orders  -     Methylnaltrexone Bromide (RELISTOR) 150 MG tablet; Take 3 tablets by mouth Daily.  Dispense: 90 tablet; " Refill: 5      --- Refill OxyContin and Hydrocodone. Patient appears stable with current regimen. No adverse effects. Regarding continuation of opioids, there is no evidence of aberrant behavior or any red flags.  The patient continues with appropriate response to opioid therapy. ADL's remain intact by self.   --- Routine UDS in office today as part of monitoring requirements for controlled substances.  The specimen was viewed and the immunoassay result reviewed and is +OXY.  This specimen will be sent to [facility] laboratory for confirmation.     --- The patient signed an updated copy of the controlled substance agreement on 11/11/2021  --- PT to eval/treat - right foot pain causing gait instability, also causing back pain to worsen. Trying to avoid right foot surgery   --- Trial Relistor - take 3 po every AM. Samples dispensed (#18). Discussed medication with the patient.  Included in this discussion was the potential for side effects and adverse events.  Patient verbalized understanding and wished to proceed.  Prescription will be sent to pharmacy.  --- Follow-up 1 month          MATTHEW REPORT  As part of the patient's treatment plan, I am prescribing controlled substances. The patient has been made aware of appropriate use of such medications, including potential risk of somnolence, limited ability to drive and/or work safely, and the potential for dependence or overdose. It has also been made clear that these medications are for use by this patient only, without concomitant use of alcohol or other substances unless prescribed.     Patient has completed prescribing agreement detailing terms of continued prescribing of controlled substances, including monitoring MATTHEW reports, urine drug screening, and pill counts if necessary. The patient is aware that inappropriate use will results in cessation of prescribing such medications.    As the clinician, I personally reviewed the MATTHEW from 7/8/2022 while the  patient was in the office today.    History and physical exam exhibit continued safe and appropriate use of controlled substances.     Dictated utilizing Dragon dictation.     This document is intended for medical expert use only. Reading of this document by patients and/or patient's family without participating medical staff guidance may result in misinterpretation and unintended morbidity.   Any interpretation of such data is the responsibility of the patient and/or family member responsible for the patient in concert with their primary or specialist providers, not to be left for sources of online searches such as [employer], [employer] or similar queries. Relying on these approaches to knowledge may result in misinterpretation, misguided goals of care and even death should patients or family members try recommendations outside of the realm of professional medical care in a supervised way.

## 2022-07-08 NOTE — TELEPHONE ENCOUNTER
Ok I was also under the impression we had done the diflucan previously, but has she taken it for the entire 6 weeks? Or just a couple doses? It typically takes longer than that to be effective. Also, the oral therapy really is the strongest there is for this type of yeast infection, so if this is not working, it is likely actually related to something that is not just a yeast infection. She may need to make an appt in to see me or better yet a dermatologist to have this evaluated if it is not getting any better

## 2022-07-08 NOTE — TELEPHONE ENCOUNTER
Spoke with pt. She stated she has been taking medication consistently for the 6 weeks, with no improvement. I mentioned it may be best to be seen in order to address what is causing the issue, pt stated she will reach out to her dermatologist.

## 2022-07-08 NOTE — TELEPHONE ENCOUNTER
Caller: Traci King    Relationship: Self    Best call back number: [phone]    What medication are you requesting: NEW MEDICATION FOR YEAST INFECTION - PATIENT STATES SHE HAS TRIED DIFLUCAN AND CREAMS PREVIOUSLY, BUT NONE HAVE HELPED    What are your current symptoms: RASH ON LOWER ABDOMEN AND NEAR CREASE OF LEGS - PATIENT NOW FEELING BLISTERS OR BUMPS THERE.    How long have you been experiencing symptoms: WEEKS    Have you had these symptoms before:    [x] Yes  [] No    Have you been treated for these symptoms before:   [x] Yes  [] No    If a prescription is needed, what is your preferred pharmacy and phone number: [facility] - [address] - [phone]  - [phone] FX

## 2022-07-09 RX ORDER — HYDROCODONE BITARTRATE AND ACETAMINOPHEN 10; 325 MG/1; MG/1
1 TABLET ORAL DAILY PRN
Qty: 15 TABLET | Refills: 0 | Status: SHIPPED | OUTPATIENT
Start: 2022-07-09 | End: 2022-08-09

## 2022-07-09 RX ORDER — OXYCODONE HYDROCHLORIDE 30 MG/1
30 TABLET, FILM COATED, EXTENDED RELEASE ORAL EVERY 12 HOURS SCHEDULED
Qty: 60 TABLET | Refills: 0 | Status: SHIPPED | OUTPATIENT
Start: 2022-07-09 | End: 2022-08-09

## 2022-07-11 ENCOUNTER — TELEPHONE (OUTPATIENT)
Dept: PAIN MEDICINE | Facility: CLINIC | Age: 72
End: 2022-07-11

## 2022-07-11 DIAGNOSIS — F33.1 MODERATE EPISODE OF RECURRENT MAJOR DEPRESSIVE DISORDER: ICD-10-CM

## 2022-07-11 RX ORDER — MIRTAZAPINE 7.5 MG/1
7.5 TABLET, FILM COATED ORAL NIGHTLY
Qty: 90 TABLET | Refills: 1 | Status: SHIPPED | OUTPATIENT
Start: 2022-07-11 | End: 2022-07-12 | Stop reason: SDUPTHER

## 2022-07-11 NOTE — TELEPHONE ENCOUNTER
Caller: Traci King    Relationship: Self    Best call back number: [phone]    What is the best time to reach you: YES    What was the call regarding: PT SAW NIKOS ON 7.8.2022 AND CALLED TO FOLLOW UP ON A COUPLE OF MATTERS.    THE LAXATIVE THAT THE PT WAS GIVEN SAMPLES OF DID HELP. SHE WOULD LIKE TO MOVE FORWARD W/ A PRESCRIPTION FOR RELISTOR AS DISCUSSED SINCE IT WORKED.     SHE ALSO KNOWS THAT SHE WOULD LIKE TO HAVE HER PHYSICAL THERAPY AT [facility] IN [address], [address].       Do you require a callback: NO

## 2022-07-12 ENCOUNTER — TELEPHONE (OUTPATIENT)
Dept: INTERNAL MEDICINE | Facility: CLINIC | Age: 72
End: 2022-07-12

## 2022-07-12 DIAGNOSIS — F33.1 MODERATE EPISODE OF RECURRENT MAJOR DEPRESSIVE DISORDER: ICD-10-CM

## 2022-07-12 RX ORDER — MIRTAZAPINE 15 MG/1
15 TABLET, FILM COATED ORAL NIGHTLY
Qty: 30 TABLET | Refills: 1 | Status: SHIPPED | OUTPATIENT
Start: 2022-07-12 | End: 2022-09-06 | Stop reason: SDUPTHER

## 2022-07-12 NOTE — TELEPHONE ENCOUNTER
Caller: Traci King    Relationship: Self    Best call back number:[phone]    What medications are you currently taking:   Current Outpatient Medications on File Prior to Visit   Medication Sig Dispense Refill   • amLODIPine (NORVASC) 10 MG tablet Take 1 tablet by mouth Daily. 30 tablet 1   • ARIPiprazole (ABILIFY) 10 MG tablet Take 1 tablet by mouth Daily. 30 tablet 1   • benzonatate (TESSALON) 100 MG capsule Take 1 capsule by mouth 3 (Three) Times a Day As Needed for Cough. 30 capsule 1   • diclofenac (VOLTAREN) 50 MG EC tablet Take 1 tablet by mouth 2 (Two) Times a Day As Needed (pain). 180 tablet 0   • fluconazole (DIFLUCAN) 150 MG tablet Take 1 tablet by mouth 1 (One) Time Per Week for 6 doses. 6 tablet 0   • hydrALAZINE (APRESOLINE) 10 MG tablet Take 1 tablet by mouth 2 (Two) Times a Day. 60 tablet 0   • HYDROcodone-acetaminophen (NORCO)  MG per tablet Take 1 tablet by mouth Daily As Needed for Severe Pain . 15 tablet 0   • hydrocortisone 2.5 % ointment Apply 1 application topically to the appropriate area as directed 2 (Two) Times a Day. 28.35 g 2   • lidocaine (LIDODERM) 5 % Place 1 patch on the skin as directed by provider Daily. Remove & Discard patch within 12 hours or as directed by MD 30 patch 5   • methocarbamol (ROBAXIN) 750 MG tablet TAKE ONE TABLET BY MOUTH 3 TIMES DAILY 90 tablet 2   • Methylnaltrexone Bromide (RELISTOR) 150 MG tablet Take 3 tablets by mouth Daily. 90 tablet 5   • mirtazapine (REMERON) 7.5 MG tablet TAKE 1 TABLET BY MOUTH EVERY NIGHT. 90 tablet 1   • naloxone (Narcan) 4 MG/0.1ML nasal spray 1 spray into the nostril(s) as directed by provider As Needed (overdose symptoms). 1 each 0   • nystatin (MYCOSTATIN) 374057 UNIT/GM powder Apply  topically to the appropriate area as directed 3 (Three) Times a Day. 60 g 2   • nystatin-triamcinolone (MYCOLOG II) 059171-0.1 UNIT/GM-% cream      • omeprazole (priLOSEC) 40 MG capsule Take 1 capsule by mouth Daily. 90 capsule 3    • ondansetron ODT (ZOFRAN-ODT) 4 MG disintegrating tablet Place 1 tablet on the tongue Every 8 (Eight) Hours As Needed for Nausea or Vomiting. 20 tablet 0   • oxyCODONE HCl ER (oxyCONTIN) 30 MG tablet extended-release 12 hour Take 1 tablet by mouth Every 12 (Twelve) Hours. 60 tablet 0   • PARoxetine (PAXIL) 20 MG tablet Take 1 tablet by mouth Every Morning. 30 tablet 1   • rosuvastatin (CRESTOR) 5 MG tablet Take 1 tablet by mouth Every Night. 90 tablet 1     No current facility-administered medications on file prior to visit.          When did you start taking these medications: 1 MONTH    Which medication are you concerned about:   mirtazapine (REMERON) 7.5 MG tablet  Who prescribed you this medicatioN     What are your concerns: PATIENT IS WONDERING IF SHE CAN GET A DOSAGE INCREASE FOR THIS MEDICATION. PATIENT STATES SHES BEEN REALLY SNAPPY AND CRYING A LOT. SHE SAYS SHE KNOW SHE JUST GOT A REFILL BUT SHE HASNT PICKED IT UP SHE WANTED TO GET A MESSAGE BACK FIRST TO SEE IF THE DOCTOR COULD DO THIS.

## 2022-07-12 NOTE — TELEPHONE ENCOUNTER
PT order placed at office visit. She can request [facility] location when they call to schedule. Relistor was sent to pharmacy during office visit as well.

## 2022-07-19 NOTE — SIGNIFICANT NOTE
Patient educated on the following :    - If you are receiving Sedation for your procedure Nothing to Eat 6 hours and only clear liquids for 2 hours prior to your procedure.    --You will need to have someone drive you home after your PROCEDURE and remain with you for 24 hours after the PROCEDURE  - The date of your procedure, your are welcome to have one visitor at bedside or remain within 10-15 minutes of [facility]  -You will need to arrive at 0745 on 7/21 for your PROCEDURE  -Please contact [facility] at: [phone] with any questions and/or concerns

## 2022-07-21 ENCOUNTER — HOSPITAL ENCOUNTER (OUTPATIENT)
Dept: GENERAL RADIOLOGY | Facility: SURGERY CENTER | Age: 72
Setting detail: HOSPITAL OUTPATIENT SURGERY
End: 2022-07-21

## 2022-07-21 ENCOUNTER — HOSPITAL ENCOUNTER (OUTPATIENT)
Facility: SURGERY CENTER | Age: 72
Setting detail: HOSPITAL OUTPATIENT SURGERY
Discharge: HOME | End: 2022-07-21
Attending: ANESTHESIOLOGY | Admitting: ANESTHESIOLOGY

## 2022-07-21 VITALS
BODY MASS INDEX: 34.74 KG/M2 | SYSTOLIC BLOOD PRESSURE: 109 MMHG | HEART RATE: 80 BPM | HEIGHT: 61 IN | DIASTOLIC BLOOD PRESSURE: 64 MMHG | RESPIRATION RATE: 16 BRPM | OXYGEN SATURATION: 95 % | WEIGHT: 184 LBS | TEMPERATURE: 98.2 F

## 2022-07-21 DIAGNOSIS — S32.020G COMPRESSION FRACTURE OF L2 VERTEBRA WITH DELAYED HEALING, SUBSEQUENT ENCOUNTER: ICD-10-CM

## 2022-07-21 DIAGNOSIS — M47.816 LUMBAR FACET ARTHROPATHY: ICD-10-CM

## 2022-07-21 PROCEDURE — 64493 INJ PARAVERT F JNT L/S 1 LEV: CPT | Performed by: ANESTHESIOLOGY

## 2022-07-21 PROCEDURE — 25010000002 IOPAMIDOL 61 % SOLUTION 30 ML VIAL: Performed by: ANESTHESIOLOGY

## 2022-07-21 PROCEDURE — 77002 NEEDLE LOCALIZATION BY XRAY: CPT

## 2022-07-21 PROCEDURE — 25010000002 MIDAZOLAM PER 1 MG: Performed by: ANESTHESIOLOGY

## 2022-07-21 PROCEDURE — 76000 FLUOROSCOPY <1 HR PHYS/QHP: CPT

## 2022-07-21 RX ORDER — SODIUM CHLORIDE 0.9 % (FLUSH) 0.9 %
10 SYRINGE (ML) INJECTION AS NEEDED
Status: DISCONTINUED | OUTPATIENT
Start: 2022-07-21 | End: 2022-07-21 | Stop reason: HOSPADM

## 2022-07-21 RX ORDER — SODIUM CHLORIDE 0.9 % (FLUSH) 0.9 %
10 SYRINGE (ML) INJECTION EVERY 12 HOURS SCHEDULED
Status: DISCONTINUED | OUTPATIENT
Start: 2022-07-21 | End: 2022-07-21 | Stop reason: HOSPADM

## 2022-07-21 RX ORDER — MIDAZOLAM HYDROCHLORIDE 1 MG/ML
INJECTION INTRAMUSCULAR; INTRAVENOUS AS NEEDED
Status: DISCONTINUED | OUTPATIENT
Start: 2022-07-21 | End: 2022-07-21 | Stop reason: HOSPADM

## 2022-07-21 RX ORDER — KETOCONAZOLE 20 MG/G
CREAM TOPICAL
COMMUNITY
Start: 2022-07-07

## 2022-07-21 NOTE — DISCHARGE INSTRUCTIONS
[facility] [place] [facility] - Post-procedure Instructions          --  While there are no absolute restrictions, it is recommended that you do not perform strenuous activity today. In the morning, you may resume your level of activity as before your block.    --  If you have a band-aid at your injection site, please remove it later today. Observe the area for any redness, swelling, pus-like drainage, or a temperature over 101°. If any of these symptoms occur, please call your doctor at [phone]. If after office hours, leave a message and the on-call provider will return your call.    --  Ice may be applied to your injection site. It is recommended you avoid direct heat (heating pad; hot tub) for 1-2 days.    --  Call [facility] [place] at [phone] if you experience persistent headache, persistent bleeding from the injection site, or severe pain not relieved by heat or oral medication.    --  Do not make important decisions today.    --  Due to the effects of the block and/or the I.V. Sedation, DO NOT drive or operate hazardous machinery for 12 hours.  Local anesthetics may cause numbness after procedure and precautions must be taken with regards to operating equipment as well as with walking, even if ambulating with assistance of another person or with an assistive device.    --  Do not drink alcohol for 12 hours.    -- You may return to work tomorrow, or as directed by your referring doctor.    --  Occasionally you may notice a slight increase in your pain after the procedure. This should start to improve within the next 24-48 hours. Radiofrequency ablation procedure pain may last 3-4 weeks.    --  It may take as long as 3-4 days before you notice a gradual improvement in your pain and/or other symptoms.    -- You may continue to take your prescribed pain medication as needed.    --  Some normal possible side effects of steroid use could include fluid retention, increased blood sugar, dull headache,  increased sweating, increased appetite, mood swings and flushing.    --  Diabetics are recommended to watch their blood glucose level closely for 24-48 hours after the injection.    --  Must stay in PACU for 20 min upon arrival and prove no leg weakness before being discharged.    --  IN THE EVENT OF A LIFE THREATENING EMERGENCY, (CHEST PAIN, BREATHING DIFFICULTIES, PARALYSIS…) YOU SHOULD GO TO YOUR NEAREST EMERGENCY ROOM.    --  You should be contacted by our office within 2-3 days to schedule follow up or next appointment date.  If not contacted within 7 days, please call the office at [phone]

## 2022-07-21 NOTE — OP NOTE
Lumbar Medial Branch Blockade  [facility]      PREOPERATIVE DIAGNOSIS:  Lumbar spondylosis without myelopathy    POSTOPERATIVE DIAGNOSIS:  Lumbar spondylosis without myelopathy    PROCEDURE:   Diagnostic bilateral  Lumbar Medial Branch Nerve Blockades, with fluoroscopy:   L1 and L2 nerve levels    PRE-PROCEDURE DISCUSSION WITH PATIENT:    Risks and complications were discussed with the patient prior to starting the procedure and informed consent was obtained.      SURGEON:  Jose Luis Gan MD    REASON FOR PROCEDURE:   The patient complains of pain that seems to have a significant axial component and Tenderness of the affected facet joints on exam under fluoroscopy    SEDATION:  Versed 4mg IV and The patient had higher than average levels of procedural anxiety and the need to provide additional procedural sedation was needed to safely proceed.      ANESTHETIC:  Marcaine 0.5%  STEROID:  NONE  TOTAL VOLUME OF SOLUTION:  4 mL    DESCRIPTON OF PROCEDURE:  After obtaining informed consent, IV access was obtained in the preoperative area.   The patient was taken to the operating room.  The patient was placed in the prone position with a pillow under the abdomen. All pressure points were well padded.  EKG, blood pressure, and pulse oximeter were monitored.  The patient was monitored and sedated by the RN under my direction. The lumbosacral area was prepped with Chloraprep and draped in a sterile fashion.     Under fluoroscopic guidance the vertebral  transverse processes areas at the appropriate vertebral levels were located, and points were identified at the junctions of the superior articular processes with the superior articular processes.     Skin and subcutaneous tissue were anesthetized with 1% lidocaine above each of these points. A 22-gauge spinal needle was introduced under fluoroscopic guidance at the above junctions. Aspiration was negative for blood and CSF.  After confirming the position  of the needle with fluoroscope in all views, 0.25 mL of Omnipaque was injected, and after seeing the proper spread a total of 1 mL of the anesthetic solution noted above was injected at each of these points.  Needles were removed intact from each of the areas.  Onset of analgesia was noted.  Vital signs remained stable throughout.      ESTIMATED BLOOD LOSS:  <5 mL  SPECIMENS:  none    COMPLICATIONS:   No complications were noted., There was no indication of vascular uptake on live injection of contrast dye., There was no indication of intrathecal uptake on live injection of contrast dye., There was not any evidence of dural puncture.   and The patient did not have any signs of postprocedure numbness nor weakness.    TOLERANCE & DISCHARGE CONDITION:    The patient tolerated the procedure well.  The patient was transported to the recovery area without difficulties.  The patient was discharged to home under the care of family in stable and satisfactory condition.    PLAN OF CARE:  1. The patient was given our standard instruction sheet.  2. We discussed that Lumbar Medial Branch Blockade is a diagnostic procedure in consideration for radiofrequency ablation if two diagnostic procedures prove to be positive for significant benefit.  If sustained relief of 6 to eight weeks is obtained, then an alternative plan could be therapeutic lumbar branch blockades.  3. The patient is asked to keep a pain log each hour for 8 hours after the procedure today.  4. The patient will  Return to clinic 1 wk.  5. The patient will resume all medications as per the medication reconciliation sheet.

## 2022-07-21 NOTE — INTERVAL H&P NOTE
H&P reviewed. The patient was examined and there are no changes to the H&P.  Will evaluate facet tenderness under fluoroscopy to decide if any level modification needs to be considered for today's plan of care

## 2022-07-25 RX ORDER — ROSUVASTATIN CALCIUM 5 MG/1
5 TABLET, COATED ORAL NIGHTLY
Qty: 90 TABLET | Refills: 1 | Status: SHIPPED | OUTPATIENT
Start: 2022-07-25 | End: 2022-08-02 | Stop reason: SINTOL

## 2022-07-25 NOTE — TELEPHONE ENCOUNTER
Rx Refill Note  Requested Prescriptions     Pending Prescriptions Disp Refills   • rosuvastatin (CRESTOR) 5 MG tablet [Pharmacy Med Name: ROSUVASTATIN CALCIUM 5 MG T 5 Tablet] 90 tablet 1     Sig: TAKE 1 TABLET BY MOUTH EVERY NIGHT.      Last office visit with prescribing clinician: 6/30/2022      Next office visit with prescribing clinician: 8/2/2022            Hilaria Gurrola MA  07/25/22, 12:02 EDT

## 2022-08-02 ENCOUNTER — OFFICE VISIT (OUTPATIENT)
Dept: INTERNAL MEDICINE | Facility: CLINIC | Age: 72
End: 2022-08-02

## 2022-08-02 VITALS
DIASTOLIC BLOOD PRESSURE: 64 MMHG | BODY MASS INDEX: 34.02 KG/M2 | HEIGHT: 61 IN | OXYGEN SATURATION: 99 % | TEMPERATURE: 96.6 F | WEIGHT: 180.2 LBS | SYSTOLIC BLOOD PRESSURE: 110 MMHG | HEART RATE: 94 BPM

## 2022-08-02 DIAGNOSIS — F33.1 MODERATE EPISODE OF RECURRENT MAJOR DEPRESSIVE DISORDER: ICD-10-CM

## 2022-08-02 DIAGNOSIS — E78.2 MIXED HYPERLIPIDEMIA: Primary | Chronic | ICD-10-CM

## 2022-08-02 DIAGNOSIS — K59.03 THERAPEUTIC OPIOID INDUCED CONSTIPATION: Chronic | ICD-10-CM

## 2022-08-02 DIAGNOSIS — E22.2 SIADH (SYNDROME OF INAPPROPRIATE ADH PRODUCTION): Chronic | ICD-10-CM

## 2022-08-02 DIAGNOSIS — T40.2X5A THERAPEUTIC OPIOID INDUCED CONSTIPATION: Chronic | ICD-10-CM

## 2022-08-02 DIAGNOSIS — F51.01 PRIMARY INSOMNIA: ICD-10-CM

## 2022-08-02 PROCEDURE — 99214 OFFICE O/P EST MOD 30 MIN: CPT | Performed by: INTERNAL MEDICINE

## 2022-08-02 RX ORDER — ARIPIPRAZOLE 15 MG/1
15 TABLET ORAL DAILY
Qty: 90 TABLET | Refills: 1 | Status: SHIPPED | OUTPATIENT
Start: 2022-08-02 | End: 2023-01-26

## 2022-08-02 RX ORDER — PHENTERMINE HYDROCHLORIDE 37.5 MG/1
37.5 TABLET ORAL DAILY
COMMUNITY
Start: 2022-07-20 | End: 2022-12-05

## 2022-08-02 RX ORDER — TEMAZEPAM 15 MG/1
15 CAPSULE ORAL NIGHTLY PRN
COMMUNITY
End: 2022-08-02 | Stop reason: SDUPTHER

## 2022-08-02 RX ORDER — LACTULOSE 10 G/15ML
10 SOLUTION ORAL 3 TIMES DAILY PRN
Qty: 473 ML | Refills: 2 | Status: ON HOLD | OUTPATIENT
Start: 2022-08-02 | End: 2022-09-15

## 2022-08-02 RX ORDER — PRAVASTATIN SODIUM 40 MG
40 TABLET ORAL DAILY
Qty: 90 TABLET | Refills: 1 | Status: ON HOLD | OUTPATIENT
Start: 2022-08-02 | End: 2022-09-15

## 2022-08-02 RX ORDER — TEMAZEPAM 15 MG/1
15 CAPSULE ORAL NIGHTLY PRN
Qty: 30 CAPSULE | Refills: 2 | Status: ON HOLD | OUTPATIENT
Start: 2022-08-02 | End: 2022-09-15

## 2022-08-02 NOTE — ASSESSMENT & PLAN NOTE
CONTROLLED  - FLP today for ongoing monitoring  - recently on rosuvastatin but developed muscle aches, has empirically stopped medication with improvement in her symptoms--> will check CK today  - repeat FLP in 1month after being off meds  - if LDL goes above goal of 100, will plan to start pravastatin to see if better tolerated  - cont with good diet and lifestyle changes including increased exercise, low chol and low fat diet, and increased fiber

## 2022-08-02 NOTE — ASSESSMENT & PLAN NOTE
UNCONTROLLED  - previously on restoril provided by psych for insomnia, had been on this for many years, has now been off x 5 months but having issues sleeping  - will agree to prn Rx--> sent today  - rosanna checked and appropriate  - discussed high risk nature of this medication, will cont to monitor closely for development of any side effects or dependence

## 2022-08-02 NOTE — ASSESSMENT & PLAN NOTE
UNCONTROLLED  - tried methylnaltrexone, only mildly helpful but very expensive for her  - cont docusate nightly  - will add laculose BID-TID prn  - does not like miralax because of the volume of liquid she needs to drink with it daily, supposed to be on fluid restriction related to SIADH

## 2022-08-02 NOTE — ASSESSMENT & PLAN NOTE
IMPROVING  -now fully weaned off paxil  - increase abilify to 15 mg for continued improvemenets  - cont on mirtazapine at night   - GeneSight testing sent per patient request, results still pending  - Reviewed potential side effects of medication including sexual performance changes, insomnia, fatigue, weight changes

## 2022-08-02 NOTE — PROGRESS NOTES
"Chief Complaint  Depression and Hypertension    Subjective          Traci King presents to [facility] for follow up on depression, HTN, hyponatremia.   Remains off all medications for HTN and BP in goal range.   Taking abilify and mirtazapine for depression and pt notes she is still very tearful. Does not feel as depressed as she used to which is better but still cries very easily. She is fully weaned off the paxil and doing well from that standpoint. No side effects from any of her medications.   Started having muscle aches and pains all over her body about 1 week ago, pharmacist warned it could be related to her statin so she has stopped this medication and has seen an improvement but not full resolution of this issue since stopping med.   Also having issues sleeping. Was seeing psychiatrist and was getting restoril for sleep, last Rx was back in Jan, tried to do it without medications but is not sleeping well and would like to have this back for prn use.     Objective   Vital Signs:     /64 (BP Location: Left arm, Patient Position: Sitting)   Pulse 94   Temp 96.6 °F (35.9 °C) (Temporal)   Ht 154.9 cm (60.98\")   Wt 81.7 kg (180 lb 3.2 oz)   SpO2 99%   BMI 34.07 kg/m²     Physical Exam  Vitals and nursing note reviewed.   Constitutional:       General: She is not in acute distress.     Appearance: Normal appearance.   Cardiovascular:      Rate and Rhythm: Normal rate and regular rhythm.      Pulses: Normal pulses.      Heart sounds: Normal heart sounds. No murmur heard.  Pulmonary:      Effort: Pulmonary effort is normal. No respiratory distress.      Breath sounds: Normal breath sounds.   Abdominal:      General: Abdomen is flat. Bowel sounds are normal.      Palpations: Abdomen is soft.      Tenderness: There is no abdominal tenderness.   Musculoskeletal:      Right lower leg: No edema.      Left lower leg: No edema.   Neurological:      Mental " Status: She is alert and oriented to person, place, and time. Mental status is at baseline.   Psychiatric:         Mood and Affect: Mood normal.         Behavior: Behavior normal.          Result Review :   The following data was reviewed by: Sherry Fournier MD on 08/02/2022:  CMP    CMP 6/20/22 6/24/22 6/30/22   Glucose 95 97 97   BUN 26 24 16   Creatinine 1.20 (A) 1.06 (A) 0.91   Sodium 135 136 135   Potassium 5.7 (A) 5.0 4.8   Chloride 104 105 98   Calcium 9.4 9.6 9.7   (A) Abnormal value            CBC w/diff    CBC w/Diff 5/20/22 5/25/22 6/7/22   WBC 7.1 7.34 9.4   RBC 3.17 (A) 3.47 (A) 3.31 (A)   Hemoglobin 10.6 (A) 11.4 (A) 11.1   Hematocrit 32.1 (A) 36.1 32.9 (A)    (A) 104.0 (A) 99 (A)   MCH 33.4 (A) 32.9 33.5 (A)   MCHC 33.0 31.6 33.7   RDW 12.5 13.3 12.2   Platelets 362 409 409   Neutrophil Rel % 61 73.4 71   Immature Granulocyte Rel %  0.8    Lymphocyte Rel % 27 16.9 20   Monocyte Rel % 8 7.2 6   Eosinophil Rel % 2 1.0 1   Basophil Rel % 1 0.7 1   (A) Abnormal value            Lipid Panel    Lipid Panel 12/9/21   Total Cholesterol 161   Triglycerides 112   HDL Cholesterol 87   VLDL Cholesterol 19   LDL Cholesterol  55               Electrolytes    Electrolytes 6/20/22 6/24/22 6/30/22   Sodium 135 136 135   Potassium 5.7 (A) 5.0 4.8   Chloride 104 105 98   Calcium 9.4 9.6 9.7   (A) Abnormal value            BMP    BMP 6/20/22 6/24/22 6/30/22   BUN 26 24 16   Creatinine 1.20 (A) 1.06 (A) 0.91   Sodium 135 136 135   Potassium 5.7 (A) 5.0 4.8   Chloride 104 105 98   CO2 18 (A) 16 (A) 22   Calcium 9.4 9.6 9.7   (A) Abnormal value            Most Recent A1C    HGBA1C Most Recent 12/9/21   Hemoglobin A1C 5.6      Comments are available for some flowsheets but are not being displayed.              Assessment and Plan      Diagnoses and all orders for this visit:    1. Mixed hyperlipidemia (Primary)  Assessment & Plan:  CONTROLLED  - FLP today for ongoing monitoring  - recently on rosuvastatin but  developed muscle aches, has empirically stopped medication with improvement in her symptoms--> will check CK today  - repeat FLP in 1month after being off meds  - if LDL goes above goal of 100, will plan to start pravastatin to see if better tolerated  - cont with good diet and lifestyle changes including increased exercise, low chol and low fat diet, and increased fiber      Orders:  -     Comprehensive Metabolic Panel  -     Lipid Panel  -     CK  -     pravastatin (Pravachol) 40 MG tablet; Take 1 tablet by mouth Daily.  Dispense: 90 tablet; Refill: 1  -     Lipid Panel; Future    2. SIADH (syndrome of inappropriate ADH production) (HCC)  -     Comprehensive Metabolic Panel    3. Moderate episode of recurrent major depressive disorder (HCC)  Assessment & Plan:  IMPROVING  -now fully weaned off paxil  - increase abilify to 15 mg for continued improvemenets  - cont on mirtazapine at night   - GeneSight testing sent per patient request, results still pending  - Reviewed potential side effects of medication including sexual performance changes, insomnia, fatigue, weight changes      Orders:  -     ARIPiprazole (ABILIFY) 15 MG tablet; Take 1 tablet by mouth Daily.  Dispense: 90 tablet; Refill: 1    4. Therapeutic opioid induced constipation  Assessment & Plan:  UNCONTROLLED  - tried methylnaltrexone, only mildly helpful but very expensive for her  - cont docusate nightly  - will add laculose BID-TID prn  - does not like miralax because of the volume of liquid she needs to drink with it daily, supposed to be on fluid restriction related to SIADH    Orders:  -     lactulose (CHRONULAC) 10 GM/15ML solution; Take 15 mL by mouth 3 (Three) Times a Day As Needed (constipation).  Dispense: 473 mL; Refill: 2    5. Primary insomnia  Assessment & Plan:  UNCONTROLLED  - previously on restoril provided by psych for insomnia, had been on this for many years, has now been off x 5 months but having issues sleeping  - will agree to prn  Rx--> sent today  - rosanna checked and appropriate  - discussed high risk nature of this medication, will cont to monitor closely for development of any side effects or dependence     Orders:  -     temazepam (RESTORIL) 15 MG capsule; Take 1 capsule by mouth At Night As Needed for Sleep.  Dispense: 30 capsule; Refill: 2    Follow Up   Return in about 3 months (around 11/2/2022) for follow up HLD, depression, insomnia.    Patient was given instructions and counseling regarding her condition or for health maintenance advice. Please see specific information pulled into the AVS if appropriate.     Lyndsay Fournier MD  [facility] [place] [facility]  Phone: [phone]  Fax: [phone]

## 2022-08-03 LAB
ALBUMIN SERPL-MCNC: 4.2 G/DL (ref 3.8–4.8)
ALBUMIN/GLOB SERPL: 1.6 {RATIO} (ref 1.2–2.2)
ALP SERPL-CCNC: 115 IU/L (ref 44–121)
ALT SERPL-CCNC: 31 IU/L (ref 0–32)
AST SERPL-CCNC: 30 IU/L (ref 0–40)
BILIRUB SERPL-MCNC: <0.2 MG/DL (ref 0–1.2)
BUN SERPL-MCNC: 14 MG/DL (ref 8–27)
BUN/CREAT SERPL: 16 (ref 12–28)
CALCIUM SERPL-MCNC: 9.5 MG/DL (ref 8.7–10.3)
CHLORIDE SERPL-SCNC: 107 MMOL/L (ref 96–106)
CHOLEST SERPL-MCNC: 175 MG/DL (ref 100–199)
CK SERPL-CCNC: 80 U/L (ref 32–182)
CO2 SERPL-SCNC: 18 MMOL/L (ref 20–29)
CREAT SERPL-MCNC: 0.85 MG/DL (ref 0.57–1)
EGFRCR SERPLBLD CKD-EPI 2021: 74 ML/MIN/1.73
GLOBULIN SER CALC-MCNC: 2.6 G/DL (ref 1.5–4.5)
GLUCOSE SERPL-MCNC: 101 MG/DL (ref 65–99)
HDLC SERPL-MCNC: 83 MG/DL
LDLC SERPL CALC-MCNC: 75 MG/DL (ref 0–99)
POTASSIUM SERPL-SCNC: 4.8 MMOL/L (ref 3.5–5.2)
PROT SERPL-MCNC: 6.8 G/DL (ref 6–8.5)
SODIUM SERPL-SCNC: 138 MMOL/L (ref 134–144)
TRIGL SERPL-MCNC: 94 MG/DL (ref 0–149)
VLDLC SERPL CALC-MCNC: 17 MG/DL (ref 5–40)

## 2022-08-08 ENCOUNTER — PREP FOR SURGERY (OUTPATIENT)
Dept: SURGERY | Facility: SURGERY CENTER | Age: 72
End: 2022-08-08

## 2022-08-08 ENCOUNTER — OFFICE VISIT (OUTPATIENT)
Dept: PAIN MEDICINE | Facility: CLINIC | Age: 72
End: 2022-08-08

## 2022-08-08 VITALS
WEIGHT: 182 LBS | OXYGEN SATURATION: 98 % | BODY MASS INDEX: 34.36 KG/M2 | TEMPERATURE: 98 F | DIASTOLIC BLOOD PRESSURE: 68 MMHG | SYSTOLIC BLOOD PRESSURE: 112 MMHG | HEIGHT: 61 IN | RESPIRATION RATE: 16 BRPM | HEART RATE: 91 BPM

## 2022-08-08 DIAGNOSIS — M79.18 MYOFASCIAL PAIN SYNDROME OF LUMBAR SPINE: ICD-10-CM

## 2022-08-08 DIAGNOSIS — M47.812 CERVICAL SPONDYLOSIS WITHOUT MYELOPATHY: Primary | ICD-10-CM

## 2022-08-08 DIAGNOSIS — S32.020G COMPRESSION FRACTURE OF L2 VERTEBRA WITH DELAYED HEALING, SUBSEQUENT ENCOUNTER: ICD-10-CM

## 2022-08-08 DIAGNOSIS — M47.817 LUMBOSACRAL SPONDYLOSIS WITHOUT MYELOPATHY: Primary | ICD-10-CM

## 2022-08-08 DIAGNOSIS — Z79.899 ENCOUNTER FOR LONG-TERM (CURRENT) USE OF HIGH-RISK MEDICATION: ICD-10-CM

## 2022-08-08 DIAGNOSIS — M47.812 CERVICAL SPONDYLOSIS WITHOUT MYELOPATHY: ICD-10-CM

## 2022-08-08 DIAGNOSIS — G89.4 CHRONIC PAIN SYNDROME: ICD-10-CM

## 2022-08-08 DIAGNOSIS — M47.817 LUMBOSACRAL SPONDYLOSIS WITHOUT MYELOPATHY: ICD-10-CM

## 2022-08-08 PROBLEM — M46.92 UNSPECIFIED INFLAMMATORY SPONDYLOPATHY, CERVICAL REGION: Status: ACTIVE | Noted: 2022-08-08

## 2022-08-08 PROCEDURE — 99215 OFFICE O/P EST HI 40 MIN: CPT | Performed by: PHYSICIAN ASSISTANT

## 2022-08-08 RX ORDER — SODIUM CHLORIDE 0.9 % (FLUSH) 0.9 %
10 SYRINGE (ML) INJECTION EVERY 12 HOURS SCHEDULED
Status: CANCELLED | OUTPATIENT
Start: 2022-08-08

## 2022-08-08 RX ORDER — HYDROCODONE BITARTRATE AND ACETAMINOPHEN 10; 325 MG/1; MG/1
1 TABLET ORAL DAILY PRN
Qty: 15 TABLET | Refills: 0 | Status: CANCELLED | OUTPATIENT
Start: 2022-08-08

## 2022-08-08 RX ORDER — SODIUM CHLORIDE 0.9 % (FLUSH) 0.9 %
10 SYRINGE (ML) INJECTION AS NEEDED
Status: CANCELLED | OUTPATIENT
Start: 2022-08-08

## 2022-08-08 RX ORDER — OXYCODONE HYDROCHLORIDE 30 MG/1
30 TABLET, FILM COATED, EXTENDED RELEASE ORAL EVERY 12 HOURS SCHEDULED
Qty: 60 TABLET | Refills: 0 | Status: CANCELLED | OUTPATIENT
Start: 2022-08-08

## 2022-08-08 NOTE — PROGRESS NOTES
CHIEF COMPLAINT  Procedure follow up       Subjective   Traci King is a 72 y.o. female  who presents to the office for follow-up of procedure.  She completed a #1 bilateral L1-3 lumbar Medial Branch Blockade   on  7/21/22 performed by Dr. Gan for management of low back pain. Patient reports 80% relief from the procedure x1 day with improvement of range of motion.  Patient presents to the office today with continued complaints of axial low back pain and is ready to proceed with her second diagnostic injection.  She illustrates pain in a bandlike distribution across the lumbosacral spine referred into the bilateral buttocks and hips without lower extremity involvement.     Patient also has complaints of recurrent axial cervical spine pain which is progressively worsened over the last several weeks.  Patient had previously undergone RFA of bilateral C3-5 MBB's in 2018 with greater than 80% reduction of pain for approximately 12-18 months.  She states that she is significantly noted progressive worsening of axial cervical spine pain which is exacerbated with cervical lateral rotation and extension of the spine.    Patient reports a VAS of 5/10 in severity as a pertains to her lumbar spine.  Patient reports VAS of 8/10 in severity as a pertains to her cervical spine.  She is currently medically managed with OxyContin 30 mg twice daily and hydrocodone 10/325 mg q. OD.  She also utilizes Robaxin-750 milligrams 3 times daily as necessary for spasms.  Patient does have mild OIC which is alleviated with use of lactulose.  Patient appears to be stable with current medication regimen without any adverse effects.  Regarding continuation of opiates there is no evidence of aberrant behavior or any red flags.  The patient continues with appropriate sponsor opiate therapy.  Continues to be able to perform ADLs and physical functioning intact.      Back Pain  This is a chronic problem. The problem occurs constantly. The  problem has been waxing and waning since onset. The pain is present in the lumbar spine. The quality of the pain is described as aching. The pain does not radiate. The pain is at a severity of 5/10. The pain is moderate. The pain is the same all the time. The symptoms are aggravated by bending, twisting and position. Stiffness is present all day.   Neck Pain   This is a recurrent problem. The problem occurs constantly. The problem has been gradually worsening. The pain is present in the anterior neck. The quality of the pain is described as aching and shooting. The pain is at a severity of 8/10. The pain is moderate. The symptoms are aggravated by twisting. The pain is same all the time.      Procedure list:  7/21/2022 #1 diagnostic bilateral L1-L3 80% pain relief x1 day  4/27/2022-LESI at L2 3-100% relief x1 day  1/11/2022-bilateral L3-L5 RFA-60% relief  12/16/2021-LESI at L2-3-no relief  8/4/2021-bilateral L3-L5 MBB-60% diagnostic relief  12/29/2020-bilateral L3-L5 RFA-50% relief  7/8/2020-bilateral L3-L5 MBB-50% relief  5/20/2020-bilateral L3-L5 MBB-80% relief  8/16/2018-bilateral S1 TFESI-no relief  1/26/2018-left C3-C5 RFA-50% relief    PEG Assessment   What number best describes your pain on average in the past week?5  What number best describes how, during the past week, pain has interfered with your enjoyment of life?5  What number best describes how, during the past week, pain has interfered with your general activity?  5    Review of Pertinent Medical Data --- I have reviewed the cervical and lumbar MRI reports on today.      The following portions of the patient's history were reviewed and updated as appropriate: allergies, current medications, past family history, past medical history, past social history, past surgical history and problem list.    Review of Systems   HENT: Negative for congestion.    Eyes: Negative for visual disturbance.   Respiratory: Negative for shortness of breath.   "  Gastrointestinal: Positive for constipation. Negative for diarrhea.   Genitourinary: Negative for difficulty urinating and dyspareunia.   Musculoskeletal: Positive for back pain, joint swelling and neck pain.   Psychiatric/Behavioral: Positive for sleep disturbance. Negative for suicidal ideas.     I have reviewed and confirmed the accuracy of the ROS as documented by the MA/LPN/RN ALLY Cortez    Vitals:    08/08/22 0938   BP: 112/68   Pulse: 91   Resp: 16   Temp: 98 °F (36.7 °C)   SpO2: 98%   Weight: 82.6 kg (182 lb)   Height: 154.9 cm (60.98\")   PainSc:   5   PainLoc: Back         Objective   Physical Exam  Vitals reviewed.   Constitutional:       Appearance: Normal appearance. She is normal weight.   HENT:      Head: Normocephalic.   Pulmonary:      Effort: Pulmonary effort is normal.   Musculoskeletal:      Cervical back: Tenderness (EXQUISITE PAIN TO PALPATION OVER THE BILATERAL CERVICAL FACET JOINTS) present. Pain with movement (PAIN WITH CERVICAL EXTENSION AND LATERAL ROTATION), spinous process tenderness and muscular tenderness present. Decreased range of motion.      Lumbar back: Spasms and tenderness present. Decreased range of motion. Negative right straight leg raise test.      Comments: PAIN TO PALPATION OVER THE BILATERAL LUMBAR FACET JOINTS   Skin:     General: Skin is warm and dry.   Neurological:      General: No focal deficit present.      Mental Status: She is alert and oriented to person, place, and time.      Cranial Nerves: Cranial nerves are intact.      Sensory: Sensation is intact.      Motor: Motor function is intact.      Gait: Gait abnormal.   Psychiatric:         Mood and Affect: Mood normal.         Behavior: Behavior normal.         Thought Content: Thought content normal.         Judgment: Judgment normal.         Assessment & Plan   Diagnoses and all orders for this visit:    1. Lumbosacral spondylosis without myelopathy    2. Cervical spondylosis without myelopathy    3. " Encounter for long-term (current) use of high-risk medication      Plan for patient to return for #2/confirmatory bilateral L1-3 MBB with plan to progress to RFA assuming favorable response.    Plan for patient to return for #1 diagnostic bilateral C3-5 MBB with plan to progress to RFA assuming favorable response as patient has gone over 2 years following ablation procedure therefore we must resume with diagnostic injections.    Plan to refill OxyContin 30 mg twice daily and hydrocodone 10/325 q. OD     Follow-up 4 weeks for medication management.       The urine drug screen confirmation from 7/8/2022 has been reviewed and the result is appropriate based on patient history and MATTHEW report    The patient signed an updated copy of the controlled substance agreement on 11/11/2021    MATTHEW REPORT  As part of the patient's treatment plan, I am prescribing controlled substances. The patient has been made aware of appropriate use of such medications, including potential risk of somnolence, limited ability to drive and/or work safely, and the potential for dependence or overdose. It has also been made clear that these medications are for use by this patient only, without concomitant use of alcohol or other substances unless prescribed.     Patient has completed prescribing agreement detailing terms of continued prescribing of controlled substances, including monitoring MATTHEW reports, urine drug screening, and pill counts if necessary. The patient is aware that inappropriate use will results in cessation of prescribing such medications.    As the clinician, I personally reviewed the MATTHEW from 8/5/2022 while the patient was in the office today.    History and physical exam exhibit continued safe and appropriate use of controlled substances.       Dictated utilizing Dragon dictation.      This document is intended for medical expert use only. Reading of this document by patients and/or patient's family without  participating medical staff guidance may result in misinterpretation and unintended morbidity.   Any interpretation of such data is the responsibility of the patient and/or family member responsible for the patient in concert with their primary or specialist providers, not to be left for sources of online searches such as [employer], [employer] or similar queries. Relying on these approaches to knowledge may result in misinterpretation, misguided goals of care and even death should patients or family members try recommendations outside of the realm of professional medical care in a supervised way.    Patient remained masked during entire encounter. No cough present. I donned a mask and eye protection throughout entire visit. Prior to donning mask and eye protection, hand hygiene was performed, as well as when it was doffed.  I was closer than 6 feet, but not for an extended period of time. No obvious exposure to any bodily fluids.

## 2022-08-09 ENCOUNTER — TRANSCRIBE ORDERS (OUTPATIENT)
Dept: SURGERY | Facility: SURGERY CENTER | Age: 72
End: 2022-08-09

## 2022-08-09 ENCOUNTER — TELEPHONE (OUTPATIENT)
Dept: PAIN MEDICINE | Facility: CLINIC | Age: 72
End: 2022-08-09

## 2022-08-09 DIAGNOSIS — M47.812 CERVICAL SPONDYLOSIS WITHOUT MYELOPATHY: Primary | ICD-10-CM

## 2022-08-09 RX ORDER — OXYCODONE HYDROCHLORIDE 30 MG/1
TABLET, FILM COATED, EXTENDED RELEASE ORAL
Qty: 60 TABLET | Refills: 0 | Status: SHIPPED | OUTPATIENT
Start: 2022-08-09 | End: 2022-09-08 | Stop reason: SDUPTHER

## 2022-08-09 RX ORDER — HYDROCODONE BITARTRATE AND ACETAMINOPHEN 10; 325 MG/1; MG/1
1 TABLET ORAL DAILY PRN
Qty: 15 TABLET | Refills: 0 | Status: SHIPPED | OUTPATIENT
Start: 2022-08-09 | End: 2022-09-08 | Stop reason: SDUPTHER

## 2022-08-09 NOTE — TELEPHONE ENCOUNTER
Please disregard if you sent already. Possible duplicate. Leonard ACNALES sent this to you yesterday after office visit.

## 2022-08-09 NOTE — TELEPHONE ENCOUNTER
Can you please send at your earliest convenience? Pt called this morning she was supposed to leave for [place] today at 0430 but couldn't leave because her meds were not sent to pharmacy yesterday. She is out of meds and is waiting to pick Rx up before her trip. Please advise. Thank you.

## 2022-08-09 NOTE — TELEPHONE ENCOUNTER
Caller: Traci King    Relationship: Self    Best call back number: [phone]    Requested Prescriptions:   HYDROcodone-acetaminophen (NORCO)  MG per tablet    oxyCODONE HCl ER (oxyCONTIN) 30 MG tablet extended-release 12 hour    Pharmacy where request should be sent: [facility] - [place], [place] - [address] [place] [address] Rd. - [phone]  - [phone] FX     Additional details provided by patient: PATIENT IS REQUESTING CALL BACK ONCE MEDICATION IS SENT TO PHARMACY/    Does the patient have less than a 3 day supply:  [x] Yes  [] No    Elias Berrios Rep   08/09/22 08:16 EDT

## 2022-08-23 ENCOUNTER — TELEPHONE (OUTPATIENT)
Dept: INTERNAL MEDICINE | Facility: CLINIC | Age: 72
End: 2022-08-23

## 2022-08-25 NOTE — TELEPHONE ENCOUNTER
We do have the results, and I thought if insurance didn't cover she would only have to pay like 150 dollars?

## 2022-08-25 NOTE — TELEPHONE ENCOUNTER
That is my understanding as well, and I believe she was told that ahead of time because I always warn people about potential costs of the test, but sounds like maybe they are still tryign to push it through? I am just confused because I can't believe the company would have agreed to do the test before they got paid, so not sure why the insurance piece is just now getting sorted out?

## 2022-08-25 NOTE — TELEPHONE ENCOUNTER
I am confused, this testing was already done, why are they just now processing this? We have the results in the office I thought

## 2022-08-25 NOTE — TELEPHONE ENCOUNTER
SAMAN FROM [facility] IS CALLING IN ABOUT THIS GENE SITE TESTING SHE WAS WANTING TO SPEAK TO SOMEONE ABOUT DOING A PEER TO PEER FOR THIS.       PLEASE ADVISE    CALLBACK NUMBER IS  [phone]

## 2022-08-30 ENCOUNTER — TELEPHONE (OUTPATIENT)
Dept: INTERNAL MEDICINE | Facility: CLINIC | Age: 72
End: 2022-08-30

## 2022-08-30 NOTE — TELEPHONE ENCOUNTER
Caller: SAMAN NIETO    Relationship: NURSE WITH INSURANCE    Best call back number: [phone]    What is the best time to reach you: UNKNOWN    Who are you requesting to speak with (clinical staff, provider,  specific staff member): CLINICAL STAFF    Do you know the name of the person who called: SAMAN NIETO    What was the call regarding: SAMAN NIETO CALLED REGARDING THE GENESITE TESTING AND STATED IT HAS BEEN DENIED- STATED WILL SEND DENIAL IN THE MAIL WITH ALL OF THE DETAILS, NO NEED TO CALL BACK    Do you require a callback: NO

## 2022-09-01 ENCOUNTER — HOSPITAL ENCOUNTER (OUTPATIENT)
Facility: SURGERY CENTER | Age: 72
Setting detail: HOSPITAL OUTPATIENT SURGERY
Discharge: HOME | End: 2022-09-01
Attending: ANESTHESIOLOGY | Admitting: ANESTHESIOLOGY

## 2022-09-01 ENCOUNTER — HOSPITAL ENCOUNTER (OUTPATIENT)
Dept: GENERAL RADIOLOGY | Facility: SURGERY CENTER | Age: 72
Setting detail: HOSPITAL OUTPATIENT SURGERY
End: 2022-09-01

## 2022-09-01 VITALS
OXYGEN SATURATION: 95 % | HEIGHT: 60 IN | RESPIRATION RATE: 16 BRPM | WEIGHT: 180 LBS | DIASTOLIC BLOOD PRESSURE: 72 MMHG | SYSTOLIC BLOOD PRESSURE: 135 MMHG | BODY MASS INDEX: 35.34 KG/M2 | TEMPERATURE: 97.8 F | HEART RATE: 80 BPM

## 2022-09-01 DIAGNOSIS — M47.812 CERVICAL SPONDYLOSIS WITHOUT MYELOPATHY: ICD-10-CM

## 2022-09-01 PROCEDURE — 64493 INJ PARAVERT F JNT L/S 1 LEV: CPT | Performed by: ANESTHESIOLOGY

## 2022-09-01 PROCEDURE — 64494 INJ PARAVERT F JNT L/S 2 LEV: CPT | Performed by: ANESTHESIOLOGY

## 2022-09-01 PROCEDURE — 25010000002 IOPAMIDOL 61 % SOLUTION 30 ML VIAL: Performed by: ANESTHESIOLOGY

## 2022-09-01 PROCEDURE — 77002 NEEDLE LOCALIZATION BY XRAY: CPT

## 2022-09-01 PROCEDURE — 25010000002 MIDAZOLAM PER 1 MG: Performed by: ANESTHESIOLOGY

## 2022-09-01 PROCEDURE — 76000 FLUOROSCOPY <1 HR PHYS/QHP: CPT

## 2022-09-01 RX ORDER — SODIUM CHLORIDE 0.9 % (FLUSH) 0.9 %
10 SYRINGE (ML) INJECTION EVERY 12 HOURS SCHEDULED
Status: DISCONTINUED | OUTPATIENT
Start: 2022-09-01 | End: 2022-09-01 | Stop reason: HOSPADM

## 2022-09-01 RX ORDER — MIDAZOLAM HYDROCHLORIDE 1 MG/ML
INJECTION INTRAMUSCULAR; INTRAVENOUS AS NEEDED
Status: DISCONTINUED | OUTPATIENT
Start: 2022-09-01 | End: 2022-09-01 | Stop reason: HOSPADM

## 2022-09-01 RX ORDER — SODIUM CHLORIDE 0.9 % (FLUSH) 0.9 %
10 SYRINGE (ML) INJECTION AS NEEDED
Status: DISCONTINUED | OUTPATIENT
Start: 2022-09-01 | End: 2022-09-01 | Stop reason: HOSPADM

## 2022-09-01 NOTE — DISCHARGE INSTRUCTIONS
[facility] - Post-procedure Instructions          --  While there are no absolute restrictions, it is recommended that you do not perform strenuous activity today. In the morning, you may resume your level of activity as before your block.    --  If you have a band-aid at your injection site, please remove it later today. Observe the area for any redness, swelling, pus-like drainage, or a temperature over 101°. If any of these symptoms occur, please call your doctor at [phone]. If after office hours, leave a message and the on-call provider will return your call.    --  Ice may be applied to your injection site. It is recommended you avoid direct heat (heating pad; hot tub) for 1-2 days.    --  Call [facility] at [phone] if you experience persistent headache, persistent bleeding from the injection site, or severe pain not relieved by heat or oral medication.    --  Do not make important decisions today.    --  Due to the effects of the block and/or the I.V. Sedation, DO NOT drive or operate hazardous machinery for 12 hours.  Local anesthetics may cause numbness after procedure and precautions must be taken with regards to operating equipment as well as with walking, even if ambulating with assistance of another person or with an assistive device.    --  Do not drink alcohol for 12 hours.    -- You may return to work tomorrow, or as directed by your referring doctor.    --  Occasionally you may notice a slight increase in your pain after the procedure. This should start to improve within the next 24-48 hours. Radiofrequency ablation procedure pain may last 3-4 weeks.    --  It may take as long as 3-4 days before you notice a gradual improvement in your pain and/or other symptoms.    -- You may continue to take your prescribed pain medication as needed.    --  Some normal possible side effects of steroid use could include fluid retention, increased blood sugar, dull headache,  increased sweating, increased appetite, mood swings and flushing.    --  Diabetics are recommended to watch their blood glucose level closely for 24-48 hours after the injection.    --  Must stay in PACU for 20 min upon arrival and prove no leg weakness before being discharged.    --  IN THE EVENT OF A LIFE THREATENING EMERGENCY, (CHEST PAIN, BREATHING DIFFICULTIES, PARALYSIS…) YOU SHOULD GO TO YOUR NEAREST EMERGENCY ROOM.    --  You should be contacted by our office within 2-3 days to schedule follow up or next appointment date.  If not contacted within 7 days, please call the office at [phone]

## 2022-09-01 NOTE — OP NOTE
Bilateral L1-3 Lumbar Medial Branch Blockade  [facility]      PREOPERATIVE DIAGNOSIS:  Lumbar spondylosis without myelopathy    POSTOPERATIVE DIAGNOSIS:  Lumbar spondylosis without myelopathy    PROCEDURE:   Diagnostic Bilateral Lumbar Medial Branch Nerve Blockades, with fluoroscopy:  L1, L2, and L3 nerves (at the L2 & L3 & L4 transverse processes) to block facet joints L2-3 and L3-4 bilaterally  1. 22287-42 -- Bilateral Lumbar Facet blocks, 1st Level  2. [id number] -- Bilateral Lumbar Facet blocks, 2nd  Level    PRE-PROCEDURE DISCUSSION WITH PATIENT:    Risks and complications were discussed with the patient prior to starting the procedure and informed consent was obtained.      SURGEON:  Jose Luis Gan MD    REASON FOR PROCEDURE:    The patient complains of pain that seems to have a significant axial component, Tenderness of the affected facet joints on exam under fluoroscopy and Painful area identified on exam under fluoroscopy -it is of note that the patient's plan of care was to perform diagnostic blocks at the L1 and L2 and L3 medial branches.  She did in fact have pain on palpation of the L2-3 and L3-4 facets on exam under fluoroscopy so we proceeded    SEDATION:  Versed 4mg IV, The use of increased procedural sedation was carefully considered, and for this particular patient the need for additional procedural sedation seemed necessary in this instance to safely perform the procedure. and The patient had higher than average levels of procedural anxiety and the need to provide additional procedural sedation was needed to safely proceed. -And further review she does have some significant depressive and anxious symptomatology and requires both use of Abilify as well as a benzodiazepine.  The application of Versed was necessary for therefore for comfort care and while it seemed to be an appropriate anxiolytic the patient complained throughout the procedure that she was not and had an  expectation to be sedated to unconsciousness, despite counseling and informed consent that that is not the intent of the anxiolytic agent, and again this stating she was not appearing to be sedated and was conversational throughout the procedure.  The IV was functioning appropriately.    ANESTHETIC:  Marcaine 0.5%  STEROID:  NONE  TOTAL VOLUME OF SOLUTION:  6 mL    DESCRIPTON OF PROCEDURE:  After obtaining informed consent, IV access was obtained in the preoperative area.   The patient was taken to the operating room.  The patient was placed in the prone position with a pillow under the abdomen. All pressure points were well padded.  EKG, blood pressure, and pulse oximeter were monitored.  The patient was monitored and sedated by the RN under my direction. The lumbosacral area was prepped with Chloraprep and draped in a sterile fashion. Under fluoroscopic guidance the transverse processes of the L2 & L3 & L4 vertebrae at the junctions of the superior articular processes were identified on the right.  Skin and subcutaneous tissue were anesthetized with 1% lidocaine above each of these points. A 22-gauge spinal needle was introduced under fluoroscopic guidance at the above junctions. Aspiration was negative for blood and CSF.  After confirming the position of the needle with fluoroscope in all views, 0.25 mL of Omnipaque was injected, and after seeing the proper spread a total of 1 mL of the anesthetic solution noted above was injected at each of these points.  Needles were removed intact from each of the areas.  A similar procedure was repeated to block the L1 & L2 and L3 nerves on the contralateral side.   Onset of analgesia was noted.  Vital signs remained stable throughout.      ESTIMATED BLOOD LOSS:  <5 mL  SPECIMENS:  none    COMPLICATIONS:   No complications were noted., There was no indication of vascular uptake on live injection of contrast dye., There was no indication of intrathecal uptake on live injection  of contrast dye., There was not any evidence of dural puncture.   and The patient did not have any signs of postprocedure numbness nor weakness.    TOLERANCE & DISCHARGE CONDITION:    The patient tolerated the procedure well.  The patient was transported to the recovery area without difficulties.  The patient was discharged to home under the care of family in stable and satisfactory condition.    PLAN OF CARE:  1. The patient was given our standard instruction sheet.  2. We discussed that Lumbar Medial Branch Blockade is a diagnostic procedure in consideration for radiofrequency ablation if two diagnostic procedures prove to be positive for significant benefit.  If sustained relief of 6 to eight weeks is obtained, then an alternative plan could be therapeutic lumbar branch blockades.  3. The patient is asked to keep a pain log each hour for 8 hours after the procedure today.  4. The patient will  Return to clinic 1 wk  5. The patient will resume all medications as per the medication reconciliation sheet.

## 2022-09-06 ENCOUNTER — OFFICE VISIT (OUTPATIENT)
Dept: INTERNAL MEDICINE | Facility: CLINIC | Age: 72
End: 2022-09-06

## 2022-09-06 VITALS
DIASTOLIC BLOOD PRESSURE: 86 MMHG | SYSTOLIC BLOOD PRESSURE: 130 MMHG | OXYGEN SATURATION: 95 % | BODY MASS INDEX: 35.15 KG/M2 | TEMPERATURE: 96.1 F | HEART RATE: 98 BPM | RESPIRATION RATE: 16 BRPM | HEIGHT: 60 IN

## 2022-09-06 DIAGNOSIS — F33.1 MODERATE EPISODE OF RECURRENT MAJOR DEPRESSIVE DISORDER: Primary | ICD-10-CM

## 2022-09-06 DIAGNOSIS — E78.2 MIXED HYPERLIPIDEMIA: Chronic | ICD-10-CM

## 2022-09-06 PROCEDURE — 99214 OFFICE O/P EST MOD 30 MIN: CPT | Performed by: INTERNAL MEDICINE

## 2022-09-06 RX ORDER — INFLUENZA A VIRUS A/MICHIGAN/45/2015 X-275 (H1N1) ANTIGEN (FORMALDEHYDE INACTIVATED), INFLUENZA A VIRUS A/SINGAPORE/INFIMH-16-0019/2016 IVR-186 (H3N2) ANTIGEN (FORMALDEHYDE INACTIVATED), INFLUENZA B VIRUS B/PHUKET/3073/2013 ANTIGEN (FORMALDEHYDE INACTIVATED), AND INFLUENZA B VIRUS B/MARYLAND/15/2016 BX-69A ANTIGEN (FORMALDEHYDE INACTIVATED) 60; 60; 60; 60 UG/.7ML; UG/.7ML; UG/.7ML; UG/.7ML
INJECTION, SUSPENSION INTRAMUSCULAR
COMMUNITY
Start: 2022-08-23

## 2022-09-06 RX ORDER — FLUOXETINE HYDROCHLORIDE 20 MG/1
20 CAPSULE ORAL DAILY
Qty: 30 CAPSULE | Refills: 1 | Status: SHIPPED | OUTPATIENT
Start: 2022-09-06 | End: 2022-10-13 | Stop reason: SDUPTHER

## 2022-09-06 RX ORDER — CELECOXIB 200 MG/1
CAPSULE ORAL
Status: ON HOLD | COMMUNITY
Start: 2022-08-03 | End: 2022-09-15

## 2022-09-06 RX ORDER — METHOCARBAMOL 750 MG/1
TABLET, FILM COATED ORAL
Qty: 90 TABLET | Refills: 2 | Status: ON HOLD | OUTPATIENT
Start: 2022-09-06 | End: 2022-09-15

## 2022-09-06 RX ORDER — MIRTAZAPINE 30 MG/1
30 TABLET, FILM COATED ORAL NIGHTLY
Qty: 90 TABLET | Refills: 1 | Status: SHIPPED | OUTPATIENT
Start: 2022-09-06 | End: 2022-10-13 | Stop reason: SDUPTHER

## 2022-09-06 NOTE — PROGRESS NOTES
"Chief Complaint  Follow-up and Results ([facility])    Subjective          Traci King presents to [facility] for follow up and review of GeneSight testing.   Abilify was increased at her last appt but has nto seen much improvement. She is better than initially, but still very tearful and cries very easily almost nothing with little control of this.   She was Rx temazepam at her last appt for prn use based on previous success with this medication but she notes she has not been using since she was last seen.     Objective   Vital Signs:     /86   Pulse 98   Temp 96.1 °F (35.6 °C)   Resp 16   Ht 152.4 cm (60\")   SpO2 95%   BMI 35.15 kg/m²     Physical Exam  Vitals and nursing note reviewed.   Constitutional:       General: She is not in acute distress.     Appearance: Normal appearance.   Pulmonary:      Effort: Pulmonary effort is normal. No respiratory distress.   Neurological:      Mental Status: She is alert and oriented to person, place, and time. Mental status is at baseline.   Psychiatric:         Mood and Affect: Mood normal.         Thought Content: Thought content normal.         Judgment: Judgment normal.          Result Review :   The following data was reviewed by: Sherry Fournier MD on 09/06/2022:  CMP    CMP 6/24/22 6/30/22 8/2/22   Glucose 97 97 101 (A)   BUN 24 16 14   Creatinine 1.06 (A) 0.91 0.85   Sodium 136 135 138   Potassium 5.0 4.8 4.8   Chloride 105 98 107 (A)   Calcium 9.6 9.7 9.5   Total Protein   6.8   Albumin   4.2   Globulin   2.6   Total Bilirubin   <0.2   Alkaline Phosphatase   115   AST (SGOT)   30   ALT (SGPT)   31   (A) Abnormal value            CBC w/diff    CBC w/Diff 5/20/22 5/25/22 6/7/22   WBC 7.1 7.34 9.4   RBC 3.17 (A) 3.47 (A) 3.31 (A)   Hemoglobin 10.6 (A) 11.4 (A) 11.1   Hematocrit 32.1 (A) 36.1 32.9 (A)    (A) 104.0 (A) 99 (A)   MCH 33.4 (A) 32.9 33.5 (A)   MCHC 33.0 31.6 33.7   RDW 12.5 13.3 12.2 "   Platelets 362 409 409   Neutrophil Rel % 61 73.4 71   Immature Granulocyte Rel %  0.8    Lymphocyte Rel % 27 16.9 20   Monocyte Rel % 8 7.2 6   Eosinophil Rel % 2 1.0 1   Basophil Rel % 1 0.7 1   (A) Abnormal value            Lipid Panel    Lipid Panel 12/9/21 8/2/22   Total Cholesterol 161 175   Triglycerides 112 94   HDL Cholesterol 87 83   VLDL Cholesterol 19 17   LDL Cholesterol  55 75               Most Recent A1C    HGBA1C Most Recent 12/9/21   Hemoglobin A1C 5.6      Comments are available for some flowsheets but are not being displayed.           Data reviewed: GeneSight testing reporyt            Assessment and Plan      Diagnoses and all orders for this visit:    1. Moderate episode of recurrent major depressive disorder (HCC) (Primary)  Assessment & Plan:  IMPROVING  - reviewed GeneSight testing results which indicate she is somewhat of a hypermetabolizer for several first line SSRI medications  -now fully weaned off paxil  - increased abilify to 15 mg at last appt with very minimal improvements  - increase mirtazapine dose to 30 mg nightly based on GeneSight testing report  - add low dose fluoxetine at 20 mg daily  - Reviewed potential side effects of medication including sexual performance changes, insomnia, fatigue, weight changes      Orders:  -     mirtazapine (REMERON) 30 MG tablet; Take 1 tablet by mouth Every Night.  Dispense: 90 tablet; Refill: 1  -     FLUoxetine (PROzac) 20 MG capsule; Take 1 capsule by mouth Daily.  Dispense: 30 capsule; Refill: 1    2. Mixed hyperlipidemia  Assessment & Plan:  - new orders placed for FLP to be done in next 1-2 weeks  - pt has stopped statin medication and wants to follow up to see if she is able to stay off of this medication or if it would be indicated to restart based on untreated LDL    Orders:  -     Cancel: Lipid Panel  -     Lipid Panel; Future      Follow Up   Return for Next scheduled follow up.    Patient was given instructions and counseling  regarding her condition or for health maintenance advice. Please see specific information pulled into the AVS if appropriate.     Lyndsay Fournier MD  [facility]  Phone: [phone]  Fax: [phone]

## 2022-09-06 NOTE — ASSESSMENT & PLAN NOTE
- new orders placed for FLP to be done in next 1-2 weeks  - pt has stopped statin medication and wants to follow up to see if she is able to stay off of this medication or if it would be indicated to restart based on untreated LDL

## 2022-09-06 NOTE — ASSESSMENT & PLAN NOTE
IMPROVING  - reviewed [employer] testing results which indicate she is somewhat of a hypermetabolizer for several first line SSRI medications  -now fully weaned off paxil  - increased abilify to 15 mg at last appt with very minimal improvements  - increase mirtazapine dose to 30 mg nightly based on [employer] testing report  - add low dose fluoxetine at 20 mg daily  - Reviewed potential side effects of medication including sexual performance changes, insomnia, fatigue, weight changes

## 2022-09-08 ENCOUNTER — OFFICE VISIT (OUTPATIENT)
Dept: PAIN MEDICINE | Facility: CLINIC | Age: 72
End: 2022-09-08

## 2022-09-08 VITALS
DIASTOLIC BLOOD PRESSURE: 65 MMHG | HEIGHT: 60 IN | SYSTOLIC BLOOD PRESSURE: 108 MMHG | WEIGHT: 180 LBS | TEMPERATURE: 96.9 F | OXYGEN SATURATION: 96 % | HEART RATE: 92 BPM | RESPIRATION RATE: 20 BRPM | BODY MASS INDEX: 35.34 KG/M2

## 2022-09-08 DIAGNOSIS — M47.817 LUMBOSACRAL SPONDYLOSIS WITHOUT MYELOPATHY: Primary | ICD-10-CM

## 2022-09-08 DIAGNOSIS — S32.020G COMPRESSION FRACTURE OF L2 VERTEBRA WITH DELAYED HEALING, SUBSEQUENT ENCOUNTER: ICD-10-CM

## 2022-09-08 DIAGNOSIS — Z79.899 ENCOUNTER FOR LONG-TERM (CURRENT) USE OF HIGH-RISK MEDICATION: ICD-10-CM

## 2022-09-08 DIAGNOSIS — M79.18 MYOFASCIAL PAIN SYNDROME OF LUMBAR SPINE: ICD-10-CM

## 2022-09-08 DIAGNOSIS — M47.812 CERVICAL SPONDYLOSIS WITHOUT MYELOPATHY: ICD-10-CM

## 2022-09-08 DIAGNOSIS — G89.4 CHRONIC PAIN SYNDROME: ICD-10-CM

## 2022-09-08 PROCEDURE — 99214 OFFICE O/P EST MOD 30 MIN: CPT | Performed by: PHYSICIAN ASSISTANT

## 2022-09-08 RX ORDER — OXYCODONE HYDROCHLORIDE 30 MG/1
30 TABLET, FILM COATED, EXTENDED RELEASE ORAL EVERY 12 HOURS
Qty: 60 TABLET | Refills: 0 | Status: SHIPPED | OUTPATIENT
Start: 2022-09-08 | End: 2022-09-29 | Stop reason: SDUPTHER

## 2022-09-08 RX ORDER — HYDROCODONE BITARTRATE AND ACETAMINOPHEN 10; 325 MG/1; MG/1
1 TABLET ORAL DAILY PRN
Qty: 15 TABLET | Refills: 0 | Status: SHIPPED | OUTPATIENT
Start: 2022-09-08 | End: 2022-09-29 | Stop reason: SDUPTHER

## 2022-09-08 NOTE — TELEPHONE ENCOUNTER
MATTHEW reviewed and appropriate.  Last drug screen 7/8/22 appropriate.     This patient is under the care of my colleague and I am covering patient care for him at this time.  I have reviewed pertinent information/documentation as necessary and will continue the plan of care as previously directed to the best of my ability.

## 2022-09-08 NOTE — PROGRESS NOTES
CHIEF COMPLAINT  F/u back and neck pain. Pt had LUMBAR MEDIAL BRANCH BLOCK BILATERAL L1-L3 and sts receiving 85% relief x 2 days     Subjective   Traci King is a 72 y.o. female  who presents for follow-up.  She has a history of neck and low back pain.  On 9/1/2022 patient underwent #2 diagnostic/confirmatory bilateral L1-L3 MBB with 85% pain relief for 2 days.  She has noted significant worsening of pain in a transverse distribution across the lumbosacral spine which is mostly axial in nature.  Patient also has persistent cervical spine pain and is scheduled to initiate cervical MBB's on 9/15/2022.    Patient is currently managed on OxyContin 30 mg twice daily and hydrocodone 10/325 daily as needed.  She tolerates these medications without adverse effects and finds that they help her to perform her activities of daily living and increase her physical activity.    Pain today 5/10 VAS in severity.    Back Pain  This is a chronic problem. The current episode started more than 1 year ago. The problem occurs constantly. The problem has been waxing and waning since onset. The pain is present in the lumbar spine. The quality of the pain is described as aching. The pain does not radiate. The pain is at a severity of 5/10. The pain is moderate. The pain is the same all the time. The symptoms are aggravated by twisting, standing and position. Pertinent negatives include no chest pain, fever, headaches, numbness or weakness.        PEG Assessment   What number best describes your pain on average in the past week?5  What number best describes how, during the past week, pain has interfered with your enjoyment of life?5  What number best describes how, during the past week, pain has interfered with your general activity?  5    Review of Pertinent Medical Data ---  Imaging not reviewed today    The following portions of the patient's history were reviewed and updated as appropriate: allergies, current medications, past  "family history, past medical history, past social history, past surgical history and problem list.    Review of Systems   Constitutional: Negative for activity change, fatigue and fever.   HENT: Negative for congestion.    Eyes: Negative for visual disturbance.   Respiratory: Negative for cough and shortness of breath.    Cardiovascular: Negative for chest pain.   Gastrointestinal: Negative for constipation and diarrhea.   Genitourinary: Negative for difficulty urinating.   Musculoskeletal: Positive for back pain, neck pain and neck stiffness (occ).   Neurological: Negative for dizziness, weakness, light-headedness, numbness and headaches.   Psychiatric/Behavioral: Negative for agitation, sleep disturbance and suicidal ideas. The patient is not nervous/anxious.      I have reviewed and confirmed the accuracy of the ROS as documented by the MA/LPN/RN ALLY Cortez    Vitals:    09/08/22 1028   Pulse: 92   Resp: 20   Temp: 96.9 °F (36.1 °C)   SpO2: 96%   Weight: 81.6 kg (180 lb)   Height: 152.4 cm (60\")   PainSc:   5   PainLoc: Back         Objective   Physical Exam  Vitals and nursing note reviewed.   Constitutional:       Appearance: Normal appearance.   HENT:      Head: Normocephalic.   Pulmonary:      Effort: Pulmonary effort is normal.   Musculoskeletal:      Lumbar back: Spasms and tenderness present. Decreased range of motion.   Skin:     General: Skin is warm and dry.   Neurological:      General: No focal deficit present.      Mental Status: She is alert and oriented to person, place, and time.      Cranial Nerves: Cranial nerves are intact.      Sensory: Sensation is intact.      Motor: Motor function is intact.      Gait: Gait abnormal.   Psychiatric:         Mood and Affect: Mood normal.         Behavior: Behavior normal.         Thought Content: Thought content normal.         Judgment: Judgment normal.         Assessment & Plan   Diagnoses and all orders for this visit:    1. Lumbosacral spondylosis " without myelopathy (Primary)    2. Cervical spondylosis without myelopathy    3. Encounter for long-term (current) use of high-risk medication        --- Patient will be authorized to return for RFA bilateral L1-3 based on favorable response to diagnostic injections.  --- Refill given today on OxyContin 30 mg twice daily and hydrocodone 10/325 mg daily as needed. Patient appears stable with current regimen. No adverse effects. Regarding continuation of opioids, there is no evidence of aberrant behavior or any red flags.  The patient continues with appropriate response to opioid therapy. ADL's remain intact by self.   --- RTC 4 weeks for medication management      The urine drug screen confirmation from 7/8/2022 has been reviewed and the result is appropriate based on patient history and MATTHEW report    The patient signed an updated copy of the controlled substance agreement on 11/11/2021.    MATTHEW REPORT  As part of the patient's treatment plan, I am prescribing controlled substances. The patient has been made aware of appropriate use of such medications, including potential risk of somnolence, limited ability to drive and/or work safely, and the potential for dependence or overdose. It has also been made clear that these medications are for use by this patient only, without concomitant use of alcohol or other substances unless prescribed.     Patient has completed prescribing agreement detailing terms of continued prescribing of controlled substances, including monitoring MATTHEW reports, urine drug screening, and pill counts if necessary. The patient is aware that inappropriate use will results in cessation of prescribing such medications.    As the clinician, I personally reviewed the MATTHEW from 9/8/2022 while the patient was in the office today.    History and physical exam exhibit continued safe and appropriate use of controlled substances.     Dictated utilizing Dragon dictation.     This document is  intended for medical expert use only. Reading of this document by patients and/or patient's family without participating medical staff guidance may result in misinterpretation and unintended morbidity.   Any interpretation of such data is the responsibility of the patient and/or family member responsible for the patient in concert with their primary or specialist providers, not to be left for sources of online searches such as [employer], [employer] or similar queries. Relying on these approaches to knowledge may result in misinterpretation, misguided goals of care and even death should patients or family members try recommendations outside of the realm of professional medical care in a supervised way.    Patient remained masked during entire encounter. No cough present. I donned a mask and eye protection throughout entire visit. Prior to donning mask and eye protection, hand hygiene was performed, as well as when it was doffed.  I was closer than 6 feet, but not for an extended period of time. No obvious exposure to any bodily fluids.

## 2022-09-08 NOTE — H&P (VIEW-ONLY)
CHIEF COMPLAINT  F/u back and neck pain. Pt had LUMBAR MEDIAL BRANCH BLOCK BILATERAL L1-L3 and sts receiving 85% relief x 2 days     Subjective   Traci King is a 72 y.o. female  who presents for follow-up.  She has a history of neck and low back pain.  On 9/1/2022 patient underwent #2 diagnostic/confirmatory bilateral L1-L3 MBB with 85% pain relief for 2 days.  She has noted significant worsening of pain in a transverse distribution across the lumbosacral spine which is mostly axial in nature.  Patient also has persistent cervical spine pain and is scheduled to initiate cervical MBB's on 9/15/2022.    Patient is currently managed on OxyContin 30 mg twice daily and hydrocodone 10/325 daily as needed.  She tolerates these medications without adverse effects and finds that they help her to perform her activities of daily living and increase her physical activity.    Pain today 5/10 VAS in severity.    Back Pain  This is a chronic problem. The current episode started more than 1 year ago. The problem occurs constantly. The problem has been waxing and waning since onset. The pain is present in the lumbar spine. The quality of the pain is described as aching. The pain does not radiate. The pain is at a severity of 5/10. The pain is moderate. The pain is the same all the time. The symptoms are aggravated by twisting, standing and position. Pertinent negatives include no chest pain, fever, headaches, numbness or weakness.        PEG Assessment   What number best describes your pain on average in the past week?5  What number best describes how, during the past week, pain has interfered with your enjoyment of life?5  What number best describes how, during the past week, pain has interfered with your general activity?  5    Review of Pertinent Medical Data ---  Imaging not reviewed today    The following portions of the patient's history were reviewed and updated as appropriate: allergies, current medications, past  "family history, past medical history, past social history, past surgical history and problem list.    Review of Systems   Constitutional: Negative for activity change, fatigue and fever.   HENT: Negative for congestion.    Eyes: Negative for visual disturbance.   Respiratory: Negative for cough and shortness of breath.    Cardiovascular: Negative for chest pain.   Gastrointestinal: Negative for constipation and diarrhea.   Genitourinary: Negative for difficulty urinating.   Musculoskeletal: Positive for back pain, neck pain and neck stiffness (occ).   Neurological: Negative for dizziness, weakness, light-headedness, numbness and headaches.   Psychiatric/Behavioral: Negative for agitation, sleep disturbance and suicidal ideas. The patient is not nervous/anxious.      I have reviewed and confirmed the accuracy of the ROS as documented by the MA/LPN/RN ALLY Cortez    Vitals:    09/08/22 1028   Pulse: 92   Resp: 20   Temp: 96.9 °F (36.1 °C)   SpO2: 96%   Weight: 81.6 kg (180 lb)   Height: 152.4 cm (60\")   PainSc:   5   PainLoc: Back         Objective   Physical Exam  Vitals and nursing note reviewed.   Constitutional:       Appearance: Normal appearance.   HENT:      Head: Normocephalic.   Pulmonary:      Effort: Pulmonary effort is normal.   Musculoskeletal:      Lumbar back: Spasms and tenderness present. Decreased range of motion.   Skin:     General: Skin is warm and dry.   Neurological:      General: No focal deficit present.      Mental Status: She is alert and oriented to person, place, and time.      Cranial Nerves: Cranial nerves are intact.      Sensory: Sensation is intact.      Motor: Motor function is intact.      Gait: Gait abnormal.   Psychiatric:         Mood and Affect: Mood normal.         Behavior: Behavior normal.         Thought Content: Thought content normal.         Judgment: Judgment normal.         Assessment & Plan   Diagnoses and all orders for this visit:    1. Lumbosacral spondylosis " without myelopathy (Primary)    2. Cervical spondylosis without myelopathy    3. Encounter for long-term (current) use of high-risk medication        --- Patient will be authorized to return for RFA bilateral L1-3 based on favorable response to diagnostic injections.  --- Refill given today on OxyContin 30 mg twice daily and hydrocodone 10/325 mg daily as needed. Patient appears stable with current regimen. No adverse effects. Regarding continuation of opioids, there is no evidence of aberrant behavior or any red flags.  The patient continues with appropriate response to opioid therapy. ADL's remain intact by self.   --- RTC 4 weeks for medication management      The urine drug screen confirmation from 7/8/2022 has been reviewed and the result is appropriate based on patient history and MATTHEW report    The patient signed an updated copy of the controlled substance agreement on 11/11/2021.    MATTHEW REPORT  As part of the patient's treatment plan, I am prescribing controlled substances. The patient has been made aware of appropriate use of such medications, including potential risk of somnolence, limited ability to drive and/or work safely, and the potential for dependence or overdose. It has also been made clear that these medications are for use by this patient only, without concomitant use of alcohol or other substances unless prescribed.     Patient has completed prescribing agreement detailing terms of continued prescribing of controlled substances, including monitoring MATTHEW reports, urine drug screening, and pill counts if necessary. The patient is aware that inappropriate use will results in cessation of prescribing such medications.    As the clinician, I personally reviewed the MATTHEW from 9/8/2022 while the patient was in the office today.    History and physical exam exhibit continued safe and appropriate use of controlled substances.     Dictated utilizing Dragon dictation.     This document is  intended for medical expert use only. Reading of this document by patients and/or patient's family without participating medical staff guidance may result in misinterpretation and unintended morbidity.   Any interpretation of such data is the responsibility of the patient and/or family member responsible for the patient in concert with their primary or specialist providers, not to be left for sources of online searches such as [employer], [employer] or similar queries. Relying on these approaches to knowledge may result in misinterpretation, misguided goals of care and even death should patients or family members try recommendations outside of the realm of professional medical care in a supervised way.    Patient remained masked during entire encounter. No cough present. I donned a mask and eye protection throughout entire visit. Prior to donning mask and eye protection, hand hygiene was performed, as well as when it was doffed.  I was closer than 6 feet, but not for an extended period of time. No obvious exposure to any bodily fluids.

## 2022-09-09 LAB
CHOLEST SERPL-MCNC: 182 MG/DL (ref 100–199)
HDLC SERPL-MCNC: 75 MG/DL
LDLC SERPL CALC-MCNC: 85 MG/DL (ref 0–99)
TRIGL SERPL-MCNC: 126 MG/DL (ref 0–149)
VLDLC SERPL CALC-MCNC: 22 MG/DL (ref 5–40)

## 2022-09-12 ENCOUNTER — PREP FOR SURGERY (OUTPATIENT)
Dept: SURGERY | Facility: SURGERY CENTER | Age: 72
End: 2022-09-12

## 2022-09-12 DIAGNOSIS — M47.817 LUMBOSACRAL SPONDYLOSIS WITHOUT MYELOPATHY: Primary | ICD-10-CM

## 2022-09-12 RX ORDER — SODIUM CHLORIDE 0.9 % (FLUSH) 0.9 %
10 SYRINGE (ML) INJECTION AS NEEDED
Status: CANCELLED | OUTPATIENT
Start: 2022-09-12

## 2022-09-12 RX ORDER — SODIUM CHLORIDE 0.9 % (FLUSH) 0.9 %
10 SYRINGE (ML) INJECTION EVERY 12 HOURS SCHEDULED
Status: CANCELLED | OUTPATIENT
Start: 2022-09-12

## 2022-09-13 ENCOUNTER — TRANSCRIBE ORDERS (OUTPATIENT)
Dept: SURGERY | Facility: SURGERY CENTER | Age: 72
End: 2022-09-13

## 2022-09-13 DIAGNOSIS — Z41.9 SURGERY, ELECTIVE: Primary | ICD-10-CM

## 2022-09-15 ENCOUNTER — HOSPITAL ENCOUNTER (OUTPATIENT)
Facility: SURGERY CENTER | Age: 72
Setting detail: HOSPITAL OUTPATIENT SURGERY
Discharge: HOME | End: 2022-09-15
Attending: ANESTHESIOLOGY | Admitting: ANESTHESIOLOGY

## 2022-09-15 ENCOUNTER — HOSPITAL ENCOUNTER (OUTPATIENT)
Dept: GENERAL RADIOLOGY | Facility: SURGERY CENTER | Age: 72
Setting detail: HOSPITAL OUTPATIENT SURGERY
End: 2022-09-15

## 2022-09-15 VITALS
RESPIRATION RATE: 16 BRPM | HEIGHT: 60 IN | HEART RATE: 80 BPM | DIASTOLIC BLOOD PRESSURE: 70 MMHG | OXYGEN SATURATION: 100 % | SYSTOLIC BLOOD PRESSURE: 136 MMHG | WEIGHT: 180 LBS | BODY MASS INDEX: 35.34 KG/M2 | TEMPERATURE: 97.7 F

## 2022-09-15 DIAGNOSIS — M47.812 CERVICAL SPONDYLOSIS WITHOUT MYELOPATHY: ICD-10-CM

## 2022-09-15 PROCEDURE — 77002 NEEDLE LOCALIZATION BY XRAY: CPT

## 2022-09-15 PROCEDURE — 64490 INJ PARAVERT F JNT C/T 1 LEV: CPT | Performed by: ANESTHESIOLOGY

## 2022-09-15 PROCEDURE — 76000 FLUOROSCOPY <1 HR PHYS/QHP: CPT

## 2022-09-15 PROCEDURE — 64491 INJ PARAVERT F JNT C/T 2 LEV: CPT | Performed by: ANESTHESIOLOGY

## 2022-09-15 PROCEDURE — 0 IOHEXOL 300 MG/ML SOLUTION 10 ML VIAL: Performed by: ANESTHESIOLOGY

## 2022-09-15 PROCEDURE — 25010000002 METHYLPREDNISOLONE PER 40 MG: Performed by: ANESTHESIOLOGY

## 2022-09-15 PROCEDURE — 25010000002 MIDAZOLAM PER 1 MG: Performed by: ANESTHESIOLOGY

## 2022-09-15 RX ORDER — SOLIFENACIN SUCCINATE 5 MG/1
5 TABLET, FILM COATED ORAL DAILY
COMMUNITY
Start: 2022-09-12 | End: 2022-10-12

## 2022-09-15 RX ORDER — METHOCARBAMOL 750 MG/1
1 TABLET, FILM COATED ORAL 3 TIMES DAILY
COMMUNITY
Start: 2022-09-06 | End: 2022-12-22 | Stop reason: SDUPTHER

## 2022-09-15 RX ORDER — MIRTAZAPINE 15 MG/1
TABLET, FILM COATED ORAL DAILY
Status: ON HOLD | COMMUNITY
Start: 2022-08-05 | End: 2022-09-15

## 2022-09-15 RX ORDER — SODIUM CHLORIDE, SODIUM LACTATE, POTASSIUM CHLORIDE, CALCIUM CHLORIDE 600; 310; 30; 20 MG/100ML; MG/100ML; MG/100ML; MG/100ML
9 INJECTION, SOLUTION INTRAVENOUS CONTINUOUS
Status: DISCONTINUED | OUTPATIENT
Start: 2022-09-15 | End: 2022-09-15 | Stop reason: HOSPADM

## 2022-09-15 RX ORDER — MIDAZOLAM HYDROCHLORIDE 1 MG/ML
INJECTION INTRAMUSCULAR; INTRAVENOUS AS NEEDED
Status: DISCONTINUED | OUTPATIENT
Start: 2022-09-15 | End: 2022-09-15 | Stop reason: HOSPADM

## 2022-09-15 RX ORDER — SODIUM CHLORIDE 0.9 % (FLUSH) 0.9 %
10 SYRINGE (ML) INJECTION EVERY 12 HOURS SCHEDULED
Status: DISCONTINUED | OUTPATIENT
Start: 2022-09-15 | End: 2022-09-15 | Stop reason: HOSPADM

## 2022-09-15 RX ORDER — SODIUM CHLORIDE 0.9 % (FLUSH) 0.9 %
10 SYRINGE (ML) INJECTION AS NEEDED
Status: DISCONTINUED | OUTPATIENT
Start: 2022-09-15 | End: 2022-09-15 | Stop reason: HOSPADM

## 2022-09-15 RX ADMIN — SODIUM CHLORIDE, POTASSIUM CHLORIDE, SODIUM LACTATE AND CALCIUM CHLORIDE 9 ML: 600; 310; 30; 20 INJECTION, SOLUTION INTRAVENOUS at 09:58

## 2022-09-15 NOTE — DISCHARGE INSTRUCTIONS
[facility] - Post-procedure Instructions          --  While there are no absolute restrictions, it is recommended that you do not perform strenuous activity today. In the morning, you may resume your level of activity as before your block.    --  If you have a band-aid at your injection site, please remove it later today. Observe the area for any redness, swelling, pus-like drainage, or a temperature over 101°. If any of these symptoms occur, please call your doctor at [phone]. If after office hours, leave a message and the on-call provider will return your call.    --  Ice may be applied to your injection site. It is recommended you avoid direct heat (heating pad; hot tub) for 1-2 days.    --  Call [facility] at [phone] if you experience persistent headache, persistent bleeding from the injection site, or severe pain not relieved by heat or oral medication.    --  Do not make important decisions today.    --  Due to the effects of the block and/or the I.V. Sedation, DO NOT drive or operate hazardous machinery for 12 hours.  Local anesthetics may cause numbness after procedure and precautions must be taken with regards to operating equipment as well as with walking, even if ambulating with assistance of another person or with an assistive device.    --  Do not drink alcohol for 12 hours.    -- You may return to work tomorrow, or as directed by your referring doctor.    --  Occasionally you may notice a slight increase in your pain after the procedure. This should start to improve within the next 24-48 hours. Radiofrequency ablation procedure pain may last 3-4 weeks.    --  It may take as long as 3-4 days before you notice a gradual improvement in your pain and/or other symptoms.    -- You may continue to take your prescribed pain medication as needed.    --  Some normal possible side effects of steroid use could include fluid retention, increased blood sugar, dull headache,  increased sweating, increased appetite, mood swings and flushing.    --  Diabetics are recommended to watch their blood glucose level closely for 24-48 hours after the injection.    --  Must stay in PACU for 20 min upon arrival and prove no leg weakness before being discharged.    --  IN THE EVENT OF A LIFE THREATENING EMERGENCY, (CHEST PAIN, BREATHING DIFFICULTIES, PARALYSIS…) YOU SHOULD GO TO YOUR NEAREST EMERGENCY ROOM.    --  You should be contacted by our office within 2-3 days to schedule follow up or next appointment date.  If not contacted within 7 days, please call the office at [phone]

## 2022-09-15 NOTE — INTERVAL H&P NOTE
H&P reviewed. The patient was examined and there are no changes to the H&P. Axial neck pain, she rates her neck pain as 80%+ right.

## 2022-09-15 NOTE — OP NOTE
RIGHT C3-5 Cervical Medial Branch Blockade  [facility]      PREOPERATIVE DIAGNOSIS:  Cervical spondylosis without myelopathy   POSTOPERATIVE DIAGNOSIS:  Same as preoperative diagnosis    PROCEDURE:    Cervical Facet Nerve (medial branch) Blocks, with Fluoroscopy:  LEVELS C3, C4, C5,  to block facet joints C3-4 & C4-5 on the RIGHT  • 20271 - Cervical Facet block, 1st level  • 79616 - Cervical Facet block, 2nd level    PRE-PROCEDURE DISCUSSION WITH PATIENT:    Risks and complications were discussed with the patient prior to starting the procedure and informed consent was obtained.    SURGEON:  Jose Luis Gan MD    REASON FOR PROCEDURE:    The patient complains of pain that seems to have a significant axial component.  She describes her pain as 80%+ right sided so we focused only on the right today diagnostically.    SEDATION:  Versed 6mg IV, The use of increased procedural sedation was carefully considered, and for this particular patient the need for additional procedural sedation seemed necessary in this instance to safely perform the procedure. and The patient had higher than average levels of procedural anxiety and the need to provide additional procedural sedation was needed to safely proceed. - -And further review she does have some significant depressive and anxious symptomatology and requires both use of Abilify as well as a benzodiazepine.  The application of Versed was necessary for therefore for comfort care ; please see previous op note about the difficulties with performance of the chart and the negative aspects of the procedure when she was given Versed 4 mg… She did tolerate well today and she and was conversational throughout the procedure.     ANESTHETIC:   Marcaine 0.5%  STEROID:  NONE  TOTAL VOLUME OF SOLUTION:  3 mL    DESCRIPTON OF PROCEDURE:  After obtaining informed consent, the patient was placed in the prone position. IV access was obtained.  EKG, blood pressure, and  pulse oximeter were monitored and all sedation was administered by an RN under my direct guidance.  The cervical area was prepped with Chloraprep and draped with sterile barrier. Under fluoroscopic guidance the waists of the C3 & C4 & C5 vertebra on the affected side were identified. Skin and subcutaneous tissue was then anesthetized with 1% lidocaine 1mL at each point. Then spinal needles were introduced under fluoroscopic guidance at the waist of these vertebra on this side. After confirming the position of the needle under fluoroscopic PA and lateral views, and confirming negative aspiration of blood and CSF, 0.25 mL of Omnipaque was injected.  Proper spread and lack of vascular uptake was demonstrated.  A solution was prepared as above, and 1 mL of that solution was injected very slowly each level.      Needles were removed intact from all levels.  Vitals were stable throughout.       ESTIMATED BLOOD LOSS:  minimal  SPECIMENS:  None    COMPLICATIONS:    No complications were noted., There was no indication of vascular uptake on live injection of contrast dye., There was no indication of intrathecal uptake on live injection of contrast dye., There was not any evidence of dural puncture.   and The patient did not have any signs of postprocedure numbness nor weakness.    TOLERANCE & DISCHARGE CONDITION:    The patient tolerated the procedure well.  The patient was transported to the recovery area without difficulties.  The patient was discharged to home under the care of family in stable and satisfactory condition.      PLAN OF CARE:  1. The patient was given our standard instruction sheet.  2. We discussed that Cervical Medial Branch Blockade is a diagnostic procedure in consideration for radiofrequency ablation if two diagnostic procedures proved to be positive for significant benefit.  If sustained relief of six to eight weeks is obtained, then an alternative plan could be therapeutic cervical medial branch  blocks on an as-needed basis.  3. The patient is asked to keep a pain log hourly for 8 hours postoperatively today.  4. The patient will Return to clinic 1-2 wks.  5. The patient will resume all medications as per the medication reconciliation sheet.

## 2022-09-29 ENCOUNTER — OFFICE VISIT (OUTPATIENT)
Dept: PAIN MEDICINE | Facility: CLINIC | Age: 72
End: 2022-09-29

## 2022-09-29 ENCOUNTER — TRANSCRIBE ORDERS (OUTPATIENT)
Dept: SURGERY | Facility: SURGERY CENTER | Age: 72
End: 2022-09-29

## 2022-09-29 ENCOUNTER — PREP FOR SURGERY (OUTPATIENT)
Dept: SURGERY | Facility: SURGERY CENTER | Age: 72
End: 2022-09-29

## 2022-09-29 VITALS
TEMPERATURE: 97.7 F | SYSTOLIC BLOOD PRESSURE: 115 MMHG | RESPIRATION RATE: 16 BRPM | HEIGHT: 60 IN | OXYGEN SATURATION: 95 % | WEIGHT: 181.6 LBS | DIASTOLIC BLOOD PRESSURE: 73 MMHG | HEART RATE: 92 BPM | BODY MASS INDEX: 35.65 KG/M2

## 2022-09-29 DIAGNOSIS — S32.020G COMPRESSION FRACTURE OF L2 VERTEBRA WITH DELAYED HEALING, SUBSEQUENT ENCOUNTER: ICD-10-CM

## 2022-09-29 DIAGNOSIS — Z41.9 SURGERY, ELECTIVE: Primary | ICD-10-CM

## 2022-09-29 DIAGNOSIS — Z79.899 ENCOUNTER FOR LONG-TERM (CURRENT) USE OF HIGH-RISK MEDICATION: ICD-10-CM

## 2022-09-29 DIAGNOSIS — M47.817 LUMBOSACRAL SPONDYLOSIS WITHOUT MYELOPATHY: ICD-10-CM

## 2022-09-29 DIAGNOSIS — M47.812 CERVICAL SPONDYLOSIS WITHOUT MYELOPATHY: Primary | ICD-10-CM

## 2022-09-29 DIAGNOSIS — G89.4 CHRONIC PAIN SYNDROME: ICD-10-CM

## 2022-09-29 PROCEDURE — 99214 OFFICE O/P EST MOD 30 MIN: CPT | Performed by: PHYSICIAN ASSISTANT

## 2022-09-29 RX ORDER — OXYCODONE HYDROCHLORIDE 30 MG/1
30 TABLET, FILM COATED, EXTENDED RELEASE ORAL EVERY 12 HOURS
Qty: 60 TABLET | Refills: 0 | Status: SHIPPED | OUTPATIENT
Start: 2022-10-08 | End: 2022-11-02 | Stop reason: SDUPTHER

## 2022-09-29 RX ORDER — SODIUM CHLORIDE 0.9 % (FLUSH) 0.9 %
10 SYRINGE (ML) INJECTION AS NEEDED
Status: CANCELLED | OUTPATIENT
Start: 2022-09-29

## 2022-09-29 RX ORDER — HYDROCODONE BITARTRATE AND ACETAMINOPHEN 10; 325 MG/1; MG/1
1 TABLET ORAL DAILY PRN
Qty: 15 TABLET | Refills: 0 | Status: SHIPPED | OUTPATIENT
Start: 2022-10-08 | End: 2022-11-02 | Stop reason: SDUPTHER

## 2022-09-29 RX ORDER — SODIUM CHLORIDE 0.9 % (FLUSH) 0.9 %
10 SYRINGE (ML) INJECTION EVERY 12 HOURS SCHEDULED
Status: CANCELLED | OUTPATIENT
Start: 2022-09-29

## 2022-09-29 NOTE — PROGRESS NOTES
CHIEF COMPLAINT    Procedure follow up, pt states neck pain is better, back pain is the same    Subjective   Traci King is a 72 y.o. female  who presents to the office for follow-up of procedure.  She completed a RIGHT C3-5 Cervical Medial Branch Blockade  on  9/15/22 performed by Dr. Gan for management of neck pain. Patient reports 90% relief from the procedure for 1 week before returning to baseline levels.  Over the last several weeks she has noted progressive worsening of predominantly right-sided cervical spine pain which is entirely axial in nature.  Has also noted progressive worsening of pain in a transverse distribution across the lumbosacral spine without lower extremity radicular pain.  Patient is scheduled to return on 10/20/2022 for RFA bilateral L1-L3.    She continues on OxyContin 30 mg twice daily and hydrocodone 10/325 mg as needed.  She is tolerating these current medications without adverse effect such as constipation or somnolence.    Pain today 4/10 VAS in severity.    Neck Pain   This is a chronic problem. The current episode started more than 1 year ago. The problem occurs constantly. The problem has been unchanged. The pain is associated with an unknown factor. The pain is present in the right side and midline. The quality of the pain is described as aching and burning. The pain is at a severity of 4/10. The pain is moderate. The symptoms are aggravated by position. The pain is same all the time. Pertinent negatives include no chest pain, fever, numbness or weakness.   Back Pain  This is a chronic problem. The current episode started more than 1 year ago. The problem occurs constantly. The problem has been gradually worsening since onset. The pain is present in the lumbar spine. The quality of the pain is described as aching. The pain does not radiate. The pain is at a severity of 4/10. The pain is moderate. The pain is the same all the time. The symptoms are aggravated by bending,  twisting and position. Pertinent negatives include no chest pain, fever, numbness or weakness.        PEG Assessment   What number best describes your pain on average in the past week?5  What number best describes how, during the past week, pain has interfered with your enjoyment of life?7  What number best describes how, during the past week, pain has interfered with your general activity?  5    Review of Pertinent Medical Data ---  MRI lumbar spine without contrast,     03/22/2022 at 0916 hours     HISTORY: L2 vertebral body fracture.         TECHNIQUE: Multiplanar multisequence imaging was obtained of the lumbar spine on a 3 Haylie magnet. Images were obtained without IV gadolinium.     COMPARISON: CT of the lumbar spine performed on February 23, 2022.     FINDINGS:     There has been no interval change in the chronic L2 burst fracture. The L2 vertebral body is approximately 70% compressed. Retropulsion of fracture fragments or compromising the spinal canal approximately 25%. No new fractures are demonstrated.     The conus terminates at L1. Signal within the conus is within normal limits. The cauda equina is unremarkable.     Moderate degenerative disc disease is at L5-S1. No pars defects are demonstrated. The paravertebral soft tissues are unremarkable.     T12-L1: Broad-based disc bulge resulting in mild bilateral neuroforaminal narrowing     L1-L2: A broad-based disc bulge and facet disease resulting in mild to moderate bilateral neuroforaminal narrowing and mild spinal stenosis     L2-L3: A broad-based disc bulge and facet disease resulting in mild-to-moderate bilateral neuroforaminal narrowing.     L3-L4: Broad-based disc bulge and facet disease result in mild bilateral neuroforaminal narrowing     L4-L5: Broad-based disc bulge and facet disease result in mild-to-moderate bilateral neuroforaminal narrowing     L5-S1: Broad-based disc bulge, posterior also slightly and facet disease resulting in  mild-to-moderate bilateral neuroforaminal narrowing     IMPRESSION:     1. No interval change in the L2 chronic burst fracture.   2. Stable multilevel degenerative spondylosis     Dictated by: Arlyn Feliz M.D.     Images and Report reviewed and interpreted by: Arlyn Feliz M.D.    CT OF THE CERVICAL SPINE:     FINDINGS: Negative for acute fracture and acute malalignment of the cervical spine.     2 mm grade 1 anterolisthesis of C3 on C4 with complete osseous fusion through the facet joints and anterior spinous processes of C3 and C4. There is facet fusion at C2-C3, greater on the left. Severe degenerative disc disease at C4-C5, C5-C6, and C6-C7.   Vertebral body heights are normal. No prevertebral soft tissue swelling. Multilevel mild central canal stenosis. Multilevel bilateral neural foraminal narrowing, moderate on the left at C3-C4, C4-C5, and C6-C7 and moderate on the right at C4-C5, C5-C6,   and C6-C7.     IMPRESSION:   1. Negative for acute fracture and acute malalignment of the cervical spine.     2. Multilevel degenerative changes of the cervical spine, detailed above       CT OF THE LUMBAR SPINE:     FINDINGS: There is a compression-burst fracture of the L2 vertebral body with up to 75% vertebral body height loss. There is retropulsion of the posterior superior endplate of L2 measuring 7 mm, with moderate central canal stenosis and moderate bilateral    neural foraminal narrowing at L1-L2. There is no fracture of the posterior elements of L2.     No other lumbar spine fractures. No other malalignment. Severe degenerative disc disease at L5-S1 with partial interbody fusion. At L4-L5, disc bulge, facet disease, and ligamentum flavum thickening result in mild to moderate central canal stenosis and   bilateral neural foraminal narrowing. Other multilevel mild disc bulges.     IMPRESSION:   1. Compression-burst fracture of the L2 vertebral body with 75% height loss, 7 mm retropulsion of the posterior  "superior endplate of, and resulting moderate central canal stenosis     2. Degenerative spondylosis of the lumbar spine without other acute bone abnormality.     Dictated by: Frandy Liu M.D.     Images and Report reviewed and interpreted by: Frandy Liu M.D.       The following portions of the patient's history were reviewed and updated as appropriate: allergies, current medications, past family history, past medical history, past social history, past surgical history and problem list.    Review of Systems   Constitutional: Negative for fever.   HENT: Negative for congestion.    Eyes: Negative for visual disturbance.   Respiratory: Positive for shortness of breath.    Cardiovascular: Negative for chest pain.   Gastrointestinal: Negative for constipation and diarrhea.   Genitourinary: Negative for difficulty urinating and dyspareunia.   Musculoskeletal: Positive for back pain and neck pain. Negative for joint swelling.   Neurological: Negative for weakness and numbness.   Psychiatric/Behavioral: Negative for sleep disturbance and suicidal ideas.     I have reviewed and confirmed the accuracy of the ROS as documented by the MA/LPN/RN ALLY Cortez     Vitals:    09/29/22 0903   BP: 115/73   Pulse: 92   Resp: 16   Temp: 97.7 °F (36.5 °C)   SpO2: 95%   Weight: 82.4 kg (181 lb 9.6 oz)   Height: 152.4 cm (60\")   PainSc:   4   PainLoc: Neck         Objective   Physical Exam  Vitals and nursing note reviewed.   Constitutional:       Appearance: Normal appearance. She is normal weight.   HENT:      Head: Normocephalic.   Pulmonary:      Effort: Pulmonary effort is normal.   Musculoskeletal:      Cervical back: Tenderness (Exquisite tenderness within the overlying right cervical facet joint spaces) present. Pain with movement (Pain is increased with cervical extension and lateral rotation), spinous process tenderness and muscular tenderness present.      Lumbar back: Spasms and tenderness (Pain to palpation " within the overlying bilateral lumbar facet joints basis) present. Decreased range of motion (Increased pain with lumbar extension).   Skin:     General: Skin is warm and dry.   Neurological:      General: No focal deficit present.      Mental Status: She is alert and oriented to person, place, and time.      Cranial Nerves: Cranial nerves are intact.      Sensory: Sensation is intact.      Motor: Motor function is intact.      Gait: Gait is intact.   Psychiatric:         Mood and Affect: Mood normal.         Behavior: Behavior normal.         Thought Content: Thought content normal.         Judgment: Judgment normal.         Assessment & Plan   Diagnoses and all orders for this visit:    1. Cervical spondylosis without myelopathy (Primary)    2. Lumbosacral spondylosis without myelopathy    3. Encounter for long-term (current) use of high-risk medication    4. Chronic pain syndrome        --- I will have patient return for #2 diagnostic/confirmatory right C3-5 MBB with plan to progress to RFA assuming continued favorable response.  --- Return as scheduled on 10/20/2022 for scheduled RFA bilateral L1-L3   --Follow-up in 4 weeks for medication management.  --- Refill given today on OxyContin 30 mg twice daily and hydrocodone 10/325 mg. Patient appears stable with current regimen. No adverse effects. Regarding continuation of opioids, there is no evidence of aberrant behavior or any red flags.  The patient continues with appropriate response to opioid therapy. ADL's remain intact by self.     The urine drug screen confirmation from 7/8/2022 has been reviewed and the result is appropriate based on patient history and MATTHEW     The patient signed an updated copy of the controlled substance agreement on 11/11/2021.      MATTHEW REPORT  As part of the patient's treatment plan, I am prescribing controlled substances. The patient has been made aware of appropriate use of such medications, including potential risk of  somnolence, limited ability to drive and/or work safely, and the potential for dependence or overdose. It has also been made clear that these medications are for use by this patient only, without concomitant use of alcohol or other substances unless prescribed.     Patient has completed prescribing agreement detailing terms of continued prescribing of controlled substances, including monitoring MATTHEW reports, urine drug screening, and pill counts if necessary. The patient is aware that inappropriate use will results in cessation of prescribing such medications.    As the clinician, I personally reviewed the MATTHEW from 9/29/2022 while the patient was in the office today.    History and physical exam exhibit continued safe and appropriate use of controlled substances.       Dictated utilizing Dragon dictation.      This document is intended for medical expert use only. Reading of this document by patients and/or patient's family without participating medical staff guidance may result in misinterpretation and unintended morbidity.   Any interpretation of such data is the responsibility of the patient and/or family member responsible for the patient in concert with their primary or specialist providers, not to be left for sources of online searches such as [employer], [employer] or similar queries. Relying on these approaches to knowledge may result in misinterpretation, misguided goals of care and even death should patients or family members try recommendations outside of the realm of professional medical care in a supervised way.    Patient remained masked during entire encounter. No cough present. I donned a mask and eye protection throughout entire visit. Prior to donning mask and eye protection, hand hygiene was performed, as well as when it was doffed.  I was closer than 6 feet, but not for an extended period of time. No obvious exposure to any bodily fluids.

## 2022-10-13 ENCOUNTER — OFFICE VISIT (OUTPATIENT)
Dept: INTERNAL MEDICINE | Facility: CLINIC | Age: 72
End: 2022-10-13

## 2022-10-13 VITALS
SYSTOLIC BLOOD PRESSURE: 128 MMHG | WEIGHT: 180 LBS | BODY MASS INDEX: 35.34 KG/M2 | HEART RATE: 95 BPM | DIASTOLIC BLOOD PRESSURE: 72 MMHG | HEIGHT: 60 IN | TEMPERATURE: 96.1 F | RESPIRATION RATE: 16 BRPM | OXYGEN SATURATION: 100 %

## 2022-10-13 DIAGNOSIS — E22.2 SIADH (SYNDROME OF INAPPROPRIATE ADH PRODUCTION): Chronic | ICD-10-CM

## 2022-10-13 DIAGNOSIS — F33.1 MODERATE EPISODE OF RECURRENT MAJOR DEPRESSIVE DISORDER: Primary | ICD-10-CM

## 2022-10-13 DIAGNOSIS — L30.4 PRURITIC INTERTRIGO: ICD-10-CM

## 2022-10-13 PROCEDURE — 99214 OFFICE O/P EST MOD 30 MIN: CPT | Performed by: INTERNAL MEDICINE

## 2022-10-13 RX ORDER — MIRTAZAPINE 30 MG/1
TABLET, FILM COATED ORAL
Qty: 90 TABLET | Refills: 1
Start: 2022-10-13 | End: 2022-10-18

## 2022-10-13 RX ORDER — FLUOXETINE HYDROCHLORIDE 40 MG/1
40 CAPSULE ORAL DAILY
Qty: 30 CAPSULE | Refills: 1 | Status: SHIPPED | OUTPATIENT
Start: 2022-10-13 | End: 2022-11-03

## 2022-10-13 RX ORDER — NYSTATIN 100000 [USP'U]/G
POWDER TOPICAL 3 TIMES DAILY
Qty: 60 G | Refills: 2 | Status: SHIPPED | OUTPATIENT
Start: 2022-10-13

## 2022-10-13 NOTE — PATIENT INSTRUCTIONS
Med Changes Today:    - Weaning off Mirtazapine (nighttime medicine)--> take 1/2 pill x 3 weeks, then ok to stop medication    - Increase Fluoxetine to 40 mg

## 2022-10-13 NOTE — ASSESSMENT & PLAN NOTE
IMPROVING  - reviewed Gene[employer] testing results which indicate she is somewhat of a hypermetabolizer for several first line SSRI medications  -now fully weaned off paxil  - cont abilify at 15 mg for now  - wean down on mirtazapine--> 15 mg nightly x 3 weeks then can stop medication  - increase fluoxetine to 40 mg for improved efficacy, has seen a decrease in tearfulness since adding this medication  - Reviewed potential side effects of medication including sexual performance changes, insomnia, fatigue, weight changes

## 2022-10-13 NOTE — PROGRESS NOTES
"Chief Complaint  Follow-up and Depression    Subjective          Traci King presents to [facility] for follow up on depression.   We have been working on depression for a long time now with multiple medication changes and as of yet we have not made many improvements. She has had genesight testing and as a result many medication changes were made last time including increasing her abilify, her mirtazapine, and added fluoxetine. Does feel like she may be less tearful than before but her depression is still present.   She is also very worried about her phentermine, she had been getting this consistently for 6+ months now but provider that was prescribing this is now in a bad location.   Also with new itchy rash under left breast.     Objective   Vital Signs:     /72   Pulse 95   Temp 96.1 °F (35.6 °C)   Resp 16   Ht 152.4 cm (60\")   Wt 81.6 kg (180 lb)   SpO2 100%   BMI 35.15 kg/m²     Physical Exam  Vitals and nursing note reviewed.   Constitutional:       General: She is not in acute distress.     Appearance: Normal appearance.   Cardiovascular:      Rate and Rhythm: Normal rate and regular rhythm.      Pulses: Normal pulses.      Heart sounds: Normal heart sounds. No murmur heard.  Pulmonary:      Effort: Pulmonary effort is normal. No respiratory distress.      Breath sounds: Normal breath sounds.   Abdominal:      General: Abdomen is flat. Bowel sounds are normal.      Palpations: Abdomen is soft.      Tenderness: There is no abdominal tenderness.   Musculoskeletal:      Right lower leg: No edema.      Left lower leg: No edema.   Neurological:      Mental Status: She is alert and oriented to person, place, and time. Mental status is at baseline.   Psychiatric:         Mood and Affect: Mood is depressed. Mood is not anxious. Affect is tearful.         Speech: Speech normal.         Behavior: Behavior normal. Behavior is cooperative.         Thought " Content: Thought content normal.          Result Review : : None     Assessment and Plan      Diagnoses and all orders for this visit:    1. Moderate episode of recurrent major depressive disorder (HCC) (Primary)  Assessment & Plan:  IMPROVING  - reviewed GeneSight testing results which indicate she is somewhat of a hypermetabolizer for several first line SSRI medications  -now fully weaned off paxil  - cont abilify at 15 mg for now  - wean down on mirtazapine--> 15 mg nightly x 3 weeks then can stop medication  - increase fluoxetine to 40 mg for improved efficacy, has seen a decrease in tearfulness since adding this medication  - Reviewed potential side effects of medication including sexual performance changes, insomnia, fatigue, weight changes      Orders:  -     FLUoxetine (PROzac) 40 MG capsule; Take 1 capsule by mouth Daily.  Dispense: 30 capsule; Refill: 1  -     mirtazapine (REMERON) 30 MG tablet; Take 1/2 tab (15 mg) x 3 weeks, then stop medication  Dispense: 90 tablet; Refill: 1    2. Pruritic intertrigo  -     nystatin (MYCOSTATIN) 682044 UNIT/GM powder; Apply  topically to the appropriate area as directed 3 (Three) Times a Day.  Dispense: 60 g; Refill: 2    3. SIADH (syndrome of inappropriate ADH production) ([facility])  -     Basic Metabolic Panel        Follow Up   Return in about 4 weeks (around 11/10/2022) for follow up depression.    Patient was given instructions and counseling regarding her condition or for health maintenance advice. Please see specific information pulled into the AVS if appropriate.     Lyndsay Fournier MD  [facility]  Phone: [phone]  Fax: [phone]

## 2022-10-14 LAB
BUN SERPL-MCNC: 15 MG/DL (ref 8–23)
BUN/CREAT SERPL: 16.1 (ref 7–25)
CALCIUM SERPL-MCNC: 9.2 MG/DL (ref 8.6–10.5)
CHLORIDE SERPL-SCNC: 103 MMOL/L (ref 98–107)
CO2 SERPL-SCNC: 19 MMOL/L (ref 22–29)
CREAT SERPL-MCNC: 0.93 MG/DL (ref 0.57–1)
EGFRCR SERPLBLD CKD-EPI 2021: 66.3 ML/MIN/1.73
GLUCOSE SERPL-MCNC: 95 MG/DL (ref 65–99)
POTASSIUM SERPL-SCNC: 5.2 MMOL/L (ref 3.5–5.2)
SODIUM SERPL-SCNC: 135 MMOL/L (ref 136–145)

## 2022-10-18 NOTE — SIGNIFICANT NOTE
Patient educated on the following :    - If you are receiving Sedation for your procedure Nothing to Eat 6 hours and only clear liquids for 2 hours prior to your procedure.     -You will need to have someone drive you home after your PROCEDURE and remain with you for 24 hours after the PROCEDURE  - The date of your procedure, your are welcome to have one visitor at bedside or remain within 10-15 minutes of [facility]  -You will need to arrive at 0745 on 10/20 PROCEDURE  -Please contact [facility] at: [phone] with any questions and/or concerns

## 2022-10-19 ENCOUNTER — TELEPHONE (OUTPATIENT)
Dept: INTERNAL MEDICINE | Facility: CLINIC | Age: 72
End: 2022-10-19

## 2022-10-19 NOTE — TELEPHONE ENCOUNTER
Not an unreasonable option but would have to replace one of her current medication and would rather discuss in the context of an appt so we can talk about how to transition to this medication from what she is currently taking  Also, this is a newer medication so before we get together on 11/3 she should call her insurance company and see if this medication is even covered because it is one we often find is kicked back

## 2022-10-19 NOTE — TELEPHONE ENCOUNTER
Caller: Traci King    Relationship: Self    Best call back number: [phone]    What medication are you requesting: REXULTI     What are your current symptoms: MENTAL HEALTH     If a prescription is needed, what is your preferred pharmacy and phone [facility] [address] - [phone]  - [phone] FX     Additional notes: PATIENT STATES HER PCP WAS HELPING HER WITH ANXIETY AND DEPRESSION. PATIENT STATES SHE IS NOT DOING BETTER AND IS WONDERING IF SHE IS ABLE TO BE PRESCRIBED REXULTI TO TAKE WITH HER CURRENT MEDICATIONS.    PLEASE CALL PATIENT TO DISCUSS AND ADVISE

## 2022-10-20 ENCOUNTER — HOSPITAL ENCOUNTER (OUTPATIENT)
Facility: SURGERY CENTER | Age: 72
Setting detail: HOSPITAL OUTPATIENT SURGERY
Discharge: HOME | End: 2022-10-20
Attending: ANESTHESIOLOGY | Admitting: ANESTHESIOLOGY

## 2022-10-20 ENCOUNTER — HOSPITAL ENCOUNTER (OUTPATIENT)
Dept: GENERAL RADIOLOGY | Facility: SURGERY CENTER | Age: 72
Setting detail: HOSPITAL OUTPATIENT SURGERY
End: 2022-10-20

## 2022-10-20 VITALS
TEMPERATURE: 98 F | WEIGHT: 180 LBS | BODY MASS INDEX: 35.34 KG/M2 | DIASTOLIC BLOOD PRESSURE: 74 MMHG | SYSTOLIC BLOOD PRESSURE: 114 MMHG | RESPIRATION RATE: 20 BRPM | HEIGHT: 60 IN | OXYGEN SATURATION: 97 % | HEART RATE: 76 BPM

## 2022-10-20 DIAGNOSIS — M47.816 LUMBAR FACET ARTHROPATHY: ICD-10-CM

## 2022-10-20 DIAGNOSIS — Z41.9 SURGERY, ELECTIVE: ICD-10-CM

## 2022-10-20 PROCEDURE — 64635 DESTROY LUMB/SAC FACET JNT: CPT | Performed by: ANESTHESIOLOGY

## 2022-10-20 PROCEDURE — 64636 DESTROY L/S FACET JNT ADDL: CPT | Performed by: ANESTHESIOLOGY

## 2022-10-20 PROCEDURE — 25010000002 FENTANYL CITRATE (PF) 50 MCG/ML SOLUTION: Performed by: ANESTHESIOLOGY

## 2022-10-20 PROCEDURE — 99152 MOD SED SAME PHYS/QHP 5/>YRS: CPT | Performed by: ANESTHESIOLOGY

## 2022-10-20 PROCEDURE — 25010000002 MIDAZOLAM PER 1 MG: Performed by: ANESTHESIOLOGY

## 2022-10-20 PROCEDURE — 76000 FLUOROSCOPY <1 HR PHYS/QHP: CPT

## 2022-10-20 RX ORDER — MIDAZOLAM HYDROCHLORIDE 1 MG/ML
INJECTION INTRAMUSCULAR; INTRAVENOUS AS NEEDED
Status: DISCONTINUED | OUTPATIENT
Start: 2022-10-20 | End: 2022-10-20 | Stop reason: HOSPADM

## 2022-10-20 RX ORDER — SODIUM CHLORIDE, SODIUM LACTATE, POTASSIUM CHLORIDE, CALCIUM CHLORIDE 600; 310; 30; 20 MG/100ML; MG/100ML; MG/100ML; MG/100ML
9 INJECTION, SOLUTION INTRAVENOUS CONTINUOUS PRN
Status: DISCONTINUED | OUTPATIENT
Start: 2022-10-20 | End: 2022-10-20 | Stop reason: HOSPADM

## 2022-10-20 RX ORDER — SODIUM CHLORIDE 0.9 % (FLUSH) 0.9 %
10 SYRINGE (ML) INJECTION AS NEEDED
Status: DISCONTINUED | OUTPATIENT
Start: 2022-10-20 | End: 2022-10-20 | Stop reason: HOSPADM

## 2022-10-20 RX ORDER — FENTANYL CITRATE 50 UG/ML
INJECTION, SOLUTION INTRAMUSCULAR; INTRAVENOUS AS NEEDED
Status: DISCONTINUED | OUTPATIENT
Start: 2022-10-20 | End: 2022-10-20 | Stop reason: HOSPADM

## 2022-10-20 RX ORDER — SODIUM CHLORIDE 0.9 % (FLUSH) 0.9 %
10 SYRINGE (ML) INJECTION EVERY 12 HOURS SCHEDULED
Status: DISCONTINUED | OUTPATIENT
Start: 2022-10-20 | End: 2022-10-20 | Stop reason: HOSPADM

## 2022-10-20 NOTE — OP NOTE
Bilateral L1-3 Lumbar Medial Branch RADIOFREQUENCY  [facility]    PREOPERATIVE DIAGNOSIS:  Lumbar spondylosis without myelopathy    POSTOPERATIVE DIAGNOSIS:  Lumbar spondylosis without myelopathy    PROCEDURE:   Diagnostic Bilateral Lumbar Medial Branch Nerve thermal radiofrequency lesioning, with fluoroscopy:  L1, L2, L3  nerves (at the L2, L3, L4 transverse processes) to thermally treat the innervation to facet joints L2-3, L3-4.  1. 15548-15 -- Bilateral L/S facet neuro destr., 1st Level  2. 70644-57 -- Bilateral L/S facet neuro destr., 2nd  Level     PRE-PROCEDURE DISCUSSION WITH PATIENT:    Risks and complications were discussed with the patient prior to starting the procedure and informed consent was obtained.      SURGEON:  Jose Luis Gan MD    REASON FOR PROCEDURE:    The patient complains of pain that seems to have a significant axial component, Previous diagnostic positivity of a Lumbar Medial Branch Blockade at the same levels and Painful area identified on exam under fluoroscopy    SEDATION:  Versed 2mg & Fentanyl 100 mcg IV, The use of increased procedural sedation was carefully considered, and for this particular patient the need for additional procedural sedation seemed necessary in this instance to safely perform the procedure. and The patient had higher than average levels of procedural anxiety and the need to provide additional procedural sedation was needed to safely proceed.     And further review she does have some uniquely significant depressive and anxious symptomatology and requires  use of multiple prescription psychoactive agents including benzodiazepine.    She also has a high opioid tolerance for prescription pain medication.  The application of Versed was necessary for therefore for comfort care.  While this seemed to be an appropriate anxiolytic and necessary to complete the procedure, we have also on multiple previous occasions had consultation and counseling with  the patient that he  expectation to be sedated to unconsciousness his not appropriate, despite counseling and informed consent that that is not the intent of the anxiolytic agent.  The same counseling occurred today.  She was comfortable but responsive to testing.  The comfort care medication allowed her to maintain the prone position and to not experience undue panic.    TIME OF PROCEDURE:   The intraoperative procedure time after administration of the sedative was 22 minutes.     ANESTHETIC:  Lidocaine 2%  STEROID:  NONE    DESCRIPTON OF PROCEDURE:  After obtaining informed consent, IV access was obtained in the preoperative area.   The patient was taken to the operating room.  The patient was placed in the prone position with a pillow under the abdomen. All pressure points were well padded.  EKG, blood pressure, and pulse oximeter were monitored.  The patient was monitored and sedated by the RN under my direction. The lumbosacral area was prepped with Chloraprep and draped in a sterile fashion.     Under fluoroscopic guidance the transverse processes of the L2, L3, L4 vertebrae at the junctions of the superior articular processes were identified on the right.     Skin and subcutaneous tissue were anesthetized with 1ml of 1% lidocaine above each of these points. Then, radiofrequency probe needles were advanced in this fluoro view to the above junctions.  Aspiration was negative for blood and CSF.  After confirming the position of the needle with fluoroscope in all views, testing was initiated.  First, sensory testing was started on each needle a 1V and 50Hz and slowly decreased until painful pressure stimulation diminished at 0.5V.  Next, motor testing was confirmed to be negative at 3V and 2Hz for any radicular stimulation.  Then 1mL of the local anesthetic solution was instilled in each needle.  Two minutes elapsed, and during this time a lateral fluoroscopic view was confirmed again to ensure the needles had  not advanced nor retracted.  Then, Radiofrequency Lesioning was initiated for 1.5 minutes at 85 degrees Celsius.  Needles were removed intact from each of the areas.     A similar procedure was repeated to address the similar nerves on the contralateral side.   Onset of analgesia was noted.  Vital signs remained stable throughout.      ESTIMATED BLOOD LOSS:  <5 mL  SPECIMENS:  none    COMPLICATIONS:   No complications were noted., There was not any evidence of dural puncture.   and The patient did not have any signs of postprocedure numbness nor weakness.    TOLERANCE & DISCHARGE CONDITION:    The patient tolerated the procedure well.  The patient was transported to the recovery area without difficulties.  The patient was discharged to home under the care of family in stable and satisfactory condition.    PLAN OF CARE:  1. The patient was given our standard instruction sheet.  2. The patient will  Return to clinic 1-2 wks.  3. The patient will resume all medications as per the medication reconciliation sheet.

## 2022-10-20 NOTE — DISCHARGE INSTRUCTIONS
[facility] - Post-procedure Instructions          --  While there are no absolute restrictions, it is recommended that you do not perform strenuous activity today. In the morning, you may resume your level of activity as before your block.    --  If you have a band-aid at your injection site, please remove it later today. Observe the area for any redness, swelling, pus-like drainage, or a temperature over 101°. If any of these symptoms occur, please call your doctor at [phone]. If after office hours, leave a message and the on-call provider will return your call.    --  Ice may be applied to your injection site. It is recommended you avoid direct heat (heating pad; hot tub) for 1-2 days.    --  Call [facility] at [phone] if you experience persistent headache, persistent bleeding from the injection site, or severe pain not relieved by heat or oral medication.    --  Do not make important decisions today.    --  Due to the effects of the block and/or the I.V. Sedation, DO NOT drive or operate hazardous machinery for 12 hours.  Local anesthetics may cause numbness after procedure and precautions must be taken with regards to operating equipment as well as with walking, even if ambulating with assistance of another person or with an assistive device.    --  Do not drink alcohol for 12 hours.    -- You may return to work tomorrow, or as directed by your referring doctor.    --  Occasionally you may notice a slight increase in your pain after the procedure. This should start to improve within the next 24-48 hours. Radiofrequency ablation procedure pain may last 3-4 weeks.    --  It may take as long as 3-4 days before you notice a gradual improvement in your pain and/or other symptoms.    -- You may continue to take your prescribed pain medication as needed.    --  Some normal possible side effects of steroid use could include fluid retention, increased blood sugar, dull headache,  increased sweating, increased appetite, mood swings and flushing.    --  Diabetics are recommended to watch their blood glucose level closely for 24-48 hours after the injection.    --  Must stay in PACU for 20 min upon arrival and prove no leg weakness before being discharged.    --  IN THE EVENT OF A LIFE THREATENING EMERGENCY, (CHEST PAIN, BREATHING DIFFICULTIES, PARALYSIS…) YOU SHOULD GO TO YOUR NEAREST EMERGENCY ROOM.    --  You should be contacted by our office within 2-3 days to schedule follow up or next appointment date.  If not contacted within 7 days, please call the office at [phone]

## 2022-10-21 NOTE — TELEPHONE ENCOUNTER
Caller: Traci King    Relationship: Self    Best call back number:[phone]    What was the call regarding: PATIENT WOULD LIKE FOR SOMEONE TO GIVE HER A CALL WITH AN UPDATE FROM HER PREVIOUS MESSAGE.    PLEASE ADVISE     Do you require a callback: YES

## 2022-10-31 ENCOUNTER — TELEPHONE (OUTPATIENT)
Dept: INTERNAL MEDICINE | Facility: CLINIC | Age: 72
End: 2022-10-31

## 2022-10-31 NOTE — TELEPHONE ENCOUNTER
Caller: Traci King    Relationship: Self    Best call back number: [phone]    What is the best time to reach you: ANY    Who are you requesting to speak with (clinical staff, provider,  specific staff member): CLINICAL    What was the call regarding: PATIENT IS CALLING TO FIND OUT IF SHE REALLY NEEDS TO BE SEEN AT HER UPCOMING APPOINTMENT- 11/3/22.  SHE HAS BEEN FEELING FINE.    Do you require a callback: YES

## 2022-11-02 ENCOUNTER — OFFICE VISIT (OUTPATIENT)
Dept: PAIN MEDICINE | Facility: CLINIC | Age: 72
End: 2022-11-02

## 2022-11-02 VITALS
BODY MASS INDEX: 35.02 KG/M2 | HEART RATE: 85 BPM | HEIGHT: 60 IN | TEMPERATURE: 96.6 F | RESPIRATION RATE: 16 BRPM | OXYGEN SATURATION: 99 % | DIASTOLIC BLOOD PRESSURE: 66 MMHG | SYSTOLIC BLOOD PRESSURE: 123 MMHG | WEIGHT: 178.4 LBS

## 2022-11-02 DIAGNOSIS — G89.4 CHRONIC PAIN SYNDROME: ICD-10-CM

## 2022-11-02 DIAGNOSIS — M47.816 LUMBAR FACET ARTHROPATHY: Primary | ICD-10-CM

## 2022-11-02 DIAGNOSIS — M47.812 CERVICAL SPONDYLOSIS WITHOUT MYELOPATHY: ICD-10-CM

## 2022-11-02 DIAGNOSIS — Z79.899 ENCOUNTER FOR LONG-TERM (CURRENT) USE OF HIGH-RISK MEDICATION: ICD-10-CM

## 2022-11-02 PROCEDURE — 99214 OFFICE O/P EST MOD 30 MIN: CPT | Performed by: PHYSICIAN ASSISTANT

## 2022-11-02 RX ORDER — OXYCODONE HYDROCHLORIDE 30 MG/1
30 TABLET, FILM COATED, EXTENDED RELEASE ORAL EVERY 12 HOURS
Qty: 60 TABLET | Refills: 0 | Status: SHIPPED | OUTPATIENT
Start: 2022-11-07 | End: 2022-12-01

## 2022-11-02 RX ORDER — HYDROCODONE BITARTRATE AND ACETAMINOPHEN 10; 325 MG/1; MG/1
1 TABLET ORAL DAILY PRN
Qty: 15 TABLET | Refills: 0 | Status: SHIPPED | OUTPATIENT
Start: 2022-11-07 | End: 2022-12-01

## 2022-11-02 NOTE — PROGRESS NOTES
CHIEF COMPLAINT    Procedure follow up, pt states pain is better since injection     Subjective   Traci King is a 72 y.o. female  who presents to the office for follow-up of procedure.  She completed a Bilateral L1-3 Lumbar Medial Branch RADIOFREQUENCY  on  10/20/22 performed by Dr. Gan for management of low back pain. Patient reports 90% relief from the procedure which is ongoing.      Patient also has secondary complaints of primarily right-sided cervical spine pain which is entirely axial in nature which is also continue to be improved.  Patient was scheduled for her second diagnostic right C3-5 MBB on 11/8/2022 however she plans to cancel that procedure as her pain is doing remarkably better.    Medication regimen consists of OxyContin 30 mg twice daily and hydrocodone 10/325 as needed as needed (#15/month).  She is tolerating these medications well without adverse effect such as constipation or somnolence.  She feels that the medications provide at least 40-50% reduction of pain which allows her to maintain her activities of daily living.    Pain today 2/10 VAS in severity.      Neck Pain   This is a chronic problem. The current episode started more than 1 year ago. The problem occurs intermittently. The problem has been waxing and waning. The pain is associated with an unknown factor. The pain is present in the midline. The quality of the pain is described as aching. The pain is at a severity of 2/10. The pain is mild. The symptoms are aggravated by position. Pertinent negatives include no chest pain, fever or numbness.   Back Pain  This is a chronic problem. The current episode started more than 1 year ago. The problem occurs constantly. Progression since onset: IMPROVED OVERALL. The pain is present in the lumbar spine. The quality of the pain is described as aching. The pain does not radiate. The pain is at a severity of 2/10. The pain is mild. The pain is worse during the day. The symptoms are  aggravated by bending, position and twisting. Pertinent negatives include no chest pain, fever or numbness.        PEG Assessment   What number best describes your pain on average in the past week?3  What number best describes how, during the past week, pain has interfered with your enjoyment of life?6  What number best describes how, during the past week, pain has interfered with your general activity?  6    Review of Pertinent Medical Data ---  MRI lumbar spine without contrast,     03/22/2022 at 0916 hours     HISTORY: L2 vertebral body fracture.         TECHNIQUE: Multiplanar multisequence imaging was obtained of the lumbar spine on a 3 Haylie magnet. Images were obtained without IV gadolinium.     COMPARISON: CT of the lumbar spine performed on February 23, 2022.     FINDINGS:     There has been no interval change in the chronic L2 burst fracture. The L2 vertebral body is approximately 70% compressed. Retropulsion of fracture fragments or compromising the spinal canal approximately 25%. No new fractures are demonstrated.     The conus terminates at L1. Signal within the conus is within normal limits. The cauda equina is unremarkable.     Moderate degenerative disc disease is at L5-S1. No pars defects are demonstrated. The paravertebral soft tissues are unremarkable.     T12-L1: Broad-based disc bulge resulting in mild bilateral neuroforaminal narrowing     L1-L2: A broad-based disc bulge and facet disease resulting in mild to moderate bilateral neuroforaminal narrowing and mild spinal stenosis     L2-L3: A broad-based disc bulge and facet disease resulting in mild-to-moderate bilateral neuroforaminal narrowing.     L3-L4: Broad-based disc bulge and facet disease result in mild bilateral neuroforaminal narrowing     L4-L5: Broad-based disc bulge and facet disease result in mild-to-moderate bilateral neuroforaminal narrowing     L5-S1: Broad-based disc bulge, posterior also slightly and facet disease resulting in  mild-to-moderate bilateral neuroforaminal narrowing     IMPRESSION:     1. No interval change in the L2 chronic burst fracture.   2. Stable multilevel degenerative spondylosis     Dictated by: Arlyn Feliz M.D.     Images and Report reviewed and interpreted by: Arlyn Feliz M.D.    CT OF THE CERVICAL SPINE:     FINDINGS: Negative for acute fracture and acute malalignment of the cervical spine.     2 mm grade 1 anterolisthesis of C3 on C4 with complete osseous fusion through the facet joints and anterior spinous processes of C3 and C4. There is facet fusion at C2-C3, greater on the left. Severe degenerative disc disease at C4-C5, C5-C6, and C6-C7.   Vertebral body heights are normal. No prevertebral soft tissue swelling. Multilevel mild central canal stenosis. Multilevel bilateral neural foraminal narrowing, moderate on the left at C3-C4, C4-C5, and C6-C7 and moderate on the right at C4-C5, C5-C6,   and C6-C7.     IMPRESSION:   1. Negative for acute fracture and acute malalignment of the cervical spine.     2. Multilevel degenerative changes of the cervical spine, detailed above       CT OF THE LUMBAR SPINE:     FINDINGS: There is a compression-burst fracture of the L2 vertebral body with up to 75% vertebral body height loss. There is retropulsion of the posterior superior endplate of L2 measuring 7 mm, with moderate central canal stenosis and moderate bilateral    neural foraminal narrowing at L1-L2. There is no fracture of the posterior elements of L2.     No other lumbar spine fractures. No other malalignment. Severe degenerative disc disease at L5-S1 with partial interbody fusion. At L4-L5, disc bulge, facet disease, and ligamentum flavum thickening result in mild to moderate central canal stenosis and   bilateral neural foraminal narrowing. Other multilevel mild disc bulges.     IMPRESSION:   1. Compression-burst fracture of the L2 vertebral body with 75% height loss, 7 mm retropulsion of the posterior  "superior endplate of, and resulting moderate central canal stenosis     2. Degenerative spondylosis of the lumbar spine without other acute bone abnormality.     Dictated by: Frandy Liu M.D.     Images and Report reviewed and interpreted by: Frandy Liu M.D.     The following portions of the patient's history were reviewed and updated as appropriate: allergies, current medications, past family history, past medical history, past social history, past surgical history and problem list.    Review of Systems   Constitutional: Negative for fever.   HENT: Negative for congestion.    Eyes: Negative for visual disturbance.   Respiratory: Positive for shortness of breath.    Cardiovascular: Negative for chest pain.   Gastrointestinal: Negative for constipation and diarrhea.   Genitourinary: Negative for difficulty urinating and dyspareunia.   Musculoskeletal: Positive for back pain and neck pain. Negative for joint swelling.   Neurological: Negative for numbness.   Psychiatric/Behavioral: Negative for sleep disturbance and suicidal ideas.     I have reviewed and confirmed the accuracy of the ROS as documented by the MA/LPN/RN ALLY Cortez     Vitals:    11/02/22 0929   BP: 123/66   Pulse: 85   Resp: 16   Temp: 96.6 °F (35.9 °C)   SpO2: 99%   Weight: 80.9 kg (178 lb 6.4 oz)   Height: 152.4 cm (60\")   PainSc:   2   PainLoc: Back         Objective   Physical Exam  Vitals and nursing note reviewed.   Constitutional:       Appearance: Normal appearance.   HENT:      Head: Normocephalic.   Pulmonary:      Effort: Pulmonary effort is normal.   Musculoskeletal:      Cervical back: Tenderness present. Pain with movement present.      Lumbar back: Tenderness present. Decreased range of motion.   Skin:     General: Skin is warm and dry.   Neurological:      General: No focal deficit present.      Mental Status: She is alert and oriented to person, place, and time.      Cranial Nerves: Cranial nerves 2-12 are intact. "      Sensory: Sensation is intact.      Motor: Motor function is intact.      Gait: Gait abnormal (SLOW AND DELIBERATE GAIT).   Psychiatric:         Mood and Affect: Mood normal.         Behavior: Behavior normal.         Thought Content: Thought content normal.         Judgment: Judgment normal.         Assessment & Plan   Diagnoses and all orders for this visit:    1. Lumbar facet arthropathy (Primary)    2. Cervical spondylosis without myelopathy    3. Chronic pain syndrome    4. Encounter for long-term (current) use of high-risk medication        --- Follow-up in 1 month for medication management  --- Refill given today on OxyContin and hydrocodone. Patient appears stable with current regimen. No adverse effects. Regarding continuation of opioids, there is no evidence of aberrant behavior or any red flags.  The patient continues with appropriate response to opioid therapy. ADL's remain intact by self.     The urine drug screen confirmation from 7/8/2022 has been reviewed and the result is appropriate based on patient history and MATTHEW report     We have updated CSA in the office on today.        MATTHEW REPORT  As part of the patient's treatment plan, I am prescribing controlled substances. The patient has been made aware of appropriate use of such medications, including potential risk of somnolence, limited ability to drive and/or work safely, and the potential for dependence or overdose. It has also been made clear that these medications are for use by this patient only, without concomitant use of alcohol or other substances unless prescribed.     Patient has completed prescribing agreement detailing terms of continued prescribing of controlled substances, including monitoring MATTHEW reports, urine drug screening, and pill counts if necessary. The patient is aware that inappropriate use will results in cessation of prescribing such medications.    As the clinician, I personally reviewed the MATTHEW from  11/2/2022 while the patient was in the office today.    History and physical exam exhibit continued safe and appropriate use of controlled substances.       Dictated utilizing Dragon dictation.      This document is intended for medical expert use only. Reading of this document by patients and/or patient's family without participating medical staff guidance may result in misinterpretation and unintended morbidity.   Any interpretation of such data is the responsibility of the patient and/or family member responsible for the patient in concert with their primary or specialist providers, not to be left for sources of online searches such as [employer], [employer] or similar queries. Relying on these approaches to knowledge may result in misinterpretation, misguided goals of care and even death should patients or family members try recommendations outside of the realm of professional medical care in a supervised way.    Patient remained masked during entire encounter. No cough present. I donned a mask and eye protection throughout entire visit. Prior to donning mask and eye protection, hand hygiene was performed, as well as when it was doffed.  I was closer than 6 feet, but not for an extended period of time. No obvious exposure to any bodily fluids.

## 2022-11-03 ENCOUNTER — OFFICE VISIT (OUTPATIENT)
Dept: INTERNAL MEDICINE | Facility: CLINIC | Age: 72
End: 2022-11-03

## 2022-11-03 VITALS
SYSTOLIC BLOOD PRESSURE: 130 MMHG | OXYGEN SATURATION: 98 % | RESPIRATION RATE: 16 BRPM | TEMPERATURE: 96.3 F | BODY MASS INDEX: 35.14 KG/M2 | HEART RATE: 78 BPM | DIASTOLIC BLOOD PRESSURE: 72 MMHG | HEIGHT: 60 IN | WEIGHT: 179 LBS

## 2022-11-03 DIAGNOSIS — F33.1 MODERATE EPISODE OF RECURRENT MAJOR DEPRESSIVE DISORDER: Primary | ICD-10-CM

## 2022-11-03 PROCEDURE — 99214 OFFICE O/P EST MOD 30 MIN: CPT | Performed by: INTERNAL MEDICINE

## 2022-11-03 RX ORDER — FLUOXETINE HYDROCHLORIDE 20 MG/1
20 CAPSULE ORAL DAILY
Qty: 90 CAPSULE | Refills: 1 | Status: SHIPPED | OUTPATIENT
Start: 2022-11-03

## 2022-11-03 RX ORDER — MIRABEGRON 50 MG/1
TABLET, FILM COATED, EXTENDED RELEASE ORAL
COMMUNITY
Start: 2022-11-01

## 2022-11-03 RX ORDER — FLUOXETINE HYDROCHLORIDE 40 MG/1
40 CAPSULE ORAL DAILY
Qty: 90 CAPSULE | Refills: 1 | Status: SHIPPED | OUTPATIENT
Start: 2022-11-03

## 2022-11-03 NOTE — ASSESSMENT & PLAN NOTE
IMPROVING  - reviewed GeneSight testing results which indicate she is somewhat of a hypermetabolizer for several first line SSRI medications  -now fully weaned off paxil  - cont abilify at 15 mg for now  - increase fluoxetine to 60 mg for improved efficacy, has seen slight improvement in symptoms at current dose  - Reviewed potential side effects of medication including sexual performance changes, insomnia, fatigue, weight changes  - Return to clinic in 6-8 weeks for follow-up or sooner if needed

## 2022-11-03 NOTE — PROGRESS NOTES
"Chief Complaint  Follow-up, Hypertension, Hyperlipidemia, and Depression    Subjective          Traci King presents to [facility] for depression follow-up.   She remains on Abilify 15mg and Fluoxetine 40mg daily. Has weaned off the mirtazapine.   She has noticed an improvement in her depression since increasing the dose of Fluoxetine, but still feels like she could be better controlled.   Endorses anxiousness and little desire to do things. Sleep has been good. Denies any suicidal thoughts. Appetite has been normal.     Objective   Vital Signs:   /72   Pulse 78   Temp 96.3 °F (35.7 °C)   Resp 16   Ht 152.4 cm (60\")   Wt 81.2 kg (179 lb)   SpO2 98%   BMI 34.96 kg/m²     Physical Exam  Vitals and nursing note reviewed.   Constitutional:       General: She is not in acute distress.     Appearance: Normal appearance.   Eyes:      General: No scleral icterus.     Conjunctiva/sclera: Conjunctivae normal.   Cardiovascular:      Rate and Rhythm: Normal rate and regular rhythm.      Heart sounds: Normal heart sounds. No murmur heard.  Pulmonary:      Effort: Pulmonary effort is normal. No respiratory distress.      Breath sounds: Normal breath sounds.   Abdominal:      General: Bowel sounds are normal. There is no distension.      Palpations: Abdomen is soft.   Musculoskeletal:         General: No swelling.      Right lower leg: No edema.      Left lower leg: No edema.   Neurological:      General: No focal deficit present.      Mental Status: She is alert. Mental status is at baseline.   Psychiatric:         Mood and Affect: Mood normal.         Behavior: Behavior normal.         Thought Content: Thought content normal.        Result Review : : None    Assessment and Plan      Diagnoses and all orders for this visit:    1. Moderate episode of recurrent major depressive disorder (HCC) (Primary)  Assessment & Plan:  IMPROVING  - reviewed GeneSight testing " results which indicate she is somewhat of a hypermetabolizer for several first line SSRI medications  -now fully weaned off paxil  - cont abilify at 15 mg for now  - increase fluoxetine to 60 mg for improved efficacy, has seen slight improvement in symptoms at current dose  - Reviewed potential side effects of medication including sexual performance changes, insomnia, fatigue, weight changes  - Return to clinic in 6-8 weeks for follow-up or sooner if needed    Orders:  -     FLUoxetine (PROzac) 20 MG capsule; Take 1 capsule by mouth Daily. With 40 mg dose for a total of 60 mg daily  Dispense: 90 capsule; Refill: 1  -     FLUoxetine (PROzac) 40 MG capsule; Take 1 capsule by mouth Daily. With 20 mg capsule for a total of 60 mg daily  Dispense: 90 capsule; Refill: 1        Follow Up   Return in about 8 weeks (around 12/29/2022) for follow up depression.    Patient was given instructions and counseling regarding her condition or for health maintenance advice. Please see specific information pulled into the AVS if appropriate.     Jocelin Denney MD  Internal Medicine and Pediatrics, PGY-4    Patient seen and evaluated with Dr. Denney. Pertinent portions of history and exam repeated. Agree with assessment and plan as above.  Traci is here today for follow up on depression, meds changed last visit with increase in fluoxetine dose and discontinuation of mirtazapine based on GeneSight testing results. Pt does feel like she has seen a noticeable change since titrating this dose up further. Will increase slightly further to 60 mg. Very pleased to see this progress given how chronic of an issue this has been for her. Hopeful to see this improve further over coming months.     Lyndsay Fournier MD  [facility] [place] [facility]  Phone: [phone]  Fax: [phone]

## 2022-11-29 ENCOUNTER — TELEPHONE (OUTPATIENT)
Dept: PAIN MEDICINE | Facility: CLINIC | Age: 72
End: 2022-11-29

## 2022-11-29 NOTE — TELEPHONE ENCOUNTER
Caller: Traci King    Relationship to patient: Self    Best call back number: [phone]    Chief complaint: BACK PAIN    Type of visit: FOLLOW UP    Requested date: 12/2/2022 @ 11:20AM CHANGED TO VIRTUAL INSTEAD OF IN PERSON    Additional notes: PT IS CURRENTLY IN [facility] AND NEEDS TO HAVE A VIRTUAL APPT. SAYS THAT THEY HAVE CHANGED HER PAIN MEDICATION WHILE SHE HAS BEEN IN THE HOSPITAL. THEY TOOK HER OFF OF OXYCONTIN AND PUT HER ON PERCOCET 10MG EVERY 4 HOURS.

## 2022-11-29 NOTE — TELEPHONE ENCOUNTER
Please let patient know that we are not able to see her until she has been released from the hospital.  Nor can we direct to Noah what pain medications to give her.

## 2022-11-30 NOTE — TELEPHONE ENCOUNTER
Called and informed pt of your msg. She sts she is being d/c'd right now. She wants to know if she can r/s her appt to a video visit and if she can be seen sooner virtually. She sts she no longer wants to be on oxycontin. Told her she can discuss that with you at upcoming visit. She has PNA and sts she physically cannot come in. She sts she is being d/c'd on oxygen. Please advise. Thank you.

## 2022-11-30 NOTE — TELEPHONE ENCOUNTER
There's a 140pm opening on your schedule tomorrow for video visit, can she reschedule for that slot?

## 2022-11-30 NOTE — TELEPHONE ENCOUNTER
HUB UNSUCCESSFULLY ATTEMPTED TO WARM TRANSFER.     PT CALLED STATING THAT NO ONE HAS CALLED HER BACK AND SHE NEEDS TO DISCUSS HER PAIN MEDICATION ASAP. SAYS THE SITUATION IS URGENT.     SHE IS BEING RELEASED FROM [facility] TODAY ON OXYGEN. THEY TOOK HER OFF OF OXYCONTIN DUE TO HER BREATHING PROBLEMS. THEY CHANGED PAIN MEDICATION TO PERCOCET. THEY WILL NOT GIVE HER A PRESCRIPTION FOR THE PERCOCET AND THEY SAY THAT SHE HAS TO GET IT FROM THIS OFFICE SINCE SHE HAS A CONTRACT.     SHE ALSO STILL WISHES TO HAVE HER APPT ON Friday BE A TELEHEALTH VISIT.

## 2022-12-01 ENCOUNTER — TELEMEDICINE (OUTPATIENT)
Dept: PAIN MEDICINE | Facility: CLINIC | Age: 72
End: 2022-12-01

## 2022-12-01 ENCOUNTER — TELEPHONE (OUTPATIENT)
Dept: INTERNAL MEDICINE | Facility: CLINIC | Age: 72
End: 2022-12-01

## 2022-12-01 DIAGNOSIS — M47.812 CERVICAL SPONDYLOSIS WITHOUT MYELOPATHY: ICD-10-CM

## 2022-12-01 DIAGNOSIS — M51.36 DDD (DEGENERATIVE DISC DISEASE), LUMBAR: Primary | ICD-10-CM

## 2022-12-01 DIAGNOSIS — M50.30 DEGENERATIVE DISC DISEASE, CERVICAL: ICD-10-CM

## 2022-12-01 DIAGNOSIS — M47.816 LUMBAR FACET ARTHROPATHY: Primary | ICD-10-CM

## 2022-12-01 DIAGNOSIS — Z79.899 ENCOUNTER FOR LONG-TERM (CURRENT) USE OF HIGH-RISK MEDICATION: ICD-10-CM

## 2022-12-01 DIAGNOSIS — M47.817 LUMBOSACRAL SPONDYLOSIS WITHOUT MYELOPATHY: ICD-10-CM

## 2022-12-01 PROCEDURE — 99214 OFFICE O/P EST MOD 30 MIN: CPT | Performed by: PHYSICIAN ASSISTANT

## 2022-12-01 RX ORDER — OXYCODONE AND ACETAMINOPHEN 7.5; 325 MG/1; MG/1
TABLET ORAL
Qty: 150 TABLET | Refills: 0 | Status: SHIPPED | OUTPATIENT
Start: 2022-12-01 | End: 2022-12-22 | Stop reason: SDUPTHER

## 2022-12-01 RX ORDER — FUROSEMIDE 20 MG/1
20 TABLET ORAL 2 TIMES DAILY
Qty: 30 TABLET | Refills: 1 | Status: SHIPPED | OUTPATIENT
Start: 2022-12-01 | End: 2022-12-22 | Stop reason: ALTCHOICE

## 2022-12-01 NOTE — TELEPHONE ENCOUNTER
Pt informed; states that she is just concerned because she is having swelling in her ankles and lower legs

## 2022-12-01 NOTE — TELEPHONE ENCOUNTER
It looks like she was given a few doses in the hospital to help with fluid balance but they did nto intend for her to keep taking it after DSC on a daily basis so that is why it was not sent in, would like for her to make her hosp follow up appt hopefully next week and we can discuss whether ths medication should be continued, etc at that time

## 2022-12-01 NOTE — PROGRESS NOTES
TELEMEDICINE - VIDEO VISIT    You have chosen to receive care through a telehealth visit.  Do you consent to use a video/audio connection for your medical care today? Yes    Location of patient: At her residence  Location of Provider-office at [facility]  Anyone else present-no, if yes- who-N/A  Type of Technology used- ZOOM through use of Epic/[employer] visit    CHIEF COMPLAINT      Subjective   Traci King is a 72 y.o. female  who presents for a video visit follow-up.She has a history of chronic neck and low back pain.  Patient continues with complaints of primarily midline low back pain which is referred into the bilateral lumbar paraspinals without lower extremity involvement.  She underwent RFA bilateral L1-L3 performed 10/20/2022 by Dr. Gan with approximately 80-90% reduction of pain which is ongoing.  Patient is also had history of chronic right-sided cervical spine pain which has significantly improved over the last several months.    Patient is evaluated today based on recent hospitalization for 7 days at [facility] for bilateral pneumonia.  During the course of her hospital stay OxyContin 30 mg twice daily was discontinued as she was having difficulty maintaining appropriate levels of oxygen saturation.  The patient states that she was also provided with a course of BuSpar to assist with her withdrawal symptoms and she has since now been off OxyContin for the past 6 days and does not wish to resume it.  Patient was started on Percocet 10 mg every 4 hours and was discharged on yesterday without a prescription provided.    Patient reports present pain at 6/10 VAS in severity.       Back Pain  This is a chronic problem. The current episode started more than 1 year ago. The problem occurs constantly. The problem has been waxing and waning since onset. The pain is present in the lumbar spine. The quality of the pain is described as aching. The pain does not radiate. The pain is at  a severity of 5/10. The pain is mild. The pain is the same all the time. The symptoms are aggravated by bending, position, coughing, standing and twisting.          Review of Pertinent Medical Data ---  I have reviewed the patient's discharge summary from her recent hospitalization at [facility]    The following portions of the patient's history were reviewed and updated as appropriate: allergies, current medications, past family history, past medical history, past social history, past surgical history and problem list.      Review of Systems   Musculoskeletal: Positive for back pain.         I have reviewed and confirmed the accuracy of the ROS as documented by the MA/LPN/RN ALLY Cortez      There were no vitals filed for this visit.      Objective   Physical Exam  Nursing note reviewed.   Constitutional:       Appearance: She is normal weight. She is ill-appearing.   HENT:      Head: Normocephalic.   Eyes:      Extraocular Movements: Extraocular movements intact.   Pulmonary:      Effort: Pulmonary effort is normal.      Comments: Patient noted to have oxygen cannula is in place  Neurological:      Mental Status: She is alert.      Cranial Nerves: Cranial nerves 2-12 are intact.   Psychiatric:         Mood and Affect: Mood normal.         Behavior: Behavior normal.         Thought Content: Thought content normal.         Judgment: Judgment normal.         Assessment & Plan   Diagnoses and all orders for this visit:    1. Lumbar facet arthropathy (Primary)    2. Cervical spondylosis without myelopathy    3. Encounter for long-term (current) use of high-risk medication          ----------------      Our practice is offering alternative &/or electronic methods to continue to follow our patients while at the same time further the efforts toward social distancing, in accordance with our organizational policies, professional societies' guidance, and gubernatorial mandates.  I support the Healthy at [place]  campaign and in this visit I have counseled the patient on our needs to limit in-person office visits and physical encounters with medical facilities whenever possible.  I have also educated the patient on the medical necessities of maintaining social distancing while we continue to function during this crisis period.      The patient was counseled on the need to consider telehealth options. The patient was offered the opportunity for a Video Visit using [employer]. The patient agreed to a Video Visit. The patient had obstacles which preclude consideration for a Video Visit. The patient was counseled on the need for a telehealth visit for necessary monitoring. The patient was educated about our efforts to comply with monitoring standards when prescribing potent medications. Coding & Billing for telehealth encounter is completed based on medical decision making concurrent with the 2021 E&M criteria, based on current federally mandated guidelines.          ----------------      --- Follow-up in 1 month for further evaluation and treatment recommendations to be made.  --- I have discussed with Dr. Gan this patient's plan of care and we will proceed with Percocet 7.5/325 mg up to 5/day for the next 30 days with plan to decrease to every 6 dosing  --- Prescription sent to the pharmacy for Percocet 7.5/325.               MATTHEW REPORT  As part of the patient's treatment plan, I am prescribing controlled substances. The patient has been made aware of appropriate use of such medications, including potential risk of somnolence, limited ability to drive and/or work safely, and the potential for dependence or overdose. It has also been made clear that these medications are for use by this patient only, without concomitant use of alcohol or other substances unless prescribed.     Patient has completed prescribing agreement detailing terms of continued prescribing of controlled substances, including monitoring MATTHEW reports,  urine drug screening, and pill counts if necessary. The patient is aware that inappropriate use will results in cessation of prescribing such medications.    As the clinician, I personally reviewed the MATTHEW from 12/1/2022 while the patient was in the office today.    History and physical exam exhibit continued safe and appropriate use of controlled substances.  -------    EMR Dragon/Transcription disclaimer:   Much of this encounter note is an electronic transcription/translation of spoken language to printed text. The electronic translation of spoken language may permit erroneous, or at times, nonsensical words or phrases to be inadvertently transcribed; Although I have reviewed the note for such errors, some may still exist.         This document is intended for medical expert use only. Reading of this document by patients and/or patient's family without participating medical staff guidance may result in misinterpretation and unintended morbidity.   Any interpretation of such data is the responsibility of the patient and/or family member responsible for the patient in concert with their primary or specialist providers, not to be left for sources of online searches such as [employer], [employer] or similar queries. Relying on these approaches to knowledge may result in misinterpretation, misguided goals of care and even death should patients or family members try recommendations outside of the realm of professional medical care in a supervised way.

## 2022-12-01 NOTE — TELEPHONE ENCOUNTER
Will send in a Rx for lasix 20 mg and she can take 1 per day until we see her on Monday, given her sodium issues in the past, we need to be very careful with this medication because it can drop her sodium

## 2022-12-01 NOTE — TELEPHONE ENCOUNTER
Caller: Traci King    Relationship: Self    Best call back number: [phone]    What is the best time to reach you: ANY TIME    Who are you requesting to speak with (clinical staff, provider,  specific staff member): CLINICAL STAFF    What was the call regarding: PATIENT STATES THAT SHE WAS DISCHARGED FROM [facility] ON 11/30/22.  SHE SAYS THAT THEY DID NOT SEND HER HOME WITH ANY LASIX MEDICATION AND SHE IS CONCERNED BECAUSE SHE HAS SWELLING.      Do you require a callback: YES    PLEASE CALL HER TO DISCUSS AND ADVISE AS SOON AS POSSIBLE.

## 2022-12-01 NOTE — TELEPHONE ENCOUNTER
.    Caller: DIOGO BISHOP    Relationship: SELF    Best call back number: [phone]    Requested Prescriptions:   Requested Prescriptions     Signed Prescriptions Disp Refills   • oxyCODONE-acetaminophen (PERCOCET) 7.5-325 MG per tablet 150 tablet 0     Sig: Take one tablet by mouth every 4-6 hours as needed for moderate pain (max 5/day)     Authorizing Provider: JOSE MIGUEL LEMUS        [facility] be sent[facility]       Additional details provided by patient: PATIENT HAS NOT RECEIVED IT    Does the patient have less than a 3 day supply:  [x] Yes  [] No    Would you like a call back once the refill request has been completed: [x] Yes [] No    If the office needs to give you a call back, can they leave a voicemail: [x] Yes [] No    Elias Coronado Rep   12/01/22 16:30 EST

## 2022-12-01 NOTE — TELEPHONE ENCOUNTER
Pt informed; [facility]

## 2022-12-01 NOTE — PROGRESS NOTES
7 dys at [facility]--BILETAL PNUEMONIA  NOW ON 3L/OXYGEN    F/U WITH PULMONOLOIST dR KEILA MAHAN    OXYGEN LEVELS WERE LOW--SO THEY TOOK HER OFF OXYCONTIN AND STARTED PERCOCET 10 MG Q 4HRS--DID FINE WITH THAT    ONLY HAS NORCO 10 MG--ONE LEFT

## 2022-12-02 ENCOUNTER — TELEPHONE (OUTPATIENT)
Dept: INTERNAL MEDICINE | Facility: CLINIC | Age: 72
End: 2022-12-02

## 2022-12-02 DIAGNOSIS — I51.89 DIASTOLIC DYSFUNCTION: ICD-10-CM

## 2022-12-02 DIAGNOSIS — M51.36 DDD (DEGENERATIVE DISC DISEASE), LUMBAR: ICD-10-CM

## 2022-12-02 DIAGNOSIS — M15.9 PRIMARY OSTEOARTHRITIS INVOLVING MULTIPLE JOINTS: Primary | Chronic | ICD-10-CM

## 2022-12-02 DIAGNOSIS — Z79.899 ENCOUNTER FOR LONG-TERM (CURRENT) USE OF HIGH-RISK MEDICATION: ICD-10-CM

## 2022-12-02 DIAGNOSIS — J44.1 COPD WITH ACUTE EXACERBATION: ICD-10-CM

## 2022-12-02 NOTE — TELEPHONE ENCOUNTER
Nayla Ni called with Traci to request an referral for Home Health after patient was recently Hospitalized at [facility] 11/23/2022. Traci does not have any preference for the home health services. Please let me know if she will need an appointment with you first before placing the referral. Call Nayla Ni [phone] with any questions or concerns regarding this.

## 2022-12-02 NOTE — TELEPHONE ENCOUNTER
Verbal order is gladly given for home health evaluation, she would likely benefit from PT, OT, and skilled nursing services, but I will give orders for whatever services they feel are appropriate for her

## 2022-12-05 ENCOUNTER — TELEMEDICINE (OUTPATIENT)
Dept: INTERNAL MEDICINE | Facility: CLINIC | Age: 72
End: 2022-12-05

## 2022-12-05 DIAGNOSIS — Z72.0 TOBACCO ABUSE: ICD-10-CM

## 2022-12-05 DIAGNOSIS — J18.9 PNEUMONIA OF BOTH LOWER LOBES DUE TO INFECTIOUS ORGANISM: Primary | ICD-10-CM

## 2022-12-05 DIAGNOSIS — J44.9 CHRONIC OBSTRUCTIVE PULMONARY DISEASE, UNSPECIFIED COPD TYPE: ICD-10-CM

## 2022-12-05 DIAGNOSIS — F41.9 ANXIETY AND DEPRESSION: Chronic | ICD-10-CM

## 2022-12-05 DIAGNOSIS — F32.A ANXIETY AND DEPRESSION: Chronic | ICD-10-CM

## 2022-12-05 PROBLEM — U07.1 PNEUMONIA DUE TO COVID-19 VIRUS: Status: RESOLVED | Noted: 2021-10-24 | Resolved: 2022-12-05

## 2022-12-05 PROBLEM — J12.82 PNEUMONIA DUE TO COVID-19 VIRUS: Status: RESOLVED | Noted: 2021-10-24 | Resolved: 2022-12-05

## 2022-12-05 PROCEDURE — 99496 TRANSJ CARE MGMT HIGH F2F 7D: CPT | Performed by: INTERNAL MEDICINE

## 2022-12-05 PROCEDURE — 1111F DSCHRG MED/CURRENT MED MERGE: CPT | Performed by: INTERNAL MEDICINE

## 2022-12-05 RX ORDER — BUSPIRONE HYDROCHLORIDE 5 MG/1
5 TABLET ORAL 3 TIMES DAILY
Qty: 270 TABLET | Refills: 1 | Status: SHIPPED | OUTPATIENT
Start: 2022-12-05 | End: 2023-01-17 | Stop reason: SDUPTHER

## 2022-12-05 RX ORDER — BUSPIRONE HYDROCHLORIDE 5 MG/1
5 TABLET ORAL 3 TIMES DAILY
COMMUNITY
End: 2022-12-05 | Stop reason: SDUPTHER

## 2022-12-05 RX ORDER — NICOTINE 21 MG/24HR
1 PATCH, TRANSDERMAL 24 HOURS TRANSDERMAL EVERY 24 HOURS
Qty: 28 EACH | Refills: 1 | Status: SHIPPED | OUTPATIENT
Start: 2022-12-05

## 2022-12-05 RX ORDER — FLUTICASONE FUROATE AND VILANTEROL 100; 25 UG/1; UG/1
1 POWDER RESPIRATORY (INHALATION)
Qty: 60 EACH | Refills: 5 | Status: SHIPPED | OUTPATIENT
Start: 2022-12-05 | End: 2023-01-17 | Stop reason: DRUGHIGH

## 2022-12-05 RX ORDER — NICOTINE 21 MG/24HR
1 PATCH, TRANSDERMAL 24 HOURS TRANSDERMAL EVERY 24 HOURS
Qty: 28 PATCH | Refills: 1 | Status: SHIPPED | OUTPATIENT
Start: 2022-12-05

## 2022-12-05 NOTE — ASSESSMENT & PLAN NOTE
IMPROVING  - reviewed GeneSight testing results which indicate she is somewhat of a hypermetabolizer for several first line SSRI medications  -now fully weaned off paxil  - cont abilify at 15 mg for now  - cont on fluoxetine at 60 mg dose  - cont buspar 5 mg TID, was started while inpatient and does feel like it has been very effective for her, plan to cont at current dose for now, may escalate as we cont to wean off nicotine as this seems to be her biggest challenge to her anxiety  - Reviewed potential side effects of medication including sexual performance changes, insomnia, fatigue, weight changes  - Return to clinic in 6-8 weeks for follow-up or sooner if needed

## 2022-12-05 NOTE — ASSESSMENT & PLAN NOTE
IMPROVING  - is working on weaning down, really committed to smokign cessation after this most recent hospitalization  - currently using patches, was DSC home with 14 mg dose and does not feel like this is strong enough, will reset up to 21 mg dose with plans to wean down monthly to 14 mg then 7 mg dose  - pt it requesting gum as well, discussed that at 21 mg dose, this patch plus multiples gum doses her day will actually likely be higher dose of nicotine than what she was getting before, so hopeful she won't need the gum once we adjust the patch dose

## 2022-12-05 NOTE — ASSESSMENT & PLAN NOTE
STABLE  - had not been on any daily inhalers for a long time  - recent hospitalization for B/L PNA felt COPD was likely contributing, no steroids but was placed back on daily ICS/LABA inhaler--> refills sent  - has pulm follow up in 1 month  - currently with supplemental O2 at home, weaned down to 2L with only intermittent use and monitoring O2 sats closely

## 2022-12-05 NOTE — PROGRESS NOTES
Transitional Care Follow Up Visit    Follow up Hospitalization, PNA    Subjective     Traci King is a 72 y.o. female who presents for a transitional care management visit.    DSC Date: 11/30/22  DSC Diagnosis: Bilateral Pneumonia    Within 48 business hours after discharge our office contacted her via telephone to coordinate her care and needs.      I reviewed and discussed the details of that call along with the discharge summary, hospital problems, inpatient lab results, inpatient diagnostic studies, and consultation reports with Traci.     Current outpatient and discharge medications have been reconciled for the patient.  Reviewed by: Sherry Fournier MD    History of Present Illness   Course During Hospital Stay:  Pt presented to ER with SOB and had chest imaging that revealed B/L PNA with consolidations in both [place]ob[place] and RML. She was treated with IV abx and then transitioned to PO augmentin prior to DSC to complete 10 day course. She required supplemental O2 that was titrated down to 2-3L NC at the time of DSC with follow up with pulmonology planned max O2 support required was Vapotherm. An ECHO was done which showed severe PAH and DD so she was also diuresed gently which improved her quality of breathing as well. She was started on Breo inhaler for COPD and recommendations made for PFT at follow up outpatient.   Also while admitted, they transitioned her pain medication from oxycontin to oxycodone 7.5 mg 4 times daily.   She is also working smoking cessation, was sent home with patches but only 14 mg dose and does not seem to be enough.   She was also started on buspar for her anxiety due to all of the medication changes above and smoking cessation. She does feel like this has been helpful to her and has helped her since being home.      The following portions of the patient's history were reviewed and updated as appropriate: allergies, current medications, past family history, past medical  history, past social history, past surgical history and problem list.    Review of Systems   Constitutional: Negative for appetite change, chills and fever.   HENT: Negative for hearing loss and sore throat.    Eyes: Negative for visual disturbance.   Respiratory: Negative for cough and shortness of breath.    Cardiovascular: Negative for chest pain, palpitations and leg swelling.   Gastrointestinal: Negative for constipation, diarrhea and vomiting.   Genitourinary: Negative for difficulty urinating and frequency.   Musculoskeletal: Negative for arthralgias and myalgias.   Skin: Negative for rash.   Neurological: Negative for weakness and headaches.   Hematological: Negative for adenopathy.   Psychiatric/Behavioral: Negative for confusion and sleep disturbance. The patient is nervous/anxious.        Objective   Physical Exam  Vitals and nursing note reviewed.   Constitutional:       General: She is not in acute distress.     Appearance: Normal appearance.   Pulmonary:      Effort: Pulmonary effort is normal. No respiratory distress.   Neurological:      Mental Status: She is alert and oriented to person, place, and time. Mental status is at baseline.   Psychiatric:         Mood and Affect: Mood normal.         Thought Content: Thought content normal.         Judgment: Judgment normal.         Assessment & Plan   Diagnoses and all orders for this visit:    1. Pneumonia of both lower lobes due to infectious organism (Primary)   RESOLVED   - has complete PO course of Augmentin and is feeling better   - home health is ordered for skilled nursing, PT, OT to help assist with transition home    2. Tobacco abuse  Assessment & Plan:  IMPROVING  - is working on weaning down, really committed to smokign cessation after this most recent hospitalization  - currently using patches, was DSC home with 14 mg dose and does not feel like this is strong enough, will reset up to 21 mg dose with plans to wean down monthly to 14 mg then 7  mg dose  - pt it requesting gum as well, discussed that at 21 mg dose, this patch plus multiples gum doses her day will actually likely be higher dose of nicotine than what she was getting before, so hopeful she won't need the gum once we adjust the patch dose    Orders:  -     nicotine (NICODERM CQ) 21 MG/24HR patch; Place 1 patch on the skin as directed by provider Daily.  Dispense: 28 each; Refill: 1  -     nicotine (NICODERM CQ) 14 MG/24HR patch; Place 1 patch on the skin as directed by provider Daily.  Dispense: 28 patch; Refill: 1  -     nicotine (NICODERM CQ) 7 MG/24HR patch; Place 1 patch on the skin as directed by provider Daily.  Dispense: 28 patch; Refill: 1  -     nicotine polacrilex (NICORETTE) 4 MG gum; Chew 1 each As Needed for Smoking Cessation.  Dispense: 100 each; Refill: 1    3. Anxiety and depression  Assessment & Plan:  IMPROVING  - reviewed GeneSight testing results which indicate she is somewhat of a hypermetabolizer for several first line SSRI medications  -now fully weaned off paxil  - cont abilify at 15 mg for now  - cont on fluoxetine at 60 mg dose  - cont buspar 5 mg TID, was started while inpatient and does feel like it has been very effective for her, plan to cont at current dose for now, may escalate as we cont to wean off nicotine as this seems to be her biggest challenge to her anxiety  - Reviewed potential side effects of medication including sexual performance changes, insomnia, fatigue, weight changes  - Return to clinic in 6-8 weeks for follow-up or sooner if needed    Orders:  -     busPIRone (BUSPAR) 5 MG tablet; Take 1 tablet by mouth 3 (Three) Times a Day.  Dispense: 270 tablet; Refill: 1    4. Chronic obstructive pulmonary disease, unspecified COPD type ([facility])  Assessment & Plan:  STABLE  - had not been on any daily inhalers for a long time  - recent hospitalization for B/L PNA felt COPD was likely contributing, no steroids but was placed back on daily ICS/LABA inhaler-->  refills sent  - has pulm follow up in 1 month  - currently with supplemental O2 at home, weaned down to 2L with only intermittent use and monitoring O2 sats closely        Orders:  -     Fluticasone Furoate-Vilanterol (Breo Ellipta) 100-25 MCG/ACT aerosol powder ; Inhale 1 puff Daily.  Dispense: 60 each; Refill: 5             .Return for Next scheduled follow up.    Lyndsay Fournier MD  [facility]  Phone: [phone]  Fax: [phone]

## 2022-12-14 ENCOUNTER — TELEPHONE (OUTPATIENT)
Dept: INTERNAL MEDICINE | Facility: CLINIC | Age: 72
End: 2022-12-14

## 2022-12-14 NOTE — TELEPHONE ENCOUNTER
Caller: Traci King     Relationship: SELF     Best call back number: [phone]       What is your medical concern?     PATIENT IS STATING THAT SHE IS STRUGGLING WITH HER ANXIETY AND THAT THE CURRENT MEDICATION IS NOT HELPING AND IS WANTING TO KNOW IF THERE IS SOMETHING STRONGER THAT SHE CAN TAKE.    [facility] - [place], [place] - [address] - [phone] PH - [phone] FX    PLEASE CALL PATIENT AND ADVISE

## 2022-12-15 ENCOUNTER — TELEPHONE (OUTPATIENT)
Dept: INTERNAL MEDICINE | Facility: CLINIC | Age: 72
End: 2022-12-15

## 2022-12-15 NOTE — TELEPHONE ENCOUNTER
Home health called to notify that this pt needing a order. The order is for Physical Therapy once a week for 4 weeks. The nurse said you could leave a voice mesage for her when completed.     Callback # [phone]

## 2022-12-15 NOTE — TELEPHONE ENCOUNTER
She is actually taking several medications for anxiety, so not sure if she is referring to any one in particular when she is asking if there is something stronger, but the easiest thing to d would be to increas the buspirone. I have down that she is taking 5 mg three times per day, she can actually double this up and take 10 mg 3 times a day for now and then we will follow up with her in Jan as leslye

## 2022-12-15 NOTE — TELEPHONE ENCOUNTER
Caller: Traci King    Relationship: Self    Best call back number: [phone]    What is the best time to reach you: ANYTIME    Who are you requesting to speak with (clinical staff, provider,  specific staff member): CLINICAL STAFF    Do you know the name of the person who called: PATIENT     What was the call regarding: PATIENT CALLED TO FOLLOW UP ON MESSAGE FROM YESTERDAY    Do you require a callback: YES

## 2022-12-15 NOTE — TELEPHONE ENCOUNTER
Tried calling  nurse unable to lvm;     HUB TO READ: Dr. Fournier okayed PT orders for once a week for 4 weeks.

## 2022-12-20 ENCOUNTER — TELEPHONE (OUTPATIENT)
Dept: PAIN MEDICINE | Facility: CLINIC | Age: 72
End: 2022-12-20

## 2022-12-20 NOTE — TELEPHONE ENCOUNTER
Hub staff attempted to follow warm transfer process and was unsuccessful       Caller: DIOGO BISHOP    Relationship to patient: SELF    Best call back number: [phone]    Patient is needing: PT STATED SHE SPOKE TO SOMEONE AT THE PRACTICE TWICE YESTERDAY IN REGARDS TO CHANGING HER 12-22-22 APPT TO CHANGE TO VIRTUAL VISIT. PT WAS HOSPITALIZED FOR PNEUMONIA & WAS JUST RELEASED, AND ISN'T ABLE TO DRIVE. WOULD LIKE A CALL BACK TO CONFIRM.

## 2022-12-22 ENCOUNTER — OFFICE VISIT (OUTPATIENT)
Dept: PAIN MEDICINE | Facility: CLINIC | Age: 72
End: 2022-12-22

## 2022-12-22 VITALS
BODY MASS INDEX: 34.87 KG/M2 | DIASTOLIC BLOOD PRESSURE: 69 MMHG | RESPIRATION RATE: 18 BRPM | SYSTOLIC BLOOD PRESSURE: 137 MMHG | HEART RATE: 83 BPM | WEIGHT: 177.6 LBS | TEMPERATURE: 97.7 F | OXYGEN SATURATION: 97 % | HEIGHT: 60 IN

## 2022-12-22 DIAGNOSIS — M47.816 LUMBAR FACET ARTHROPATHY: ICD-10-CM

## 2022-12-22 DIAGNOSIS — M62.838 MUSCLE SPASM: ICD-10-CM

## 2022-12-22 DIAGNOSIS — Z79.899 ENCOUNTER FOR LONG-TERM (CURRENT) USE OF HIGH-RISK MEDICATION: ICD-10-CM

## 2022-12-22 DIAGNOSIS — M51.36 DDD (DEGENERATIVE DISC DISEASE), LUMBAR: ICD-10-CM

## 2022-12-22 DIAGNOSIS — M47.817 LUMBOSACRAL SPONDYLOSIS WITHOUT MYELOPATHY: Primary | ICD-10-CM

## 2022-12-22 DIAGNOSIS — M47.812 CERVICAL SPONDYLOSIS WITHOUT MYELOPATHY: ICD-10-CM

## 2022-12-22 DIAGNOSIS — M50.30 DEGENERATIVE DISC DISEASE, CERVICAL: Primary | ICD-10-CM

## 2022-12-22 DIAGNOSIS — M15.9 PRIMARY OSTEOARTHRITIS INVOLVING MULTIPLE JOINTS: Chronic | ICD-10-CM

## 2022-12-22 PROCEDURE — 99214 OFFICE O/P EST MOD 30 MIN: CPT | Performed by: PHYSICIAN ASSISTANT

## 2022-12-22 RX ORDER — MELOXICAM 15 MG/1
15 TABLET ORAL DAILY
Qty: 90 TABLET | Refills: 0 | Status: SHIPPED | OUTPATIENT
Start: 2022-12-22

## 2022-12-22 RX ORDER — METHOCARBAMOL 750 MG/1
750 TABLET, FILM COATED ORAL 3 TIMES DAILY
Qty: 270 TABLET | Refills: 0 | Status: SHIPPED | OUTPATIENT
Start: 2022-12-22 | End: 2023-03-27 | Stop reason: SDUPTHER

## 2022-12-22 RX ORDER — OXYCODONE AND ACETAMINOPHEN 7.5; 325 MG/1; MG/1
TABLET ORAL
Qty: 150 TABLET | Refills: 0 | Status: SHIPPED | OUTPATIENT
Start: 2022-12-31 | End: 2023-01-30 | Stop reason: SDUPTHER

## 2022-12-22 NOTE — PROGRESS NOTES
CHIEF COMPLAINT   3 week lumbar pain follow up   Patient in office reports 70% pain relief over the last few weeks . States she takes OXY every 5 hours     Subjective   Traci King is a 72 y.o. female  who presents for follow-up.  She has a history of chronic neck and low back pain.  Patient states that she has had slight increased pain over the last month as she has adjusted to being off OxyContin 30 mg twice daily.  Continues to note primarily midline low back pain referred into the bilateral paraspinal muscles without lower extremity involvement.  Continues to note approximately 80% reduction of pain following recent RFA bilateral L1-L3 which was performed 10/20/2022 with Dr. Gan.  Patient has secondary complaints of primarily right-sided cervical spine pain which has continued to improve.    Patient states that she continues to convalesce following hospitalization at [facility] for bilateral pneumonia.  At that visit she was discontinued on OxyContin 30 mg twice daily.  Patient states that she is having difficulty with nicotine cessation and she is smoked her entire life.    She is currently being managed with Percocet 7.5/325 mg every 4-6 hours, max 5/day.  She is tolerating the medication well without adverse effect such as constipation or dizziness.    Patient reports current pain is 0/10 with current and activity.      Back Pain  This is a chronic problem. The current episode started more than 1 year ago. The problem occurs constantly. The problem is unchanged. The pain is present in the lumbar spine. The quality of the pain is described as aching. The pain does not radiate. The pain is at a severity of 0/10. The patient is experiencing no pain. The pain is worse during the day. The symptoms are aggravated by bending, position and standing. Stiffness is present all day. Pertinent negatives include no abdominal pain, chest pain, dysuria, fever, headaches, numbness or weakness.   Neck Pain    This is a chronic problem. The current episode started more than 1 year ago. The problem occurs intermittently. The problem has been waxing and waning. The pain is associated with nothing. The pain is present in the right side. The quality of the pain is described as aching. The pain is at a severity of 0/10. The patient is experiencing no pain. Pertinent negatives include no chest pain, fever, headaches, numbness or weakness.        PEG Assessment   What number best describes your pain on average in the past week?3  What number best describes how, during the past week, pain has interfered with your enjoyment of life?3  What number best describes how, during the past week, pain has interfered with your general activity?  3    Review of Pertinent Medical Data ---  RADIOLOGY REPORT     FACILITY:  [facility]   UNIT/AGE/GENDER: ARMANDO  OP      AGE:71 Y          SEX:F   PATIENT NAME/:  DIOGO BISHOPBREE    1950   UNIT NUMBER:  [id number]   ACCOUNT NUMBER:  [id number]   ACCESSION NUMBER:  [id number]     MRI lumbar spine without contrast,     2022 at 0916 hours     HISTORY: L2 vertebral body fracture.         TECHNIQUE: Multiplanar multisequence imaging was obtained of the lumbar spine on a 3 Haylie magnet. Images were obtained without IV gadolinium.     COMPARISON: CT of the lumbar spine performed on 2022.     FINDINGS:     There has been no interval change in the chronic L2 burst fracture. The L2 vertebral body is approximately 70% compressed. Retropulsion of fracture fragments or compromising the spinal canal approximately 25%. No new fractures are demonstrated.     The conus terminates at L1. Signal within the conus is within normal limits. The cauda equina is unremarkable.     Moderate degenerative disc disease is at L5-S1. No pars defects are demonstrated. The paravertebral soft tissues are unremarkable.     T12-L1: Broad-based disc bulge resulting in mild  bilateral neuroforaminal narrowing     L1-L2: A broad-based disc bulge and facet disease resulting in mild to moderate bilateral neuroforaminal narrowing and mild spinal stenosis     L2-L3: A broad-based disc bulge and facet disease resulting in mild-to-moderate bilateral neuroforaminal narrowing.     L3-L4: Broad-based disc bulge and facet disease result in mild bilateral neuroforaminal narrowing     L4-L5: Broad-based disc bulge and facet disease result in mild-to-moderate bilateral neuroforaminal narrowing     L5-S1: Broad-based disc bulge, posterior also slightly and facet disease resulting in mild-to-moderate bilateral neuroforaminal narrowing     IMPRESSION:     1. No interval change in the L2 chronic burst fracture.   2. Stable multilevel degenerative spondylosis     Dictated by: Arlyn Feliz M.D.     Images and Report reviewed and interpreted by: Arlyn Feliz M.D.     <PS><Electronically signed by: Arlyn Feliz M.D.>   03/22/2022 1045         The following portions of the patient's history were reviewed and updated as appropriate: allergies, current medications, past family history, past medical history, past social history, past surgical history and problem list.    Review of Systems   Constitutional: Negative for activity change, fatigue and fever.   HENT: Negative for congestion.    Eyes: Negative for visual disturbance.   Respiratory: Negative for cough and chest tightness.    Cardiovascular: Negative for chest pain.   Gastrointestinal: Negative for abdominal pain, constipation and diarrhea.   Genitourinary: Negative for difficulty urinating and dysuria.   Musculoskeletal: Positive for back pain and neck pain.   Neurological: Negative for dizziness, weakness, light-headedness, numbness and headaches.   Psychiatric/Behavioral: Positive for agitation and sleep disturbance. Negative for suicidal ideas. The patient is nervous/anxious.      I have reviewed and confirmed the accuracy of the ROS  "as documented by the MA/LPN/RN ALLY Cortez    Vitals:    12/22/22 1418   BP: 137/69   BP Location: Right arm   Patient Position: Sitting   Cuff Size: Adult   Pulse: 83   Resp: 18   Temp: 97.7 °F (36.5 °C)   TempSrc: Temporal   SpO2: 97%   Weight: 80.6 kg (177 lb 9.6 oz)   Height: 152.4 cm (60\")   PainSc: 0-No pain   PainLoc: Comment: back         Objective   Physical Exam  Vitals and nursing note reviewed.   Constitutional:       Appearance: Normal appearance.   HENT:      Head: Normocephalic.   Pulmonary:      Effort: Pulmonary effort is normal.   Musculoskeletal:      Cervical back: Tenderness present.      Lumbar back: Spasms and tenderness present. Decreased range of motion.   Skin:     General: Skin is warm and dry.   Neurological:      General: No focal deficit present.      Mental Status: She is alert and oriented to person, place, and time.      Cranial Nerves: Cranial nerves 2-12 are intact.      Sensory: Sensation is intact.      Motor: Weakness present.      Gait: Gait abnormal (Ambulates with slow and deliberate gait).   Psychiatric:         Mood and Affect: Mood normal.         Behavior: Behavior normal.         Thought Content: Thought content normal.         Judgment: Judgment normal.         Assessment & Plan   Diagnoses and all orders for this visit:    1. Degenerative disc disease, cervical (Primary)    2. Cervical spondylosis without myelopathy    3. DDD (degenerative disc disease), lumbar    4. Lumbar facet arthropathy    5. Primary osteoarthritis involving multiple joints  -     meloxicam (MOBIC) 15 MG tablet; Take 1 tablet by mouth Daily.  Dispense: 90 tablet; Refill: 0    6. Muscle spasm  -     methocarbamol (ROBAXIN) 750 MG tablet; Take 1 tablet by mouth 3 (Three) Times a Day.  Dispense: 270 tablet; Refill: 0        --- I have discontinued the patient's diclofenac as there appears to be interaction with that and her prescribed Prozac and patient has noted slight worsening of depressive " symptoms over the last several weeks.  In its place I will proceed with a trial of meloxicam 15 mg daily.  --- Refill provided today of Percocet 7.5/225 mg, 5/day.  I have discussed with the patient that her ultimate goal would be to proceed with downward titration of the Percocet to every 6 hours dosing.  We will provide another 30 days of #150/month with plan to decrease at her next visit. Patient appears stable with current regimen. No adverse effects. Regarding continuation of opioids, there is no evidence of aberrant behavior or any red flags.  The patient continues with appropriate response to opioid therapy. ADL's remain intact by self.   --- RTC in 1 month or sooner as needed       The urine drug screen confirmation from 7/8/2022 has been reviewed and the result is appropriate based on patient history and MATTHEW report      The patient signed an updated copy of the controlled substance agreement on 11/2/2022        MATTHEW REPORT  As part of the patient's treatment plan, I am prescribing controlled substances. The patient has been made aware of appropriate use of such medications, including potential risk of somnolence, limited ability to drive and/or work safely, and the potential for dependence or overdose. It has also been made clear that these medications are for use by this patient only, without concomitant use of alcohol or other substances unless prescribed.     Patient has completed prescribing agreement detailing terms of continued prescribing of controlled substances, including monitoring MATTHEW reports, urine drug screening, and pill counts if necessary. The patient is aware that inappropriate use will results in cessation of prescribing such medications.    As the clinician, I personally reviewed the MATTHEW from 12/22/2022 while the patient was in the office today.    History and physical exam exhibit continued safe and appropriate use of controlled substances.     Dictated utilizing Dragon  dictation.       Patient remained masked during entire encounter. No cough present. I donned a mask and eye protection throughout entire visit. Prior to donning mask and eye protection, hand hygiene was performed, as well as when it was doffed.  I was closer than 6 feet, but not for an extended period of time. No obvious exposure to any bodily fluids.

## 2022-12-27 ENCOUNTER — OFFICE VISIT (OUTPATIENT)
Dept: INTERNAL MEDICINE | Facility: CLINIC | Age: 72
End: 2022-12-27
Payer: MEDICARE

## 2022-12-27 VITALS
OXYGEN SATURATION: 94 % | HEIGHT: 60 IN | BODY MASS INDEX: 34.95 KG/M2 | TEMPERATURE: 97.8 F | HEART RATE: 80 BPM | DIASTOLIC BLOOD PRESSURE: 74 MMHG | SYSTOLIC BLOOD PRESSURE: 132 MMHG | WEIGHT: 178 LBS | RESPIRATION RATE: 18 BRPM

## 2022-12-27 DIAGNOSIS — E06.3 HASHIMOTO'S DISEASE: ICD-10-CM

## 2022-12-27 DIAGNOSIS — E61.1 IRON DEFICIENCY: ICD-10-CM

## 2022-12-27 DIAGNOSIS — R53.83 OTHER FATIGUE: Primary | ICD-10-CM

## 2022-12-27 DIAGNOSIS — E55.9 VITAMIN D DEFICIENCY: ICD-10-CM

## 2022-12-27 PROCEDURE — 3078F DIAST BP <80 MM HG: CPT | Performed by: INTERNAL MEDICINE

## 2022-12-27 PROCEDURE — 3075F SYST BP GE 130 - 139MM HG: CPT | Performed by: INTERNAL MEDICINE

## 2022-12-27 PROCEDURE — 99214 OFFICE O/P EST MOD 30 MIN: CPT | Performed by: INTERNAL MEDICINE

## 2022-12-27 RX ORDER — AMOXICILLIN AND CLAVULANATE POTASSIUM 875; 125 MG/1; MG/1
1 TABLET, FILM COATED ORAL 2 TIMES DAILY
Qty: 14 TABLET | Refills: 0 | Status: SHIPPED | OUTPATIENT
Start: 2022-12-27 | End: 2023-01-03

## 2022-12-27 RX ORDER — BUPROPION HYDROCHLORIDE 150 MG/1
150 TABLET ORAL DAILY
Qty: 30 TABLET | Refills: 3 | Status: SHIPPED | OUTPATIENT
Start: 2022-12-27

## 2022-12-27 NOTE — PROGRESS NOTES
Chief Complaint  Depression and Edema (Bilateral ankles )    Subjective        Traci King presents to [facility]  History of Present Illness  Here with anxiety, depression, worse the last 1 month or so since stopping smoking during a hospitalization for \"double pneumonia\"; no thoughts of hurting self, others, suicide; low mood at times      Objective   Vital Signs:  /74   Pulse 80   Temp 97.8 °F (36.6 °C)   Resp 18   Ht 152.4 cm (60\")   Wt 80.7 kg (178 lb)   SpO2 94%   BMI 34.76 kg/m²   Estimated body mass index is 34.76 kg/m² as calculated from the following:    Height as of this encounter: 152.4 cm (60\").    Weight as of this encounter: 80.7 kg (178 lb).          Physical Exam  Vitals and nursing note reviewed.   Constitutional:       Appearance: Normal appearance.   HENT:      Head: Normocephalic and atraumatic.      Right Ear: Tympanic membrane, ear canal and external ear normal.      Left Ear: Tympanic membrane, ear canal and external ear normal.      Nose: Nose normal.      Mouth/Throat:      Mouth: Mucous membranes are moist.      Pharynx: Oropharynx is clear.   Eyes:      Extraocular Movements: Extraocular movements intact.      Conjunctiva/sclera: Conjunctivae normal.      Pupils: Pupils are equal, round, and reactive to light.   Cardiovascular:      Rate and Rhythm: Normal rate and regular rhythm.      Pulses: Normal pulses.      Heart sounds: Normal heart sounds.   Pulmonary:      Effort: Pulmonary effort is normal.      Breath sounds: Normal breath sounds. No wheezing, rhonchi or rales.   Abdominal:      General: Abdomen is flat. There is no distension.      Palpations: Abdomen is soft.      Tenderness: There is no abdominal tenderness.   Musculoskeletal:         General: Normal range of motion.      Cervical back: Normal range of motion and neck supple.      Right lower leg: No edema.      Left lower leg: No edema.   Skin:     General:  Skin is warm and dry.      Capillary Refill: Capillary refill takes less than 2 seconds.      Findings: No rash.   Neurological:      General: No focal deficit present.      Mental Status: She is alert and oriented to person, place, and time. Mental status is at baseline.   Psychiatric:         Mood and Affect: Mood normal.         Behavior: Behavior normal.        Result Review :                Assessment and Plan   Diagnoses and all orders for this visit:    1. Other fatigue (Primary)  -     CBC & Differential  -     Comprehensive Metabolic Panel  -     TSH  -     Vitamin B12  -     Iron and TIBC  -     Ferritin  -     Vitamin D 25 hydroxy    2. Iron deficiency  -     Iron and TIBC  -     Ferritin    3. Vitamin D deficiency  -     Vitamin D 25 hydroxy    4. Hashimoto's disease  -     Thyroid Peroxidase Antibody    Other orders  -     amoxicillin-clavulanate (Augmentin) 875-125 MG per tablet; Take 1 tablet by mouth 2 (Two) Times a Day for 7 days.  Dispense: 14 tablet; Refill: 0  -     buPROPion XL (Wellbutrin XL) 150 MG 24 hr tablet; Take 1 tablet by mouth Daily.  Dispense: 30 tablet; Refill: 3  -     Thyroid Peroxidase Antibody      - check labs as above, counseled and discussed metabolic etiologies of chadd/mdd with patient and daughter today; start wellbutrin to help with smoking cessation, mdd; discussed outside time, exercise, seeing therapist  - rtc worsening, change in illness  - check labs, discussed LE edema, supportive care, venous insufficiency, stockings  - rtc with dr christianson for routine follow up       Follow Up   No follow-ups on file.  Patient was given instructions and counseling regarding her condition or for health maintenance advice. Please see specific information pulled into the AVS if appropriate.

## 2022-12-28 LAB
25(OH)D3+25(OH)D2 SERPL-MCNC: 23.2 NG/ML (ref 30–100)
ALBUMIN SERPL-MCNC: 3.9 G/DL (ref 3.8–4.8)
ALBUMIN/GLOB SERPL: 1.4 {RATIO} (ref 1.2–2.2)
ALP SERPL-CCNC: 104 IU/L (ref 44–121)
ALT SERPL-CCNC: 6 IU/L (ref 0–32)
AST SERPL-CCNC: 11 IU/L (ref 0–40)
BASOPHILS # BLD AUTO: 0.1 X10E3/UL (ref 0–0.2)
BASOPHILS NFR BLD AUTO: 1 %
BILIRUB SERPL-MCNC: <0.2 MG/DL (ref 0–1.2)
BUN SERPL-MCNC: 13 MG/DL (ref 8–27)
BUN/CREAT SERPL: 16 (ref 12–28)
CALCIUM SERPL-MCNC: 9.6 MG/DL (ref 8.7–10.3)
CHLORIDE SERPL-SCNC: 103 MMOL/L (ref 96–106)
CO2 SERPL-SCNC: 21 MMOL/L (ref 20–29)
CREAT SERPL-MCNC: 0.79 MG/DL (ref 0.57–1)
EGFRCR SERPLBLD CKD-EPI 2021: 80 ML/MIN/1.73
EOSINOPHIL # BLD AUTO: 0 X10E3/UL (ref 0–0.4)
EOSINOPHIL NFR BLD AUTO: 0 %
ERYTHROCYTE [DISTWIDTH] IN BLOOD BY AUTOMATED COUNT: 12.5 % (ref 11.7–15.4)
FERRITIN SERPL-MCNC: 42 NG/ML (ref 15–150)
GLOBULIN SER CALC-MCNC: 2.7 G/DL (ref 1.5–4.5)
GLUCOSE SERPL-MCNC: 89 MG/DL (ref 70–99)
HCT VFR BLD AUTO: 32.2 % (ref 34–46.6)
HGB BLD-MCNC: 10.9 G/DL (ref 11.1–15.9)
IMM GRANULOCYTES # BLD AUTO: 0 X10E3/UL (ref 0–0.1)
IMM GRANULOCYTES NFR BLD AUTO: 0 %
IRON SATN MFR SERPL: 16 % (ref 15–55)
IRON SERPL-MCNC: 52 UG/DL (ref 27–139)
LYMPHOCYTES # BLD AUTO: 1.5 X10E3/UL (ref 0.7–3.1)
LYMPHOCYTES NFR BLD AUTO: 14 %
MCH RBC QN AUTO: 32 PG (ref 26.6–33)
MCHC RBC AUTO-ENTMCNC: 33.9 G/DL (ref 31.5–35.7)
MCV RBC AUTO: 94 FL (ref 79–97)
MONOCYTES # BLD AUTO: 0.7 X10E3/UL (ref 0.1–0.9)
MONOCYTES NFR BLD AUTO: 7 %
NEUTROPHILS # BLD AUTO: 8.8 X10E3/UL (ref 1.4–7)
NEUTROPHILS NFR BLD AUTO: 78 %
PLATELET # BLD AUTO: 331 X10E3/UL (ref 150–450)
POTASSIUM SERPL-SCNC: 3.8 MMOL/L (ref 3.5–5.2)
PROT SERPL-MCNC: 6.6 G/DL (ref 6–8.5)
RBC # BLD AUTO: 3.41 X10E6/UL (ref 3.77–5.28)
SODIUM SERPL-SCNC: 140 MMOL/L (ref 134–144)
THYROPEROXIDASE AB SERPL-ACNC: <9 IU/ML (ref 0–34)
TIBC SERPL-MCNC: 320 UG/DL (ref 250–450)
TSH SERPL DL<=0.005 MIU/L-ACNC: 1.24 UIU/ML (ref 0.45–4.5)
UIBC SERPL-MCNC: 268 UG/DL (ref 118–369)
VIT B12 SERPL-MCNC: 375 PG/ML (ref 232–1245)
WBC # BLD AUTO: 11.1 X10E3/UL (ref 3.4–10.8)

## 2022-12-29 ENCOUNTER — TELEPHONE (OUTPATIENT)
Dept: INTERNAL MEDICINE | Facility: CLINIC | Age: 72
End: 2022-12-29

## 2022-12-29 NOTE — TELEPHONE ENCOUNTER
Caller: Traci King    Relationship: Self    Best call back number: [phone]      What is the best time to reach you: ANYTIME    Who are you requesting to speak with (clinical staff, provider,  specific staff member): MA OR DOCTOR        What was the call regarding: PATIENT STATES SHE GOT SOME LAB RESULTS BACK AND HAS SOME QUESTIONS AND CONCERNS.    Do you require a callback: YES

## 2022-12-30 ENCOUNTER — TELEPHONE (OUTPATIENT)
Dept: INTERNAL MEDICINE | Facility: CLINIC | Age: 72
End: 2022-12-30

## 2022-12-30 NOTE — TELEPHONE ENCOUNTER
Caller: ALBERTO    Relationship: Home Health    Best call back number: [phone]    ALBERTO WITH [employer] CALLED, STATING SHE IS CANCELING THE ORDER REQUEST FOR OCCUPATIONAL THERAPY. PHYSICAL THERAPY HAS BEEN SEEING THE PATIENT AND SAYS SHE IS DOING WELL.     PLEASE ADVISE

## 2023-01-03 ENCOUNTER — OFFICE VISIT (OUTPATIENT)
Dept: INTERNAL MEDICINE | Facility: CLINIC | Age: 73
End: 2023-01-03
Payer: MEDICARE

## 2023-01-03 ENCOUNTER — TELEPHONE (OUTPATIENT)
Dept: INTERNAL MEDICINE | Facility: CLINIC | Age: 73
End: 2023-01-03

## 2023-01-03 ENCOUNTER — HOSPITAL ENCOUNTER (OUTPATIENT)
Dept: GENERAL RADIOLOGY | Facility: HOSPITAL | Age: 73
Discharge: HOME | End: 2023-01-03
Admitting: INTERNAL MEDICINE
Payer: MEDICARE

## 2023-01-03 VITALS — HEIGHT: 60 IN | BODY MASS INDEX: 34.76 KG/M2 | RESPIRATION RATE: 16 BRPM | OXYGEN SATURATION: 93 % | TEMPERATURE: 98.3 F

## 2023-01-03 DIAGNOSIS — R06.02 SOB (SHORTNESS OF BREATH): ICD-10-CM

## 2023-01-03 DIAGNOSIS — Z72.0 TOBACCO ABUSE: ICD-10-CM

## 2023-01-03 DIAGNOSIS — F41.9 ANXIETY AND DEPRESSION: Chronic | ICD-10-CM

## 2023-01-03 DIAGNOSIS — F32.A ANXIETY AND DEPRESSION: Chronic | ICD-10-CM

## 2023-01-03 DIAGNOSIS — R06.02 SOB (SHORTNESS OF BREATH): Primary | ICD-10-CM

## 2023-01-03 DIAGNOSIS — E55.9 VITAMIN D DEFICIENCY: ICD-10-CM

## 2023-01-03 LAB
EXPIRATION DATE: NORMAL
FLUAV AG UPPER RESP QL IA.RAPID: NOT DETECTED
FLUBV AG UPPER RESP QL IA.RAPID: NOT DETECTED
INTERNAL CONTROL: NORMAL
Lab: NORMAL
SARS-COV-2 RNA RESP QL NAA+PROBE: NOT DETECTED

## 2023-01-03 PROCEDURE — 87428 SARSCOV & INF VIR A&B AG IA: CPT | Performed by: INTERNAL MEDICINE

## 2023-01-03 PROCEDURE — 1159F MED LIST DOCD IN RCRD: CPT | Performed by: INTERNAL MEDICINE

## 2023-01-03 PROCEDURE — 99214 OFFICE O/P EST MOD 30 MIN: CPT | Performed by: INTERNAL MEDICINE

## 2023-01-03 PROCEDURE — 71046 X-RAY EXAM CHEST 2 VIEWS: CPT

## 2023-01-03 PROCEDURE — 1160F RVW MEDS BY RX/DR IN RCRD: CPT | Performed by: INTERNAL MEDICINE

## 2023-01-03 RX ORDER — ALBUTEROL SULFATE 90 UG/1
2 AEROSOL, METERED RESPIRATORY (INHALATION) EVERY 4 HOURS PRN
Qty: 8 G | Refills: 3 | Status: SHIPPED | OUTPATIENT
Start: 2023-01-03

## 2023-01-03 NOTE — PROGRESS NOTES
Chief Complaint  Follow-up, Anxiety, Depression, fatigue, Shortness of Breath, and Rapid Heart Rate    Subjective          Traci King presents to [facility] for follow up on anxiety and depression and also discuss her ongoing issues with fatigue and now SOB.   Was started on wellbutrin 1 week ago to try to help with smoking cessation and depression. No side effects from this yet.   Developed new rapid heart rate, SOB, and onset of diarrhea x 1 day. No cough. No wheezing.     Objective   Vital Signs:     Temp 98.3 °F (36.8 °C)   Resp 16   Ht 152.4 cm (60\")   SpO2 93%   BMI 34.76 kg/m²     Physical Exam  Vitals and nursing note reviewed.   Constitutional:       General: She is not in acute distress.     Appearance: Normal appearance.   Cardiovascular:      Rate and Rhythm: Normal rate and regular rhythm.      Pulses: Normal pulses.      Heart sounds: Normal heart sounds. No murmur heard.  Pulmonary:      Effort: Pulmonary effort is normal. No respiratory distress.      Breath sounds: Normal breath sounds.   Abdominal:      General: Abdomen is flat. Bowel sounds are normal.      Palpations: Abdomen is soft.      Tenderness: There is no abdominal tenderness.   Musculoskeletal:      Right lower leg: No edema.      Left lower leg: No edema.   Neurological:      Mental Status: She is alert and oriented to person, place, and time. Mental status is at baseline.   Psychiatric:         Mood and Affect: Mood is anxious. Affect is flat.         Speech: Speech is not rapid and pressured.         Behavior: Behavior normal. Behavior is not agitated or hyperactive.          Result Review :   The following data was reviewed by: Sherry Fournier MD on 01/03/2023:  CMP    CMP 8/2/22 10/13/22 12/27/22   Glucose 101 (A) 95 89   BUN 14 15 13   Creatinine 0.85 0.93 0.79   Sodium 138 135 (A) 140   Potassium 4.8 5.2 3.8   Chloride 107 (A) 103 103   Calcium 9.5 9.2 9.6   Total  Protein 6.8  6.6   Albumin 4.2  3.9   Globulin 2.6  2.7   Total Bilirubin <0.2  <0.2   Alkaline Phosphatase 115  104   AST (SGOT) 30  11   ALT (SGPT) 31  6   BUN/Creatinine Ratio 16 16.1 16   (A) Abnormal value       Comments are available for some flowsheets but are not being displayed.           CBC w/diff    CBC w/Diff 5/25/22 6/7/22 12/27/22   WBC 7.34 9.4 11.1 (A)   RBC 3.47 (A) 3.31 (A) 3.41 (A)   Hemoglobin 11.4 (A) 11.1 10.9 (A)   Hematocrit 36.1 32.9 (A) 32.2 (A)   .0 (A) 99 (A) 94   MCH 32.9 33.5 (A) 32.0   MCHC 31.6 33.7 33.9   RDW 13.3 12.2 12.5   Platelets 409 409 331   Neutrophil Rel % 73.4 71 78   Immature Granulocyte Rel % 0.8     Lymphocyte Rel % 16.9 20 14   Monocyte Rel % 7.2 6 7   Eosinophil Rel % 1.0 1 0   Basophil Rel % 0.7 1 1   (A) Abnormal value            Lipid Panel    Lipid Panel 8/2/22 9/7/22   Total Cholesterol 175 182   Triglycerides 94 126   HDL Cholesterol 83 75   VLDL Cholesterol 17 22   LDL Cholesterol  75 85           TSH    TSH 12/27/22   TSH 1.240           Vitamin B12 (12/27/2022 14:08)   Iron and TIBC (12/27/2022 14:08)   Ferritin (12/27/2022 14:08)   Vitamin D 25 hydroxy (12/27/2022 14:08)   Thyroid Peroxidase Antibody (12/27/2022 14:08)        Assessment and Plan      Diagnoses and all orders for this visit:    1. SOB (shortness of breath) (Primary)   - rapid Flu/COVID testing negative in office today   - will send confirmatory PCR test for COVID   - CXR today, given smoking history and reported SOB with tachycardia, would consider CT PE protocol if CXR is negative   - no abx for now as lung exam is clear   - did have mild leukocytosis on labs last week, likely all a viral illness but will rule out other life threatening etiologies     Orders:  -     Covid-19 + Flu A&B AG, Veritor  -     XR Chest PA & Lateral; Future  -     COVID-19,[facility] ROUTINE, NP/OP SWAB IN TRANSPORT MEDIA OR ESWAB 72 HR TAT - Swab, Nasopharynx    2. Anxiety and depression  Assessment &  Plan:  EXACERBATED  - recently seen by Dr. Pereyra and wellbutrin added to help with depression and smoking cessation, has only been on for 1 week at this time, tolerating well but yet to see benefit as expected  -now fully weaned off paxil  - cont abilify at 15 mg for now  - cont on fluoxetine at 60 mg dose  - cont buspar 5 mg TID, was started while inpatient and does feel like it has been very effective for her, plan to cont at current dose for now, may escalate as we cont to wean off nicotine as this seems to be her biggest challenge to her anxiety  - Reviewed potential side effects of medication including sexual performance changes, insomnia, fatigue, weight changes  - GeneSight testing results indicated she is somewhat of a hypermetabolizer for several first line SSRI medications          Follow Up   Return in about 2 weeks (around 1/17/2023) for follow up anxiety/depression.    Patient was given instructions and counseling regarding her condition or for health maintenance advice. Please see specific information pulled into the AVS if appropriate.     Lyndsay Fournier MD  [facility]  Phone: [phone]  Fax: [phone]

## 2023-01-03 NOTE — ASSESSMENT & PLAN NOTE
EXACERBATED  - recently seen by Dr. Pereyra and wellbutrin added to help with depression and smoking cessation, has only been on for 1 week at this time, tolerating well but yet to see benefit as expected  -now fully weaned off paxil  - cont abilify at 15 mg for now  - cont on fluoxetine at 60 mg dose  - cont buspar 5 mg TID, was started while inpatient and does feel like it has been very effective for her, plan to cont at current dose for now, may escalate as we cont to wean off nicotine as this seems to be her biggest challenge to her anxiety  - Reviewed potential side effects of medication including sexual performance changes, insomnia, fatigue, weight changes  - GeneSight testing results indicated she is somewhat of a hypermetabolizer for several first line SSRI medications

## 2023-01-04 ENCOUNTER — TELEPHONE (OUTPATIENT)
Dept: INTERNAL MEDICINE | Facility: CLINIC | Age: 73
End: 2023-01-04
Payer: MEDICARE

## 2023-01-04 ENCOUNTER — HOSPITAL ENCOUNTER (OUTPATIENT)
Dept: CT IMAGING | Facility: HOSPITAL | Age: 73
Discharge: HOME | End: 2023-01-04
Admitting: INTERNAL MEDICINE
Payer: MEDICARE

## 2023-01-04 DIAGNOSIS — Z72.0 TOBACCO ABUSE: ICD-10-CM

## 2023-01-04 DIAGNOSIS — R06.02 SOB (SHORTNESS OF BREATH): ICD-10-CM

## 2023-01-04 LAB
LABCORP SARS-COV-2, NAA 2 DAY TAT: NORMAL
SARS-COV-2 RNA RESP QL NAA+PROBE: NOT DETECTED

## 2023-01-04 PROCEDURE — 71275 CT ANGIOGRAPHY CHEST: CPT

## 2023-01-04 PROCEDURE — 0 IOPAMIDOL PER 1 ML: Performed by: INTERNAL MEDICINE

## 2023-01-04 RX ADMIN — IOPAMIDOL 100 ML: 755 INJECTION, SOLUTION INTRAVENOUS at 11:50

## 2023-01-04 NOTE — TELEPHONE ENCOUNTER
STAT CT    Pt has a collapse lungs. Pt called to setup an appointment for her CT Scan. They said that the next available apt will be on 1/17. The pt was told to request this appointment to be a STAT CT in order for her to get an apt today.

## 2023-01-04 NOTE — TELEPHONE ENCOUNTER
Pt has already had this scan done today, should not be anything further to follow up with, thanks!

## 2023-01-05 RX ORDER — PREDNISONE 20 MG/1
TABLET ORAL
Qty: 11 TABLET | Refills: 0 | Status: SHIPPED | OUTPATIENT
Start: 2023-01-05 | End: 2023-01-14

## 2023-01-05 RX ORDER — AZITHROMYCIN 250 MG/1
TABLET, FILM COATED ORAL
Qty: 6 TABLET | Refills: 0 | Status: SHIPPED | OUTPATIENT
Start: 2023-01-05

## 2023-01-17 ENCOUNTER — TELEPHONE (OUTPATIENT)
Dept: INTERNAL MEDICINE | Facility: CLINIC | Age: 73
End: 2023-01-17

## 2023-01-17 ENCOUNTER — HOSPITAL ENCOUNTER (OUTPATIENT)
Dept: GENERAL RADIOLOGY | Facility: HOSPITAL | Age: 73
Discharge: HOME | End: 2023-01-17
Admitting: INTERNAL MEDICINE
Payer: MEDICARE

## 2023-01-17 ENCOUNTER — APPOINTMENT (OUTPATIENT)
Dept: CT IMAGING | Facility: HOSPITAL | Age: 73
End: 2023-01-17
Payer: MEDICARE

## 2023-01-17 ENCOUNTER — OFFICE VISIT (OUTPATIENT)
Dept: INTERNAL MEDICINE | Facility: CLINIC | Age: 73
End: 2023-01-17
Payer: MEDICARE

## 2023-01-17 VITALS
WEIGHT: 178 LBS | HEIGHT: 60 IN | HEART RATE: 89 BPM | BODY MASS INDEX: 34.95 KG/M2 | TEMPERATURE: 96.8 F | OXYGEN SATURATION: 98 % | SYSTOLIC BLOOD PRESSURE: 146 MMHG | RESPIRATION RATE: 16 BRPM | DIASTOLIC BLOOD PRESSURE: 74 MMHG

## 2023-01-17 DIAGNOSIS — M47.817 LUMBOSACRAL SPONDYLOSIS WITHOUT MYELOPATHY: Primary | ICD-10-CM

## 2023-01-17 DIAGNOSIS — F32.A ANXIETY AND DEPRESSION: Primary | Chronic | ICD-10-CM

## 2023-01-17 DIAGNOSIS — F41.9 ANXIETY AND DEPRESSION: Primary | Chronic | ICD-10-CM

## 2023-01-17 DIAGNOSIS — M47.817 LUMBOSACRAL SPONDYLOSIS WITHOUT MYELOPATHY: ICD-10-CM

## 2023-01-17 DIAGNOSIS — J44.9 CHRONIC OBSTRUCTIVE PULMONARY DISEASE, UNSPECIFIED COPD TYPE: ICD-10-CM

## 2023-01-17 PROCEDURE — 72100 X-RAY EXAM L-S SPINE 2/3 VWS: CPT

## 2023-01-17 PROCEDURE — 99214 OFFICE O/P EST MOD 30 MIN: CPT | Performed by: INTERNAL MEDICINE

## 2023-01-17 RX ORDER — FLUTICASONE FUROATE AND VILANTEROL 200; 25 UG/1; UG/1
1 POWDER RESPIRATORY (INHALATION)
Qty: 3 EACH | Refills: 3 | Status: SHIPPED | OUTPATIENT
Start: 2023-01-17 | End: 2023-01-17 | Stop reason: SDUPTHER

## 2023-01-17 RX ORDER — IPRATROPIUM BROMIDE AND ALBUTEROL SULFATE 2.5; .5 MG/3ML; MG/3ML
3 SOLUTION RESPIRATORY (INHALATION) EVERY 4 HOURS PRN
Qty: 360 ML | Refills: 2 | Status: SHIPPED | OUTPATIENT
Start: 2023-01-17

## 2023-01-17 RX ORDER — BUSPIRONE HYDROCHLORIDE 10 MG/1
10 TABLET ORAL 3 TIMES DAILY
Qty: 270 TABLET | Refills: 1 | Status: SHIPPED | OUTPATIENT
Start: 2023-01-17

## 2023-01-17 NOTE — TELEPHONE ENCOUNTER
Caller: Traci King    Relationship: Self    Best call back number: [phone]    What is the best time to reach you: ANY     Who are you requesting to speak with (clinical staff, provider,  specific staff member):  CLINICAL STAFF         What was the call regarding:  PATIENT IS CALLING IN ASKING FOR A CALL BACK.  SHE IS CALLING REGARDING THE MEDICATIONS Fluticasone Furoate-Vilanterol (Breo Ellipta) 200-25 MCG/ACT inhaler AND Diclofenac Sodium (VOLTAREN) 1 % gel gel BOTH OF THESE ARE IN NEED OF A PRIOR AUTHORIZATION BEFORE INSURANCE WILL COVER THEM     Do you require a callback: YES

## 2023-01-17 NOTE — PROGRESS NOTES
"Chief Complaint  Follow-up, Depression, Back Pain, Hypertension, Foot Swelling (/), and Shortness of Breath (/)    Subjective          Traci King presents to [facility] for ongoing SOB, back pain after near fall in the shower, HTN, and depression.   She caught herself from falling in the shower over the weekend, has now thrown out her back and even her daily pain medication is not covering her pain. She has a muscle relaxer as well that is not really helping much.   Her buspar was recently increased to 10 mg TID and she does think it is helping some compared to where she was. Needs a refill on this. Does feel like the wellbutrin has helped her officially stop smoking so that has been a positive change.   Her breathing is still not back to normal. She was admitted just under 2 months ago for B/L PNA and had repeat chest imaging 1 week ago that showed resolution of PNA, no PE, and COPD.     Objective   Vital Signs:     /74   Pulse 89   Temp 96.8 °F (36 °C)   Resp 16   Ht 152.4 cm (60\")   Wt 80.7 kg (178 lb)   SpO2 98%   BMI 34.76 kg/m²     Physical Exam  Vitals and nursing note reviewed.   Constitutional:       General: She is not in acute distress.     Appearance: Normal appearance.   Cardiovascular:      Rate and Rhythm: Normal rate and regular rhythm.      Pulses: Normal pulses.      Heart sounds: Normal heart sounds. No murmur heard.  Pulmonary:      Effort: Pulmonary effort is normal. No respiratory distress.      Breath sounds: Normal breath sounds.   Abdominal:      General: Abdomen is flat. Bowel sounds are normal.      Palpations: Abdomen is soft.      Tenderness: There is no abdominal tenderness.   Musculoskeletal:      Right lower leg: Edema present.      Left lower leg: Edema present.      Comments: No vertebral body tenderness, pain over L lumbar paraspinous muscles, requires use of upper extremities to rise to a stand from the chair, + " antalgic gait and very slow moving, ROM exercises limited related to pain   Neurological:      Mental Status: She is alert and oriented to person, place, and time. Mental status is at baseline.   Psychiatric:         Mood and Affect: Mood normal.         Behavior: Behavior normal.          Result Review :   The following data was reviewed by: Sherry Fournier MD on 01/17/2023:  CMP    CMP 8/2/22 10/13/22 12/27/22   Glucose 101 (A) 95 89   BUN 14 15 13   Creatinine 0.85 0.93 0.79   Sodium 138 135 (A) 140   Potassium 4.8 5.2 3.8   Chloride 107 (A) 103 103   Calcium 9.5 9.2 9.6   Total Protein 6.8  6.6   Albumin 4.2  3.9   Globulin 2.6  2.7   Total Bilirubin <0.2  <0.2   Alkaline Phosphatase 115  104   AST (SGOT) 30  11   ALT (SGPT) 31  6   BUN/Creatinine Ratio 16 16.1 16   (A) Abnormal value       Comments are available for some flowsheets but are not being displayed.           CBC w/diff    CBC w/Diff 5/25/22 6/7/22 12/27/22   WBC 7.34 9.4 11.1 (A)   RBC 3.47 (A) 3.31 (A) 3.41 (A)   Hemoglobin 11.4 (A) 11.1 10.9 (A)   Hematocrit 36.1 32.9 (A) 32.2 (A)   .0 (A) 99 (A) 94   MCH 32.9 33.5 (A) 32.0   MCHC 31.6 33.7 33.9   RDW 13.3 12.2 12.5   Platelets 409 409 331   Neutrophil Rel % 73.4 71 78   Immature Granulocyte Rel % 0.8     Lymphocyte Rel % 16.9 20 14   Monocyte Rel % 7.2 6 7   Eosinophil Rel % 1.0 1 0   Basophil Rel % 0.7 1 1   (A) Abnormal value            Lipid Panel    Lipid Panel 8/2/22 9/7/22   Total Cholesterol 175 182   Triglycerides 94 126   HDL Cholesterol 83 75   VLDL Cholesterol 17 22   LDL Cholesterol  75 85           Electrolytes    Electrolytes 8/2/22 10/13/22 12/27/22   Sodium 138 135 (A) 140   Potassium 4.8 5.2 3.8   Chloride 107 (A) 103 103   Calcium 9.5 9.2 9.6   (A) Abnormal value       Comments are available for some flowsheets but are not being displayed.               Data reviewed: Radiologic studies CXR, CT PE protocol 1/23            Assessment and Plan      Diagnoses and all  orders for this visit:    1. Anxiety and depression (Primary)  Assessment & Plan:  IMPROVED  - cont on recently added wellbutrin  - cont abilify at 15 mg for now  - cont on fluoxetine at 60 mg dose  - cont buspar 10 mg TID   - Reviewed potential side effects of medication including sexual performance changes, insomnia, fatigue, weight changes  - GeneSight testing results indicated she is somewhat of a hypermetabolizer for several first line SSRI medications    Orders:  -     busPIRone (BUSPAR) 10 MG tablet; Take 1 tablet by mouth 3 (Three) Times a Day.  Dispense: 270 tablet; Refill: 1    2. Chronic obstructive pulmonary disease, unspecified COPD type ([facility])  Assessment & Plan:  UNCONTROLLED  - recently quit smoking after B/L PNA hospitalization 11/22  - had repeeat chest imaging 1/23 for ongoing SOB which showed only chronic changes, exam clear, suspect her ongoing symptoms are related to poor baseline and recent illness  - will increase ICS-LABA to Breo 200 daily  - given nebulizer in office today and will start Duonebs scheduled BID x 1 week, then go to prn dosing  - do think some of her symptoms is related to poor baseline, hopeful that will help, may need to enroll in formal cardiopulm rehab        Orders:  -     Discontinue: Fluticasone Furoate-Vilanterol (Breo Ellipta) 200-25 MCG/ACT inhaler; Inhale 1 puff Daily.  Dispense: 3 each; Refill: 3  -     ipratropium-albuterol (DUO-NEB) 0.5-2.5 mg/3 ml nebulizer; Take 3 mL by nebulization Every 4 (Four) Hours As Needed for Wheezing or Shortness of Air. Dx: J44.9  Dispense: 360 mL; Refill: 2  -     Breo Ellipta 200-25 MCG/ACT inhaler; Inhale 1 puff Daily.  Dispense: 3 each; Refill: 3    3. Lumbosacral spondylosis without myelopathy   - suspect muscular spasm based on story and exam, no actual fall so less concerned for acute fracture   - given degree of pain and history of abn, will get XR today   - will follow up with MRI as needed based on XR   - can use lidocaine  patches from pain mgmt, apply voltaren gel while patches are not in place   - apply heat   - PT order given     Orders:  -     XR Spine Lumbar 2 or 3 View; Future  -     Ambulatory Referral to Physical Therapy Evaluate and treat  -     Diclofenac Sodium (VOLTAREN) 1 % gel gel; Apply 4 g topically to the appropriate area as directed 4 (Four) Times a Day As Needed (back pain).  Dispense: 350 g; Refill: 1    Follow Up   Return in about 4 weeks (around 2/14/2023) for follow up.    Patient was given instructions and counseling regarding her condition or for health maintenance advice. Please see specific information pulled into the AVS if appropriate.     Lynsday Fournier MD  [facility]  Phone: [phone]  Fax: [phone]

## 2023-01-18 NOTE — ASSESSMENT & PLAN NOTE
IMPROVED  - cont on recently added wellbutrin  - cont abilify at 15 mg for now  - cont on fluoxetine at 60 mg dose  - cont buspar 10 mg TID   - Reviewed potential side effects of medication including sexual performance changes, insomnia, fatigue, weight changes  - GeneSight testing results indicated she is somewhat of a hypermetabolizer for several first line SSRI medications

## 2023-01-18 NOTE — ASSESSMENT & PLAN NOTE
UNCONTROLLED  - recently quit smoking after B/L PNA hospitalization 11/22  - had repeeat chest imaging 1/23 for ongoing SOB which showed only chronic changes, exam clear, suspect her ongoing symptoms are related to poor baseline and recent illness  - will increase ICS-LABA to Breo 200 daily  - given nebulizer in office today and will start Duonebs scheduled BID x 1 week, then go to prn dosing  - do think some of her symptoms is related to poor baseline, hopeful that will help, may need to enroll in formal cardiopulm rehab

## 2023-01-19 ENCOUNTER — TELEPHONE (OUTPATIENT)
Dept: INTERNAL MEDICINE | Facility: CLINIC | Age: 73
End: 2023-01-19
Payer: MEDICARE

## 2023-01-19 NOTE — TELEPHONE ENCOUNTER
ROSA WITH [facility] SPOKE WITH PATIENT AND SHE MENTIONED SOMETHING ABOUT AN MRI OF HER SPINE BUT ROSA DIDN'T SEE IT ANYWHERE OR PENDING IN [facility] SO SHE WAS JUST CHECKING TO SEE IF WE JUST HADN'T SENT IT OVER YET OR WHAT THE CASE WAS.    ROSA> [phone]

## 2023-01-23 ENCOUNTER — TELEPHONE (OUTPATIENT)
Dept: PAIN MEDICINE | Facility: CLINIC | Age: 73
End: 2023-01-23
Payer: MEDICARE

## 2023-01-24 ENCOUNTER — TELEPHONE (OUTPATIENT)
Dept: INTERNAL MEDICINE | Facility: CLINIC | Age: 73
End: 2023-01-24

## 2023-01-24 NOTE — TELEPHONE ENCOUNTER
Caller: Traci King    Relationship: Self    Best call back number: [phone]    What is the best time to reach you: ANY TIME    Who are you requesting to speak with (clinical staff, provider,  specific staff member): CLINICAL STAFF    What was the call regarding: PATIENT CALLED TO CHECK ON STATUS OF MRI SCHEDULING, SHE SEEN DR. PRASAD ON 1/17/23 AND HAS NOT HEARD FROM ANYONE ABOUT SCHEDULING.    PLEASE ADVISE    Do you require a callback: YES

## 2023-01-25 ENCOUNTER — TELEPHONE (OUTPATIENT)
Dept: PAIN MEDICINE | Facility: CLINIC | Age: 73
End: 2023-01-25
Payer: MEDICARE

## 2023-01-25 NOTE — TELEPHONE ENCOUNTER
Caller: Traci King    Relationship: Self    Best call back number:     What is the best time to reach you: ANYTIME    Who are you requesting to speak with (clinical staff, provider,  specific staff member): DR LEMUS    What was the call regarding: THE PATIENT WOULD LIKE TO TALK TO DR LEMUS ABOUT INCREASING HER PAIN MEDICATION AFTER SHE SLIPPED IN THE SHOWER ON 1/8/23.   THE PATIENT WOULD LIKE TO SPEAK WITH DR LEMUS AFTER HER MEDICATION INCREASE REQUEST WAS DENIED BY MAREK PENA ON 1/23/23      Do you require a callback: YES.

## 2023-01-26 DIAGNOSIS — F33.1 MODERATE EPISODE OF RECURRENT MAJOR DEPRESSIVE DISORDER: ICD-10-CM

## 2023-01-26 RX ORDER — ARIPIPRAZOLE 15 MG/1
15 TABLET ORAL DAILY
Qty: 90 TABLET | Refills: 1 | Status: SHIPPED | OUTPATIENT
Start: 2023-01-26

## 2023-01-26 NOTE — TELEPHONE ENCOUNTER
Rx Refill Note  Requested Prescriptions     Pending Prescriptions Disp Refills   • ARIPiprazole (ABILIFY) 15 MG tablet [Pharmacy Med Name: ARIPiprazole 15 MG TABS 15 Tablet] 90 tablet 1     Sig: TAKE 1 TABLET BY MOUTH DAILY.      Last office visit with prescribing clinician: 1/17/2023   Last telemedicine visit with prescribing clinician: 2/14/2023   Next office visit with prescribing clinician: 2/14/2023                         Would you like a call back once the refill request has been completed: [] Yes [] No    If the office needs to give you a call back, can they leave a voicemail: [] Yes [] No    Hilaria Gurrola MA  01/26/23, 14:43 EST

## 2023-01-30 ENCOUNTER — TELEPHONE (OUTPATIENT)
Dept: INTERNAL MEDICINE | Facility: CLINIC | Age: 73
End: 2023-01-30
Payer: MEDICARE

## 2023-01-30 ENCOUNTER — OFFICE VISIT (OUTPATIENT)
Dept: PAIN MEDICINE | Facility: CLINIC | Age: 73
End: 2023-01-30
Payer: MEDICARE

## 2023-01-30 ENCOUNTER — TELEPHONE (OUTPATIENT)
Dept: CARDIOLOGY | Facility: CLINIC | Age: 73
End: 2023-01-30
Payer: MEDICARE

## 2023-01-30 VITALS
SYSTOLIC BLOOD PRESSURE: 143 MMHG | OXYGEN SATURATION: 96 % | DIASTOLIC BLOOD PRESSURE: 66 MMHG | BODY MASS INDEX: 35.22 KG/M2 | WEIGHT: 179.4 LBS | HEIGHT: 60 IN | TEMPERATURE: 97.1 F | HEART RATE: 90 BPM

## 2023-01-30 DIAGNOSIS — M47.817 LUMBOSACRAL SPONDYLOSIS WITHOUT MYELOPATHY: ICD-10-CM

## 2023-01-30 DIAGNOSIS — S32.020G COMPRESSION FRACTURE OF L2 VERTEBRA WITH DELAYED HEALING, SUBSEQUENT ENCOUNTER: Primary | ICD-10-CM

## 2023-01-30 DIAGNOSIS — M47.817 LUMBOSACRAL SPONDYLOSIS WITHOUT MYELOPATHY: Primary | ICD-10-CM

## 2023-01-30 DIAGNOSIS — M47.816 LUMBAR FACET ARTHROPATHY: ICD-10-CM

## 2023-01-30 DIAGNOSIS — M51.36 DDD (DEGENERATIVE DISC DISEASE), LUMBAR: ICD-10-CM

## 2023-01-30 DIAGNOSIS — Z79.899 ENCOUNTER FOR LONG-TERM (CURRENT) USE OF HIGH-RISK MEDICATION: ICD-10-CM

## 2023-01-30 PROCEDURE — 80305 DRUG TEST PRSMV DIR OPT OBS: CPT | Performed by: PHYSICIAN ASSISTANT

## 2023-01-30 PROCEDURE — 99214 OFFICE O/P EST MOD 30 MIN: CPT | Performed by: PHYSICIAN ASSISTANT

## 2023-01-30 RX ORDER — OXYCODONE AND ACETAMINOPHEN 7.5; 325 MG/1; MG/1
TABLET ORAL
Qty: 150 TABLET | Refills: 0 | Status: SHIPPED | OUTPATIENT
Start: 2023-01-30 | End: 2023-02-27 | Stop reason: SDUPTHER

## 2023-01-30 NOTE — TELEPHONE ENCOUNTER
Traci King called to request new patient appointment.  Patient was previously seen by , but has not been seen since 12/2018.  She requested first available provider since she's considered a new patient.    Patient reports her right foot has been swollen for two weeks.  Patient denies any pain or redness in the right leg.  Patient does wear compression stockings.    Patient also reports worsening shortness of breath with exertion, fatigue, and palpitations.  Over the last week, patient has noted increased palpitations, with episodes lasting about 1 minute.  Patient stated palpitations occur frequently throughout the day.  Patient stated that she is under increased stress as she recently fell in the shower and hurt her back.  Patient drinks caffeine and admits she does not drink much water.  Educated patient on the effects of stress, caffeine, and dehydration on palpitations and she verbalized understanding.    /86    Offered patient an appointment on 1/31 at 830 am () or at 115 pm (), however patient declined both appointments due to possible snow tonight. Patient agreeable to appointment on 2/3 with Dr. Martinez. Patient expressed concern about her symptoms.  Encouraged patient to contact PCP for an appointment.  Patient stated she was not able to get an appointment for two weeks.  Explained to patient that she should not wait for the appointment on Friday.  Encouraged patient to go to Urgent Care or ED for evaluation for worrisome or worsening symptoms.  Patient stated she will do this.    Thank you,  Maureen CHEUNG RN  Triage Nurse [facility]

## 2023-01-30 NOTE — TELEPHONE ENCOUNTER
Caller: Traci King    Relationship: Self    Best call back number:[phone]    What is the best time to reach you: ANYTIME    Who are you requesting to speak with (clinical staff, provider,  specific staff member): DR. PRASAD OR MEDICAL ASSISTANT    Do you know the name of the person who called: SELF    What was the call regarding: THE PATIENT STATES THAT SHE HAS BEEN EXPERIENCING SOME ELEVATED BLOOD PRESSURE READINGS. THE PATIENT HAS BEEN EXPERIENCING THIS ISSUE SINCE SHE HAD PNEUMONIA BACK IN November, 2022. THE PATIENT STATES THAT HER LATEST READING FROM LAST WEEK /86. THE PATIENT DECLINED A SAME DAY AND HAS AN APPOINTMENT TO BE SEEN ON 02/07/2023, BUT SHE WOULD LIKE TO KNOW IF SHE SHOULD GO AHEAD AND BEGIN TAKING MEDICATION FOR THIS ISSUE. PLEASE ADVISE.     Do you require a callback: YES, PLEASE

## 2023-01-30 NOTE — PROGRESS NOTES
CHIEF COMPLAINT  F/U back pain- patient states that her pain has worsened since her last visit.     Subjective   Traci King is a 72 y.o. female  who presents for follow-up.  She has a history of low back pain.  The patient reports significant worsening of pain as compared to her last office visit after she slipped in the bathtub on 1/22/2023 throwing her back out though she denies an actual fall.  She has been evaluated by her PCP Dr. Fournier and underwent lumbar x-rays as well as referral was made to physical therapy.  Patient finds that the PT maneuvers are causing increased pain and she is scheduled to undergo lumbar MRI on 2/7/2023.    Patient continues with medical management of Percocet 7.5 mg every 4-6 hours, max 5 a day which she is tolerating well without adverse effects such as somnolence or constipation.  The patient does not find that the Percocet is providing significant relief of pain for her new increased issue.    Pain today 6/10 VAS in severity.      Back Pain  This is a chronic problem. The current episode started more than 1 year ago. The problem occurs constantly. The problem has been gradually worsening (Acute pain superimposed on chronic) since onset. The pain is present in the lumbar spine. The quality of the pain is described as aching, burning and shooting. The pain does not radiate. The pain is at a severity of 6/10. The pain is moderate. The pain is the same all the time. The symptoms are aggravated by position. Associated symptoms include weakness (hands). Pertinent negatives include no abdominal pain, chest pain, dysuria, fever, headaches or numbness.        PEG Assessment   What number best describes your pain on average in the past week?7  What number best describes how, during the past week, pain has interfered with your enjoyment of life?9  What number best describes how, during the past week, pain has interfered with your general activity?  9    Review of Pertinent Medical  Data ---    XR SPINE LUMBAR 2 OR 3 VW-     INDICATIONS: Pain     TECHNIQUE: 3 views of the lumbar spine     COMPARISON: 09/13/2018, correlated with chest x-ray from 01/03/2023     FINDINGS:     An L2 compression deformity show 60% loss of height, new from 2018, but  stable from 01/03/2023. Mild superior endplate depression at L4 is new  from the prior exam 2018. Vertebral body heights otherwise appear  stable. No other evidence of fracture is identified. Mild retrolisthesis  of L2 on L3. Mild anterolisthesis of L4 on L5. Mild retrolisthesis of L5  on S1. Lower lumbar facet arthropathy is present. Disc space narrowing  is seen at L1/2, L5/S1. Arterial calcification is present. If further  imaging evaluation is indicated, MRI could be considered.     IMPRESSION:     As described.           This report was finalized on 1/17/2023 2:03 PM by Dr. Justo Leal M.D.    The following portions of the patient's history were reviewed and updated as appropriate: allergies, current medications, past family history, past medical history, past social history, past surgical history and problem list.    Review of Systems   Constitutional: Positive for activity change (decreased) and fatigue. Negative for chills and fever.   HENT: Negative for congestion.    Eyes: Negative for visual disturbance.   Respiratory: Positive for shortness of breath. Negative for chest tightness.    Cardiovascular: Negative for chest pain.   Gastrointestinal: Negative for abdominal pain, constipation and diarrhea.   Genitourinary: Negative for difficulty urinating, dyspareunia and dysuria.   Musculoskeletal: Positive for back pain.   Neurological: Positive for weakness (hands) and light-headedness. Negative for dizziness, numbness and headaches.   Psychiatric/Behavioral: Positive for sleep disturbance. Negative for agitation. The patient is nervous/anxious (nervous).      I have reviewed and confirmed the accuracy of the ROS as documented by the  "MA/JUAN/ALLY [facility]    Vitals:    01/30/23 1235   BP: 143/66   Pulse: 90   Temp: 97.1 °F (36.2 °C)   SpO2: 96%   Weight: 81.4 kg (179 lb 6.4 oz)   Height: 152.4 cm (60\")   PainSc:   6   PainLoc: Back         Objective   Physical Exam  Vitals and nursing note reviewed.   Constitutional:       Appearance: Normal appearance.   HENT:      Head: Normocephalic.   Pulmonary:      Effort: Pulmonary effort is normal.   Musculoskeletal:      Lumbar back: Spasms, tenderness and bony tenderness present. Decreased range of motion.   Skin:     General: Skin is warm and dry.   Neurological:      General: No focal deficit present.      Mental Status: She is alert and oriented to person, place, and time.      Cranial Nerves: Cranial nerves 2-12 are intact.      Sensory: Sensation is intact.      Motor: Motor function is intact.      Gait: Gait is intact.   Psychiatric:         Mood and Affect: Mood normal.         Behavior: Behavior normal.         Thought Content: Thought content normal.         Judgment: Judgment normal.           Assessment & Plan   Diagnoses and all orders for this visit:    1. Compression fracture of L2 vertebra with delayed healing, subsequent encounter (Primary)  -     Ambulatory Referral to Neurosurgery    2. Lumbar facet arthropathy    3. Lumbosacral spondylosis without myelopathy    4. DDD (degenerative disc disease), lumbar    5. Encounter for long-term (current) use of high-risk medication        --- RTC 4 weeks for further evaluation  --- I have discussed with the patient that based on the results of the x-ray of the lumbar spine performed 1/17/2023 that I will proceed with scheduling for neurosurgical evaluation with Dr. Campa just in the event that the pending lumbar MRI shows compression fracture which may be amenable to operative intervention  --- I discussed with the patient to begin alternating with ice that she is only been using heat therapy for persistent low back pain as well as to " apply lidocaine patches.  We have also discussed various strategies on taking the oxycodone in order to optimize her level of analgesia with it.  --- Refill given today on oxycodone 7.5 mg. Patient appears stable with current regimen. No adverse effects. Regarding continuation of opioids, there is no evidence of aberrant behavior or any red flags.  The patient continues with appropriate response to opioid therapy. ADL's remain intact by self.         Routine UDS in office today as part of monitoring requirements for controlled substances.  The specimen was viewed and the immunoassay result reviewed and is appropriately positive for oxycodone.  This specimen will be sent to [facility] laboratory for confirmation.           The patient signed an updated copy of the controlled substance agreement on 11/2/2022.      MATTHEW REPORT  As part of the patient's treatment plan, I am prescribing controlled substances. The patient has been made aware of appropriate use of such medications, including potential risk of somnolence, limited ability to drive and/or work safely, and the potential for dependence or overdose. It has also been made clear that these medications are for use by this patient only, without concomitant use of alcohol or other substances unless prescribed.     Patient has completed prescribing agreement detailing terms of continued prescribing of controlled substances, including monitoring MATTHEW reports, urine drug screening, and pill counts if necessary. The patient is aware that inappropriate use will results in cessation of prescribing such medications.    As the clinician, I personally reviewed the MATTHEW from 1/30/2023 while the patient was in the office today.    History and physical exam exhibit continued safe and appropriate use of controlled substances.        Dictated utilizing Dragon dictation.       Patient remained masked during entire encounter. No cough present. I donned a mask and eye  protection throughout entire visit. Prior to donning mask and eye protection, hand hygiene was performed, as well as when it was doffed.  I was closer than 6 feet, but not for an extended period of time. No obvious exposure to any y fluids.

## 2023-01-30 NOTE — TELEPHONE ENCOUNTER
Dont think we need to start any medications at this very minute, those  numbers are a little high but not high enough to put her in any imminent danger, can wait to discuss further at her appt next week

## 2023-02-03 ENCOUNTER — HOSPITAL ENCOUNTER (OUTPATIENT)
Dept: GENERAL RADIOLOGY | Facility: HOSPITAL | Age: 73
Discharge: HOME | End: 2023-02-03
Payer: MEDICARE

## 2023-02-03 ENCOUNTER — LAB (OUTPATIENT)
Dept: LAB | Facility: HOSPITAL | Age: 73
End: 2023-02-03
Payer: MEDICARE

## 2023-02-03 ENCOUNTER — HOSPITAL ENCOUNTER (OUTPATIENT)
Dept: CARDIOLOGY | Facility: HOSPITAL | Age: 73
Discharge: HOME | End: 2023-02-03
Payer: MEDICARE

## 2023-02-03 ENCOUNTER — TELEPHONE (OUTPATIENT)
Dept: CARDIOLOGY | Facility: CLINIC | Age: 73
End: 2023-02-03
Payer: MEDICARE

## 2023-02-03 ENCOUNTER — OFFICE VISIT (OUTPATIENT)
Dept: CARDIOLOGY | Facility: CLINIC | Age: 73
End: 2023-02-03
Payer: MEDICARE

## 2023-02-03 VITALS
DIASTOLIC BLOOD PRESSURE: 74 MMHG | BODY MASS INDEX: 34.91 KG/M2 | HEIGHT: 60 IN | SYSTOLIC BLOOD PRESSURE: 140 MMHG | HEART RATE: 73 BPM | WEIGHT: 177.8 LBS | OXYGEN SATURATION: 95 %

## 2023-02-03 DIAGNOSIS — E78.2 MIXED HYPERLIPIDEMIA: Primary | Chronic | ICD-10-CM

## 2023-02-03 DIAGNOSIS — R00.2 PALPITATIONS: ICD-10-CM

## 2023-02-03 DIAGNOSIS — I10 PRIMARY HYPERTENSION: Chronic | ICD-10-CM

## 2023-02-03 DIAGNOSIS — R60.0 EDEMA LEG: ICD-10-CM

## 2023-02-03 DIAGNOSIS — I51.89 DIASTOLIC DYSFUNCTION: ICD-10-CM

## 2023-02-03 DIAGNOSIS — R06.09 DYSPNEA ON EXERTION: ICD-10-CM

## 2023-02-03 DIAGNOSIS — J44.9 CHRONIC OBSTRUCTIVE PULMONARY DISEASE, UNSPECIFIED COPD TYPE: ICD-10-CM

## 2023-02-03 LAB
ALBUMIN SERPL-MCNC: 4 G/DL (ref 3.5–5.2)
ALBUMIN/GLOB SERPL: 1.5 G/DL
ALP SERPL-CCNC: 154 U/L (ref 39–117)
ALT SERPL W P-5'-P-CCNC: 7 U/L (ref 1–33)
ANION GAP SERPL CALCULATED.3IONS-SCNC: 11 MMOL/L (ref 5–15)
AST SERPL-CCNC: 10 U/L (ref 1–32)
BASOPHILS # BLD AUTO: 0.04 10*3/MM3 (ref 0–0.2)
BASOPHILS NFR BLD AUTO: 0.5 % (ref 0–1.5)
BH CV LOWER VASCULAR LEFT COMMON FEMORAL AUGMENT: NORMAL
BH CV LOWER VASCULAR LEFT COMMON FEMORAL COMPETENT: NORMAL
BH CV LOWER VASCULAR LEFT COMMON FEMORAL COMPRESS: NORMAL
BH CV LOWER VASCULAR LEFT COMMON FEMORAL PHASIC: NORMAL
BH CV LOWER VASCULAR LEFT COMMON FEMORAL SPONT: NORMAL
BH CV LOWER VASCULAR RIGHT COMMON FEMORAL AUGMENT: NORMAL
BH CV LOWER VASCULAR RIGHT COMMON FEMORAL COMPETENT: NORMAL
BH CV LOWER VASCULAR RIGHT COMMON FEMORAL COMPRESS: NORMAL
BH CV LOWER VASCULAR RIGHT COMMON FEMORAL PHASIC: NORMAL
BH CV LOWER VASCULAR RIGHT COMMON FEMORAL SPONT: NORMAL
BH CV LOWER VASCULAR RIGHT DISTAL FEMORAL COMPRESS: NORMAL
BH CV LOWER VASCULAR RIGHT GASTRONEMIUS COMPRESS: NORMAL
BH CV LOWER VASCULAR RIGHT GREATER SAPH AK COMPRESS: NORMAL
BH CV LOWER VASCULAR RIGHT GREATER SAPH BK COMPRESS: NORMAL
BH CV LOWER VASCULAR RIGHT LESSER SAPH COMPRESS: NORMAL
BH CV LOWER VASCULAR RIGHT MID FEMORAL AUGMENT: NORMAL
BH CV LOWER VASCULAR RIGHT MID FEMORAL COMPETENT: NORMAL
BH CV LOWER VASCULAR RIGHT MID FEMORAL COMPRESS: NORMAL
BH CV LOWER VASCULAR RIGHT MID FEMORAL PHASIC: NORMAL
BH CV LOWER VASCULAR RIGHT MID FEMORAL SPONT: NORMAL
BH CV LOWER VASCULAR RIGHT PERONEAL COMPRESS: NORMAL
BH CV LOWER VASCULAR RIGHT POPLITEAL AUGMENT: NORMAL
BH CV LOWER VASCULAR RIGHT POPLITEAL COMPETENT: NORMAL
BH CV LOWER VASCULAR RIGHT POPLITEAL COMPRESS: NORMAL
BH CV LOWER VASCULAR RIGHT POPLITEAL PHASIC: NORMAL
BH CV LOWER VASCULAR RIGHT POPLITEAL SPONT: NORMAL
BH CV LOWER VASCULAR RIGHT POSTERIOR TIBIAL COMPRESS: NORMAL
BH CV LOWER VASCULAR RIGHT PROFUNDA FEMORAL COMPRESS: NORMAL
BH CV LOWER VASCULAR RIGHT PROXIMAL FEMORAL COMPRESS: NORMAL
BH CV LOWER VASCULAR RIGHT SAPHENOFEMORAL JUNCTION COMPRESS: NORMAL
BILIRUB SERPL-MCNC: 0.2 MG/DL (ref 0–1.2)
BUN SERPL-MCNC: 12 MG/DL (ref 8–23)
BUN/CREAT SERPL: 13.2 (ref 7–25)
CALCIUM SPEC-SCNC: 9.5 MG/DL (ref 8.6–10.5)
CHLORIDE SERPL-SCNC: 101 MMOL/L (ref 98–107)
CO2 SERPL-SCNC: 24 MMOL/L (ref 22–29)
CREAT SERPL-MCNC: 0.91 MG/DL (ref 0.57–1)
D DIMER PPP FEU-MCNC: 1.75 MCGFEU/ML (ref 0–0.7)
DEPRECATED RDW RBC AUTO: 40.8 FL (ref 37–54)
EGFRCR SERPLBLD CKD-EPI 2021: 68 ML/MIN/1.73
EOSINOPHIL # BLD AUTO: 0.06 10*3/MM3 (ref 0–0.4)
EOSINOPHIL NFR BLD AUTO: 0.8 % (ref 0.3–6.2)
ERYTHROCYTE [DISTWIDTH] IN BLOOD BY AUTOMATED COUNT: 12.1 % (ref 12.3–15.4)
GLOBULIN UR ELPH-MCNC: 2.6 GM/DL
GLUCOSE SERPL-MCNC: 92 MG/DL (ref 65–99)
HCT VFR BLD AUTO: 37.4 % (ref 34–46.6)
HGB BLD-MCNC: 12.1 G/DL (ref 12–15.9)
IMM GRANULOCYTES # BLD AUTO: 0.03 10*3/MM3 (ref 0–0.05)
IMM GRANULOCYTES NFR BLD AUTO: 0.4 % (ref 0–0.5)
LYMPHOCYTES # BLD AUTO: 1.32 10*3/MM3 (ref 0.7–3.1)
LYMPHOCYTES NFR BLD AUTO: 17.1 % (ref 19.6–45.3)
MAXIMAL PREDICTED HEART RATE: 150 BPM
MCH RBC QN AUTO: 29.7 PG (ref 26.6–33)
MCHC RBC AUTO-ENTMCNC: 32.4 G/DL (ref 31.5–35.7)
MCV RBC AUTO: 91.7 FL (ref 79–97)
MONOCYTES # BLD AUTO: 0.54 10*3/MM3 (ref 0.1–0.9)
MONOCYTES NFR BLD AUTO: 7 % (ref 5–12)
NEUTROPHILS NFR BLD AUTO: 5.73 10*3/MM3 (ref 1.7–7)
NEUTROPHILS NFR BLD AUTO: 74.2 % (ref 42.7–76)
NRBC BLD AUTO-RTO: 0 /100 WBC (ref 0–0.2)
NT-PROBNP SERPL-MCNC: 147 PG/ML (ref 0–900)
PLATELET # BLD AUTO: 411 10*3/MM3 (ref 140–450)
PMV BLD AUTO: 8.7 FL (ref 6–12)
POTASSIUM SERPL-SCNC: 3.6 MMOL/L (ref 3.5–5.2)
PROT SERPL-MCNC: 6.6 G/DL (ref 6–8.5)
RBC # BLD AUTO: 4.08 10*6/MM3 (ref 3.77–5.28)
SODIUM SERPL-SCNC: 136 MMOL/L (ref 136–145)
STRESS TARGET HR: 128 BPM
WBC NRBC COR # BLD: 7.72 10*3/MM3 (ref 3.4–10.8)

## 2023-02-03 PROCEDURE — 71046 X-RAY EXAM CHEST 2 VIEWS: CPT

## 2023-02-03 PROCEDURE — 93971 EXTREMITY STUDY: CPT

## 2023-02-03 PROCEDURE — 36415 COLL VENOUS BLD VENIPUNCTURE: CPT

## 2023-02-03 PROCEDURE — 93000 ELECTROCARDIOGRAM COMPLETE: CPT | Performed by: STUDENT IN AN ORGANIZED HEALTH CARE EDUCATION/TRAINING PROGRAM

## 2023-02-03 PROCEDURE — 85379 FIBRIN DEGRADATION QUANT: CPT

## 2023-02-03 PROCEDURE — 99204 OFFICE O/P NEW MOD 45 MIN: CPT | Performed by: STUDENT IN AN ORGANIZED HEALTH CARE EDUCATION/TRAINING PROGRAM

## 2023-02-03 PROCEDURE — 83880 ASSAY OF NATRIURETIC PEPTIDE: CPT

## 2023-02-03 PROCEDURE — 80053 COMPREHEN METABOLIC PANEL: CPT

## 2023-02-03 PROCEDURE — 85025 COMPLETE CBC W/AUTO DIFF WBC: CPT

## 2023-02-03 RX ORDER — LOSARTAN POTASSIUM 25 MG/1
25 TABLET ORAL DAILY
Qty: 30 TABLET | Refills: 11 | Status: SHIPPED | OUTPATIENT
Start: 2023-02-03 | End: 2024-02-03

## 2023-02-03 RX ORDER — FUROSEMIDE 40 MG/1
40 TABLET ORAL DAILY
Qty: 30 TABLET | Refills: 11 | Status: SHIPPED | OUTPATIENT
Start: 2023-02-03

## 2023-02-03 NOTE — PROGRESS NOTES
The preliminary findings of today's right lower extremity venous duplex are negative for deep vein thrombosis. These preliminary findings were called to Melanie ANDERSON RN at Dr. Martinez office. Melanie advised patient may leave for additional testing ordered today and they will call the patient later with the results. Patient advised of this and taken to the outpatient lab.

## 2023-02-03 NOTE — TELEPHONE ENCOUNTER
Aida from vascular lab is calling to give preliminary results for RLE venous doppler.  Prelim report is negative.    I spoke with Dr. Martinez in office.  Patient can be released home.  She needs a CXR and labs first though.      Aida states patient is aware and she is going to take her over to radiology department next.    Scheduling:  Per EK, pt needs a 1 week follow up appt with echo same day.  Could you please call her to schedule?    Melanie Millard RN  [facility] Triage  02/03/23 11:58 EST
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
## 2023-02-03 NOTE — PATIENT INSTRUCTIONS
I am starting a medication called losartan to help with blood pressure.     I am starting lasix 40mg tablet. You are to take this once per day for 5 days. Then we will see you in clinic again to see if you still need this.    I have ordered a heart ultrasound to evaluate the heart muscle and look for heart failure.     You need to stop by the lab to have blood work today, and then get an ultrasound of your leg to look for a clot. You will need to wait in radiology for the result to make sure you do not need treatment if a clot is there.     Also, I want to get a chest x ray to evaluate you lungs and look for fluid.     Lastly, we have fit you for a heart monitor to evaluate your palpitations. Wear this for 2 days.

## 2023-02-03 NOTE — PROGRESS NOTES
Can we please call patient and let her know that her blood work did not show overt evidence of heart failure, which is good, however she should continue to take the Lasix for her lower extremity swelling.  It is also reassuring that she does not have a blood clot in her legs.  We will need to repeat her heart ultrasound, as we discussed in clinic, to look at her heart muscle ensure there is no significant heart failure causing her symptoms.  The heart failure marker, called a BNP, was normal which is reassuring.

## 2023-02-07 ENCOUNTER — HOSPITAL ENCOUNTER (OUTPATIENT)
Dept: MRI IMAGING | Facility: HOSPITAL | Age: 73
Discharge: HOME | End: 2023-02-07
Admitting: INTERNAL MEDICINE
Payer: MEDICARE

## 2023-02-07 DIAGNOSIS — M47.817 LUMBOSACRAL SPONDYLOSIS WITHOUT MYELOPATHY: ICD-10-CM

## 2023-02-07 PROCEDURE — 72148 MRI LUMBAR SPINE W/O DYE: CPT

## 2023-02-08 NOTE — TELEPHONE ENCOUNTER
02/13/18  9:52 AM  I called; she is scheduled to see Dr. Sargent 2/14 at 1000. I advised that she call our office or go to ER after hours for recurrence of chest pain. She agrees with this plan - tmm.   26.7

## 2023-02-09 ENCOUNTER — TELEPHONE (OUTPATIENT)
Dept: INTERNAL MEDICINE | Facility: CLINIC | Age: 73
End: 2023-02-09

## 2023-02-09 NOTE — TELEPHONE ENCOUNTER
Caller: Traci King    Relationship: Self    Best call back number: [phone]    What is the best time to reach you: ANYTIME    Who are you requesting to speak with (clinical staff, provider,  specific staff member): DR PRASAD    What was the call regarding: PATIENT IS REQUESTING A CALLBACK TO GO OVER MRI RESULTS FROM 2/7/23.

## 2023-02-09 NOTE — TELEPHONE ENCOUNTER
Result message was sent about this earlier to Hilaria but uncertain if she had a chance to reach out to her before she left, pt has an appt scheduled next week in the office so we can go over it in detail when she comes as there is too much to discuss over the phone, but there are some changes that may explain her symptoms, but nothing that is emergent or needs immediate attention in an ER setting, so we have time to discuss in detail next week

## 2023-02-14 ENCOUNTER — OFFICE VISIT (OUTPATIENT)
Dept: INTERNAL MEDICINE | Facility: CLINIC | Age: 73
End: 2023-02-14
Payer: MEDICARE

## 2023-02-14 VITALS
DIASTOLIC BLOOD PRESSURE: 74 MMHG | HEIGHT: 60 IN | RESPIRATION RATE: 16 BRPM | TEMPERATURE: 97.4 F | HEART RATE: 77 BPM | WEIGHT: 178 LBS | OXYGEN SATURATION: 92 % | SYSTOLIC BLOOD PRESSURE: 146 MMHG | BODY MASS INDEX: 34.95 KG/M2

## 2023-02-14 DIAGNOSIS — I51.89 DIASTOLIC DYSFUNCTION: ICD-10-CM

## 2023-02-14 DIAGNOSIS — S32.040D COMPRESSION FRACTURE OF L4 VERTEBRA WITH ROUTINE HEALING: Chronic | ICD-10-CM

## 2023-02-14 DIAGNOSIS — F32.A ANXIETY AND DEPRESSION: Primary | Chronic | ICD-10-CM

## 2023-02-14 DIAGNOSIS — S32.020G COMPRESSION FRACTURE OF L2 VERTEBRA WITH DELAYED HEALING, SUBSEQUENT ENCOUNTER: ICD-10-CM

## 2023-02-14 DIAGNOSIS — F41.9 ANXIETY AND DEPRESSION: Primary | Chronic | ICD-10-CM

## 2023-02-14 PROCEDURE — 99214 OFFICE O/P EST MOD 30 MIN: CPT | Performed by: INTERNAL MEDICINE

## 2023-02-14 NOTE — ASSESSMENT & PLAN NOTE
IMPROVED  - cont on recently added wellbutrin  - cont abilify at 15 mg for now  - cont on fluoxetine at 60 mg dose  - had been on buspar at her last visit but pt has stopped this medication as she felt she was doing well enough she didn't need it any longer, had tolerated well if symptoms flare again in the future  - Reviewed potential side effects of medication including sexual performance changes, insomnia, fatigue, weight changes  - Gene[employer]ight testing results indicated she is somewhat of a hypermetabolizer for several first line SSRI medications

## 2023-02-14 NOTE — ASSESSMENT & PLAN NOTE
IMPROVING  -Following with cardiology  -Echo ordered and pending for follow-up, last done several years ago showing presence of diastolic dysfunction  -Some question about pulmonary hypertension as contributor to her ongoing dyspnea complaints, based on echo report, cardiology to follow-up and determine need for right heart cath  -For now, on Lasix 40 mg once a day, will check BMP today and see if kidneys and electrolytes will tolerate dose increase to twice daily  -Discussion and cardiology notes about adding Jardiance to her daily regimen based on echo and response to Lasix therapy

## 2023-02-14 NOTE — PROGRESS NOTES
"Chief Complaint  Follow-up, Anxiety, Depression, and Results (MRI)    Subjective          Traci King presents to [facility] for follow up an anxiety and depression and discussion of MRI results.   Has seen cardiology since she was last seen here for eval of worsening LE edema and progressive SOB. Has TTE planned with some concern for possible pHTN and may need RHC. Did a trial of lasix x 5 days with improvement in her LE swelling but not complete resolution. She is still taking this once daily now. She states her breathing is only minimally better since starting the daily water pill.   Her Breo dose was increased to the 200 per day and that seems to have helped her breathing some as well.   Has also had an MRI done on her L spine due to progression of pain and radicular symptoms. She does follow with pain mgmt actively.     Objective   Vital Signs:     /74   Pulse 77   Temp 97.4 °F (36.3 °C)   Resp 16   Ht 152.4 cm (60\")   Wt 80.7 kg (178 lb)   SpO2 92%   BMI 34.76 kg/m²     Physical Exam  Vitals and nursing note reviewed.   Constitutional:       General: She is not in acute distress.     Appearance: Normal appearance.   Cardiovascular:      Rate and Rhythm: Normal rate and regular rhythm.      Pulses: Normal pulses.      Heart sounds: Normal heart sounds. No murmur heard.  Pulmonary:      Effort: Pulmonary effort is normal. No respiratory distress.      Breath sounds: Normal breath sounds.   Abdominal:      General: Abdomen is flat. Bowel sounds are normal.      Palpations: Abdomen is soft.      Tenderness: There is no abdominal tenderness.   Musculoskeletal:      Right lower leg: Edema present.      Left lower leg: Edema present.      Comments: 1+ pitting edema in BLE to knees   Neurological:      Mental Status: She is alert and oriented to person, place, and time. Mental status is at baseline.      Gait: Gait (antalgic gait present) normal. "   Psychiatric:         Mood and Affect: Mood normal.         Behavior: Behavior normal.          Result Review :   The following data was reviewed by: Sherry Fournier MD on 02/14/2023:  CMP    CMP 10/13/22 12/27/22 2/3/23   Glucose 95 89 92   BUN 15 13 12   Creatinine 0.93 0.79 0.91   eGFR   68.0   Sodium 135 (A) 140 136   Potassium 5.2 3.8 3.6   Chloride 103 103 101   Calcium 9.2 9.6 9.5   Total Protein  6.6    Total Protein   6.6   Albumin  3.9 4.0   Globulin  2.7    Globulin   2.6   Total Bilirubin  <0.2 0.2   Alkaline Phosphatase  104 154 (A)   AST (SGOT)  11 10   ALT (SGPT)  6 7   Albumin/Globulin Ratio   1.5   BUN/Creatinine Ratio 16.1 16 13.2   Anion Gap   11.0   (A) Abnormal value       Comments are available for some flowsheets but are not being displayed.           CBC w/diff    CBC w/Diff 6/7/22 12/27/22 2/3/23   WBC 9.4 11.1 (A) 7.72   RBC 3.31 (A) 3.41 (A) 4.08   Hemoglobin 11.1 10.9 (A) 12.1   Hematocrit 32.9 (A) 32.2 (A) 37.4   MCV 99 (A) 94 91.7   MCH 33.5 (A) 32.0 29.7   MCHC 33.7 33.9 32.4   RDW 12.2 12.5 12.1 (A)   Platelets 409 331 411   Neutrophil Rel % 71 78 74.2   Immature Granulocyte Rel %   0.4   Lymphocyte Rel % 20 14 17.1 (A)   Monocyte Rel % 6 7 7.0   Eosinophil Rel % 1 0 0.8   Basophil Rel % 1 1 0.5   (A) Abnormal value            Lipid Panel    Lipid Panel 8/2/22 9/7/22   Total Cholesterol 175 182   Triglycerides 94 126   HDL Cholesterol 83 75   VLDL Cholesterol 17 22   LDL Cholesterol  75 85           TSH    TSH 12/27/22   TSH 1.240                   Data reviewed: Radiologic studies MRI L spine and Consultant notes cardiology            Assessment and Plan      Diagnoses and all orders for this visit:    1. Anxiety and depression (Primary)  Assessment & Plan:  IMPROVED  - cont on recently added wellbutrin  - cont abilify at 15 mg for now  - cont on fluoxetine at 60 mg dose  - had been on buspar at her last visit but pt has stopped this medication as she felt she was doing well  enough she didn't need it any longer, had tolerated well if symptoms flare again in the future  - Reviewed potential side effects of medication including sexual performance changes, insomnia, fatigue, weight changes  - GeneSight testing results indicated she is somewhat of a hypermetabolizer for several first line SSRI medications      2. Diastolic dysfunction  Assessment & Plan:  IMPROVING  -Following with cardiology  -Echo ordered and pending for follow-up, last done several years ago showing presence of diastolic dysfunction  -Some question about pulmonary hypertension as contributor to her ongoing dyspnea complaints, based on echo report, cardiology to follow-up and determine need for right heart cath  -For now, on Lasix 40 mg once a day, will check BMP today and see if kidneys and electrolytes will tolerate dose increase to twice daily  -Discussion and cardiology notes about adding Jardiance to her daily regimen based on echo and response to Lasix therapy      Orders:  -     Basic metabolic panel    3. Compression fracture of L2 vertebra with delayed healing, subsequent encounter  4. Compression fracture of L4 vertebra with routine healing   - pt has referral pending to [facility] but has not yet been scheduled, will have my office reach out to arrange this as I suspect scheduling was delayed pending the MRI results   - MRI reviewed in detail with the patient which revealed new compression fracture at L4   - has follow up planned with pain mgmt for ongoing interventional and medical treatment of her chronic back pain      Follow Up   Return in about 3 months (around 5/14/2023) for follow up back pain, anxiety, HTN, SOB, COPD.    Patient was given instructions and counseling regarding her condition or for health maintenance advice. Please see specific information pulled into the AVS if appropriate.     Lyndsay Fournier MD  [facility]  Phone: [phone]  Fax:  [phone]

## 2023-02-15 LAB
BUN SERPL-MCNC: 14 MG/DL (ref 8–23)
BUN/CREAT SERPL: 14 (ref 7–25)
CALCIUM SERPL-MCNC: 9.6 MG/DL (ref 8.6–10.5)
CHLORIDE SERPL-SCNC: 97 MMOL/L (ref 98–107)
CO2 SERPL-SCNC: 29.5 MMOL/L (ref 22–29)
CREAT SERPL-MCNC: 1 MG/DL (ref 0.57–1)
EGFRCR SERPLBLD CKD-EPI 2021: 60.7 ML/MIN/1.73
GLUCOSE SERPL-MCNC: 88 MG/DL (ref 65–99)
POTASSIUM SERPL-SCNC: 4.1 MMOL/L (ref 3.5–5.2)
SODIUM SERPL-SCNC: 138 MMOL/L (ref 136–145)

## 2023-02-15 NOTE — TELEPHONE ENCOUNTER
I s/w the patient and relayed Dr. Fournier's message regarding her lab results and instructions.  I also scheduled her a lab appt for Monday, 2/20/23.

## 2023-02-27 ENCOUNTER — PREP FOR SURGERY (OUTPATIENT)
Dept: SURGERY | Facility: SURGERY CENTER | Age: 73
End: 2023-02-27
Payer: MEDICARE

## 2023-02-27 ENCOUNTER — OFFICE VISIT (OUTPATIENT)
Dept: PAIN MEDICINE | Facility: CLINIC | Age: 73
End: 2023-02-27
Payer: MEDICARE

## 2023-02-27 VITALS
BODY MASS INDEX: 35.1 KG/M2 | SYSTOLIC BLOOD PRESSURE: 97 MMHG | DIASTOLIC BLOOD PRESSURE: 52 MMHG | OXYGEN SATURATION: 97 % | TEMPERATURE: 97.8 F | HEIGHT: 60 IN | HEART RATE: 90 BPM | WEIGHT: 178.8 LBS

## 2023-02-27 DIAGNOSIS — M47.817 LUMBOSACRAL SPONDYLOSIS WITHOUT MYELOPATHY: ICD-10-CM

## 2023-02-27 DIAGNOSIS — S32.040D COMPRESSION FRACTURE OF L4 VERTEBRA WITH ROUTINE HEALING: Primary | Chronic | ICD-10-CM

## 2023-02-27 DIAGNOSIS — M47.817 LUMBOSACRAL SPONDYLOSIS WITHOUT MYELOPATHY: Primary | ICD-10-CM

## 2023-02-27 DIAGNOSIS — Z79.899 ENCOUNTER FOR LONG-TERM (CURRENT) USE OF HIGH-RISK MEDICATION: ICD-10-CM

## 2023-02-27 DIAGNOSIS — M51.36 DDD (DEGENERATIVE DISC DISEASE), LUMBAR: ICD-10-CM

## 2023-02-27 DIAGNOSIS — S32.020G COMPRESSION FRACTURE OF L2 VERTEBRA WITH DELAYED HEALING, SUBSEQUENT ENCOUNTER: ICD-10-CM

## 2023-02-27 DIAGNOSIS — M47.816 LUMBAR FACET ARTHROPATHY: ICD-10-CM

## 2023-02-27 DIAGNOSIS — M47.816 LUMBAR FACET ARTHROPATHY: Primary | ICD-10-CM

## 2023-02-27 PROCEDURE — 99214 OFFICE O/P EST MOD 30 MIN: CPT | Performed by: PHYSICIAN ASSISTANT

## 2023-02-27 RX ORDER — SODIUM CHLORIDE 0.9 % (FLUSH) 0.9 %
10 SYRINGE (ML) INJECTION AS NEEDED
OUTPATIENT
Start: 2023-02-27

## 2023-02-27 RX ORDER — SODIUM CHLORIDE 0.9 % (FLUSH) 0.9 %
10 SYRINGE (ML) INJECTION EVERY 12 HOURS SCHEDULED
OUTPATIENT
Start: 2023-02-27
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
## 2023-02-28 ENCOUNTER — HOSPITAL ENCOUNTER (OUTPATIENT)
Dept: CARDIOLOGY | Facility: HOSPITAL | Age: 73
Discharge: HOME | End: 2023-02-28
Admitting: STUDENT IN AN ORGANIZED HEALTH CARE EDUCATION/TRAINING PROGRAM
Payer: MEDICARE

## 2023-02-28 VITALS
DIASTOLIC BLOOD PRESSURE: 65 MMHG | HEART RATE: 71 BPM | HEIGHT: 60 IN | SYSTOLIC BLOOD PRESSURE: 140 MMHG | WEIGHT: 178.79 LBS | BODY MASS INDEX: 35.1 KG/M2

## 2023-02-28 DIAGNOSIS — R00.2 PALPITATIONS: ICD-10-CM

## 2023-02-28 LAB
AORTIC ARCH: 2.5 CM
ASCENDING AORTA: 2.8 CM
BH CV ECHO MEAS - ACS: 2.07 CM
BH CV ECHO MEAS - AO MAX PG: 10.8 MMHG
BH CV ECHO MEAS - AO MEAN PG: 4.8 MMHG
BH CV ECHO MEAS - AO ROOT DIAM: 2.8 CM
BH CV ECHO MEAS - AO V2 MAX: 164.2 CM/SEC
BH CV ECHO MEAS - AO V2 VTI: 32.6 CM
BH CV ECHO MEAS - AVA(I,D): 1.71 CM2
BH CV ECHO MEAS - EDV(CUBED): 78.7 ML
BH CV ECHO MEAS - EDV(MOD-SP2): 82 ML
BH CV ECHO MEAS - EDV(MOD-SP4): 120 ML
BH CV ECHO MEAS - EF(MOD-BP): 60.3 %
BH CV ECHO MEAS - EF(MOD-SP2): 57.3 %
BH CV ECHO MEAS - EF(MOD-SP4): 58.3 %
BH CV ECHO MEAS - ESV(CUBED): 32.6 ML
BH CV ECHO MEAS - ESV(MOD-SP2): 35 ML
BH CV ECHO MEAS - ESV(MOD-SP4): 50 ML
BH CV ECHO MEAS - FS: 25.5 %
BH CV ECHO MEAS - IVS/LVPW: 1.17 CM
BH CV ECHO MEAS - IVSD: 1.08 CM
BH CV ECHO MEAS - LAT PEAK E' VEL: 9.9 CM/SEC
BH CV ECHO MEAS - LV DIASTOLIC VOL/BSA (35-75): 67.6 CM2
BH CV ECHO MEAS - LV MASS(C)D: 141.9 GRAMS
BH CV ECHO MEAS - LV MAX PG: 5.1 MMHG
BH CV ECHO MEAS - LV MEAN PG: 2.6 MMHG
BH CV ECHO MEAS - LV SYSTOLIC VOL/BSA (12-30): 28.2 CM2
BH CV ECHO MEAS - LV V1 MAX: 112.5 CM/SEC
BH CV ECHO MEAS - LV V1 VTI: 23.3 CM
BH CV ECHO MEAS - LVIDD: 4.3 CM
BH CV ECHO MEAS - LVIDS: 3.2 CM
BH CV ECHO MEAS - LVOT AREA: 2.39 CM2
BH CV ECHO MEAS - LVOT DIAM: 1.74 CM
BH CV ECHO MEAS - LVPWD: 0.92 CM
BH CV ECHO MEAS - MED PEAK E' VEL: 6.7 CM/SEC
BH CV ECHO MEAS - MV A DUR: 0.14 SEC
BH CV ECHO MEAS - MV A MAX VEL: 133.5 CM/SEC
BH CV ECHO MEAS - MV DEC SLOPE: 648.3 CM/SEC2
BH CV ECHO MEAS - MV DEC TIME: 0.14 MSEC
BH CV ECHO MEAS - MV E MAX VEL: 108 CM/SEC
BH CV ECHO MEAS - MV E/A: 0.81
BH CV ECHO MEAS - MV MAX PG: 6.7 MMHG
BH CV ECHO MEAS - MV MEAN PG: 1.56 MMHG
BH CV ECHO MEAS - MV P1/2T: 42.9 MSEC
BH CV ECHO MEAS - MV V2 VTI: 25.7 CM
BH CV ECHO MEAS - MVA(P1/2T): 5.1 CM2
BH CV ECHO MEAS - MVA(VTI): 2.17 CM2
BH CV ECHO MEAS - PA ACC TIME: 0.08 SEC
BH CV ECHO MEAS - PA PR(ACCEL): 42.6 MMHG
BH CV ECHO MEAS - PA V2 MAX: 116.7 CM/SEC
BH CV ECHO MEAS - PULM A REVS DUR: 0.16 SEC
BH CV ECHO MEAS - PULM A REVS VEL: 37.5 CM/SEC
BH CV ECHO MEAS - PULM DIAS VEL: 43.2 CM/SEC
BH CV ECHO MEAS - PULM S/D: 1.11
BH CV ECHO MEAS - PULM SYS VEL: 47.8 CM/SEC
BH CV ECHO MEAS - QP/QS: 1.6
BH CV ECHO MEAS - RAP SYSTOLE: 8 MMHG
BH CV ECHO MEAS - RV MAX PG: 2.34 MMHG
BH CV ECHO MEAS - RV V1 MAX: 76.5 CM/SEC
BH CV ECHO MEAS - RV V1 VTI: 17.5 CM
BH CV ECHO MEAS - RVOT DIAM: 2.5 CM
BH CV ECHO MEAS - RVSP: 41.8 MMHG
BH CV ECHO MEAS - SI(MOD-SP2): 26.5 ML/M2
BH CV ECHO MEAS - SI(MOD-SP4): 39.4 ML/M2
BH CV ECHO MEAS - SUP REN AO DIAM: 2.7 CM
BH CV ECHO MEAS - SV(LVOT): 55.7 ML
BH CV ECHO MEAS - SV(MOD-SP2): 47 ML
BH CV ECHO MEAS - SV(MOD-SP4): 70 ML
BH CV ECHO MEAS - SV(RVOT): 88.9 ML
BH CV ECHO MEAS - TAPSE (>1.6): 2.6 CM
BH CV ECHO MEAS - TR MAX PG: 33.8 MMHG
BH CV ECHO MEAS - TR MAX VEL: 290.7 CM/SEC
BH CV ECHO MEASUREMENTS AVERAGE E/E' RATIO: 13.01
BH CV XLRA - RV BASE: 2.8 CM
BH CV XLRA - RV LENGTH: 6.8 CM
BH CV XLRA - RV MID: 3.3 CM
BH CV XLRA - TDI S': 18.1 CM/SEC
LEFT ATRIUM VOLUME INDEX: 21.3 ML/M2
MAXIMAL PREDICTED HEART RATE: 150 BPM
SINUS: 2.45 CM
STJ: 2.05 CM
STRESS TARGET HR: 128 BPM

## 2023-02-28 PROCEDURE — 93306 TTE W/DOPPLER COMPLETE: CPT

## 2023-02-28 PROCEDURE — 93306 TTE W/DOPPLER COMPLETE: CPT | Performed by: INTERNAL MEDICINE

## 2023-02-28 PROCEDURE — 25510000001 PERFLUTREN (DEFINITY) 8.476 MG IN SODIUM CHLORIDE (PF) 0.9 % 10 ML INJECTION: Performed by: STUDENT IN AN ORGANIZED HEALTH CARE EDUCATION/TRAINING PROGRAM

## 2023-02-28 RX ORDER — OXYCODONE AND ACETAMINOPHEN 7.5; 325 MG/1; MG/1
TABLET ORAL
Qty: 150 TABLET | Refills: 0 | Status: SHIPPED | OUTPATIENT
Start: 2023-03-03 | End: 2023-03-27 | Stop reason: SDUPTHER

## 2023-02-28 RX ADMIN — PERFLUTREN 2 ML: 6.52 INJECTION, SUSPENSION INTRAVENOUS at 11:22
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
## 2023-03-13 ENCOUNTER — PREP FOR SURGERY (OUTPATIENT)
Dept: OTHER | Facility: HOSPITAL | Age: 73
End: 2023-03-13
Payer: MEDICARE

## 2023-03-13 ENCOUNTER — OFFICE VISIT (OUTPATIENT)
Dept: NEUROSURGERY | Facility: CLINIC | Age: 73
End: 2023-03-13
Payer: MEDICARE

## 2023-03-13 VITALS
OXYGEN SATURATION: 97 % | DIASTOLIC BLOOD PRESSURE: 70 MMHG | TEMPERATURE: 97.1 F | WEIGHT: 181.8 LBS | HEIGHT: 58 IN | HEART RATE: 74 BPM | SYSTOLIC BLOOD PRESSURE: 130 MMHG | BODY MASS INDEX: 38.16 KG/M2

## 2023-03-13 DIAGNOSIS — S32.040A COMPRESSION FRACTURE OF L4 LUMBAR VERTEBRA, CLOSED, INITIAL ENCOUNTER: Primary | ICD-10-CM

## 2023-03-13 DIAGNOSIS — M80.80XA OSTEOPOROSIS WITH FRACTURE: ICD-10-CM

## 2023-03-13 PROCEDURE — 3075F SYST BP GE 130 - 139MM HG: CPT | Performed by: NEUROLOGICAL SURGERY

## 2023-03-13 PROCEDURE — 3078F DIAST BP <80 MM HG: CPT | Performed by: NEUROLOGICAL SURGERY

## 2023-03-13 PROCEDURE — 1160F RVW MEDS BY RX/DR IN RCRD: CPT | Performed by: NEUROLOGICAL SURGERY

## 2023-03-13 PROCEDURE — 1159F MED LIST DOCD IN RCRD: CPT | Performed by: NEUROLOGICAL SURGERY

## 2023-03-13 PROCEDURE — 99203 OFFICE O/P NEW LOW 30 MIN: CPT | Performed by: NEUROLOGICAL SURGERY

## 2023-03-16 ENCOUNTER — TRANSCRIBE ORDERS (OUTPATIENT)
Dept: SURGERY | Facility: SURGERY CENTER | Age: 73
End: 2023-03-16
Payer: MEDICARE

## 2023-03-16 DIAGNOSIS — Z41.9 SURGERY, ELECTIVE: Primary | ICD-10-CM

## 2023-03-17 ENCOUNTER — PATIENT ROUNDING (BHMG ONLY) (OUTPATIENT)
Dept: NEUROSURGERY | Facility: CLINIC | Age: 73
End: 2023-03-17
Payer: MEDICARE

## 2023-03-17 ENCOUNTER — OFFICE VISIT (OUTPATIENT)
Dept: INTERNAL MEDICINE | Facility: CLINIC | Age: 73
End: 2023-03-17
Payer: MEDICARE

## 2023-03-17 VITALS
TEMPERATURE: 97 F | OXYGEN SATURATION: 96 % | SYSTOLIC BLOOD PRESSURE: 126 MMHG | HEART RATE: 79 BPM | BODY MASS INDEX: 37.79 KG/M2 | HEIGHT: 58 IN | DIASTOLIC BLOOD PRESSURE: 84 MMHG | RESPIRATION RATE: 16 BRPM | WEIGHT: 180 LBS

## 2023-03-17 DIAGNOSIS — F32.A ANXIETY AND DEPRESSION: Primary | Chronic | ICD-10-CM

## 2023-03-17 DIAGNOSIS — F41.9 ANXIETY AND DEPRESSION: Primary | Chronic | ICD-10-CM

## 2023-03-17 PROCEDURE — 1160F RVW MEDS BY RX/DR IN RCRD: CPT | Performed by: INTERNAL MEDICINE

## 2023-03-17 PROCEDURE — 99214 OFFICE O/P EST MOD 30 MIN: CPT | Performed by: INTERNAL MEDICINE

## 2023-03-17 PROCEDURE — 1159F MED LIST DOCD IN RCRD: CPT | Performed by: INTERNAL MEDICINE

## 2023-03-17 NOTE — ASSESSMENT & PLAN NOTE
UNCONTROLLED  - will trial a change from fluoxetine to trintellix to see if this provides better control of her depression  - cont on recently added wellbutrin, could consider increase to 300 mg dose in the future as needd  - cont abilify at 15 mg for now, pt would like to transition to rexulti eventually, but will make one med change at a time  - had been on buspar but pt stopped this in past 6 mos because her anxiety had done much better, was effective anxiolytic when she was taking it, would take again if needed  - Reviewed potential side effects of medication including sexual performance changes, insomnia, fatigue, weight changes  - [employer] testing results indicated she is somewhat of a hypermetabolizer for several first line SSRI medications  - previous meds tried: paxil (ineffective), prozac (became ineffective)

## 2023-03-20 ENCOUNTER — TELEPHONE (OUTPATIENT)
Dept: NEUROSURGERY | Facility: CLINIC | Age: 73
End: 2023-03-20
Payer: MEDICARE

## 2023-03-20 NOTE — TELEPHONE ENCOUNTER
Spoke with patient and cancelled procedure. She will call me back if she would like to reschedule.

## 2023-03-20 NOTE — TELEPHONE ENCOUNTER
Patient needs to cancel Kyoplasty at this time. Doctor is switching some medications on her and feels that she shoulld hold off surgery until another date.

## 2023-03-27 ENCOUNTER — OFFICE VISIT (OUTPATIENT)
Dept: PAIN MEDICINE | Facility: CLINIC | Age: 73
End: 2023-03-27
Payer: MEDICARE

## 2023-03-27 VITALS
DIASTOLIC BLOOD PRESSURE: 80 MMHG | BODY MASS INDEX: 38.24 KG/M2 | SYSTOLIC BLOOD PRESSURE: 131 MMHG | OXYGEN SATURATION: 96 % | TEMPERATURE: 98.2 F | HEIGHT: 58 IN | HEART RATE: 82 BPM | WEIGHT: 182.2 LBS

## 2023-03-27 DIAGNOSIS — M47.816 LUMBAR FACET ARTHROPATHY: ICD-10-CM

## 2023-03-27 DIAGNOSIS — M51.36 DDD (DEGENERATIVE DISC DISEASE), LUMBAR: ICD-10-CM

## 2023-03-27 DIAGNOSIS — M47.817 LUMBOSACRAL SPONDYLOSIS WITHOUT MYELOPATHY: ICD-10-CM

## 2023-03-27 DIAGNOSIS — S32.040D COMPRESSION FRACTURE OF L4 VERTEBRA WITH ROUTINE HEALING: Primary | Chronic | ICD-10-CM

## 2023-03-27 DIAGNOSIS — M62.838 MUSCLE SPASM: ICD-10-CM

## 2023-03-27 DIAGNOSIS — Z79.899 ENCOUNTER FOR LONG-TERM (CURRENT) USE OF HIGH-RISK MEDICATION: ICD-10-CM

## 2023-03-27 DIAGNOSIS — S32.020G COMPRESSION FRACTURE OF L2 VERTEBRA WITH DELAYED HEALING, SUBSEQUENT ENCOUNTER: ICD-10-CM

## 2023-03-27 PROCEDURE — 1159F MED LIST DOCD IN RCRD: CPT | Performed by: PHYSICIAN ASSISTANT

## 2023-03-27 PROCEDURE — 1160F RVW MEDS BY RX/DR IN RCRD: CPT | Performed by: PHYSICIAN ASSISTANT

## 2023-03-27 PROCEDURE — 99214 OFFICE O/P EST MOD 30 MIN: CPT | Performed by: PHYSICIAN ASSISTANT

## 2023-03-27 PROCEDURE — 3075F SYST BP GE 130 - 139MM HG: CPT | Performed by: PHYSICIAN ASSISTANT

## 2023-03-27 PROCEDURE — 3079F DIAST BP 80-89 MM HG: CPT | Performed by: PHYSICIAN ASSISTANT

## 2023-03-27 PROCEDURE — 1125F AMNT PAIN NOTED PAIN PRSNT: CPT | Performed by: PHYSICIAN ASSISTANT

## 2023-03-27 RX ORDER — OXYCODONE AND ACETAMINOPHEN 7.5; 325 MG/1; MG/1
TABLET ORAL
Qty: 150 TABLET | Refills: 0 | Status: SHIPPED | OUTPATIENT
Start: 2023-04-02

## 2023-03-27 RX ORDER — METHOCARBAMOL 750 MG/1
750 TABLET, FILM COATED ORAL 3 TIMES DAILY
Qty: 270 TABLET | Refills: 0 | Status: SHIPPED | OUTPATIENT
Start: 2023-03-27
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
## 2023-04-11 ENCOUNTER — OFFICE VISIT (OUTPATIENT)
Dept: INTERNAL MEDICINE | Facility: CLINIC | Age: 73
End: 2023-04-11
Payer: MEDICARE

## 2023-04-11 VITALS
OXYGEN SATURATION: 93 % | TEMPERATURE: 95.2 F | HEART RATE: 101 BPM | BODY MASS INDEX: 37.16 KG/M2 | HEIGHT: 58 IN | RESPIRATION RATE: 16 BRPM | SYSTOLIC BLOOD PRESSURE: 130 MMHG | DIASTOLIC BLOOD PRESSURE: 72 MMHG | WEIGHT: 177 LBS

## 2023-04-11 DIAGNOSIS — F32.A ANXIETY AND DEPRESSION: Primary | Chronic | ICD-10-CM

## 2023-04-11 DIAGNOSIS — F41.9 ANXIETY AND DEPRESSION: Primary | Chronic | ICD-10-CM

## 2023-04-11 DIAGNOSIS — H91.93 BILATERAL HEARING LOSS, UNSPECIFIED HEARING LOSS TYPE: ICD-10-CM

## 2023-04-11 RX ORDER — BUPROPION HYDROCHLORIDE 300 MG/1
300 TABLET ORAL DAILY
Qty: 90 TABLET | Refills: 1 | Status: SHIPPED | OUTPATIENT
Start: 2023-04-11 | End: 2023-04-13 | Stop reason: SDUPTHER

## 2023-04-11 NOTE — PROGRESS NOTES
"Chief Complaint  Follow-up, Anxiety, and Depression    Subjective          Traci King presents to [facility] for follow up on anxiety and depression. Her medications were changed the last time she was here, was on fluoxetine and now on trintellix. Does feel like she is a little better than the last time we saw her but still with some depressed mood and anhedonia. Does have buspar the she uses prn when anxiety is very bad.   Still very distressed about her weight and would like to try injectable medication if possible.   Also having new issues with hearing loss, having to turn TV volume up very high.     Objective   Vital Signs:     /72   Pulse 101   Temp 95.2 °F (35.1 °C)   Resp 16   Ht 147.3 cm (58\")   Wt 80.3 kg (177 lb)   SpO2 93%   BMI 36.99 kg/m²     Physical Exam  Vitals and nursing note reviewed.   Constitutional:       General: She is not in acute distress.     Appearance: Normal appearance.   HENT:      Right Ear: Tympanic membrane normal. There is no impacted cerumen.      Left Ear: Tympanic membrane normal. There is no impacted cerumen.      Nose: Nose normal. No congestion.      Mouth/Throat:      Mouth: Mucous membranes are moist.      Pharynx: Oropharynx is clear.   Pulmonary:      Effort: Pulmonary effort is normal. No respiratory distress.   Neurological:      Mental Status: She is alert and oriented to person, place, and time. Mental status is at baseline.   Psychiatric:         Mood and Affect: Mood is depressed. Mood is not anxious. Affect is flat. Affect is not tearful.         Speech: Speech normal.         Behavior: Behavior normal.         Thought Content: Thought content normal.         Judgment: Judgment normal.          Result Review : : None     Assessment and Plan      Diagnoses and all orders for this visit:    1. Anxiety and depression (Primary)  Assessment & Plan:  IMPROVING  - cont on new trintellix at 20 mg dose--> " refills sent  - will increase dose of wellbutrin from 150 mg to 300 mg for improved efficacy  - will change abilify 15 mg to rexulti to see if this is more effective for, start at 2 mg and may titrate up further from there based no response to therapy  - has buspar that has worked well for her as anxiolytic in the past, currently using prn  - Reviewed potential side effects of medication including sexual performance changes, insomnia, fatigue, weight changes  - GeneSight testing results indicated she is somewhat of a hypermetabolizer for several first line SSRI medications  - previous meds tried: paxil (ineffective), prozac (became ineffective), fluoxetine (became ineffective)    Orders:  -     buPROPion XL (Wellbutrin XL) 300 MG 24 hr tablet; Take 1 tablet by mouth Daily.  Dispense: 90 tablet; Refill: 1  -     Vortioxetine HBr (TRINTELLIX) 20 MG tablet; Take 1 tablet by mouth Daily With Breakfast.  Dispense: 90 tablet; Refill: 1  -     Brexpiprazole 2 MG tablet; Take 1 tablet by mouth Daily.  Dispense: 30 tablet; Refill: 1    2. Bilateral hearing loss, unspecified hearing loss type  -     Ambulatory Referral to ENT (Otolaryngology)        Follow Up   Return in about 4 weeks (around 5/9/2023) for follow up.    Patient was given instructions and counseling regarding her condition or for health maintenance advice. Please see specific information pulled into the AVS if appropriate.     Lyndsay Fournier MD  [facility]  Phone: [phone]  Fax: [phone]

## 2023-04-11 NOTE — ASSESSMENT & PLAN NOTE
IMPROVING  - cont on new trintellix at 20 mg dose--> refills sent  - will increase dose of wellbutrin from 150 mg to 300 mg for improved efficacy  - will change abilify 15 mg to rexulti to see if this is more effective for, start at 2 mg and may titrate up further from there based no response to therapy  - has buspar that has worked well for her as anxiolytic in the past, currently using prn  - Reviewed potential side effects of medication including sexual performance changes, insomnia, fatigue, weight changes  - GeneSight testing results indicated she is somewhat of a hypermetabolizer for several first line SSRI medications  - previous meds tried: paxil (ineffective), prozac (became ineffective), fluoxetine (became ineffective)

## 2023-04-12 ENCOUNTER — TELEPHONE (OUTPATIENT)
Dept: PAIN MEDICINE | Facility: CLINIC | Age: 73
End: 2023-04-12

## 2023-04-12 ENCOUNTER — TELEPHONE (OUTPATIENT)
Dept: INTERNAL MEDICINE | Facility: CLINIC | Age: 73
End: 2023-04-12
Payer: MEDICARE

## 2023-04-12 NOTE — TELEPHONE ENCOUNTER
Caller: Traci King    Relationship to patient: Self    Best call back number: [phone]    Additional notes: PT WANTS TO CX 04/25 PROCEDURE WITH DR. LEMUS.

## 2023-04-12 NOTE — TELEPHONE ENCOUNTER
Caller: Traci King    Relationship: Self    Best call back number: [phone]    What was the call regarding: PATIENT NEEDS A PRIOR AUTHORIZATION ON HER Brexpiprazole 2 MG tablet AND THE PHARMACY DID NOT RECEIVE HER WELLBUTRIN 300 MG, PLEASE RESEND.     Do you require a callback: YES, PLEASE ADVISE WHEN PRIOR AUTH IS SENT.

## 2023-04-13 DIAGNOSIS — F32.A ANXIETY AND DEPRESSION: Chronic | ICD-10-CM

## 2023-04-13 DIAGNOSIS — F41.9 ANXIETY AND DEPRESSION: Chronic | ICD-10-CM

## 2023-04-13 RX ORDER — BUPROPION HYDROCHLORIDE 300 MG/1
300 TABLET ORAL DAILY
Qty: 90 TABLET | Refills: 1 | Status: SHIPPED | OUTPATIENT
Start: 2023-04-13

## 2023-04-13 NOTE — TELEPHONE ENCOUNTER
Caller: Traci King    Relationship: Self    Best call back number: [phone]     Requested Prescriptions:   Requested Prescriptions     Pending Prescriptions Disp Refills   • buPROPion XL (Wellbutrin XL) 300 MG 24 hr tablet 90 tablet 1     Sig: Take 1 tablet by mouth Daily.        [facility] [address] - [phone]  - [phone] FX     Last office visit with prescribing clinician: 4/11/2023   Last telemedicine visit with prescribing clinician: 5/9/2023   Next office visit with prescribing clinician: 5/9/2023     Additional details provided by patient: PREVIOUS REQUEST ON 04/11/23 DID NOT SUBMIT PROPERLY TO THE PHARMACY.    Does the patient have less than a 3 day supply:  [x] Yes  [] No    Would you like a call back once the refill request has been completed: [] Yes [x] No    If the office needs to give you a call back, can they leave a voicemail: [] Yes [x] No    Elias Newell Rep   04/13/23 11:04 EDT

## 2023-04-13 NOTE — TELEPHONE ENCOUNTER
PREVIOUS REQUEST ON 04/11/23 DID NOT SUBMIT PROPERLY TO THE PHARMACY.    Rx Refill Note  Requested Prescriptions     Pending Prescriptions Disp Refills   • buPROPion XL (Wellbutrin XL) 300 MG 24 hr tablet 90 tablet 1     Sig: Take 1 tablet by mouth Daily.      Last office visit with prescribing clinician: 4/11/2023   Last telemedicine visit with prescribing clinician: 5/9/2023   Next office visit with prescribing clinician: 5/9/2023                         Would you like a call back once the refill request has been completed: [] Yes [] No    If the office needs to give you a call back, can they leave a voicemail: [] Yes [] No    Hilaria Gurrola MA  04/13/23, 11:20 EDT

## 2023-04-14 DIAGNOSIS — M15.9 PRIMARY OSTEOARTHRITIS INVOLVING MULTIPLE JOINTS: Chronic | ICD-10-CM

## 2023-04-17 RX ORDER — MELOXICAM 15 MG/1
TABLET ORAL
Qty: 90 TABLET | Refills: 0 | Status: SHIPPED | OUTPATIENT
Start: 2023-04-17

## 2023-04-25 ENCOUNTER — TELEPHONE (OUTPATIENT)
Dept: INTERNAL MEDICINE | Facility: CLINIC | Age: 73
End: 2023-04-25
Payer: MEDICARE

## 2023-04-25 DIAGNOSIS — M79.672 PAIN IN BOTH FEET: ICD-10-CM

## 2023-04-25 DIAGNOSIS — M79.671 PAIN IN BOTH FEET: ICD-10-CM

## 2023-04-25 DIAGNOSIS — M47.817 LUMBOSACRAL SPONDYLOSIS WITHOUT MYELOPATHY: Primary | ICD-10-CM

## 2023-04-25 NOTE — TELEPHONE ENCOUNTER
Caller: Traci King    Relationship: Self    Best call back number: [phone]    What orders are you requesting (i.e. lab or imaging): PHYSICAL THERAPY ON LEGS AND FEET    In what timeframe would the patient need to come in: AS SOON AS POSSIBLE    Where will you receive your lab/imaging services:  [facility] [address] [phone]    Additional notes: PATIENT HAS GONE TO THIS PLACE PRIOR.  THEY HAVE CHANGED THEIR NAME AND MOVED.

## 2023-04-28 ENCOUNTER — OFFICE VISIT (OUTPATIENT)
Dept: CARDIOLOGY | Facility: CLINIC | Age: 73
End: 2023-04-28
Payer: MEDICARE

## 2023-04-28 VITALS
BODY MASS INDEX: 38.41 KG/M2 | SYSTOLIC BLOOD PRESSURE: 110 MMHG | DIASTOLIC BLOOD PRESSURE: 60 MMHG | WEIGHT: 183 LBS | HEART RATE: 74 BPM | OXYGEN SATURATION: 95 % | HEIGHT: 58 IN

## 2023-04-28 DIAGNOSIS — I10 PRIMARY HYPERTENSION: ICD-10-CM

## 2023-04-28 DIAGNOSIS — I51.89 DIASTOLIC DYSFUNCTION: ICD-10-CM

## 2023-04-28 DIAGNOSIS — E22.2 SIADH (SYNDROME OF INAPPROPRIATE ADH PRODUCTION): Chronic | ICD-10-CM

## 2023-04-28 DIAGNOSIS — I50.32 CHRONIC DIASTOLIC CONGESTIVE HEART FAILURE: Primary | ICD-10-CM

## 2023-04-28 DIAGNOSIS — J44.9 CHRONIC OBSTRUCTIVE PULMONARY DISEASE, UNSPECIFIED COPD TYPE: ICD-10-CM

## 2023-04-28 DIAGNOSIS — E78.2 MIXED HYPERLIPIDEMIA: Chronic | ICD-10-CM
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
## 2023-04-28 NOTE — PATIENT INSTRUCTIONS
I would recommend you start taking that new medication called Jardiance, this is taken once per day, 10 mg, and it helps you pee out blood sugar.  It may also help limit with weight loss.  You are taking it because of a slightly stiffened heart muscle, and a mild elevation in the heart pressures.  This could be a mild form of heart failure, which is why we can stay ahead of it with exercise, weight loss, and Jardiance.  This can actually help the heart heal and relieve some of the pressures and congestion.

## 2023-05-02 ENCOUNTER — OFFICE VISIT (OUTPATIENT)
Dept: PAIN MEDICINE | Facility: CLINIC | Age: 73
End: 2023-05-02
Payer: MEDICARE

## 2023-05-02 VITALS
WEIGHT: 180.8 LBS | DIASTOLIC BLOOD PRESSURE: 68 MMHG | HEART RATE: 85 BPM | SYSTOLIC BLOOD PRESSURE: 125 MMHG | RESPIRATION RATE: 18 BRPM | TEMPERATURE: 97.8 F | HEIGHT: 58 IN | OXYGEN SATURATION: 97 % | BODY MASS INDEX: 37.95 KG/M2

## 2023-05-02 DIAGNOSIS — M51.36 DDD (DEGENERATIVE DISC DISEASE), LUMBAR: Primary | ICD-10-CM

## 2023-05-02 DIAGNOSIS — M47.817 LUMBOSACRAL SPONDYLOSIS WITHOUT MYELOPATHY: ICD-10-CM

## 2023-05-02 DIAGNOSIS — S32.020G COMPRESSION FRACTURE OF L2 VERTEBRA WITH DELAYED HEALING, SUBSEQUENT ENCOUNTER: ICD-10-CM

## 2023-05-02 DIAGNOSIS — M47.816 LUMBAR FACET ARTHROPATHY: ICD-10-CM

## 2023-05-02 DIAGNOSIS — Z79.899 ENCOUNTER FOR LONG-TERM (CURRENT) USE OF HIGH-RISK MEDICATION: ICD-10-CM

## 2023-05-02 RX ORDER — OXYCODONE AND ACETAMINOPHEN 7.5; 325 MG/1; MG/1
TABLET ORAL
Qty: 150 TABLET | Refills: 0 | Status: SHIPPED | OUTPATIENT
Start: 2023-05-02
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
## 2023-05-09 ENCOUNTER — OFFICE VISIT (OUTPATIENT)
Dept: INTERNAL MEDICINE | Facility: CLINIC | Age: 73
End: 2023-05-09
Payer: MEDICARE

## 2023-05-09 VITALS
HEART RATE: 83 BPM | BODY MASS INDEX: 37.36 KG/M2 | DIASTOLIC BLOOD PRESSURE: 84 MMHG | SYSTOLIC BLOOD PRESSURE: 132 MMHG | TEMPERATURE: 96.5 F | HEIGHT: 58 IN | RESPIRATION RATE: 16 BRPM | WEIGHT: 178 LBS | OXYGEN SATURATION: 94 %

## 2023-05-09 DIAGNOSIS — F32.A ANXIETY AND DEPRESSION: Chronic | ICD-10-CM

## 2023-05-09 DIAGNOSIS — F41.9 ANXIETY AND DEPRESSION: Chronic | ICD-10-CM

## 2023-05-09 RX ORDER — BREXPIPRAZOLE 3 MG/1
3 TABLET ORAL DAILY
Qty: 90 TABLET | Refills: 1 | Status: SHIPPED | OUTPATIENT
Start: 2023-05-09

## 2023-05-09 NOTE — ASSESSMENT & PLAN NOTE
IMPROVING  - cont on new trintellix at 20 mg dose--> refills sent  - cont on wellbutrin at recently increased dose of 300 mg  - cont on rexulti, will increase dose to 3 mg today for ongoing improvements  - has buspar that has worked well for her as anxiolytic in the past, currently using prn  - Reviewed potential side effects of medication including sexual performance changes, insomnia, fatigue, weight changes  - GeneSight testing results indicated she is somewhat of a hypermetabolizer for several first line SSRI medications  - previous meds tried: paxil (ineffective), prozac (became ineffective), fluoxetine (became ineffective)

## 2023-05-09 NOTE — PROGRESS NOTES
"Chief Complaint  Follow-up, Anxiety, and Depression    Subjective          Traci King presents to [facility] for follow up on anxiety and depression.   Pts psych meds were changed at her last appt with increased dose of her wellbutrin and her abilify was changed to rexulti. She feels like the rexulti has made a very positive change. She has lost some weight since she was last seen and has started PT. Feels more motivated to get out and do these things since she is feeling better.   Cardiology started her on jardiance.     Objective   Vital Signs:     /84   Pulse 83   Temp 96.5 °F (35.8 °C)   Resp 16   Ht 147.3 cm (58\")   Wt 80.7 kg (178 lb)   SpO2 94%   BMI 37.20 kg/m²     Physical Exam  Vitals and nursing note reviewed.   Constitutional:       General: She is not in acute distress.     Appearance: Normal appearance.   Pulmonary:      Effort: Pulmonary effort is normal. No respiratory distress.   Neurological:      Mental Status: She is alert and oriented to person, place, and time. Mental status is at baseline.   Psychiatric:         Mood and Affect: Mood normal. Mood is not anxious or depressed. Affect is not tearful.         Speech: Speech normal.         Behavior: Behavior normal. Behavior is cooperative.         Thought Content: Thought content normal.         Cognition and Memory: Cognition normal.         Judgment: Judgment normal.      Comments: Most optimistic and interactive encounter I have had with this patient in a very long time, seems more energetic and conversant today, she admits she is very pleased with her progress and improvements          Result Review :   The following data was reviewed by: Sherry Fournier MD on 05/09/2023:  CMP        2/3/2023    12:16 2/14/2023    11:41 2/20/2023    10:51   CMP   Glucose 92   88   92     BUN 12   14   17     Creatinine 0.91   1.00   1.12     EGFR 68.0       Sodium 136   138   136   "   Potassium 3.6   4.1   5.0     Chloride 101   97   97     Calcium 9.5   9.6   9.5     Total Protein 6.6       Albumin 4.0       Globulin 2.6       Total Bilirubin 0.2       Alkaline Phosphatase 154       AST (SGOT) 10       ALT (SGPT) 7       Albumin/Globulin Ratio 1.5       BUN/Creatinine Ratio 13.2   14.0   15.2     Anion Gap 11.0         CBC w/diff        11/30/2022    03:01 12/27/2022    14:08 2/3/2023    12:16   CBC w/Diff   WBC 10.14      11.1   7.72     RBC 3.26      3.41   4.08     Hemoglobin 10.3      10.9   12.1     Hematocrit 32.5      32.2   37.4     MCV 99.7      94   91.7     MCH 31.6      32.0   29.7     MCHC 31.7      33.9   32.4     RDW 14.1      12.5   12.1     Platelets 463      331   411     Neutrophil Rel % 65.7      78   74.2     Immature Granulocyte Rel % 4.5       0.4     Lymphocyte Rel % 19.0      14   17.1     Monocyte Rel % 6.6      7   7.0     Eosinophil Rel % 3.1      0   0.8     Basophil Rel % 1.1      1   0.5         This result is from an external source.     Lipid Panel        8/2/2022    11:57 9/7/2022    08:56   Lipid Panel   Total Cholesterol 175   182     Triglycerides 94   126     HDL Cholesterol 83   75     VLDL Cholesterol 17   22     LDL Cholesterol  75   85       TSH        12/27/2022    14:08   TSH   TSH 1.240               Data reviewed: Consultant notes cardiology            Assessment and Plan      Diagnoses and all orders for this visit:    1. Anxiety and depression  Assessment & Plan:  IMPROVING  - cont on new trintellix at 20 mg dose--> refills sent  - cont on wellbutrin at recently increased dose of 300 mg  - cont on rexulti, will increase dose to 3 mg today for ongoing improvements  - has buspar that has worked well for her as anxiolytic in the past, currently using prn  - Reviewed potential side effects of medication including sexual performance changes, insomnia, fatigue, weight changes  - GeneSight testing results indicated she is somewhat of a hypermetabolizer  for several first line SSRI medications  - previous meds tried: paxil (ineffective), prozac (became ineffective), fluoxetine (became ineffective)    Orders:  -     Brexpiprazole (Rexulti) 3 MG tablet; Take 1 tablet by mouth Daily.  Dispense: 90 tablet; Refill: 1  -     Vortioxetine HBr (TRINTELLIX) 20 MG tablet; Take 1 tablet by mouth Daily With Breakfast.  Dispense: 90 tablet; Refill: 1        Follow Up   Return in about 3 months (around 8/9/2023) for Medicare Wellness.    Patient was given instructions and counseling regarding her condition or for health maintenance advice. Please see specific information pulled into the AVS if appropriate.     Lyndsay Fournier MD  [facility]  Phone: [phone]  Fax: [phone]

## 2023-05-15 ENCOUNTER — TELEPHONE (OUTPATIENT)
Dept: CARDIOLOGY | Facility: CLINIC | Age: 73
End: 2023-05-15

## 2023-05-15 NOTE — TELEPHONE ENCOUNTER
Returned call to Traci King for additional information.    Patient reports she was having frequency, urgency, and pain and burning during urination.  Patient stated she was seen at [facility] ([phone]), where she saw Dr. Jimmy Rodriguez and was prescribed Macrobid.  Patient reviewed her after visit summary but did not see any lab values for her UA.  Patient stated that the summary just states she has a UTI.    Patient stated she read the side effects of Macrobid which include muscle/joint pain and and swollen glands.  Patient stated she is going to  stop taking the Macrobid.  Encouraged patient to contact PCP regarding medication concerns and discussed potential risks of discontinuing medication without speaking with provider.  Patient verbalized understanding and stated she will contact PCP.    Thank you,  Maureen CHEUNG RN  Triage Nurse [facility]

## 2023-05-15 NOTE — TELEPHONE ENCOUNTER
Reviewed Dr. Martinez' message with Traci King and she verbalized understanding.    Patient reports sore throat, swollen glands, and fatigue/weak today.  Encouraged patient to f/u with PCP regarding symptoms and she stated she will do this.    Please let me know if there is anything else you would like me to do for this patient.    Thank you,  Maureen CHEUNG RN  Triage Nurse [facility]

## 2023-05-15 NOTE — TELEPHONE ENCOUNTER
Patient called stating Dr Martinez put her on Jardiance a couple weeks ago and over the weekend got a bad UTI and had to go to urgent care and told her to stop taking Jardiance until she can talk with YOU about it today so she is wondering if she should not take it, if so to get on something else.    You can reach her [phone]    Please Advise

## 2023-05-16 NOTE — TELEPHONE ENCOUNTER
Reviewed recommendations with Traci King and the patient verbalized understanding of the recommendations.  Patient stated [facility] and was able to get her antibiotic switched.    Thank you,  Maureen CHEUNG RN  Triage Nurse [facility] [place]

## 2023-06-01 ENCOUNTER — OFFICE VISIT (OUTPATIENT)
Dept: PAIN MEDICINE | Facility: CLINIC | Age: 73
End: 2023-06-01

## 2023-06-01 VITALS
SYSTOLIC BLOOD PRESSURE: 128 MMHG | DIASTOLIC BLOOD PRESSURE: 74 MMHG | HEART RATE: 72 BPM | TEMPERATURE: 97.4 F | RESPIRATION RATE: 20 BRPM | WEIGHT: 183.2 LBS | BODY MASS INDEX: 38.46 KG/M2 | HEIGHT: 58 IN | OXYGEN SATURATION: 93 %

## 2023-06-01 DIAGNOSIS — Z79.899 ENCOUNTER FOR LONG-TERM (CURRENT) USE OF HIGH-RISK MEDICATION: ICD-10-CM

## 2023-06-01 DIAGNOSIS — M47.817 LUMBOSACRAL SPONDYLOSIS WITHOUT MYELOPATHY: Primary | ICD-10-CM

## 2023-06-01 DIAGNOSIS — M51.36 DDD (DEGENERATIVE DISC DISEASE), LUMBAR: ICD-10-CM

## 2023-06-01 DIAGNOSIS — M62.838 MUSCLE SPASM: ICD-10-CM

## 2023-06-01 DIAGNOSIS — M47.817 LUMBOSACRAL SPONDYLOSIS WITHOUT MYELOPATHY: ICD-10-CM

## 2023-06-01 DIAGNOSIS — S32.020D COMPRESSION FRACTURE OF L2 VERTEBRA WITH ROUTINE HEALING, SUBSEQUENT ENCOUNTER: ICD-10-CM

## 2023-06-01 DIAGNOSIS — M47.816 LUMBAR FACET ARTHROPATHY: ICD-10-CM

## 2023-06-01 RX ORDER — NITROFURANTOIN 25; 75 MG/1; MG/1
CAPSULE ORAL
COMMUNITY
Start: 2023-05-13

## 2023-06-01 RX ORDER — OXYCODONE AND ACETAMINOPHEN 7.5; 325 MG/1; MG/1
TABLET ORAL
Qty: 150 TABLET | Refills: 0 | Status: SHIPPED | OUTPATIENT
Start: 2023-06-01

## 2023-06-01 RX ORDER — IBANDRONATE SODIUM 150 MG/1
TABLET, FILM COATED ORAL
COMMUNITY
Start: 2023-05-11

## 2023-06-01 RX ORDER — METHOCARBAMOL 750 MG/1
750 TABLET, FILM COATED ORAL 4 TIMES DAILY
Qty: 360 TABLET | Refills: 0 | Status: SHIPPED | OUTPATIENT
Start: 2023-06-01

## 2023-06-01 RX ORDER — PHENTERMINE HYDROCHLORIDE 37.5 MG/1
1 TABLET ORAL DAILY
COMMUNITY
Start: 2023-05-25
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
## 2023-07-17 NOTE — TELEPHONE ENCOUNTER
[address] [facility]  Progress Note  Name: Dylan Grissom  MRN: [id number]  Returned pt call to tell her I was waiting on auth she stated she was at the ER also pt is scheduled with berenice han so we are both trying to get auth patient needs to decide where she want to go , [facility] Annelise Whitmore  mg (07/17/23 8331)   • ondansetron  4 mg Intravenous Q8H PRN ANNA Joseph     • oxyCODONE  5 mg Oral Q4H PRN Roland Fabian MD     • pantoprazole  40 mg Oral Early Morning Roland Fabian MD     • risperiDONE  0.5 mg Oral Daily Annelise Whitmore MD     • saccharomyces boulardii  250 mg Oral BID Roland Fabian MD     • sertraline  200 mg Oral Daily Annelise Whitmore MD     • simethicone  80 mg Oral Once Roland Fabian MD     • zolpidem  5 mg Oral HS PRN Roland Fabian MD          Today, Patient Was Seen By: Roland Fabian MD    **Please Note: This note may have been constructed using a voice recognition system. **

## 2023-07-25 ENCOUNTER — OFFICE VISIT (OUTPATIENT)
Dept: PAIN MEDICINE | Facility: CLINIC | Age: 73
End: 2023-07-25
Payer: MEDICARE

## 2023-07-25 VITALS
SYSTOLIC BLOOD PRESSURE: 132 MMHG | DIASTOLIC BLOOD PRESSURE: 84 MMHG | WEIGHT: 186 LBS | TEMPERATURE: 97.3 F | HEIGHT: 58 IN | OXYGEN SATURATION: 97 % | HEART RATE: 83 BPM | BODY MASS INDEX: 39.04 KG/M2

## 2023-07-25 DIAGNOSIS — Z79.899 ENCOUNTER FOR LONG-TERM (CURRENT) USE OF HIGH-RISK MEDICATION: ICD-10-CM

## 2023-07-25 DIAGNOSIS — M51.36 DDD (DEGENERATIVE DISC DISEASE), LUMBAR: ICD-10-CM

## 2023-07-25 DIAGNOSIS — M47.816 LUMBAR FACET ARTHROPATHY: ICD-10-CM

## 2023-07-25 DIAGNOSIS — M47.817 LUMBOSACRAL SPONDYLOSIS WITHOUT MYELOPATHY: ICD-10-CM

## 2023-07-25 DIAGNOSIS — S32.020D COMPRESSION FRACTURE OF L2 VERTEBRA WITH ROUTINE HEALING, SUBSEQUENT ENCOUNTER: Primary | ICD-10-CM

## 2023-07-25 DIAGNOSIS — M15.9 PRIMARY OSTEOARTHRITIS INVOLVING MULTIPLE JOINTS: Chronic | ICD-10-CM

## 2023-07-25 DIAGNOSIS — M48.02 SPINAL STENOSIS, CERVICAL REGION: ICD-10-CM

## 2023-07-25 PROCEDURE — 3075F SYST BP GE 130 - 139MM HG: CPT | Performed by: PHYSICIAN ASSISTANT

## 2023-07-25 PROCEDURE — 99214 OFFICE O/P EST MOD 30 MIN: CPT | Performed by: PHYSICIAN ASSISTANT

## 2023-07-25 PROCEDURE — 1160F RVW MEDS BY RX/DR IN RCRD: CPT | Performed by: PHYSICIAN ASSISTANT

## 2023-07-25 PROCEDURE — 3079F DIAST BP 80-89 MM HG: CPT | Performed by: PHYSICIAN ASSISTANT

## 2023-07-25 PROCEDURE — 1159F MED LIST DOCD IN RCRD: CPT | Performed by: PHYSICIAN ASSISTANT

## 2023-07-25 PROCEDURE — 80305 DRUG TEST PRSMV DIR OPT OBS: CPT | Performed by: PHYSICIAN ASSISTANT

## 2023-07-25 PROCEDURE — 1125F AMNT PAIN NOTED PAIN PRSNT: CPT | Performed by: PHYSICIAN ASSISTANT

## 2023-07-25 RX ORDER — MELOXICAM 15 MG/1
15 TABLET ORAL DAILY
Qty: 30 TABLET | Refills: 1 | Status: SHIPPED | OUTPATIENT
Start: 2023-07-25

## 2023-07-25 RX ORDER — OXYCODONE AND ACETAMINOPHEN 7.5; 325 MG/1; MG/1
TABLET ORAL
Qty: 150 TABLET | Refills: 0 | Status: SHIPPED | OUTPATIENT
Start: 2023-07-31 | End: 2023-07-31 | Stop reason: SDUPTHER
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
## 2023-07-26 ENCOUNTER — TELEPHONE (OUTPATIENT)
Dept: PAIN MEDICINE | Facility: CLINIC | Age: 73
End: 2023-07-26
Payer: MEDICARE

## 2023-07-26 DIAGNOSIS — M51.36 DDD (DEGENERATIVE DISC DISEASE), LUMBAR: ICD-10-CM

## 2023-07-26 DIAGNOSIS — M47.817 LUMBOSACRAL SPONDYLOSIS WITHOUT MYELOPATHY: ICD-10-CM

## 2023-07-26 DIAGNOSIS — Z79.899 ENCOUNTER FOR LONG-TERM (CURRENT) USE OF HIGH-RISK MEDICATION: ICD-10-CM

## 2023-07-31 ENCOUNTER — TELEPHONE (OUTPATIENT)
Dept: INTERNAL MEDICINE | Facility: CLINIC | Age: 73
End: 2023-07-31
Payer: MEDICARE

## 2023-07-31 RX ORDER — OXYCODONE AND ACETAMINOPHEN 7.5; 325 MG/1; MG/1
TABLET ORAL
Qty: 150 TABLET | Refills: 0 | Status: SHIPPED | OUTPATIENT
Start: 2023-07-31

## 2023-08-01 DIAGNOSIS — M51.36 DDD (DEGENERATIVE DISC DISEASE), LUMBAR: ICD-10-CM

## 2023-08-01 DIAGNOSIS — M15.9 PRIMARY OSTEOARTHRITIS INVOLVING MULTIPLE JOINTS: Chronic | ICD-10-CM

## 2023-08-01 DIAGNOSIS — M54.16 LUMBAR RADICULOPATHY: ICD-10-CM

## 2023-08-01 RX ORDER — LOSARTAN POTASSIUM 25 MG/1
25 TABLET ORAL DAILY
Qty: 30 TABLET | Refills: 11 | Status: SHIPPED | OUTPATIENT
Start: 2023-08-01 | End: 2024-07-31

## 2023-08-01 RX ORDER — GABAPENTIN 300 MG/1
CAPSULE ORAL
Qty: 180 CAPSULE | Refills: 0 | Status: SHIPPED | OUTPATIENT
Start: 2023-08-01

## 2023-08-01 RX ORDER — MELOXICAM 15 MG/1
15 TABLET ORAL DAILY
Qty: 90 TABLET | Refills: 0 | Status: SHIPPED | OUTPATIENT
Start: 2023-08-01

## 2023-08-14 ENCOUNTER — OFFICE VISIT (OUTPATIENT)
Dept: INTERNAL MEDICINE | Facility: CLINIC | Age: 73
End: 2023-08-14
Payer: MEDICARE

## 2023-08-14 VITALS
RESPIRATION RATE: 16 BRPM | HEIGHT: 58 IN | BODY MASS INDEX: 39.25 KG/M2 | HEART RATE: 83 BPM | OXYGEN SATURATION: 98 % | DIASTOLIC BLOOD PRESSURE: 82 MMHG | WEIGHT: 187 LBS | SYSTOLIC BLOOD PRESSURE: 130 MMHG | TEMPERATURE: 95.7 F

## 2023-08-14 DIAGNOSIS — E78.2 MIXED HYPERLIPIDEMIA: Chronic | ICD-10-CM

## 2023-08-14 DIAGNOSIS — I10 PRIMARY HYPERTENSION: Chronic | ICD-10-CM

## 2023-08-14 DIAGNOSIS — M80.00XD PATHOLOGICAL FRACTURE DUE TO AGE-RELATED OSTEOPOROSIS WITH ROUTINE HEALING, UNSPECIFIED FRACTURE SITE, SUBSEQUENT ENCOUNTER: ICD-10-CM

## 2023-08-14 DIAGNOSIS — M81.0 AGE-RELATED OSTEOPOROSIS WITHOUT CURRENT PATHOLOGICAL FRACTURE: ICD-10-CM

## 2023-08-14 DIAGNOSIS — F32.A ANXIETY AND DEPRESSION: Chronic | ICD-10-CM

## 2023-08-14 DIAGNOSIS — Z72.0 TOBACCO ABUSE: ICD-10-CM

## 2023-08-14 DIAGNOSIS — Z12.11 COLON CANCER SCREENING: ICD-10-CM

## 2023-08-14 DIAGNOSIS — Z87.891 PERSONAL HISTORY OF NICOTINE DEPENDENCE: ICD-10-CM

## 2023-08-14 DIAGNOSIS — F41.9 ANXIETY AND DEPRESSION: Chronic | ICD-10-CM

## 2023-08-14 DIAGNOSIS — Z00.00 MEDICARE ANNUAL WELLNESS VISIT, SUBSEQUENT: Primary | ICD-10-CM

## 2023-08-14 DIAGNOSIS — N39.3 STRESS INCONTINENCE: ICD-10-CM

## 2023-08-14 LAB
25(OH)D3+25(OH)D2 SERPL-MCNC: 26.4 NG/ML (ref 30–100)
ALBUMIN SERPL-MCNC: 4.1 G/DL (ref 3.5–5.2)
ALBUMIN/GLOB SERPL: 1.8 G/DL
ALP SERPL-CCNC: 104 U/L (ref 39–117)
ALT SERPL-CCNC: 16 U/L (ref 1–33)
AST SERPL-CCNC: 15 U/L (ref 1–32)
BASOPHILS # BLD AUTO: 0.06 10*3/MM3 (ref 0–0.2)
BASOPHILS NFR BLD AUTO: 0.9 % (ref 0–1.5)
BILIRUB SERPL-MCNC: <0.2 MG/DL (ref 0–1.2)
BUN SERPL-MCNC: 10 MG/DL (ref 8–23)
BUN/CREAT SERPL: 11 (ref 7–25)
CALCIUM SERPL-MCNC: 9.3 MG/DL (ref 8.6–10.5)
CHLORIDE SERPL-SCNC: 105 MMOL/L (ref 98–107)
CHOLEST SERPL-MCNC: 189 MG/DL (ref 0–200)
CO2 SERPL-SCNC: 23.1 MMOL/L (ref 22–29)
CREAT SERPL-MCNC: 0.91 MG/DL (ref 0.57–1)
EGFRCR SERPLBLD CKD-EPI 2021: 67.6 ML/MIN/1.73
EOSINOPHIL # BLD AUTO: 0.12 10*3/MM3 (ref 0–0.4)
EOSINOPHIL NFR BLD AUTO: 1.7 % (ref 0.3–6.2)
ERYTHROCYTE [DISTWIDTH] IN BLOOD BY AUTOMATED COUNT: 15.4 % (ref 12.3–15.4)
GLOBULIN SER CALC-MCNC: 2.3 GM/DL
GLUCOSE SERPL-MCNC: 90 MG/DL (ref 65–99)
HCT VFR BLD AUTO: 33.5 % (ref 34–46.6)
HDLC SERPL-MCNC: 79 MG/DL (ref 40–60)
HGB BLD-MCNC: 11.2 G/DL (ref 12–15.9)
IMM GRANULOCYTES # BLD AUTO: 0.05 10*3/MM3 (ref 0–0.05)
IMM GRANULOCYTES NFR BLD AUTO: 0.7 % (ref 0–0.5)
LDLC SERPL CALC-MCNC: 91 MG/DL (ref 0–100)
LYMPHOCYTES # BLD AUTO: 1.38 10*3/MM3 (ref 0.7–3.1)
LYMPHOCYTES NFR BLD AUTO: 19.9 % (ref 19.6–45.3)
MCH RBC QN AUTO: 31 PG (ref 26.6–33)
MCHC RBC AUTO-ENTMCNC: 33.4 G/DL (ref 31.5–35.7)
MCV RBC AUTO: 92.8 FL (ref 79–97)
MONOCYTES # BLD AUTO: 0.45 10*3/MM3 (ref 0.1–0.9)
MONOCYTES NFR BLD AUTO: 6.5 % (ref 5–12)
NEUTROPHILS # BLD AUTO: 4.87 10*3/MM3 (ref 1.7–7)
NEUTROPHILS NFR BLD AUTO: 70.3 % (ref 42.7–76)
NRBC BLD AUTO-RTO: 0 /100 WBC (ref 0–0.2)
PLATELET # BLD AUTO: 319 10*3/MM3 (ref 140–450)
POTASSIUM SERPL-SCNC: 4.3 MMOL/L (ref 3.5–5.2)
PROT SERPL-MCNC: 6.4 G/DL (ref 6–8.5)
RBC # BLD AUTO: 3.61 10*6/MM3 (ref 3.77–5.28)
SODIUM SERPL-SCNC: 139 MMOL/L (ref 136–145)
TRIGL SERPL-MCNC: 110 MG/DL (ref 0–150)
TSH SERPL DL<=0.005 MIU/L-ACNC: 1.2 UIU/ML (ref 0.27–4.2)
VLDLC SERPL CALC-MCNC: 19 MG/DL (ref 5–40)
WBC # BLD AUTO: 6.93 10*3/MM3 (ref 3.4–10.8)

## 2023-08-14 PROCEDURE — 1159F MED LIST DOCD IN RCRD: CPT | Performed by: INTERNAL MEDICINE

## 2023-08-14 PROCEDURE — G0439 PPPS, SUBSEQ VISIT: HCPCS | Performed by: INTERNAL MEDICINE

## 2023-08-14 PROCEDURE — 3075F SYST BP GE 130 - 139MM HG: CPT | Performed by: INTERNAL MEDICINE

## 2023-08-14 PROCEDURE — 96160 PT-FOCUSED HLTH RISK ASSMT: CPT | Performed by: INTERNAL MEDICINE

## 2023-08-14 PROCEDURE — 1170F FXNL STATUS ASSESSED: CPT | Performed by: INTERNAL MEDICINE

## 2023-08-14 PROCEDURE — 3079F DIAST BP 80-89 MM HG: CPT | Performed by: INTERNAL MEDICINE

## 2023-08-14 RX ORDER — KETOCONAZOLE 20 MG/G
CREAM TOPICAL DAILY
Qty: 60 G | Refills: 5 | Status: SHIPPED | OUTPATIENT
Start: 2023-08-14

## 2023-08-14 NOTE — PROGRESS NOTES
Subsequent Medicare Wellness Visit   The ABC's of the Annual Wellness Visit    Chief Complaint   Patient presents with    Annual Exam       HPI:  Traci King, -1950, is a 73 y.o. female who presents for a Subsequent Medicare Wellness Visit.    Recent Hospitalizations:  Recently treated at the following:  Other: [facility] for PNA  .    Current Medical Providers:  Patient Care Team:  Sherry Fournier MD as PCP - General (Internal Medicine & Pediatrics)  Jose Luis Gan MD as Consulting Physician (Pain Medicine)  Jessica Dupont MD as Consulting Physician (Obstetrics and Gynecology)  Kory Martinez MD as Consulting Physician (Cardiology)  Hai Nickerson MD as Consulting Physician (Urology)    Health Habits and Functional and Cognitive Screening and Depression Screenin/14/2023    10:00 AM   Functional & Cognitive Status   Do you have difficulty preparing food and eating? No   Do you have difficulty bathing yourself, getting dressed or grooming yourself? No   Do you have difficulty using the toilet? No   Do you have difficulty moving around from place to place? No   Do you have trouble with steps or getting out of a bed or a chair? Yes   Current Diet Unhealthy Diet   Dental Exam Up to date   Eye Exam Up to date   Exercise (times per week) 0 times per week   Current Exercises Include No Regular Exercise   Do you need help using the phone?  No   Are you deaf or do you have serious difficulty hearing?  No   Do you need help to go to places out of walking distance? No   Do you need help shopping? No   Do you need help preparing meals?  No   Do you need help with housework?  Yes   Do you need help with laundry? No   Do you need help taking your medications? No   Do you need help managing money? No   Do you ever drive or ride in a car without wearing a seat belt? No   Have you felt unusual stress, anger or loneliness in the last month? No   Who do you live with? Spouse   If you need  help, do you have trouble finding someone available to you? No   Have you been bothered in the last four weeks by sexual problems? No   Do you have difficulty concentrating, remembering or making decisions? No       Compared to one year ago, the patient feels her physical health is worse and her mental health is the same.    Depression Screen:      6/1/2023    10:48 AM   PHQ-2/PHQ-9 Depression Screening   Little Interest or Pleasure in Doing Things 0-->not at all   Feeling Down, Depressed or Hopeless 0-->not at all   PHQ-9: Brief Depression Severity Measure Score 0         Past Medical/Family/Social History:  The following portions of the patient's history were reviewed and updated as appropriate: allergies, current medications, past family history, past medical history, past social history, past surgical history, and problem list.    Allergies   Allergen Reactions    Nickel Rash     Pt. Stated she doesn't think she is allergic anymore.          Current Outpatient Medications:     Brexpiprazole (Rexulti) 3 MG tablet, Take 1 tablet by mouth Daily., Disp: 90 tablet, Rfl: 1    buPROPion XL (Wellbutrin XL) 300 MG 24 hr tablet, Take 1 tablet by mouth Daily., Disp: 90 tablet, Rfl: 1    gabapentin (NEURONTIN) 300 MG capsule, Take 1 or 2 capsules PO QHS as tolerated, Disp: 180 capsule, Rfl: 0    ibandronate (BONIVA) 150 MG tablet, , Disp: , Rfl:     ipratropium-albuterol (DUO-NEB) 0.5-2.5 mg/3 ml nebulizer, Take 3 mL by nebulization Every 4 (Four) Hours As Needed for Wheezing or Shortness of Air. Dx: J44.9, Disp: 360 mL, Rfl: 2    ketoconazole (NIZORAL) 2 % cream, Apply  topically to the appropriate area as directed Daily., Disp: 60 g, Rfl: 5    losartan (Cozaar) 25 MG tablet, Take 1 tablet by mouth Daily., Disp: 30 tablet, Rfl: 11    meloxicam (MOBIC) 15 MG tablet, Take 1 tablet by mouth Daily., Disp: 90 tablet, Rfl: 0    methocarbamol (ROBAXIN) 750 MG tablet, Take 1 tablet by mouth 4 (Four) Times a Day., Disp: 360 tablet,  Rfl: 0    Myrbetriq 50 MG tablet sustained-release 24 hour 24 hr tablet, , Disp: , Rfl:     nicotine polacrilex (NICORETTE) 4 MG gum, Chew 1 each As Needed for Smoking Cessation., Disp: 100 each, Rfl: 1    nystatin-triamcinolone (MYCOLOG II) 000284-7.1 UNIT/GM-% cream, Apply  topically to the appropriate area as directed 2 (Two) Times a Day., Disp: 60 g, Rfl: 5    oxyCODONE-acetaminophen (PERCOCET) 7.5-325 MG per tablet, Take one tablet by mouth every 4-6 hours as needed for moderate pain (max 5/day), Disp: 150 tablet, Rfl: 0    Vortioxetine HBr (TRINTELLIX) 20 MG tablet, Take 1 tablet by mouth Daily With Breakfast., Disp: 90 tablet, Rfl: 1    albuterol sulfate  (90 Base) MCG/ACT inhaler, Inhale 2 puffs Every 4 (Four) Hours As Needed for Wheezing or Shortness of Air., Disp: 8 g, Rfl: 3    Breo Ellipta 200-25 MCG/ACT inhaler, Inhale 1 puff Daily., Disp: 3 each, Rfl: 3    Diclofenac Sodium (VOLTAREN) 1 % gel gel, Apply 4 g topically to the appropriate area as directed 4 (Four) Times a Day As Needed (back pain)., Disp: 350 g, Rfl: 1    Fluzone High-Dose Quadrivalent 0.7 ML suspension prefilled syringe injection, , Disp: , Rfl:     hydrocortisone 2.5 % ointment, Apply 1 application topically to the appropriate area as directed 2 (Two) Times a Day., Disp: 28.35 g, Rfl: 2    naloxone (Narcan) 4 MG/0.1ML nasal spray, 1 spray into the nostril(s) as directed by provider As Needed (overdose symptoms)., Disp: 1 each, Rfl: 0    nystatin (MYCOSTATIN) 089304 UNIT/GM powder, Apply  topically to the appropriate area as directed 3 (Three) Times a Day., Disp: 60 g, Rfl: 2    omeprazole (priLOSEC) 40 MG capsule, Take 1 capsule by mouth Daily., Disp: 90 capsule, Rfl: 3    ondansetron ODT (ZOFRAN-ODT) 4 MG disintegrating tablet, Place 1 tablet on the tongue Every 8 (Eight) Hours As Needed for Nausea or Vomiting., Disp: 20 tablet, Rfl: 0    Aspirin use counseling: Does not need ASA (and currently is not on it)    Current  medication list contains high risk medications. No harmful drug interactions have been identified.  Plan of action cont close monitoring, sees pain mgmt routinely for ongoing monitoring    Family History   Problem Relation Age of Onset    Other Mother         CABG    Hypertension Mother     Arthritis Mother     Dementia Mother     Alzheimer's disease Mother     Depression Mother     Hyperlipidemia Mother     Other Father         Pulmonary disease    Alcohol abuse Father     COPD Father     No Known Problems Maternal Grandmother     No Known Problems Maternal Grandfather     No Known Problems Paternal Grandmother     No Known Problems Paternal Grandfather        Social History     Tobacco Use    Smoking status: Former     Packs/day: 0.50     Types: Cigarettes     Quit date: 2022     Years since quittin.7    Smokeless tobacco: Never    Tobacco comments:     Began smoking at age 16.  Smoked 1 ppd for 48 years and .5 ppd for the past 4 years for a 50 pack year history.   Substance Use Topics    Alcohol use: No       Past Surgical History:   Procedure Laterality Date    BACK SURGERY      BLADDER SURGERY      BREAST IMPLANT REMOVAL      BREAST IMPLANT SURGERY      BREAST SURGERY      CARDIAC CATHETERIZATION  2015    CARDIAC CATHETERIZATION N/A 2017    Procedure: Coronary angiography;  Surgeon: Cathi Sargent MD;  Location: [facility] CATH INVASIVE LOCATION;  Service:     CARDIAC CATHETERIZATION N/A 2017    Procedure: Left heart cath;  Surgeon: Cathi Sargent MD;  Location: [facility] CATH INVASIVE LOCATION;  Service:     CARDIAC CATHETERIZATION N/A 2017    Procedure: Left ventriculography;  Surgeon: Cathi Sargent MD;  Location: [facility] CATH INVASIVE LOCATION;  Service:     EPIDURAL BLOCK      FACIAL COSMETIC SURGERY      LASIK Bilateral     LUMBAR EPIDURAL INJECTION N/A 2021    Procedure: lumbar epidural anticipated at L23;  Surgeon: Jose Luis Gan MD;  Location: [facility];  Service: Pain  Management;  Laterality: N/A;    LUMBAR EPIDURAL INJECTION N/A 1/11/2022    Procedure: lumbar epidural anticipated at L23;  Surgeon: Jose Luis Gan MD;  Location[facility];  Service: Pain Management;  Laterality: N/A;    LUMBAR EPIDURAL INJECTION N/A 4/27/2022    Procedure: lumbar epidural steroid injection lumbar 2-3;  Surgeon: Jose Luis Gan MD;  Location: [facility];  Service: Pain Management;  Laterality: N/A;    LUNG LOBECTOMY Right 2013    middle lobe    MEDIAL BRANCH BLOCK Bilateral 8/4/2021    Procedure: MEDIAL BRANCH BLOCK--- bilateral lumbar3-lumbar5;  Surgeon: Jose Luis Gan MD;  Location: [facility];  Service: Pain Management;  Laterality: Bilateral;    MEDIAL BRANCH BLOCK Bilateral 7/21/2022    Procedure: MEDIAL BRANCH BLOCK--bilateral lumbar1-3;  Surgeon: Jose Luis Gan MD;  Location: [facility];  Service: Pain Management;  Laterality: Bilateral;    MEDIAL BRANCH BLOCK Bilateral 9/1/2022    Procedure: LUMBAR MEDIAL BRANCH BLOCK BILATERAL L1-L3;  Surgeon: Jose Luis Gan MD;  Location: [facility];  Service: Pain Management;  Laterality: Bilateral;    MEDIAL BRANCH BLOCK Bilateral 9/15/2022    Procedure: CERVICAL MEDIAL BRANCH BLOCK BILAT C3-5 #1;  Surgeon: Jose Luis Gan MD;  Location: [facility];  Service: Pain Management;  Laterality: Bilateral;    MULTIPLE TOOTH EXTRACTIONS      ORIF WRIST FRACTURE Right     OTHER SURGICAL HISTORY      Pelvic tendon repair    RADIOFREQUENCY ABLATION N/A 10/20/2022    Procedure: RADIOFREQUENCY ABLATION LUMBAR;  Surgeon: Jose Luis Gan MD;  Location: [facility];  Service: Pain Management;  Laterality: N/A;    RETINAL DETACHMENT REPAIR      TONSILLECTOMY      TUBAL ABDOMINAL LIGATION      VITRECTOMY PARS PLANA Left        Patient Active Problem List   Diagnosis    Encounter for long-term (current) use of high-risk medication    Degenerative disc disease, cervical    Tobacco abuse    Cervical spondylosis without  "myelopathy    Diastolic dysfunction    DDD (degenerative disc disease), lumbar    Allergic rhinitis    COPD (chronic obstructive pulmonary disease)    Anxiety and depression    Detached retina    Diverticulosis of sigmoid colon    Foraminal stenosis of cervical region    Insomnia    Spinal stenosis, cervical region    Vitamin D deficiency    Chronic pain syndrome    Lumbar facet arthropathy    Bilateral occipital neuralgia    Primary osteoarthritis involving multiple joints    Compression fracture of L2 lumbar vertebra    Mixed hyperlipidemia    Primary hypertension    Gastroesophageal reflux disease with esophagitis without hemorrhage    SIADH (syndrome of inappropriate ADH production)    Therapeutic opioid induced constipation    Unspecified inflammatory spondylopathy, cervical region    Lumbosacral spondylosis without myelopathy    Compression fracture of L4 vertebra with routine healing    Osteoporosis with fracture       Review of Systems   Constitutional:  Negative for appetite change, chills and fever.   HENT:  Positive for hearing loss. Negative for sore throat and trouble swallowing.    Eyes:  Negative for visual disturbance.   Respiratory:  Negative for cough and shortness of breath.    Cardiovascular:  Negative for chest pain, palpitations and leg swelling.   Gastrointestinal:  Negative for constipation, diarrhea and vomiting.   Genitourinary:  Negative for difficulty urinating, frequency and hematuria.        + incontinence   Musculoskeletal:  Positive for arthralgias and back pain. Negative for myalgias.   Skin:  Positive for rash.   Neurological:  Negative for weakness and headaches.   Hematological:  Negative for adenopathy.   Psychiatric/Behavioral:  Negative for confusion and sleep disturbance.      Objective     Vitals:    08/14/23 1018   BP: 130/82   Pulse: 83   Resp: 16   Temp: 95.7 øF (35.4 øC)   SpO2: 98%   Weight: 84.8 kg (187 lb)   Height: 147.3 cm (58\")       Class 2 Severe Obesity (BMI >=35 " and <=39.9). Obesity-related health conditions include the following: hypertension, dyslipidemias, GERD, osteoarthritis, lower extremity venous stasis disease, urinary stress incontinence, and peripheral vascular disease. Obesity is worsening. BMI is is above average; BMI management plan is completed. We discussed portion control and increasing exercise.      The patient has no evidence of cognitve impairment.     Physical Exam  Vitals and nursing note reviewed.   Constitutional:       General: She is not in acute distress.     Appearance: Normal appearance.   HENT:      Head: Normocephalic and atraumatic.      Right Ear: Tympanic membrane and ear canal normal.      Left Ear: Tympanic membrane and ear canal normal.      Nose: Nose normal.      Mouth/Throat:      Mouth: Mucous membranes are moist.      Pharynx: Oropharynx is clear.   Eyes:      Extraocular Movements: Extraocular movements intact.      Conjunctiva/sclera: Conjunctivae normal.      Pupils: Pupils are equal, round, and reactive to light.   Cardiovascular:      Rate and Rhythm: Normal rate and regular rhythm.      Heart sounds: Normal heart sounds. No murmur heard.  Pulmonary:      Effort: Pulmonary effort is normal. No respiratory distress.      Breath sounds: Normal breath sounds. No wheezing or rhonchi.   Abdominal:      General: Bowel sounds are normal. There is no distension.      Palpations: Abdomen is soft. There is no mass.      Tenderness: There is no abdominal tenderness.      Comments: no hepatosplenomegaly   Musculoskeletal:         General: No deformity. Normal range of motion.      Cervical back: Normal range of motion and neck supple.   Skin:     General: Skin is warm and dry.      Capillary Refill: Capillary refill takes less than 2 seconds.      Findings: No lesion or rash.   Neurological:      General: No focal deficit present.      Mental Status: She is alert and oriented to person, place, and time.      Cranial Nerves: No cranial  nerve deficit.   Psychiatric:         Mood and Affect: Mood normal.         Behavior: Behavior normal.         Judgment: Judgment normal.       Recent Lab Results:     Lab Results   Component Value Date    TRIG 110 08/14/2023    HDL 79 (H) 08/14/2023    VLDL 19 08/14/2023    LDLHDL 0.71 08/03/2021       Assessment & Plan   Age-appropriate Screening Schedule:  Refer to the list below for future screening recommendations based on patient's age, sex and/or medical conditions.      Health Maintenance   Topic Date Due    DXA SCAN  Never done    PAP SMEAR  Never done    COLORECTAL CANCER SCREENING  05/22/2021    COVID-19 Vaccine (3 - Pfizer series) 06/15/2021    INFLUENZA VACCINE  10/01/2023    MAMMOGRAM  05/11/2024    ANNUAL WELLNESS VISIT  08/14/2024    LIPID PANEL  08/14/2024    TDAP/TD VACCINES (2 - Td or Tdap) 01/07/2029    HEPATITIS C SCREENING  Completed    Pneumococcal Vaccine 65+  Completed    ZOSTER VACCINE  Completed       Immunization History   Administered Date(s) Administered    COVID-19 (PFIZER) Purple Cap Monovalent 03/30/2021, 04/20/2021    FLUAD TRI 65YR+ 01/10/2020    Fluzone High Dose =>65 Years ([employer] ONLY) 10/16/2018    Fluzone High-Dose 65+yrs 09/11/2020, 12/09/2021, 08/23/2022    Hepatitis A 07/20/2016    Pneumococcal Conjugate 13-Valent (PCV13) 01/07/2019, 09/11/2020    Pneumococcal Polysaccharide (PPSV23) 02/15/2005, 02/03/2020    Shingrix 10/09/2019, 02/03/2020    Tdap 01/07/2019    Zostavax 10/09/2019         Medicare Risks and Personalized Health Plan:  Advance Directive Discussion  Alcohol Misuse  Breast Cancer/Mammogram Screening  Cardiovascular risk  Chronic Pain   Colon Cancer Screening  Dementia/Memory   Depression/Dysphoria  Diabetic Lab Screening   Fall Risk  Hearing Problem  Immunizations Discussed/Encouraged (specific immunizations; Tdap, Hepatitis A Vaccine/Series, Influenza, Pneumococcal 23, Prevnar 20 (Pneumococcal 20-valent conjugate), Shingrix, and COVID19  )  Inactivity/Sedentary  Lung Cancer Risk  Obesity/Overweight   Osteoporosis Risk  Polypharmacy  Urinary Incontinence      CMS-Preventive Services Quick Reference  Medicare Preventive Services Addressed:  Annual Wellness Visit (AWV)  Bone Density Measurements  Colorectal Cancer Screening, Cologuard Test   Influenza Vaccine and Administration  Lung Cancer Screening Counseling and Annual Screening for Lung Cancer with Low Dose Computed Tomography (LDCT); (use of smartset for low dose CT for lung cancer screening for documentation and orders)  Screening Mammography     Advance Care Planning:  ACP discussion was held with the patient during this visit. Patient has an advance directive (not in EMR), copy requested.    Annual Wellness  - Labs ordered today including CBC, CMP, Fasting lipid panel, HgbA1C, TSH, and Vit D. Will call with results once available and make follow up plan and med changes as appropriate.   - Cont current medications for chronic medical issues, refills sent as needed today.   - EKG managed by cardiology  - PAP smear not indicated  - Mammo up to date and managed by gynecology. Last 05/22 and negative, has scheduled in 1 month  - Cscope due, ordered today with cologuard  - DEXA Last done 2021 and abnormal with osteoporosis, has been given Rx boniva but has not started taking, has next DEXA scan scheduled next month with GYN.   - Lung CA screening due, ordered today  - Immunizations: Flu shot due Fall 2023. COVID series completed, booster eligible. TDaP done 2019, UTD. Shingrix series completed. PCV13 and PPSV23 completed.    - Depression screen reviewed with patient and positive. Pt on numerous medications for management of chronic anxiety and depression, at this time is stable on her current meds but is always monitored for flares given her history. Discussed transition from rexulti to seroquel for better insurance coverage, pt did not respond well to abilify to hesitant about this change but if  cost becomes an issue we can try thi.   - Advised behavioral modifications including dietary changes and increased exercise with goal of healthy weight and lifestyle.       Diagnoses and all orders for this visit:    1. Medicare annual wellness visit, subsequent (Primary)  -     CBC & Differential  -     Comprehensive Metabolic Panel  -     TSH    2. Primary hypertension  -     Cancel: POC Urinalysis Dipstick, Automated    3. Mixed hyperlipidemia  -     Lipid Panel    4. Anxiety and depression    5. Pathological fracture due to age-related osteoporosis with routine healing, unspecified fracture site, subsequent encounter  -     Vitamin D,25-Hydroxy    6. Tobacco abuse  -      CT Chest Low Dose Cancer Screening WO; Future    7. Colon cancer screening  -     Cologuard - Stool, Per Rectum; Future    8. Age-related osteoporosis without current pathological fracture  -     Vitamin D,25-Hydroxy    9. Personal history of nicotine dependence  -      CT Chest Low Dose Cancer Screening WO; Future    10. Stress incontinence    Other orders  -     ketoconazole (NIZORAL) 2 % cream; Apply  topically to the appropriate area as directed Daily.  Dispense: 60 g; Refill: 5  -     nystatin-triamcinolone (MYCOLOG II) 024810-0.1 UNIT/GM-% cream; Apply  topically to the appropriate area as directed 2 (Two) Times a Day.  Dispense: 60 g; Refill: 5        An After Visit Summary and PPPS with all of these plans were given to the patient.      Follow Up:  Return in about 6 months (around 2/14/2024) for follow up with labs.        Lyndsay Fournier MD  [facility]  Phone: [phone]  Fax: [phone]

## 2023-08-24 ENCOUNTER — PREP FOR SURGERY (OUTPATIENT)
Dept: SURGERY | Facility: SURGERY CENTER | Age: 73
End: 2023-08-24
Payer: MEDICARE

## 2023-08-24 ENCOUNTER — OFFICE VISIT (OUTPATIENT)
Dept: PAIN MEDICINE | Facility: CLINIC | Age: 73
End: 2023-08-24
Payer: MEDICARE

## 2023-08-24 ENCOUNTER — TELEPHONE (OUTPATIENT)
Dept: PEDIATRICS | Facility: OTHER | Age: 73
End: 2023-08-24
Payer: MEDICARE

## 2023-08-24 VITALS
OXYGEN SATURATION: 98 % | TEMPERATURE: 97 F | HEART RATE: 74 BPM | DIASTOLIC BLOOD PRESSURE: 90 MMHG | SYSTOLIC BLOOD PRESSURE: 148 MMHG | RESPIRATION RATE: 18 BRPM | WEIGHT: 188.2 LBS | BODY MASS INDEX: 39.51 KG/M2 | HEIGHT: 58 IN

## 2023-08-24 DIAGNOSIS — Z79.899 ENCOUNTER FOR LONG-TERM (CURRENT) USE OF HIGH-RISK MEDICATION: ICD-10-CM

## 2023-08-24 DIAGNOSIS — M51.36 DDD (DEGENERATIVE DISC DISEASE), LUMBAR: ICD-10-CM

## 2023-08-24 DIAGNOSIS — S32.020D COMPRESSION FRACTURE OF L2 VERTEBRA WITH ROUTINE HEALING, SUBSEQUENT ENCOUNTER: ICD-10-CM

## 2023-08-24 DIAGNOSIS — F41.9 ANXIETY AND DEPRESSION: Chronic | ICD-10-CM

## 2023-08-24 DIAGNOSIS — M47.812 CERVICAL SPONDYLOSIS WITHOUT MYELOPATHY: ICD-10-CM

## 2023-08-24 DIAGNOSIS — M47.816 LUMBAR FACET ARTHROPATHY: Primary | ICD-10-CM

## 2023-08-24 DIAGNOSIS — F32.A ANXIETY AND DEPRESSION: Chronic | ICD-10-CM

## 2023-08-24 DIAGNOSIS — T40.2X5A THERAPEUTIC OPIOID INDUCED CONSTIPATION: Chronic | ICD-10-CM

## 2023-08-24 DIAGNOSIS — K59.03 THERAPEUTIC OPIOID INDUCED CONSTIPATION: Chronic | ICD-10-CM

## 2023-08-24 DIAGNOSIS — M47.817 LUMBOSACRAL SPONDYLOSIS WITHOUT MYELOPATHY: ICD-10-CM

## 2023-08-24 DIAGNOSIS — M47.817 LUMBOSACRAL SPONDYLOSIS WITHOUT MYELOPATHY: Primary | ICD-10-CM

## 2023-08-24 DIAGNOSIS — S32.040D COMPRESSION FRACTURE OF L4 VERTEBRA WITH ROUTINE HEALING: Chronic | ICD-10-CM

## 2023-08-24 RX ORDER — SODIUM CHLORIDE 0.9 % (FLUSH) 0.9 %
10 SYRINGE (ML) INJECTION EVERY 12 HOURS SCHEDULED
OUTPATIENT
Start: 2023-08-24

## 2023-08-24 RX ORDER — LOSARTAN POTASSIUM 50 MG/1
50 TABLET ORAL DAILY
Qty: 90 TABLET | Refills: 1 | Status: SHIPPED | OUTPATIENT
Start: 2023-08-24 | End: 2024-08-23

## 2023-08-24 RX ORDER — OXYCODONE AND ACETAMINOPHEN 7.5; 325 MG/1; MG/1
TABLET ORAL
Qty: 150 TABLET | Refills: 0 | Status: SHIPPED | OUTPATIENT
Start: 2023-08-30

## 2023-08-24 RX ORDER — SODIUM CHLORIDE 0.9 % (FLUSH) 0.9 %
10 SYRINGE (ML) INJECTION AS NEEDED
OUTPATIENT
Start: 2023-08-24

## 2023-08-24 RX ORDER — BUPROPION HYDROCHLORIDE 300 MG/1
300 TABLET ORAL DAILY
Qty: 90 TABLET | Refills: 1 | Status: SHIPPED | OUTPATIENT
Start: 2023-08-24

## 2023-08-24 NOTE — TELEPHONE ENCOUNTER
Will send in new Rx for 50 mg dose  Unti that arrives in the mail she can just double up on her 25 mg pills

## 2023-08-24 NOTE — TELEPHONE ENCOUNTER
Rx Refill Note  Requested Prescriptions     Pending Prescriptions Disp Refills    buPROPion XL (Wellbutrin XL) 300 MG 24 hr tablet 90 tablet 1     Sig: Take 1 tablet by mouth Daily.    Vortioxetine HBr (TRINTELLIX) 20 MG tablet 90 tablet 1     Sig: Take 1 tablet by mouth Daily With Breakfast.      Last office visit with prescribing clinician: 8/14/2023   Last telemedicine visit with prescribing clinician: Visit date not found   Next office visit with prescribing clinician: 2/15/2024                         Would you like a call back once the refill request has been completed: [] Yes [] No    If the office needs to give you a call back, can they leave a voicemail: [] Yes [] No    Hilaria Gurrola MA  08/24/23, 15:53 EDT

## 2023-08-24 NOTE — TELEPHONE ENCOUNTER
Caller: Traci King    Relationship: Self    Best call back number:[phone]  What is the best time to reach you: ANY     Who are you requesting to speak with (clinical staff, provider,  specific staff member): CLINICAL STAFF       What was the call regarding: PATIENT IS ASKING FOR  A CALL BACK .  SHE IS AT HER PAIN MANAGEMENT DOCTORS APPOINTMENT ,  THEY CHECKED HER BLOOD PRESSURE AND IT /90,  SHE IS CURRENTLY TAKING losartan (Cozaar) 25 MG tablet .  SHE STATES THAT HER BLOOD PRESSURE ALWAYS SEEMS TO BE NELLY WHEN THEY TAKE IT AND THAT IT DOES NOT SEEM TO BE WORKING,  PLEASE ADVISE IF DR PRASAD FEELS IF ANOTHER BLOOD PRESSURE MEDICATION NEEDS TO BE ADDED OR IF THE CURRENT MEDICATION NEEDS TO BE INCREASED
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
## 2023-08-31 ENCOUNTER — TELEPHONE (OUTPATIENT)
Dept: INTERNAL MEDICINE | Facility: CLINIC | Age: 73
End: 2023-08-31
Payer: MEDICARE

## 2023-08-31 NOTE — TELEPHONE ENCOUNTER
Caller: Traci King    Relationship: Self    Best call back number: [phone]     Who are you requesting to speak with (clinical staff, provider,  specific staff member): DR PRASAD OR MA    What was the call regarding: PATIENT STATES THAT SHE HAD PREVIOUSLY DISCUSS ISSUES WITH HER WEIGHT GAIN WITH DR PRASAD. SHE DOES NOT MEET QUALIFICATIONS FOR OZEMPIC, BUT HAD DISCUSSED A POSSIBLE ALTERNATIVE MEDICATION AT THE TIME. SHE WOULD LIKE TO CONSIDER STARTING SOMETHING TO HELP LOSE WEIGHT. REQUESTS CALL BACK TO DISCUSS FURTHER

## 2023-09-01 NOTE — TELEPHONE ENCOUNTER
Caller: Traci King    Relationship: Self    Best call back number: [phone]     What was the call regarding: PATIENT CONTACTED [facility], AND WOULD LIKE TO HAVE PRESCRIPTION SENT IN WHEN AVAILABLE    [facility] - [place], [place] -[id number] - [place], [place] - [address] [phone] [facility] [phone] FX

## 2023-09-01 NOTE — TELEPHONE ENCOUNTER
Pt to call rosalba for more info and will callback if this is something that she would like to pursue

## 2023-09-01 NOTE — TELEPHONE ENCOUNTER
I can't remember specifically but I believe we had discussed the option of the compounding pharmacy, is that what she is referring to? I can't think of any other medication I would have recommended at this time that would have helped with weight loss other than the injectable medications but she is correct she will not qualify for those with her insurance plan it will have to be cash pay

## 2023-09-19 ENCOUNTER — TRANSCRIBE ORDERS (OUTPATIENT)
Dept: SURGERY | Facility: SURGERY CENTER | Age: 73
End: 2023-09-19
Payer: MEDICARE

## 2023-09-19 DIAGNOSIS — Z41.9 SURGERY, ELECTIVE: Primary | ICD-10-CM

## 2023-09-27 ENCOUNTER — TELEPHONE (OUTPATIENT)
Dept: PAIN MEDICINE | Facility: CLINIC | Age: 73
End: 2023-09-27
Payer: MEDICARE

## 2023-09-28 ENCOUNTER — OFFICE VISIT (OUTPATIENT)
Dept: PAIN MEDICINE | Facility: CLINIC | Age: 73
End: 2023-09-28
Payer: MEDICARE

## 2023-09-28 VITALS
WEIGHT: 186 LBS | BODY MASS INDEX: 39.04 KG/M2 | TEMPERATURE: 98.1 F | DIASTOLIC BLOOD PRESSURE: 70 MMHG | OXYGEN SATURATION: 96 % | HEIGHT: 58 IN | SYSTOLIC BLOOD PRESSURE: 124 MMHG | HEART RATE: 98 BPM | RESPIRATION RATE: 20 BRPM

## 2023-09-28 DIAGNOSIS — M47.816 LUMBAR FACET ARTHROPATHY: ICD-10-CM

## 2023-09-28 DIAGNOSIS — S32.020D COMPRESSION FRACTURE OF L2 VERTEBRA WITH ROUTINE HEALING, SUBSEQUENT ENCOUNTER: ICD-10-CM

## 2023-09-28 DIAGNOSIS — M50.30 DEGENERATIVE DISC DISEASE, CERVICAL: ICD-10-CM

## 2023-09-28 DIAGNOSIS — M51.36 DDD (DEGENERATIVE DISC DISEASE), LUMBAR: Primary | ICD-10-CM

## 2023-09-28 DIAGNOSIS — M48.02 SPINAL STENOSIS, CERVICAL REGION: ICD-10-CM

## 2023-09-28 DIAGNOSIS — M51.36 DDD (DEGENERATIVE DISC DISEASE), LUMBAR: ICD-10-CM

## 2023-09-28 DIAGNOSIS — Z79.899 ENCOUNTER FOR LONG-TERM (CURRENT) USE OF HIGH-RISK MEDICATION: ICD-10-CM

## 2023-09-28 DIAGNOSIS — M47.817 LUMBOSACRAL SPONDYLOSIS WITHOUT MYELOPATHY: ICD-10-CM

## 2023-09-28 RX ORDER — OXYCODONE AND ACETAMINOPHEN 7.5; 325 MG/1; MG/1
TABLET ORAL
Qty: 150 TABLET | Refills: 0 | Status: SHIPPED | OUTPATIENT
Start: 2023-09-29 | End: 2023-09-29 | Stop reason: SDUPTHER

## 2023-09-28 RX ORDER — KETOROLAC TROMETHAMINE 10 MG/1
10 TABLET, FILM COATED ORAL EVERY 6 HOURS PRN
Status: DISCONTINUED | OUTPATIENT
Start: 2023-09-28 | End: 2023-09-29

## 2023-09-28 RX ORDER — LIDOCAINE 50 MG/G
1 PATCH TOPICAL EVERY 24 HOURS
Qty: 30 PATCH | Refills: 5 | Status: SHIPPED | OUTPATIENT
Start: 2023-09-28

## 2023-09-28 RX ORDER — KETOROLAC TROMETHAMINE 30 MG/ML
30 INJECTION, SOLUTION INTRAMUSCULAR; INTRAVENOUS ONCE
Status: COMPLETED | OUTPATIENT
Start: 2023-09-28 | End: 2023-09-28

## 2023-09-28 RX ADMIN — KETOROLAC TROMETHAMINE 30 MG: 30 INJECTION, SOLUTION INTRAMUSCULAR; INTRAVENOUS at 15:08
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
## 2023-09-29 DIAGNOSIS — M47.817 LUMBOSACRAL SPONDYLOSIS WITHOUT MYELOPATHY: ICD-10-CM

## 2023-09-29 DIAGNOSIS — Z79.899 ENCOUNTER FOR LONG-TERM (CURRENT) USE OF HIGH-RISK MEDICATION: ICD-10-CM

## 2023-09-29 DIAGNOSIS — M62.838 MUSCLE SPASM: ICD-10-CM

## 2023-09-29 DIAGNOSIS — M51.36 DDD (DEGENERATIVE DISC DISEASE), LUMBAR: ICD-10-CM

## 2023-09-29 RX ORDER — KETOROLAC TROMETHAMINE 10 MG/1
10 TABLET, FILM COATED ORAL EVERY 6 HOURS PRN
Status: CANCELLED | OUTPATIENT
Start: 2023-09-29

## 2023-09-29 RX ORDER — METHOCARBAMOL 750 MG/1
750 TABLET, FILM COATED ORAL 4 TIMES DAILY
Qty: 360 TABLET | Refills: 0 | Status: CANCELLED | OUTPATIENT
Start: 2023-09-29

## 2023-09-29 RX ORDER — METHOCARBAMOL 750 MG/1
750 TABLET, FILM COATED ORAL 4 TIMES DAILY
Qty: 360 TABLET | Refills: 0 | Status: SHIPPED | OUTPATIENT
Start: 2023-09-29

## 2023-09-29 RX ORDER — KETOROLAC TROMETHAMINE 10 MG/1
10 TABLET, FILM COATED ORAL EVERY 6 HOURS PRN
Qty: 20 TABLET | Refills: 0 | Status: SHIPPED | OUTPATIENT
Start: 2023-09-29

## 2023-09-29 RX ORDER — OXYCODONE AND ACETAMINOPHEN 7.5; 325 MG/1; MG/1
TABLET ORAL
Qty: 150 TABLET | Refills: 0 | Status: SHIPPED | OUTPATIENT
Start: 2023-09-29

## 2023-09-29 NOTE — TELEPHONE ENCOUNTER
Reviewed last office visit on 9/29/2023. Due to provider being out of office, will refill appropriately.

## 2023-09-29 NOTE — TELEPHONE ENCOUNTER
Can you fill these medications for ALLY Groves? The Toradol Rx didn't go thru yesterday and Ms. King needs a refill on the muscle relaxer as well.

## 2023-09-29 NOTE — TELEPHONE ENCOUNTER
Dr. Demarco would you please resend Ms. King's oxy/apap 7.5-325 mg to her [facility] pharmacy? Dr. Gan sent the Rx to her mail order pharmacy by mistake. She called and her mail order pharmacy and canceled the Rx herself. She was given a case ID#[id number] when she canceled the Rx. She is out of pain medication today.

## 2023-09-29 NOTE — TELEPHONE ENCOUNTER
Caller: DIOGO BISHOP    Relationship: SELF    Best call back number: [phone] (home)       Requested Prescriptions:   Requested Prescriptions     Pending Prescriptions Disp Refills    methocarbamol (ROBAXIN) 750 MG tablet 360 tablet 0     Sig: Take 1 tablet by mouth 4 (Four) Times a Day.    PATIENT STATED HER OXYCODONE WAS SENT TO THE WRONG PHARMACY. SHE IS COMPLETELY OUT OF HER MEDICINE,SHE WAS ALSO ASKING WHY THE TORADOL WAS NOT REFILLED. PATIENT REQUESTING A CALL BACK    Pharmacy where request should be sent: [facility] [phone] [facility] [id number] [place] [address] AT [facility] [place] [phone] [facility] [phone] FX     Last office visit with prescribing clinician: 3/8/2022   Last telemedicine visit with prescribing clinician: Visit date not found   Next office visit with prescribing clinician: Visit date not found     Additional details provided by patient:     Does the patient have less than a 3 day supply:  [x] Yes  [] No    Would you like a call back once the refill request has been completed: [x] Yes [] No    If the office needs to give you a call back, can they leave a voicemail: [x] Yes [] No    Elias Franco Rep   09/29/23 09:13 EDT

## 2023-10-12 DIAGNOSIS — F32.A ANXIETY AND DEPRESSION: Chronic | ICD-10-CM

## 2023-10-12 DIAGNOSIS — F41.9 ANXIETY AND DEPRESSION: Chronic | ICD-10-CM

## 2023-10-12 RX ORDER — BREXPIPRAZOLE 3 MG/1
3 TABLET ORAL DAILY
Qty: 90 TABLET | Refills: 1 | Status: SHIPPED | OUTPATIENT
Start: 2023-10-12

## 2023-10-12 NOTE — TELEPHONE ENCOUNTER
Caller: Traci King    Relationship: Self    Best call back number: [phone]     Requested Prescriptions:   Requested Prescriptions     Pending Prescriptions Disp Refills    Brexpiprazole (Rexulti) 3 MG tablet 90 tablet 1     Sig: Take 1 tablet by mouth Daily.        Pharmacy where request should be sent: [facility] - [address] - [phone] PH - [phone] FX     Last office visit with prescribing clinician: 8/14/2023   Last telemedicine visit with prescribing clinician: Visit date not found   Next office visit with prescribing clinician: Visit date not found         Does the patient have less than a 3 day supply:  [] Yes  [x] No    Elias Amaro Rep   10/12/23 09:19 EDT

## 2023-10-13 DIAGNOSIS — M15.9 PRIMARY OSTEOARTHRITIS INVOLVING MULTIPLE JOINTS: Chronic | ICD-10-CM

## 2023-10-13 NOTE — TELEPHONE ENCOUNTER
Too early for refill, this was a 90 day supply, also spoke to pt she agree that this is too early to ask for refill

## 2023-10-16 ENCOUNTER — HOSPITAL ENCOUNTER (OUTPATIENT)
Dept: CT IMAGING | Facility: HOSPITAL | Age: 73
Discharge: HOME | End: 2023-10-16
Admitting: INTERNAL MEDICINE
Payer: MEDICARE

## 2023-10-16 DIAGNOSIS — Z72.0 TOBACCO ABUSE: ICD-10-CM

## 2023-10-16 DIAGNOSIS — Z87.891 PERSONAL HISTORY OF NICOTINE DEPENDENCE: ICD-10-CM

## 2023-10-16 PROCEDURE — 71271 CT THORAX LUNG CANCER SCR C-: CPT

## 2023-10-17 ENCOUNTER — TELEPHONE (OUTPATIENT)
Dept: INTERNAL MEDICINE | Facility: CLINIC | Age: 73
End: 2023-10-17
Payer: MEDICARE

## 2023-10-17 NOTE — TELEPHONE ENCOUNTER
Caller: Traci King    Relationship: Self    Best call back number: [phone]     What was the call regarding: PATIENT STATES THAT HER PHARMACY INFORMED HER THAT HER PRESCRIPTION FOR Brexpiprazole (Rexulti) 3 MG tablet WOULD REQUIRE A PRIOR AUTHORIZATION

## 2023-10-17 NOTE — TELEPHONE ENCOUNTER
It looks like she is scheduled with Dr. Angulo currently which is a reasonable fit, she could also plan to continue with Dr. Hernandez, as he may have more consistent availability at first just given that he wll be new to this population out here  I will reach out to talk to her as well but it may take a week or so before I can return the phone call

## 2023-10-17 NOTE — TELEPHONE ENCOUNTER
Caller: Traci King    Relationship: Self    Best call back number: [phone]     Who are you requesting to speak with (clinical staff, provider,  specific staff member): DR PRASAD OR MA    What was the call regarding: PATIENT REQUESTS A CALL BACK TO DISCUSS WHICH PROVIDER DR PRASAD RECOMMENDS IN THE PRACTICE AFTER SHE LEAVES IN NOVEMBER, OR IF DR PRASAD IS RELEASING INFORMATION ON WHERE SHE IS GOING TO STAY AS A PATIENT

## 2023-10-18 RX ORDER — MELOXICAM 15 MG/1
15 TABLET ORAL DAILY
Qty: 90 TABLET | Refills: 10 | OUTPATIENT
Start: 2023-10-18

## 2023-10-18 RX ORDER — MIDAZOLAM HYDROCHLORIDE 2 MG/2ML
4 INJECTION, SOLUTION INTRAMUSCULAR; INTRAVENOUS ONCE
Status: COMPLETED | OUTPATIENT
Start: 2023-10-19 | End: 2023-10-19

## 2023-10-18 RX ORDER — FENTANYL CITRATE 50 UG/ML
100 INJECTION, SOLUTION INTRAMUSCULAR; INTRAVENOUS ONCE
Status: COMPLETED | OUTPATIENT
Start: 2023-10-19 | End: 2023-10-19

## 2023-10-18 NOTE — SIGNIFICANT NOTE
Patient educated on the following :    - If you are receiving Sedation for your procedure Nothing to Eat 6 hours and only clear liquids for 2 hours prior to your procedure.    -You will need to have someone drive you home after your PROCEDURE and remain with you for 24 hours after the PROCEDURE  - The date of your procedure, your are welcome to have one visitor at bedside or remain within 10-15 minutes of [facility]  -You will need to arrive at 1430 on 10/19 PROCEDURE  -Please contact [facility] at: [phone] with any questions and/or concerns

## 2023-10-19 ENCOUNTER — HOSPITAL ENCOUNTER (OUTPATIENT)
Facility: SURGERY CENTER | Age: 73
Setting detail: HOSPITAL OUTPATIENT SURGERY
Discharge: HOME | End: 2023-10-19
Attending: ANESTHESIOLOGY | Admitting: ANESTHESIOLOGY
Payer: MEDICARE

## 2023-10-19 ENCOUNTER — HOSPITAL ENCOUNTER (OUTPATIENT)
Dept: GENERAL RADIOLOGY | Facility: SURGERY CENTER | Age: 73
Setting detail: HOSPITAL OUTPATIENT SURGERY
End: 2023-10-19
Payer: MEDICARE

## 2023-10-19 VITALS
WEIGHT: 188 LBS | HEART RATE: 83 BPM | DIASTOLIC BLOOD PRESSURE: 62 MMHG | TEMPERATURE: 97.5 F | RESPIRATION RATE: 16 BRPM | SYSTOLIC BLOOD PRESSURE: 124 MMHG | HEIGHT: 60 IN | BODY MASS INDEX: 36.91 KG/M2 | OXYGEN SATURATION: 96 %

## 2023-10-19 DIAGNOSIS — Z41.9 SURGERY, ELECTIVE: ICD-10-CM

## 2023-10-19 DIAGNOSIS — M47.817 LUMBOSACRAL SPONDYLOSIS WITHOUT MYELOPATHY: ICD-10-CM

## 2023-10-19 PROCEDURE — 64635 DESTROY LUMB/SAC FACET JNT: CPT | Performed by: ANESTHESIOLOGY

## 2023-10-19 PROCEDURE — 76000 FLUOROSCOPY <1 HR PHYS/QHP: CPT

## 2023-10-19 PROCEDURE — 64636 DESTROY L/S FACET JNT ADDL: CPT | Performed by: ANESTHESIOLOGY

## 2023-10-19 PROCEDURE — 25010000002 MIDAZOLAM PER 1MG: Performed by: ANESTHESIOLOGY

## 2023-10-19 PROCEDURE — 25010000002 FENTANYL CITRATE (PF) 50 MCG/ML SOLUTION: Performed by: ANESTHESIOLOGY

## 2023-10-19 PROCEDURE — 25010000002 BUPIVACAINE (PF) 0.5 % SOLUTION 10 ML VIAL: Performed by: ANESTHESIOLOGY

## 2023-10-19 RX ORDER — SODIUM CHLORIDE 0.9 % (FLUSH) 0.9 %
10 SYRINGE (ML) INJECTION EVERY 12 HOURS SCHEDULED
Status: DISCONTINUED | OUTPATIENT
Start: 2023-10-19 | End: 2023-10-19 | Stop reason: HOSPADM

## 2023-10-19 RX ORDER — SODIUM CHLORIDE 0.9 % (FLUSH) 0.9 %
10 SYRINGE (ML) INJECTION AS NEEDED
Status: DISCONTINUED | OUTPATIENT
Start: 2023-10-19 | End: 2023-10-19 | Stop reason: HOSPADM

## 2023-10-19 RX ADMIN — FENTANYL CITRATE 50 MCG: 0.05 INJECTION, SOLUTION INTRAMUSCULAR; INTRAVENOUS at 15:04

## 2023-10-19 RX ADMIN — MIDAZOLAM HYDROCHLORIDE 4 MG: 2 INJECTION, SOLUTION INTRAMUSCULAR; INTRAVENOUS at 15:05

## 2023-10-19 NOTE — DISCHARGE INSTRUCTIONS
[facility] - Post-procedure Instructions          --  While there are no absolute restrictions, it is recommended that you do not perform strenuous activity today. In the morning, you may resume your level of activity as before your block.    --  If you have a band-aid at your injection site, please remove it later today. Observe the area for any redness, swelling, pus-like drainage, or a temperature over 101°. If any of these symptoms occur, please call your doctor at [phone]. If after office hours, leave a message and the on-call provider will return your call.    --  Ice may be applied to your injection site. It is recommended you avoid direct heat (heating pad; hot tub) for 1-2 days.    --  Call [facility] at [phone] if you experience persistent headache, persistent bleeding from the injection site, or severe pain not relieved by heat or oral medication.    --  Do not make important decisions today.    --  Due to the effects of the block and/or the I.V. Sedation, DO NOT drive or operate hazardous machinery for 12 hours.  Local anesthetics may cause numbness after procedure and precautions must be taken with regards to operating equipment as well as with walking, even if ambulating with assistance of another person or with an assistive device.    --  Do not drink alcohol for 12 hours.    -- You may return to work tomorrow, or as directed by your referring doctor.    --  Occasionally you may notice a slight increase in your pain after the procedure. This should start to improve within the next 24-48 hours. Radiofrequency ablation procedure pain may last 3-4 weeks.    --  It may take as long as 3-4 days before you notice a gradual improvement in your pain and/or other symptoms.    -- You may continue to take your prescribed pain medication as needed.    --  Some normal possible side effects of steroid use could include fluid retention, increased blood sugar, dull headache,  increased sweating, increased appetite, mood swings and flushing.    --  Diabetics are recommended to watch their blood glucose level closely for 24-48 hours after the injection.    --  Must stay in PACU for 20 min upon arrival and prove no leg weakness before being discharged.    --  IN THE EVENT OF A LIFE THREATENING EMERGENCY, (CHEST PAIN, BREATHING DIFFICULTIES, PARALYSIS…) YOU SHOULD GO TO YOUR NEAREST EMERGENCY ROOM.    --  You should be contacted by our office within 2-3 days to schedule follow up or next appointment date.  If not contacted within 7 days, please call the office at [phone]

## 2023-10-19 NOTE — OP NOTE
Bilateral L1-3 Lumbar Medial Branch RADIOFREQUENCY  [facility]    PREOPERATIVE DIAGNOSIS:  Lumbar spondylosis without myelopathy    POSTOPERATIVE DIAGNOSIS:  Lumbar spondylosis without myelopathy    PROCEDURE:   Diagnostic Bilateral Lumbar Medial Branch Nerve thermal radiofrequency lesioning, with fluoroscopy:  L1, L2, L3  nerves (at the L2, L3, L4 transverse processes) to thermally treat the innervation to facet joints L2-3, L3-4.  10890-26 -- Bilateral L/S facet neuro destr., 1st Level  30672-51 -- Bilateral L/S facet neuro destr., 2nd  Level     PRE-PROCEDURE DISCUSSION WITH PATIENT:    Risks and complications were discussed with the patient prior to starting the procedure and informed consent was obtained.      SURGEON:  Jose Luis Gan MD    REASON FOR PROCEDURE:    Previous clinically significant therapeutic effect after prior Radiofrequency procedure    SEDATION:  Versed 4mg & Fentanyl 50 mcg IV  TIME OF PROCEDURE:   The intraoperative procedure time after administration of the sedative was (15:07-15:23) 16 minutes.     ANESTHETIC:  Lidocaine 2%  STEROID:  NONE    DESCRIPTON OF PROCEDURE:  After obtaining informed consent, IV access was obtained in the preoperative area.   The patient was taken to the operating room.  The patient was placed in the prone position with a pillow under the abdomen. All pressure points were well padded.  EKG, blood pressure, and pulse oximeter were monitored.  The patient was monitored and sedated by the RN under my direction. The lumbosacral area was prepped with Chloraprep and draped in a sterile fashion.     Under fluoroscopic guidance the transverse processes of the L2, L3, L4 vertebrae at the junctions of the superior articular processes were identified on the right.     Skin and subcutaneous tissue were anesthetized with 1ml of 1% lidocaine above each of these points. Then, radiofrequency probe needles were advanced in this fluoro view to the above  junctions.  Aspiration was negative for blood and CSF.  After confirming the position of the needle with fluoroscope in all views, testing was initiated.  First, sensory testing was started on each needle a 1V and 50Hz and slowly decreased until painful pressure stimulation diminished at 0.5V.  Next, motor testing was confirmed to be negative at 3V and 2Hz for any radicular stimulation.  Then 1mL of the local anesthetic solution was instilled in each needle.  Two minutes elapsed, and during this time a lateral fluoroscopic view was confirmed again to ensure the needles had not advanced nor retracted.  Then, Radiofrequency Lesioning was initiated for 2.5 minutes at 80 degrees Celsius.  Needles were removed intact from each of the areas.     A similar procedure was repeated to address the similar nerves on the contralateral side.   Onset of analgesia was noted.  Vital signs remained stable throughout.      ESTIMATED BLOOD LOSS:  <5 mL  SPECIMENS:  none    COMPLICATIONS:   No complications were noted.    TOLERANCE & DISCHARGE CONDITION:    The patient tolerated the procedure well.  The patient was transported to the recovery area without difficulties.  The patient was discharged to home under the care of family in stable and satisfactory condition.    PLAN OF CARE:  The patient was given our standard instruction sheet.  The patient will  Return to clinic 1-2 wks.  The patient will resume all medications as per the medication reconciliation sheet.

## 2023-10-25 ENCOUNTER — OFFICE VISIT (OUTPATIENT)
Dept: PAIN MEDICINE | Facility: CLINIC | Age: 73
End: 2023-10-25
Payer: MEDICARE

## 2023-10-25 VITALS
HEART RATE: 62 BPM | DIASTOLIC BLOOD PRESSURE: 92 MMHG | OXYGEN SATURATION: 100 % | WEIGHT: 187.2 LBS | TEMPERATURE: 97.1 F | BODY MASS INDEX: 36.75 KG/M2 | SYSTOLIC BLOOD PRESSURE: 138 MMHG | HEIGHT: 60 IN

## 2023-10-25 DIAGNOSIS — Z79.899 ENCOUNTER FOR LONG-TERM (CURRENT) USE OF HIGH-RISK MEDICATION: ICD-10-CM

## 2023-10-25 DIAGNOSIS — M51.36 DDD (DEGENERATIVE DISC DISEASE), LUMBAR: Primary | ICD-10-CM

## 2023-10-25 DIAGNOSIS — M47.816 LUMBAR FACET ARTHROPATHY: ICD-10-CM

## 2023-10-25 DIAGNOSIS — M47.817 LUMBOSACRAL SPONDYLOSIS WITHOUT MYELOPATHY: ICD-10-CM

## 2023-10-25 DIAGNOSIS — S32.040D COMPRESSION FRACTURE OF L4 VERTEBRA WITH ROUTINE HEALING: Chronic | ICD-10-CM

## 2023-10-25 RX ORDER — OXYCODONE AND ACETAMINOPHEN 10; 325 MG/1; MG/1
TABLET ORAL
Qty: 150 TABLET | Refills: 0 | Status: SHIPPED | OUTPATIENT
Start: 2023-10-29 | End: 2023-10-26
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
## 2023-10-26 RX ORDER — OXYCODONE HYDROCHLORIDE 10 MG/1
10 TABLET ORAL
Qty: 150 TABLET | Refills: 0 | Status: SHIPPED | OUTPATIENT
Start: 2023-10-26

## 2023-10-26 NOTE — TELEPHONE ENCOUNTER
Hub staff attempted to follow warm transfer process and was unsuccessful     Caller: DIOGO BISHOP    Relationship to patient: SELF    Best call back number:     Patient is needing: MS BISHOP STATED THAT HER PHARMACY IS OUT OF THE PERCOCET 10 MG BUT THEY HAVE 7.5 MG. SHE HAS CHECKED OTHER PHARMACIES AND NONE OF THEM HAVE THE 10 MG. SHE WOULD LIKE A RX FOR THE 7.5 MG TABLETS SENT TO [facility] [id number] - [facility] [place] [address] AT [facility] - [phone] [facility] [phone]  [phone]     PLEASE CALL THE PT BACK WHENNEW RX HAS BEEN SENT

## 2023-10-26 NOTE — TELEPHONE ENCOUNTER
Call and spoke to pharmacist, she had an issue with the sig, they are tablets and you had capsule, plus they do not have the percocet 10 only percocet 5, which she told mr there is no guarantees she will have this on 10/29

## 2023-10-27 ENCOUNTER — TELEPHONE (OUTPATIENT)
Dept: PAIN MEDICINE | Facility: CLINIC | Age: 73
End: 2023-10-27

## 2023-10-27 NOTE — TELEPHONE ENCOUNTER
Provider: NELSON    Caller: PATIENT    Pharmacy: [facility] [phone]    Phone Number: [phone]    Reason for Call: PATIENT STATES THAT SHE CAN'T GET IN TOUCH WITH THE PHARMACY TO CHECK ON HER RX. SHE IS ASKING IF THIS WAS SENT. SHE WOULD LIKE A CALL BACK PLEASE, AS SHE STATES SHE IS DUE TO FILL ON MONDAY.

## 2023-10-27 NOTE — TELEPHONE ENCOUNTER
HUB AGENT ATTEMPTED CLINIC WARM TRANSFER - NO ANSWER     FOLLOWING UP FROM ALREADY SIGNED 10-25-23 TEL ENC w/NEW MESSAGE ADDED THIS MORNING 10-27-23 @ [id number]     PATIENT STATED [facility][id number] (PH: [phone]) JUST TOLD PATIENT THEY DO HAVE ROXICODONE 10 MG IN STOCK - PLEASE CANCEL / DISREGARD PREVIOUS MESSAGE re: A DIFFERENT DOSAGE     THANKS

## 2023-11-02 DIAGNOSIS — M15.9 PRIMARY OSTEOARTHRITIS INVOLVING MULTIPLE JOINTS: Chronic | ICD-10-CM

## 2023-11-02 NOTE — TELEPHONE ENCOUNTER
Received a fax requesting prescription got to [facility].    Refill  Last OV: 10/25/2023  Next OV: 11/28/2023

## 2023-11-03 ENCOUNTER — OFFICE VISIT (OUTPATIENT)
Dept: CARDIOLOGY | Facility: CLINIC | Age: 73
End: 2023-11-03
Payer: MEDICARE

## 2023-11-03 VITALS
OXYGEN SATURATION: 100 % | HEIGHT: 60 IN | WEIGHT: 187.8 LBS | DIASTOLIC BLOOD PRESSURE: 75 MMHG | BODY MASS INDEX: 36.87 KG/M2 | SYSTOLIC BLOOD PRESSURE: 125 MMHG | HEART RATE: 95 BPM

## 2023-11-03 DIAGNOSIS — R93.1 ABNORMAL ECHOCARDIOGRAM: ICD-10-CM

## 2023-11-03 DIAGNOSIS — I51.89 DIASTOLIC DYSFUNCTION: Primary | ICD-10-CM

## 2023-11-03 DIAGNOSIS — M51.36 DDD (DEGENERATIVE DISC DISEASE), LUMBAR: ICD-10-CM

## 2023-11-03 DIAGNOSIS — E78.2 MIXED HYPERLIPIDEMIA: Chronic | ICD-10-CM

## 2023-11-03 DIAGNOSIS — I10 PRIMARY HYPERTENSION: Chronic | ICD-10-CM

## 2023-11-03 NOTE — PROGRESS NOTES
CARDIOLOGY    Date of Office Visit: 2023  Patient Name: Traci King  : 1950  Encounter Provider: Ehsan Orellana PA-C  Primary Cardiologist: Kory Martinez MD and previously saw Dr. Sargent    CHIEF COMPLAINT / REASON FOR OFFICE VISIT     6 month follow up      HISTORY OF PRESENT ILLNESS     This is a 73 y.o. year old female who presents to [facility]ARDIOLOGY for a for a 6 month follow up.     She has a longstanding history of atypical chest pains and eventually had a heart catheterization in  after an abnormal stress test.  She was also seen to have abnormal septal wall motion.  Heart cath showed normal epicardial coronary vessels with no significant disease.  Due to this,  she has been kept on losartan.  She was seen in our office in 2023 for evaluation for dyspnea on exertion.  Prior to this in November she had previously been hospitalized for bilateral pneumonia and at that time her RVSP was 72 mmHg.  She was started on low-dose diuretic therapy and a new echocardiogram was completed showing an RVSP that had improved to 42 mmHg.    She has been struggling with back pain and had a recent lumbar radiofrequency ablation of bilateral L1-L3 on 10/19/2023 with Dr. Gan.  She had follow-up in their office on 10/25/2023 and is continuing to have pain.  She is currently taking Percocet.    Today the patient reports she has been doing fairly well over the past few months.  She has not had any recurrence of palpitations or chest discomfort.  She continues to have mild shortness of breath that has been at baseline and has not changed in caliber over the last year.  She continues to have significant back discomfort that limits her mobility.  She officially quit smoking last November and has gained about 10 pounds since.  We talked about diet and lifestyle modifications including increasing her water intake and cutting back on late night snacking to help with weight  "loss.    I did note that her initial blood pressure was mildly elevated today and she did have higher diastolic blood pressure readings when she saw her pain management specialist 2 weeks ago.  I retook her blood pressure and it was normal.  She will start checking her blood pressure while she is out and about at the stores and if it remains high we will have to uptitrate her losartan.      PMHx: Hypertension, hyperlipidemia, diastolic dysfunction, vitamin D deficiency, SIADH, anxiety, osteoarthritis, degenerative joint disease, COPD, tobacco abuse, GERD    PHYSICAL EXAMINATION     Vital Signs:  /90 (BP Location: Left arm, Patient Position: Sitting, Cuff Size: Adult)   Pulse 95   Ht 152.4 cm (60\")   Wt 85.2 kg (187 lb 12.8 oz)   SpO2 100%   BMI 36.68 kg/m²   Estimated body mass index is 36.68 kg/m² as calculated from the following:    Height as of this encounter: 152.4 cm (60\").    Weight as of this encounter: 85.2 kg (187 lb 12.8 oz).       Physical Exam  Constitutional:       Appearance: Normal appearance.   HENT:      Head: Normocephalic and atraumatic.   Cardiovascular:      Rate and Rhythm: Normal rate and regular rhythm.      Pulses: Normal pulses.      Heart sounds: Normal heart sounds.   Pulmonary:      Effort: Pulmonary effort is normal.      Breath sounds: Normal breath sounds.   Musculoskeletal:      Right lower leg: No edema.      Left lower leg: No edema.   Skin:     General: Skin is warm and dry.   Neurological:      General: No focal deficit present.      Mental Status: She is alert and oriented to person, place, and time.          Cardiac Testing/Results     Cardiac Testing:   - Echo 2/28/2023: EF 56 to 60%, normal diastolic function.  RVSP mildly elevated at 42 mmHg.  She has a history of septal wall motion abnormality that it is due to a possible silent myocardial infarction.  This was noted on previous myocardial perfusion studies and in 2015/17 had coronary angiogram is that did not " show any evidence of epicardial coronary disease.    -Holter monitor February 9, 2023: Rare PACs with short runs of SVT.  Unremarkable monitor.    Result Review :  The following data was reviewed by: Ehsan Orellana PA-C on 11/03/2023:    Lipid Panel          8/14/2023    00:00   Lipid Panel   Total Cholesterol 189    Triglycerides 110    HDL Cholesterol 79    VLDL Cholesterol 19    LDL Cholesterol  91       Lab Results   Component Value Date     08/14/2023     02/20/2023    K 4.3 08/14/2023    K 5.0 02/20/2023     08/14/2023    CL 97 (L) 02/20/2023    CO2 23.1 08/14/2023    CO2 26.5 02/20/2023    BUN 10 08/14/2023    BUN 17 02/20/2023    CREATININE 0.91 08/14/2023    CREATININE 1.12 (H) 02/20/2023    EGFRIFNONA 65 01/04/2022    EGFRIFNONA 67 12/09/2021    EGFRIFAFRI 74 01/04/2022    EGFRIFAFRI 78 12/09/2021    GLUCOSE 90 08/14/2023    GLUCOSE 92 02/20/2023    CALCIUM 9.3 08/14/2023    CALCIUM 9.5 02/20/2023    ALBUMIN 4.1 08/14/2023    ALBUMIN 4.0 02/03/2023    AST 15 08/14/2023    AST 10 02/03/2023    ALT 16 08/14/2023    ALT 7 02/03/2023     Lab Results   Component Value Date    WBC 6.93 08/14/2023    WBC 7.72 02/03/2023    HGB 11.2 (L) 08/14/2023    HGB 12.1 02/03/2023    HCT 33.5 (L) 08/14/2023    HCT 37.4 02/03/2023    MCV 92.8 08/14/2023    MCV 91.7 02/03/2023     08/14/2023     02/03/2023     Lab Results   Component Value Date    PROBNP 147.0 02/03/2023    PROBNP 159 01/13/2021    BNP 59.5 11/23/2022    .2 10/25/2021     Lab Results   Component Value Date    CKTOTAL 80 08/02/2022    TROPONINI <0.010 11/23/2022     Lab Results   Component Value Date    TSH 1.200 08/14/2023    TSH 1.240 12/27/2022         Data reviewed : Consultant notes Ortho note from 10/25/2023        ECG 12 Lead    Date/Time: 11/3/2023 1:49 PM  Performed by: Ehsan Orellana PA-C    Authorized by: Ehsan Orellana PA-C  Comparison: compared with previous ECG   Rhythm: sinus  rhythm  Rate: normal  BPM: 95  T flattening: all  QRS axis: normal    Clinical impression: non-specific ECG  Comments: Flattening of T waves throughout with no changes from prior EKGs, Qtc 551, this was recalculated bymyself to be much less        Using Bazzets formula the corrected Qtc is 453          ASSESSMENT & PLAN       Diagnoses and all orders for this visit:    1. Diastolic dysfunction (Primary)  Echocardiogram with normal ejection fraction with evidence of diastolic dysfunction.  Previously had very high RVSP after she had bilateral pneumonia that is now improved to 42 on recent echo  Continue to limit sodium and intake as needed furosemide.  She is not currently taking on a daily basis  Continue losartan 50 mg daily  2. Mixed hyperlipidemia  Goal LDL less than 100.  Her most recent LDL on labs was 91.  She has high HDL cholesterol at 79 placing her ratio well under 2-1  3. Primary hypertension  Continue to keep intermittent blood pressure log at home.  Continue losartan  Initial blood pressure today did have elevated diastolic reading, repeat had corrected.  She will start monitoring as an outpatient and if it remains elevated we will need to uptitrate losartan  4. Abnormal echocardiogram  History of prior septal wall motion normality.  This resulted in heart catheterization in 2017 that showed no significant epicardial coronary artery stenosis.  She recently quit smoking and has gained about 10 pounds.  Went over diet and lifestyle modifications.  5. DDD (degenerative disc disease), lumbar  She had radiofrequency ablation a few weeks ago.  Ongoing pain and this limits her mobility quite significantly    Follow Up:  Return in about 1 year (around 11/3/2024) for Dr. Parker-Routine.  Patient was given instructions and counseling regarding her condition or for health maintenance advice. Please contact office if worsening symptoms or proceed to ER when appropriate.      Ehsan Orellana  NATY  11/03/23  13:49 EDT    MEDICATIONS         Discharge Medications            Accurate as of November 3, 2023  1:49 PM. If you have any questions, ask your nurse or doctor.                Continue These Medications        Instructions Start Date   buPROPion  MG 24 hr tablet  Commonly known as: Wellbutrin XL   300 mg, Oral, Daily      Diclofenac Sodium 1 % gel gel  Commonly known as: VOLTAREN   4 g, Topical, 4 Times Daily PRN      Fluzone High-Dose Quadrivalent 0.7 ML suspension prefilled syringe injection  Generic drug: Influenza Vac High-Dose Quad   No dose, route, or frequency recorded.      hydrocortisone 2.5 % ointment   1 application , Topical, 2 Times Daily      ibandronate 150 MG tablet  Commonly known as: BONIVA   No dose, route, or frequency recorded.      ketoconazole 2 % cream  Commonly known as: NIZORAL   Topical, Daily      lidocaine 5 %  Commonly known as: LIDODERM   1 patch, Transdermal, Every 24 Hours, Remove & Discard patch within 12 hours or as directed by MD      losartan 50 MG tablet  Commonly known as: Cozaar   50 mg, Oral, Daily      meloxicam 15 MG tablet  Commonly known as: MOBIC   15 mg, Oral, Daily      methocarbamol 750 MG tablet  Commonly known as: ROBAXIN   750 mg, Oral, 4 Times Daily      Myrbetriq 50 MG tablet sustained-release 24 hour 24 hr tablet  Generic drug: Mirabegron ER   No dose, route, or frequency recorded.      naloxone 4 MG/0.1ML nasal spray  Commonly known as: Narcan   1 spray, Nasal, As Needed      nicotine polacrilex 4 MG gum  Commonly known as: NICORETTE   4 mg, Mouth/Throat, As Needed      nystatin 799186 UNIT/GM powder  Commonly known as: MYCOSTATIN   Topical, 3 Times Daily      nystatin-triamcinolone 950575-1.1 UNIT/GM-% cream  Commonly known as: MYCOLOG II   Topical, 2 Times Daily      ondansetron ODT 4 MG disintegrating tablet  Commonly known as: ZOFRAN-ODT   4 mg, Translingual, Every 8 Hours PRN      oxyCODONE 10 MG tablet  Commonly known as:  ROXICODONE   10 mg, Oral, 5 Times Daily PRN, Take acetaminophen 325mg with each dose.      Rexulti 3 MG tablet  Generic drug: Brexpiprazole   3 mg, Oral, Daily      Vortioxetine HBr 20 MG tablet  Commonly known as: TRINTELLIX   20 mg, Oral, Daily With Breakfast                   **Marcelo Disclaimer: This note was dictated using an electronic transcription. The electronic translation of spoken language may permit erroneous, or at times, nonsensical words or phrases to be inadvertently transcribed. Although I have reviewed the note for such errors, some may still exist.

## 2023-11-06 RX ORDER — MELOXICAM 15 MG/1
15 TABLET ORAL DAILY
Qty: 90 TABLET | Refills: 0 | Status: SHIPPED | OUTPATIENT
Start: 2023-11-06

## 2023-11-28 ENCOUNTER — OFFICE VISIT (OUTPATIENT)
Dept: PAIN MEDICINE | Facility: CLINIC | Age: 73
End: 2023-11-28
Payer: MEDICARE

## 2023-11-28 VITALS
HEART RATE: 86 BPM | TEMPERATURE: 96.4 F | RESPIRATION RATE: 18 BRPM | BODY MASS INDEX: 37.22 KG/M2 | WEIGHT: 189.6 LBS | SYSTOLIC BLOOD PRESSURE: 140 MMHG | DIASTOLIC BLOOD PRESSURE: 90 MMHG | OXYGEN SATURATION: 97 % | HEIGHT: 60 IN

## 2023-11-28 DIAGNOSIS — M47.816 LUMBAR FACET ARTHROPATHY: Primary | ICD-10-CM

## 2023-11-28 DIAGNOSIS — M50.30 DEGENERATIVE DISC DISEASE, CERVICAL: ICD-10-CM

## 2023-11-28 DIAGNOSIS — S32.020D COMPRESSION FRACTURE OF L2 VERTEBRA WITH ROUTINE HEALING, SUBSEQUENT ENCOUNTER: ICD-10-CM

## 2023-11-28 DIAGNOSIS — Z79.899 ENCOUNTER FOR LONG-TERM (CURRENT) USE OF HIGH-RISK MEDICATION: ICD-10-CM

## 2023-11-28 DIAGNOSIS — M47.812 CERVICAL SPONDYLOSIS WITHOUT MYELOPATHY: ICD-10-CM

## 2023-11-28 DIAGNOSIS — M51.36 DDD (DEGENERATIVE DISC DISEASE), LUMBAR: Primary | ICD-10-CM

## 2023-11-28 RX ORDER — OXYCODONE AND ACETAMINOPHEN 7.5; 325 MG/1; MG/1
TABLET ORAL
Qty: 150 TABLET | Refills: 0 | Status: SHIPPED | OUTPATIENT
Start: 2023-11-28
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
## 2023-12-27 ENCOUNTER — OFFICE VISIT (OUTPATIENT)
Dept: PAIN MEDICINE | Facility: CLINIC | Age: 73
End: 2023-12-27
Payer: MEDICARE

## 2023-12-27 VITALS
SYSTOLIC BLOOD PRESSURE: 110 MMHG | HEIGHT: 60 IN | HEART RATE: 88 BPM | OXYGEN SATURATION: 92 % | DIASTOLIC BLOOD PRESSURE: 72 MMHG | WEIGHT: 190.6 LBS | TEMPERATURE: 97 F | RESPIRATION RATE: 18 BRPM | BODY MASS INDEX: 37.42 KG/M2

## 2023-12-27 DIAGNOSIS — M47.816 LUMBAR FACET ARTHROPATHY: ICD-10-CM

## 2023-12-27 DIAGNOSIS — M62.838 MUSCLE SPASM: Primary | ICD-10-CM

## 2023-12-27 DIAGNOSIS — M51.36 DDD (DEGENERATIVE DISC DISEASE), LUMBAR: ICD-10-CM

## 2023-12-27 DIAGNOSIS — M15.9 PRIMARY OSTEOARTHRITIS INVOLVING MULTIPLE JOINTS: Chronic | ICD-10-CM

## 2023-12-27 DIAGNOSIS — M47.812 CERVICAL SPONDYLOSIS WITHOUT MYELOPATHY: ICD-10-CM

## 2023-12-27 DIAGNOSIS — Z79.899 ENCOUNTER FOR LONG-TERM (CURRENT) USE OF HIGH-RISK MEDICATION: ICD-10-CM

## 2023-12-27 DIAGNOSIS — M50.30 DEGENERATIVE DISC DISEASE, CERVICAL: ICD-10-CM

## 2023-12-27 DIAGNOSIS — Z79.899 ENCOUNTER FOR LONG-TERM (CURRENT) USE OF HIGH-RISK MEDICATION: Primary | ICD-10-CM

## 2023-12-27 PROCEDURE — 1159F MED LIST DOCD IN RCRD: CPT | Performed by: PHYSICIAN ASSISTANT

## 2023-12-27 PROCEDURE — 1125F AMNT PAIN NOTED PAIN PRSNT: CPT | Performed by: PHYSICIAN ASSISTANT

## 2023-12-27 PROCEDURE — 3074F SYST BP LT 130 MM HG: CPT | Performed by: PHYSICIAN ASSISTANT

## 2023-12-27 PROCEDURE — 99214 OFFICE O/P EST MOD 30 MIN: CPT | Performed by: PHYSICIAN ASSISTANT

## 2023-12-27 PROCEDURE — 3078F DIAST BP <80 MM HG: CPT | Performed by: PHYSICIAN ASSISTANT

## 2023-12-27 PROCEDURE — 1160F RVW MEDS BY RX/DR IN RCRD: CPT | Performed by: PHYSICIAN ASSISTANT

## 2023-12-27 RX ORDER — VIBEGRON 75 MG/1
TABLET, FILM COATED ORAL
COMMUNITY
Start: 2023-12-04

## 2023-12-27 RX ORDER — METHOCARBAMOL 750 MG/1
750 TABLET, FILM COATED ORAL 4 TIMES DAILY
Qty: 360 TABLET | Refills: 0 | Status: SHIPPED | OUTPATIENT
Start: 2023-12-27

## 2023-12-27 RX ORDER — MELOXICAM 15 MG/1
15 TABLET ORAL DAILY
Qty: 90 TABLET | Refills: 0 | Status: SHIPPED | OUTPATIENT
Start: 2023-12-27

## 2023-12-27 RX ORDER — OXYCODONE HYDROCHLORIDE 10 MG/1
TABLET ORAL
Qty: 150 TABLET | Refills: 0 | Status: SHIPPED | OUTPATIENT
Start: 2023-12-28 | End: 2023-12-28
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
## 2023-12-28 ENCOUNTER — TELEPHONE (OUTPATIENT)
Dept: PAIN MEDICINE | Facility: CLINIC | Age: 73
End: 2023-12-28

## 2023-12-28 RX ORDER — OXYCODONE AND ACETAMINOPHEN 10; 325 MG/1; MG/1
1 TABLET ORAL
Qty: 150 TABLET | Refills: 0 | Status: SHIPPED | OUTPATIENT
Start: 2023-12-28

## 2023-12-28 NOTE — TELEPHONE ENCOUNTER
Patients pharmacy is out of Oxycodone and only has percocet  mg. Will you resend the prescription. The oxycodone has been cancelled at her pharmacy.

## 2023-12-28 NOTE — TELEPHONE ENCOUNTER
Caller: Traci King A    Relationship to patient: Self    Best call back number: [phone] (home)     Patient is needing: PATIENT IS CALLING FOR AN UPDATE ON GETTING HER OXYCODONE SWITCHED TO OXYCODONE- ACETAMINOPHEN. THE LAST MESSAGE WAS SIGNED AND WAITING ON A RESPONSE FROM DR LEMUS.   PATIENT REQUESTS A CALL WHEN THE SCRIPT IS SENT.

## 2023-12-28 NOTE — TELEPHONE ENCOUNTER
Caller: DIOGO BISHOP    Relationship to patient: SELF    Best call back number:     Patient is needing: MS BISHOP STATED SHE SPOKE WITH HER PHARMACY AND THEY DO NOT HAVE ENOUGH OXYCODONE TO FILL HER RX. THEY DO HAVE OXYCODONE-ACETAMINOPHEN AVAILABLE - SHE WOULD LIKE NIESHA JUAREZ TO SEND OVER AN RX FOR THE OXYCODONE WITH ACETAMINOPHEN    [facility] [id number] [facility] [id number] [address] AT [facility] - [phone] [facility] [phone]  [phone]     PLEASE CALL THE PATIENT WHEN THE NEW RX HAS BEEN SENT, THANK YOU!

## 2024-01-13 DIAGNOSIS — M62.838 MUSCLE SPASM: ICD-10-CM

## 2024-01-13 DIAGNOSIS — M15.9 PRIMARY OSTEOARTHRITIS INVOLVING MULTIPLE JOINTS: Chronic | ICD-10-CM

## 2024-01-15 RX ORDER — MELOXICAM 15 MG/1
15 TABLET ORAL DAILY
Qty: 90 TABLET | Refills: 0 | Status: SHIPPED | OUTPATIENT
Start: 2024-01-15

## 2024-01-23 ENCOUNTER — OFFICE VISIT (OUTPATIENT)
Dept: PAIN MEDICINE | Facility: CLINIC | Age: 74
End: 2024-01-23
Payer: MEDICARE

## 2024-01-23 VITALS
SYSTOLIC BLOOD PRESSURE: 112 MMHG | WEIGHT: 189.4 LBS | RESPIRATION RATE: 20 BRPM | HEIGHT: 60 IN | HEART RATE: 83 BPM | BODY MASS INDEX: 37.18 KG/M2 | OXYGEN SATURATION: 94 % | DIASTOLIC BLOOD PRESSURE: 64 MMHG | TEMPERATURE: 96.6 F

## 2024-01-23 DIAGNOSIS — M47.812 CERVICAL SPONDYLOSIS WITHOUT MYELOPATHY: ICD-10-CM

## 2024-01-23 DIAGNOSIS — Z79.899 ENCOUNTER FOR LONG-TERM (CURRENT) USE OF HIGH-RISK MEDICATION: ICD-10-CM

## 2024-01-23 DIAGNOSIS — M50.30 DEGENERATIVE DISC DISEASE, CERVICAL: Primary | ICD-10-CM

## 2024-01-23 DIAGNOSIS — M51.36 DDD (DEGENERATIVE DISC DISEASE), LUMBAR: ICD-10-CM

## 2024-01-23 DIAGNOSIS — M47.816 LUMBAR FACET ARTHROPATHY: ICD-10-CM

## 2024-01-23 PROCEDURE — 99214 OFFICE O/P EST MOD 30 MIN: CPT | Performed by: PHYSICIAN ASSISTANT

## 2024-01-23 PROCEDURE — 3078F DIAST BP <80 MM HG: CPT | Performed by: PHYSICIAN ASSISTANT

## 2024-01-23 PROCEDURE — 80305 DRUG TEST PRSMV DIR OPT OBS: CPT | Performed by: PHYSICIAN ASSISTANT

## 2024-01-23 PROCEDURE — 1160F RVW MEDS BY RX/DR IN RCRD: CPT | Performed by: PHYSICIAN ASSISTANT

## 2024-01-23 PROCEDURE — 1125F AMNT PAIN NOTED PAIN PRSNT: CPT | Performed by: PHYSICIAN ASSISTANT

## 2024-01-23 PROCEDURE — 1159F MED LIST DOCD IN RCRD: CPT | Performed by: PHYSICIAN ASSISTANT

## 2024-01-23 PROCEDURE — 3074F SYST BP LT 130 MM HG: CPT | Performed by: PHYSICIAN ASSISTANT

## 2024-01-23 RX ORDER — OXYCODONE AND ACETAMINOPHEN 10; 325 MG/1; MG/1
1 TABLET ORAL
Qty: 150 TABLET | Refills: 0 | Status: SHIPPED | OUTPATIENT
Start: 2024-01-28
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
## 2024-01-25 ENCOUNTER — TELEPHONE (OUTPATIENT)
Dept: PAIN MEDICINE | Facility: CLINIC | Age: 74
End: 2024-01-25

## 2024-01-25 NOTE — TELEPHONE ENCOUNTER
Hub staff attempted to follow warm transfer process and was unsuccessful     Caller: Traci King    Relationship to patient: Self    Best call back number: [phone]    Patient is needing: RETURNING CALL

## 2024-01-25 NOTE — TELEPHONE ENCOUNTER
Caller: Traci King    Relationship: Self    Best call back number: [phone]    What is the best time to reach you: ANYTIME     Who are you requesting to speak with (clinical staff, provider,  specific staff member): CLINICAL     Do you know the name of the person who called: PLEASE CALL BACK     What was the call regarding: PATIENT HAS SOME QUESTIONS REGARDING URINE SCREEN TEST RESULTS IN MY CHART.

## 2024-01-31 NOTE — TELEPHONE ENCOUNTER
"Patient informed. Patient then asked about taking CBD Gummies for weight loss. I informed her that our protocol for CBD is that although it says it does not obtain thc they may test positive for THC. If they test postitive for THC then they cannot be used because this can cause discontinuation of her medication. She then stated \"so then the answer is I shouldn't take them\". I informed her if that is how she understands it then yes. "

## 2024-02-14 ENCOUNTER — TRANSCRIBE ORDERS (OUTPATIENT)
Dept: ADMINISTRATIVE | Facility: HOSPITAL | Age: 74
End: 2024-02-14
Payer: MEDICARE

## 2024-02-14 DIAGNOSIS — R91.8 LUNG MASS: Primary | ICD-10-CM

## 2024-02-15 DIAGNOSIS — F32.A ANXIETY AND DEPRESSION: Chronic | ICD-10-CM

## 2024-02-15 DIAGNOSIS — F41.9 ANXIETY AND DEPRESSION: Chronic | ICD-10-CM

## 2024-02-15 RX ORDER — BUPROPION HYDROCHLORIDE 300 MG/1
300 TABLET ORAL DAILY
Qty: 90 TABLET | Refills: 1 | OUTPATIENT
Start: 2024-02-15

## 2024-02-26 ENCOUNTER — PREP FOR SURGERY (OUTPATIENT)
Dept: SURGERY | Facility: SURGERY CENTER | Age: 74
End: 2024-02-26
Payer: MEDICARE

## 2024-02-26 ENCOUNTER — OFFICE VISIT (OUTPATIENT)
Dept: PAIN MEDICINE | Facility: CLINIC | Age: 74
End: 2024-02-26
Payer: MEDICARE

## 2024-02-26 VITALS
BODY MASS INDEX: 37.69 KG/M2 | WEIGHT: 192 LBS | OXYGEN SATURATION: 94 % | TEMPERATURE: 96.9 F | SYSTOLIC BLOOD PRESSURE: 108 MMHG | HEART RATE: 88 BPM | HEIGHT: 60 IN | DIASTOLIC BLOOD PRESSURE: 70 MMHG | RESPIRATION RATE: 18 BRPM

## 2024-02-26 DIAGNOSIS — Z79.899 ENCOUNTER FOR LONG-TERM (CURRENT) USE OF HIGH-RISK MEDICATION: ICD-10-CM

## 2024-02-26 DIAGNOSIS — M47.816 LUMBAR FACET ARTHROPATHY: Primary | ICD-10-CM

## 2024-02-26 DIAGNOSIS — M50.30 DEGENERATIVE DISC DISEASE, CERVICAL: ICD-10-CM

## 2024-02-26 DIAGNOSIS — M51.36 DDD (DEGENERATIVE DISC DISEASE), LUMBAR: ICD-10-CM

## 2024-02-26 PROCEDURE — 3078F DIAST BP <80 MM HG: CPT | Performed by: PHYSICIAN ASSISTANT

## 2024-02-26 PROCEDURE — 1159F MED LIST DOCD IN RCRD: CPT | Performed by: PHYSICIAN ASSISTANT

## 2024-02-26 PROCEDURE — 1160F RVW MEDS BY RX/DR IN RCRD: CPT | Performed by: PHYSICIAN ASSISTANT

## 2024-02-26 PROCEDURE — 3074F SYST BP LT 130 MM HG: CPT | Performed by: PHYSICIAN ASSISTANT

## 2024-02-26 PROCEDURE — 99214 OFFICE O/P EST MOD 30 MIN: CPT | Performed by: PHYSICIAN ASSISTANT

## 2024-02-26 PROCEDURE — 1125F AMNT PAIN NOTED PAIN PRSNT: CPT | Performed by: PHYSICIAN ASSISTANT

## 2024-02-26 RX ORDER — SODIUM CHLORIDE 0.9 % (FLUSH) 0.9 %
10 SYRINGE (ML) INJECTION AS NEEDED
OUTPATIENT
Start: 2024-02-26

## 2024-02-26 RX ORDER — SODIUM CHLORIDE 0.9 % (FLUSH) 0.9 %
10 SYRINGE (ML) INJECTION EVERY 12 HOURS SCHEDULED
OUTPATIENT
Start: 2024-02-26

## 2024-02-26 RX ORDER — OXYCODONE AND ACETAMINOPHEN 10; 325 MG/1; MG/1
1 TABLET ORAL
Qty: 150 TABLET | Refills: 0 | Status: SHIPPED | OUTPATIENT
Start: 2024-02-27

## 2024-02-26 RX ORDER — TOPIRAMATE 50 MG/1
TABLET, FILM COATED ORAL
COMMUNITY
Start: 2024-02-19
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
## 2024-02-27 ENCOUNTER — TRANSCRIBE ORDERS (OUTPATIENT)
Dept: SURGERY | Facility: SURGERY CENTER | Age: 74
End: 2024-02-27
Payer: MEDICARE

## 2024-02-27 DIAGNOSIS — Z41.9 SURGERY, ELECTIVE: Primary | ICD-10-CM

## 2024-03-05 ENCOUNTER — HOSPITAL ENCOUNTER (OUTPATIENT)
Dept: CT IMAGING | Facility: HOSPITAL | Age: 74
Discharge: HOME | End: 2024-03-05
Admitting: INTERNAL MEDICINE
Payer: MEDICARE

## 2024-03-05 DIAGNOSIS — R91.8 LUNG MASS: ICD-10-CM

## 2024-03-05 PROCEDURE — 71250 CT THORAX DX C-: CPT

## 2024-03-06 ENCOUNTER — TRANSCRIBE ORDERS (OUTPATIENT)
Dept: SURGERY | Facility: SURGERY CENTER | Age: 74
End: 2024-03-06
Payer: MEDICARE

## 2024-03-06 DIAGNOSIS — Z41.9 SURGERY, ELECTIVE: Primary | ICD-10-CM

## 2024-03-26 ENCOUNTER — TELEPHONE (OUTPATIENT)
Dept: PAIN MEDICINE | Facility: CLINIC | Age: 74
End: 2024-03-26
Payer: MEDICARE

## 2024-03-26 ENCOUNTER — OFFICE VISIT (OUTPATIENT)
Dept: PAIN MEDICINE | Facility: CLINIC | Age: 74
End: 2024-03-26
Payer: MEDICARE

## 2024-03-26 VITALS
DIASTOLIC BLOOD PRESSURE: 74 MMHG | BODY MASS INDEX: 37.73 KG/M2 | OXYGEN SATURATION: 96 % | TEMPERATURE: 96.2 F | HEART RATE: 79 BPM | SYSTOLIC BLOOD PRESSURE: 118 MMHG | WEIGHT: 192.2 LBS | HEIGHT: 60 IN

## 2024-03-26 DIAGNOSIS — Z79.899 ENCOUNTER FOR LONG-TERM (CURRENT) USE OF HIGH-RISK MEDICATION: ICD-10-CM

## 2024-03-26 DIAGNOSIS — M48.02 SPINAL STENOSIS, CERVICAL REGION: ICD-10-CM

## 2024-03-26 DIAGNOSIS — M47.816 LUMBAR FACET ARTHROPATHY: Primary | ICD-10-CM

## 2024-03-26 DIAGNOSIS — M47.816 LUMBAR FACET ARTHROPATHY: ICD-10-CM

## 2024-03-26 DIAGNOSIS — M47.812 CERVICAL SPONDYLOSIS WITHOUT MYELOPATHY: ICD-10-CM

## 2024-03-26 DIAGNOSIS — M51.36 DDD (DEGENERATIVE DISC DISEASE), LUMBAR: ICD-10-CM

## 2024-03-26 DIAGNOSIS — M62.838 MUSCLE SPASM: ICD-10-CM

## 2024-03-26 PROCEDURE — 1125F AMNT PAIN NOTED PAIN PRSNT: CPT | Performed by: PHYSICIAN ASSISTANT

## 2024-03-26 PROCEDURE — 3078F DIAST BP <80 MM HG: CPT | Performed by: PHYSICIAN ASSISTANT

## 2024-03-26 PROCEDURE — 3074F SYST BP LT 130 MM HG: CPT | Performed by: PHYSICIAN ASSISTANT

## 2024-03-26 PROCEDURE — 99214 OFFICE O/P EST MOD 30 MIN: CPT | Performed by: PHYSICIAN ASSISTANT

## 2024-03-26 PROCEDURE — 1160F RVW MEDS BY RX/DR IN RCRD: CPT | Performed by: PHYSICIAN ASSISTANT

## 2024-03-26 PROCEDURE — 1159F MED LIST DOCD IN RCRD: CPT | Performed by: PHYSICIAN ASSISTANT

## 2024-03-26 RX ORDER — OXYCODONE AND ACETAMINOPHEN 10; 325 MG/1; MG/1
1 TABLET ORAL
Qty: 150 TABLET | Refills: 0 | Status: SHIPPED | OUTPATIENT
Start: 2024-03-28

## 2024-03-26 RX ORDER — METHOCARBAMOL 750 MG/1
750 TABLET, FILM COATED ORAL 3 TIMES DAILY
Qty: 270 TABLET | Refills: 0 | Status: SHIPPED | OUTPATIENT
Start: 2024-03-26

## 2024-03-26 RX ORDER — GABAPENTIN 300 MG/1
CAPSULE ORAL
Qty: 180 CAPSULE | Refills: 0 | Status: SHIPPED | OUTPATIENT
Start: 2024-03-26
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
## 2024-03-26 NOTE — TELEPHONE ENCOUNTER
Caller: Traci King    Relationship to patient: Self    Best call back number: [phone]    Chief complaint: LOWER BACK     Type of visit: INJECTION     Requested date: N/A      If rescheduling, when is the original appointment: 4-11     Additional notes:PATIENT STATES SHE IS HAVING ANOTHER PROCEDURE THE SAME DAY FOR A BLADDER PROBLEM AND WILL CALL BACK FOR RESCHEDULING.

## 2024-03-30 DIAGNOSIS — M15.9 PRIMARY OSTEOARTHRITIS INVOLVING MULTIPLE JOINTS: Chronic | ICD-10-CM

## 2024-03-30 DIAGNOSIS — M62.838 MUSCLE SPASM: ICD-10-CM

## 2024-04-01 RX ORDER — MELOXICAM 15 MG/1
15 TABLET ORAL DAILY
Qty: 90 TABLET | Refills: 0 | Status: SHIPPED | OUTPATIENT
Start: 2024-04-01

## 2024-04-24 ENCOUNTER — OFFICE VISIT (OUTPATIENT)
Dept: PAIN MEDICINE | Facility: CLINIC | Age: 74
End: 2024-04-24
Payer: MEDICARE

## 2024-04-24 VITALS
WEIGHT: 190.2 LBS | SYSTOLIC BLOOD PRESSURE: 111 MMHG | HEART RATE: 73 BPM | OXYGEN SATURATION: 98 % | DIASTOLIC BLOOD PRESSURE: 71 MMHG | BODY MASS INDEX: 37.34 KG/M2 | TEMPERATURE: 96.6 F | HEIGHT: 60 IN

## 2024-04-24 DIAGNOSIS — M47.812 CERVICAL SPONDYLOSIS WITHOUT MYELOPATHY: ICD-10-CM

## 2024-04-24 DIAGNOSIS — Z79.899 ENCOUNTER FOR LONG-TERM (CURRENT) USE OF HIGH-RISK MEDICATION: ICD-10-CM

## 2024-04-24 DIAGNOSIS — M15.9 PRIMARY OSTEOARTHRITIS INVOLVING MULTIPLE JOINTS: Primary | ICD-10-CM

## 2024-04-24 DIAGNOSIS — M50.30 DEGENERATIVE DISC DISEASE, CERVICAL: ICD-10-CM

## 2024-04-24 DIAGNOSIS — M47.816 LUMBAR FACET ARTHROPATHY: ICD-10-CM

## 2024-04-24 DIAGNOSIS — M51.36 DDD (DEGENERATIVE DISC DISEASE), LUMBAR: ICD-10-CM

## 2024-04-24 PROCEDURE — 1159F MED LIST DOCD IN RCRD: CPT | Performed by: PHYSICIAN ASSISTANT

## 2024-04-24 PROCEDURE — 1160F RVW MEDS BY RX/DR IN RCRD: CPT | Performed by: PHYSICIAN ASSISTANT

## 2024-04-24 PROCEDURE — 3078F DIAST BP <80 MM HG: CPT | Performed by: PHYSICIAN ASSISTANT

## 2024-04-24 PROCEDURE — 99214 OFFICE O/P EST MOD 30 MIN: CPT | Performed by: PHYSICIAN ASSISTANT

## 2024-04-24 PROCEDURE — 1125F AMNT PAIN NOTED PAIN PRSNT: CPT | Performed by: PHYSICIAN ASSISTANT

## 2024-04-24 PROCEDURE — 3074F SYST BP LT 130 MM HG: CPT | Performed by: PHYSICIAN ASSISTANT

## 2024-04-24 RX ORDER — ETODOLAC 400 MG/1
400 TABLET, FILM COATED ORAL 3 TIMES DAILY
Qty: 90 TABLET | Refills: 0 | Status: SHIPPED | OUTPATIENT
Start: 2024-04-24

## 2024-04-24 RX ORDER — OXYCODONE AND ACETAMINOPHEN 10; 325 MG/1; MG/1
1 TABLET ORAL
Qty: 150 TABLET | Refills: 0 | Status: SHIPPED | OUTPATIENT
Start: 2024-04-27
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
## 2024-05-15 ENCOUNTER — OFFICE VISIT (OUTPATIENT)
Dept: PAIN MEDICINE | Facility: CLINIC | Age: 74
End: 2024-05-15
Payer: MEDICARE

## 2024-05-15 ENCOUNTER — PREP FOR SURGERY (OUTPATIENT)
Dept: SURGERY | Facility: SURGERY CENTER | Age: 74
End: 2024-05-15
Payer: MEDICARE

## 2024-05-15 VITALS
BODY MASS INDEX: 36.95 KG/M2 | HEIGHT: 60 IN | HEART RATE: 83 BPM | SYSTOLIC BLOOD PRESSURE: 132 MMHG | OXYGEN SATURATION: 97 % | WEIGHT: 188.2 LBS | TEMPERATURE: 97.1 F | DIASTOLIC BLOOD PRESSURE: 81 MMHG

## 2024-05-15 DIAGNOSIS — M48.02 SPINAL STENOSIS, CERVICAL REGION: ICD-10-CM

## 2024-05-15 DIAGNOSIS — M51.36 DDD (DEGENERATIVE DISC DISEASE), LUMBAR: ICD-10-CM

## 2024-05-15 DIAGNOSIS — M47.816 LUMBAR FACET ARTHROPATHY: Primary | ICD-10-CM

## 2024-05-15 DIAGNOSIS — K59.03 THERAPEUTIC OPIOID INDUCED CONSTIPATION: Chronic | ICD-10-CM

## 2024-05-15 DIAGNOSIS — T40.2X5A THERAPEUTIC OPIOID INDUCED CONSTIPATION: Chronic | ICD-10-CM

## 2024-05-15 DIAGNOSIS — Z79.899 ENCOUNTER FOR LONG-TERM (CURRENT) USE OF HIGH-RISK MEDICATION: ICD-10-CM

## 2024-05-15 DIAGNOSIS — M47.812 CERVICAL SPONDYLOSIS WITHOUT MYELOPATHY: ICD-10-CM

## 2024-05-15 RX ORDER — SODIUM CHLORIDE 0.9 % (FLUSH) 0.9 %
10 SYRINGE (ML) INJECTION AS NEEDED
OUTPATIENT
Start: 2024-05-15

## 2024-05-15 RX ORDER — SODIUM CHLORIDE 0.9 % (FLUSH) 0.9 %
10 SYRINGE (ML) INJECTION EVERY 12 HOURS SCHEDULED
OUTPATIENT
Start: 2024-05-15

## 2024-05-15 RX ORDER — KETOROLAC TROMETHAMINE 10 MG/1
10 TABLET, FILM COATED ORAL EVERY 6 HOURS PRN
Qty: 20 TABLET | Refills: 0 | Status: SHIPPED | OUTPATIENT
Start: 2024-05-15

## 2024-05-15 RX ORDER — KETOROLAC TROMETHAMINE 30 MG/ML
60 INJECTION, SOLUTION INTRAMUSCULAR; INTRAVENOUS ONCE
Status: COMPLETED | OUTPATIENT
Start: 2024-05-15 | End: 2024-05-15

## 2024-05-15 RX ADMIN — KETOROLAC TROMETHAMINE 60 MG: 30 INJECTION, SOLUTION INTRAMUSCULAR; INTRAVENOUS at 09:11
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
## 2024-05-16 ENCOUNTER — TRANSCRIBE ORDERS (OUTPATIENT)
Dept: SURGERY | Facility: SURGERY CENTER | Age: 74
End: 2024-05-16
Payer: MEDICARE

## 2024-05-16 DIAGNOSIS — Z41.9 SURGERY, ELECTIVE: Primary | ICD-10-CM

## 2024-05-20 DIAGNOSIS — Z79.899 ENCOUNTER FOR LONG-TERM (CURRENT) USE OF HIGH-RISK MEDICATION: ICD-10-CM

## 2024-05-20 DIAGNOSIS — M51.36 DDD (DEGENERATIVE DISC DISEASE), LUMBAR: Primary | ICD-10-CM

## 2024-05-20 RX ORDER — OXYCODONE HYDROCHLORIDE 30 MG/1
30 TABLET, FILM COATED, EXTENDED RELEASE ORAL 2 TIMES DAILY
Qty: 60 TABLET | Refills: 0 | Status: SHIPPED | OUTPATIENT
Start: 2024-05-20 | End: 2024-05-21

## 2024-05-21 ENCOUNTER — PATIENT MESSAGE (OUTPATIENT)
Dept: PAIN MEDICINE | Facility: CLINIC | Age: 74
End: 2024-05-21
Payer: MEDICARE

## 2024-05-21 ENCOUNTER — TELEPHONE (OUTPATIENT)
Dept: PAIN MEDICINE | Facility: CLINIC | Age: 74
End: 2024-05-21
Payer: MEDICARE

## 2024-05-21 DIAGNOSIS — M51.36 DDD (DEGENERATIVE DISC DISEASE), LUMBAR: Primary | ICD-10-CM

## 2024-05-21 DIAGNOSIS — Z79.899 ENCOUNTER FOR LONG-TERM (CURRENT) USE OF HIGH-RISK MEDICATION: ICD-10-CM

## 2024-05-21 RX ORDER — OXYCODONE HYDROCHLORIDE 30 MG/1
30 TABLET, FILM COATED, EXTENDED RELEASE ORAL 2 TIMES DAILY
Qty: 60 TABLET | Refills: 0 | Status: SHIPPED | OUTPATIENT
Start: 2024-05-21 | End: 2024-05-24

## 2024-05-21 RX ORDER — OXYCODONE HYDROCHLORIDE 30 MG/1
30 TABLET, FILM COATED, EXTENDED RELEASE ORAL EVERY 12 HOURS SCHEDULED
Qty: 60 TABLET | Refills: 0 | Status: CANCELLED | OUTPATIENT
Start: 2024-05-21

## 2024-05-21 NOTE — TELEPHONE ENCOUNTER
Please let patient know that I will send in a prescription for movantik 25 mg--she will take one per day.    Also please let her know that Dr Gan is going to keep her on the oxycodone 10 mg in place of the hydrocodone.

## 2024-05-21 NOTE — TELEPHONE ENCOUNTER
A new prescription will need to be sent for oxycodone. Explanation of Percocet was provided to pharmacist.

## 2024-05-21 NOTE — TELEPHONE ENCOUNTER
[facility] pharmacy called today. The oxycodone ER 30 mg was sent as an abuse deterrent formula and will cost the patient $800, even with the discount card. The pharmacist wanted to know if the abuse deterrent formula was intentional. The pharmacist also inquired about whether patient will be continuing the Percocet as well.    Please advise

## 2024-05-21 NOTE — TELEPHONE ENCOUNTER
I only see one option.  There are 2 on the list but they come through the same way on the signature box

## 2024-05-21 NOTE — TELEPHONE ENCOUNTER
No that was not intentional to send as an abuse deterrent--please use the regular oxycontin.  She will be decreasing the oxycodone 10 mg and will only be given a quantity of #25/month going forth--please find out if we will have to send a new rx for the oxycontin

## 2024-05-21 NOTE — TELEPHONE ENCOUNTER
I did an appeal for the Relistor and it was approved. No need to send in Movantik RX. Patient informed

## 2024-05-21 NOTE — TELEPHONE ENCOUNTER
Provider: MAREK NELSON     Caller: DIOGO BISHOP     Relationship to Patient: SELF     Phone Number: [phone]    Reason for Call: PATIENT STATES THAT HER INSURANCE DENIED HER PRESCRIPTION FOR RELISTOR 150 MG  BECAUSE THEY SAID SHE NEEDS TO TRY LUBIPROFTONE OR MOVANTIK FIRST. PLEASE ADVISE     PATIENT ALSO WANTS TO KNOW IF HER HYDROCODONE HAS BEEN SENT TO THE PHARMACY.     PATIENT PREFERS A CALL BACK.

## 2024-05-21 NOTE — TELEPHONE ENCOUNTER
Hub staff attempted to follow warm transfer process and was unsuccessful     Caller: Traci King A    Relationship to patient: Self    Best call back number: [phone]    Patient is needing: PATIENT WAS CALLING TO FIND OUT WHEN HER PRESCRIPTION FOR OXYCODONE ER WOULD BE SENT TO HER PHARMACY, [facility]. PATIENT STATED THE OXYCODONE ER WAS SUPPOSE TO BE SENT INSTEAD OF THE OXYCONTIN. PATIENT IS AT THE PHARMACY NOW AND WOULD LIKE A CALL BACK TO DISCUSS HER GETTING HER PRESCRIPTION SENT TO [facility] ASAP. THANK YOU!

## 2024-05-22 ENCOUNTER — TELEPHONE (OUTPATIENT)
Dept: PAIN MEDICINE | Facility: CLINIC | Age: 74
End: 2024-05-22
Payer: MEDICARE

## 2024-05-22 RX ORDER — MORPHINE SULFATE 30 MG/1
30 TABLET, FILM COATED, EXTENDED RELEASE ORAL 2 TIMES DAILY
Qty: 60 TABLET | Refills: 0 | Status: SHIPPED | OUTPATIENT
Start: 2024-05-22 | End: 2024-05-24

## 2024-05-22 NOTE — TELEPHONE ENCOUNTER
Caller: Traci King    Relationship to patient: Self    Best call back number: [phone] (home)     Patient is needing: PATIENT WOULD LIKE AN UPDATE ON HER PAIN MEDICATION.   LAST TE SHOWS THE OFFICE TRYING TO GET A PA FOR OXYCOTIN.

## 2024-05-22 NOTE — TELEPHONE ENCOUNTER
Insurance denied authorization for oxycontin. It looks like they will cover Xtampza ER. Spoke to pharmacy and they do not know how much this will cost the patient without running it through insurance. Do you want to send RX for Xtampza ER?

## 2024-05-22 NOTE — TELEPHONE ENCOUNTER
Provider: ALLAN    Caller: PATIENT    Phone Number: [phone]    Reason for Call: PATIENT STATES HER INSURANCE COMPANY IS DENYING THE OXYCONTIN ER. SHE IS REQUESTING THAT DR. LEMUS DO AN APPEAL. PATIENT PROVIDED PHONE NUMBER [phone] TO CALL TO APPEAL THE DENIAL. PLEASE CALL HER TO LET HER KNOW WHEN THIS IS DONE, OR WITH ANY QUESTIONS.

## 2024-05-22 NOTE — TELEPHONE ENCOUNTER
We found out that they no longer taking oxycontin in a generic.  B[facility] is working on getting a PA for the OxyContin

## 2024-05-23 NOTE — TELEPHONE ENCOUNTER
Caller: Traci King    Relationship to patient: Self    Best call back number: [phone]    Patient is needing: PT IS CALLING REQUESTING A STATUS UPDATE OF THE CHANGE IN MEDICATION.. PLEASE ADVISE..

## 2024-05-23 NOTE — TELEPHONE ENCOUNTER
Let's try that and see if it is helpful for her.  Hold on extended release oxycodone for now.  Xtampza would be a secondary plan.  Alessandra sent

## 2024-05-24 RX ORDER — OXYCODONE AND ACETAMINOPHEN 10; 325 MG/1; MG/1
1 TABLET ORAL EVERY 4 HOURS PRN
Qty: 60 TABLET | Refills: 0 | Status: SHIPPED | OUTPATIENT
Start: 2024-05-24

## 2024-05-24 RX ORDER — OXYCODONE 27 MG/1
1 CAPSULE, EXTENDED RELEASE ORAL EVERY 12 HOURS
Qty: 60 EACH | Refills: 0 | Status: SHIPPED | OUTPATIENT
Start: 2024-05-24

## 2024-05-24 NOTE — TELEPHONE ENCOUNTER
I spoke with Ms. King today and informed her to bring her Morphine Rx to her next appt to be disposed of. She states that she will.

## 2024-05-24 NOTE — TELEPHONE ENCOUNTER
Provider: DR LEMUS     Caller: PATIENT     Pharmacy: [facility]     Phone Number: [phone]    Reason for Call: PATIENT STATES THAT DR LEMUS CHANGED HER MEDICATION FROM OXYCODONE TO MORPHINE ER. SHE PICKED THIS UP YESTERDAY AND TOOK HER FIRST DOSE AND THEN A SECOND DOSE THIS MORNING. SHE STATES THIS IS MAKING HER NAUSEOUS AND VERY TIRED AND IS QUITE TAKING CARE OF THE PAIN. SHE SAID SHE DOES NOT MEAN TO BE ANY TROUBLE SHE JUST DOESN'T THINK SHE CAN TAKE THIS MEDICATION AND IS CONCERNED THAT THE LONG WEEKEND IS COMING UP.     When was the patient last seen: 5-22-24

## 2024-05-24 NOTE — TELEPHONE ENCOUNTER
Well bless her heart.  I prescribed 10 day supply of Percocet refill.  Prescribed 30 day supply of Xtampza ER.  Use Percocet while waiting on Xtampza ER auth and supply.  Do not take together.  I will discontinue MSContin.

## 2024-06-06 PROBLEM — E86.0 DEHYDRATION: Status: ACTIVE | Noted: 2024-06-06

## 2024-06-06 RX ORDER — SODIUM CHLORIDE, SODIUM LACTATE, POTASSIUM CHLORIDE, CALCIUM CHLORIDE 600; 310; 30; 20 MG/100ML; MG/100ML; MG/100ML; MG/100ML
667 INJECTION, SOLUTION INTRAVENOUS ONCE
Status: CANCELLED | OUTPATIENT
Start: 2024-06-06

## 2024-06-06 RX ORDER — SODIUM CHLORIDE, SODIUM LACTATE, POTASSIUM CHLORIDE, CALCIUM CHLORIDE 600; 310; 30; 20 MG/100ML; MG/100ML; MG/100ML; MG/100ML
667 INJECTION, SOLUTION INTRAVENOUS ONCE
Status: CANCELLED | OUTPATIENT
Start: 2024-06-07

## 2024-06-07 ENCOUNTER — HOSPITAL ENCOUNTER (OUTPATIENT)
Dept: INFUSION THERAPY | Facility: HOSPITAL | Age: 74
Discharge: HOME | End: 2024-06-07
Payer: MEDICARE

## 2024-06-07 ENCOUNTER — TELEPHONE (OUTPATIENT)
Dept: PAIN MEDICINE | Facility: CLINIC | Age: 74
End: 2024-06-07

## 2024-06-07 VITALS
TEMPERATURE: 98.1 F | WEIGHT: 187 LBS | DIASTOLIC BLOOD PRESSURE: 60 MMHG | HEART RATE: 60 BPM | BODY MASS INDEX: 36.71 KG/M2 | SYSTOLIC BLOOD PRESSURE: 132 MMHG | OXYGEN SATURATION: 100 % | RESPIRATION RATE: 16 BRPM | HEIGHT: 60 IN

## 2024-06-07 DIAGNOSIS — E86.0 DEHYDRATION: Primary | ICD-10-CM

## 2024-06-07 PROCEDURE — 25810000003 LACTATED RINGERS PER 1000 ML: Performed by: INTERNAL MEDICINE

## 2024-06-07 PROCEDURE — 96361 HYDRATE IV INFUSION ADD-ON: CPT

## 2024-06-07 PROCEDURE — 96360 HYDRATION IV INFUSION INIT: CPT

## 2024-06-07 RX ORDER — SODIUM CHLORIDE, SODIUM LACTATE, POTASSIUM CHLORIDE, CALCIUM CHLORIDE 600; 310; 30; 20 MG/100ML; MG/100ML; MG/100ML; MG/100ML
667 INJECTION, SOLUTION INTRAVENOUS ONCE
Status: COMPLETED | OUTPATIENT
Start: 2024-06-07 | End: 2024-06-07

## 2024-06-07 RX ORDER — SODIUM CHLORIDE, SODIUM LACTATE, POTASSIUM CHLORIDE, CALCIUM CHLORIDE 600; 310; 30; 20 MG/100ML; MG/100ML; MG/100ML; MG/100ML
667 INJECTION, SOLUTION INTRAVENOUS ONCE
Status: CANCELLED | OUTPATIENT
Start: 2024-06-07

## 2024-06-07 RX ADMIN — SODIUM CHLORIDE, POTASSIUM CHLORIDE, SODIUM LACTATE AND CALCIUM CHLORIDE 667 ML/HR: 600; 310; 30; 20 INJECTION, SOLUTION INTRAVENOUS at 11:02

## 2024-06-07 NOTE — TELEPHONE ENCOUNTER
I discussed with Dr. Gan. He said it is unlikely that the small increase in her opioid is causing her symptoms. Recommend she follow up with [facility] early next week.

## 2024-06-07 NOTE — TELEPHONE ENCOUNTER
"Per pt  \"Started out vomiting once 10 days ago along with weakness and nauseated/fatigue can hardly function,so I went to the ER and they ran test\".stated PCP told her it might a virus because its going around but ER doctor  thinks it might be the medication."

## 2024-06-07 NOTE — NURSING NOTE
PT ARRIVED TO [facility] FOR APPT. VSS, NO COMPLAINTS AT THIS TIME. 1L LR HYDRATION ADMINISTERED PER MD ORDER. PT TOLERATED WELL. VSS POST INFUSION. AVS OFFERED, PT DISCHARGED FROM [facility] AT 12:50 PM IN STABLE CONDITION, WITHOUT COMPLAINTS.

## 2024-06-07 NOTE — PATIENT INSTRUCTIONS
"  Call  DR. TREVOR PRASAD  if you have any problems or concerns.    We know you have a Choice in healthcare and appreciate you using [facility].  Our purpose is to provide you \"Excellent Care\".  We hope that you will always choose us in the future and continue to recommend us to your family and friends.             "

## 2024-06-07 NOTE — TELEPHONE ENCOUNTER
Provider: MAREK NELSON     Caller: DIOGO BISHOP     Relationship to Patient: SELF    Phone Number: [phone]    Reason for Call: PATIENT STARTED A NEW MEDICATION XPAMPZAER. SHE STATES APOLINAR SHE HAS BEEN TAKING IT SHE HAS HAD VIRUS SYMPTOMS. SHE WENT TO THE ER AND WAS TOLD IT COULD POSSIBLY BE THIS NEW MEDICATION. PLEASE ADVISE. PATIENT IS AT HOSPITAL RECEIVING IV FLUIDS FOR DEHYDRATION NOW. SHE STATES IF SHE DOES NOT ANSWER TO LEAVE A MESSAGE

## 2024-06-17 ENCOUNTER — TELEPHONE (OUTPATIENT)
Dept: CARDIOLOGY | Facility: CLINIC | Age: 74
End: 2024-06-17
Payer: MEDICARE

## 2024-06-17 DIAGNOSIS — I27.20 PULMONARY HYPERTENSION: Primary | ICD-10-CM

## 2024-06-17 NOTE — TELEPHONE ENCOUNTER
Patient is calling because for the last 3 days she has been SOA with exertion. She denies any swelling or weight gain. She did not have an BP or HR to provide. Below are her medications:    Losartan 50mg daily    Letty Vickers RN  Triage [facility]

## 2024-06-17 NOTE — TELEPHONE ENCOUNTER
Notified patient of recommendations. Patient verbalized understanding.    Dr. Martinez: patient states she does not have any Lasix. She is having SOA, but no swelling. Do you want to send in a prescription for some Lasix? If so, she would like this sent to [facility] in [place].     Scheduling: please get patient scheduled to see an APRN along with a limited echo on the same day.     Letty Vickers RN  Triage [facility]

## 2024-06-17 NOTE — TELEPHONE ENCOUNTER
Notified patient of recommendations. Patient verbalized understanding.    Letty Vickers RN  Triage [facility] [place]

## 2024-06-19 ENCOUNTER — OFFICE VISIT (OUTPATIENT)
Dept: PAIN MEDICINE | Facility: CLINIC | Age: 74
End: 2024-06-19
Payer: MEDICARE

## 2024-06-19 ENCOUNTER — DOCUMENTATION (OUTPATIENT)
Dept: PAIN MEDICINE | Facility: CLINIC | Age: 74
End: 2024-06-19

## 2024-06-19 VITALS
OXYGEN SATURATION: 94 % | BODY MASS INDEX: 37.54 KG/M2 | DIASTOLIC BLOOD PRESSURE: 69 MMHG | RESPIRATION RATE: 18 BRPM | HEART RATE: 96 BPM | WEIGHT: 191.2 LBS | SYSTOLIC BLOOD PRESSURE: 142 MMHG | HEIGHT: 60 IN | TEMPERATURE: 96 F

## 2024-06-19 DIAGNOSIS — M48.02 SPINAL STENOSIS, CERVICAL REGION: ICD-10-CM

## 2024-06-19 DIAGNOSIS — M47.816 LUMBAR FACET ARTHROPATHY: Primary | ICD-10-CM

## 2024-06-19 DIAGNOSIS — Z79.899 ENCOUNTER FOR LONG-TERM (CURRENT) USE OF HIGH-RISK MEDICATION: ICD-10-CM

## 2024-06-19 DIAGNOSIS — M51.36 DDD (DEGENERATIVE DISC DISEASE), LUMBAR: ICD-10-CM

## 2024-06-19 PROCEDURE — 3077F SYST BP >= 140 MM HG: CPT | Performed by: PHYSICIAN ASSISTANT

## 2024-06-19 PROCEDURE — 3078F DIAST BP <80 MM HG: CPT | Performed by: PHYSICIAN ASSISTANT

## 2024-06-19 PROCEDURE — 1160F RVW MEDS BY RX/DR IN RCRD: CPT | Performed by: PHYSICIAN ASSISTANT

## 2024-06-19 PROCEDURE — 1159F MED LIST DOCD IN RCRD: CPT | Performed by: PHYSICIAN ASSISTANT

## 2024-06-19 PROCEDURE — 1125F AMNT PAIN NOTED PAIN PRSNT: CPT | Performed by: PHYSICIAN ASSISTANT

## 2024-06-19 PROCEDURE — 99214 OFFICE O/P EST MOD 30 MIN: CPT | Performed by: PHYSICIAN ASSISTANT

## 2024-06-19 RX ORDER — NITROFURANTOIN 25; 75 MG/1; MG/1
CAPSULE ORAL
COMMUNITY
Start: 2024-06-14

## 2024-06-19 RX ORDER — PROMETHAZINE HYDROCHLORIDE 25 MG/1
TABLET ORAL
COMMUNITY
Start: 2024-06-05

## 2024-06-19 RX ORDER — MELOXICAM 15 MG/1
15 TABLET ORAL DAILY
COMMUNITY
Start: 2024-06-08

## 2024-06-19 NOTE — PROGRESS NOTES
Date of pill count: 6/19/2024  Medication(dosage):Morphine ER 30 mg  Sig:take 1 tab BID  Date and Quantity filled:5/22/2024 and 60  Quantity at pill count: 57    Ms. Aguillons Morphine ER was destroyed in front if her after her office visit with ALLY Groves.

## 2024-06-19 NOTE — PROGRESS NOTES
CHIEF COMPLAINT  Follow-up for back and neck pain.  SOA  x 2 days        Subjective   Traci King is a 74 y.o. female  who presents for follow-up.  She has a history of chronic neck and low back pain.  The patient continues with persistent pain in a bandlike distribution across the lumbosacral spine which is aggravated with any type of lumbar facet loading maneuvers.  She is scheduled for repeat therapeutic ablation on 7/9/2024.  She has secondary complaints of posterior cervical spine pain which radiates into the lower thoracic spine without upper extremity involvement.  The patient also states that for the last 2 days she has have some shortness of air and plans to go to the emergency department following her appointment today.    We had trialed the patient on MS Contin 30 mg twice daily however she did not tolerate the medication and has returned the medication today for us to destroy.  #57 pills out of a prescription of 60 were destroyed.    Medication management now consists of Xtampza 27 mcg twice daily with Percocet 10 mg for breakthrough pain (#25/month) which she is tolerating well without adverse effects. Continues to utilize gabapentin 300 mg 2 p.o. nightly, methocarbamol 750 mg 3 times daily and she has resumed meloxicam 15 mg daily. Opioid-induced constipation was alleviated with the samples of Relistor for her insurance will not cover the prescription for the Relistor.    Pain today 3/10 VAS in severity.      Back Pain  This is a chronic problem. The current episode started more than 1 year ago. The problem occurs constantly. The problem has been gradually worsening since onset. The pain is present in the lumbar spine. The quality of the pain is described as aching (throbbing). The pain does not radiate. The pain is at a severity of 3/10. The pain is moderate. The pain is The same all the time. The symptoms are aggravated by standing and position (cooking/walking). Pertinent negatives include no  abdominal pain, chest pain, dysuria, fever, headaches, numbness or weakness.   Neck Pain   This is a chronic problem. The current episode started more than 1 year ago. The problem occurs constantly. The problem has been unchanged. The pain is associated with nothing. The pain is present in the midline. The quality of the pain is described as aching. The pain is at a severity of 2/10. The pain is mild. The symptoms are aggravated by position and twisting. The pain is Same all the time. Stiffness is present In the morning. Pertinent negatives include no chest pain, fever, headaches, numbness or weakness.        PEG Assessment   What number best describes your pain on average in the past week?4  What number best describes how, during the past week, pain has interfered with your enjoyment of life?7  What number best describes how, during the past week, pain has interfered with your general activity?  7    Review of Pertinent Medical Data ---  No new imaging to review on today    The following portions of the patient's history were reviewed and updated as appropriate: allergies, current medications, past family history, past medical history, past social history, past surgical history, and problem list.    Review of Systems   Constitutional:  Negative for fever.   Respiratory:  Positive for shortness of breath.    Cardiovascular:  Negative for chest pain.   Gastrointestinal:  Negative for abdominal pain, constipation and diarrhea.   Genitourinary:  Negative for difficulty urinating and dysuria.   Musculoskeletal:  Positive for back pain and neck pain.   Neurological:  Negative for weakness, numbness and headaches.   Psychiatric/Behavioral:  Negative for sleep disturbance and suicidal ideas. The patient is not nervous/anxious.      I have reviewed and confirmed the accuracy of the ROS as documented by the MA/JUAN/ALLY Solis  Vitals:    06/19/24 1251   BP: 142/69   Pulse: 96   Resp: 18   Temp: 96 °F (35.6 °C)  "  SpO2: 94%   Weight: 86.7 kg (191 lb 3.2 oz)   Height: 152.4 cm (60\")   PainSc: 3  Comment: neck pain 3/10   PainLoc: Back         Objective   Physical Exam  Vitals and nursing note reviewed.   Constitutional:       General: She is in acute distress.      Appearance: Normal appearance. She is obese.   HENT:      Head: Normocephalic.   Pulmonary:      Effort: Pulmonary effort is normal.   Musculoskeletal:      Cervical back: Tenderness present. Decreased range of motion.      Lumbar back: Spasms (worse on right paraspinal muscles) and tenderness (Moderate tenderness to palpation within the overlying bilateral lumbar facet joint spaces) present. Decreased range of motion (Decreased range of motion in all planes).        Back:    Skin:     General: Skin is warm and dry.   Neurological:      General: No focal deficit present.      Mental Status: She is alert and oriented to person, place, and time.      Cranial Nerves: Cranial nerves 2-12 are intact.      Sensory: Sensation is intact.      Motor: Motor function is intact.      Gait: Gait abnormal.   Psychiatric:         Mood and Affect: Mood normal.         Behavior: Behavior normal.         Thought Content: Thought content normal.         Judgment: Judgment normal.             Assessment & Plan   Diagnoses and all orders for this visit:    1. Lumbar facet arthropathy (Primary)    2. DDD (degenerative disc disease), lumbar    3. Spinal stenosis, cervical region    4. Encounter for long-term (current) use of high-risk medication        Traci King reports a pain score of 3.  Given her pain assessment as noted, treatment options were discussed and the following options were decided upon as a follow-up plan to address the patient's pain: continuation of current treatment plan for pain, prescription for opiod analgesics, and use of non-medical modalities (ice, heat, stretching and/or behavior modifications).      --- Follow-up in 1 month for medication management  --- " Refill Xtampza 27 mcg and oxycodone 10 mg for breakthrough pain. Patient appears stable with current regimen. No adverse effects. Regarding continuation of opioids, there is no evidence of aberrant behavior or any red flags.  The patient continues with appropriate response to opioid therapy. ADL's remain intact by self.   --- Moderate risk for opiate abuse/diversion  --- Patient has been strongly urged to present to the emergency department following this appointment for further evaluation and treat recommendations to be made.  She has refused the office contacting EMS for any transport stating that she feels fine other than shortness of air with exertion.    The urine drug screen confirmation from 1/23/2024 has been reviewed and the result is appropriate based on patient history and MATTHEW report        The patient signed an updated copy of the controlled substance agreement on 11/28/2023.           MATTHEW REPORT  As part of the patient's treatment plan, I am prescribing controlled substances. The patient has been made aware of appropriate use of such medications, including potential risk of somnolence, limited ability to drive and/or work safely, and the potential for dependence or overdose. It has also been made clear that these medications are for use by this patient only, without concomitant use of alcohol or other substances unless prescribed.     Patient has completed prescribing agreement detailing terms of continued prescribing of controlled substances, including monitoring MATTHEW reports, urine drug screening, and pill counts if necessary. The patient is aware that inappropriate use will results in cessation of prescribing such medications.    As the clinician, I personally reviewed the MATTHEW from 6/19/2024 while the patient was in the office today.    History and physical exam exhibit continued safe and appropriate use of controlled substances.     Dictated utilizing Dragon dictation.

## 2024-06-20 RX ORDER — OXYCODONE 27 MG/1
1 CAPSULE, EXTENDED RELEASE ORAL EVERY 12 HOURS
Qty: 60 EACH | Refills: 0 | Status: SHIPPED | OUTPATIENT
Start: 2024-06-27

## 2024-06-20 RX ORDER — OXYCODONE AND ACETAMINOPHEN 10; 325 MG/1; MG/1
TABLET ORAL
Qty: 25 TABLET | Refills: 0 | Status: SHIPPED | OUTPATIENT
Start: 2024-06-20

## 2024-06-21 ENCOUNTER — TELEPHONE (OUTPATIENT)
Dept: PAIN MEDICINE | Facility: CLINIC | Age: 74
End: 2024-06-21

## 2024-06-21 NOTE — TELEPHONE ENCOUNTER
Provider: NELSON    Caller: PATIENT    Phone Number: [phone]    Reason for Call: PATIENT WANTED TO MAKE OFFICE AWARE THAT SHE WENT TO THE ED AFTER HER APPT ON 06/19, AND SHE WAS DIAGNOSED WITH PNEUMONIA.

## 2024-06-25 ENCOUNTER — TELEPHONE (OUTPATIENT)
Dept: PAIN MEDICINE | Facility: CLINIC | Age: 74
End: 2024-06-25

## 2024-06-25 NOTE — TELEPHONE ENCOUNTER
Hub staff attempted to follow warm transfer process and was unsuccessful     Caller: PATIENT    Relationship to patient: SELF     Best call back number: [phone]    Patient is needing: PATIENT WAS CALLING TO ASK WHEN HER MEDICATION, XTAMPZA ER 27 MG CAN BE REFILLED. THANK YOU!

## 2024-06-28 ENCOUNTER — OFFICE VISIT (OUTPATIENT)
Dept: CARDIOLOGY | Facility: CLINIC | Age: 74
End: 2024-06-28
Payer: MEDICARE

## 2024-06-28 ENCOUNTER — HOSPITAL ENCOUNTER (OUTPATIENT)
Dept: CARDIOLOGY | Facility: HOSPITAL | Age: 74
Discharge: HOME | End: 2024-06-28
Payer: MEDICARE

## 2024-06-28 VITALS
BODY MASS INDEX: 36.52 KG/M2 | DIASTOLIC BLOOD PRESSURE: 70 MMHG | HEART RATE: 73 BPM | HEIGHT: 60 IN | SYSTOLIC BLOOD PRESSURE: 106 MMHG | WEIGHT: 186 LBS

## 2024-06-28 VITALS
HEIGHT: 60 IN | BODY MASS INDEX: 37.5 KG/M2 | OXYGEN SATURATION: 96 % | HEART RATE: 81 BPM | DIASTOLIC BLOOD PRESSURE: 56 MMHG | WEIGHT: 191 LBS | SYSTOLIC BLOOD PRESSURE: 110 MMHG

## 2024-06-28 DIAGNOSIS — I42.8 INFILTRATIVE CARDIOMYOPATHY: Primary | ICD-10-CM

## 2024-06-28 DIAGNOSIS — I27.20 PULMONARY HYPERTENSION: ICD-10-CM

## 2024-06-28 LAB
BH CV ECHO MEAS - EDV(CUBED): 68.9 ML
BH CV ECHO MEAS - EDV(MOD-SP2): 67 ML
BH CV ECHO MEAS - EDV(MOD-SP4): 54 ML
BH CV ECHO MEAS - EF(MOD-BP): 61.9 %
BH CV ECHO MEAS - EF(MOD-SP2): 62.7 %
BH CV ECHO MEAS - EF(MOD-SP4): 59.3 %
BH CV ECHO MEAS - ESV(CUBED): 17 ML
BH CV ECHO MEAS - ESV(MOD-SP2): 25 ML
BH CV ECHO MEAS - ESV(MOD-SP4): 22 ML
BH CV ECHO MEAS - FS: 37.3 %
BH CV ECHO MEAS - IVS/LVPW: 1 CM
BH CV ECHO MEAS - IVSD: 1 CM
BH CV ECHO MEAS - LV DIASTOLIC VOL/BSA (35-75): 29.5 CM2
BH CV ECHO MEAS - LV MASS(C)D: 132.1 GRAMS
BH CV ECHO MEAS - LV SYSTOLIC VOL/BSA (12-30): 12 CM2
BH CV ECHO MEAS - LVIDD: 4.1 CM
BH CV ECHO MEAS - LVIDS: 2.6 CM
BH CV ECHO MEAS - LVPWD: 1 CM
BH CV ECHO MEAS - RAP SYSTOLE: 3 MMHG
BH CV ECHO MEAS - RVSP: 27.1 MMHG
BH CV ECHO MEAS - SV(MOD-SP2): 42 ML
BH CV ECHO MEAS - SV(MOD-SP4): 32 ML
BH CV ECHO MEAS - SVI(MOD-SP2): 22.9 ML/M2
BH CV ECHO MEAS - SVI(MOD-SP4): 17.5 ML/M2
BH CV ECHO MEAS - TR MAX PG: 24.1 MMHG
BH CV ECHO MEAS - TR MAX VEL: 245.7 CM/SEC

## 2024-06-28 PROCEDURE — 99214 OFFICE O/P EST MOD 30 MIN: CPT | Performed by: NURSE PRACTITIONER

## 2024-06-28 PROCEDURE — 93308 TTE F-UP OR LMTD: CPT

## 2024-06-28 PROCEDURE — 1160F RVW MEDS BY RX/DR IN RCRD: CPT | Performed by: NURSE PRACTITIONER

## 2024-06-28 PROCEDURE — 93325 DOPPLER ECHO COLOR FLOW MAPG: CPT

## 2024-06-28 PROCEDURE — 93321 DOPPLER ECHO F-UP/LMTD STD: CPT

## 2024-06-28 PROCEDURE — 3078F DIAST BP <80 MM HG: CPT | Performed by: NURSE PRACTITIONER

## 2024-06-28 PROCEDURE — 3074F SYST BP LT 130 MM HG: CPT | Performed by: NURSE PRACTITIONER

## 2024-06-28 PROCEDURE — 93000 ELECTROCARDIOGRAM COMPLETE: CPT | Performed by: NURSE PRACTITIONER

## 2024-06-28 PROCEDURE — 1159F MED LIST DOCD IN RCRD: CPT | Performed by: NURSE PRACTITIONER

## 2024-06-28 PROCEDURE — 25510000001 PERFLUTREN (DEFINITY) 8.476 MG IN SODIUM CHLORIDE (PF) 0.9 % 10 ML INJECTION: Performed by: STUDENT IN AN ORGANIZED HEALTH CARE EDUCATION/TRAINING PROGRAM

## 2024-06-28 RX ORDER — LOSARTAN POTASSIUM 50 MG/1
50 TABLET ORAL 2 TIMES DAILY
Qty: 180 TABLET | Refills: 3 | Status: SHIPPED | OUTPATIENT
Start: 2024-06-28 | End: 2025-06-28

## 2024-06-28 RX ADMIN — PERFLUTREN 1.5 ML: 6.52 INJECTION, SUSPENSION INTRAVENOUS at 14:12

## 2024-06-28 NOTE — PROGRESS NOTES
Reviewed with patient in the office with NP.  Recent CT scan with hilar adenopathy and pulmonary findings.  With echo results this could be suspicious for sarcoidosis.  Plan for CMR for further evaluation.
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
## 2024-07-01 ENCOUNTER — TELEPHONE (OUTPATIENT)
Dept: CARDIOLOGY | Facility: CLINIC | Age: 74
End: 2024-07-01
Payer: MEDICARE

## 2024-07-01 NOTE — TELEPHONE ENCOUNTER
Chiquis,    Pt called today. She just had an appt with Ami for SOA with exertion. Ami ordered a cardiac MRI. Pt called to schedule it and she couldn't get an appt for an MRI until 7-16-24.     She said she is so SOA she can barely walk across the room. She asked if we can make her MRI stat so scheduling can try to get her in sooner?    Thank you,    Chiquis Rocha RN  Triage [facility]  07/01/24 09:21 EDT

## 2024-07-01 NOTE — TELEPHONE ENCOUNTER
Notified pt. She said she doesn't want to go to ER. She is going to ask her PCP if she can get in at [facility] to do the test. She will call us back if we need to fax the order somewhere.    Thank you,    Chiquis Rocha, RN  Triage [facility]  07/01/24 14:53 EDT

## 2024-07-01 NOTE — TELEPHONE ENCOUNTER
PT CALLED BACK, IS THERE ANOTHER  FACILITY THAT SHE CAN GET IN SOONER? [facility] [address] ? SHE SAID SHE IS GETTING WORSE EVERYDAY AND IS GETTING SCARED ABOUT HER HEALTH. PLEASE ADVISE

## 2024-07-09 ENCOUNTER — APPOINTMENT (OUTPATIENT)
Dept: GENERAL RADIOLOGY | Facility: SURGERY CENTER | Age: 74
End: 2024-07-09
Payer: MEDICARE

## 2024-07-09 ENCOUNTER — TELEPHONE (OUTPATIENT)
Dept: CARDIOLOGY | Facility: CLINIC | Age: 74
End: 2024-07-09

## 2024-07-09 NOTE — TELEPHONE ENCOUNTER
Caller: Traci King A    Relationship to patient: Self    Best call back number: [phone]    Patient is needing: PATIENT CALLED BACK TO MAKE SURE OFFICE HAS THE CORRECT FAX NUMBER FOR THE PULMONOLOGIST. PATIENT CALLED PULMONOLOGY AND THEY SAID THEY HAVE NOT RECEIVED ANYTHING FROM DR. DOMINGO OFFICE. FAX NUMBER .[phone].

## 2024-07-09 NOTE — TELEPHONE ENCOUNTER
Caller: Traci King    Relationship: Self    Best call back number: [phone]    What was the call regarding: PT STATES SHE WAS TOLD TO CALL BACK THIS WEEK IF SHE HAD STILL NOT HEARD FROM A LUNG DOCTOR. SHE HAS NOT BEEN CALLED BY ANYONE WITH SCHEDULING.

## 2024-07-09 NOTE — TELEPHONE ENCOUNTER
Called and spoke with patient. Answered her questions and gave her the number for Pulmonology. She verbalized understanding.    Thank you,    Chiquis Rocha, RN  Triage [facility] [place]  07/09/24 14:38 EDT

## 2024-07-11 NOTE — TELEPHONE ENCOUNTER
Appt scheduled. Called and spoke with patient. She is aware about where she needs to come to clinic tomorrow.    Thank you,    Chiquis Rocha RN  [place] [facility] [place]  07/11/24 16:33 EDT

## 2024-07-11 NOTE — TELEPHONE ENCOUNTER
PATIENT CALLED IN TO ADVISE THAT THE PULMONOLOGIST UNTIL 9.26  SHE IS REQUESTING A CALL BACK FROM DR DOMINGO TO DISCUSS

## 2024-07-12 ENCOUNTER — HOSPITAL ENCOUNTER (OUTPATIENT)
Dept: GENERAL RADIOLOGY | Facility: HOSPITAL | Age: 74
Discharge: HOME | End: 2024-07-12
Payer: MEDICARE

## 2024-07-12 ENCOUNTER — LAB (OUTPATIENT)
Dept: LAB | Facility: HOSPITAL | Age: 74
End: 2024-07-12
Payer: MEDICARE

## 2024-07-12 ENCOUNTER — OFFICE VISIT (OUTPATIENT)
Dept: CARDIOLOGY | Facility: CLINIC | Age: 74
End: 2024-07-12
Payer: MEDICARE

## 2024-07-12 VITALS
WEIGHT: 190 LBS | HEIGHT: 60 IN | BODY MASS INDEX: 37.3 KG/M2 | DIASTOLIC BLOOD PRESSURE: 80 MMHG | HEART RATE: 77 BPM | SYSTOLIC BLOOD PRESSURE: 110 MMHG

## 2024-07-12 DIAGNOSIS — R06.09 DYSPNEA ON EXERTION: ICD-10-CM

## 2024-07-12 DIAGNOSIS — R06.09 DYSPNEA ON EXERTION: Primary | ICD-10-CM

## 2024-07-12 DIAGNOSIS — R93.89 ABNORMAL VENTRICULAR WALL MOTION: ICD-10-CM

## 2024-07-12 DIAGNOSIS — I25.2 MYOCARDIAL INFARCT, OLD: ICD-10-CM

## 2024-07-12 DIAGNOSIS — I42.8 OTHER CARDIOMYOPATHY: ICD-10-CM

## 2024-07-12 DIAGNOSIS — I27.20 PULMONARY HYPERTENSION: ICD-10-CM

## 2024-07-12 PROBLEM — I42.9 MYOCARDIOPATHY: Status: ACTIVE | Noted: 2024-07-12

## 2024-07-12 LAB
ALBUMIN SERPL-MCNC: 3.9 G/DL (ref 3.5–5.2)
ALBUMIN/GLOB SERPL: 1.2 G/DL
ALP SERPL-CCNC: 127 U/L (ref 39–117)
ALT SERPL W P-5'-P-CCNC: 12 U/L (ref 1–33)
ANION GAP SERPL CALCULATED.3IONS-SCNC: 13.4 MMOL/L (ref 5–15)
AST SERPL-CCNC: 13 U/L (ref 1–32)
BASOPHILS # BLD AUTO: 0.05 10*3/MM3 (ref 0–0.2)
BASOPHILS NFR BLD AUTO: 0.6 % (ref 0–1.5)
BILIRUB SERPL-MCNC: <0.2 MG/DL (ref 0–1.2)
BUN SERPL-MCNC: 12 MG/DL (ref 8–23)
BUN/CREAT SERPL: 10.3 (ref 7–25)
CALCIUM SPEC-SCNC: 8.9 MG/DL (ref 8.6–10.5)
CHLORIDE SERPL-SCNC: 103 MMOL/L (ref 98–107)
CHOLEST SERPL-MCNC: 228 MG/DL (ref 0–200)
CO2 SERPL-SCNC: 18.6 MMOL/L (ref 22–29)
CREAT SERPL-MCNC: 1.17 MG/DL (ref 0.57–1)
DEPRECATED RDW RBC AUTO: 42.7 FL (ref 37–54)
EGFRCR SERPLBLD CKD-EPI 2021: 49.7 ML/MIN/1.73
EOSINOPHIL # BLD AUTO: 0.2 10*3/MM3 (ref 0–0.4)
EOSINOPHIL NFR BLD AUTO: 2.4 % (ref 0.3–6.2)
ERYTHROCYTE [DISTWIDTH] IN BLOOD BY AUTOMATED COUNT: 12.4 % (ref 12.3–15.4)
GLOBULIN UR ELPH-MCNC: 3.2 GM/DL
GLUCOSE SERPL-MCNC: 88 MG/DL (ref 65–99)
HCT VFR BLD AUTO: 37.9 % (ref 34–46.6)
HDLC SERPL-MCNC: 86 MG/DL (ref 40–60)
HGB BLD-MCNC: 12.6 G/DL (ref 12–15.9)
IMM GRANULOCYTES # BLD AUTO: 0.04 10*3/MM3 (ref 0–0.05)
IMM GRANULOCYTES NFR BLD AUTO: 0.5 % (ref 0–0.5)
LDLC SERPL CALC-MCNC: 120 MG/DL (ref 0–100)
LDLC/HDLC SERPL: 1.36 {RATIO}
LYMPHOCYTES # BLD AUTO: 1.6 10*3/MM3 (ref 0.7–3.1)
LYMPHOCYTES NFR BLD AUTO: 19.2 % (ref 19.6–45.3)
MCH RBC QN AUTO: 31.1 PG (ref 26.6–33)
MCHC RBC AUTO-ENTMCNC: 33.2 G/DL (ref 31.5–35.7)
MCV RBC AUTO: 93.6 FL (ref 79–97)
MONOCYTES # BLD AUTO: 0.49 10*3/MM3 (ref 0.1–0.9)
MONOCYTES NFR BLD AUTO: 5.9 % (ref 5–12)
NEUTROPHILS NFR BLD AUTO: 5.94 10*3/MM3 (ref 1.7–7)
NEUTROPHILS NFR BLD AUTO: 71.4 % (ref 42.7–76)
NRBC BLD AUTO-RTO: 0 /100 WBC (ref 0–0.2)
NT-PROBNP SERPL-MCNC: 109 PG/ML (ref 0–900)
PLATELET # BLD AUTO: 303 10*3/MM3 (ref 140–450)
PMV BLD AUTO: 9.1 FL (ref 6–12)
POTASSIUM SERPL-SCNC: 4.8 MMOL/L (ref 3.5–5.2)
PROT SERPL-MCNC: 7.1 G/DL (ref 6–8.5)
RBC # BLD AUTO: 4.05 10*6/MM3 (ref 3.77–5.28)
SODIUM SERPL-SCNC: 135 MMOL/L (ref 136–145)
TRIGL SERPL-MCNC: 127 MG/DL (ref 0–150)
TSH SERPL DL<=0.05 MIU/L-ACNC: 2.48 UIU/ML (ref 0.27–4.2)
VLDLC SERPL-MCNC: 22 MG/DL (ref 5–40)
WBC NRBC COR # BLD AUTO: 8.32 10*3/MM3 (ref 3.4–10.8)

## 2024-07-12 PROCEDURE — 84443 ASSAY THYROID STIM HORMONE: CPT

## 2024-07-12 PROCEDURE — 36415 COLL VENOUS BLD VENIPUNCTURE: CPT

## 2024-07-12 PROCEDURE — 85025 COMPLETE CBC W/AUTO DIFF WBC: CPT

## 2024-07-12 PROCEDURE — 80053 COMPREHEN METABOLIC PANEL: CPT

## 2024-07-12 PROCEDURE — 71046 X-RAY EXAM CHEST 2 VIEWS: CPT

## 2024-07-12 PROCEDURE — 80061 LIPID PANEL: CPT

## 2024-07-12 PROCEDURE — 83880 ASSAY OF NATRIURETIC PEPTIDE: CPT
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
## 2024-07-12 NOTE — PROGRESS NOTES
Please let patient know that overall her blood work appears stable.  Although she remains short of breath, the blood test that looks for fluid overload or too much fluid related to heart failure is only mildly elevated, this is reassuring that this shortness of breath may not be due to to heart failure, but we will obtain the heart cath to ensure there is nothing else were missing since some of the puzzle pieces do not match up.  The radiologist also has not looked at her chest x-ray yet, but on my view I do not see a lot of fluid or heart failure on the chest x-ray either.

## 2024-07-15 ENCOUNTER — HOSPITAL ENCOUNTER (OUTPATIENT)
Facility: HOSPITAL | Age: 74
Setting detail: HOSPITAL OUTPATIENT SURGERY
Discharge: HOME | End: 2024-07-15
Attending: STUDENT IN AN ORGANIZED HEALTH CARE EDUCATION/TRAINING PROGRAM | Admitting: STUDENT IN AN ORGANIZED HEALTH CARE EDUCATION/TRAINING PROGRAM
Payer: MEDICARE

## 2024-07-15 VITALS
HEIGHT: 60 IN | DIASTOLIC BLOOD PRESSURE: 65 MMHG | WEIGHT: 188 LBS | OXYGEN SATURATION: 96 % | RESPIRATION RATE: 18 BRPM | SYSTOLIC BLOOD PRESSURE: 125 MMHG | TEMPERATURE: 97.8 F | HEART RATE: 83 BPM | BODY MASS INDEX: 36.91 KG/M2

## 2024-07-15 DIAGNOSIS — I27.20 PULMONARY HYPERTENSION: ICD-10-CM

## 2024-07-15 DIAGNOSIS — I42.8 OTHER CARDIOMYOPATHY: ICD-10-CM

## 2024-07-15 DIAGNOSIS — R93.89 ABNORMAL VENTRICULAR WALL MOTION: ICD-10-CM

## 2024-07-15 DIAGNOSIS — R06.09 DYSPNEA ON EXERTION: ICD-10-CM

## 2024-07-15 DIAGNOSIS — I25.2 MYOCARDIAL INFARCT, OLD: ICD-10-CM

## 2024-07-15 LAB
HCT VFR BLDA CALC: 38 % (ref 38–51)
HGB BLDA-MCNC: 12.9 G/DL (ref 12–17)
SAO2 % BLDA: 95 % (ref 95–98)

## 2024-07-15 PROCEDURE — C1769 GUIDE WIRE: HCPCS | Performed by: STUDENT IN AN ORGANIZED HEALTH CARE EDUCATION/TRAINING PROGRAM

## 2024-07-15 PROCEDURE — 85018 HEMOGLOBIN: CPT

## 2024-07-15 PROCEDURE — 85014 HEMATOCRIT: CPT

## 2024-07-15 PROCEDURE — C1894 INTRO/SHEATH, NON-LASER: HCPCS | Performed by: STUDENT IN AN ORGANIZED HEALTH CARE EDUCATION/TRAINING PROGRAM

## 2024-07-15 PROCEDURE — 93460 R&L HRT ART/VENTRICLE ANGIO: CPT | Performed by: STUDENT IN AN ORGANIZED HEALTH CARE EDUCATION/TRAINING PROGRAM

## 2024-07-15 PROCEDURE — 82810 BLOOD GASES O2 SAT ONLY: CPT

## 2024-07-15 PROCEDURE — 25010000002 HEPARIN (PORCINE) PER 1000 UNITS: Performed by: STUDENT IN AN ORGANIZED HEALTH CARE EDUCATION/TRAINING PROGRAM

## 2024-07-15 PROCEDURE — 25510000001 IOPAMIDOL PER 1 ML: Performed by: STUDENT IN AN ORGANIZED HEALTH CARE EDUCATION/TRAINING PROGRAM

## 2024-07-15 PROCEDURE — 25810000003 SODIUM CHLORIDE 0.9 % SOLUTION: Performed by: STUDENT IN AN ORGANIZED HEALTH CARE EDUCATION/TRAINING PROGRAM

## 2024-07-15 PROCEDURE — 25010000002 MIDAZOLAM PER 1 MG: Performed by: STUDENT IN AN ORGANIZED HEALTH CARE EDUCATION/TRAINING PROGRAM

## 2024-07-15 PROCEDURE — 25010000002 FENTANYL CITRATE (PF) 50 MCG/ML SOLUTION: Performed by: STUDENT IN AN ORGANIZED HEALTH CARE EDUCATION/TRAINING PROGRAM

## 2024-07-15 RX ORDER — SODIUM CHLORIDE 9 MG/ML
75 INJECTION, SOLUTION INTRAVENOUS CONTINUOUS
Status: DISCONTINUED | OUTPATIENT
Start: 2024-07-15 | End: 2024-07-15 | Stop reason: HOSPADM

## 2024-07-15 RX ORDER — MIDAZOLAM HYDROCHLORIDE 1 MG/ML
INJECTION INTRAMUSCULAR; INTRAVENOUS
Status: DISCONTINUED | OUTPATIENT
Start: 2024-07-15 | End: 2024-07-15 | Stop reason: HOSPADM

## 2024-07-15 RX ORDER — LIDOCAINE HYDROCHLORIDE 20 MG/ML
INJECTION, SOLUTION INFILTRATION; PERINEURAL
Status: DISCONTINUED | OUTPATIENT
Start: 2024-07-15 | End: 2024-07-15 | Stop reason: HOSPADM

## 2024-07-15 RX ORDER — SODIUM CHLORIDE 0.9 % (FLUSH) 0.9 %
10 SYRINGE (ML) INJECTION EVERY 12 HOURS SCHEDULED
Status: DISCONTINUED | OUTPATIENT
Start: 2024-07-15 | End: 2024-07-15 | Stop reason: HOSPADM

## 2024-07-15 RX ORDER — VERAPAMIL HYDROCHLORIDE 2.5 MG/ML
INJECTION, SOLUTION INTRAVENOUS
Status: DISCONTINUED | OUTPATIENT
Start: 2024-07-15 | End: 2024-07-15 | Stop reason: HOSPADM

## 2024-07-15 RX ORDER — ACETAMINOPHEN 325 MG/1
650 TABLET ORAL EVERY 4 HOURS PRN
Status: DISCONTINUED | OUTPATIENT
Start: 2024-07-15 | End: 2024-07-15 | Stop reason: HOSPADM

## 2024-07-15 RX ORDER — HEPARIN SODIUM 1000 [USP'U]/ML
INJECTION, SOLUTION INTRAVENOUS; SUBCUTANEOUS
Status: DISCONTINUED | OUTPATIENT
Start: 2024-07-15 | End: 2024-07-15 | Stop reason: HOSPADM

## 2024-07-15 RX ORDER — SODIUM CHLORIDE 0.9 % (FLUSH) 0.9 %
10 SYRINGE (ML) INJECTION AS NEEDED
Status: DISCONTINUED | OUTPATIENT
Start: 2024-07-15 | End: 2024-07-15 | Stop reason: HOSPADM

## 2024-07-15 RX ORDER — FENTANYL CITRATE 50 UG/ML
INJECTION, SOLUTION INTRAMUSCULAR; INTRAVENOUS
Status: DISCONTINUED | OUTPATIENT
Start: 2024-07-15 | End: 2024-07-15 | Stop reason: HOSPADM

## 2024-07-15 RX ADMIN — SODIUM CHLORIDE 75 ML/HR: 9 INJECTION, SOLUTION INTRAVENOUS at 10:59

## 2024-07-15 NOTE — Clinical Note
Hemostasis started on the right brachial vein. Manual pressure applied to vessel. Manual pressure was held by CK. Manual pressure was held for 5 min. Hemostasis achieved successfully.

## 2024-07-15 NOTE — Clinical Note
Hemostasis started on the right ulnar artery. R-Band was used in achieving hemostasis. Radial compression device applied to vessel. Hemostasis achieved successfully. Closure device additional comment: Vasc band with 10cc of air

## 2024-07-15 NOTE — PROGRESS NOTES
Please let patient know that her heart cath thankfully did not reveal any evidence of coronary artery disease, which we expected.  It is good to know that none of that has changed.    Reportedly, this test shows us that the pressures in her heart overall looked good.  There are no findings on this heart cath procedure that would suggest that she needs more Lasix to remove fluid.  The pressure in her lungs, which was as high as 70 when she had COVID-pneumonia, is now 43.  This is mildly elevated, but can point towards a problem in the lungs.  We will get the cardiac MRI on the 16th and be in touch with the results of that.  Depending on what the MRI shows we can probably expedite an appointment to pulmonologist, as there is not much here on this heart cath that I would be able to treat from the heart perspective.    Thank you very much.

## 2024-07-15 NOTE — DISCHARGE INSTRUCTIONS
82 [facility]  [address] [place] [address]    Coronary Angiogram (Radial/Ulnar Approach) After Care    Refer to this sheet in the next few weeks. These instructions provide you with information on caring for yourself after your procedure. Your caregiver may also give you more specific instructions. Your treatment has been planned according to current medical practices, but problems sometimes occur. Call your caregiver if you have any problems or questions after your procedure.    Home Care Instructions:  You may shower the day after the procedure. Remove the bandage (dressing) and gently wash the site with plain soap and water. Gently pat the site dry. You may apply a band aid daily for 2 days if desired.    Do not apply powder or lotion to the site.  Do not submerge the affected site in water for 3 to 5 days or until the site is completely healed.   Do not lift, push or pull anything over 5 pounds for 5 days after your procedure or as directed by your physician.  As a reference, a gallon of milk weighs 8 pounds.   Inspect the site at least twice daily. You may notice some bruising at the site and it may be tender for 1 to 2 weeks.     Increase your fluid intake for the next 2 days.    Keep arm elevated for 24 hours. For the remainder of the day, keep your arm in “Pledge of Allegiance” position when up and about.     You may drive 24 hours after the procedure unless otherwise instructed by your caregiver.  Do not operate machinery or power tools for 24 hours.  A responsible adult should be with you for the first 24 hours after you arrive home. Do not make any important legal decisions or sign legal papers for 24 hours.  Do not drink alcohol for 24 hours.    Metformin or any medications containing Metformin should not be taken for 48 hours after your procedure.      Call Your Doctor if:   You have unusual pain at the radial/ulnar (wrist) site.  You have redness, warmth, swelling, or pain at the  radial/ulnar (wrist) site.  You have drainage (other than a small amount of blood on the dressing).  `You have chills or a fever > 101.  Your arm becomes pale or dark, cool, tingly, or numb.  You develop chest pain, shortness of breath, feel faint or pass out.    You have heavy bleeding from the site, hold pressure on the site for 20 minutes.  If the bleeding stops, apply a fresh bandage and call your cardiologist.  However, if you        continue to have bleeding, call 911 and continue to apply pressure to the site.   You have any symptoms of a stroke.  Remember BE FAST  B-balance. Sudden trouble walking or loss of balance.  E-eyes.  Sudden changes in how you see or a sudden onset of a very bad headache.   F-face. Sudden weakness or loss of feeling of the face or facial droop on one side.   A-arms Sudden weakness or numbness in one arm.  One arm drifts down if they are both held out in front of you. This happens suddenly and usually on one side of the body.   S-speech.  Sudden trouble speaking, slurred speech or trouble understanding what are saying.   T-time  Time to call emergency services.  Write down the symptoms and the time they started.

## 2024-07-15 NOTE — Clinical Note
A 6 fr sheath was successfully inserted with ultrasound guidance into the right ulnar artery and right ulnar artery. Sheath insertion not delayed.

## 2024-07-15 NOTE — Clinical Note
A 5 fr sheath was successfully inserted with ultrasound guidance into the right brachial vein. Sheath insertion not delayed.

## 2024-07-16 ENCOUNTER — HOSPITAL ENCOUNTER (OUTPATIENT)
Facility: HOSPITAL | Age: 74
Discharge: HOME | End: 2024-07-16
Admitting: NURSE PRACTITIONER
Payer: MEDICARE

## 2024-07-16 LAB
HCT VFR BLDA CALC: 38 % (ref 38–51)
HGB BLDA-MCNC: 12.9 G/DL (ref 12–17)
SAO2 % BLDA: 64 % (ref 95–98)

## 2024-07-16 PROCEDURE — 75561 CARDIAC MRI FOR MORPH W/DYE: CPT

## 2024-07-16 PROCEDURE — 0 GADOBENATE DIMEGLUMINE 529 MG/ML SOLUTION: Performed by: NURSE PRACTITIONER

## 2024-07-16 PROCEDURE — A9577 INJ MULTIHANCE: HCPCS | Performed by: NURSE PRACTITIONER

## 2024-07-16 RX ADMIN — GADOBENATE DIMEGLUMINE 17 ML: 529 INJECTION, SOLUTION INTRAVENOUS at 14:51

## 2024-07-18 ENCOUNTER — OFFICE VISIT (OUTPATIENT)
Dept: PAIN MEDICINE | Facility: CLINIC | Age: 74
End: 2024-07-18
Payer: MEDICARE

## 2024-07-18 VITALS
TEMPERATURE: 96.8 F | BODY MASS INDEX: 37.22 KG/M2 | OXYGEN SATURATION: 95 % | HEART RATE: 103 BPM | DIASTOLIC BLOOD PRESSURE: 82 MMHG | SYSTOLIC BLOOD PRESSURE: 124 MMHG | HEIGHT: 60 IN | WEIGHT: 189.6 LBS

## 2024-07-18 DIAGNOSIS — D86.85 CARDIAC SARCOIDOSIS: Primary | ICD-10-CM

## 2024-07-18 DIAGNOSIS — M47.812 CERVICAL SPONDYLOSIS WITHOUT MYELOPATHY: ICD-10-CM

## 2024-07-18 DIAGNOSIS — T40.2X5A THERAPEUTIC OPIOID INDUCED CONSTIPATION: Chronic | ICD-10-CM

## 2024-07-18 DIAGNOSIS — Z79.899 ENCOUNTER FOR LONG-TERM (CURRENT) USE OF HIGH-RISK MEDICATION: ICD-10-CM

## 2024-07-18 DIAGNOSIS — M51.36 DDD (DEGENERATIVE DISC DISEASE), LUMBAR: ICD-10-CM

## 2024-07-18 DIAGNOSIS — R91.8 OTHER NONSPECIFIC ABNORMAL FINDING OF LUNG FIELD: ICD-10-CM

## 2024-07-18 DIAGNOSIS — K59.03 THERAPEUTIC OPIOID INDUCED CONSTIPATION: Chronic | ICD-10-CM

## 2024-07-18 DIAGNOSIS — M47.816 LUMBAR FACET ARTHROPATHY: ICD-10-CM

## 2024-07-18 DIAGNOSIS — M47.816 LUMBAR FACET ARTHROPATHY: Primary | ICD-10-CM

## 2024-07-18 RX ORDER — OXYCODONE AND ACETAMINOPHEN 10; 325 MG/1; MG/1
TABLET ORAL
Qty: 25 TABLET | Refills: 0 | Status: SHIPPED | OUTPATIENT
Start: 2024-07-20

## 2024-07-18 RX ORDER — OXYCODONE 27 MG/1
1 CAPSULE, EXTENDED RELEASE ORAL EVERY 12 HOURS
Qty: 60 EACH | Refills: 0 | Status: SHIPPED | OUTPATIENT
Start: 2024-07-27
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
## 2024-07-19 DIAGNOSIS — D86.85 CARDIAC SARCOIDOSIS: Primary | ICD-10-CM

## 2024-08-12 NOTE — SIGNIFICANT NOTE
Patient educated on the following :    - If you are receiving Sedation for your procedure Nothing to Eat 6 hours and only clear liquids for 2 hours prior to your procedure.     -You will need to have someone drive you home after your PROCEDURE and remain with you for 24 hours after the PROCEDURE  - The date of your procedure, your are welcome to have one visitor at bedside or remain within 10-15 minutes of [facility]  -You will need to arrive at 0915 on 1/11 PROCEDURE  -Please contact [facility] at: [phone] with any questions and/or concerns    
Yas

## 2024-08-15 ENCOUNTER — OFFICE VISIT (OUTPATIENT)
Dept: PAIN MEDICINE | Facility: CLINIC | Age: 74
End: 2024-08-15
Payer: MEDICARE

## 2024-08-15 VITALS
DIASTOLIC BLOOD PRESSURE: 62 MMHG | HEART RATE: 81 BPM | OXYGEN SATURATION: 95 % | TEMPERATURE: 96.8 F | SYSTOLIC BLOOD PRESSURE: 115 MMHG | RESPIRATION RATE: 20 BRPM | BODY MASS INDEX: 37.03 KG/M2 | HEIGHT: 60 IN

## 2024-08-15 DIAGNOSIS — M47.816 LUMBAR FACET ARTHROPATHY: ICD-10-CM

## 2024-08-15 DIAGNOSIS — M47.816 LUMBAR FACET ARTHROPATHY: Primary | ICD-10-CM

## 2024-08-15 DIAGNOSIS — Z79.899 ENCOUNTER FOR LONG-TERM (CURRENT) USE OF HIGH-RISK MEDICATION: ICD-10-CM

## 2024-08-15 DIAGNOSIS — M48.02 FORAMINAL STENOSIS OF CERVICAL REGION: ICD-10-CM

## 2024-08-15 DIAGNOSIS — M51.36 DDD (DEGENERATIVE DISC DISEASE), LUMBAR: ICD-10-CM

## 2024-08-15 DIAGNOSIS — M48.02 SPINAL STENOSIS, CERVICAL REGION: ICD-10-CM

## 2024-08-15 RX ORDER — OXYCODONE AND ACETAMINOPHEN 10; 325 MG/1; MG/1
TABLET ORAL
Qty: 25 TABLET | Refills: 0 | Status: SHIPPED | OUTPATIENT
Start: 2024-08-15

## 2024-08-15 RX ORDER — OXYCODONE 27 MG/1
1 CAPSULE, EXTENDED RELEASE ORAL EVERY 12 HOURS
Qty: 60 EACH | Refills: 0 | Status: SHIPPED | OUTPATIENT
Start: 2024-08-26
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
## 2024-08-15 NOTE — TELEPHONE ENCOUNTER
Pt seen today; please allow early refill on the oxycodone 10 mg--pt had bad fall and I have reviewed ED notes--fracture to left foot and sutures to left hand secondary to fall

## 2024-08-21 RX ORDER — MELOXICAM 15 MG/1
15 TABLET ORAL DAILY
Qty: 30 TABLET | Refills: 0 | Status: SHIPPED | OUTPATIENT
Start: 2024-08-21

## 2024-08-21 NOTE — TELEPHONE ENCOUNTER
Spoke to patient. She states she is going to try and not take the meloxicam at all and see what happens. I told her if she needed to use it, to limit it to 3 X weekly.

## 2024-08-21 NOTE — TELEPHONE ENCOUNTER
Please call patient and let her know that her kidney functions are showing slightly elevated--I need her to decrease use of the meloxicam to no more than 3 times a week--I will repeat labs in one month

## 2024-08-26 ENCOUNTER — TELEPHONE (OUTPATIENT)
Dept: PAIN MEDICINE | Facility: CLINIC | Age: 74
End: 2024-08-26

## 2024-08-27 DIAGNOSIS — Z79.899 ENCOUNTER FOR LONG-TERM (CURRENT) USE OF HIGH-RISK MEDICATION: ICD-10-CM

## 2024-08-27 DIAGNOSIS — M47.816 LUMBAR FACET ARTHROPATHY: ICD-10-CM

## 2024-08-27 RX ORDER — OXYCODONE 27 MG/1
1 CAPSULE, EXTENDED RELEASE ORAL EVERY 12 HOURS
Qty: 60 EACH | Refills: 0 | Status: SHIPPED | OUTPATIENT
Start: 2024-08-27

## 2024-08-27 NOTE — TELEPHONE ENCOUNTER
Dr. Gan, the Oxycodone RX was sent to [facility] again - the patient wanted the prescription to go to [facility]. I sent a refill request to you with the correct pharmacy.

## 2024-09-23 ENCOUNTER — OFFICE VISIT (OUTPATIENT)
Dept: PAIN MEDICINE | Facility: CLINIC | Age: 74
End: 2024-09-23
Payer: MEDICARE

## 2024-09-23 ENCOUNTER — PREP FOR SURGERY (OUTPATIENT)
Dept: SURGERY | Facility: SURGERY CENTER | Age: 74
End: 2024-09-23
Payer: MEDICARE

## 2024-09-23 ENCOUNTER — HOSPITAL ENCOUNTER (EMERGENCY)
Facility: HOSPITAL | Age: 74
Discharge: HOME | End: 2024-09-23
Attending: EMERGENCY MEDICINE | Admitting: EMERGENCY MEDICINE
Payer: MEDICARE

## 2024-09-23 ENCOUNTER — APPOINTMENT (OUTPATIENT)
Dept: CT IMAGING | Facility: HOSPITAL | Age: 74
End: 2024-09-23
Payer: MEDICARE

## 2024-09-23 VITALS
RESPIRATION RATE: 18 BRPM | SYSTOLIC BLOOD PRESSURE: 179 MMHG | BODY MASS INDEX: 34.23 KG/M2 | OXYGEN SATURATION: 95 % | HEIGHT: 62 IN | TEMPERATURE: 97.8 F | DIASTOLIC BLOOD PRESSURE: 77 MMHG | HEART RATE: 81 BPM | WEIGHT: 186 LBS

## 2024-09-23 VITALS
DIASTOLIC BLOOD PRESSURE: 84 MMHG | HEART RATE: 82 BPM | HEIGHT: 60 IN | TEMPERATURE: 96.2 F | SYSTOLIC BLOOD PRESSURE: 150 MMHG | OXYGEN SATURATION: 96 % | BODY MASS INDEX: 37.03 KG/M2

## 2024-09-23 DIAGNOSIS — M47.816 LUMBAR FACET ARTHROPATHY: ICD-10-CM

## 2024-09-23 DIAGNOSIS — K59.03 THERAPEUTIC OPIOID INDUCED CONSTIPATION: ICD-10-CM

## 2024-09-23 DIAGNOSIS — M51.369 DDD (DEGENERATIVE DISC DISEASE), LUMBAR: ICD-10-CM

## 2024-09-23 DIAGNOSIS — Z79.899 ENCOUNTER FOR LONG-TERM (CURRENT) USE OF HIGH-RISK MEDICATION: ICD-10-CM

## 2024-09-23 DIAGNOSIS — M47.816 LUMBAR FACET ARTHROPATHY: Primary | ICD-10-CM

## 2024-09-23 DIAGNOSIS — M48.02 SPINAL STENOSIS, CERVICAL REGION: ICD-10-CM

## 2024-09-23 DIAGNOSIS — M48.02 FORAMINAL STENOSIS OF CERVICAL REGION: Primary | ICD-10-CM

## 2024-09-23 DIAGNOSIS — G89.29 CHRONIC LOW BACK PAIN WITHOUT SCIATICA, UNSPECIFIED BACK PAIN LATERALITY: Primary | ICD-10-CM

## 2024-09-23 DIAGNOSIS — M47.812 CERVICAL SPONDYLOSIS WITHOUT MYELOPATHY: ICD-10-CM

## 2024-09-23 DIAGNOSIS — M54.50 CHRONIC LOW BACK PAIN WITHOUT SCIATICA, UNSPECIFIED BACK PAIN LATERALITY: Primary | ICD-10-CM

## 2024-09-23 DIAGNOSIS — T40.2X5A THERAPEUTIC OPIOID INDUCED CONSTIPATION: ICD-10-CM

## 2024-09-23 DIAGNOSIS — Z79.1 LONG TERM (CURRENT) USE OF NON-STEROIDAL ANTI-INFLAMMATORIES (NSAID): ICD-10-CM

## 2024-09-23 PROCEDURE — 1125F AMNT PAIN NOTED PAIN PRSNT: CPT | Performed by: PHYSICIAN ASSISTANT

## 2024-09-23 PROCEDURE — 99214 OFFICE O/P EST MOD 30 MIN: CPT | Performed by: PHYSICIAN ASSISTANT

## 2024-09-23 PROCEDURE — 72131 CT LUMBAR SPINE W/O DYE: CPT

## 2024-09-23 PROCEDURE — 1159F MED LIST DOCD IN RCRD: CPT | Performed by: PHYSICIAN ASSISTANT

## 2024-09-23 PROCEDURE — 3079F DIAST BP 80-89 MM HG: CPT | Performed by: PHYSICIAN ASSISTANT

## 2024-09-23 PROCEDURE — 99283 EMERGENCY DEPT VISIT LOW MDM: CPT | Performed by: PHYSICIAN ASSISTANT

## 2024-09-23 PROCEDURE — 1160F RVW MEDS BY RX/DR IN RCRD: CPT | Performed by: PHYSICIAN ASSISTANT

## 2024-09-23 PROCEDURE — 3077F SYST BP >= 140 MM HG: CPT | Performed by: PHYSICIAN ASSISTANT

## 2024-09-23 PROCEDURE — 99284 EMERGENCY DEPT VISIT MOD MDM: CPT

## 2024-09-23 RX ORDER — OXYCODONE AND ACETAMINOPHEN 10; 325 MG/1; MG/1
TABLET ORAL
Qty: 25 TABLET | Refills: 0 | Status: SHIPPED | OUTPATIENT
Start: 2024-09-23

## 2024-09-23 RX ORDER — LIDOCAINE 4 G/G
1 PATCH TOPICAL
Status: DISCONTINUED | OUTPATIENT
Start: 2024-09-23 | End: 2024-09-23

## 2024-09-23 RX ORDER — OXYCODONE 27 MG/1
1 CAPSULE, EXTENDED RELEASE ORAL EVERY 12 HOURS
Qty: 60 EACH | Refills: 0 | Status: SHIPPED | OUTPATIENT
Start: 2024-09-26 | End: 2024-09-26 | Stop reason: SDUPTHER
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
## 2024-09-24 ENCOUNTER — TRANSCRIBE ORDERS (OUTPATIENT)
Dept: SURGERY | Facility: SURGERY CENTER | Age: 74
End: 2024-09-24
Payer: MEDICARE

## 2024-09-24 ENCOUNTER — TELEPHONE (OUTPATIENT)
Dept: PAIN MEDICINE | Facility: CLINIC | Age: 74
End: 2024-09-24

## 2024-09-24 DIAGNOSIS — Z41.9 SURGERY, ELECTIVE: Primary | ICD-10-CM

## 2024-09-24 DIAGNOSIS — M62.838 MUSCLE SPASM: Primary | ICD-10-CM

## 2024-09-24 RX ORDER — METHOCARBAMOL 750 MG/1
750 TABLET, FILM COATED ORAL 4 TIMES DAILY
Qty: 120 TABLET | Refills: 1 | Status: SHIPPED | OUTPATIENT
Start: 2024-09-24

## 2024-09-24 NOTE — TELEPHONE ENCOUNTER
Please let patient know that I will send in a refill for the methocarbamol however she cannot have another dose of ketorolac--that is something that we use sparingly and not on a consistent basis because it can be hard on the kidneys.

## 2024-09-24 NOTE — TELEPHONE ENCOUNTER
Provider: NELSON    Caller: PATIENT    Pharmacy: [facility] [phone]    Phone Number: [phone]    Reason for Call: PATIENT CALLING TO PROVIDE UPDATE REGARDING HER ED VISIT YESTERDAY. PATENT STATES THAT THEY DID A CT SCAN, AND THEY SAID SHE HAD NO NEW FRACTURES BUT THAT SHE PROBABLY AGGRAVATED OLD FRACTURES. SHE STATES SHE ALSO NEEDS REFILLS ON METHOCARBAMOL 750 MG AND KETOROLAC 10 MG, SHE STATES SHE FORGOT TO ASK YESTERDAY.

## 2024-09-25 ENCOUNTER — TELEPHONE (OUTPATIENT)
Dept: PAIN MEDICINE | Facility: CLINIC | Age: 74
End: 2024-09-25

## 2024-09-25 RX ORDER — MIDAZOLAM HYDROCHLORIDE 2 MG/2ML
4 INJECTION, SOLUTION INTRAMUSCULAR; INTRAVENOUS ONCE
Status: COMPLETED | OUTPATIENT
Start: 2024-09-26 | End: 2024-09-26

## 2024-09-25 RX ORDER — FENTANYL CITRATE 50 UG/ML
50 INJECTION, SOLUTION INTRAMUSCULAR; INTRAVENOUS ONCE
Status: COMPLETED | OUTPATIENT
Start: 2024-09-26 | End: 2024-09-26

## 2024-09-25 NOTE — TELEPHONE ENCOUNTER
Caller: Traci King    Relationship: Self    Best call back number: [phone] (home)       What is the best time to reach you: BEFORE HER SURGERY THAT IS SCHEDULED FOR TOMMORROW    Who are you requesting to speak with (clinical staff, provider,  specific staff member): CLINICAL STAFF         What was the call regarding: PATIENT WANTED THE NUMBER TO THE SURGERY CENTER WHERE SHE IS HAVING HER PROCEDURE DONE ON TOMORROW W/ ARRIVAL TIME OF 12.45    PATIENT WANTED TO GET PRE-SURGERY INSTRUCTIONS AND SAYS SHE HAS QUESTIONS    Is it okay if the provider responds through MyChart: NA

## 2024-09-26 ENCOUNTER — HOSPITAL ENCOUNTER (OUTPATIENT)
Dept: GENERAL RADIOLOGY | Facility: SURGERY CENTER | Age: 74
Setting detail: HOSPITAL OUTPATIENT SURGERY
End: 2024-09-26
Payer: MEDICARE

## 2024-09-26 ENCOUNTER — HOSPITAL ENCOUNTER (OUTPATIENT)
Facility: SURGERY CENTER | Age: 74
Setting detail: HOSPITAL OUTPATIENT SURGERY
Discharge: HOME | End: 2024-09-26
Attending: ANESTHESIOLOGY | Admitting: ANESTHESIOLOGY
Payer: MEDICARE

## 2024-09-26 VITALS
OXYGEN SATURATION: 98 % | HEIGHT: 60 IN | HEART RATE: 78 BPM | TEMPERATURE: 97.1 F | RESPIRATION RATE: 18 BRPM | WEIGHT: 186 LBS | BODY MASS INDEX: 36.52 KG/M2 | DIASTOLIC BLOOD PRESSURE: 67 MMHG | SYSTOLIC BLOOD PRESSURE: 152 MMHG

## 2024-09-26 DIAGNOSIS — M47.816 LUMBAR FACET ARTHROPATHY: ICD-10-CM

## 2024-09-26 DIAGNOSIS — Z41.9 SURGERY, ELECTIVE: ICD-10-CM

## 2024-09-26 DIAGNOSIS — Z79.899 ENCOUNTER FOR LONG-TERM (CURRENT) USE OF HIGH-RISK MEDICATION: ICD-10-CM

## 2024-09-26 PROCEDURE — 64635 DESTROY LUMB/SAC FACET JNT: CPT | Performed by: ANESTHESIOLOGY

## 2024-09-26 PROCEDURE — 76000 FLUOROSCOPY <1 HR PHYS/QHP: CPT

## 2024-09-26 PROCEDURE — 99152 MOD SED SAME PHYS/QHP 5/>YRS: CPT | Performed by: ANESTHESIOLOGY

## 2024-09-26 PROCEDURE — 25010000002 MIDAZOLAM PER 1MG: Performed by: ANESTHESIOLOGY

## 2024-09-26 PROCEDURE — 25010000002 BUPIVACAINE (PF) 0.5 % SOLUTION 10 ML VIAL: Performed by: ANESTHESIOLOGY

## 2024-09-26 PROCEDURE — 64636 DESTROY L/S FACET JNT ADDL: CPT | Performed by: ANESTHESIOLOGY

## 2024-09-26 PROCEDURE — 25010000002 FENTANYL CITRATE (PF) 50 MCG/ML SOLUTION: Performed by: ANESTHESIOLOGY

## 2024-09-26 RX ORDER — SODIUM CHLORIDE 0.9 % (FLUSH) 0.9 %
10 SYRINGE (ML) INJECTION AS NEEDED
Status: DISCONTINUED | OUTPATIENT
Start: 2024-09-26 | End: 2024-09-26 | Stop reason: HOSPADM

## 2024-09-26 RX ORDER — OXYCODONE 27 MG/1
1 CAPSULE, EXTENDED RELEASE ORAL EVERY 12 HOURS
Qty: 60 EACH | Refills: 0 | Status: SHIPPED | OUTPATIENT
Start: 2024-09-26

## 2024-09-26 RX ORDER — SODIUM CHLORIDE 0.9 % (FLUSH) 0.9 %
10 SYRINGE (ML) INJECTION EVERY 12 HOURS SCHEDULED
Status: DISCONTINUED | OUTPATIENT
Start: 2024-09-26 | End: 2024-09-26 | Stop reason: HOSPADM

## 2024-09-26 RX ORDER — OXYCODONE 27 MG/1
1 CAPSULE, EXTENDED RELEASE ORAL EVERY 12 HOURS
Qty: 60 EACH | Refills: 0 | Status: ON HOLD | OUTPATIENT
Start: 2024-09-26 | End: 2024-09-26 | Stop reason: SDUPTHER

## 2024-09-26 RX ADMIN — FENTANYL CITRATE 50 MCG: 50 INJECTION INTRAMUSCULAR; INTRAVENOUS at 13:36

## 2024-09-26 RX ADMIN — MIDAZOLAM HYDROCHLORIDE 4 MG: 2 INJECTION, SOLUTION INTRAMUSCULAR; INTRAVENOUS at 13:36

## 2024-09-26 NOTE — OP NOTE
Bilateral L1-3 Lumbar Medial Branch RADIOFREQUENCY  [facility]    PREOPERATIVE DIAGNOSIS:  Lumbar spondylosis without myelopathy    POSTOPERATIVE DIAGNOSIS:  Lumbar spondylosis without myelopathy    PROCEDURE:   Diagnostic Bilateral Lumbar Medial Branch Nerve thermal radiofrequency lesioning, with fluoroscopy:  L1, L2, L3  nerves (at the L2, L3, L4 transverse processes) to thermally treat the innervation to facet joints L2-3, L3-4.  68554-44 -- Bilateral L/S facet neuro destr., 1st Level  79166-62 -- Bilateral L/S facet neuro destr., 2nd  Level     PRE-PROCEDURE DISCUSSION WITH PATIENT:    Risks and complications were discussed with the patient prior to starting the procedure and informed consent was obtained.      SURGEON:  Jose Luis Gan MD    REASON FOR PROCEDURE:    The patient complains of pain that seems to have a significant axial component and Previous clinically significant therapeutic effect after prior Radiofrequency procedure    SEDATION:  Versed 4mg & Fentanyl 50 mcg IV and The patient had higher than average levels of procedural anxiety and the need to provide additional procedural sedation was needed to safely proceed.  TIME OF PROCEDURE:   The intraoperative procedure time after administration of the sedative was (1872-1175) 19 minutes.     ANESTHETIC:  Lidocaine 2%  STEROID:  NONE    DESCRIPTON OF PROCEDURE:  After obtaining informed consent, IV access was obtained in the preoperative area.   The patient was taken to the operating room.  The patient was placed in the prone position with a pillow under the abdomen. All pressure points were well padded.  EKG, blood pressure, and pulse oximeter were monitored.  The patient was monitored and sedated by the RN under my direction. The lumbosacral area was prepped with Chloraprep and draped in a sterile fashion.     Under fluoroscopic guidance the transverse processes of the L2, L3, L4 vertebrae at the junctions of the superior  articular processes were identified on the right.     Skin and subcutaneous tissue were anesthetized with 1ml of 1% lidocaine above each of these points. Then, radiofrequency probe needles were advanced in this fluoro view to the above junctions.  Aspiration was negative for blood and CSF.  After confirming the position of the needle with fluoroscope in all views, testing was initiated.  First, sensory testing was started on each needle a 1V and 50Hz and slowly decreased until painful pressure stimulation diminished at 0.5V.  Next, motor testing was confirmed to be negative at 3V and 2Hz for any radicular stimulation.  Then 1mL of the local anesthetic solution was instilled in each needle.  Two minutes elapsed, and during this time a lateral fluoroscopic view was confirmed again to ensure the needles had not advanced nor retracted.  Then, Radiofrequency Lesioning was initiated for 3 minutes at 83 degrees Celsius.  Needles were removed intact from each of the areas.     A similar procedure was repeated to address the similar nerves on the contralateral side.   Onset of analgesia was noted.  Vital signs remained stable throughout.      ESTIMATED BLOOD LOSS:  <5 mL  SPECIMENS:  none    COMPLICATIONS:   No complications were noted. and The patient did not have any signs of postprocedure numbness nor weakness.    TOLERANCE & DISCHARGE CONDITION:    The patient tolerated the procedure well.  The patient was transported to the recovery area without difficulties.  The patient was discharged to home under the care of family in stable and satisfactory condition.    PLAN OF CARE:  The patient was given our standard instruction sheet.  The patient will  Return to clinic 4-6 wks.  The patient will resume all medications as per the medication reconciliation sheet.

## 2024-09-30 ENCOUNTER — TELEPHONE (OUTPATIENT)
Dept: CARDIOLOGY | Facility: CLINIC | Age: 74
End: 2024-09-30
Payer: MEDICARE

## 2024-09-30 NOTE — TELEPHONE ENCOUNTER
Caller: ALIS CASTRO[facility]    Relationship to patient:     Best call back number: [phone]    Patient is needing: [employer] IS NEEDING THE MRI INFORMATION FROM 07.16.2024 EITHER FAXED OR UPLOADED THE WAY THE REST OF THE INFORMATION WAS LOADED. CAN ALSO REQUEST A PEER TO PEER. STATED OFFICE HAS 3 BUSINESS DAYS TO SEND INFORMATION.     FAX NUMBER [phone]    [employer] TRACKING ID [id number]    CAN HAVE REQUEST REVIEWED BY ONE OF [employer]'S PHYSICIANS. JUST BE AWARE IT CAN BE SUBJECT TO ADVERSE DETERMINATION

## 2024-10-14 ENCOUNTER — TELEPHONE (OUTPATIENT)
Dept: PAIN MEDICINE | Facility: CLINIC | Age: 74
End: 2024-10-14

## 2024-10-14 NOTE — TELEPHONE ENCOUNTER
Caller: Traci King A    Relationship to patient: Self    Best call back number: [phone]    Patient is needing: PATIENT'S PRIMARY CARE  WANTED TO LET MAREK JUAREZ KNOW THAT PATIENT HAS BEEN HAVING TROUBLE SLEEPING THEY TRIED HER ON TRAZODONE, IT DIDN'T HELP SO HE PUT HER ON  TEMAZEPAM 15MG

## 2024-10-15 RX ORDER — TEMAZEPAM 15 MG/1
15 CAPSULE ORAL NIGHTLY PRN
COMMUNITY

## 2024-10-23 ENCOUNTER — OFFICE VISIT (OUTPATIENT)
Dept: PAIN MEDICINE | Facility: CLINIC | Age: 74
End: 2024-10-23
Payer: MEDICARE

## 2024-10-23 VITALS
DIASTOLIC BLOOD PRESSURE: 72 MMHG | TEMPERATURE: 97.1 F | SYSTOLIC BLOOD PRESSURE: 123 MMHG | BODY MASS INDEX: 35.34 KG/M2 | HEIGHT: 60 IN | HEART RATE: 91 BPM | RESPIRATION RATE: 20 BRPM | WEIGHT: 180 LBS | OXYGEN SATURATION: 97 %

## 2024-10-23 DIAGNOSIS — M51.360 DEGENERATION OF INTERVERTEBRAL DISC OF LUMBAR REGION WITH DISCOGENIC BACK PAIN: ICD-10-CM

## 2024-10-23 DIAGNOSIS — Z79.899 ENCOUNTER FOR LONG-TERM (CURRENT) USE OF HIGH-RISK MEDICATION: ICD-10-CM

## 2024-10-23 DIAGNOSIS — M48.02 FORAMINAL STENOSIS OF CERVICAL REGION: ICD-10-CM

## 2024-10-23 DIAGNOSIS — M47.816 LUMBAR FACET ARTHROPATHY: ICD-10-CM

## 2024-10-23 DIAGNOSIS — M47.816 LUMBAR FACET ARTHROPATHY: Primary | ICD-10-CM

## 2024-10-23 RX ORDER — OXYCODONE AND ACETAMINOPHEN 10; 325 MG/1; MG/1
TABLET ORAL
Qty: 25 TABLET | Refills: 0 | Status: SHIPPED | OUTPATIENT
Start: 2024-10-23

## 2024-10-23 RX ORDER — OXYCODONE 27 MG/1
1 CAPSULE, EXTENDED RELEASE ORAL EVERY 12 HOURS
Qty: 60 EACH | Refills: 0 | Status: SHIPPED | OUTPATIENT
Start: 2024-10-26

## 2024-10-23 RX ORDER — PREGABALIN 50 MG/1
CAPSULE ORAL
Qty: 90 CAPSULE | Refills: 0 | Status: SHIPPED | OUTPATIENT
Start: 2024-10-23

## 2024-10-23 NOTE — PROGRESS NOTES
CHIEF COMPLAINT  Increased low back pain  Going to start PT tomorrow    Subjective   Traci King is a 74 y.o. female  who presents to the office for follow-up of chronic neck and low back pain.  This patient reports significant worsening of pain within the lumbar spine in a bandlike distribution which referred into the bilateral paraspinal muscles without lower extremity radicular symptoms.  She states that she has no quality of life as the back pain has been significantly worse however she does find that recent utilization of her TENS unit has provided some decrease of pain within the back.  Traci has secondary complaints of posterior cervical spine pain which radiates into the lower thoracic spine.    She is currently medically managed on Xtampza 27 mcg twice daily, Percocet 10 mg 0.5-1 p.o. daily (#25/month), gabapentin 300 mg 2 p.o. nightly, meloxicam 15 mg daily and methocarbamol 750 mg as needed.  Reports opioid-induced constipation which has been alleviated with the use of Relistor.  Overall these medications are only providing 20-30% pain relief which helps her to barely manage her ADLs daily.    The patient states that she discontinued the gabapentin as she found that it became ineffective.    Pain today 7/10 VAS in severity.      Back Pain  This is a chronic problem. The current episode started more than 1 year ago. The problem occurs constantly. The problem has been gradually worsening since onset. The pain is present in the lumbar spine. The quality of the pain is described as aching (throbbing). The pain does not radiate. The pain is at a severity of 7/10. The pain is severe. The pain is The same all the time. The symptoms are aggravated by standing and position (cooking/walking). Pertinent negatives include no abdominal pain, chest pain, dysuria, fever, headaches, numbness or weakness.   Neck Pain   This is a chronic problem. The current episode started more than 1 year ago. The problem occurs  constantly. The problem has been unchanged. The pain is associated with nothing. The pain is present in the midline. The quality of the pain is described as aching. The pain is at a severity of 7/10. The pain is moderate. The symptoms are aggravated by position and twisting. The pain is Same all the time. Stiffness is present In the morning. Pertinent negatives include no chest pain, fever, headaches, numbness or weakness.        PEG Assessment   What number best describes your pain on average in the past week?7  What number best describes how, during the past week, pain has interfered with your enjoyment of life?7  What number best describes how, during the past week, pain has interfered with your general activity?  7    Review of Pertinent Medical Data ---  I have reviewed the neurosurgical note from [facility] by DACIA Garcia on 10/18/2024: Recommendation has been made for referral to physical therapy for lumbar core strengthening and recommendation to continue with [facility] pain management.  Not a surgical candidate at this time.      CT LUMBAR SPINE WITHOUT CONTRAST     HISTORY: Mechanical fall, back pain.     COMPARISON: MRI lumbar spine 2/7/2023.     FINDINGS: A moderate to severe compression deformity involving L2 is  appreciated with approximately 7 mm of bony retropulsion grossly similar  to the MRI examination 2/7/2023 given differences in technique. A mild  concave deformity involving the superior endplate of L4 is appreciated  as well as a Schmorl's node involving the superior endplate of L4 is  similar in appearance as compared to the prior study. Severe loss of  disc height at L5-S1 is noted as well as retrolisthesis of L5 on S1  estimated to be approximately 4 to 5 mm. There is approximately 3 mm of  anterolisthesis of L4 upon L5. Moderate to severe loss of disc height  and vacuum disc effect is appreciated at T10/T11 and T11/T12.     No convincing acute fracture is  identified.     L1-L2: There is mild to moderate canal stenosis and moderate lateral  recess narrowing bilaterally secondary to the retropulsion of the  superior endplate of L2. Mild foraminal stenosis is present bilaterally  secondary to extension of the disc osteophyte complex into the neural  foramen.     L2-L3: There is no evidence of a disc bulge or herniation.     L3-L4: A mild broad-based disc osteophyte complex is present with zones  of herniation. Mild foraminal stenosis is present on the left secondary  to extension of a small disc osteophyte complex into the neural foramen.     L4-L5: There is moderate canal stenosis centrally and moderate lateral  recess narrowing bilaterally secondary to broad-based disc bulge, the  anterolisthesis of L4 upon L5 and moderate posterior element  degenerative disease. Mild to moderate foraminal stenosis is present on  the left and there is mild foraminal stenosis on the right secondary to  extension of disc material into the neural foramina, slightly more  prominent as compared to 2/7/2023.     L5-S1: A mild broad-based disc osteophyte complex is present with no  evidence of herniation. Mild foraminal stenosis is present bilaterally  secondary to extension of a small disc osteophyte complex into the  neural foramen.     IMPRESSION:  1.  Given differences in technique, compression deformities involving L2  and to lesser extent L4. Grossly similar to 2/7/2023. No convincing  acute fractures identified. Multilevel degenerative disease involving  the lumbar spine is noted as described above including moderate canal  stenosis at L4-L5, multilevel facet degenerative disease (most prominent  at L4-L5) and multilevel foraminal stenosis. Foraminal stenosis at L4-L5  is slightly more prominent as compared to the MRI examination 2/7/2023.  See above. Further evaluation could be performed with a MRI examination  of the lumbar spine as indicated.      Radiation dose reduction  "techniques were utilized, including automated  exposure control and exposure modulation based on body size.        This report was finalized on 9/23/2024 1:08 PM by Dr. Elvis Curran M.D      The following portions of the patient's history were reviewed and updated as appropriate: allergies, current medications, past family history, past medical history, past social history, past surgical history, and problem list.    Review of Systems   Constitutional:  Negative for activity change, fatigue and fever.   Respiratory:  Negative for cough and chest tightness.    Cardiovascular:  Negative for chest pain.   Gastrointestinal:  Positive for constipation. Negative for abdominal pain and diarrhea.   Genitourinary:  Negative for dysuria.   Musculoskeletal:  Positive for back pain.   Neurological:  Negative for dizziness, weakness, light-headedness, numbness and headaches.   Psychiatric/Behavioral:  Positive for sleep disturbance (pain). Negative for agitation and suicidal ideas. The patient is not nervous/anxious.      I have reviewed and confirmed the accuracy of the ROS as documented by the MA/LPN/RN ALLY Cortez   Vitals:    10/23/24 1244   BP: 123/72   BP Location: Right arm   Patient Position: Sitting   Cuff Size: Large Adult   Pulse: 91   Resp: 20   Temp: 97.1 °F (36.2 °C)   TempSrc: Temporal   SpO2: 97%   Weight: 81.6 kg (180 lb)   Height: 152.4 cm (60\")   PainSc:   7         Objective   Physical Exam  Vitals and nursing note reviewed.   Constitutional:       General: She is in acute distress.      Appearance: Normal appearance. She is obese.   HENT:      Head: Normocephalic.   Pulmonary:      Effort: Pulmonary effort is normal.   Musculoskeletal:      Cervical back: Tenderness present. Decreased range of motion.      Lumbar back: Spasms (worse on right paraspinal muscles) and tenderness (Moderate tenderness to palpation within the overlying bilateral lumbar facet joint spaces) present. Decreased range of " motion (Decreased range of motion in all planes).        Back:    Skin:     General: Skin is warm and dry.   Neurological:      General: No focal deficit present.      Mental Status: She is alert and oriented to person, place, and time.      Cranial Nerves: Cranial nerves 2-12 are intact.      Sensory: Sensation is intact.      Motor: Motor function is intact.      Gait: Gait abnormal.   Psychiatric:         Mood and Affect: Mood normal.         Behavior: Behavior normal.         Thought Content: Thought content normal.         Judgment: Judgment normal.         Assessment & Plan   Diagnoses and all orders for this visit:    1. Lumbar facet arthropathy (Primary)    2. Foraminal stenosis of cervical region  -     pregabalin (LYRICA) 50 MG capsule; Start 1 capsule PO QHS x 3 days; if tolerated may increase to 1 capsule PO BID x 3 days; if tolerated may increase to 1 capsule PO TID  Dispense: 90 capsule; Refill: 0    3. Degeneration of intervertebral disc of lumbar region with discogenic back pain    4. Encounter for long-term (current) use of high-risk medication        Traci LACHO King reports a pain score of 7.  Given her pain assessment as noted, treatment options were discussed and the following options were decided upon as a follow-up plan to address the patient's pain: continuation of current treatment plan for pain, prescription for opiod analgesics, and use of non-medical modalities (ice, heat, stretching and/or behavior modifications).      --- Patient asked for a follow-up with Dr. Gan which has been scheduled for 11/11/2024 in order to discuss either further medication options and/or interventional pain procedures.  I have discussed with her that I do believe that she may be a candidate for consideration of neuromodulation and provided patient education materials for both [employer] systems.  I have asked the patient to review the materials in order for her to discuss it further at her next  appointment with Dr. Gan.  I have briefly discussed with the patient the risk and benefits of neuromodulation.  --- I have also had a lengthy discussion with the patient that I feel that she would truly benefit from initiating physical therapy which was ordered as she seems to have some improvement of pain with utilization of her TENS unit.  The patient has also been utilizing her methocarbamol on a more sparing basis and I have discussed with her that she may benefit from using it more consistently even if she takes half a tablet in order to avoid any sedation from it.  --- Refill Xtampza and oxycodone 10 mg. Patient appears stable with current regimen. No adverse effects. Regarding continuation of opioids, there is no evidence of aberrant behavior or any red flags.  The patient continues with appropriate response to opioid therapy. ADL's remain intact by self.   --- Moderate risk for opiate abuse/diversion.  Patient is evaluated every 30 days secondary to her MME.  --- I will institute a trial of pregabalin 50 mg up to 3 times daily dosing if able to tolerate for the neuropathic component of her pain         The urine drug screen confirmation from 7/18/2024 has been reviewed and the result is appropriate based on patient history and MATTHEW report        The patient signed an updated copy of the controlled substance agreement on 11/28/2023.        I spent 40 minutes caring for Traci on this date of service. This time includes time spent by me in the following activities: preparing for the visit, reviewing tests, performing a medically appropriate examination and/or evaluation, counseling and educating the patient/family/caregiver, documenting information in the medical record, care coordination, ordering medications, and discussion of advanced interventional pain procedures          MATTHEW REPORT  As part of the patient's treatment plan, I am prescribing controlled substances. The patient has been made aware of  appropriate use of such medications, including potential risk of somnolence, limited ability to drive and/or work safely, and the potential for dependence or overdose. It has also been made clear that these medications are for use by this patient only, without concomitant use of alcohol or other substances unless prescribed.     Patient has completed prescribing agreement detailing terms of continued prescribing of controlled substances, including monitoring MATTHEW reports, urine drug screening, and pill counts if necessary. The patient is aware that inappropriate use will results in cessation of prescribing such medications.    As the clinician, I personally reviewed the MATTHEW from 10/23/2024 while the patient was in the office today.    History and physical exam exhibit continued safe and appropriate use of controlled substances.       Dictated utilizing Dragon dictation.

## 2024-10-28 ENCOUNTER — TELEPHONE (OUTPATIENT)
Dept: PAIN MEDICINE | Facility: CLINIC | Age: 74
End: 2024-10-28

## 2024-10-28 NOTE — TELEPHONE ENCOUNTER
Please tell Traci that when she sees Dr Gan on 11/11 he can send her rxs for November-- I will see her back in December. She will need to request rx refills on that visit with him

## 2024-10-28 NOTE — TELEPHONE ENCOUNTER
Provider: ALLAN    Caller: PATIENT    Phone Number: [phone]    Reason for Call: PATIENT STATES THAT SHE THOUGHT SHE WAS SUPPOSED TO SEE LARRY THIS MONTH FOR HER PAIN MEDICATION REFILL. SHE STATES THAT THE APPT WITH DR. LEMUS ON 11/11/24 IS FOR SOMETHING ELSE. SHE IS ASKINFG IF SHE IS SUPPOSED TO HAVE ANOTHER APPT FOR HER PAIN MEDICATION REFILL.

## 2024-11-11 ENCOUNTER — OFFICE VISIT (OUTPATIENT)
Dept: PAIN MEDICINE | Facility: CLINIC | Age: 74
End: 2024-11-11
Payer: MEDICARE

## 2024-11-11 ENCOUNTER — PREP FOR SURGERY (OUTPATIENT)
Dept: SURGERY | Facility: SURGERY CENTER | Age: 74
End: 2024-11-11
Payer: MEDICARE

## 2024-11-11 VITALS
BODY MASS INDEX: 35.15 KG/M2 | RESPIRATION RATE: 18 BRPM | DIASTOLIC BLOOD PRESSURE: 67 MMHG | SYSTOLIC BLOOD PRESSURE: 105 MMHG | HEIGHT: 60 IN | HEART RATE: 90 BPM | OXYGEN SATURATION: 99 % | TEMPERATURE: 97.9 F

## 2024-11-11 DIAGNOSIS — M47.816 LUMBAR FACET ARTHROPATHY: ICD-10-CM

## 2024-11-11 DIAGNOSIS — M53.3 SACROILIAC JOINT DYSFUNCTION OF BOTH SIDES: ICD-10-CM

## 2024-11-11 DIAGNOSIS — M53.3 SACROILIAC JOINT DYSFUNCTION OF BOTH SIDES: Primary | ICD-10-CM

## 2024-11-11 DIAGNOSIS — Z79.899 ENCOUNTER FOR LONG-TERM (CURRENT) USE OF HIGH-RISK MEDICATION: ICD-10-CM

## 2024-11-11 DIAGNOSIS — M47.812 CERVICAL SPONDYLOSIS WITHOUT MYELOPATHY: Primary | ICD-10-CM

## 2024-11-11 PROCEDURE — 3074F SYST BP LT 130 MM HG: CPT | Performed by: ANESTHESIOLOGY

## 2024-11-11 PROCEDURE — 1125F AMNT PAIN NOTED PAIN PRSNT: CPT | Performed by: ANESTHESIOLOGY

## 2024-11-11 PROCEDURE — 1159F MED LIST DOCD IN RCRD: CPT | Performed by: ANESTHESIOLOGY

## 2024-11-11 PROCEDURE — 99214 OFFICE O/P EST MOD 30 MIN: CPT | Performed by: ANESTHESIOLOGY

## 2024-11-11 PROCEDURE — 3078F DIAST BP <80 MM HG: CPT | Performed by: ANESTHESIOLOGY

## 2024-11-11 PROCEDURE — 1160F RVW MEDS BY RX/DR IN RCRD: CPT | Performed by: ANESTHESIOLOGY

## 2024-11-11 RX ORDER — DIAZEPAM 5 MG/1
10 TABLET ORAL ONCE AS NEEDED
OUTPATIENT
Start: 2024-11-11

## 2024-11-11 RX ORDER — OXYCODONE AND ACETAMINOPHEN 10; 325 MG/1; MG/1
1 TABLET ORAL EVERY 4 HOURS PRN
Qty: 180 TABLET | Refills: 0 | Status: SHIPPED | OUTPATIENT
Start: 2024-11-11
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
## 2024-11-12 ENCOUNTER — TRANSCRIBE ORDERS (OUTPATIENT)
Dept: SURGERY | Facility: SURGERY CENTER | Age: 74
End: 2024-11-12
Payer: MEDICARE

## 2024-11-12 DIAGNOSIS — Z41.9 SURGERY, ELECTIVE: Primary | ICD-10-CM

## 2024-11-12 PROBLEM — M53.3 SACROILIAC JOINT DYSFUNCTION OF BOTH SIDES: Status: ACTIVE | Noted: 2024-11-11

## 2024-11-14 ENCOUNTER — TELEPHONE (OUTPATIENT)
Dept: PAIN MEDICINE | Facility: CLINIC | Age: 74
End: 2024-11-14

## 2024-11-14 NOTE — TELEPHONE ENCOUNTER
Provider: DR LEMUS     Caller: DIOGO BISHOP     Relationship to Patient: PATIENT     Phone Number: [phone]    Reason for Call: 11/25/2024 PATIENT SCHEDULED FOR FL Guided NDL Place ASP/INJ/LOC .  PATIENT WANTS TO KNOW IF THIS IS A PROCEDURE SHE CAN TOLERATE WITHOUT SEDATION - PLEASE CALL PATIENT TO DISCUSS OPTIONS

## 2024-11-15 NOTE — TELEPHONE ENCOUNTER
I spoke with Ms. King and informed her that the decision is up to her. I told her we have patient that do the injections without sedation and some that do it with sedation.

## 2024-11-25 ENCOUNTER — HOSPITAL ENCOUNTER (OUTPATIENT)
Facility: SURGERY CENTER | Age: 74
Setting detail: HOSPITAL OUTPATIENT SURGERY
Discharge: HOME | End: 2024-11-25
Attending: ANESTHESIOLOGY | Admitting: ANESTHESIOLOGY
Payer: MEDICARE

## 2024-11-25 ENCOUNTER — HOSPITAL ENCOUNTER (OUTPATIENT)
Dept: GENERAL RADIOLOGY | Facility: SURGERY CENTER | Age: 74
Setting detail: HOSPITAL OUTPATIENT SURGERY
End: 2024-11-25
Payer: MEDICARE

## 2024-11-25 VITALS
SYSTOLIC BLOOD PRESSURE: 150 MMHG | RESPIRATION RATE: 16 BRPM | OXYGEN SATURATION: 97 % | TEMPERATURE: 97.2 F | BODY MASS INDEX: 35.34 KG/M2 | DIASTOLIC BLOOD PRESSURE: 76 MMHG | WEIGHT: 180 LBS | HEIGHT: 60 IN | HEART RATE: 70 BPM

## 2024-11-25 DIAGNOSIS — Z41.9 SURGERY, ELECTIVE: ICD-10-CM

## 2024-11-25 DIAGNOSIS — M53.3 SACROILIAC JOINT DYSFUNCTION OF BOTH SIDES: ICD-10-CM

## 2024-11-25 PROCEDURE — G0260 INJ FOR SACROILIAC JT ANESTH: HCPCS | Performed by: ANESTHESIOLOGY

## 2024-11-25 PROCEDURE — 25010000002 LIDOCAINE PF 1% 1 % SOLUTION 5 ML VIAL: Performed by: ANESTHESIOLOGY

## 2024-11-25 PROCEDURE — 25010000002 METHYLPREDNISOLONE PER 80 MG: Performed by: ANESTHESIOLOGY

## 2024-11-25 PROCEDURE — 25010000002 BUPIVACAINE (PF) 0.5 % SOLUTION 10 ML VIAL: Performed by: ANESTHESIOLOGY

## 2024-11-25 PROCEDURE — 27096 INJECT SACROILIAC JOINT: CPT | Performed by: ANESTHESIOLOGY

## 2024-11-25 PROCEDURE — 76000 FLUOROSCOPY <1 HR PHYS/QHP: CPT

## 2024-11-25 PROCEDURE — 77002 NEEDLE LOCALIZATION BY XRAY: CPT

## 2024-11-25 PROCEDURE — 25510000001 IOPAMIDOL 61 % SOLUTION 30 ML VIAL: Performed by: ANESTHESIOLOGY

## 2024-11-25 RX ORDER — DIAZEPAM 5 MG/1
10 TABLET ORAL ONCE AS NEEDED
Status: DISCONTINUED | OUTPATIENT
Start: 2024-11-25 | End: 2024-11-25 | Stop reason: HOSPADM

## 2024-11-25 NOTE — DISCHARGE INSTRUCTIONS
[facility] - Post-procedure Instructions          --  While there are no absolute restrictions, it is recommended that you do not perform strenuous activity today. In the morning, you may resume your level of activity as before your block.    --  If you have a band-aid at your injection site, please remove it later today. Observe the area for any redness, swelling, pus-like drainage, or a temperature over 101°. If any of these symptoms occur, please call your doctor at [phone]. If after office hours, leave a message and the on-call provider will return your call.    --  Ice may be applied to your injection site. It is recommended you avoid direct heat (heating pad; hot tub) for 1-2 days.    --  Call [facility] at [phone] if you experience persistent headache, persistent bleeding from the injection site, or severe pain not relieved by heat or oral medication.    --  Do not make important decisions today.    --  Due to the effects of the block and/or the I.V. Sedation, DO NOT drive or operate hazardous machinery for 12 hours.  Local anesthetics may cause numbness after procedure and precautions must be taken with regards to operating equipment as well as with walking, even if ambulating with assistance of another person or with an assistive device.    --  Do not drink alcohol for 12 hours.    -- You may return to work tomorrow, or as directed by your referring doctor.    --  Occasionally you may notice a slight increase in your pain after the procedure. This should start to improve within the next 24-48 hours. Radiofrequency ablation procedure pain may last 3-4 weeks.    --  It may take as long as 3-4 days before you notice a gradual improvement in your pain and/or other symptoms.    -- You may continue to take your prescribed pain medication as needed.    --  Some normal possible side effects of steroid use could include fluid retention, increased blood sugar, dull headache,  increased sweating, increased appetite, mood swings and flushing.    --  Diabetics are recommended to watch their blood glucose level closely for 24-48 hours after the injection.    --  Must stay in PACU for 20 min upon arrival and prove no leg weakness before being discharged.    --  IN THE EVENT OF A LIFE THREATENING EMERGENCY, (CHEST PAIN, BREATHING DIFFICULTIES, PARALYSIS…) YOU SHOULD GO TO YOUR NEAREST EMERGENCY ROOM.    --  You should be contacted by our office within 2-3 days to schedule follow up or next appointment date.  If not contacted within 7 days, please call the office at [phone]

## 2024-11-25 NOTE — OP NOTE
Bilateral Sacroiliac Joint Injection  [facility]      PREOPERATIVE DIAGNOSIS:   Sacroiliac joint dysfunction, bilateral    POSTOPERATIVE DIAGNOSIS:  Sacroiliac joint dysfunction, bilateral    PROCEDURE:  Sacroiliac Joint Injection, Bilaterally, with fluoroscopic guidance    PRE-PROCEDURE DISCUSSION WITH PATIENT:    Risks and complications were discussed with the patient prior to starting the procedure and informed consent was obtained.  We discussed various topics including but not limited to bleeding, infection, injury, postprocedural site soreness, painful flareup, worsening of clinical picture, paralysis, coma, and death.     SURGEON:  Jose Luis Gan MD    REASON FOR PROCEDURE:    Patient has pain consistent with SI pathology on history and physical exam. Patient has other lumbrosacral pathology that makes the injection diagnostically necessary. Positive sacroiliac provocation maneuvers noted.      SEDATION:  Patient declined administration of moderate sedation    ANESTHETIC AGENT:  Marcaine 0.5%  STEROID AGENT:  Methylprednisolone (DEPO MEDROL) 80mg/ml    DESCRIPTON OF PROCEDURE:  After obtaining informed consent, IV access was not obtained in the preoperative area.  The patient was transported to the operative suite and placed in the prone position with a pillow under the pelvic area. EKG, blood pressure, and pulse oximeter were monitored. The lumbosacral area was prepped with Chloraprep and draped in a sterile fashion. Under fluoroscopic guidance the inferior most portion of the right SI joint was identified. The overlying skin and subcutaneous tissue was anesthetized with 1% lidocaine. A 22-gauge spinal needle was introduced from the inferior most portion of the joint into the RIGHT SI joint under fluoroscopic guidance in the AP dimension with slight oblique rotation to the contralateral side.  Aspiration was negative.  After confirming the position of the needle with fluoroscopy, 1 mL  of Omnipaque was injected and after seeing appropriate spread into the joint a total of 2mL Marcaine, with the steroid, was injected very slowly.  Needle was removed intact.  A similar procedure was performed on the LEFT side.   Vital signs remained stable.  The onset of analgesia was noted.      ESTIMATED BLOOD LOSS:  minimal  SPECIMENS:  None  COMPLICATIONS:  No complications were noted. and There was no indication of vascular uptake on live injection of contrast dye.    TOLERANCE & DISCHARGE CONDITION:    The patient tolerated the procedure well.  The patient was transported to the recovery area without difficulties.  The patient was discharged to home under the care of family in stable and satisfactory condition.    PLAN OF CARE:  The patient was given our standard instruction sheet and will resume all medications as per the medication reconciliation sheet.  The patient will Return to clinic 1 wk.  The patient is instructed to keep a pain log hourly for 8 hours after the procedure.

## 2024-11-30 ENCOUNTER — HOSPITAL ENCOUNTER (OUTPATIENT)
Facility: HOSPITAL | Age: 74
Discharge: HOME | End: 2024-11-30
Attending: STUDENT IN AN ORGANIZED HEALTH CARE EDUCATION/TRAINING PROGRAM | Admitting: STUDENT IN AN ORGANIZED HEALTH CARE EDUCATION/TRAINING PROGRAM
Payer: MEDICARE

## 2024-11-30 VITALS
HEIGHT: 60 IN | WEIGHT: 185 LBS | SYSTOLIC BLOOD PRESSURE: 136 MMHG | DIASTOLIC BLOOD PRESSURE: 65 MMHG | BODY MASS INDEX: 36.32 KG/M2 | TEMPERATURE: 98.1 F | RESPIRATION RATE: 20 BRPM | HEART RATE: 84 BPM | OXYGEN SATURATION: 96 %

## 2024-11-30 DIAGNOSIS — U07.1 COVID-19: Primary | ICD-10-CM

## 2024-11-30 LAB
FLUAV SUBTYP SPEC NAA+PROBE: NOT DETECTED
FLUBV RNA ISLT QL NAA+PROBE: NOT DETECTED
SARS-COV-2 RNA RESP QL NAA+PROBE: DETECTED

## 2024-11-30 PROCEDURE — G0463 HOSPITAL OUTPT CLINIC VISIT: HCPCS | Performed by: STUDENT IN AN ORGANIZED HEALTH CARE EDUCATION/TRAINING PROGRAM

## 2024-11-30 PROCEDURE — 87636 SARSCOV2 & INF A&B AMP PRB: CPT | Performed by: STUDENT IN AN ORGANIZED HEALTH CARE EDUCATION/TRAINING PROGRAM
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
## 2024-11-30 NOTE — DISCHARGE INSTRUCTIONS

## 2024-12-02 ENCOUNTER — LAB (OUTPATIENT)
Dept: LAB | Facility: HOSPITAL | Age: 74
End: 2024-12-02
Payer: MEDICARE

## 2024-12-02 ENCOUNTER — HOSPITAL ENCOUNTER (OUTPATIENT)
Dept: GENERAL RADIOLOGY | Facility: HOSPITAL | Age: 74
Discharge: HOME | End: 2024-12-02
Payer: MEDICARE

## 2024-12-02 DIAGNOSIS — R05.1 ACUTE COUGH: ICD-10-CM

## 2024-12-02 DIAGNOSIS — U07.1 COVID: ICD-10-CM

## 2024-12-02 DIAGNOSIS — R06.02 SHORTNESS OF BREATH: ICD-10-CM

## 2024-12-02 DIAGNOSIS — A09 DIARRHEA OF INFECTIOUS ORIGIN: ICD-10-CM

## 2024-12-02 LAB
ALBUMIN SERPL-MCNC: 3.7 G/DL (ref 3.5–5.2)
ALBUMIN/GLOB SERPL: 1.1 G/DL
ALP SERPL-CCNC: 95 U/L (ref 39–117)
ALT SERPL W P-5'-P-CCNC: 10 U/L (ref 1–33)
ANION GAP SERPL CALCULATED.3IONS-SCNC: 11.2 MMOL/L (ref 5–15)
AST SERPL-CCNC: 11 U/L (ref 1–32)
BASOPHILS # BLD AUTO: 0.07 10*3/MM3 (ref 0–0.2)
BASOPHILS NFR BLD AUTO: 1 % (ref 0–1.5)
BILIRUB SERPL-MCNC: <0.2 MG/DL (ref 0–1.2)
BUN SERPL-MCNC: 16 MG/DL (ref 8–23)
BUN/CREAT SERPL: 14.4 (ref 7–25)
CALCIUM SPEC-SCNC: 9 MG/DL (ref 8.6–10.5)
CHLORIDE SERPL-SCNC: 104 MMOL/L (ref 98–107)
CO2 SERPL-SCNC: 21.8 MMOL/L (ref 22–29)
CREAT SERPL-MCNC: 1.11 MG/DL (ref 0.57–1)
DEPRECATED RDW RBC AUTO: 46.8 FL (ref 37–54)
EGFRCR SERPLBLD CKD-EPI 2021: 52.9 ML/MIN/1.73
EOSINOPHIL # BLD AUTO: 0.11 10*3/MM3 (ref 0–0.4)
EOSINOPHIL NFR BLD AUTO: 1.6 % (ref 0.3–6.2)
ERYTHROCYTE [DISTWIDTH] IN BLOOD BY AUTOMATED COUNT: 13.6 % (ref 12.3–15.4)
GLOBULIN UR ELPH-MCNC: 3.3 GM/DL
GLUCOSE SERPL-MCNC: 98 MG/DL (ref 65–99)
HCT VFR BLD AUTO: 40.6 % (ref 34–46.6)
HGB BLD-MCNC: 12.7 G/DL (ref 12–15.9)
IMM GRANULOCYTES # BLD AUTO: 0.04 10*3/MM3 (ref 0–0.05)
IMM GRANULOCYTES NFR BLD AUTO: 0.6 % (ref 0–0.5)
LIPASE SERPL-CCNC: 15 U/L (ref 13–60)
LYMPHOCYTES # BLD AUTO: 1.76 10*3/MM3 (ref 0.7–3.1)
LYMPHOCYTES NFR BLD AUTO: 25.1 % (ref 19.6–45.3)
MCH RBC QN AUTO: 29.5 PG (ref 26.6–33)
MCHC RBC AUTO-ENTMCNC: 31.3 G/DL (ref 31.5–35.7)
MCV RBC AUTO: 94.4 FL (ref 79–97)
MONOCYTES # BLD AUTO: 0.53 10*3/MM3 (ref 0.1–0.9)
MONOCYTES NFR BLD AUTO: 7.6 % (ref 5–12)
NEUTROPHILS NFR BLD AUTO: 4.49 10*3/MM3 (ref 1.7–7)
NEUTROPHILS NFR BLD AUTO: 64.1 % (ref 42.7–76)
NRBC BLD AUTO-RTO: 0 /100 WBC (ref 0–0.2)
NT-PROBNP SERPL-MCNC: 108 PG/ML (ref 0–900)
PLATELET # BLD AUTO: 487 10*3/MM3 (ref 140–450)
PMV BLD AUTO: 8.6 FL (ref 6–12)
POTASSIUM SERPL-SCNC: 4.8 MMOL/L (ref 3.5–5.2)
PROCALCITONIN SERPL-MCNC: 0.05 NG/ML (ref 0–0.25)
PROT SERPL-MCNC: 7 G/DL (ref 6–8.5)
RBC # BLD AUTO: 4.3 10*6/MM3 (ref 3.77–5.28)
SODIUM SERPL-SCNC: 137 MMOL/L (ref 136–145)
WBC NRBC COR # BLD AUTO: 7 10*3/MM3 (ref 3.4–10.8)

## 2024-12-02 PROCEDURE — 85025 COMPLETE CBC W/AUTO DIFF WBC: CPT

## 2024-12-02 PROCEDURE — 36415 COLL VENOUS BLD VENIPUNCTURE: CPT

## 2024-12-02 PROCEDURE — 80053 COMPREHEN METABOLIC PANEL: CPT

## 2024-12-02 PROCEDURE — 83880 ASSAY OF NATRIURETIC PEPTIDE: CPT

## 2024-12-02 PROCEDURE — 71046 X-RAY EXAM CHEST 2 VIEWS: CPT

## 2024-12-02 PROCEDURE — 84145 PROCALCITONIN (PCT): CPT

## 2024-12-02 PROCEDURE — 83690 ASSAY OF LIPASE: CPT

## 2024-12-04 ENCOUNTER — PREP FOR SURGERY (OUTPATIENT)
Dept: SURGERY | Facility: SURGERY CENTER | Age: 74
End: 2024-12-04
Payer: MEDICARE

## 2024-12-04 ENCOUNTER — OFFICE VISIT (OUTPATIENT)
Dept: PAIN MEDICINE | Facility: CLINIC | Age: 74
End: 2024-12-04
Payer: MEDICARE

## 2024-12-04 VITALS
HEART RATE: 94 BPM | TEMPERATURE: 97.2 F | WEIGHT: 178 LBS | BODY MASS INDEX: 34.95 KG/M2 | SYSTOLIC BLOOD PRESSURE: 123 MMHG | HEIGHT: 60 IN | DIASTOLIC BLOOD PRESSURE: 67 MMHG | OXYGEN SATURATION: 94 %

## 2024-12-04 DIAGNOSIS — M48.02 SPINAL STENOSIS, CERVICAL REGION: ICD-10-CM

## 2024-12-04 DIAGNOSIS — M53.3 SACROILIAC JOINT DYSFUNCTION OF BOTH SIDES: Primary | ICD-10-CM

## 2024-12-04 DIAGNOSIS — Z79.899 ENCOUNTER FOR LONG-TERM (CURRENT) USE OF HIGH-RISK MEDICATION: ICD-10-CM

## 2024-12-04 DIAGNOSIS — M47.816 LUMBAR FACET ARTHROPATHY: ICD-10-CM

## 2024-12-04 PROCEDURE — 99214 OFFICE O/P EST MOD 30 MIN: CPT | Performed by: PHYSICIAN ASSISTANT

## 2024-12-04 PROCEDURE — 3078F DIAST BP <80 MM HG: CPT | Performed by: PHYSICIAN ASSISTANT

## 2024-12-04 PROCEDURE — 1160F RVW MEDS BY RX/DR IN RCRD: CPT | Performed by: PHYSICIAN ASSISTANT

## 2024-12-04 PROCEDURE — 3074F SYST BP LT 130 MM HG: CPT | Performed by: PHYSICIAN ASSISTANT

## 2024-12-04 PROCEDURE — 1125F AMNT PAIN NOTED PAIN PRSNT: CPT | Performed by: PHYSICIAN ASSISTANT

## 2024-12-04 PROCEDURE — 1159F MED LIST DOCD IN RCRD: CPT | Performed by: PHYSICIAN ASSISTANT

## 2024-12-04 RX ORDER — OXYCODONE AND ACETAMINOPHEN 10; 325 MG/1; MG/1
1 TABLET ORAL EVERY 4 HOURS PRN
Qty: 180 TABLET | Refills: 0 | Status: SHIPPED | OUTPATIENT
Start: 2024-12-11

## 2024-12-04 RX ORDER — GABAPENTIN 300 MG/1
CAPSULE ORAL
Qty: 60 CAPSULE | Refills: 1 | Status: SHIPPED | OUTPATIENT
Start: 2024-12-04

## 2024-12-04 NOTE — PROGRESS NOTES
CHIEF COMPLAINT  F/U back pain- SACROILIAC INJECTION- patient states that she had 75% improvement over the past week. She had a fall yesterday and now the pain has returned.     Subjective   Traci King is a 74 y.o. female  who presents to the office for follow-up of procedure.  She completed a bilateral sacroiliac joint injection   on 11/25/2024 performed by Dr. Gan for management of low back/sciatica pain. Patient reports 75% relief from the procedure x 1 week.  Patient states that she was doing well until she tripped over a step in her nodule yesterday and fell landing on her knees into her vacuum  which is carlos her back.  She has since noted recrudescence of bilateral sciatica pain though she feels significant improvement was made by the last injection.  Patient continues to obtain at least 90% of axial low back pain following her most recent bilateral L1-L3 radiofrequency ablation which was performed on September 26, 2024.  Continues to also have complaints of posterior cervical spine pain with history of cervical foraminal stenosis.    Her last office visit with Dr. Gan was on 11/11/2024 where Xtampza was discontinued and patient was continued on oxycodone 10 mg every 4 hours, gabapentin 300 mg 2 p.o. nightly, meloxicam 15 mg daily and methocarbamol 750 mg as needed.  Opiate induced constipation is alleviated with use of Relistor.  Overall her medications are providing now 40-50% pain relief which allows her to function and manage her ADLs.    Patient attempted PT but was discontinued as it increased her back pain.    Pain today is 4/10 VAS in severity     Back Pain  This is a chronic problem. The current episode started more than 1 year ago. The problem occurs constantly. The problem has been gradually worsening since onset. The pain is present in the lumbar spine and sacro-iliac. The quality of the pain is described as aching (throbbing). The pain does not radiate. The pain is at a  severity of 4/10. The pain is moderate. The pain is Worse during the day. The symptoms are aggravated by standing, position and sitting (cooking/walking). Pertinent negatives include no abdominal pain, chest pain, dysuria, fever, headaches, numbness or weakness.   Neck Pain   This is a chronic problem. The current episode started more than 1 year ago. The problem occurs constantly. The problem has been unchanged. The pain is associated with nothing. The pain is present in the midline. The quality of the pain is described as aching. The pain is at a severity of 4/10. The pain is moderate. The symptoms are aggravated by position and twisting. The pain is Same all the time. Stiffness is present In the morning. Pertinent negatives include no chest pain, fever, headaches, numbness or weakness.        PEG Assessment   What number best describes your pain on average in the past week?4  What number best describes how, during the past week, pain has interfered with your enjoyment of life?9  What number best describes how, during the past week, pain has interfered with your general activity?  9    Review of Pertinent Medical Data ---  No new imaging for review on today    The following portions of the patient's history were reviewed and updated as appropriate: allergies, current medications, past family history, past medical history, past social history, past surgical history, and problem list.    Review of Systems   Constitutional:  Positive for fatigue. Negative for activity change, chills and fever.   HENT:  Negative for congestion.    Eyes:  Negative for visual disturbance.   Respiratory:  Negative for chest tightness and shortness of breath.    Cardiovascular:  Negative for chest pain.   Gastrointestinal:  Negative for abdominal pain, constipation and diarrhea.   Genitourinary:  Negative for difficulty urinating, dyspareunia and dysuria.   Musculoskeletal:  Positive for back pain.   Neurological:  Negative for dizziness,  "weakness, light-headedness, numbness and headaches.   Psychiatric/Behavioral:  Positive for sleep disturbance. Negative for agitation, self-injury and suicidal ideas. The patient is not nervous/anxious.      I have reviewed and confirmed the accuracy of the ROS as documented by the MA/LPN/RN ALLY Cortez   Vitals:    12/04/24 1342   BP: 123/67   Pulse: 94   Temp: 97.2 °F (36.2 °C)   SpO2: 94%   Weight: 80.7 kg (178 lb)   Height: 152.4 cm (60\")   PainSc:   4   PainLoc: Back         Objective   Physical Exam  Vitals and nursing note reviewed.   Constitutional:       Appearance: Normal appearance. She is obese.   HENT:      Head: Normocephalic.   Pulmonary:      Effort: Pulmonary effort is normal.   Musculoskeletal:      Cervical back: Tenderness present. Decreased range of motion.      Lumbar back: Spasms (worse on right paraspinal muscles) and tenderness (Moderate tenderness to palpation within the overlying bilateral lumbar facet joint spaces/SI joint spaces; +patricks/+SI thrust/+SI compression tests bilaterally) present. Decreased range of motion (Decreased range of motion in all planes).        Back:    Skin:     General: Skin is warm and dry.   Neurological:      General: No focal deficit present.      Mental Status: She is alert and oriented to person, place, and time.      Cranial Nerves: Cranial nerves 2-12 are intact.      Sensory: Sensation is intact.      Motor: Motor function is intact.      Gait: Gait abnormal.   Psychiatric:         Mood and Affect: Mood normal.         Behavior: Behavior normal.         Thought Content: Thought content normal.         Judgment: Judgment normal.         Assessment & Plan   Diagnoses and all orders for this visit:    1. Sacroiliac joint dysfunction of both sides (Primary)  -     gabapentin (NEURONTIN) 300 MG capsule; Take 1 or 2 capsules PO QHS  Dispense: 60 capsule; Refill: 1    2. Spinal stenosis, cervical region  -     gabapentin (NEURONTIN) 300 MG capsule; Take " 1 or 2 capsules PO QHS  Dispense: 60 capsule; Refill: 1    3. Lumbar facet arthropathy    4. Encounter for long-term (current) use of high-risk medication        Traci King reports a pain score of 4.  Given her pain assessment as noted, treatment options were discussed and the following options were decided upon as a follow-up plan to address the patient's pain: continuation of current treatment plan for pain, prescription for non-opiod analgesics, prescription for opiod analgesics, steroid injections, and use of non-medical modalities (ice, heat, stretching and/or behavior modifications).      --- Follow-up in 1 month or sooner for medication management  --- Refill oxycodone 10 mg. Patient appears stable with current regimen. No adverse effects. Regarding continuation of opioids, there is no evidence of aberrant behavior or any red flags.  The patient continues with appropriate response to opioid therapy. ADL's remain intact by self.   --- Moderate risk for opiate abuse/diversion.  Patient is evaluated every 30 days due to her MME.  --- Traci never attempted the pregabalin but would like to continue with the gabapentin as she feels it helps with pain so that she can sleep at night.      The urine drug screen confirmation from 7/18/2024 has been reviewed and the result is appropriate based on patient history and MATTHEW report        The patient signed an updated copy of the controlled substance agreement on today, 12/4/2024.       MATTHEW REPORT  As part of the patient's treatment plan, I am prescribing controlled substances. The patient has been made aware of appropriate use of such medications, including potential risk of somnolence, limited ability to drive and/or work safely, and the potential for dependence or overdose. It has also been made clear that these medications are for use by this patient only, without concomitant use of alcohol or other substances unless prescribed.     Patient has completed  prescribing agreement detailing terms of continued prescribing of controlled substances, including monitoring MATTHEW reports, urine drug screening, and pill counts if necessary. The patient is aware that inappropriate use will results in cessation of prescribing such medications.    As the clinician, I personally reviewed the MATTHEW from 12/4/24 while the patient was in the office today.    History and physical exam exhibit continued safe and appropriate use of controlled substances.         Dictated utilizing Dragon dictation.

## 2024-12-05 ENCOUNTER — TRANSCRIBE ORDERS (OUTPATIENT)
Dept: SURGERY | Facility: SURGERY CENTER | Age: 74
End: 2024-12-05
Payer: MEDICARE

## 2024-12-05 DIAGNOSIS — Z41.9 SURGERY, ELECTIVE: Primary | ICD-10-CM
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
## 2025-01-03 ENCOUNTER — TELEPHONE (OUTPATIENT)
Dept: PAIN MEDICINE | Facility: CLINIC | Age: 75
End: 2025-01-03

## 2025-01-03 NOTE — TELEPHONE ENCOUNTER
Caller: Traci King    Relationship to patient: Self    Best call back number: [phone]    Additional notes:PATIENT NEEDS TO CANCEL HER PROCEDURE ON 01/07/25, SHE IS SICK. SHE DOESN'T WISH TO R/S AT THIS TIME.

## 2025-01-07 ENCOUNTER — APPOINTMENT (OUTPATIENT)
Dept: GENERAL RADIOLOGY | Facility: SURGERY CENTER | Age: 75
End: 2025-01-07
Payer: MEDICARE

## 2025-01-07 NOTE — TELEPHONE ENCOUNTER
Provider: ALLAN    Caller: Traci King    Relationship to Patient: Self    Phone Number: [phone]    Reason for Call: PT REQUESTING CALL BACK TO R/S CANCELLED PROCEDURE.

## 2025-01-08 DIAGNOSIS — Z79.899 ENCOUNTER FOR LONG-TERM (CURRENT) USE OF HIGH-RISK MEDICATION: ICD-10-CM

## 2025-01-08 DIAGNOSIS — M47.816 LUMBAR FACET ARTHROPATHY: ICD-10-CM

## 2025-01-08 RX ORDER — OXYCODONE AND ACETAMINOPHEN 10; 325 MG/1; MG/1
1 TABLET ORAL EVERY 4 HOURS PRN
Qty: 180 TABLET | Refills: 0 | Status: SHIPPED | OUTPATIENT
Start: 2025-01-10

## 2025-01-08 NOTE — TELEPHONE ENCOUNTER
Caller: DIOGO BISHOP    Relationship: PATIENT    Best call back number: [phone]    Requested Prescriptions:   Requested Prescriptions     Pending Prescriptions Disp Refills    oxyCODONE-acetaminophen (PERCOCET)  MG per tablet 180 tablet 0     Sig: Take 1 tablet by mouth Every 4 (Four) Hours As Needed for Moderate Pain.        Pharmacy where request should be sent:  [facility] [phone]    Last office visit with prescribing clinician: 11/11/2024   Last telemedicine visit with prescribing clinician: Visit date not found   Next office visit with prescribing clinician: Visit date not found     Additional details provided by patient:  PATIENT WILL RUN OUT OF RX FRIDAY, 1/10/25. PATIENT IS ILL WITH STOMACH BUG AND RESCHEDULED APPT, TODAY TO NEXT TUESDAY, 1/14/25.    Does the patient have less than a 3 day supply:  [x] Yes  [] No    Would you like a call back once the refill request has been completed: [] Yes [] No    If the office needs to give you a call back, can they leave a voicemail: [] Yes [] No    Mercedes Nunes   01/08/25 09:58 EST

## 2025-01-14 ENCOUNTER — OFFICE VISIT (OUTPATIENT)
Dept: PAIN MEDICINE | Facility: CLINIC | Age: 75
End: 2025-01-14
Payer: MEDICARE

## 2025-01-14 VITALS
TEMPERATURE: 94.4 F | DIASTOLIC BLOOD PRESSURE: 72 MMHG | OXYGEN SATURATION: 99 % | RESPIRATION RATE: 18 BRPM | SYSTOLIC BLOOD PRESSURE: 123 MMHG | HEIGHT: 60 IN | HEART RATE: 70 BPM | BODY MASS INDEX: 35.1 KG/M2 | WEIGHT: 178.8 LBS

## 2025-01-14 DIAGNOSIS — M47.817 LUMBOSACRAL SPONDYLOSIS WITHOUT MYELOPATHY: ICD-10-CM

## 2025-01-14 DIAGNOSIS — M51.360 DEGENERATION OF INTERVERTEBRAL DISC OF LUMBAR REGION WITH DISCOGENIC BACK PAIN: ICD-10-CM

## 2025-01-14 DIAGNOSIS — M53.3 SACROILIAC JOINT DYSFUNCTION OF BOTH SIDES: ICD-10-CM

## 2025-01-14 DIAGNOSIS — M47.816 LUMBAR FACET ARTHROPATHY: ICD-10-CM

## 2025-01-14 DIAGNOSIS — M47.812 CERVICAL SPONDYLOSIS WITHOUT MYELOPATHY: Primary | ICD-10-CM

## 2025-01-14 DIAGNOSIS — S32.040D COMPRESSION FRACTURE OF L4 VERTEBRA WITH ROUTINE HEALING: ICD-10-CM

## 2025-01-14 DIAGNOSIS — M62.838 MUSCLE SPASM: ICD-10-CM

## 2025-01-14 LAB
POC AMPHETAMINES: NEGATIVE
POC BARBITURATES: NEGATIVE
POC BENZODIAZEPHINES: POSITIVE
POC COCAINE: NEGATIVE
POC METHADONE: NEGATIVE
POC METHAMPHETAMINE SCREEN URINE: NEGATIVE
POC OPIATES: POSITIVE
POC OXYCODONE: POSITIVE
POC PHENCYCLIDINE: NEGATIVE
POC PROPOXYPHENE: NEGATIVE
POC THC: NEGATIVE
POC TRICYCLIC ANTIDEPRESSANTS: NEGATIVE

## 2025-01-14 RX ORDER — METHOCARBAMOL 750 MG/1
750 TABLET, FILM COATED ORAL 4 TIMES DAILY
Qty: 120 TABLET | Refills: 1 | Status: SHIPPED | OUTPATIENT
Start: 2025-01-14

## 2025-01-14 NOTE — PROGRESS NOTES
CHIEF COMPLAINT  Back pain  Pain is stabilized.  Uds:opi,oxy,bzo    Subjective   Traci King is a 74 y.o. female  who presents for follow-up.  She has a history of chronic neck and low back pain.  Patient reports essentially stable and unchanged pain presentation as compared to her neck however she has had progressive worsening of bilateral sciatica pain.  She is still obtaining at least 90% reduction of axial low back pain following radiofrequency ablation of bilateral L1-L3 which was completed 9/6/2024.  Primary pain complaint is pain within the posterior buttocks which is aggravated with sitting for prolonged periods of time.    Her current medication regimen consists of  oxycodone 10 mg every 4 hours, gabapentin 300 mg 2 p.o. nightly, meloxicam 15 mg daily and methocarbamol 750 mg as needed.  Opiate induced constipation is alleviated with use of Relistor.  Overall her medications are providing now 40-50% pain relief which allows her to function and manage her ADLs.     Pain today 3/10 VAS in severity however back pain will increase to 6/10 with activities.      Back Pain  This is a chronic problem. The current episode started more than 1 year ago. The problem occurs constantly. The problem has been gradually worsening since onset. The pain is present in the lumbar spine and sacro-iliac. The quality of the pain is described as aching (throbbing). The pain does not radiate. The pain is at a severity of 3/10. The pain is moderate (Increases to 6/10 with activity). The pain is Worse during the day. The symptoms are aggravated by standing, position and sitting (cooking/walking). Pertinent negatives include no abdominal pain, chest pain, dysuria, fever, headaches, numbness or weakness.   Neck Pain   This is a chronic problem. The current episode started more than 1 year ago. The problem occurs constantly. The problem has been unchanged. The pain is associated with nothing. The pain is present in the midline. The  quality of the pain is described as aching. The pain is at a severity of 3/10. The pain is moderate. The symptoms are aggravated by position and twisting. The pain is Same all the time. Stiffness is present In the morning. Pertinent negatives include no chest pain, fever, headaches, numbness or weakness.        PEG Assessment   What number best describes your pain on average in the past week?3  What number best describes how, during the past week, pain has interfered with your enjoyment of life?4  What number best describes how, during the past week, pain has interfered with your general activity?  6    Review of Pertinent Medical Data ---  Review of emergency department note at [facility] dated 1/12/2025 patient presented with complaints of abdominal pain and nausea/vomiting x 2 weeks.  Denied any diarrhea nor any fever.  Patient was also unsure if any food had made her sick.  CT of the abdomen/pelvis was negative for bowel obstruction.  Lab work was also reassuring per the provider's report.  Patient was discharged to home with diagnosis of gastroenteritis and upper abdominal pain.    The following portions of the patient's history were reviewed and updated as appropriate: allergies, current medications, past family history, past medical history, past social history, past surgical history, and problem list.    Review of Systems   Constitutional:  Negative for activity change, fatigue and fever.   Respiratory:  Negative for cough and chest tightness.    Cardiovascular:  Negative for chest pain.   Gastrointestinal:  Negative for abdominal pain, constipation and diarrhea.   Genitourinary:  Negative for dysuria.   Musculoskeletal:  Positive for back pain.   Neurological:  Negative for dizziness, weakness, light-headedness, numbness and headaches.   Psychiatric/Behavioral:  Negative for agitation, sleep disturbance and suicidal ideas. The patient is not nervous/anxious.      I have reviewed and confirmed the  "accuracy of the ROS as documented by the MA/LPN/RN ALLY Cortez  Vitals:    01/14/25 1041   BP: 123/72   BP Location: Left arm   Patient Position: Sitting   Cuff Size: Large Adult   Pulse: 70   Resp: 18   Temp: 94.4 °F (34.7 °C)   TempSrc: Temporal   SpO2: 99%   Weight: 81.1 kg (178 lb 12.8 oz)   Height: 152.4 cm (60\")   PainSc:   3         Objective   Physical Exam  Vitals and nursing note reviewed.   Constitutional:       Appearance: Normal appearance. She is obese.   HENT:      Head: Normocephalic.   Pulmonary:      Effort: Pulmonary effort is normal.   Musculoskeletal:      Cervical back: Tenderness present. Decreased range of motion.      Lumbar back: Spasms (worse on right paraspinal muscles) and tenderness (Moderate tenderness to palpation within the overlying bilateral lumbar facet joint spaces/SI joint spaces; +patricks/+SI thrust/+SI compression tests bilaterally) present. Decreased range of motion (Decreased range of motion in all planes).        Back:    Skin:     General: Skin is warm and dry.   Neurological:      General: No focal deficit present.      Mental Status: She is alert and oriented to person, place, and time.      Cranial Nerves: Cranial nerves 2-12 are intact.      Sensory: Sensation is intact.      Motor: Motor function is intact.      Gait: Gait abnormal.   Psychiatric:         Mood and Affect: Mood normal.         Behavior: Behavior normal.         Thought Content: Thought content normal.         Judgment: Judgment normal.         PHQ-2 Depression Screening  Little interest or pleasure in doing things? Not at all   Feeling down, depressed, or hopeless? Not at all   PHQ-2 Total Score 0         Tobacco Use: Medium Risk (1/14/2025)    Patient History     Smoking Tobacco Use: Former     Smokeless Tobacco Use: Never     Passive Exposure: Not on file         Assessment & Plan   Diagnoses and all orders for this visit:    1. Cervical spondylosis without myelopathy (Primary)  -     Urine " Drug Screen Confirmation - Urine, Clean Catch; Future  -     POC Urine Drug Screen, Triage    2. Lumbar facet arthropathy  -     Urine Drug Screen Confirmation - Urine, Clean Catch; Future  -     POC Urine Drug Screen, Triage    3. Sacroiliac joint dysfunction of both sides  -     Urine Drug Screen Confirmation - Urine, Clean Catch; Future  -     POC Urine Drug Screen, Triage    4. Lumbosacral spondylosis without myelopathy  -     Urine Drug Screen Confirmation - Urine, Clean Catch; Future  -     POC Urine Drug Screen, Triage    5. Compression fracture of L4 vertebra with routine healing  -     Urine Drug Screen Confirmation - Urine, Clean Catch; Future  -     POC Urine Drug Screen, Triage    6. Degeneration of intervertebral disc of lumbar region with discogenic back pain  -     Urine Drug Screen Confirmation - Urine, Clean Catch; Future  -     POC Urine Drug Screen, Triage    7. Muscle spasm  -     methocarbamol (ROBAXIN) 750 MG tablet; Take 1 tablet by mouth 4 (Four) Times a Day.  Dispense: 120 tablet; Refill: 1        Traci King reports a pain score of 3.  Given her pain assessment as noted, treatment options were discussed and the following options were decided upon as a follow-up plan to address the patient's pain: continuation of current treatment plan for pain, prescription for non-opiod analgesics, steroid injections, and use of non-medical modalities (ice, heat, stretching and/or behavior modifications).      --- Follow-up in 1 month or sooner for medication management  --- Continue oxycodone 10 mg.  She does not require prescription refill on today. Patient appears stable with current regimen. No adverse effects. Regarding continuation of opioids, there is no evidence of aberrant behavior or any red flags.  The patient continues with appropriate response to opioid therapy. ADL's remain intact by self.   --- Not at risk for opiate abuse/diversion.  She is evaluated every 30 days due to the MME.  ---  Continue gabapentin.  She does not require refill on today.  --- Due to recrudescence of sciatica pain patient will return for repeat bilateral SIJ injection.      Routine UDS in office today as part of monitoring requirements for controlled substances.  The specimen was viewed and the immunoassay result reviewed and is inappropriately positive for opiates but also positive appropriately for oxycodone and benzodiazepines.  [facility].        The patient signed an updated copy of the controlled substance agreement on 12/4/2024.      MATTHEW REPORT  As part of the patient's treatment plan, I am prescribing controlled substances. The patient has been made aware of appropriate use of such medications, including potential risk of somnolence, limited ability to drive and/or work safely, and the potential for dependence or overdose. It has also been made clear that these medications are for use by this patient only, without concomitant use of alcohol or other substances unless prescribed.     Patient has completed prescribing agreement detailing terms of continued prescribing of controlled substances, including monitoring MATTHEW reports, urine drug screening, and pill counts if necessary. The patient is aware that inappropriate use will results in cessation of prescribing such medications.    As the clinician, I personally reviewed the MATTHEW from 1/14/25 while the patient was in the office today.    History and physical exam exhibit continued safe and appropriate use of controlled substances.     Dictated utilizing Dragon dictation.

## 2025-01-14 NOTE — H&P (VIEW-ONLY)
CHIEF COMPLAINT  Back pain  Pain is stabilized.  Uds:opi,oxy,bzo    Subjective   Traci King is a 74 y.o. female  who presents for follow-up.  She has a history of chronic neck and low back pain.  Patient reports essentially stable and unchanged pain presentation as compared to her neck however she has had progressive worsening of bilateral sciatica pain.  She is still obtaining at least 90% reduction of axial low back pain following radiofrequency ablation of bilateral L1-L3 which was completed 9/6/2024.  Primary pain complaint is pain within the posterior buttocks which is aggravated with sitting for prolonged periods of time.    Her current medication regimen consists of  oxycodone 10 mg every 4 hours, gabapentin 300 mg 2 p.o. nightly, meloxicam 15 mg daily and methocarbamol 750 mg as needed.  Opiate induced constipation is alleviated with use of Relistor.  Overall her medications are providing now 40-50% pain relief which allows her to function and manage her ADLs.     Pain today 3/10 VAS in severity however back pain will increase to 6/10 with activities.      Back Pain  This is a chronic problem. The current episode started more than 1 year ago. The problem occurs constantly. The problem has been gradually worsening since onset. The pain is present in the lumbar spine and sacro-iliac. The quality of the pain is described as aching (throbbing). The pain does not radiate. The pain is at a severity of 3/10. The pain is moderate (Increases to 6/10 with activity). The pain is Worse during the day. The symptoms are aggravated by standing, position and sitting (cooking/walking). Pertinent negatives include no abdominal pain, chest pain, dysuria, fever, headaches, numbness or weakness.   Neck Pain   This is a chronic problem. The current episode started more than 1 year ago. The problem occurs constantly. The problem has been unchanged. The pain is associated with nothing. The pain is present in the midline. The  quality of the pain is described as aching. The pain is at a severity of 3/10. The pain is moderate. The symptoms are aggravated by position and twisting. The pain is Same all the time. Stiffness is present In the morning. Pertinent negatives include no chest pain, fever, headaches, numbness or weakness.        PEG Assessment   What number best describes your pain on average in the past week?3  What number best describes how, during the past week, pain has interfered with your enjoyment of life?4  What number best describes how, during the past week, pain has interfered with your general activity?  6    Review of Pertinent Medical Data ---  Review of emergency department note at [facility] dated 1/12/2025 patient presented with complaints of abdominal pain and nausea/vomiting x 2 weeks.  Denied any diarrhea nor any fever.  Patient was also unsure if any food had made her sick.  CT of the abdomen/pelvis was negative for bowel obstruction.  Lab work was also reassuring per the provider's report.  Patient was discharged to home with diagnosis of gastroenteritis and upper abdominal pain.    The following portions of the patient's history were reviewed and updated as appropriate: allergies, current medications, past family history, past medical history, past social history, past surgical history, and problem list.    Review of Systems   Constitutional:  Negative for activity change, fatigue and fever.   Respiratory:  Negative for cough and chest tightness.    Cardiovascular:  Negative for chest pain.   Gastrointestinal:  Negative for abdominal pain, constipation and diarrhea.   Genitourinary:  Negative for dysuria.   Musculoskeletal:  Positive for back pain.   Neurological:  Negative for dizziness, weakness, light-headedness, numbness and headaches.   Psychiatric/Behavioral:  Negative for agitation, sleep disturbance and suicidal ideas. The patient is not nervous/anxious.      I have reviewed and confirmed the  "accuracy of the ROS as documented by the MA/LPN/RN ALLY Cortez  Vitals:    01/14/25 1041   BP: 123/72   BP Location: Left arm   Patient Position: Sitting   Cuff Size: Large Adult   Pulse: 70   Resp: 18   Temp: 94.4 °F (34.7 °C)   TempSrc: Temporal   SpO2: 99%   Weight: 81.1 kg (178 lb 12.8 oz)   Height: 152.4 cm (60\")   PainSc:   3         Objective   Physical Exam  Vitals and nursing note reviewed.   Constitutional:       Appearance: Normal appearance. She is obese.   HENT:      Head: Normocephalic.   Pulmonary:      Effort: Pulmonary effort is normal.   Musculoskeletal:      Cervical back: Tenderness present. Decreased range of motion.      Lumbar back: Spasms (worse on right paraspinal muscles) and tenderness (Moderate tenderness to palpation within the overlying bilateral lumbar facet joint spaces/SI joint spaces; +patricks/+SI thrust/+SI compression tests bilaterally) present. Decreased range of motion (Decreased range of motion in all planes).        Back:    Skin:     General: Skin is warm and dry.   Neurological:      General: No focal deficit present.      Mental Status: She is alert and oriented to person, place, and time.      Cranial Nerves: Cranial nerves 2-12 are intact.      Sensory: Sensation is intact.      Motor: Motor function is intact.      Gait: Gait abnormal.   Psychiatric:         Mood and Affect: Mood normal.         Behavior: Behavior normal.         Thought Content: Thought content normal.         Judgment: Judgment normal.         PHQ-2 Depression Screening  Little interest or pleasure in doing things? Not at all   Feeling down, depressed, or hopeless? Not at all   PHQ-2 Total Score 0         Tobacco Use: Medium Risk (1/14/2025)    Patient History     Smoking Tobacco Use: Former     Smokeless Tobacco Use: Never     Passive Exposure: Not on file         Assessment & Plan   Diagnoses and all orders for this visit:    1. Cervical spondylosis without myelopathy (Primary)  -     Urine " Drug Screen Confirmation - Urine, Clean Catch; Future  -     POC Urine Drug Screen, Triage    2. Lumbar facet arthropathy  -     Urine Drug Screen Confirmation - Urine, Clean Catch; Future  -     POC Urine Drug Screen, Triage    3. Sacroiliac joint dysfunction of both sides  -     Urine Drug Screen Confirmation - Urine, Clean Catch; Future  -     POC Urine Drug Screen, Triage    4. Lumbosacral spondylosis without myelopathy  -     Urine Drug Screen Confirmation - Urine, Clean Catch; Future  -     POC Urine Drug Screen, Triage    5. Compression fracture of L4 vertebra with routine healing  -     Urine Drug Screen Confirmation - Urine, Clean Catch; Future  -     POC Urine Drug Screen, Triage    6. Degeneration of intervertebral disc of lumbar region with discogenic back pain  -     Urine Drug Screen Confirmation - Urine, Clean Catch; Future  -     POC Urine Drug Screen, Triage    7. Muscle spasm  -     methocarbamol (ROBAXIN) 750 MG tablet; Take 1 tablet by mouth 4 (Four) Times a Day.  Dispense: 120 tablet; Refill: 1        Traci King reports a pain score of 3.  Given her pain assessment as noted, treatment options were discussed and the following options were decided upon as a follow-up plan to address the patient's pain: continuation of current treatment plan for pain, prescription for non-opiod analgesics, steroid injections, and use of non-medical modalities (ice, heat, stretching and/or behavior modifications).      --- Follow-up in 1 month or sooner for medication management  --- Continue oxycodone 10 mg.  She does not require prescription refill on today. Patient appears stable with current regimen. No adverse effects. Regarding continuation of opioids, there is no evidence of aberrant behavior or any red flags.  The patient continues with appropriate response to opioid therapy. ADL's remain intact by self.   --- Not at risk for opiate abuse/diversion.  She is evaluated every 30 days due to the MME.  ---  Continue gabapentin.  She does not require refill on today.  --- Due to recrudescence of sciatica pain patient will return for repeat bilateral SIJ injection.      Routine UDS in office today as part of monitoring requirements for controlled substances.  The specimen was viewed and the immunoassay result reviewed and is inappropriately positive for opiates but also positive appropriately for oxycodone and benzodiazepines.  [facility].        The patient signed an updated copy of the controlled substance agreement on 12/4/2024.      MATTHEW REPORT  As part of the patient's treatment plan, I am prescribing controlled substances. The patient has been made aware of appropriate use of such medications, including potential risk of somnolence, limited ability to drive and/or work safely, and the potential for dependence or overdose. It has also been made clear that these medications are for use by this patient only, without concomitant use of alcohol or other substances unless prescribed.     Patient has completed prescribing agreement detailing terms of continued prescribing of controlled substances, including monitoring MATTHEW reports, urine drug screening, and pill counts if necessary. The patient is aware that inappropriate use will results in cessation of prescribing such medications.    As the clinician, I personally reviewed the MATTHEW from 1/14/25 while the patient was in the office today.    History and physical exam exhibit continued safe and appropriate use of controlled substances.     Dictated utilizing Dragon dictation.

## 2025-01-22 ENCOUNTER — HOSPITAL ENCOUNTER (OUTPATIENT)
Dept: GENERAL RADIOLOGY | Facility: SURGERY CENTER | Age: 75
Setting detail: HOSPITAL OUTPATIENT SURGERY
End: 2025-01-22
Payer: MEDICARE

## 2025-01-22 ENCOUNTER — HOSPITAL ENCOUNTER (OUTPATIENT)
Facility: SURGERY CENTER | Age: 75
Setting detail: HOSPITAL OUTPATIENT SURGERY
Discharge: HOME | End: 2025-01-22
Attending: ANESTHESIOLOGY | Admitting: ANESTHESIOLOGY
Payer: MEDICARE

## 2025-01-22 VITALS
BODY MASS INDEX: 34.95 KG/M2 | HEIGHT: 60 IN | HEART RATE: 64 BPM | WEIGHT: 178 LBS | DIASTOLIC BLOOD PRESSURE: 60 MMHG | OXYGEN SATURATION: 96 % | TEMPERATURE: 97.5 F | SYSTOLIC BLOOD PRESSURE: 149 MMHG | RESPIRATION RATE: 16 BRPM

## 2025-01-22 DIAGNOSIS — Z41.9 SURGERY, ELECTIVE: ICD-10-CM

## 2025-01-22 DIAGNOSIS — M53.3 SACROILIAC JOINT DYSFUNCTION OF BOTH SIDES: ICD-10-CM

## 2025-01-22 PROCEDURE — G0260 INJ FOR SACROILIAC JT ANESTH: HCPCS | Performed by: ANESTHESIOLOGY

## 2025-01-22 PROCEDURE — 77002 NEEDLE LOCALIZATION BY XRAY: CPT

## 2025-01-22 PROCEDURE — 25010000002 METHYLPREDNISOLONE PER 80 MG: Performed by: ANESTHESIOLOGY

## 2025-01-22 PROCEDURE — 76000 FLUOROSCOPY <1 HR PHYS/QHP: CPT

## 2025-01-22 PROCEDURE — 25010000002 BUPIVACAINE (PF) 0.5 % SOLUTION 10 ML VIAL: Performed by: ANESTHESIOLOGY

## 2025-01-22 PROCEDURE — 25010000002 LIDOCAINE PF 1% 1 % SOLUTION 5 ML VIAL: Performed by: ANESTHESIOLOGY

## 2025-01-22 PROCEDURE — 25510000001 IOPAMIDOL 61 % SOLUTION 30 ML VIAL: Performed by: ANESTHESIOLOGY

## 2025-01-22 NOTE — DISCHARGE INSTRUCTIONS
[facility] [place] [facility] - Post-procedure Instructions          --  While there are no absolute restrictions, it is recommended that you do not perform strenuous activity today. In the morning, you may resume your level of activity as before your block.    --  If you have a band-aid at your injection site, please remove it later today. Observe the area for any redness, swelling, pus-like drainage, or a temperature over 101°. If any of these symptoms occur, please call your doctor at [phone]. If after office hours, leave a message and the on-call provider will return your call.    --  Ice may be applied to your injection site. It is recommended you avoid direct heat (heating pad; hot tub) for 1-2 days.    --  Call [facility] [place] at [phone] if you experience persistent headache, persistent bleeding from the injection site, or severe pain not relieved by heat or oral medication.    --  Do not make important decisions today.    --  Due to the effects of the block and/or the I.V. Sedation, DO NOT drive or operate hazardous machinery for 12 hours.  Local anesthetics may cause numbness after procedure and precautions must be taken with regards to operating equipment as well as with walking, even if ambulating with assistance of another person or with an assistive device.    --  Do not drink alcohol for 12 hours.    -- You may return to work tomorrow, or as directed by your referring doctor.    --  Occasionally you may notice a slight increase in your pain after the procedure. This should start to improve within the next 24-48 hours. Radiofrequency ablation procedure pain may last 3-4 weeks.    --  It may take as long as 3-4 days before you notice a gradual improvement in your pain and/or other symptoms.    -- You may continue to take your prescribed pain medication as needed.    --  Some normal possible side effects of steroid use could include fluid retention, increased blood sugar, dull headache,  increased sweating, increased appetite, mood swings and flushing.    --  Diabetics are recommended to watch their blood glucose level closely for 24-48 hours after the injection.    --  Must stay in PACU for 20 min upon arrival and prove no leg weakness before being discharged.    --  IN THE EVENT OF A LIFE THREATENING EMERGENCY, (CHEST PAIN, BREATHING DIFFICULTIES, PARALYSIS…) YOU SHOULD GO TO YOUR NEAREST EMERGENCY ROOM.    --  You should be contacted by our office within 2-3 days to schedule follow up or next appointment date.  If not contacted within 7 days, please call the office at [phone]

## 2025-01-22 NOTE — OP NOTE
Bilateral Sacroiliac Joint Injection  [facility]      PREOPERATIVE DIAGNOSIS:   Sacroiliac joint dysfunction, bilateral    POSTOPERATIVE DIAGNOSIS:  Sacroiliac joint dysfunction, bilateral    PROCEDURE:  Sacroiliac Joint Injection, Bilaterally, with fluoroscopic guidance    PRE-PROCEDURE DISCUSSION WITH PATIENT:    Risks and complications were discussed with the patient prior to starting the procedure and informed consent was obtained.  We discussed various topics including but not limited to bleeding, infection, injury, postprocedural site soreness, painful flareup, worsening of clinical picture, paralysis, coma, and death.     SURGEON:  Jose Luis Gan MD    REASON FOR PROCEDURE:    Patient has pain consistent with SI pathology on history and physical exam. Patient has other lumbrosacral pathology that makes the injection diagnostically necessary. Positive sacroiliac provocation maneuvers noted.   Previous clinically significant therapeutic effect of SI joint injection.      SEDATION:  Patient declined administration of moderate sedation    ANESTHETIC AGENT:  Marcaine 0.5%  STEROID AGENT:  Methylprednisolone (DEPO MEDROL) 80mg/ml    DESCRIPTON OF PROCEDURE:  After obtaining informed consent, IV access was not obtained in the preoperative area.  The patient was transported to the operative suite and placed in the prone position with a pillow under the pelvic area. EKG, blood pressure, and pulse oximeter were monitored. The lumbosacral area was prepped with Chloraprep and draped in a sterile fashion. Under fluoroscopic guidance the inferior most portion of the right SI joint was identified. The overlying skin and subcutaneous tissue was anesthetized with 1% lidocaine. A 22-gauge spinal needle was introduced from the inferior most portion of the joint into the RIGHT SI joint under fluoroscopic guidance in the AP dimension with slight oblique rotation to the contralateral side.  Aspiration was  negative.  After confirming the position of the needle with fluoroscopy, 1 mL of Omnipaque was injected and after seeing appropriate spread into the joint a total of 2mL Marcaine, with the steroid, was injected very slowly.  Needle was removed intact.  A similar procedure was performed on the LEFT side.   Vital signs remained stable.  The onset of analgesia was noted.      ESTIMATED BLOOD LOSS:  minimal  SPECIMENS:  None  COMPLICATIONS:  No complications were noted., There was no indication of vascular uptake on live injection of contrast dye., and The patient did not have any signs of postprocedure numbness nor weakness.    TOLERANCE & DISCHARGE CONDITION:    The patient tolerated the procedure well.  The patient was transported to the recovery area without difficulties.  The patient was discharged to home under the care of family in stable and satisfactory condition.    PLAN OF CARE:  The patient was given our standard instruction sheet and will resume all medications as per the medication reconciliation sheet.  The patient will Return to clinic 6 wks.  The patient is instructed to keep a pain log hourly for 8 hours after the procedure.

## 2025-02-03 ENCOUNTER — TELEPHONE (OUTPATIENT)
Dept: PAIN MEDICINE | Facility: CLINIC | Age: 75
End: 2025-02-03

## 2025-02-03 NOTE — TELEPHONE ENCOUNTER
Caller: Traci King    Relationship: Self    Best call back number: [phone]    Requested Prescriptions:   OXYCODONE    Pharmacy where request should be sent: [facility] [id number] [facility] [id number] AT [facility] [phone]  - [phone] FX     Last office visit with prescribing clinician: 11/11/2024   Last telemedicine visit with prescribing clinician: Visit date not found   Next office visit with prescribing clinician: Visit date not found     Does the patient have less than a 3 day supply:  [] Yes  [x] No    Would you like a call back once the refill request has been completed: [x] Yes [] No    If the office needs to give you a call back, can they leave a voicemail: [x] Yes [] No    Carlos Lebron   02/03/25 08:55 EST

## 2025-02-04 DIAGNOSIS — Z79.899 ENCOUNTER FOR LONG-TERM (CURRENT) USE OF HIGH-RISK MEDICATION: ICD-10-CM

## 2025-02-04 DIAGNOSIS — M47.816 LUMBAR FACET ARTHROPATHY: ICD-10-CM

## 2025-02-04 RX ORDER — OXYCODONE AND ACETAMINOPHEN 10; 325 MG/1; MG/1
1 TABLET ORAL EVERY 4 HOURS PRN
Qty: 180 TABLET | Refills: 0 | Status: SHIPPED | OUTPATIENT
Start: 2025-02-09

## 2025-02-04 NOTE — TELEPHONE ENCOUNTER
Medication Refill Request    Date of phone call: 25    Medication being requested: Percocet  mg   si tab po q 4 hrs prn  Qty: 180    Date of last visit: 25    Date of last refill:     MATTHEW up to date?:     Next Follow up?: 3/3/25    Any new pertinent information? (i.e, new medication allergies, new use of medications, change in patient's health or condition, non-compliance or inconsistency with prescribing agreement?):

## 2025-02-20 ENCOUNTER — HOSPITAL ENCOUNTER (OUTPATIENT)
Dept: CT IMAGING | Facility: HOSPITAL | Age: 75
Discharge: HOME | End: 2025-02-20
Admitting: INTERNAL MEDICINE
Payer: MEDICARE

## 2025-02-20 DIAGNOSIS — Z72.0 TOBACCO ABUSE: ICD-10-CM

## 2025-02-20 DIAGNOSIS — F17.210 SMOKING GREATER THAN 25 PACK YEARS: ICD-10-CM

## 2025-02-20 PROCEDURE — 71271 CT THORAX LUNG CANCER SCR C-: CPT

## 2025-02-28 ENCOUNTER — PREP FOR SURGERY (OUTPATIENT)
Dept: OTHER | Facility: HOSPITAL | Age: 75
End: 2025-02-28
Payer: MEDICARE

## 2025-02-28 DIAGNOSIS — Z12.11 SCREENING FOR COLON CANCER: Primary | ICD-10-CM

## 2025-03-03 ENCOUNTER — OFFICE VISIT (OUTPATIENT)
Dept: PAIN MEDICINE | Facility: CLINIC | Age: 75
End: 2025-03-03
Payer: MEDICARE

## 2025-03-03 VITALS
HEIGHT: 60 IN | RESPIRATION RATE: 20 BRPM | OXYGEN SATURATION: 97 % | BODY MASS INDEX: 35.89 KG/M2 | TEMPERATURE: 96.7 F | HEART RATE: 74 BPM | DIASTOLIC BLOOD PRESSURE: 52 MMHG | WEIGHT: 182.8 LBS | SYSTOLIC BLOOD PRESSURE: 105 MMHG

## 2025-03-03 DIAGNOSIS — Z79.899 ENCOUNTER FOR LONG-TERM (CURRENT) USE OF HIGH-RISK MEDICATION: ICD-10-CM

## 2025-03-03 DIAGNOSIS — M53.3 SACROILIAC JOINT DYSFUNCTION OF BOTH SIDES: ICD-10-CM

## 2025-03-03 DIAGNOSIS — M47.812 CERVICAL SPONDYLOSIS WITHOUT MYELOPATHY: ICD-10-CM

## 2025-03-03 DIAGNOSIS — M47.816 LUMBAR FACET ARTHROPATHY: Primary | ICD-10-CM

## 2025-03-03 DIAGNOSIS — M48.02 SPINAL STENOSIS, CERVICAL REGION: ICD-10-CM

## 2025-03-03 DIAGNOSIS — M47.816 LUMBAR FACET ARTHROPATHY: ICD-10-CM

## 2025-03-03 DIAGNOSIS — M62.838 MUSCLE SPASM: ICD-10-CM

## 2025-03-03 PROCEDURE — 1160F RVW MEDS BY RX/DR IN RCRD: CPT | Performed by: PHYSICIAN ASSISTANT

## 2025-03-03 PROCEDURE — 1125F AMNT PAIN NOTED PAIN PRSNT: CPT | Performed by: PHYSICIAN ASSISTANT

## 2025-03-03 PROCEDURE — 3078F DIAST BP <80 MM HG: CPT | Performed by: PHYSICIAN ASSISTANT

## 2025-03-03 PROCEDURE — 3074F SYST BP LT 130 MM HG: CPT | Performed by: PHYSICIAN ASSISTANT

## 2025-03-03 PROCEDURE — 1159F MED LIST DOCD IN RCRD: CPT | Performed by: PHYSICIAN ASSISTANT

## 2025-03-03 PROCEDURE — 99214 OFFICE O/P EST MOD 30 MIN: CPT | Performed by: PHYSICIAN ASSISTANT

## 2025-03-03 RX ORDER — METHOCARBAMOL 750 MG/1
750 TABLET, FILM COATED ORAL 4 TIMES DAILY
Qty: 120 TABLET | Refills: 1 | Status: SHIPPED | OUTPATIENT
Start: 2025-03-03

## 2025-03-03 RX ORDER — OXYCODONE AND ACETAMINOPHEN 10; 325 MG/1; MG/1
1 TABLET ORAL EVERY 4 HOURS PRN
Qty: 180 TABLET | Refills: 0 | Status: SHIPPED | OUTPATIENT
Start: 2025-03-12

## 2025-03-03 RX ORDER — GABAPENTIN 300 MG/1
CAPSULE ORAL
Qty: 60 CAPSULE | Refills: 1 | Status: SHIPPED | OUTPATIENT
Start: 2025-03-03

## 2025-03-03 NOTE — PROGRESS NOTES
CHIEF COMPLAINT  SACROILIAC INJECTION BILATERAL   Pt reports 50% ongoing pain relief      Subjective   Traci King is a 74 y.o. female  who presents to the office for follow-up of procedure.  She completed a therapeutic bilateral sacroiliac joint injection   on 1/22/2025 performed by Dr. Gan for management of back/sciatica pain. Patient reports 50% ongoing relief from the procedure.  Patient is pleased to report significant reduction of sciatica pain since undergoing therapeutic injection.  She is still obtaining at least 50-75% ongoing axial low back pain relief following radiofrequency ablation which was performed 9/6/2024.    Current medication regimen consists of oxycodone 10 mg every 4 hours, gabapentin 300 mg 2 p.o. nightly, 15 mg daily and methocarbamol 750 mg as needed.  She does have some opiate induced constipation that is usually alleviated with [employer].  Overall her medications provide at least 40-50% pain relief allowing her to function and manage her ADLs.    Pain today 4/10 VAS in severity.        Back Pain  This is a chronic problem. The current episode started more than 1 year ago. The problem occurs constantly. The problem is unchanged. The pain is present in the lumbar spine and sacro-iliac. The quality of the pain is described as aching (throbbing). The pain does not radiate. The pain is at a severity of 4/10. The pain is moderate (Increases to 6/10 with activity). The pain is Worse during the day. The symptoms are aggravated by standing, position and sitting (cooking/walking). Pertinent negatives include no abdominal pain, chest pain, dysuria, fever, headaches, numbness or weakness.   Neck Pain   This is a chronic problem. The current episode started more than 1 year ago. The problem occurs constantly. The problem has been unchanged. The pain is associated with nothing. The pain is present in the midline. The quality of the pain is described as aching. The pain is at a severity of  "3/10. The pain is moderate. The symptoms are aggravated by position and twisting. The pain is Same all the time. Stiffness is present In the morning. Pertinent negatives include no chest pain, fever, headaches, numbness or weakness.        PEG Assessment   What number best describes your pain on average in the past week?4  What number best describes how, during the past week, pain has interfered with your enjoyment of life?4  What number best describes how, during the past week, pain has interfered with your general activity?  4    Review of Pertinent Medical Data ---  No new imaging for review on today    The following portions of the patient's history were reviewed and updated as appropriate: allergies, current medications, past family history, past medical history, past social history, past surgical history, and problem list.    Review of Systems   Constitutional:  Negative for activity change, fatigue and fever.   Respiratory:  Negative for cough and chest tightness.    Cardiovascular:  Negative for chest pain.   Gastrointestinal:  Positive for constipation. Negative for abdominal pain and diarrhea.   Genitourinary:  Negative for dysuria.   Musculoskeletal:  Positive for back pain.   Neurological:  Negative for dizziness, weakness, light-headedness, numbness and headaches.   Psychiatric/Behavioral:  Negative for agitation, sleep disturbance and suicidal ideas. The patient is not nervous/anxious.      I have reviewed and confirmed the accuracy of the ROS as documented by the MA/LPN/RN ALLY Cortez   Vitals:    03/03/25 1111   BP: 105/52   BP Location: Left arm   Patient Position: Sitting   Cuff Size: Large Adult   Pulse: 74   Resp: 20   Temp: 96.7 °F (35.9 °C)   TempSrc: Temporal   SpO2: 97%   Weight: 82.9 kg (182 lb 12.8 oz)   Height: 152.4 cm (60\")   PainSc: 4          Objective   Physical Exam  Vitals and nursing note reviewed.   Constitutional:       Appearance: Normal appearance. She is normal weight. "   HENT:      Head: Normocephalic.   Neurological:      Mental Status: She is alert and oriented to person, place, and time.      Cranial Nerves: Cranial nerves 2-12 are intact.      Sensory: Sensation is intact.      Motor: Motor function is intact.      Gait: Gait is intact.   Psychiatric:         Mood and Affect: Mood normal.         Behavior: Behavior normal.         Thought Content: Thought content normal.         Judgment: Judgment normal.           Tobacco Use: Medium Risk (3/3/2025)    Patient History     Smoking Tobacco Use: Former     Smokeless Tobacco Use: Never     Passive Exposure: Not on file     PHQ-2 Depression Screening  Little interest or pleasure in doing things? Not at all   Feeling down, depressed, or hopeless? Not at all   PHQ-2 Total Score 0           Assessment & Plan   Diagnoses and all orders for this visit:    1. Lumbar facet arthropathy (Primary)    2. Sacroiliac joint dysfunction of both sides  -     gabapentin (NEURONTIN) 300 MG capsule; Take 1 or 2 capsules PO QHS  Dispense: 60 capsule; Refill: 1    3. Cervical spondylosis without myelopathy    4. Encounter for long-term (current) use of high-risk medication    5. Muscle spasm  -     methocarbamol (ROBAXIN) 750 MG tablet; Take 1 tablet by mouth 4 (Four) Times a Day.  Dispense: 120 tablet; Refill: 1    6. Spinal stenosis, cervical region  -     gabapentin (NEURONTIN) 300 MG capsule; Take 1 or 2 capsules PO QHS  Dispense: 60 capsule; Refill: 1        Traci King reports a pain score of 4.  Given her pain assessment as noted, treatment options were discussed and the following options were decided upon as a follow-up plan to address the patient's pain: continuation of current treatment plan for pain, patient not interested in pharmacological measures, prescription for non-opiod analgesics, and use of non-medical modalities (ice, heat, stretching and/or behavior modifications).      --- Follow-up 1 month or sooner for medication  management  --- Refill oxycodone 10 mg. Patient appears stable with current regimen. No adverse effects. Regarding continuation of opioids, there is no evidence of aberrant behavior or any red flags.  The patient continues with appropriate response to opioid therapy. ADL's remain intact by self.   --- Moderate risk for opiate abuse/diversion.  Patient is evaluated every 30 days due to MME  ---Traci is unsure if the meloxicam which she takes on Monday, Wednesday and Fridays is even helpful therefore she is going to discontinue it and see if her pain level responds to discontinuation of the medication.    The urine drug screen confirmation from 1/14/2025 has been reviewed and the result is appropriate based on patient history and MATTHEW report.      The patient signed an updated copy of the controlled substance agreement on 12/4/2024.       MATTHEW REPORT  As part of the patient's treatment plan, I am prescribing controlled substances. The patient has been made aware of appropriate use of such medications, including potential risk of somnolence, limited ability to drive and/or work safely, and the potential for dependence or overdose. It has also been made clear that these medications are for use by this patient only, without concomitant use of alcohol or other substances unless prescribed.     Patient has completed prescribing agreement detailing terms of continued prescribing of controlled substances, including monitoring MATTHEW reports, urine drug screening, and pill counts if necessary. The patient is aware that inappropriate use will results in cessation of prescribing such medications.    As the clinician, I personally reviewed the MATTHEW from 3/3/2025 while the patient was in the office today.    History and physical exam exhibit continued safe and appropriate use of controlled substances.       Dictated utilizing Dragon dictation.

## 2025-03-06 ENCOUNTER — PREP FOR SURGERY (OUTPATIENT)
Dept: OTHER | Facility: HOSPITAL | Age: 75
End: 2025-03-06
Payer: MEDICARE

## 2025-03-11 ENCOUNTER — PREP FOR SURGERY (OUTPATIENT)
Dept: OTHER | Facility: HOSPITAL | Age: 75
End: 2025-03-11
Payer: MEDICARE

## 2025-03-11 DIAGNOSIS — Z12.11 SCREENING FOR COLON CANCER: Primary | ICD-10-CM

## 2025-03-25 ENCOUNTER — OFFICE VISIT (OUTPATIENT)
Dept: SURGERY | Facility: CLINIC | Age: 75
End: 2025-03-25
Payer: MEDICARE

## 2025-03-25 VITALS
OXYGEN SATURATION: 98 % | DIASTOLIC BLOOD PRESSURE: 60 MMHG | HEART RATE: 60 BPM | BODY MASS INDEX: 36.16 KG/M2 | WEIGHT: 184.2 LBS | SYSTOLIC BLOOD PRESSURE: 104 MMHG | HEIGHT: 60 IN

## 2025-03-25 DIAGNOSIS — K21.00 GASTROESOPHAGEAL REFLUX DISEASE WITH ESOPHAGITIS WITHOUT HEMORRHAGE: Primary | Chronic | ICD-10-CM

## 2025-03-25 PROCEDURE — 1159F MED LIST DOCD IN RCRD: CPT | Performed by: SURGERY

## 2025-03-25 PROCEDURE — 3074F SYST BP LT 130 MM HG: CPT | Performed by: SURGERY

## 2025-03-25 PROCEDURE — 1160F RVW MEDS BY RX/DR IN RCRD: CPT | Performed by: SURGERY

## 2025-03-25 PROCEDURE — 99203 OFFICE O/P NEW LOW 30 MIN: CPT | Performed by: SURGERY

## 2025-03-25 PROCEDURE — 3078F DIAST BP <80 MM HG: CPT | Performed by: SURGERY

## 2025-03-25 RX ORDER — OMEPRAZOLE 20 MG/1
CAPSULE, DELAYED RELEASE ORAL
COMMUNITY
Start: 2025-03-11

## 2025-03-25 RX ORDER — LINACLOTIDE 72 UG/1
CAPSULE, GELATIN COATED ORAL
COMMUNITY
Start: 2025-03-19

## 2025-03-25 RX ORDER — POLYETHYLENE GLYCOL 3350, SODIUM SULFATE ANHYDROUS, SODIUM BICARBONATE, SODIUM CHLORIDE, POTASSIUM CHLORIDE 236; 22.74; 6.74; 5.86; 2.97 G/4L; G/4L; G/4L; G/4L; G/4L
4 POWDER, FOR SOLUTION ORAL ONCE
Qty: 1 ML | Refills: 0 | Status: SHIPPED | OUTPATIENT
Start: 2025-03-25 | End: 2025-03-25
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
## 2025-04-10 ENCOUNTER — PREP FOR SURGERY (OUTPATIENT)
Dept: SURGERY | Facility: SURGERY CENTER | Age: 75
End: 2025-04-10
Payer: MEDICARE

## 2025-04-10 ENCOUNTER — OFFICE VISIT (OUTPATIENT)
Dept: PAIN MEDICINE | Facility: CLINIC | Age: 75
End: 2025-04-10
Payer: MEDICARE

## 2025-04-10 VITALS
HEIGHT: 60 IN | WEIGHT: 189.6 LBS | BODY MASS INDEX: 37.22 KG/M2 | TEMPERATURE: 96.6 F | HEART RATE: 69 BPM | DIASTOLIC BLOOD PRESSURE: 56 MMHG | SYSTOLIC BLOOD PRESSURE: 141 MMHG | RESPIRATION RATE: 20 BRPM | OXYGEN SATURATION: 96 %

## 2025-04-10 DIAGNOSIS — M53.3 SACROILIAC JOINT DYSFUNCTION OF BOTH SIDES: ICD-10-CM

## 2025-04-10 DIAGNOSIS — Z79.899 ENCOUNTER FOR LONG-TERM (CURRENT) USE OF HIGH-RISK MEDICATION: ICD-10-CM

## 2025-04-10 DIAGNOSIS — M47.816 LUMBAR FACET ARTHROPATHY: ICD-10-CM

## 2025-04-10 DIAGNOSIS — M48.02 SPINAL STENOSIS, CERVICAL REGION: ICD-10-CM

## 2025-04-10 DIAGNOSIS — M47.816 LUMBAR FACET ARTHROPATHY: Primary | ICD-10-CM

## 2025-04-10 DIAGNOSIS — M62.838 MUSCLE SPASM: Primary | ICD-10-CM

## 2025-04-10 PROCEDURE — 3077F SYST BP >= 140 MM HG: CPT | Performed by: PHYSICIAN ASSISTANT

## 2025-04-10 PROCEDURE — 3078F DIAST BP <80 MM HG: CPT | Performed by: PHYSICIAN ASSISTANT

## 2025-04-10 PROCEDURE — 99214 OFFICE O/P EST MOD 30 MIN: CPT | Performed by: PHYSICIAN ASSISTANT

## 2025-04-10 PROCEDURE — 1125F AMNT PAIN NOTED PAIN PRSNT: CPT | Performed by: PHYSICIAN ASSISTANT

## 2025-04-10 PROCEDURE — 1160F RVW MEDS BY RX/DR IN RCRD: CPT | Performed by: PHYSICIAN ASSISTANT

## 2025-04-10 PROCEDURE — 1159F MED LIST DOCD IN RCRD: CPT | Performed by: PHYSICIAN ASSISTANT

## 2025-04-10 RX ORDER — HYDROXYZINE HYDROCHLORIDE 25 MG/1
TABLET, FILM COATED ORAL
COMMUNITY
Start: 2025-04-06

## 2025-04-10 RX ORDER — NYSTATIN 100000 [USP'U]/G
POWDER TOPICAL
COMMUNITY
Start: 2025-04-01

## 2025-04-10 RX ORDER — SODIUM CHLORIDE 0.9 % (FLUSH) 0.9 %
10 SYRINGE (ML) INJECTION EVERY 12 HOURS SCHEDULED
OUTPATIENT
Start: 2025-04-10

## 2025-04-10 RX ORDER — METHOCARBAMOL 750 MG/1
750 TABLET, FILM COATED ORAL 3 TIMES DAILY
Qty: 90 TABLET | Refills: 1 | Status: SHIPPED | OUTPATIENT
Start: 2025-04-10

## 2025-04-10 RX ORDER — SODIUM CHLORIDE 0.9 % (FLUSH) 0.9 %
10 SYRINGE (ML) INJECTION AS NEEDED
OUTPATIENT
Start: 2025-04-10

## 2025-04-10 RX ORDER — OXYCODONE AND ACETAMINOPHEN 10; 325 MG/1; MG/1
1 TABLET ORAL EVERY 4 HOURS PRN
Qty: 180 TABLET | Refills: 0 | Status: SHIPPED | OUTPATIENT
Start: 2025-04-12

## 2025-04-10 RX ORDER — GABAPENTIN 300 MG/1
CAPSULE ORAL
Qty: 60 CAPSULE | Refills: 1 | Status: SHIPPED | OUTPATIENT
Start: 2025-04-10
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
## 2025-04-11 ENCOUNTER — ANESTHESIA (OUTPATIENT)
Dept: GASTROENTEROLOGY | Facility: HOSPITAL | Age: 75
End: 2025-04-11
Payer: MEDICARE

## 2025-04-11 ENCOUNTER — ANESTHESIA EVENT (OUTPATIENT)
Dept: GASTROENTEROLOGY | Facility: HOSPITAL | Age: 75
End: 2025-04-11
Payer: MEDICARE

## 2025-04-11 ENCOUNTER — HOSPITAL ENCOUNTER (OUTPATIENT)
Facility: HOSPITAL | Age: 75
Setting detail: HOSPITAL OUTPATIENT SURGERY
Discharge: HOME | End: 2025-04-11
Attending: SURGERY | Admitting: SURGERY
Payer: MEDICARE

## 2025-04-11 ENCOUNTER — TRANSCRIBE ORDERS (OUTPATIENT)
Dept: SURGERY | Facility: SURGERY CENTER | Age: 75
End: 2025-04-11
Payer: MEDICARE

## 2025-04-11 VITALS
BODY MASS INDEX: 36.46 KG/M2 | WEIGHT: 185.7 LBS | RESPIRATION RATE: 18 BRPM | HEIGHT: 60 IN | OXYGEN SATURATION: 99 % | DIASTOLIC BLOOD PRESSURE: 89 MMHG | SYSTOLIC BLOOD PRESSURE: 121 MMHG | HEART RATE: 92 BPM

## 2025-04-11 DIAGNOSIS — Z12.11 SCREENING FOR COLON CANCER: ICD-10-CM

## 2025-04-11 DIAGNOSIS — Z41.9 SURGERY, ELECTIVE: Primary | ICD-10-CM

## 2025-04-11 PROBLEM — K21.9 GERD (GASTROESOPHAGEAL REFLUX DISEASE): Status: ACTIVE | Noted: 2025-04-11

## 2025-04-11 PROCEDURE — 43239 EGD BIOPSY SINGLE/MULTIPLE: CPT | Performed by: SURGERY

## 2025-04-11 PROCEDURE — 25810000003 LACTATED RINGERS PER 1000 ML: Performed by: SURGERY

## 2025-04-11 PROCEDURE — 88305 TISSUE EXAM BY PATHOLOGIST: CPT | Performed by: SURGERY

## 2025-04-11 PROCEDURE — 25010000002 LIDOCAINE 2% SOLUTION: Performed by: NURSE ANESTHETIST, CERTIFIED REGISTERED

## 2025-04-11 PROCEDURE — 25010000002 ONDANSETRON PER 1 MG

## 2025-04-11 PROCEDURE — G0121 COLON CA SCRN NOT HI RSK IND: HCPCS | Performed by: SURGERY

## 2025-04-11 PROCEDURE — 25010000002 GLYCOPYRROLATE 0.2 MG/ML SOLUTION: Performed by: NURSE ANESTHETIST, CERTIFIED REGISTERED

## 2025-04-11 PROCEDURE — 25010000002 PROPOFOL 200 MG/20ML EMULSION: Performed by: NURSE ANESTHETIST, CERTIFIED REGISTERED

## 2025-04-11 RX ORDER — ONDANSETRON 2 MG/ML
INJECTION INTRAMUSCULAR; INTRAVENOUS
Status: COMPLETED
Start: 2025-04-11 | End: 2025-04-11

## 2025-04-11 RX ORDER — ONDANSETRON 2 MG/ML
4 INJECTION INTRAMUSCULAR; INTRAVENOUS ONCE
Status: CANCELLED | OUTPATIENT
Start: 2025-04-11

## 2025-04-11 RX ORDER — GLYCOPYRROLATE 0.2 MG/ML
INJECTION INTRAMUSCULAR; INTRAVENOUS AS NEEDED
Status: DISCONTINUED | OUTPATIENT
Start: 2025-04-11 | End: 2025-04-11 | Stop reason: SURG

## 2025-04-11 RX ORDER — SODIUM CHLORIDE, SODIUM LACTATE, POTASSIUM CHLORIDE, CALCIUM CHLORIDE 600; 310; 30; 20 MG/100ML; MG/100ML; MG/100ML; MG/100ML
30 INJECTION, SOLUTION INTRAVENOUS CONTINUOUS PRN
Status: DISCONTINUED | OUTPATIENT
Start: 2025-04-11 | End: 2025-04-11 | Stop reason: HOSPADM

## 2025-04-11 RX ORDER — LIDOCAINE HYDROCHLORIDE 20 MG/ML
INJECTION, SOLUTION INFILTRATION; PERINEURAL AS NEEDED
Status: DISCONTINUED | OUTPATIENT
Start: 2025-04-11 | End: 2025-04-11 | Stop reason: SURG

## 2025-04-11 RX ORDER — PROPOFOL 10 MG/ML
INJECTION, EMULSION INTRAVENOUS CONTINUOUS PRN
Status: DISCONTINUED | OUTPATIENT
Start: 2025-04-11 | End: 2025-04-11 | Stop reason: SURG

## 2025-04-11 RX ADMIN — LIDOCAINE HYDROCHLORIDE 80 MG: 20 INJECTION, SOLUTION INFILTRATION; PERINEURAL at 08:20

## 2025-04-11 RX ADMIN — GLYCOPYRROLATE 0.2 MG: 0.2 INJECTION INTRAMUSCULAR; INTRAVENOUS at 08:20

## 2025-04-11 RX ADMIN — SODIUM CHLORIDE, POTASSIUM CHLORIDE, SODIUM LACTATE AND CALCIUM CHLORIDE 30 ML/HR: 600; 310; 30; 20 INJECTION, SOLUTION INTRAVENOUS at 07:55

## 2025-04-11 RX ADMIN — ONDANSETRON 4 MG: 2 INJECTION, SOLUTION INTRAMUSCULAR; INTRAVENOUS at 07:55

## 2025-04-11 RX ADMIN — PROPOFOL 180 MCG/KG/MIN: 10 INJECTION, EMULSION INTRAVENOUS at 08:20

## 2025-04-11 RX ADMIN — PROPOFOL 100 MG: 10 INJECTION, EMULSION INTRAVENOUS at 08:20

## 2025-04-11 RX ADMIN — PROPOFOL 30 MG: 10 INJECTION, EMULSION INTRAVENOUS at 08:26

## 2025-04-11 NOTE — ANESTHESIA POSTPROCEDURE EVALUATION
Patient: Traci King    Procedure Summary       Date: 04/11/25 Room / [facility]:  [facility]    Anesthesia Start: 0815 Anesthesia Stop: 0846    Procedures:       COLONOSCOPY into cecum      ESOPHAGOGASTRODUODENOSCOPY with biopsy (Esophagus) Diagnosis:       Screening for colon cancer      (Screening for colon cancer [Z12.11])    Surgeons: Xavier Kemp Jr., MD Provider: Guanaco Tucker MD    Anesthesia Type: MAC ASA Status: 3            Anesthesia Type: MAC    Vitals  Vitals Value Taken Time   /89 04/11/25 09:05   Temp     Pulse 92 04/11/25 09:05   Resp 18 04/11/25 09:05   SpO2 99 % 04/11/25 09:05           Post Anesthesia Care and Evaluation    Patient location during evaluation: PHASE II  Patient participation: complete - patient participated  Level of consciousness: awake and alert  Pain management: adequate    Airway patency: patent  Anesthetic complications: No anesthetic complications  PONV Status: none  Cardiovascular status: acceptable and hemodynamically stable  Respiratory status: acceptable, nonlabored ventilation and spontaneous ventilation  Hydration status: acceptable

## 2025-04-11 NOTE — OP NOTE
Surgeon:     Xavier Kemp Jr., M.D.    Preoperative Diagnosis:     1.  GERD  2.  Need for screening colonoscopy    Postoperative Diagnosis:     1.  Small hiatal hernia  2.  Gastritis  3.  Diverticulosis    Procedure Performed:     1.  EGD with antral biopsy  2.  Colonoscopy    Indications:     The patient is a pleasant 74-year-old female with persistent GERD despite proton pump inhibitor therapy.  She is also in need of a screening colonoscopy.  She presents for EGD and colonoscopy.  The patient understands the indications, alternatives, risks, and benefits of the procedure and wishes to proceed.    Procedure:     The patient was identified, taken to the endoscopy suite, and place in the left side down decubitus position.  The patient underwent a MAC anesthesia and was appropriately monitored through the case by the anesthesia personnel.  A biteblock was placed and the gastroscope was introduced into the oropharynx and advanced into the esophagus, through the esophagogastric junction, and into the stomach.  The gastroscope was then advanced through the pylorus into the duodenal bulb, and on to the second and third portion of the duodenum.  It was then withdrawn into the stomach.  All areas were carefully examined.  The stomach was decompressed and the scope was withdrawn.  The patient tolerated the procedure well.  Attention was then turned to the colonoscopy.  A rectal exam was performed that was normal.  The colonoscope was introduced into the rectum and advanced very carefully to the cecum that was identified by the cecal strap, ileocecal valve, and the appendiceal orifice.  The scope was then slowly withdrawn with careful circumferential examination of the mucosa performed.  The bowel prep was good allowing adequate visualization of mucosal surfaces.  The scope was withdrawn.  The patient tolerated the procedure well and was transferred the recovery area in stable condition.     Findings:    There was a small  hiatal hernia with no esophagitis.    There was a diffuse pattern of gastritis and antral biopsy was performed with cold biopsy forceps and retrieved to evaluate for H. pylori.    There was diverticulosis involving the sigmoid colon but no inflammatory changes.    Recommendations:     1.  Await antral biopsy pathology results.  2.  Continue proton pump inhibitor therapy.  3.  Repeat colonoscopy in 8 years unless symptoms develop.    Xavier eKmp Jr., M.D.

## 2025-04-11 NOTE — ANESTHESIA PREPROCEDURE EVALUATION
Anesthesia Evaluation     Patient summary reviewed and Nursing notes reviewed   NPO Solid Status: > 8 hours  NPO Liquid Status: > 2 hours           Airway   Mallampati: II  TM distance: >3 FB  Neck ROM: full  No difficulty expected  Dental    (+) edentulous    Comment: Implant spikes on bottom    Pulmonary - normal exam    breath sounds clear to auscultation  (+) a smoker Former, COPD,shortness of breath    ROS comment: Hx acute respiratory failure with hypoxia  Cardiovascular     ECG reviewed  Rhythm: regular  Rate: normal    (+) hypertension less than 2 medications, valvular problems/murmurs murmur, MR and MVP, CATHERINE, hyperlipidemia    ROS comment: EF 62% by ECHO 6/24/normal coronaries by cath 7/24/patient denies hx MI (ECG changes only)  PE comment: No murmurs heard    Neuro/Psych  (+) headaches, numbness, psychiatric history Anxiety and Depression  GI/Hepatic/Renal/Endo    (+) obesity, morbid obesity, renal disease-    ROS Comment: Hx SIADH    Musculoskeletal     (+) neck pain      ROS comment: Cervical and lumbar DDD  Abdominal   (+) obese   Substance History      OB/GYN          Other   arthritis, blood dyscrasia anemia,       Other Comment: Hgb 11.8                    Anesthesia Plan    ASA 3     MAC     intravenous induction     Anesthetic plan, risks, benefits, and alternatives have been provided, discussed and informed consent has been obtained with: patient.    CODE STATUS:         
Excision of forehead mass, Possible skin graft, Local flap on 8/28/17.  Pt already requested Dr. Walker as anesthesiologist

## 2025-04-14 LAB
CYTO UR: NORMAL
LAB AP CASE REPORT: NORMAL
LAB AP CLINICAL INFORMATION: NORMAL
PATH REPORT.FINAL DX SPEC: NORMAL
PATH REPORT.GROSS SPEC: NORMAL

## 2025-04-18 ENCOUNTER — TELEPHONE (OUTPATIENT)
Dept: SURGERY | Facility: CLINIC | Age: 75
End: 2025-04-18
Payer: MEDICARE

## 2025-04-18 NOTE — TELEPHONE ENCOUNTER
Patient called and left a voicemail stating she saw her pathology results on her mychart and she does not understand them. She is requesting a phone call to explain the pathology results from her scopes.

## 2025-04-18 NOTE — TELEPHONE ENCOUNTER
I called the patient on the telephone and spoke to her about her pathology report.  There were no concerning findings only gastritis.

## 2025-04-25 ENCOUNTER — TELEPHONE (OUTPATIENT)
Dept: PAIN MEDICINE | Facility: CLINIC | Age: 75
End: 2025-04-25

## 2025-04-25 NOTE — TELEPHONE ENCOUNTER
Caller: Traci King    Relationship to patient: Self    Best call back number: [phone] (home)     Chief complaint: LUMBAR RADIOFREQUENCY ABLATION BILATERAL L1-L3 09736-08, 64636-50 X 2     Type of visit: LUMBAR RADIOFREQUENCY ABLATION BILATERAL L1-L3 69989-37, 64636-50 X 2     Requested date: AROUND THE SAME DATE    If rescheduling, when is the original appointment: 5/9/25

## 2025-05-07 ENCOUNTER — OFFICE VISIT (OUTPATIENT)
Dept: PAIN MEDICINE | Facility: CLINIC | Age: 75
End: 2025-05-07
Payer: MEDICARE

## 2025-05-07 VITALS
BODY MASS INDEX: 36.79 KG/M2 | SYSTOLIC BLOOD PRESSURE: 143 MMHG | RESPIRATION RATE: 18 BRPM | HEIGHT: 60 IN | TEMPERATURE: 97.7 F | HEART RATE: 61 BPM | WEIGHT: 187.4 LBS | DIASTOLIC BLOOD PRESSURE: 63 MMHG | OXYGEN SATURATION: 100 %

## 2025-05-07 DIAGNOSIS — M48.02 SPINAL STENOSIS, CERVICAL REGION: ICD-10-CM

## 2025-05-07 DIAGNOSIS — M62.838 MUSCLE SPASM: ICD-10-CM

## 2025-05-07 DIAGNOSIS — M53.3 SACROILIAC JOINT DYSFUNCTION OF BOTH SIDES: ICD-10-CM

## 2025-05-07 DIAGNOSIS — Z79.899 ENCOUNTER FOR LONG-TERM (CURRENT) USE OF HIGH-RISK MEDICATION: ICD-10-CM

## 2025-05-07 DIAGNOSIS — M47.816 LUMBAR FACET ARTHROPATHY: ICD-10-CM

## 2025-05-07 DIAGNOSIS — M47.816 LUMBAR FACET ARTHROPATHY: Primary | ICD-10-CM

## 2025-05-07 DIAGNOSIS — M47.812 CERVICAL SPONDYLOSIS WITHOUT MYELOPATHY: ICD-10-CM

## 2025-05-07 PROCEDURE — 1125F AMNT PAIN NOTED PAIN PRSNT: CPT | Performed by: PHYSICIAN ASSISTANT

## 2025-05-07 PROCEDURE — 1159F MED LIST DOCD IN RCRD: CPT | Performed by: PHYSICIAN ASSISTANT

## 2025-05-07 PROCEDURE — 3077F SYST BP >= 140 MM HG: CPT | Performed by: PHYSICIAN ASSISTANT

## 2025-05-07 PROCEDURE — 3078F DIAST BP <80 MM HG: CPT | Performed by: PHYSICIAN ASSISTANT

## 2025-05-07 PROCEDURE — 1160F RVW MEDS BY RX/DR IN RCRD: CPT | Performed by: PHYSICIAN ASSISTANT

## 2025-05-07 PROCEDURE — 99214 OFFICE O/P EST MOD 30 MIN: CPT | Performed by: PHYSICIAN ASSISTANT

## 2025-05-07 RX ORDER — OXYCODONE AND ACETAMINOPHEN 10; 325 MG/1; MG/1
1 TABLET ORAL EVERY 4 HOURS PRN
Qty: 180 TABLET | Refills: 0 | Status: SHIPPED | OUTPATIENT
Start: 2025-05-12

## 2025-05-07 RX ORDER — NYSTATIN AND TRIAMCINOLONE ACETONIDE 100000; 1 [USP'U]/G; MG/G
CREAM TOPICAL
COMMUNITY
Start: 2025-05-01

## 2025-05-07 NOTE — PROGRESS NOTES
CHIEF COMPLAINT  Follow-up for back pain.    Subjective   Traci King is a 75 y.o. female  who presents for follow-up.  She has a history of chronic low back pain.  This patient has noted progressive worsening of pain in a bandlike distribution across the lumbar spine which is referred into the bilateral paraspinal muscles.  She also has a history of intermittent sciatica pain that responds well to therapeutic injections.    Current medication regimen consists of oxycodone 10 mg every 4 hours, gabapentin 300 mg 2 p.o. nightly, methocarbamol 750 mg as needed and meloxicam 15 mg p.o. daily.  She experiences intermittent opioid-induced constipation that is usually alleviated with Relistor.  Her medications provide at least 40% pain relief allowing her to function and manage her ADLs.     Pain today 6/10 VAS in severity.      Back Pain  This is a chronic problem. The current episode started more than 1 year ago. The problem occurs constantly. The problem has been worse since onset. The pain is present in the lumbar spine. The quality of the pain is described as aching (throbbing). The pain does not radiate. The pain is at a severity of 6/10. The pain is moderate (Increases to 6/10 with activity). The pain is Worse during the day. The symptoms are aggravated by standing, position and sitting (cooking/walking). Pertinent negatives include no abdominal pain, chest pain, dysuria, fever, headaches, numbness or weakness.   Neck Pain   This is a chronic problem. The current episode started more than 1 year ago. The problem occurs constantly. The problem has been unchanged. The pain is associated with nothing. The pain is present in the midline. The quality of the pain is described as aching. The pain is at a severity of 3/10. The pain is moderate. The symptoms are aggravated by position and twisting. The pain is Same all the time. Stiffness is present In the morning. Pertinent negatives include no chest pain, fever,  headaches, numbness or weakness.        PEG Assessment   What number best describes your pain on average in the past week?5  What number best describes how, during the past week, pain has interfered with your enjoyment of life?7  What number best describes how, during the past week, pain has interfered with your general activity?  7    Review of Pertinent Medical Data ---  RADIOLOGY REPORT     FACILITY[facility]  [facility]   [facility]      AGE:74 Y          SEX:F   PATIENT NAME/:  DIOGO BISHOP T    1950   UNIT NUMBER:  [id number]   ACCOUNT NUMBER:  [id number]   ACCESSION NUMBER:  [id number]     STUDY DATE: 10/17/2024 12:46 PM     CLINICAL HISTORY: Low back pain.     COMPARISON: CT lumbar spine dated 2024.     PROCEDURE COMMENTS: AP and lateral standing views of the lumbar spine.     FINDINGS:   There 5 nonrib-bearing lumbar vertebral bodies. Sacroiliac joints are   intact. Slight dextroscoliosis of the lumbar spine. Anterior wedging   deformity of the L2 vertebral bodies without significant change in   appearance from the comparison study on 2024, with   similar appearance of posterior retropulsion of fracture fragments   into the central canal. Chronic superior endplate deformity involving   L4 vertebral body is also without change. There is grade 1   retrolisthesis of L2 on L3 and grade 1 anterolisthesis of L4 on L5.   Diffuse degenerative disc disease throughout the lumbar spine, most   advanced at L5-S1. Multilevel facet arthrosis, most significant in the   lower lumbar spine. Atherosclerotic calcifications of the abdominal   aorta.     IMPRESSION:   1.Anterior wedge deformity of the L2 vertebral body with posterior   retropulsion of fracture fragments, is without significant change from   the prior CT on 2024. Chronic superior endplate   deformity of the L4 vertebral body is also without significant change.   2.No new loss  "of vertebral body height in the lumbar spine.   3.Moderate to severe multilevel lumbar spondylosis.       Dictated by: Evaristo Garcia M.D.     Images and Report reviewed and interpreted by: Evaristo Garcia M.D.       The following portions of the patient's history were reviewed and updated as appropriate: allergies, current medications, past family history, past medical history, past social history, past surgical history, and problem list.    Review of Systems   Constitutional:  Negative for fever.   Cardiovascular:  Negative for chest pain.   Gastrointestinal:  Positive for constipation. Negative for abdominal pain and diarrhea.   Genitourinary:  Negative for difficulty urinating and dysuria.   Musculoskeletal:  Positive for back pain and neck pain.   Neurological:  Negative for weakness, numbness and headaches.   Psychiatric/Behavioral:  Negative for sleep disturbance and suicidal ideas. The patient is not nervous/anxious.      I have reviewed and confirmed the accuracy of the ROS as documented by the MA/LPN/RN ALLY Cortez  Vitals:    05/07/25 0811   BP: 143/63   Pulse: 61   Resp: 18   Temp: 97.7 °F (36.5 °C)   SpO2: 100%   Weight: 85 kg (187 lb 6.4 oz)   Height: 152.4 cm (60\")   PainSc: 6    PainLoc: Back         Objective   Physical Exam  Vitals and nursing note reviewed.   Constitutional:       Appearance: Normal appearance. She is obese.   HENT:      Head: Normocephalic.   Musculoskeletal:      Lumbar back: Spasms and tenderness (Tenderness to palpation within the overlying bilateral lumbar facet joint spaces as well as within the bilateral paraspinal muscles) present. Decreased range of motion.        Back:    Neurological:      Mental Status: She is alert and oriented to person, place, and time.      Cranial Nerves: Cranial nerves 2-12 are intact.      Sensory: Sensation is intact.      Motor: Motor function is intact.      Gait: Gait is intact.   Psychiatric:         Mood and Affect: Mood normal.    "      Behavior: Behavior normal.         Thought Content: Thought content normal.         Judgment: Judgment normal.             Assessment & Plan   Diagnoses and all orders for this visit:    1. Lumbar facet arthropathy (Primary)    2. Sacroiliac joint dysfunction of both sides    3. Spinal stenosis, cervical region    4. Cervical spondylosis without myelopathy    5. Muscle spasm    6. Encounter for long-term (current) use of high-risk medication        Traci King reports a pain score of 6.  Given her pain assessment as noted, treatment options were discussed and the following options were decided upon as a follow-up plan to address the patient's pain: continuation of current treatment plan for pain, prescription for opiod analgesics, and use of non-medical modalities (ice, heat, stretching and/or behavior modifications).    PHQ-2 Depression Screening  Little interest or pleasure in doing things? Not at all   Feeling down, depressed, or hopeless? Not at all   PHQ-2 Total Score 0     Tobacco Use: Medium Risk (5/7/2025)    Patient History     Smoking Tobacco Use: Former     Smokeless Tobacco Use: Never     Passive Exposure: Not on file           --- Due to persistent axial low back pain worsening I will have the patient return for therapeutic lumbar radiofrequency ablation of bilateral L1-L3 medial branch nerves under fluoroscopy guidance. Per review of Dr. Gan's procedure note from her last radiofrequency ablation that was performed on 9/26/2024 the patient had higher than average levels of procedural anxiety and needed additional procedural sedation in order to proceed safely. Due to that issue I will have the patient scheduled for radiofrequency ablation with monitored anesthesia care.   --- Refill oxycodone 10 mg. Patient appears stable with current regimen. No adverse effects. Regarding continuation of opioids, there is no evidence of aberrant behavior or any red flags.  The patient continues with  appropriate response to opioid therapy. ADL's remain intact by self.   ---Moderate risk for opiate abuse/diversion. Patient is evaluated every 30 days due to her MME.       The urine drug screen confirmation from 1/14/2025 has been reviewed and the result is appropriate based on patient history and MATTHEW report.        The patient signed an updated copy of the controlled substance agreement on 12/4/2024.         MATTHEW REPORT  As part of the patient's treatment plan, I am prescribing controlled substances. The patient has been made aware of appropriate use of such medications, including potential risk of somnolence, limited ability to drive and/or work safely, and the potential for dependence or overdose. It has also been made clear that these medications are for use by this patient only, without concomitant use of alcohol or other substances unless prescribed.     Patient has completed prescribing agreement detailing terms of continued prescribing of controlled substances, including monitoring MATTHEW reports, urine drug screening, and pill counts if necessary. The patient is aware that inappropriate use will results in cessation of prescribing such medications.    As the clinician, I personally reviewed the MATTHEW from 5/7/25 while the patient was in the office today.    History and physical exam exhibit continued safe and appropriate use of controlled substances.     Dictated utilizing Dragon dictation.

## 2025-05-09 ENCOUNTER — APPOINTMENT (OUTPATIENT)
Dept: GENERAL RADIOLOGY | Facility: SURGERY CENTER | Age: 75
End: 2025-05-09
Payer: MEDICARE

## 2025-05-22 NOTE — TELEPHONE ENCOUNTER
Caller: Traci King    Relationship to patient: Self    Best call back number:     Chief complaint: BACK PAIN     Type of visit: FUP LUMBAR RADIOFREQUENCY ABLATION BILATERAL L1-L3 81110-13, 64636-50 X 2     Requested date: ANY THING SOONER      If rescheduling, when is the original appointment: 6/13/25     Additional notes:WAS ORIGINALLY SCHEDULED FOR 5/9 BUT SHE HAD TO ATTEND A GRADUATION.  IN EXCRUCIATING PAIN

## 2025-06-10 ENCOUNTER — OFFICE VISIT (OUTPATIENT)
Dept: PAIN MEDICINE | Facility: CLINIC | Age: 75
End: 2025-06-10
Payer: MEDICARE

## 2025-06-10 ENCOUNTER — TELEPHONE (OUTPATIENT)
Dept: PAIN MEDICINE | Facility: CLINIC | Age: 75
End: 2025-06-10

## 2025-06-10 VITALS
TEMPERATURE: 97.3 F | RESPIRATION RATE: 18 BRPM | OXYGEN SATURATION: 95 % | HEART RATE: 70 BPM | DIASTOLIC BLOOD PRESSURE: 71 MMHG | SYSTOLIC BLOOD PRESSURE: 129 MMHG | HEIGHT: 60 IN | BODY MASS INDEX: 36.99 KG/M2 | WEIGHT: 188.4 LBS

## 2025-06-10 DIAGNOSIS — M51.360 DEGENERATION OF INTERVERTEBRAL DISC OF LUMBAR REGION WITH DISCOGENIC BACK PAIN: ICD-10-CM

## 2025-06-10 DIAGNOSIS — T40.2X5A THERAPEUTIC OPIOID INDUCED CONSTIPATION: Chronic | ICD-10-CM

## 2025-06-10 DIAGNOSIS — Z79.899 ENCOUNTER FOR LONG-TERM (CURRENT) USE OF HIGH-RISK MEDICATION: ICD-10-CM

## 2025-06-10 DIAGNOSIS — M48.02 SPINAL STENOSIS, CERVICAL REGION: ICD-10-CM

## 2025-06-10 DIAGNOSIS — K59.03 THERAPEUTIC OPIOID INDUCED CONSTIPATION: Chronic | ICD-10-CM

## 2025-06-10 DIAGNOSIS — M47.816 LUMBAR FACET ARTHROPATHY: Primary | ICD-10-CM

## 2025-06-10 DIAGNOSIS — M47.816 LUMBAR FACET ARTHROPATHY: ICD-10-CM

## 2025-06-10 DIAGNOSIS — M53.3 SACROILIAC JOINT DYSFUNCTION OF BOTH SIDES: ICD-10-CM

## 2025-06-10 RX ORDER — ONDANSETRON 4 MG/1
4 TABLET, ORALLY DISINTEGRATING ORAL AS NEEDED
COMMUNITY
Start: 2025-05-30

## 2025-06-10 RX ORDER — GABAPENTIN 300 MG/1
CAPSULE ORAL
Qty: 60 CAPSULE | Refills: 1 | Status: SHIPPED | OUTPATIENT
Start: 2025-06-10

## 2025-06-10 RX ORDER — OXYCODONE AND ACETAMINOPHEN 10; 325 MG/1; MG/1
1 TABLET ORAL EVERY 4 HOURS PRN
Qty: 180 TABLET | Refills: 0 | Status: SHIPPED | OUTPATIENT
Start: 2025-06-11

## 2025-06-10 NOTE — TELEPHONE ENCOUNTER
Caller: Traci King A    Relationship to patient: Self    Best call back number: [phone]    Patient is needing: PATIENT STATES THAT THE LAXATIVE LARRY GAVE HER A SAMPLE OF WORKED REALLY WELL AND SHE WOULD LIKE A PRESCRIPTION OF IT

## 2025-06-10 NOTE — PROGRESS NOTES
CHIEF COMPLAINT  Back pain  Pain is worse.      Subjective   Traci King is a 75 y.o. female  who presents for follow-up.  She has a history of chronic back pain.  This patient continues to note progressive worsening of pain as compared to her last office visit.  She illustrates pain in a transverse distribution across the lumbar spine referred into the bilateral paraspinal muscles.  She also has secondary complaints of intermittent sciatica that responds well to therapeutic injections.    Her current medication regimen consists of oxycodone 10 mg every 4 hours, gabapentin 300 mg 2 p.o. nightly, methocarbamol 750 mg as needed and meloxicam 15 mg p.o. daily. She experiences intermittent opioid-induced constipation that is usually alleviated with Relistor however her insurance will not cover it. Her medications provide at least 40% pain relief allowing her to function and manage her ADLs.     Pain today is 5/10 VAS in severity.      Back Pain  This is a chronic problem. The current episode started more than 1 year ago. The problem occurs constantly. The problem has been worse since onset. The pain is present in the lumbar spine. The quality of the pain is described as aching (throbbing). The pain does not radiate. The pain is at a severity of 5/10. The pain is moderate (Increases to 6/10 with activity). The pain is Worse during the day. The symptoms are aggravated by standing, position and sitting (cooking/walking). Associated symptoms include headaches (occ). Pertinent negatives include no abdominal pain, chest pain, dysuria, fever, numbness or weakness.   Neck Pain   This is a chronic problem. The current episode started more than 1 year ago. The problem occurs constantly. The problem has been unchanged. The pain is associated with nothing. The pain is present in the midline. The quality of the pain is described as aching. The pain is at a severity of 3/10. The pain is moderate. The symptoms are aggravated by  "position and twisting. The pain is Same all the time. Stiffness is present In the morning. Associated symptoms include headaches (occ). Pertinent negatives include no chest pain, fever, numbness or weakness.        PEG Assessment   What number best describes your pain on average in the past week?6  What number best describes how, during the past week, pain has interfered with your enjoyment of life?8  What number best describes how, during the past week, pain has interfered with your general activity?  8    Review of Pertinent Medical Data ---  No new imaging for review on today    The following portions of the patient's history were reviewed and updated as appropriate: allergies, current medications, past family history, past medical history, past social history, past surgical history, and problem list.    Review of Systems   Constitutional:  Negative for activity change (less) and fever.   Cardiovascular:  Negative for chest pain.   Gastrointestinal:  Positive for constipation. Negative for abdominal pain and diarrhea.   Genitourinary:  Negative for difficulty urinating and dysuria.   Musculoskeletal:  Positive for back pain.   Neurological:  Positive for headaches (occ). Negative for dizziness, weakness, light-headedness and numbness.   Psychiatric/Behavioral:  Negative for agitation, sleep disturbance and suicidal ideas. The patient is not nervous/anxious.      I have reviewed and confirmed the accuracy of the ROS as documented by the MA/LPN/RN ALLY Cortez  Vitals:    06/10/25 0930   BP: 129/71   BP Location: Left arm   Patient Position: Sitting   Cuff Size: Adult   Pulse: 70   Resp: 18   Temp: 97.3 °F (36.3 °C)   TempSrc: Temporal   SpO2: 95%   Weight: 85.5 kg (188 lb 6.4 oz)   Height: 152.4 cm (60\")   PainSc: 5          Objective   Physical Exam  Vitals and nursing note reviewed.   Constitutional:       Appearance: Normal appearance. She is obese.   HENT:      Head: Normocephalic.   Musculoskeletal:     "  Lumbar back: Spasms and tenderness (Tenderness to palpation within the overlying bilateral lumbar facet joint spaces as well as within the bilateral paraspinal muscles) present. Decreased range of motion.        Back:    Neurological:      Mental Status: She is alert and oriented to person, place, and time.      Cranial Nerves: Cranial nerves 2-12 are intact.      Sensory: Sensation is intact.      Motor: Motor function is intact.      Gait: Gait is intact.   Psychiatric:         Mood and Affect: Mood normal.         Behavior: Behavior normal.         Thought Content: Thought content normal.         Judgment: Judgment normal.         Assessment & Plan   Diagnoses and all orders for this visit:    1. Lumbar facet arthropathy (Primary)    2. Sacroiliac joint dysfunction of both sides  -     gabapentin (NEURONTIN) 300 MG capsule; Take 1 or 2 capsules PO QHS  Dispense: 60 capsule; Refill: 1    3. Degeneration of intervertebral disc of lumbar region with discogenic back pain    4. Encounter for long-term (current) use of high-risk medication    5. Therapeutic opioid induced constipation  -     Naloxegol Oxalate (Movantik) 12.5 MG tablet; Take 1 tablet by mouth Every Morning.  Dispense: 30 tablet; Refill: 1    6. Spinal stenosis, cervical region  -     gabapentin (NEURONTIN) 300 MG capsule; Take 1 or 2 capsules PO QHS  Dispense: 60 capsule; Refill: 1        Traci A Fernando reports a pain score of 5.  Given her pain assessment as noted, treatment options were discussed and the following options were decided upon as a follow-up plan to address the patient's pain: continuation of current treatment plan for pain, prescription for non-opiod analgesics, prescription for opiod analgesics, and use of non-medical modalities (ice, heat, stretching and/or behavior modifications).    PHQ-2 Depression Screening  Little interest or pleasure in doing things? Not at all   Feeling down, depressed, or hopeless? Not at all   PHQ-2 Total  Score 0     Tobacco Use: Medium Risk (6/10/2025)    Patient History     Smoking Tobacco Use: Former     Smokeless Tobacco Use: Never     Passive Exposure: Not on file         --- Follow-up 1 month for medication management  --- Moderate risk for opiate abuse/diversion.  Patient is evaluated every 30 days due to the MME  --- Fill oxycodone 10 mg. Patient appears stable with current regimen. No adverse effects. Regarding continuation of opioids, there is no evidence of aberrant behavior or any red flags.  The patient continues with appropriate response to opioid therapy. ADL's remain intact by self.   --- A prescription has also been provided for gabapentin and Movantik 12.5 mg for opiate induced constipation.  Patient had samples of Movantik provided to her and called back and with significant relief of symptoms.  --- Scheduled for therapeutic lumbar RFA      The urine drug screen confirmation from 1/14/2025 has been reviewed and the result is appropriate based on patient history and MATTHEW report.        The patient signed an updated copy of the controlled substance agreement on 12/4/2024.         MATTHEW REPORT  As part of the patient's treatment plan, I am prescribing controlled substances. The patient has been made aware of appropriate use of such medications, including potential risk of somnolence, limited ability to drive and/or work safely, and the potential for dependence or overdose. It has also been made clear that these medications are for use by this patient only, without concomitant use of alcohol or other substances unless prescribed.     Patient has completed prescribing agreement detailing terms of continued prescribing of controlled substances, including monitoring MATTHEW reports, urine drug screening, and pill counts if necessary. The patient is aware that inappropriate use will results in cessation of prescribing such medications.    As the clinician, I personally reviewed the MATTHEW from 6/10/2025  while the patient was in the office today.    History and physical exam exhibit continued safe and appropriate use of controlled substances.     Dictated utilizing Dragon dictation.

## 2025-06-13 ENCOUNTER — HOSPITAL ENCOUNTER (OUTPATIENT)
Dept: GENERAL RADIOLOGY | Facility: SURGERY CENTER | Age: 75
End: 2025-06-13
Payer: MEDICARE

## 2025-06-13 ENCOUNTER — HOSPITAL ENCOUNTER (OUTPATIENT)
Facility: SURGERY CENTER | Age: 75
Setting detail: HOSPITAL OUTPATIENT SURGERY
Discharge: HOME | End: 2025-06-13
Attending: ANESTHESIOLOGY | Admitting: ANESTHESIOLOGY
Payer: MEDICARE

## 2025-06-13 ENCOUNTER — ANESTHESIA (OUTPATIENT)
Dept: SURGERY | Facility: SURGERY CENTER | Age: 75
End: 2025-06-13
Payer: MEDICARE

## 2025-06-13 ENCOUNTER — ANESTHESIA EVENT (OUTPATIENT)
Dept: SURGERY | Facility: SURGERY CENTER | Age: 75
End: 2025-06-13
Payer: MEDICARE

## 2025-06-13 VITALS
OXYGEN SATURATION: 98 % | DIASTOLIC BLOOD PRESSURE: 76 MMHG | BODY MASS INDEX: 36.13 KG/M2 | SYSTOLIC BLOOD PRESSURE: 145 MMHG | RESPIRATION RATE: 16 BRPM | TEMPERATURE: 98.2 F | HEART RATE: 59 BPM | WEIGHT: 185 LBS

## 2025-06-13 DIAGNOSIS — M47.816 LUMBAR FACET ARTHROPATHY: ICD-10-CM

## 2025-06-13 DIAGNOSIS — Z41.9 SURGERY, ELECTIVE: ICD-10-CM

## 2025-06-13 PROCEDURE — 64635 DESTROY LUMB/SAC FACET JNT: CPT | Performed by: ANESTHESIOLOGY

## 2025-06-13 PROCEDURE — 76000 FLUOROSCOPY <1 HR PHYS/QHP: CPT

## 2025-06-13 PROCEDURE — 25010000002 PROPOFOL 10 MG/ML EMULSION: Performed by: NURSE ANESTHETIST, CERTIFIED REGISTERED

## 2025-06-13 PROCEDURE — 64636 DESTROY L/S FACET JNT ADDL: CPT | Performed by: ANESTHESIOLOGY

## 2025-06-13 PROCEDURE — 25010000002 BUPIVACAINE 0.5 % SOLUTION: Performed by: ANESTHESIOLOGY

## 2025-06-13 PROCEDURE — 25010000002 LIDOCAINE 2% SOLUTION: Performed by: ANESTHESIOLOGY

## 2025-06-13 PROCEDURE — 25010000002 ONDANSETRON PER 1 MG: Performed by: NURSE ANESTHETIST, CERTIFIED REGISTERED

## 2025-06-13 PROCEDURE — 25010000002 LIDOCAINE 1 % SOLUTION: Performed by: ANESTHESIOLOGY

## 2025-06-13 PROCEDURE — 25810000003 LACTATED RINGERS PER 1000 ML: Performed by: ANESTHESIOLOGY

## 2025-06-13 RX ORDER — SODIUM CHLORIDE 0.9 % (FLUSH) 0.9 %
10 SYRINGE (ML) INJECTION AS NEEDED
Status: DISCONTINUED | OUTPATIENT
Start: 2025-06-13 | End: 2025-06-13 | Stop reason: HOSPADM

## 2025-06-13 RX ORDER — LIDOCAINE HYDROCHLORIDE 10 MG/ML
0.5 INJECTION, SOLUTION INFILTRATION; PERINEURAL ONCE AS NEEDED
Status: DISCONTINUED | OUTPATIENT
Start: 2025-06-13 | End: 2025-06-13 | Stop reason: HOSPADM

## 2025-06-13 RX ORDER — SODIUM CHLORIDE, SODIUM LACTATE, POTASSIUM CHLORIDE, CALCIUM CHLORIDE 600; 310; 30; 20 MG/100ML; MG/100ML; MG/100ML; MG/100ML
50 INJECTION, SOLUTION INTRAVENOUS CONTINUOUS
Status: DISCONTINUED | OUTPATIENT
Start: 2025-06-13 | End: 2025-06-13 | Stop reason: HOSPADM

## 2025-06-13 RX ORDER — LIDOCAINE HYDROCHLORIDE 10 MG/ML
INJECTION, SOLUTION INFILTRATION; PERINEURAL AS NEEDED
Status: DISCONTINUED | OUTPATIENT
Start: 2025-06-13 | End: 2025-06-13 | Stop reason: HOSPADM

## 2025-06-13 RX ORDER — ONDANSETRON 2 MG/ML
INJECTION INTRAMUSCULAR; INTRAVENOUS AS NEEDED
Status: DISCONTINUED | OUTPATIENT
Start: 2025-06-13 | End: 2025-06-13 | Stop reason: SURG

## 2025-06-13 RX ORDER — LIDOCAINE HYDROCHLORIDE 20 MG/ML
INJECTION, SOLUTION INFILTRATION; PERINEURAL AS NEEDED
Status: DISCONTINUED | OUTPATIENT
Start: 2025-06-13 | End: 2025-06-13 | Stop reason: HOSPADM

## 2025-06-13 RX ORDER — BUPIVACAINE HYDROCHLORIDE 5 MG/ML
INJECTION, SOLUTION PERINEURAL AS NEEDED
Status: DISCONTINUED | OUTPATIENT
Start: 2025-06-13 | End: 2025-06-13 | Stop reason: HOSPADM

## 2025-06-13 RX ORDER — SODIUM CHLORIDE 0.9 % (FLUSH) 0.9 %
10 SYRINGE (ML) INJECTION EVERY 12 HOURS SCHEDULED
Status: DISCONTINUED | OUTPATIENT
Start: 2025-06-13 | End: 2025-06-13 | Stop reason: HOSPADM

## 2025-06-13 RX ADMIN — SODIUM CHLORIDE, POTASSIUM CHLORIDE, SODIUM LACTATE AND CALCIUM CHLORIDE: 600; 310; 30; 20 INJECTION, SOLUTION INTRAVENOUS at 07:58

## 2025-06-13 RX ADMIN — ONDANSETRON HYDROCHLORIDE 4 MG: 2 SOLUTION INTRAMUSCULAR; INTRAVENOUS at 08:36

## 2025-06-13 RX ADMIN — PROPOFOL 100 MCG/KG/MIN: 10 INJECTION, EMULSION INTRAVENOUS at 08:09

## 2025-06-13 NOTE — ANESTHESIA POSTPROCEDURE EVALUATION
Patient: Traci King    Procedure Summary       Date: 06/13/25 Room / Location: [facility]    Anesthesia Start: 0802 Anesthesia Stop: 0845    Procedure: LUMBAR RADIOFREQUENCY ABLATION BILATERAL L1-L3 08717-01, 64636-50 X 2 (Bilateral: Spine Lumbar) Diagnosis:       Lumbar facet arthropathy      (Lumbar facet arthropathy [M47.816])    Surgeons: Jose Luis Gan MD Provider: Martine Walden MD    Anesthesia Type: MAC ASA Status: 3            Anesthesia Type: MAC    Vitals  Vitals Value Taken Time   /65 06/13/25 09:10   Temp 36.8 °C (98.2 °F) 06/13/25 08:47   Pulse 60 06/13/25 09:13   Resp 16 06/13/25 09:05   SpO2 98 % 06/13/25 09:11   Vitals shown include unfiled device data.        Post Anesthesia Care and Evaluation    Patient location during evaluation: PHASE II  Patient participation: complete - patient participated  Level of consciousness: awake  Pain management: adequate    Airway patency: patent  Anesthetic complications: No anesthetic complications    Cardiovascular status: acceptable  Respiratory status: acceptable  Hydration status: acceptable    Comments: /76   Pulse 59   Temp 36.8 °C (98.2 °F) (Temporal)   Resp 16   Wt 83.9 kg (185 lb)   LMP  (LMP Unknown)   SpO2 98%   BMI 36.13 kg/m²

## 2025-06-13 NOTE — OP NOTE
Bilateral L1-3 Lumbar Medial Branch RADIOFREQUENCY  [facility]    PREOPERATIVE DIAGNOSIS:  Lumbar spondylosis without myelopathy    POSTOPERATIVE DIAGNOSIS:  Lumbar spondylosis without myelopathy    PROCEDURE:   Diagnostic Bilateral Lumbar Medial Branch Nerve thermal radiofrequency lesioning, with fluoroscopy:  L1, L2, L3  nerves (at the L2, L3, L4 transverse processes) to thermally treat the innervation to facet joints L2-3, L3-4.  17161-84 -- Bilateral L/S facet neuro destr., 1st Level  90075-36 -- Bilateral L/S facet neuro destr., 2nd  Level     PRE-PROCEDURE DISCUSSION WITH PATIENT:    Risks and complications were discussed with the patient prior to starting the procedure and informed consent was obtained.      SURGEON:  Jose Luis Gan MD    REASON FOR PROCEDURE:    Previous clinically significant therapeutic effect after prior Radiofrequency procedure    SEDATION:  Monitored Anesthesia Care (MAC), -  , and The use of MAC care was carefully considered, and for this particular patient the need for additional procedural sedation seemed necessary in this instance to safely perform the procedure.      ANESTHETIC:  Lidocaine 2%  STEROID:  NONE    DESCRIPTON OF PROCEDURE:  After obtaining informed consent, IV access was obtained in the preoperative area.   The patient was taken to the operating room.  The patient was placed in the prone position with a pillow under the abdomen. All pressure points were well padded.  EKG, blood pressure, and pulse oximeter were monitored.  The patient was monitored and sedated by the CRNA under direction of the attending anesthesiologist. The lumbosacral area was prepped with Chloraprep and draped in a sterile fashion.     Under fluoroscopic guidance the transverse processes of the L2, L3, L4 vertebrae at the junctions of the superior articular processes were identified on the right.     Skin and subcutaneous tissue were anesthetized with 1ml of 1% lidocaine above  each of these points. Then, radiofrequency probe needles were advanced in this fluoro view to the above junctions.  Aspiration was negative for blood and CSF.  After confirming the position of the needle with fluoroscope in all views, testing was initiated.  Motor testing was confirmed to be negative at 3V and 2Hz for any radicular stimulation.  Then 1mL of the local anesthetic solution was instilled in each needle.  Two minutes elapsed, and during this time a lateral fluoroscopic view was confirmed again to ensure the needles had not advanced nor retracted.  Then, Radiofrequency Lesioning was initiated for 2 minutes at 80 degrees Celsius.  Needles were removed intact from each of the areas.     A similar procedure was repeated to address the similar nerves on the contralateral side.   Onset of analgesia was noted.  Vital signs remained stable throughout.      ESTIMATED BLOOD LOSS:  <5 mL  SPECIMENS:  none    COMPLICATIONS:   No complications were noted.    TOLERANCE & DISCHARGE CONDITION:    The patient tolerated the procedure well.  The patient was transported to the recovery area without difficulties.  The patient was discharged to home under the care of family in stable and satisfactory condition.    PLAN OF CARE:  The patient was given our standard instruction sheet.  The patient will  Return to clinic 6 wks.  The patient will resume all medications as per the medication reconciliation sheet.

## 2025-06-13 NOTE — DISCHARGE INSTRUCTIONS
[facility] - Post-procedure Instructions          --  While there are no absolute restrictions, it is recommended that you do not perform strenuous activity today. In the morning, you may resume your level of activity as before your block.    --  If you have a band-aid at your injection site, please remove it later today. Observe the area for any redness, swelling, pus-like drainage, or a temperature over 101°. If any of these symptoms occur, please call your doctor at [phone]. If after office hours, leave a message and the on-call provider will return your call.    --  Ice may be applied to your injection site. It is recommended you avoid direct heat (heating pad; hot tub) for 1-2 days.    --  Call [facility] at [phone] if you experience persistent headache, persistent bleeding from the injection site, or severe pain not relieved by heat or oral medication.    --  Do not make important decisions today.    --  Due to the effects of the block and/or the I.V. Sedation, DO NOT drive or operate hazardous machinery for 12 hours.  Local anesthetics may cause numbness after procedure and precautions must be taken with regards to operating equipment as well as with walking, even if ambulating with assistance of another person or with an assistive device.    --  Do not drink alcohol for 12 hours.    -- You may return to work tomorrow, or as directed by your referring doctor.    --  Occasionally you may notice a slight increase in your pain after the procedure. This should start to improve within the next 24-48 hours. Radiofrequency ablation procedure pain may last 3-4 weeks.    --  It may take as long as 3-4 days before you notice a gradual improvement in your pain and/or other symptoms.    -- You may continue to take your prescribed pain medication as needed.    --  Some normal possible side effects of steroid use could include fluid retention, increased blood sugar, dull headache,  increased sweating, increased appetite, mood swings and flushing.    --  Diabetics are recommended to watch their blood glucose level closely for 24-48 hours after the injection.    --  Must stay in PACU for 20 min upon arrival and prove no leg weakness before being discharged.    --  IN THE EVENT OF A LIFE THREATENING EMERGENCY, (CHEST PAIN, BREATHING DIFFICULTIES, PARALYSIS…) YOU SHOULD GO TO YOUR NEAREST EMERGENCY ROOM.    --  You should be contacted by our office within 2-3 days to schedule follow up or next appointment date.  If not contacted within 7 days, please call the office at [phone]

## 2025-06-13 NOTE — ANESTHESIA PREPROCEDURE EVALUATION
Anesthesia Evaluation     Patient summary reviewed                Airway   Mallampati: II  Dental    (+) upper dentures and lower dentures    Pulmonary - normal exam   (+) a smoker Former, COPD,shortness of breath  Cardiovascular - normal exam    ECG reviewed    (+) hypertension, valvular problems/murmurs MVP and murmur, past MI  >12 months, hyperlipidemia    ROS comment: Reviewed recent cath report:    onclusions:  1. Normal coronary arteries.  2. Mildly elevated right and left-sided filling pressures with low cardiac output and index.      Neuro/Psych  (+) numbness, psychiatric history Anxiety and Depression  GI/Hepatic/Renal/Endo    (+) obesity, GERD, renal disease- stones    Musculoskeletal     (+) neck pain  Abdominal    Substance History      OB/GYN          Other   arthritis,                 Anesthesia Plan    ASA 3     MAC       Anesthetic plan, risks, benefits, and alternatives have been provided, discussed and informed consent has been obtained with: patient.    CODE STATUS:

## 2025-06-23 ENCOUNTER — TELEPHONE (OUTPATIENT)
Dept: PAIN MEDICINE | Facility: CLINIC | Age: 75
End: 2025-06-23
Payer: MEDICARE

## 2025-06-23 NOTE — TELEPHONE ENCOUNTER
Provider: ALLAN    Caller: Traci King    Relationship to Patient: Self    Pharmacy:     Phone Number: [phone]    Reason for Call: PT CALLED AND STATED THAT SHE IS IN A LOT OF PAIN AND THE PAIN MED SHE WAS GIVEN IS NOT HELPING BUT MAYBE 30 MINS AT A TIME AND IS WONDERING WHAT SHE CAN DO TO HELP WITH THE PAIN

## 2025-06-24 RX ORDER — METHYLPREDNISOLONE 4 MG/1
TABLET ORAL
Qty: 21 TABLET | Refills: 0 | Status: SHIPPED | OUTPATIENT
Start: 2025-06-24

## 2025-06-24 NOTE — TELEPHONE ENCOUNTER
She just had the Lumbar RFA on 6/13--it's too soon for it to have started working.  If she has tolerated in the past I can send in a steroid dose pack to see if that will help calm the pain down.  She needs to alternate with ice/heat on the back--is she taking a muscle relaxer? If pain is that severe then she needs to go to the ER.  Is already taking 6 a day of oxycodone 10 mg--she can't take any more pain medications.  I'm adding Dr Gan to see if he has any other recommendations

## 2025-06-24 NOTE — TELEPHONE ENCOUNTER
I would advise going to the ER if she is in severe pain. Does she want me to call in a steroid pack for her?

## 2025-06-24 NOTE — TELEPHONE ENCOUNTER
Hub staff attempted to follow warm transfer process and was unsuccessful     Caller: Traci King    Relationship to patient: Self    Best call back number:     Patient is needing: PATIENT STILL IN A GREAT DEAL OF PAIN.  WANTS TOP SPEAK TO SOMEONE.  IF WE PUT HUB CAN READ AT THE BOTTOM WE CAN READ TO PATIENT

## 2025-06-24 NOTE — TELEPHONE ENCOUNTER
Spoke with patient- denies any red flag symptoms. She states that she is just in a lot of pain. Please advise.

## 2025-06-25 ENCOUNTER — TELEPHONE (OUTPATIENT)
Dept: PAIN MEDICINE | Facility: CLINIC | Age: 75
End: 2025-06-25

## 2025-06-25 NOTE — TELEPHONE ENCOUNTER
Provider: MAREK NELSON     Caller: Traci King    Relationship to Patient: Self    Pharmacy: Pharmacy: [facility] [id number] - [place], [place] - [address] AT [facility] [phone] [facility] [phone] FX     Phone Number: [phone]    Reason for Call: PATIENT CALLED BACK TO FOLLOW UP ON PREVIOUS MESSAGE FROM 6-23-25. ADVISED THAT A MEDROL DOSE PACK WAS SENT TO HER PHARMACY. SHE WOULD LIKE SOMEONE TO CALL HER AND LET HER KNOW WHAT TYPE OF MEDICATION THIS IS. SHE SAYS IF IT IS A STEROID SHE CANNOT TAKE IT AS SHE HAS A VERY BAD REACTION AND THIS SHOULD BE NOTED IN HER CHART. PLEASE ADVISE.

## 2025-06-25 NOTE — TELEPHONE ENCOUNTER
Patient states that she is unable to take steroids because they make her mean, anxious and jittery.

## 2025-07-03 ENCOUNTER — OFFICE VISIT (OUTPATIENT)
Dept: PAIN MEDICINE | Facility: CLINIC | Age: 75
End: 2025-07-03
Payer: MEDICARE

## 2025-07-03 VITALS
DIASTOLIC BLOOD PRESSURE: 59 MMHG | HEIGHT: 60 IN | TEMPERATURE: 97.4 F | RESPIRATION RATE: 16 BRPM | OXYGEN SATURATION: 94 % | SYSTOLIC BLOOD PRESSURE: 98 MMHG | HEART RATE: 81 BPM | WEIGHT: 187 LBS | BODY MASS INDEX: 36.71 KG/M2

## 2025-07-03 DIAGNOSIS — M47.812 CERVICAL SPONDYLOSIS WITHOUT MYELOPATHY: ICD-10-CM

## 2025-07-03 DIAGNOSIS — M47.816 LUMBAR FACET ARTHROPATHY: ICD-10-CM

## 2025-07-03 DIAGNOSIS — M53.3 SACROILIAC JOINT DYSFUNCTION OF BOTH SIDES: ICD-10-CM

## 2025-07-03 DIAGNOSIS — M48.02 SPINAL STENOSIS, CERVICAL REGION: ICD-10-CM

## 2025-07-03 DIAGNOSIS — M62.838 MUSCLE SPASM: ICD-10-CM

## 2025-07-03 DIAGNOSIS — M51.360 DEGENERATION OF INTERVERTEBRAL DISC OF LUMBAR REGION WITH DISCOGENIC BACK PAIN: ICD-10-CM

## 2025-07-03 DIAGNOSIS — Z79.899 ENCOUNTER FOR LONG-TERM (CURRENT) USE OF HIGH-RISK MEDICATION: ICD-10-CM

## 2025-07-03 DIAGNOSIS — M47.816 LUMBAR FACET ARTHROPATHY: Primary | ICD-10-CM

## 2025-07-03 LAB
POC AMPHETAMINES: NEGATIVE
POC BARBITURATES: NEGATIVE
POC BENZODIAZEPHINES: POSITIVE
POC BUPRENORPHINE: NEGATIVE
POC COCAINE: NEGATIVE
POC METHADONE: NEGATIVE
POC METHAMPHETAMINE SCREEN URINE: NEGATIVE
POC OPIATES: POSITIVE
POC OXYCODONE: POSITIVE
POC PHENCYCLIDINE: NEGATIVE
POC PROPOXYPHENE: NEGATIVE
POC THC: NEGATIVE
POC TRICYCLIC ANTIDEPRESSANTS: NEGATIVE

## 2025-07-03 RX ORDER — OXYCODONE AND ACETAMINOPHEN 10; 325 MG/1; MG/1
1 TABLET ORAL EVERY 4 HOURS PRN
Qty: 180 TABLET | Refills: 0 | Status: SHIPPED | OUTPATIENT
Start: 2025-07-11

## 2025-07-03 RX ORDER — METHOCARBAMOL 750 MG/1
750 TABLET, FILM COATED ORAL 2 TIMES DAILY PRN
Qty: 60 TABLET | Refills: 1 | Status: SHIPPED | OUTPATIENT
Start: 2025-07-03

## 2025-07-03 NOTE — PROGRESS NOTES
CHIEF COMPLAINT  Back pain  LUMBAR RADIOFREQUENCY ABLATION BILATERAL L1-L3   UDS: positive for oxy, opi, and benzo    Subjective   Traci King is a 75 y.o. female  who presents to the office for follow-up of procedure.  She completed a LUMBAR RADIOFREQUENCY ABLATION BILATERAL L1-L3  on  06/13/25 performed by Dr. Gan for management of back pain.  It is too early for the patient to detect relief from the RFA as of today.  She continues to have persistent pain in a bandlike distribution across the lumbar spine which is referred into the bilateral paraspinal muscles.  She has experienced intermittent sciatica pain that responds well to therapeutic injections.  She is not having any pain radiating in the lower extremities at this time.  Patient called in with increased pain immediately following the ablation procedure and was provided a Medrol Dosepak.  She states that the first day of the Medrol Dosepak was helpful but by the second day the steroid causes severe degree and she discontinued it.    She is currently medically managed on  oxycodone 10 mg every 4 hours, gabapentin 300 mg 2 p.o. nightly, and methocarbamol 750 mg as needed.  Patient experiences intermittent opiate induced constipation that is alleviated with oral magnesium.  Overall her pain medications are providing 30 to 40% pain relief allowing her to manage her ADLs.  Patient is on temazepam nightly prescribed by her primary care provider.    Pain today 4/10 VAS in severity.            Back Pain  Chronicity:  Chronic  Onset:  More than 1 year ago  Frequency:  Constantly  Progression since onset:  Worse  Pain location:  Lumbar spine  Pain quality:  Aching (throbbing)  Radiates to:  Does not radiate  Pain-numeric:  4/10  Pain severity:  Moderate (Increases to 6/10 with activity)  Pain is:  Worse during the day  Aggravated by:  Standing, position and sitting (cooking/walking)  Associated symptoms: headaches (occ)    Associated symptoms: no abdominal  pain, no chest pain, no dysuria, no fever, no numbness and no weakness    Neck Pain   This is a chronic problem. The current episode started more than 1 year ago. The problem occurs constantly. The problem has been unchanged. The pain is associated with nothing. The pain is present in the midline. The quality of the pain is described as aching. The pain is at a severity of 3/10. The pain is moderate. The symptoms are aggravated by position and twisting. The pain is Same all the time. Stiffness is present In the morning. Associated symptoms include headaches (occ). Pertinent negatives include no chest pain, fever, numbness or weakness.        PEG Assessment   What number best describes your pain on average in the past week?5  What number best describes how, during the past week, pain has interfered with your enjoyment of life?7  What number best describes how, during the past week, pain has interfered with your general activity?  7    Review of Pertinent Medical Data ---  No new imaging review today    The following portions of the patient's history were reviewed and updated as appropriate: allergies, current medications, past family history, past medical history, past social history, past surgical history, and problem list.    Review of Systems   Constitutional:  Negative for activity change and fever.   Cardiovascular:  Negative for chest pain.   Gastrointestinal:  Positive for constipation. Negative for abdominal pain and diarrhea.   Genitourinary:  Negative for difficulty urinating and dysuria.   Musculoskeletal:  Positive for back pain and neck pain.   Neurological:  Positive for headaches (occ). Negative for weakness, light-headedness and numbness.   Psychiatric/Behavioral:  Negative for self-injury, sleep disturbance and suicidal ideas.      I have reviewed and confirmed the accuracy of the ROS as documented by the MA/JUAN/ALLY Solis   Vitals:    07/03/25 0935   BP: 98/59   Pulse: 81   Resp: 16  "  Temp: 97.4 °F (36.3 °C)   SpO2: 94%   Weight: 84.8 kg (187 lb)   Height: 152.4 cm (60\")   PainSc: 4          Objective   Physical Exam  Vitals and nursing note reviewed.   Constitutional:       Appearance: Normal appearance. She is obese.   HENT:      Head: Normocephalic.   Musculoskeletal:      Lumbar back: Spasms and tenderness (Tenderness to palpation within the overlying bilateral lumbar facet joint spaces as well as within the bilateral paraspinal muscles) present. Decreased range of motion.        Back:    Neurological:      Mental Status: She is alert and oriented to person, place, and time.      Cranial Nerves: Cranial nerves 2-12 are intact.      Sensory: Sensation is intact.      Motor: Motor function is intact.      Gait: Gait is intact.   Psychiatric:         Mood and Affect: Mood normal.         Behavior: Behavior normal.         Thought Content: Thought content normal.         Judgment: Judgment normal.             Assessment & Plan   Diagnoses and all orders for this visit:    1. Lumbar facet arthropathy (Primary)  -     POC Urine Drug Screen, Triage  -     Urine Drug Screen Confirmation - Urine, Clean Catch; Future  -     Urine Drug Screen Confirmation - Urine, Clean Catch    2. Sacroiliac joint dysfunction of both sides  -     POC Urine Drug Screen, Triage  -     Urine Drug Screen Confirmation - Urine, Clean Catch; Future  -     Urine Drug Screen Confirmation - Urine, Clean Catch    3. Degeneration of intervertebral disc of lumbar region with discogenic back pain  -     POC Urine Drug Screen, Triage  -     Urine Drug Screen Confirmation - Urine, Clean Catch; Future  -     Urine Drug Screen Confirmation - Urine, Clean Catch    4. Spinal stenosis, cervical region  -     POC Urine Drug Screen, Triage  -     Urine Drug Screen Confirmation - Urine, Clean Catch; Future  -     Urine Drug Screen Confirmation - Urine, Clean Catch  -     MRI Lumbar Spine Without Contrast; Future    5. Cervical spondylosis " without myelopathy  -     POC Urine Drug Screen, Triage  -     Urine Drug Screen Confirmation - Urine, Clean Catch; Future  -     Urine Drug Screen Confirmation - Urine, Clean Catch    6. Encounter for long-term (current) use of high-risk medication  -     POC Urine Drug Screen, Triage  -     Urine Drug Screen Confirmation - Urine, Clean Catch; Future  -     Urine Drug Screen Confirmation - Urine, Clean Catch    7. Muscle spasm  -     methocarbamol (ROBAXIN) 750 MG tablet; Take 1 tablet by mouth 2 (Two) Times a Day As Needed for Muscle Spasms.  Dispense: 60 tablet; Refill: 1        Traci King reports a pain score of 4.  Given her pain assessment as noted, treatment options were discussed and the following options were decided upon as a follow-up plan to address the patient's pain: continuation of current treatment plan for pain, prescription for non-opiod analgesics, prescription for opiod analgesics, and use of non-medical modalities (ice, heat, stretching and/or behavior modifications).    PHQ-2 Depression Screening  Little interest or pleasure in doing things? Not at all   Feeling down, depressed, or hopeless? Not at all   PHQ-2 Total Score 0     Tobacco Use: Medium Risk (7/3/2025)    Patient History     Smoking Tobacco Use: Former     Smokeless Tobacco Use: Never     Passive Exposure: Not on file         --- Follow-up in 1 month or sooner for medication manage  --- Refill oxycodone 10 mg. Patient appears stable with current regimen. No adverse effects. Regarding continuation of opioids, there is no evidence of aberrant behavior or any red flags.  The patient continues with appropriate response to opioid therapy. ADL's remain intact by self.   --- Moderate risk for opiate abuse/diversion  --- Will assess her response to lumbar radiofrequency ablation at her next office visit    Routine UDS in office today as part of monitoring requirements for controlled substances.  The specimen was viewed and the  immunoassay result reviewed and is positive for oxycodone and benzodiazepines and opiate.  This specimen will be sent to [facility] for confirmation.        The patient signed an updated copy of the controlled substance agreement on 12/4/2024.          MATTHEW REPORT  As part of the patient's treatment plan, I am prescribing controlled substances. The patient has been made aware of appropriate use of such medications, including potential risk of somnolence, limited ability to drive and/or work safely, and the potential for dependence or overdose. It has also been made clear that these medications are for use by this patient only, without concomitant use of alcohol or other substances unless prescribed.     Patient has completed prescribing agreement detailing terms of continued prescribing of controlled substances, including monitoring MATTHEW reports, urine drug screening, and pill counts if necessary. The patient is aware that inappropriate use will results in cessation of prescribing such medications.    As the clinician, I personally reviewed the MATTHEW from 7/3/2025 while the patient was in the office today.    History and physical exam exhibit continued safe and appropriate use of controlled substances.       Dictated utilizing Dragon dictation.

## 2025-08-06 ENCOUNTER — APPOINTMENT (OUTPATIENT)
Dept: CT IMAGING | Facility: HOSPITAL | Age: 75
DRG: 644 | End: 2025-08-06
Payer: MEDICARE

## 2025-08-06 ENCOUNTER — OFFICE VISIT (OUTPATIENT)
Dept: PAIN MEDICINE | Facility: CLINIC | Age: 75
End: 2025-08-06
Payer: MEDICARE

## 2025-08-06 ENCOUNTER — HOSPITAL ENCOUNTER (INPATIENT)
Facility: HOSPITAL | Age: 75
LOS: 2 days | Discharge: HOME | DRG: 644 | End: 2025-08-08
Attending: EMERGENCY MEDICINE | Admitting: INTERNAL MEDICINE
Payer: MEDICARE

## 2025-08-06 VITALS
OXYGEN SATURATION: 93 % | RESPIRATION RATE: 16 BRPM | HEART RATE: 68 BPM | WEIGHT: 187 LBS | TEMPERATURE: 97.2 F | SYSTOLIC BLOOD PRESSURE: 136 MMHG | BODY MASS INDEX: 36.71 KG/M2 | DIASTOLIC BLOOD PRESSURE: 78 MMHG | HEIGHT: 60 IN

## 2025-08-06 DIAGNOSIS — Z79.899 ENCOUNTER FOR LONG-TERM (CURRENT) USE OF HIGH-RISK MEDICATION: ICD-10-CM

## 2025-08-06 DIAGNOSIS — E87.1 HYPONATREMIA: Primary | ICD-10-CM

## 2025-08-06 DIAGNOSIS — F41.9 ANXIETY AND DEPRESSION: Chronic | ICD-10-CM

## 2025-08-06 DIAGNOSIS — M54.41 CHRONIC BILATERAL LOW BACK PAIN WITH BILATERAL SCIATICA: ICD-10-CM

## 2025-08-06 DIAGNOSIS — M48.02 SPINAL STENOSIS, CERVICAL REGION: ICD-10-CM

## 2025-08-06 DIAGNOSIS — M47.812 CERVICAL SPONDYLOSIS WITHOUT MYELOPATHY: ICD-10-CM

## 2025-08-06 DIAGNOSIS — G89.29 CHRONIC BILATERAL LOW BACK PAIN WITH BILATERAL SCIATICA: ICD-10-CM

## 2025-08-06 DIAGNOSIS — M53.3 SACROILIAC JOINT DYSFUNCTION OF BOTH SIDES: ICD-10-CM

## 2025-08-06 DIAGNOSIS — M54.42 CHRONIC BILATERAL LOW BACK PAIN WITH BILATERAL SCIATICA: ICD-10-CM

## 2025-08-06 DIAGNOSIS — M47.816 LUMBAR FACET ARTHROPATHY: ICD-10-CM

## 2025-08-06 DIAGNOSIS — S02.2XXA CLOSED FRACTURE OF NASAL BONE, INITIAL ENCOUNTER: ICD-10-CM

## 2025-08-06 DIAGNOSIS — F32.A ANXIETY AND DEPRESSION: Chronic | ICD-10-CM

## 2025-08-06 DIAGNOSIS — M51.360 DEGENERATION OF INTERVERTEBRAL DISC OF LUMBAR REGION WITH DISCOGENIC BACK PAIN: ICD-10-CM

## 2025-08-06 DIAGNOSIS — M47.816 LUMBAR FACET ARTHROPATHY: Primary | ICD-10-CM

## 2025-08-06 DIAGNOSIS — R93.89 ABNORMAL CT SCAN: ICD-10-CM

## 2025-08-06 LAB
ALBUMIN SERPL-MCNC: 3.9 G/DL (ref 3.5–5.2)
ALBUMIN/GLOB SERPL: 1.4 G/DL
ALP SERPL-CCNC: 89 U/L (ref 39–117)
ALT SERPL W P-5'-P-CCNC: 6 U/L (ref 1–33)
ANION GAP SERPL CALCULATED.3IONS-SCNC: 7.4 MMOL/L (ref 5–15)
AST SERPL-CCNC: 13 U/L (ref 1–32)
B PARAPERT DNA SPEC QL NAA+PROBE: NOT DETECTED
B PERT DNA SPEC QL NAA+PROBE: NOT DETECTED
BASOPHILS # BLD AUTO: 0.04 10*3/MM3 (ref 0–0.2)
BASOPHILS NFR BLD AUTO: 0.6 % (ref 0–1.5)
BILIRUB SERPL-MCNC: 0.2 MG/DL (ref 0–1.2)
BILIRUB UR QL STRIP: NEGATIVE
BUN SERPL-MCNC: 15.4 MG/DL (ref 8–23)
BUN/CREAT SERPL: 14.4 (ref 7–25)
C PNEUM DNA NPH QL NAA+NON-PROBE: NOT DETECTED
CALCIUM SPEC-SCNC: 9.1 MG/DL (ref 8.6–10.5)
CHLORIDE SERPL-SCNC: 91 MMOL/L (ref 98–107)
CK SERPL-CCNC: 176 U/L (ref 20–180)
CLARITY UR: CLEAR
CO2 SERPL-SCNC: 23.6 MMOL/L (ref 22–29)
COLOR UR: YELLOW
CREAT SERPL-MCNC: 1.07 MG/DL (ref 0.57–1)
DEPRECATED RDW RBC AUTO: 44.5 FL (ref 37–54)
EGFRCR SERPLBLD CKD-EPI 2021: 55 ML/MIN/1.73
EOSINOPHIL # BLD AUTO: 0.07 10*3/MM3 (ref 0–0.4)
EOSINOPHIL NFR BLD AUTO: 1 % (ref 0.3–6.2)
ERYTHROCYTE [DISTWIDTH] IN BLOOD BY AUTOMATED COUNT: 12.7 % (ref 12.3–15.4)
FLUAV SUBTYP SPEC NAA+PROBE: NOT DETECTED
FLUBV RNA NPH QL NAA+NON-PROBE: NOT DETECTED
GLOBULIN UR ELPH-MCNC: 2.7 GM/DL
GLUCOSE SERPL-MCNC: 100 MG/DL (ref 65–99)
GLUCOSE UR STRIP-MCNC: NEGATIVE MG/DL
HADV DNA SPEC NAA+PROBE: NOT DETECTED
HCOV 229E RNA SPEC QL NAA+PROBE: NOT DETECTED
HCOV HKU1 RNA SPEC QL NAA+PROBE: NOT DETECTED
HCOV NL63 RNA SPEC QL NAA+PROBE: NOT DETECTED
HCOV OC43 RNA SPEC QL NAA+PROBE: NOT DETECTED
HCT VFR BLD AUTO: 34.2 % (ref 34–46.6)
HGB BLD-MCNC: 11.2 G/DL (ref 12–15.9)
HGB UR QL STRIP.AUTO: ABNORMAL
HMPV RNA NPH QL NAA+NON-PROBE: NOT DETECTED
HPIV1 RNA ISLT QL NAA+PROBE: NOT DETECTED
HPIV2 RNA SPEC QL NAA+PROBE: NOT DETECTED
HPIV3 RNA NPH QL NAA+PROBE: NOT DETECTED
HPIV4 P GENE NPH QL NAA+PROBE: NOT DETECTED
IMM GRANULOCYTES # BLD AUTO: 0.03 10*3/MM3 (ref 0–0.05)
IMM GRANULOCYTES NFR BLD AUTO: 0.4 % (ref 0–0.5)
KETONES UR QL STRIP: NEGATIVE
LEUKOCYTE ESTERASE UR QL STRIP.AUTO: ABNORMAL
LIPASE SERPL-CCNC: 11 U/L (ref 13–60)
LYMPHOCYTES # BLD AUTO: 0.82 10*3/MM3 (ref 0.7–3.1)
LYMPHOCYTES NFR BLD AUTO: 12.2 % (ref 19.6–45.3)
M PNEUMO IGG SER IA-ACNC: NOT DETECTED
MAGNESIUM SERPL-MCNC: 1.8 MG/DL (ref 1.6–2.4)
MCH RBC QN AUTO: 30.9 PG (ref 26.6–33)
MCHC RBC AUTO-ENTMCNC: 32.7 G/DL (ref 31.5–35.7)
MCV RBC AUTO: 94.5 FL (ref 79–97)
MONOCYTES # BLD AUTO: 0.48 10*3/MM3 (ref 0.1–0.9)
MONOCYTES NFR BLD AUTO: 7.1 % (ref 5–12)
NEUTROPHILS NFR BLD AUTO: 5.28 10*3/MM3 (ref 1.7–7)
NEUTROPHILS NFR BLD AUTO: 78.7 % (ref 42.7–76)
NITRITE UR QL STRIP: NEGATIVE
NT-PROBNP SERPL-MCNC: 815.8 PG/ML (ref 0–900)
OSMOLALITY SERPL: 272 MOSM/KG (ref 280–301)
OSMOLALITY UR: 319 MOSM/KG
PH UR STRIP.AUTO: 6.5 [PH] (ref 5–8)
PHOSPHATE SERPL-MCNC: 2.9 MG/DL (ref 2.5–4.5)
PLATELET # BLD AUTO: 258 10*3/MM3 (ref 140–450)
PMV BLD AUTO: 8.3 FL (ref 6–12)
POTASSIUM SERPL-SCNC: 4.7 MMOL/L (ref 3.5–5.2)
PROT SERPL-MCNC: 6.6 G/DL (ref 6–8.5)
PROT UR QL STRIP: NEGATIVE
RBC # BLD AUTO: 3.62 10*6/MM3 (ref 3.77–5.28)
RHINOVIRUS RNA SPEC NAA+PROBE: NOT DETECTED
RSV RNA NPH QL NAA+NON-PROBE: NOT DETECTED
SARS-COV-2 RNA NPH QL NAA+NON-PROBE: NOT DETECTED
SODIUM SERPL-SCNC: 122 MMOL/L (ref 136–145)
SODIUM UR-SCNC: 36 MMOL/L
SP GR UR STRIP: 1.03 (ref 1–1.03)
UROBILINOGEN UR QL STRIP: ABNORMAL
WBC NRBC COR # BLD AUTO: 6.72 10*3/MM3 (ref 3.4–10.8)

## 2025-08-06 PROCEDURE — 70450 CT HEAD/BRAIN W/O DYE: CPT

## 2025-08-06 PROCEDURE — 99285 EMERGENCY DEPT VISIT HI MDM: CPT

## 2025-08-06 PROCEDURE — 70486 CT MAXILLOFACIAL W/O DYE: CPT

## 2025-08-06 PROCEDURE — 83935 ASSAY OF URINE OSMOLALITY: CPT | Performed by: INTERNAL MEDICINE

## 2025-08-06 PROCEDURE — 82550 ASSAY OF CK (CPK): CPT | Performed by: INTERNAL MEDICINE

## 2025-08-06 PROCEDURE — 25810000003 SODIUM CHLORIDE 0.9 % SOLUTION

## 2025-08-06 PROCEDURE — 83735 ASSAY OF MAGNESIUM: CPT | Performed by: INTERNAL MEDICINE

## 2025-08-06 PROCEDURE — 83880 ASSAY OF NATRIURETIC PEPTIDE: CPT

## 2025-08-06 PROCEDURE — 25010000002 ONDANSETRON PER 1 MG: Performed by: INTERNAL MEDICINE

## 2025-08-06 PROCEDURE — 0202U NFCT DS 22 TRGT SARS-COV-2: CPT | Performed by: INTERNAL MEDICINE

## 2025-08-06 PROCEDURE — 85025 COMPLETE CBC W/AUTO DIFF WBC: CPT

## 2025-08-06 PROCEDURE — 74177 CT ABD & PELVIS W/CONTRAST: CPT

## 2025-08-06 PROCEDURE — 99285 EMERGENCY DEPT VISIT HI MDM: CPT | Performed by: EMERGENCY MEDICINE

## 2025-08-06 PROCEDURE — 83690 ASSAY OF LIPASE: CPT

## 2025-08-06 PROCEDURE — 25510000001 IOPAMIDOL PER 1 ML: Performed by: EMERGENCY MEDICINE

## 2025-08-06 PROCEDURE — 84100 ASSAY OF PHOSPHORUS: CPT | Performed by: INTERNAL MEDICINE

## 2025-08-06 PROCEDURE — 25010000002 ONDANSETRON PER 1 MG

## 2025-08-06 PROCEDURE — 83930 ASSAY OF BLOOD OSMOLALITY: CPT

## 2025-08-06 PROCEDURE — 80053 COMPREHEN METABOLIC PANEL: CPT

## 2025-08-06 PROCEDURE — 84300 ASSAY OF URINE SODIUM: CPT | Performed by: INTERNAL MEDICINE

## 2025-08-06 PROCEDURE — 36415 COLL VENOUS BLD VENIPUNCTURE: CPT

## 2025-08-06 PROCEDURE — 81003 URINALYSIS AUTO W/O SCOPE: CPT

## 2025-08-06 RX ORDER — ECHINACEA PURPUREA EXTRACT 125 MG
2 TABLET ORAL
Status: DISCONTINUED | OUTPATIENT
Start: 2025-08-06 | End: 2025-08-08 | Stop reason: HOSPADM

## 2025-08-06 RX ORDER — BISACODYL 5 MG/1
5 TABLET, DELAYED RELEASE ORAL DAILY PRN
Status: DISCONTINUED | OUTPATIENT
Start: 2025-08-06 | End: 2025-08-08 | Stop reason: HOSPADM

## 2025-08-06 RX ORDER — OXYCODONE AND ACETAMINOPHEN 10; 325 MG/1; MG/1
1 TABLET ORAL EVERY 4 HOURS PRN
Refills: 0 | Status: DISCONTINUED | OUTPATIENT
Start: 2025-08-10 | End: 2025-08-06

## 2025-08-06 RX ORDER — CALCIUM CARBONATE 500 MG/1
2 TABLET, CHEWABLE ORAL 2 TIMES DAILY PRN
Status: DISCONTINUED | OUTPATIENT
Start: 2025-08-06 | End: 2025-08-08 | Stop reason: HOSPADM

## 2025-08-06 RX ORDER — NALOXONE HCL 0.4 MG/ML
0.4 VIAL (ML) INJECTION
Status: DISCONTINUED | OUTPATIENT
Start: 2025-08-06 | End: 2025-08-08 | Stop reason: HOSPADM

## 2025-08-06 RX ORDER — ACETAMINOPHEN 160 MG/5ML
650 SOLUTION ORAL EVERY 4 HOURS PRN
Status: DISCONTINUED | OUTPATIENT
Start: 2025-08-06 | End: 2025-08-08 | Stop reason: HOSPADM

## 2025-08-06 RX ORDER — SODIUM CHLORIDE 0.9 % (FLUSH) 0.9 %
10 SYRINGE (ML) INJECTION EVERY 12 HOURS SCHEDULED
Status: DISCONTINUED | OUTPATIENT
Start: 2025-08-06 | End: 2025-08-08 | Stop reason: HOSPADM

## 2025-08-06 RX ORDER — SODIUM CHLORIDE 0.9 % (FLUSH) 0.9 %
10 SYRINGE (ML) INJECTION AS NEEDED
Status: DISCONTINUED | OUTPATIENT
Start: 2025-08-06 | End: 2025-08-08 | Stop reason: HOSPADM

## 2025-08-06 RX ORDER — NITROGLYCERIN 0.4 MG/1
0.4 TABLET SUBLINGUAL
Status: DISCONTINUED | OUTPATIENT
Start: 2025-08-06 | End: 2025-08-08 | Stop reason: HOSPADM

## 2025-08-06 RX ORDER — LOSARTAN POTASSIUM 50 MG/1
50 TABLET ORAL 2 TIMES DAILY
COMMUNITY

## 2025-08-06 RX ORDER — LIDOCAINE HYDROCHLORIDE 20 MG/ML
JELLY TOPICAL AS NEEDED
Status: DISCONTINUED | OUTPATIENT
Start: 2025-08-06 | End: 2025-08-08 | Stop reason: HOSPADM

## 2025-08-06 RX ORDER — TEMAZEPAM 15 MG/1
30 CAPSULE ORAL NIGHTLY PRN
Status: DISCONTINUED | OUTPATIENT
Start: 2025-08-06 | End: 2025-08-08 | Stop reason: HOSPADM

## 2025-08-06 RX ORDER — ACETAMINOPHEN 325 MG/1
650 TABLET ORAL ONCE
Status: COMPLETED | OUTPATIENT
Start: 2025-08-06 | End: 2025-08-06

## 2025-08-06 RX ORDER — OXYCODONE AND ACETAMINOPHEN 10; 325 MG/1; MG/1
1 TABLET ORAL EVERY 4 HOURS PRN
Refills: 0 | Status: DISCONTINUED | OUTPATIENT
Start: 2025-08-06 | End: 2025-08-08 | Stop reason: HOSPADM

## 2025-08-06 RX ORDER — ONDANSETRON 2 MG/ML
4 INJECTION INTRAMUSCULAR; INTRAVENOUS EVERY 6 HOURS PRN
Status: DISCONTINUED | OUTPATIENT
Start: 2025-08-06 | End: 2025-08-08 | Stop reason: HOSPADM

## 2025-08-06 RX ORDER — METHOCARBAMOL 500 MG/1
750 TABLET, FILM COATED ORAL 2 TIMES DAILY PRN
Status: DISCONTINUED | OUTPATIENT
Start: 2025-08-06 | End: 2025-08-08 | Stop reason: HOSPADM

## 2025-08-06 RX ORDER — POLYETHYLENE GLYCOL 3350 17 G/17G
17 POWDER, FOR SOLUTION ORAL DAILY PRN
Status: DISCONTINUED | OUTPATIENT
Start: 2025-08-06 | End: 2025-08-07

## 2025-08-06 RX ORDER — ONDANSETRON 2 MG/ML
4 INJECTION INTRAMUSCULAR; INTRAVENOUS ONCE
Status: COMPLETED | OUTPATIENT
Start: 2025-08-06 | End: 2025-08-06

## 2025-08-06 RX ORDER — OXYCODONE AND ACETAMINOPHEN 10; 325 MG/1; MG/1
1 TABLET ORAL EVERY 4 HOURS PRN
Qty: 180 TABLET | Refills: 0 | Status: SHIPPED | OUTPATIENT
Start: 2025-08-10

## 2025-08-06 RX ORDER — SODIUM CHLORIDE 9 MG/ML
40 INJECTION, SOLUTION INTRAVENOUS AS NEEDED
Status: DISCONTINUED | OUTPATIENT
Start: 2025-08-06 | End: 2025-08-08 | Stop reason: HOSPADM

## 2025-08-06 RX ORDER — BISACODYL 10 MG
10 SUPPOSITORY, RECTAL RECTAL DAILY PRN
Status: DISCONTINUED | OUTPATIENT
Start: 2025-08-06 | End: 2025-08-08 | Stop reason: HOSPADM

## 2025-08-06 RX ORDER — ONDANSETRON 4 MG/1
4 TABLET, ORALLY DISINTEGRATING ORAL EVERY 6 HOURS PRN
Status: DISCONTINUED | OUTPATIENT
Start: 2025-08-06 | End: 2025-08-08 | Stop reason: HOSPADM

## 2025-08-06 RX ORDER — AMOXICILLIN 250 MG
2 CAPSULE ORAL 2 TIMES DAILY
Status: DISCONTINUED | OUTPATIENT
Start: 2025-08-06 | End: 2025-08-08 | Stop reason: HOSPADM

## 2025-08-06 RX ORDER — ACETAMINOPHEN 325 MG/1
650 TABLET ORAL EVERY 4 HOURS PRN
Status: DISCONTINUED | OUTPATIENT
Start: 2025-08-06 | End: 2025-08-08 | Stop reason: HOSPADM

## 2025-08-06 RX ORDER — LOSARTAN POTASSIUM 25 MG/1
50 TABLET ORAL 2 TIMES DAILY
Status: DISCONTINUED | OUTPATIENT
Start: 2025-08-06 | End: 2025-08-08 | Stop reason: HOSPADM

## 2025-08-06 RX ORDER — ACETAMINOPHEN 650 MG/1
650 SUPPOSITORY RECTAL EVERY 4 HOURS PRN
Status: DISCONTINUED | OUTPATIENT
Start: 2025-08-06 | End: 2025-08-08 | Stop reason: HOSPADM

## 2025-08-06 RX ORDER — OXYCODONE HYDROCHLORIDE 5 MG/1
5 TABLET ORAL ONCE
Refills: 0 | Status: COMPLETED | OUTPATIENT
Start: 2025-08-06 | End: 2025-08-06

## 2025-08-06 RX ORDER — IOPAMIDOL 755 MG/ML
100 INJECTION, SOLUTION INTRAVASCULAR
Status: COMPLETED | OUTPATIENT
Start: 2025-08-06 | End: 2025-08-06

## 2025-08-06 RX ORDER — SODIUM CHLORIDE 9 MG/ML
125 INJECTION, SOLUTION INTRAVENOUS CONTINUOUS
Status: ACTIVE | OUTPATIENT
Start: 2025-08-06 | End: 2025-08-06

## 2025-08-06 RX ADMIN — TEMAZEPAM 30 MG: 15 CAPSULE ORAL at 21:46

## 2025-08-06 RX ADMIN — IOPAMIDOL 85 ML: 755 INJECTION, SOLUTION INTRAVENOUS at 12:39

## 2025-08-06 RX ADMIN — ONDANSETRON 4 MG: 2 INJECTION INTRAMUSCULAR; INTRAVENOUS at 15:04

## 2025-08-06 RX ADMIN — ACETAMINOPHEN 650 MG: 325 TABLET ORAL at 16:49

## 2025-08-06 RX ADMIN — SODIUM CHLORIDE 125 ML/HR: 9 INJECTION, SOLUTION INTRAVENOUS at 19:00

## 2025-08-06 RX ADMIN — LOSARTAN POTASSIUM 50 MG: 25 TABLET, FILM COATED ORAL at 20:43

## 2025-08-06 RX ADMIN — OXYCODONE HYDROCHLORIDE 5 MG: 5 TABLET ORAL at 14:59

## 2025-08-06 RX ADMIN — Medication 10 ML: at 20:43

## 2025-08-06 RX ADMIN — SODIUM CHLORIDE 1000 ML: 9 INJECTION, SOLUTION INTRAVENOUS at 12:48

## 2025-08-06 RX ADMIN — SENNOSIDES AND DOCUSATE SODIUM 2 TABLET: 50; 8.6 TABLET ORAL at 20:43

## 2025-08-06 RX ADMIN — OXYCODONE AND ACETAMINOPHEN 1 TABLET: 325; 10 TABLET ORAL at 22:01

## 2025-08-06 RX ADMIN — ONDANSETRON 4 MG: 2 INJECTION INTRAMUSCULAR; INTRAVENOUS at 22:01

## 2025-08-07 ENCOUNTER — APPOINTMENT (OUTPATIENT)
Dept: CT IMAGING | Facility: HOSPITAL | Age: 75
DRG: 644 | End: 2025-08-07
Payer: MEDICARE

## 2025-08-07 LAB
ALBUMIN SERPL-MCNC: 3.5 G/DL (ref 3.5–5.2)
ALBUMIN/GLOB SERPL: 1.3 G/DL
ALP SERPL-CCNC: 80 U/L (ref 39–117)
ALT SERPL W P-5'-P-CCNC: 9 U/L (ref 1–33)
ANION GAP SERPL CALCULATED.3IONS-SCNC: 10.3 MMOL/L (ref 5–15)
ANION GAP SERPL CALCULATED.3IONS-SCNC: 11 MMOL/L (ref 5–15)
ANION GAP SERPL CALCULATED.3IONS-SCNC: 8.6 MMOL/L (ref 5–15)
AST SERPL-CCNC: 15 U/L (ref 1–32)
BASOPHILS # BLD AUTO: 0.03 10*3/MM3 (ref 0–0.2)
BASOPHILS NFR BLD AUTO: 0.5 % (ref 0–1.5)
BILIRUB SERPL-MCNC: 0.2 MG/DL (ref 0–1.2)
BUN SERPL-MCNC: 10 MG/DL (ref 8–23)
BUN SERPL-MCNC: 11 MG/DL (ref 8–23)
BUN SERPL-MCNC: 12 MG/DL (ref 8–23)
BUN/CREAT SERPL: 10.4 (ref 7–25)
BUN/CREAT SERPL: 10.7 (ref 7–25)
BUN/CREAT SERPL: 12 (ref 7–25)
CALCIUM SPEC-SCNC: 8.9 MG/DL (ref 8.6–10.5)
CALCIUM SPEC-SCNC: 9 MG/DL (ref 8.6–10.5)
CALCIUM SPEC-SCNC: 9.2 MG/DL (ref 8.6–10.5)
CHLORIDE SERPL-SCNC: 101 MMOL/L (ref 98–107)
CHLORIDE SERPL-SCNC: 101 MMOL/L (ref 98–107)
CHLORIDE SERPL-SCNC: 102 MMOL/L (ref 98–107)
CO2 SERPL-SCNC: 21 MMOL/L (ref 22–29)
CO2 SERPL-SCNC: 22.7 MMOL/L (ref 22–29)
CO2 SERPL-SCNC: 23.4 MMOL/L (ref 22–29)
CORTIS SERPL-MCNC: 8.39 MCG/DL
CREAT SERPL-MCNC: 0.96 MG/DL (ref 0.57–1)
CREAT SERPL-MCNC: 1 MG/DL (ref 0.57–1)
CREAT SERPL-MCNC: 1.03 MG/DL (ref 0.57–1)
DEPRECATED RDW RBC AUTO: 42.5 FL (ref 37–54)
EGFRCR SERPLBLD CKD-EPI 2021: 57.5 ML/MIN/1.73
EGFRCR SERPLBLD CKD-EPI 2021: 59.6 ML/MIN/1.73
EGFRCR SERPLBLD CKD-EPI 2021: 62.6 ML/MIN/1.73
EOSINOPHIL # BLD AUTO: 0.09 10*3/MM3 (ref 0–0.4)
EOSINOPHIL NFR BLD AUTO: 1.4 % (ref 0.3–6.2)
ERYTHROCYTE [DISTWIDTH] IN BLOOD BY AUTOMATED COUNT: 12.7 % (ref 12.3–15.4)
GLOBULIN UR ELPH-MCNC: 2.8 GM/DL
GLUCOSE SERPL-MCNC: 108 MG/DL (ref 65–99)
GLUCOSE SERPL-MCNC: 86 MG/DL (ref 65–99)
GLUCOSE SERPL-MCNC: 96 MG/DL (ref 65–99)
HCT VFR BLD AUTO: 32.8 % (ref 34–46.6)
HGB BLD-MCNC: 10.8 G/DL (ref 12–15.9)
IMM GRANULOCYTES # BLD AUTO: 0.03 10*3/MM3 (ref 0–0.05)
IMM GRANULOCYTES NFR BLD AUTO: 0.5 % (ref 0–0.5)
INR PPP: 1.05 (ref 0.9–1.1)
LYMPHOCYTES # BLD AUTO: 1.32 10*3/MM3 (ref 0.7–3.1)
LYMPHOCYTES NFR BLD AUTO: 21 % (ref 19.6–45.3)
MAGNESIUM SERPL-MCNC: 2 MG/DL (ref 1.6–2.4)
MCH RBC QN AUTO: 30.6 PG (ref 26.6–33)
MCHC RBC AUTO-ENTMCNC: 32.9 G/DL (ref 31.5–35.7)
MCV RBC AUTO: 92.9 FL (ref 79–97)
MONOCYTES # BLD AUTO: 0.62 10*3/MM3 (ref 0.1–0.9)
MONOCYTES NFR BLD AUTO: 9.9 % (ref 5–12)
NEUTROPHILS NFR BLD AUTO: 4.19 10*3/MM3 (ref 1.7–7)
NEUTROPHILS NFR BLD AUTO: 66.7 % (ref 42.7–76)
NRBC BLD AUTO-RTO: 0 /100 WBC (ref 0–0.2)
PLATELET # BLD AUTO: 293 10*3/MM3 (ref 140–450)
PMV BLD AUTO: 8.8 FL (ref 6–12)
POTASSIUM SERPL-SCNC: 4.3 MMOL/L (ref 3.5–5.2)
PROT SERPL-MCNC: 6.3 G/DL (ref 6–8.5)
PROTHROMBIN TIME: 13.6 SECONDS (ref 11.7–14.2)
RBC # BLD AUTO: 3.53 10*6/MM3 (ref 3.77–5.28)
SODIUM SERPL-SCNC: 133 MMOL/L (ref 136–145)
SODIUM SERPL-SCNC: 133 MMOL/L (ref 136–145)
SODIUM SERPL-SCNC: 135 MMOL/L (ref 136–145)
T4 FREE SERPL-MCNC: 1.06 NG/DL (ref 0.92–1.68)
TSH SERPL DL<=0.05 MIU/L-ACNC: 1.23 UIU/ML (ref 0.27–4.2)
URATE SERPL-MCNC: 4.4 MG/DL (ref 2.4–5.7)
WBC NRBC COR # BLD AUTO: 6.28 10*3/MM3 (ref 3.4–10.8)

## 2025-08-07 PROCEDURE — 84439 ASSAY OF FREE THYROXINE: CPT | Performed by: INTERNAL MEDICINE

## 2025-08-07 PROCEDURE — 83735 ASSAY OF MAGNESIUM: CPT | Performed by: INTERNAL MEDICINE

## 2025-08-07 PROCEDURE — 71260 CT THORAX DX C+: CPT

## 2025-08-07 PROCEDURE — 84443 ASSAY THYROID STIM HORMONE: CPT | Performed by: INTERNAL MEDICINE

## 2025-08-07 PROCEDURE — 63710000001 ONDANSETRON ODT 4 MG TABLET DISPERSIBLE: Performed by: INTERNAL MEDICINE

## 2025-08-07 PROCEDURE — 84550 ASSAY OF BLOOD/URIC ACID: CPT | Performed by: INTERNAL MEDICINE

## 2025-08-07 PROCEDURE — 25010000003 DEXTROSE 5 % SOLUTION: Performed by: STUDENT IN AN ORGANIZED HEALTH CARE EDUCATION/TRAINING PROGRAM

## 2025-08-07 PROCEDURE — 80053 COMPREHEN METABOLIC PANEL: CPT | Performed by: INTERNAL MEDICINE

## 2025-08-07 PROCEDURE — 82533 TOTAL CORTISOL: CPT | Performed by: INTERNAL MEDICINE

## 2025-08-07 PROCEDURE — 85025 COMPLETE CBC W/AUTO DIFF WBC: CPT | Performed by: INTERNAL MEDICINE

## 2025-08-07 PROCEDURE — 25510000001 IOPAMIDOL 61 % SOLUTION: Performed by: STUDENT IN AN ORGANIZED HEALTH CARE EDUCATION/TRAINING PROGRAM

## 2025-08-07 PROCEDURE — 25010000002 ONDANSETRON PER 1 MG: Performed by: INTERNAL MEDICINE

## 2025-08-07 PROCEDURE — 85610 PROTHROMBIN TIME: CPT | Performed by: INTERNAL MEDICINE

## 2025-08-07 RX ORDER — IOPAMIDOL 612 MG/ML
85 INJECTION, SOLUTION INTRAVASCULAR
Status: COMPLETED | OUTPATIENT
Start: 2025-08-07 | End: 2025-08-07

## 2025-08-07 RX ORDER — DEXTROSE MONOHYDRATE 50 MG/ML
100 INJECTION, SOLUTION INTRAVENOUS CONTINUOUS
Status: DISCONTINUED | OUTPATIENT
Start: 2025-08-07 | End: 2025-08-07

## 2025-08-07 RX ADMIN — Medication 10 ML: at 20:17

## 2025-08-07 RX ADMIN — LOSARTAN POTASSIUM 50 MG: 25 TABLET, FILM COATED ORAL at 08:31

## 2025-08-07 RX ADMIN — OXYCODONE AND ACETAMINOPHEN 1 TABLET: 325; 10 TABLET ORAL at 02:24

## 2025-08-07 RX ADMIN — Medication 10 ML: at 08:31

## 2025-08-07 RX ADMIN — OXYCODONE AND ACETAMINOPHEN 1 TABLET: 325; 10 TABLET ORAL at 10:46

## 2025-08-07 RX ADMIN — LOSARTAN POTASSIUM 50 MG: 25 TABLET, FILM COATED ORAL at 20:17

## 2025-08-07 RX ADMIN — SENNOSIDES AND DOCUSATE SODIUM 2 TABLET: 50; 8.6 TABLET ORAL at 08:31

## 2025-08-07 RX ADMIN — OXYCODONE AND ACETAMINOPHEN 1 TABLET: 325; 10 TABLET ORAL at 14:49

## 2025-08-07 RX ADMIN — ACETAMINOPHEN 650 MG: 325 TABLET ORAL at 08:36

## 2025-08-07 RX ADMIN — OXYCODONE AND ACETAMINOPHEN 1 TABLET: 325; 10 TABLET ORAL at 18:55

## 2025-08-07 RX ADMIN — ACETAMINOPHEN 650 MG: 325 TABLET ORAL at 21:05

## 2025-08-07 RX ADMIN — OXYCODONE AND ACETAMINOPHEN 1 TABLET: 325; 10 TABLET ORAL at 23:28

## 2025-08-07 RX ADMIN — TEMAZEPAM 15 MG: 15 CAPSULE ORAL at 23:29

## 2025-08-07 RX ADMIN — ONDANSETRON 4 MG: 2 INJECTION INTRAMUSCULAR; INTRAVENOUS at 23:28

## 2025-08-07 RX ADMIN — DEXTROSE MONOHYDRATE 100 ML/HR: 50 INJECTION, SOLUTION INTRAVENOUS at 11:20

## 2025-08-07 RX ADMIN — OXYCODONE AND ACETAMINOPHEN 1 TABLET: 325; 10 TABLET ORAL at 06:44

## 2025-08-07 RX ADMIN — ONDANSETRON 4 MG: 4 TABLET, ORALLY DISINTEGRATING ORAL at 17:10

## 2025-08-07 RX ADMIN — IOPAMIDOL 85 ML: 612 INJECTION, SOLUTION INTRAVENOUS at 07:51

## 2025-08-07 RX ADMIN — ACETAMINOPHEN 650 MG: 325 TABLET ORAL at 16:58

## 2025-08-08 ENCOUNTER — READMISSION MANAGEMENT (OUTPATIENT)
Dept: CALL CENTER | Facility: HOSPITAL | Age: 75
End: 2025-08-08
Payer: MEDICARE

## 2025-08-08 VITALS
BODY MASS INDEX: 37.14 KG/M2 | HEIGHT: 60 IN | WEIGHT: 189.18 LBS | HEART RATE: 79 BPM | RESPIRATION RATE: 18 BRPM | OXYGEN SATURATION: 94 % | SYSTOLIC BLOOD PRESSURE: 150 MMHG | DIASTOLIC BLOOD PRESSURE: 81 MMHG | TEMPERATURE: 97.3 F

## 2025-08-08 PROBLEM — E87.1 HYPONATREMIA: Status: RESOLVED | Noted: 2025-08-06 | Resolved: 2025-08-08

## 2025-08-08 PROBLEM — R93.89 ABNORMAL CT OF THE CHEST: Status: RESOLVED | Noted: 2025-08-06 | Resolved: 2025-08-08

## 2025-08-08 LAB
ALBUMIN SERPL-MCNC: 4 G/DL (ref 3.5–5.2)
ALBUMIN/GLOB SERPL: 1.3 G/DL
ALP SERPL-CCNC: 83 U/L (ref 39–117)
ALT SERPL W P-5'-P-CCNC: 10 U/L (ref 1–33)
ANION GAP SERPL CALCULATED.3IONS-SCNC: 12 MMOL/L (ref 5–15)
AST SERPL-CCNC: 16 U/L (ref 1–32)
BASOPHILS # BLD AUTO: 0.05 10*3/MM3 (ref 0–0.2)
BASOPHILS NFR BLD AUTO: 0.6 % (ref 0–1.5)
BILIRUB SERPL-MCNC: 0.2 MG/DL (ref 0–1.2)
BUN SERPL-MCNC: 7 MG/DL (ref 8–23)
BUN/CREAT SERPL: 8 (ref 7–25)
CALCIUM SPEC-SCNC: 9.5 MG/DL (ref 8.6–10.5)
CHLORIDE SERPL-SCNC: 102 MMOL/L (ref 98–107)
CO2 SERPL-SCNC: 23 MMOL/L (ref 22–29)
CREAT SERPL-MCNC: 0.88 MG/DL (ref 0.57–1)
DEPRECATED RDW RBC AUTO: 46 FL (ref 37–54)
EGFRCR SERPLBLD CKD-EPI 2021: 69.5 ML/MIN/1.73
EOSINOPHIL # BLD AUTO: 0.14 10*3/MM3 (ref 0–0.4)
EOSINOPHIL NFR BLD AUTO: 1.8 % (ref 0.3–6.2)
ERYTHROCYTE [DISTWIDTH] IN BLOOD BY AUTOMATED COUNT: 12.8 % (ref 12.3–15.4)
GLOBULIN UR ELPH-MCNC: 3.1 GM/DL
GLUCOSE SERPL-MCNC: 98 MG/DL (ref 65–99)
HCT VFR BLD AUTO: 37.3 % (ref 34–46.6)
HGB BLD-MCNC: 12 G/DL (ref 12–15.9)
IMM GRANULOCYTES # BLD AUTO: 0.05 10*3/MM3 (ref 0–0.05)
IMM GRANULOCYTES NFR BLD AUTO: 0.6 % (ref 0–0.5)
LYMPHOCYTES # BLD AUTO: 1.48 10*3/MM3 (ref 0.7–3.1)
LYMPHOCYTES NFR BLD AUTO: 18.6 % (ref 19.6–45.3)
MCH RBC QN AUTO: 31.1 PG (ref 26.6–33)
MCHC RBC AUTO-ENTMCNC: 32.2 G/DL (ref 31.5–35.7)
MCV RBC AUTO: 96.6 FL (ref 79–97)
MONOCYTES # BLD AUTO: 0.66 10*3/MM3 (ref 0.1–0.9)
MONOCYTES NFR BLD AUTO: 8.3 % (ref 5–12)
NEUTROPHILS NFR BLD AUTO: 5.58 10*3/MM3 (ref 1.7–7)
NEUTROPHILS NFR BLD AUTO: 70.1 % (ref 42.7–76)
NRBC BLD AUTO-RTO: 0 /100 WBC (ref 0–0.2)
PLATELET # BLD AUTO: 317 10*3/MM3 (ref 140–450)
PMV BLD AUTO: 8.7 FL (ref 6–12)
POTASSIUM SERPL-SCNC: 4.1 MMOL/L (ref 3.5–5.2)
PROT SERPL-MCNC: 7.1 G/DL (ref 6–8.5)
RBC # BLD AUTO: 3.86 10*6/MM3 (ref 3.77–5.28)
SODIUM SERPL-SCNC: 137 MMOL/L (ref 136–145)
WBC NRBC COR # BLD AUTO: 7.96 10*3/MM3 (ref 3.4–10.8)

## 2025-08-08 PROCEDURE — 85025 COMPLETE CBC W/AUTO DIFF WBC: CPT | Performed by: INTERNAL MEDICINE

## 2025-08-08 PROCEDURE — 63710000001 ONDANSETRON ODT 4 MG TABLET DISPERSIBLE: Performed by: INTERNAL MEDICINE

## 2025-08-08 PROCEDURE — 80053 COMPREHEN METABOLIC PANEL: CPT | Performed by: INTERNAL MEDICINE

## 2025-08-08 RX ADMIN — LOSARTAN POTASSIUM 50 MG: 25 TABLET, FILM COATED ORAL at 09:45

## 2025-08-08 RX ADMIN — OXYCODONE AND ACETAMINOPHEN 1 TABLET: 325; 10 TABLET ORAL at 09:45

## 2025-08-08 RX ADMIN — ONDANSETRON 4 MG: 4 TABLET, ORALLY DISINTEGRATING ORAL at 07:23

## 2025-08-08 RX ADMIN — OXYCODONE AND ACETAMINOPHEN 1 TABLET: 325; 10 TABLET ORAL at 04:32

## 2025-08-08 RX ADMIN — SENNOSIDES AND DOCUSATE SODIUM 2 TABLET: 50; 8.6 TABLET ORAL at 09:45

## 2025-08-14 ENCOUNTER — READMISSION MANAGEMENT (OUTPATIENT)
Dept: CALL CENTER | Facility: HOSPITAL | Age: 75
End: 2025-08-14
Payer: MEDICARE

## 2025-08-20 ENCOUNTER — HOSPITAL ENCOUNTER (OUTPATIENT)
Dept: MRI IMAGING | Facility: HOSPITAL | Age: 75
Discharge: HOME | End: 2025-08-20
Admitting: PHYSICIAN ASSISTANT
Payer: MEDICARE

## 2025-08-20 DIAGNOSIS — M48.02 SPINAL STENOSIS, CERVICAL REGION: ICD-10-CM

## 2025-08-20 PROCEDURE — 72148 MRI LUMBAR SPINE W/O DYE: CPT

## 2025-08-21 DIAGNOSIS — S32.040D COMPRESSION FRACTURE OF L4 VERTEBRA WITH ROUTINE HEALING: ICD-10-CM

## 2025-08-21 DIAGNOSIS — M47.816 LUMBAR FACET ARTHROPATHY: Primary | ICD-10-CM

## 2025-08-21 DIAGNOSIS — S32.020D COMPRESSION FRACTURE OF L2 VERTEBRA WITH ROUTINE HEALING, SUBSEQUENT ENCOUNTER: ICD-10-CM

## (undated) DEVICE — NDL SPINE 22G 5IN BLK

## (undated) DEVICE — GLIDESHEATH SLENDER STAINLESS STEEL KIT: Brand: GLIDESHEATH SLENDER

## (undated) DEVICE — PAD GRND E/S NT200IX RF/GEN W/CABL DISP

## (undated) DEVICE — GW EMR FIX EXCHG J STD .035 3MM 260CM

## (undated) DEVICE — DGW .035 FC J3MM 260CM TEF: Brand: EMERALD

## (undated) DEVICE — TOWEL,OR,DSP,ST,BLUE,STD,4/PK,20PK/CS: Brand: MEDLINE

## (undated) DEVICE — EPIDURAL TRAY: Brand: MEDLINE INDUSTRIES, INC.

## (undated) DEVICE — GLV SURG TRIUMPH PF LTX 7.5 STRL

## (undated) DEVICE — Device

## (undated) DEVICE — Device: Brand: PORTEX

## (undated) DEVICE — GOWN,SIRUS,NONRNF,SETINSLV,XL,20/CS: Brand: MEDLINE

## (undated) DEVICE — CANN NASL O2 INF

## (undated) DEVICE — BALN PRESS WEDGE 5F 110CM

## (undated) DEVICE — PK CATH CARD 40

## (undated) DEVICE — CATH VENT MIV RADL PIG ST TIP 4F 110CM

## (undated) DEVICE — LN SMPL CO2 SHTRM SD STREAM W/M LUER

## (undated) DEVICE — BLCK/BITE BLOX W/DENTL/RIM W/STRAP 54F

## (undated) DEVICE — NDL SPINE 25G 31/2IN BLU

## (undated) DEVICE — PAD GRND CATHAY W/CABL DISP

## (undated) DEVICE — CATH DIAG IMPULSE FL3.5 5F 100CM

## (undated) DEVICE — CATH DIAG IMPULSE FR4 5F 100CM

## (undated) DEVICE — THE VASC BAND HEMOSTAT IS A COMPRESSION DEVICE TO ASSIST HEMOSTASIS OF ARTERIAL, VENOUS AND HEMODIALYSIS PERCUTANEOUS ACCESS SITES.: Brand: VASC BAND™ HEMOSTAT

## (undated) DEVICE — SENSR O2 OXIMAX FNGR A/ 18IN NONSTR

## (undated) DEVICE — NDL SPINE 22G 31/2IN BLK

## (undated) DEVICE — MSK PROC CURAPLEX O2 2/ADAPT 7FT

## (undated) DEVICE — FRCP BX RADJAW4 NDL 2.8 240CM LG OG BX40

## (undated) DEVICE — ADAPT CLN BIOGUARD AIR/H2O DISP

## (undated) DEVICE — NDL EPID TUOHY W/WINGS 20G 4.5IN

## (undated) DEVICE — GLIDESHEATH BASIC HYDROPHILIC COATED INTRODUCER SHEATH: Brand: GLIDESHEATH

## (undated) DEVICE — KT MANIFLD CARDIAC

## (undated) DEVICE — KT ORCA ORCAPOD DISP STRL

## (undated) DEVICE — TUBING, SUCTION, 1/4" X 10', STRAIGHT: Brand: MEDLINE

## (undated) DEVICE — CATH DIAG DXTERITY ULTRA TRANSRADIAL 4.0 5F 100CM 0/SH